# Patient Record
Sex: FEMALE | Race: WHITE | Employment: OTHER | URBAN - NONMETROPOLITAN AREA
[De-identification: names, ages, dates, MRNs, and addresses within clinical notes are randomized per-mention and may not be internally consistent; named-entity substitution may affect disease eponyms.]

---

## 2017-01-03 RX ORDER — TRAMADOL HYDROCHLORIDE 50 MG/1
TABLET ORAL
Qty: 90 TABLET | Refills: 0 | Status: SHIPPED | OUTPATIENT
Start: 2017-01-03 | End: 2017-02-09 | Stop reason: SDUPTHER

## 2017-01-10 ENCOUNTER — OFFICE VISIT (OUTPATIENT)
Dept: PRIMARY CARE CLINIC | Age: 67
End: 2017-01-10
Payer: MEDICARE

## 2017-01-10 VITALS
BODY MASS INDEX: 46.98 KG/M2 | DIASTOLIC BLOOD PRESSURE: 80 MMHG | OXYGEN SATURATION: 94 % | HEART RATE: 62 BPM | RESPIRATION RATE: 16 BRPM | SYSTOLIC BLOOD PRESSURE: 132 MMHG | TEMPERATURE: 97.2 F | WEIGHT: 261 LBS

## 2017-01-10 DIAGNOSIS — Z79.4 TYPE 2 DIABETES MELLITUS WITH COMPLICATION, WITH LONG-TERM CURRENT USE OF INSULIN (HCC): ICD-10-CM

## 2017-01-10 DIAGNOSIS — E11.8 TYPE 2 DIABETES MELLITUS WITH COMPLICATION, WITH LONG-TERM CURRENT USE OF INSULIN (HCC): ICD-10-CM

## 2017-01-10 DIAGNOSIS — K59.00 CONSTIPATION, UNSPECIFIED CONSTIPATION TYPE: Primary | ICD-10-CM

## 2017-01-10 DIAGNOSIS — G20 PARKINSON DISEASE (HCC): ICD-10-CM

## 2017-01-10 PROCEDURE — 99214 OFFICE O/P EST MOD 30 MIN: CPT | Performed by: NURSE PRACTITIONER

## 2017-01-10 RX ORDER — FLUCONAZOLE 100 MG/1
100 TABLET ORAL DAILY
Qty: 7 TABLET | Refills: 0 | Status: SHIPPED | OUTPATIENT
Start: 2017-01-10 | End: 2017-01-17

## 2017-01-10 ASSESSMENT — ENCOUNTER SYMPTOMS
ABDOMINAL PAIN: 1
CONSTIPATION: 1

## 2017-01-17 ENCOUNTER — TELEPHONE (OUTPATIENT)
Dept: PRIMARY CARE CLINIC | Age: 67
End: 2017-01-17

## 2017-01-17 DIAGNOSIS — R94.4 DECREASED GFR: Primary | ICD-10-CM

## 2017-01-17 DIAGNOSIS — R79.89 ELEVATED PTHRP LEVEL: ICD-10-CM

## 2017-01-17 DIAGNOSIS — E20.1 PSEUDOHYPOPARATHYROIDISM: ICD-10-CM

## 2017-01-17 DIAGNOSIS — R79.89 ABNORMAL TSH: ICD-10-CM

## 2017-01-18 RX ORDER — LOSARTAN POTASSIUM AND HYDROCHLOROTHIAZIDE 25; 100 MG/1; MG/1
TABLET ORAL
Qty: 30 TABLET | Refills: 3 | Status: SHIPPED | OUTPATIENT
Start: 2017-01-18 | End: 2017-04-17 | Stop reason: DRUGHIGH

## 2017-01-18 RX ORDER — OMEPRAZOLE 40 MG/1
CAPSULE, DELAYED RELEASE ORAL
Qty: 30 CAPSULE | Refills: 3 | Status: SHIPPED | OUTPATIENT
Start: 2017-01-18 | End: 2017-05-23 | Stop reason: SDUPTHER

## 2017-01-23 ENCOUNTER — NURSE ONLY (OUTPATIENT)
Dept: PRIMARY CARE CLINIC | Age: 67
End: 2017-01-23
Payer: MEDICARE

## 2017-01-23 DIAGNOSIS — E20.1 PSEUDOHYPOPARATHYROIDISM: ICD-10-CM

## 2017-01-23 DIAGNOSIS — R79.89 ABNORMAL TSH: ICD-10-CM

## 2017-01-23 DIAGNOSIS — R94.4 DECREASED GFR: ICD-10-CM

## 2017-01-23 LAB
ANION GAP SERPL CALCULATED.3IONS-SCNC: 19 MMOL/L (ref 7–19)
BUN BLDV-MCNC: 42 MG/DL (ref 8–23)
CALCIUM SERPL-MCNC: 10 MG/DL (ref 8.8–10.2)
CHLORIDE BLD-SCNC: 99 MMOL/L (ref 98–111)
CO2: 19 MMOL/L (ref 22–29)
CREAT SERPL-MCNC: 1.4 MG/DL (ref 0.5–0.9)
GFR NON-AFRICAN AMERICAN: 38
GLUCOSE BLD-MCNC: 317 MG/DL (ref 74–109)
POTASSIUM SERPL-SCNC: 5.5 MMOL/L (ref 3.5–5)
SODIUM BLD-SCNC: 137 MMOL/L (ref 136–145)
T4 FREE: 1.6 NG/ML (ref 0.9–1.7)
TSH SERPL DL<=0.05 MIU/L-ACNC: 9.49 UIU/ML (ref 0.27–4.2)

## 2017-01-23 PROCEDURE — 36415 COLL VENOUS BLD VENIPUNCTURE: CPT | Performed by: NURSE PRACTITIONER

## 2017-01-24 DIAGNOSIS — I48.0 PAF (PAROXYSMAL ATRIAL FIBRILLATION) (HCC): ICD-10-CM

## 2017-01-24 DIAGNOSIS — N18.32 CHRONIC KIDNEY DISEASE (CKD) STAGE G3B/A3, MODERATELY DECREASED GLOMERULAR FILTRATION RATE (GFR) BETWEEN 30-44 ML/MIN/1.73 SQUARE METER AND ALBUMINURIA CREATININE RATIO GREATER THAN 300 MG/G (HCC): Primary | ICD-10-CM

## 2017-01-24 DIAGNOSIS — Z79.4 TYPE 2 DIABETES MELLITUS WITH COMPLICATION, WITH LONG-TERM CURRENT USE OF INSULIN (HCC): ICD-10-CM

## 2017-01-24 DIAGNOSIS — E11.8 TYPE 2 DIABETES MELLITUS WITH COMPLICATION, WITH LONG-TERM CURRENT USE OF INSULIN (HCC): ICD-10-CM

## 2017-01-25 LAB
THYROGLOBULIN AB: <0.9 IU/ML (ref 0–4)
THYROID PEROXIDASE (TPO) ABS: 3.5 IU/ML (ref 0–9)

## 2017-01-26 ENCOUNTER — TELEPHONE (OUTPATIENT)
Dept: CARDIOLOGY | Age: 67
End: 2017-01-26

## 2017-01-30 ENCOUNTER — APPOINTMENT (OUTPATIENT)
Dept: CT IMAGING | Age: 67
End: 2017-01-30
Payer: MEDICARE

## 2017-01-30 ENCOUNTER — HOSPITAL ENCOUNTER (EMERGENCY)
Age: 67
Discharge: HOME | End: 2017-01-30
Payer: MEDICARE

## 2017-01-30 VITALS
DIASTOLIC BLOOD PRESSURE: 93 MMHG | HEART RATE: 81 BPM | SYSTOLIC BLOOD PRESSURE: 168 MMHG | BODY MASS INDEX: 44.65 KG/M2 | TEMPERATURE: 97.9 F | OXYGEN SATURATION: 96 % | WEIGHT: 252 LBS | HEIGHT: 63 IN | RESPIRATION RATE: 18 BRPM

## 2017-01-30 DIAGNOSIS — S32.591A PUBIC RAMUS FRACTURE, RIGHT, CLOSED, INITIAL ENCOUNTER (HCC): Primary | ICD-10-CM

## 2017-01-30 PROCEDURE — 99283 EMERGENCY DEPT VISIT LOW MDM: CPT | Performed by: NURSE PRACTITIONER

## 2017-01-30 PROCEDURE — 96372 THER/PROPH/DIAG INJ SC/IM: CPT

## 2017-01-30 PROCEDURE — 6360000002 HC RX W HCPCS: Performed by: NURSE PRACTITIONER

## 2017-01-30 PROCEDURE — 99283 EMERGENCY DEPT VISIT LOW MDM: CPT

## 2017-01-30 PROCEDURE — 72192 CT PELVIS W/O DYE: CPT

## 2017-01-30 RX ORDER — PROMETHAZINE HYDROCHLORIDE 25 MG/ML
25 INJECTION, SOLUTION INTRAMUSCULAR; INTRAVENOUS ONCE
Status: COMPLETED | OUTPATIENT
Start: 2017-01-30 | End: 2017-01-30

## 2017-01-30 RX ORDER — HYDROCODONE BITARTRATE AND ACETAMINOPHEN 7.5; 325 MG/1; MG/1
1 TABLET ORAL EVERY 6 HOURS PRN
Qty: 25 TABLET | Refills: 0 | Status: SHIPPED | OUTPATIENT
Start: 2017-01-30 | End: 2017-09-05 | Stop reason: SDUPTHER

## 2017-01-30 RX ADMIN — HYDROMORPHONE HYDROCHLORIDE 1 MG: 1 INJECTION, SOLUTION INTRAMUSCULAR; INTRAVENOUS; SUBCUTANEOUS at 15:48

## 2017-01-30 RX ADMIN — PROMETHAZINE HYDROCHLORIDE 25 MG: 25 INJECTION INTRAMUSCULAR; INTRAVENOUS at 15:48

## 2017-01-30 ASSESSMENT — ENCOUNTER SYMPTOMS
GASTROINTESTINAL NEGATIVE: 1
RESPIRATORY NEGATIVE: 1

## 2017-01-30 ASSESSMENT — PAIN SCALES - GENERAL: PAINLEVEL_OUTOF10: 7

## 2017-02-01 ENCOUNTER — TELEPHONE (OUTPATIENT)
Dept: CARDIOLOGY | Age: 67
End: 2017-02-01

## 2017-02-01 DIAGNOSIS — G20 PARKINSON DISEASE (HCC): ICD-10-CM

## 2017-02-01 DIAGNOSIS — E11.8 TYPE 2 DIABETES MELLITUS WITH COMPLICATION (HCC): ICD-10-CM

## 2017-02-01 DIAGNOSIS — R19.7 DIARRHEA: ICD-10-CM

## 2017-02-02 RX ORDER — FUROSEMIDE 20 MG/1
TABLET ORAL
Qty: 90 TABLET | Refills: 3 | Status: SHIPPED | OUTPATIENT
Start: 2017-02-02 | End: 2017-10-19 | Stop reason: DRUGHIGH

## 2017-02-03 ENCOUNTER — TELEPHONE (OUTPATIENT)
Dept: PRIMARY CARE CLINIC | Age: 67
End: 2017-02-03

## 2017-02-08 ENCOUNTER — TELEPHONE (OUTPATIENT)
Dept: PRIMARY CARE CLINIC | Age: 67
End: 2017-02-08

## 2017-02-08 DIAGNOSIS — S32.89XG: ICD-10-CM

## 2017-02-09 RX ORDER — TRAMADOL HYDROCHLORIDE 50 MG/1
TABLET ORAL
Qty: 90 TABLET | Refills: 0 | Status: SHIPPED | OUTPATIENT
Start: 2017-02-09 | End: 2017-03-07 | Stop reason: SDUPTHER

## 2017-02-14 DIAGNOSIS — E11.8 TYPE 2 DIABETES MELLITUS WITH COMPLICATION, WITH LONG-TERM CURRENT USE OF INSULIN (HCC): ICD-10-CM

## 2017-02-14 DIAGNOSIS — G20 PARKINSON DISEASE (HCC): Primary | ICD-10-CM

## 2017-02-14 DIAGNOSIS — Z79.4 TYPE 2 DIABETES MELLITUS WITH COMPLICATION, WITH LONG-TERM CURRENT USE OF INSULIN (HCC): ICD-10-CM

## 2017-02-21 ENCOUNTER — OFFICE VISIT (OUTPATIENT)
Dept: PRIMARY CARE CLINIC | Age: 67
End: 2017-02-21
Payer: MEDICARE

## 2017-02-21 VITALS
DIASTOLIC BLOOD PRESSURE: 80 MMHG | HEIGHT: 62 IN | HEART RATE: 79 BPM | RESPIRATION RATE: 16 BRPM | SYSTOLIC BLOOD PRESSURE: 120 MMHG | OXYGEN SATURATION: 97 % | BODY MASS INDEX: 46.38 KG/M2 | TEMPERATURE: 96.7 F | WEIGHT: 252 LBS

## 2017-02-21 DIAGNOSIS — Z13.6 ENCOUNTER FOR LIPID SCREENING FOR CARDIOVASCULAR DISEASE: ICD-10-CM

## 2017-02-21 DIAGNOSIS — Z13.220 ENCOUNTER FOR LIPID SCREENING FOR CARDIOVASCULAR DISEASE: ICD-10-CM

## 2017-02-21 DIAGNOSIS — E11.8 TYPE 2 DIABETES MELLITUS WITH COMPLICATION, WITH LONG-TERM CURRENT USE OF INSULIN (HCC): ICD-10-CM

## 2017-02-21 DIAGNOSIS — R79.89 ELEVATED TSH: Primary | ICD-10-CM

## 2017-02-21 DIAGNOSIS — Z79.4 TYPE 2 DIABETES MELLITUS WITH COMPLICATION, WITH LONG-TERM CURRENT USE OF INSULIN (HCC): ICD-10-CM

## 2017-02-21 LAB
CHOLESTEROL, TOTAL: 250 MG/DL (ref 160–199)
HDLC SERPL-MCNC: 36 MG/DL (ref 65–121)
LDL CHOLESTEROL CALCULATED: 145 MG/DL
TRIGL SERPL-MCNC: 345 MG/DL (ref 150–199)
TSH SERPL DL<=0.05 MIU/L-ACNC: 5.07 UIU/ML (ref 0.27–4.2)

## 2017-02-21 PROCEDURE — 36415 COLL VENOUS BLD VENIPUNCTURE: CPT | Performed by: NURSE PRACTITIONER

## 2017-02-21 PROCEDURE — G8399 PT W/DXA RESULTS DOCUMENT: HCPCS | Performed by: NURSE PRACTITIONER

## 2017-02-21 PROCEDURE — 1123F ACP DISCUSS/DSCN MKR DOCD: CPT | Performed by: NURSE PRACTITIONER

## 2017-02-21 PROCEDURE — G8484 FLU IMMUNIZE NO ADMIN: HCPCS | Performed by: NURSE PRACTITIONER

## 2017-02-21 PROCEDURE — 1036F TOBACCO NON-USER: CPT | Performed by: NURSE PRACTITIONER

## 2017-02-21 PROCEDURE — 1090F PRES/ABSN URINE INCON ASSESS: CPT | Performed by: NURSE PRACTITIONER

## 2017-02-21 PROCEDURE — G8417 CALC BMI ABV UP PARAM F/U: HCPCS | Performed by: NURSE PRACTITIONER

## 2017-02-21 PROCEDURE — 3046F HEMOGLOBIN A1C LEVEL >9.0%: CPT | Performed by: NURSE PRACTITIONER

## 2017-02-21 PROCEDURE — 3017F COLORECTAL CA SCREEN DOC REV: CPT | Performed by: NURSE PRACTITIONER

## 2017-02-21 PROCEDURE — 3014F SCREEN MAMMO DOC REV: CPT | Performed by: NURSE PRACTITIONER

## 2017-02-21 PROCEDURE — 99213 OFFICE O/P EST LOW 20 MIN: CPT | Performed by: NURSE PRACTITIONER

## 2017-02-21 PROCEDURE — 4040F PNEUMOC VAC/ADMIN/RCVD: CPT | Performed by: NURSE PRACTITIONER

## 2017-02-21 PROCEDURE — G8427 DOCREV CUR MEDS BY ELIG CLIN: HCPCS | Performed by: NURSE PRACTITIONER

## 2017-02-21 RX ORDER — BLOOD SUGAR DIAGNOSTIC
1 STRIP MISCELLANEOUS DAILY
Qty: 100 EACH | Refills: 3 | Status: SHIPPED | OUTPATIENT
Start: 2017-02-21 | End: 2017-12-20 | Stop reason: SDUPTHER

## 2017-02-21 RX ORDER — MONTELUKAST SODIUM 10 MG/1
10 TABLET ORAL NIGHTLY
Qty: 30 TABLET | Refills: 6 | Status: SHIPPED | OUTPATIENT
Start: 2017-02-21 | End: 2017-09-20 | Stop reason: SDUPTHER

## 2017-02-22 ENCOUNTER — TELEPHONE (OUTPATIENT)
Dept: PRIMARY CARE CLINIC | Age: 67
End: 2017-02-22

## 2017-02-23 ENCOUNTER — TELEPHONE (OUTPATIENT)
Dept: NEUROLOGY | Age: 67
End: 2017-02-23

## 2017-02-27 ENCOUNTER — TELEPHONE (OUTPATIENT)
Dept: PRIMARY CARE CLINIC | Age: 67
End: 2017-02-27

## 2017-02-27 RX ORDER — ATORVASTATIN CALCIUM 10 MG/1
10 TABLET, FILM COATED ORAL DAILY
Qty: 30 TABLET | Refills: 3 | Status: SHIPPED | OUTPATIENT
Start: 2017-02-27 | End: 2017-06-21 | Stop reason: SDUPTHER

## 2017-03-07 RX ORDER — TRAMADOL HYDROCHLORIDE 50 MG/1
TABLET ORAL
Qty: 90 TABLET | Refills: 0 | Status: SHIPPED | OUTPATIENT
Start: 2017-03-07 | End: 2017-04-19 | Stop reason: SDUPTHER

## 2017-03-08 ENCOUNTER — HOSPITAL ENCOUNTER (OUTPATIENT)
Dept: ULTRASOUND IMAGING | Age: 67
Discharge: HOME | End: 2017-03-08
Payer: MEDICARE

## 2017-03-08 DIAGNOSIS — E11.22 TYPE 2 DIABETES MELLITUS WITH ESRD (END-STAGE RENAL DISEASE) (HCC): ICD-10-CM

## 2017-03-08 DIAGNOSIS — N18.6 TYPE 2 DIABETES MELLITUS WITH ESRD (END-STAGE RENAL DISEASE) (HCC): ICD-10-CM

## 2017-03-08 DIAGNOSIS — I10 ESSENTIAL HYPERTENSION, BENIGN: ICD-10-CM

## 2017-03-08 PROCEDURE — 76770 US EXAM ABDO BACK WALL COMP: CPT

## 2017-03-10 ENCOUNTER — TELEPHONE (OUTPATIENT)
Dept: PRIMARY CARE CLINIC | Age: 67
End: 2017-03-10

## 2017-03-17 ENCOUNTER — TELEPHONE (OUTPATIENT)
Dept: PRIMARY CARE CLINIC | Age: 67
End: 2017-03-17

## 2017-03-17 RX ORDER — HYDROCODONE BITARTRATE AND ACETAMINOPHEN 5; 325 MG/1; MG/1
1 TABLET ORAL 2 TIMES DAILY PRN
Qty: 60 TABLET | Refills: 0 | Status: SHIPPED | OUTPATIENT
Start: 2017-03-17 | End: 2017-04-19 | Stop reason: SDUPTHER

## 2017-03-27 ENCOUNTER — TELEPHONE (OUTPATIENT)
Dept: PRIMARY CARE CLINIC | Age: 67
End: 2017-03-27

## 2017-03-30 ENCOUNTER — TELEPHONE (OUTPATIENT)
Dept: PRIMARY CARE CLINIC | Age: 67
End: 2017-03-30

## 2017-03-30 DIAGNOSIS — E11.8 TYPE 2 DIABETES MELLITUS WITH COMPLICATION, WITH LONG-TERM CURRENT USE OF INSULIN (HCC): Primary | ICD-10-CM

## 2017-03-30 DIAGNOSIS — Z79.4 TYPE 2 DIABETES MELLITUS WITH COMPLICATION, WITH LONG-TERM CURRENT USE OF INSULIN (HCC): Primary | ICD-10-CM

## 2017-03-31 RX ORDER — SPIRONOLACTONE 25 MG/1
25 TABLET ORAL DAILY
Qty: 30 TABLET | Refills: 5 | Status: SHIPPED | OUTPATIENT
Start: 2017-03-31 | End: 2017-04-17 | Stop reason: SDUPTHER

## 2017-04-06 ENCOUNTER — OFFICE VISIT (OUTPATIENT)
Dept: NEUROLOGY | Age: 67
End: 2017-04-06
Payer: MEDICARE

## 2017-04-06 VITALS
RESPIRATION RATE: 22 BRPM | SYSTOLIC BLOOD PRESSURE: 138 MMHG | WEIGHT: 257 LBS | DIASTOLIC BLOOD PRESSURE: 84 MMHG | HEART RATE: 82 BPM | HEIGHT: 62 IN | BODY MASS INDEX: 47.29 KG/M2

## 2017-04-06 DIAGNOSIS — R20.1 HYPOESTHESIA OF SKIN: ICD-10-CM

## 2017-04-06 DIAGNOSIS — G25.81 RESTLESS LEGS SYNDROME: ICD-10-CM

## 2017-04-06 DIAGNOSIS — G20 PARKINSON DISEASE (HCC): Primary | ICD-10-CM

## 2017-04-06 PROCEDURE — G8399 PT W/DXA RESULTS DOCUMENT: HCPCS | Performed by: PSYCHIATRY & NEUROLOGY

## 2017-04-06 PROCEDURE — 99203 OFFICE O/P NEW LOW 30 MIN: CPT | Performed by: PSYCHIATRY & NEUROLOGY

## 2017-04-06 PROCEDURE — 4040F PNEUMOC VAC/ADMIN/RCVD: CPT | Performed by: PSYCHIATRY & NEUROLOGY

## 2017-04-06 PROCEDURE — 3017F COLORECTAL CA SCREEN DOC REV: CPT | Performed by: PSYCHIATRY & NEUROLOGY

## 2017-04-06 PROCEDURE — 1036F TOBACCO NON-USER: CPT | Performed by: PSYCHIATRY & NEUROLOGY

## 2017-04-06 PROCEDURE — 3014F SCREEN MAMMO DOC REV: CPT | Performed by: PSYCHIATRY & NEUROLOGY

## 2017-04-06 PROCEDURE — 1123F ACP DISCUSS/DSCN MKR DOCD: CPT | Performed by: PSYCHIATRY & NEUROLOGY

## 2017-04-06 PROCEDURE — 1090F PRES/ABSN URINE INCON ASSESS: CPT | Performed by: PSYCHIATRY & NEUROLOGY

## 2017-04-06 PROCEDURE — G8417 CALC BMI ABV UP PARAM F/U: HCPCS | Performed by: PSYCHIATRY & NEUROLOGY

## 2017-04-06 PROCEDURE — G8427 DOCREV CUR MEDS BY ELIG CLIN: HCPCS | Performed by: PSYCHIATRY & NEUROLOGY

## 2017-04-06 RX ORDER — ROPINIROLE 8 MG/1
8 TABLET, FILM COATED, EXTENDED RELEASE ORAL NIGHTLY
Qty: 30 TABLET | Refills: 5 | Status: SHIPPED | OUTPATIENT
Start: 2017-04-06 | End: 2017-10-09 | Stop reason: SDUPTHER

## 2017-04-06 RX ORDER — ROPINIROLE 12 MG/1
12 TABLET, FILM COATED, EXTENDED RELEASE ORAL DAILY
Qty: 30 TABLET | Refills: 5 | Status: SHIPPED | OUTPATIENT
Start: 2017-04-06 | End: 2017-10-09 | Stop reason: SDUPTHER

## 2017-04-12 RX ORDER — GLYBURIDE 5 MG/1
TABLET ORAL
Qty: 60 TABLET | Refills: 3 | Status: SHIPPED | OUTPATIENT
Start: 2017-04-12 | End: 2017-06-09 | Stop reason: ALTCHOICE

## 2017-04-14 RX ORDER — INSULIN DETEMIR 100 [IU]/ML
INJECTION, SOLUTION SUBCUTANEOUS
Qty: 20 ML | Refills: 5 | Status: SHIPPED | OUTPATIENT
Start: 2017-04-14 | End: 2017-05-22 | Stop reason: SDUPTHER

## 2017-04-17 ENCOUNTER — OFFICE VISIT (OUTPATIENT)
Dept: CARDIOLOGY | Age: 67
End: 2017-04-17
Payer: MEDICARE

## 2017-04-17 VITALS
DIASTOLIC BLOOD PRESSURE: 82 MMHG | BODY MASS INDEX: 47.29 KG/M2 | SYSTOLIC BLOOD PRESSURE: 110 MMHG | WEIGHT: 257 LBS | HEART RATE: 72 BPM | HEIGHT: 62 IN

## 2017-04-17 DIAGNOSIS — I48.0 PAROXYSMAL ATRIAL FIBRILLATION (HCC): Primary | ICD-10-CM

## 2017-04-17 PROCEDURE — 1090F PRES/ABSN URINE INCON ASSESS: CPT | Performed by: INTERNAL MEDICINE

## 2017-04-17 PROCEDURE — 1036F TOBACCO NON-USER: CPT | Performed by: INTERNAL MEDICINE

## 2017-04-17 PROCEDURE — 4040F PNEUMOC VAC/ADMIN/RCVD: CPT | Performed by: INTERNAL MEDICINE

## 2017-04-17 PROCEDURE — 1123F ACP DISCUSS/DSCN MKR DOCD: CPT | Performed by: INTERNAL MEDICINE

## 2017-04-17 PROCEDURE — 99213 OFFICE O/P EST LOW 20 MIN: CPT | Performed by: INTERNAL MEDICINE

## 2017-04-17 PROCEDURE — G8399 PT W/DXA RESULTS DOCUMENT: HCPCS | Performed by: INTERNAL MEDICINE

## 2017-04-17 PROCEDURE — G8417 CALC BMI ABV UP PARAM F/U: HCPCS | Performed by: INTERNAL MEDICINE

## 2017-04-17 PROCEDURE — G8427 DOCREV CUR MEDS BY ELIG CLIN: HCPCS | Performed by: INTERNAL MEDICINE

## 2017-04-17 PROCEDURE — 3017F COLORECTAL CA SCREEN DOC REV: CPT | Performed by: INTERNAL MEDICINE

## 2017-04-17 PROCEDURE — 3014F SCREEN MAMMO DOC REV: CPT | Performed by: INTERNAL MEDICINE

## 2017-04-17 RX ORDER — SOTALOL HYDROCHLORIDE 120 MG/1
120 TABLET ORAL 2 TIMES DAILY
COMMUNITY
End: 2017-04-17 | Stop reason: SDUPTHER

## 2017-04-17 RX ORDER — SOTALOL HYDROCHLORIDE 120 MG/1
120 TABLET ORAL 2 TIMES DAILY
Qty: 60 TABLET | Refills: 5 | Status: SHIPPED | OUTPATIENT
Start: 2017-04-17 | End: 2017-10-11 | Stop reason: SDUPTHER

## 2017-04-17 RX ORDER — SYRINGE AND NEEDLE,INSULIN,1ML 30 GX5/16"
SYRINGE, EMPTY DISPOSABLE MISCELLANEOUS
COMMUNITY
Start: 2017-03-03 | End: 2019-08-13 | Stop reason: SDUPTHER

## 2017-04-17 RX ORDER — LOSARTAN POTASSIUM 100 MG/1
100 TABLET ORAL DAILY
Qty: 30 TABLET | Refills: 5 | Status: SHIPPED | OUTPATIENT
Start: 2017-04-17 | End: 2017-10-11 | Stop reason: SDUPTHER

## 2017-04-17 RX ORDER — SPIRONOLACTONE 25 MG/1
25 TABLET ORAL DAILY
Qty: 30 TABLET | Refills: 5 | Status: SHIPPED | OUTPATIENT
Start: 2017-04-17 | End: 2017-10-11 | Stop reason: SDUPTHER

## 2017-04-17 RX ORDER — LOSARTAN POTASSIUM 100 MG/1
100 TABLET ORAL DAILY
COMMUNITY
Start: 2017-03-28 | End: 2017-04-17 | Stop reason: SDUPTHER

## 2017-04-19 RX ORDER — HYDROCODONE BITARTRATE AND ACETAMINOPHEN 5; 325 MG/1; MG/1
TABLET ORAL
Qty: 60 TABLET | Refills: 0 | Status: SHIPPED | OUTPATIENT
Start: 2017-04-19 | End: 2017-05-22 | Stop reason: DRUGHIGH

## 2017-04-19 RX ORDER — TRAMADOL HYDROCHLORIDE 50 MG/1
TABLET ORAL
Qty: 90 TABLET | Refills: 0 | Status: SHIPPED | OUTPATIENT
Start: 2017-04-19 | End: 2017-05-22 | Stop reason: SDUPTHER

## 2017-04-23 ASSESSMENT — ENCOUNTER SYMPTOMS
BLOOD IN STOOL: 0
SPUTUM PRODUCTION: 0
VOMITING: 0
NAUSEA: 0
DIARRHEA: 0
COUGH: 0
BLURRED VISION: 0
ABDOMINAL PAIN: 0
SHORTNESS OF BREATH: 1
CONSTIPATION: 0
BACK PAIN: 0
DOUBLE VISION: 0
HEMOPTYSIS: 0
ORTHOPNEA: 0

## 2017-04-28 ENCOUNTER — TELEPHONE (OUTPATIENT)
Dept: PRIMARY CARE CLINIC | Age: 67
End: 2017-04-28

## 2017-05-22 ENCOUNTER — OFFICE VISIT (OUTPATIENT)
Dept: PRIMARY CARE CLINIC | Age: 67
End: 2017-05-22
Payer: MEDICARE

## 2017-05-22 VITALS
DIASTOLIC BLOOD PRESSURE: 92 MMHG | HEART RATE: 68 BPM | SYSTOLIC BLOOD PRESSURE: 140 MMHG | RESPIRATION RATE: 16 BRPM | TEMPERATURE: 97.9 F

## 2017-05-22 DIAGNOSIS — E11.8 TYPE 2 DIABETES MELLITUS WITH COMPLICATION, WITH LONG-TERM CURRENT USE OF INSULIN (HCC): Primary | ICD-10-CM

## 2017-05-22 DIAGNOSIS — R79.89 ELEVATED TSH: ICD-10-CM

## 2017-05-22 DIAGNOSIS — E78.5 HYPERLIPIDEMIA, UNSPECIFIED HYPERLIPIDEMIA TYPE: ICD-10-CM

## 2017-05-22 DIAGNOSIS — S32.810S: ICD-10-CM

## 2017-05-22 DIAGNOSIS — Z79.4 TYPE 2 DIABETES MELLITUS WITH COMPLICATION, WITH LONG-TERM CURRENT USE OF INSULIN (HCC): Primary | ICD-10-CM

## 2017-05-22 LAB
ALBUMIN SERPL-MCNC: 4.4 G/DL (ref 3.5–5.2)
ALP BLD-CCNC: 162 U/L (ref 35–104)
ALT SERPL-CCNC: 7 U/L (ref 5–33)
ANION GAP SERPL CALCULATED.3IONS-SCNC: 16 MMOL/L (ref 7–19)
AST SERPL-CCNC: 17 U/L (ref 5–32)
BILIRUB SERPL-MCNC: 0.4 MG/DL (ref 0.2–1.2)
BUN BLDV-MCNC: 21 MG/DL (ref 8–23)
CALCIUM SERPL-MCNC: 10 MG/DL (ref 8.8–10.2)
CHLORIDE BLD-SCNC: 99 MMOL/L (ref 98–111)
CHOLESTEROL, TOTAL: 172 MG/DL (ref 160–199)
CO2: 24 MMOL/L (ref 22–29)
CREAT SERPL-MCNC: 0.9 MG/DL (ref 0.5–0.9)
GFR NON-AFRICAN AMERICAN: >60
GLUCOSE BLD-MCNC: 137 MG/DL (ref 74–109)
HBA1C MFR BLD: 9.4 %
HDLC SERPL-MCNC: 40 MG/DL (ref 65–121)
LDL CHOLESTEROL CALCULATED: 69 MG/DL
POTASSIUM SERPL-SCNC: 4.3 MMOL/L (ref 3.5–5)
SODIUM BLD-SCNC: 139 MMOL/L (ref 136–145)
TOTAL PROTEIN: 7.1 G/DL (ref 6.6–8.7)
TRIGL SERPL-MCNC: 317 MG/DL (ref 150–199)
TSH SERPL DL<=0.05 MIU/L-ACNC: 3.4 UIU/ML (ref 0.27–4.2)

## 2017-05-22 PROCEDURE — 1090F PRES/ABSN URINE INCON ASSESS: CPT | Performed by: NURSE PRACTITIONER

## 2017-05-22 PROCEDURE — 4040F PNEUMOC VAC/ADMIN/RCVD: CPT | Performed by: NURSE PRACTITIONER

## 2017-05-22 PROCEDURE — 1036F TOBACCO NON-USER: CPT | Performed by: NURSE PRACTITIONER

## 2017-05-22 PROCEDURE — G8427 DOCREV CUR MEDS BY ELIG CLIN: HCPCS | Performed by: NURSE PRACTITIONER

## 2017-05-22 PROCEDURE — G8399 PT W/DXA RESULTS DOCUMENT: HCPCS | Performed by: NURSE PRACTITIONER

## 2017-05-22 PROCEDURE — 1123F ACP DISCUSS/DSCN MKR DOCD: CPT | Performed by: NURSE PRACTITIONER

## 2017-05-22 PROCEDURE — 3017F COLORECTAL CA SCREEN DOC REV: CPT | Performed by: NURSE PRACTITIONER

## 2017-05-22 PROCEDURE — G8417 CALC BMI ABV UP PARAM F/U: HCPCS | Performed by: NURSE PRACTITIONER

## 2017-05-22 PROCEDURE — 3046F HEMOGLOBIN A1C LEVEL >9.0%: CPT | Performed by: NURSE PRACTITIONER

## 2017-05-22 PROCEDURE — 99214 OFFICE O/P EST MOD 30 MIN: CPT | Performed by: NURSE PRACTITIONER

## 2017-05-22 PROCEDURE — 3014F SCREEN MAMMO DOC REV: CPT | Performed by: NURSE PRACTITIONER

## 2017-05-22 PROCEDURE — 36415 COLL VENOUS BLD VENIPUNCTURE: CPT | Performed by: NURSE PRACTITIONER

## 2017-05-22 RX ORDER — HYDROCODONE BITARTRATE AND ACETAMINOPHEN 5; 325 MG/1; MG/1
TABLET ORAL
Qty: 90 TABLET | Refills: 0 | Status: SHIPPED | OUTPATIENT
Start: 2017-05-22 | End: 2017-07-06 | Stop reason: ALTCHOICE

## 2017-05-22 RX ORDER — TRAMADOL HYDROCHLORIDE 50 MG/1
50 TABLET ORAL DAILY
Qty: 30 TABLET | Refills: 0 | Status: SHIPPED | OUTPATIENT
Start: 2017-05-22 | End: 2017-07-03 | Stop reason: SDUPTHER

## 2017-05-22 RX ORDER — LORAZEPAM 1 MG/1
1 TABLET ORAL 2 TIMES DAILY PRN
Qty: 60 TABLET | Refills: 0 | Status: SHIPPED | OUTPATIENT
Start: 2017-05-22 | End: 2017-06-21

## 2017-05-23 RX ORDER — OMEPRAZOLE 40 MG/1
CAPSULE, DELAYED RELEASE ORAL
Qty: 30 CAPSULE | Refills: 5 | Status: ON HOLD | OUTPATIENT
Start: 2017-05-23 | End: 2018-01-03 | Stop reason: SDUPTHER

## 2017-06-02 ASSESSMENT — ENCOUNTER SYMPTOMS: BACK PAIN: 1

## 2017-06-22 RX ORDER — GABAPENTIN 300 MG/1
CAPSULE ORAL
Qty: 180 CAPSULE | Refills: 1 | Status: SHIPPED | OUTPATIENT
Start: 2017-06-22 | End: 2017-08-16 | Stop reason: SDUPTHER

## 2017-06-22 RX ORDER — ATORVASTATIN CALCIUM 10 MG/1
TABLET, FILM COATED ORAL
Qty: 30 TABLET | Refills: 3 | Status: SHIPPED | OUTPATIENT
Start: 2017-06-22 | End: 2017-10-11 | Stop reason: SDUPTHER

## 2017-06-22 RX ORDER — ATORVASTATIN CALCIUM 10 MG/1
TABLET, FILM COATED ORAL
Qty: 30 TABLET | Refills: 5 | Status: SHIPPED | OUTPATIENT
Start: 2017-06-22 | End: 2017-07-06 | Stop reason: SDUPTHER

## 2017-06-29 ENCOUNTER — TELEPHONE (OUTPATIENT)
Dept: PRIMARY CARE CLINIC | Age: 67
End: 2017-06-29

## 2017-07-05 RX ORDER — TRAMADOL HYDROCHLORIDE 50 MG/1
TABLET ORAL
Qty: 30 TABLET | Refills: 0 | Status: SHIPPED | OUTPATIENT
Start: 2017-07-05 | End: 2017-08-02 | Stop reason: SDUPTHER

## 2017-07-06 ENCOUNTER — OFFICE VISIT (OUTPATIENT)
Dept: PRIMARY CARE CLINIC | Age: 67
End: 2017-07-06
Payer: MEDICARE

## 2017-07-06 VITALS
TEMPERATURE: 97.3 F | HEART RATE: 79 BPM | DIASTOLIC BLOOD PRESSURE: 70 MMHG | SYSTOLIC BLOOD PRESSURE: 132 MMHG | HEIGHT: 63 IN | RESPIRATION RATE: 16 BRPM | WEIGHT: 266 LBS | BODY MASS INDEX: 47.13 KG/M2 | OXYGEN SATURATION: 96 %

## 2017-07-06 DIAGNOSIS — E11.8 TYPE 2 DIABETES MELLITUS WITH COMPLICATION, WITH LONG-TERM CURRENT USE OF INSULIN (HCC): Primary | ICD-10-CM

## 2017-07-06 DIAGNOSIS — J45.20 MILD INTERMITTENT ASTHMA WITHOUT COMPLICATION: ICD-10-CM

## 2017-07-06 DIAGNOSIS — R06.2 WHEEZING: ICD-10-CM

## 2017-07-06 DIAGNOSIS — G20 PARKINSON DISEASE (HCC): ICD-10-CM

## 2017-07-06 DIAGNOSIS — R06.02 SHORTNESS OF BREATH: ICD-10-CM

## 2017-07-06 DIAGNOSIS — Z79.4 TYPE 2 DIABETES MELLITUS WITH COMPLICATION, WITH LONG-TERM CURRENT USE OF INSULIN (HCC): Primary | ICD-10-CM

## 2017-07-06 DIAGNOSIS — Z79.899 MEDICATION MANAGEMENT: ICD-10-CM

## 2017-07-06 PROCEDURE — 1090F PRES/ABSN URINE INCON ASSESS: CPT | Performed by: NURSE PRACTITIONER

## 2017-07-06 PROCEDURE — G8399 PT W/DXA RESULTS DOCUMENT: HCPCS | Performed by: NURSE PRACTITIONER

## 2017-07-06 PROCEDURE — G8417 CALC BMI ABV UP PARAM F/U: HCPCS | Performed by: NURSE PRACTITIONER

## 2017-07-06 PROCEDURE — 99213 OFFICE O/P EST LOW 20 MIN: CPT | Performed by: NURSE PRACTITIONER

## 2017-07-06 PROCEDURE — 1123F ACP DISCUSS/DSCN MKR DOCD: CPT | Performed by: NURSE PRACTITIONER

## 2017-07-06 PROCEDURE — 3017F COLORECTAL CA SCREEN DOC REV: CPT | Performed by: NURSE PRACTITIONER

## 2017-07-06 PROCEDURE — 4040F PNEUMOC VAC/ADMIN/RCVD: CPT | Performed by: NURSE PRACTITIONER

## 2017-07-06 PROCEDURE — 3014F SCREEN MAMMO DOC REV: CPT | Performed by: NURSE PRACTITIONER

## 2017-07-06 PROCEDURE — 3046F HEMOGLOBIN A1C LEVEL >9.0%: CPT | Performed by: NURSE PRACTITIONER

## 2017-07-06 PROCEDURE — 1036F TOBACCO NON-USER: CPT | Performed by: NURSE PRACTITIONER

## 2017-07-06 PROCEDURE — G8427 DOCREV CUR MEDS BY ELIG CLIN: HCPCS | Performed by: NURSE PRACTITIONER

## 2017-07-06 RX ORDER — ALBUTEROL SULFATE 1.25 MG/3ML
1 SOLUTION RESPIRATORY (INHALATION) EVERY 6 HOURS PRN
Qty: 360 ML | Refills: 3 | Status: SHIPPED | OUTPATIENT
Start: 2017-07-06 | End: 2020-08-24 | Stop reason: SDUPTHER

## 2017-07-06 RX ORDER — LIRAGLUTIDE 6 MG/ML
1.8 INJECTION SUBCUTANEOUS DAILY
COMMUNITY
Start: 2017-06-21 | End: 2017-07-22 | Stop reason: SDUPTHER

## 2017-07-06 RX ORDER — LORAZEPAM 1 MG/1
0.5 TABLET ORAL DAILY PRN
COMMUNITY
Start: 2017-06-12 | End: 2018-03-02 | Stop reason: SDUPTHER

## 2017-07-12 LAB — MISCELLANEOUS LAB TEST ORDER: ABNORMAL

## 2017-07-24 RX ORDER — LIRAGLUTIDE 6 MG/ML
INJECTION SUBCUTANEOUS
Qty: 9 ML | Refills: 5 | Status: ON HOLD | OUTPATIENT
Start: 2017-07-24 | End: 2018-01-05 | Stop reason: HOSPADM

## 2017-08-03 RX ORDER — TRAMADOL HYDROCHLORIDE 50 MG/1
TABLET ORAL
Qty: 30 TABLET | Refills: 0 | Status: SHIPPED | OUTPATIENT
Start: 2017-08-03 | End: 2017-08-30 | Stop reason: SDUPTHER

## 2017-08-17 RX ORDER — GABAPENTIN 300 MG/1
300 CAPSULE ORAL 3 TIMES DAILY
Qty: 180 CAPSULE | Refills: 1 | Status: SHIPPED | OUTPATIENT
Start: 2017-08-17 | End: 2017-10-09 | Stop reason: SDUPTHER

## 2017-08-30 RX ORDER — TRAMADOL HYDROCHLORIDE 50 MG/1
TABLET ORAL
Qty: 30 TABLET | Refills: 0 | Status: SHIPPED | OUTPATIENT
Start: 2017-08-30 | End: 2017-09-29 | Stop reason: SDUPTHER

## 2017-08-30 RX ORDER — HYDROCODONE BITARTRATE AND ACETAMINOPHEN 5; 325 MG/1; MG/1
TABLET ORAL
Qty: 90 TABLET | Refills: 0 | OUTPATIENT
Start: 2017-08-30

## 2017-09-01 ENCOUNTER — TELEPHONE (OUTPATIENT)
Dept: PRIMARY CARE CLINIC | Age: 67
End: 2017-09-01

## 2017-09-05 RX ORDER — HYDROCODONE BITARTRATE AND ACETAMINOPHEN 7.5; 325 MG/1; MG/1
1 TABLET ORAL EVERY 6 HOURS PRN
Qty: 12 TABLET | Refills: 0 | Status: SHIPPED | OUTPATIENT
Start: 2017-09-05 | End: 2017-09-08

## 2017-09-20 RX ORDER — MONTELUKAST SODIUM 10 MG/1
TABLET ORAL
Qty: 30 TABLET | Refills: 3 | Status: SHIPPED | OUTPATIENT
Start: 2017-09-20 | End: 2018-01-16 | Stop reason: SDUPTHER

## 2017-09-26 ENCOUNTER — TELEPHONE (OUTPATIENT)
Dept: PRIMARY CARE CLINIC | Age: 67
End: 2017-09-26

## 2017-09-29 RX ORDER — TRAMADOL HYDROCHLORIDE 50 MG/1
TABLET ORAL
Qty: 30 TABLET | Refills: 0 | OUTPATIENT
Start: 2017-09-29

## 2017-09-29 RX ORDER — TRAMADOL HYDROCHLORIDE 50 MG/1
TABLET ORAL
Qty: 30 TABLET | Refills: 0 | Status: SHIPPED | OUTPATIENT
Start: 2017-09-29 | End: 2017-11-01 | Stop reason: SDUPTHER

## 2017-10-09 ENCOUNTER — OFFICE VISIT (OUTPATIENT)
Dept: NEUROLOGY | Age: 67
End: 2017-10-09
Payer: MEDICARE

## 2017-10-09 ENCOUNTER — TELEPHONE (OUTPATIENT)
Dept: NEUROLOGY | Age: 67
End: 2017-10-09

## 2017-10-09 VITALS
SYSTOLIC BLOOD PRESSURE: 170 MMHG | DIASTOLIC BLOOD PRESSURE: 88 MMHG | HEART RATE: 80 BPM | WEIGHT: 270 LBS | RESPIRATION RATE: 18 BRPM | HEIGHT: 62 IN | BODY MASS INDEX: 49.69 KG/M2

## 2017-10-09 DIAGNOSIS — G20 PARKINSON DISEASE (HCC): Primary | ICD-10-CM

## 2017-10-09 DIAGNOSIS — R40.0 SOMNOLENCE, DAYTIME: ICD-10-CM

## 2017-10-09 DIAGNOSIS — G25.81 RESTLESS LEGS SYNDROME: ICD-10-CM

## 2017-10-09 DIAGNOSIS — R44.1 VISUAL HALLUCINATIONS: ICD-10-CM

## 2017-10-09 DIAGNOSIS — R20.1 HYPOESTHESIA OF SKIN: ICD-10-CM

## 2017-10-09 PROCEDURE — 1090F PRES/ABSN URINE INCON ASSESS: CPT | Performed by: PSYCHIATRY & NEUROLOGY

## 2017-10-09 PROCEDURE — G8427 DOCREV CUR MEDS BY ELIG CLIN: HCPCS | Performed by: PSYCHIATRY & NEUROLOGY

## 2017-10-09 PROCEDURE — 3017F COLORECTAL CA SCREEN DOC REV: CPT | Performed by: PSYCHIATRY & NEUROLOGY

## 2017-10-09 PROCEDURE — 1036F TOBACCO NON-USER: CPT | Performed by: PSYCHIATRY & NEUROLOGY

## 2017-10-09 PROCEDURE — G8484 FLU IMMUNIZE NO ADMIN: HCPCS | Performed by: PSYCHIATRY & NEUROLOGY

## 2017-10-09 PROCEDURE — G8417 CALC BMI ABV UP PARAM F/U: HCPCS | Performed by: PSYCHIATRY & NEUROLOGY

## 2017-10-09 PROCEDURE — 99214 OFFICE O/P EST MOD 30 MIN: CPT | Performed by: PSYCHIATRY & NEUROLOGY

## 2017-10-09 PROCEDURE — 3014F SCREEN MAMMO DOC REV: CPT | Performed by: PSYCHIATRY & NEUROLOGY

## 2017-10-09 PROCEDURE — 4040F PNEUMOC VAC/ADMIN/RCVD: CPT | Performed by: PSYCHIATRY & NEUROLOGY

## 2017-10-09 PROCEDURE — 1123F ACP DISCUSS/DSCN MKR DOCD: CPT | Performed by: PSYCHIATRY & NEUROLOGY

## 2017-10-09 PROCEDURE — G8399 PT W/DXA RESULTS DOCUMENT: HCPCS | Performed by: PSYCHIATRY & NEUROLOGY

## 2017-10-09 RX ORDER — ROPINIROLE 12 MG/1
12 TABLET, FILM COATED, EXTENDED RELEASE ORAL DAILY
Qty: 30 TABLET | Refills: 5 | Status: ON HOLD | OUTPATIENT
Start: 2017-10-09 | End: 2018-01-03 | Stop reason: HOSPADM

## 2017-10-09 RX ORDER — ROPINIROLE 8 MG/1
8 TABLET, FILM COATED, EXTENDED RELEASE ORAL NIGHTLY
Qty: 30 TABLET | Refills: 5 | Status: ON HOLD | OUTPATIENT
Start: 2017-10-09 | End: 2018-01-03 | Stop reason: HOSPADM

## 2017-10-09 RX ORDER — GABAPENTIN 300 MG/1
600 CAPSULE ORAL 2 TIMES DAILY
Qty: 120 CAPSULE | Refills: 5 | Status: SHIPPED | OUTPATIENT
Start: 2017-10-09 | End: 2018-01-22

## 2017-10-09 NOTE — PATIENT INSTRUCTIONS
INSTRUCTIONS:  1. Continue the same medications  2. Try using a wrist split for the right carpal tunnel syndrome  3. Will arrange a nerve conduction study and electromyogram of the right upper extremity  4.  I recommend a sleep study and likely use of BiPAP

## 2017-10-09 NOTE — PROGRESS NOTES
GASTROINTESTINAL ENDOSCOPY         Recent Hospitalizations  · None    Significant Injuries  · None    Habits  Vane Weeks reports that she has never smoked. She has never used smokeless tobacco. She reports that she does not drink alcohol or use drugs. Family History   Problem Relation Age of Onset    High Blood Pressure Father     Diabetes Father     Parkinsonism Father     High Blood Pressure Mother     Diabetes Sister     Other Paternal Grandmother      hiatal hernia    Heart Disease Paternal Grandfather        Social History  Vane Weeks is , lives in [address] [place] [address], and is retired. Medications:  Current Outpatient Prescriptions   Medication Sig Dispense Refill    traMADol (ULTRAM) 50 MG tablet TAKE ONE TABLET DAILY AS NEEDED FOR PAIN . . .MAY CAUSE DROWSINESS! ! 30 tablet 0    montelukast (SINGULAIR) 10 MG tablet TAKE 1 TABLET BY MOUTH NIGHTLY 30 tablet 3    gabapentin (NEURONTIN) 300 MG capsule Take 1 capsule by mouth 3 times daily (Patient taking differently: Take 300 mg by mouth 3 times daily ) 180 capsule 1    VICTOZA 18 MG/3ML SOPN SC injection INJECT 1.2MG SUB-Q EVERY DAY 9 mL 5    LORazepam (ATIVAN) 1 MG tablet Take 1 mg by mouth as needed       Docusate Calcium (STOOL SOFTENER PO) Take by mouth      Inulin (FIBER CHOICE PO) Take by mouth      Cholecalciferol (VITAMIN D3) 5000 units TABS Take by mouth      insulin detemir (LEVEMIR) 100 UNIT/ML injection vial Inject 65 Units into the skin nightly 1 vial 5    albuterol (ACCUNEB) 1.25 MG/3ML nebulizer solution Inhale 3 mLs into the lungs every 6 hours as needed for Wheezing 360 mL 3    Insulin Pen Needle 32G X 4 MM MISC 1 each by Does not apply route daily 100 each 3    atorvastatin (LIPITOR) 10 MG tablet TAKE ONE TABLET BY MOUTH EVERY DAY EACH EVENING 30 tablet 3    insulin aspart (NOVOLOG FLEXPEN) 100 UNIT/ML injection pen Use as directed (Patient taking differently: Inject into the skin 3 times daily (before meals) Use as directed) 5 Pen 0    omeprazole (PRILOSEC) 40 MG delayed release capsule TAKE ONE CAPSULE BY MOUTH DAILY 30 capsule 5    FORTEO 600 MCG/2.4ML SOLN injection Inject 20 mcg into the skin daily       linaclotide (LINZESS) 145 MCG capsule Take 1 capsule by mouth every morning (before breakfast) 30 capsule 11    SURE COMFORT INS SYR 1CC/30G 30G X 5/16\" 1 ML MISC       losartan (COZAAR) 100 MG tablet Take 1 tablet by mouth daily 30 tablet 5    spironolactone (ALDACTONE) 25 MG tablet Take 1 tablet by mouth daily 30 tablet 5    sotalol (BETAPACE) 120 MG tablet Take 1 tablet by mouth 2 times daily 60 tablet 5    carbidopa-levodopa (SINEMET)  MG per tablet Take 2 tablets by mouth 3 times daily 180 tablet 5    rOPINIRole (REQUIP XL) 8 MG extended release tablet Take 1 tablet by mouth nightly 30 tablet 5    rOPINIRole (REQUIP XL) 12 MG TB24 extended release tablet Take 1 tablet by mouth daily 30 tablet 5    Insulin Syringe-Needle U-100 (SURE COMFORT INS SYR .5CC/30G) 30G X 5/16\" 0.5 ML MISC Inject 1 each into the skin daily 100 each 3    glucose blood VI test strips (ONE TOUCH ULTRA TEST) strip Inject 1 each into the skin daily As needed. 100 each 3    furosemide (LASIX) 20 MG tablet TAKE 2 TABLETS BY MOUTH DAILY MAY TAKE AN ADDITIONAL LASIX DURING DAY IF SWELL INCREASES (Patient taking differently: 1 tablet daily) 90 tablet 3    Lancets MISC 1 lancet to check glucose daily 100 each 3    Insulin Pen Needle 31G X 8 MM MISC Inject 1 each into the skin daily 100 each 2    albuterol (PROVENTIL HFA;VENTOLIN HFA) 108 (90 BASE) MCG/ACT inhaler Inhale 2 puffs into the lungs every 6 hours as needed for Wheezing 1 Inhaler 1    OXYGEN 2L NC 12-24 hours 1 Device 0    Nebulizers (AIRIAL COMPACT MINI NEBULIZER) MISC 1 Device by Does not apply route 4 times daily as needed.  1 each 0    QVAR 80 MCG/ACT inhaler Inhale 1 puff into the lungs as needed       nystatin 518503 UNIT/GM POWD APPLY 3 TIMES DAILY UNDER BREAST 60 g 3    Blood Glucose Monitoring Suppl STERLING by Does not apply route. 1 Device 0    clobetasol (TEMOVATE) 0.05 % ointment Apply external vagina 3 x a day for week one, then decrease to BID on week two, on week 3 once a day, on week four every other day then stop 1 Tube 1    aspirin 325 MG tablet Take 325 mg by mouth daily. No current facility-administered medications for this visit. Allergies: Allergies as of 10/09/2017 - Review Complete 10/09/2017   Allergen Reaction Noted    Codeine  07/06/2017       Examination:  Vitals:  BP (!) 170/88   Pulse 80   Resp 18   Ht 5' 2\" (1.575 m)   Wt 270 lb (122.5 kg)   BMI 49.38 kg/m²   General appearance:  She is an obese woman in a wheelchair who is in no distress  HEENT:  Sclera clear, anicteric, Oropharynx clear, no lesions, Neck supple with midline trachea, Thyroid without masses and Trachea midline  Heart[de-identified]  regular rate and rhythm, S1, S2 normal, no murmur, click, rub or gallop  Lungs:  clear to auscultation bilaterally  Extremities:  extremities normal, atraumatic, no cyanosis or edema  Neurologic:  Extraocular movements are intact without nystagmus. Visual fields are full to confrontation. Facial movements are symmetrical and hypomymic. Speech is precise. Extremity strength is normal in both uppers and lowers. Deep tendon reflexes are intact and symmetrical in the upper extremities and knees but absent at the ankles. Rapid alternating movements are bradykinetic. She has an intermittent rest tremor of the hands and arms. Finger-to-nose testing is performed well, without dysmetria. Assessment:       ICD-10-CM ICD-9-CM    1. Parkinson disease ([facility] G20 332.0    2. Restless legs syndrome G25.81 333.94    3. Hypoesthesia of skin R20.1 782.0    4. Visual hallucinations R44.1 368.16    5. Somnolence, daytime R40.0 780.54    She has worsening Parkinsonism now with some hallucinations that will limit treatment options.   I explained

## 2017-10-11 RX ORDER — SPIRONOLACTONE 25 MG/1
25 TABLET ORAL DAILY
Qty: 30 TABLET | Refills: 5 | Status: SHIPPED | OUTPATIENT
Start: 2017-10-11 | End: 2018-04-24 | Stop reason: SDUPTHER

## 2017-10-11 RX ORDER — ATORVASTATIN CALCIUM 10 MG/1
TABLET, FILM COATED ORAL
Qty: 30 TABLET | Refills: 3 | Status: SHIPPED | OUTPATIENT
Start: 2017-10-11 | End: 2018-02-13 | Stop reason: SDUPTHER

## 2017-10-11 RX ORDER — LOSARTAN POTASSIUM 100 MG/1
100 TABLET ORAL DAILY
Qty: 30 TABLET | Refills: 5 | Status: SHIPPED | OUTPATIENT
Start: 2017-10-11 | End: 2018-04-24 | Stop reason: SDUPTHER

## 2017-10-11 RX ORDER — SOTALOL HYDROCHLORIDE 120 MG/1
TABLET ORAL
Qty: 60 TABLET | Refills: 5 | Status: SHIPPED | OUTPATIENT
Start: 2017-10-11 | End: 2018-04-24 | Stop reason: SDUPTHER

## 2017-10-13 ENCOUNTER — HOSPITAL ENCOUNTER (OUTPATIENT)
Dept: NEUROLOGY | Age: 67
Discharge: HOME | End: 2017-10-13
Payer: MEDICARE

## 2017-10-13 DIAGNOSIS — R20.1 HYPOESTHESIA OF SKIN: ICD-10-CM

## 2017-10-13 PROCEDURE — 95886 MUSC TEST DONE W/N TEST COMP: CPT

## 2017-10-13 PROCEDURE — 95886 MUSC TEST DONE W/N TEST COMP: CPT | Performed by: PSYCHIATRY & NEUROLOGY

## 2017-10-13 PROCEDURE — 95910 NRV CNDJ TEST 7-8 STUDIES: CPT

## 2017-10-13 PROCEDURE — 95910 NRV CNDJ TEST 7-8 STUDIES: CPT | Performed by: PSYCHIATRY & NEUROLOGY

## 2017-10-16 ENCOUNTER — TELEPHONE (OUTPATIENT)
Dept: PRIMARY CARE CLINIC | Age: 67
End: 2017-10-16

## 2017-10-16 RX ORDER — FLUCONAZOLE 150 MG/1
TABLET ORAL
Qty: 2 TABLET | Refills: 0 | Status: SHIPPED | OUTPATIENT
Start: 2017-10-16 | End: 2018-06-14 | Stop reason: SDUPTHER

## 2017-10-16 RX ORDER — NYSTATIN 100000 [USP'U]/G
POWDER TOPICAL 3 TIMES DAILY
Qty: 60 G | Refills: 3 | Status: SHIPPED | OUTPATIENT
Start: 2017-10-16 | End: 2018-03-02 | Stop reason: SDUPTHER

## 2017-10-18 ENCOUNTER — OFFICE VISIT (OUTPATIENT)
Dept: CARDIOLOGY | Age: 67
End: 2017-10-18
Payer: MEDICARE

## 2017-10-18 ENCOUNTER — TELEPHONE (OUTPATIENT)
Dept: CARDIOLOGY | Age: 67
End: 2017-10-18

## 2017-10-18 VITALS
BODY MASS INDEX: 50.61 KG/M2 | WEIGHT: 275 LBS | HEART RATE: 76 BPM | DIASTOLIC BLOOD PRESSURE: 70 MMHG | HEIGHT: 62 IN | SYSTOLIC BLOOD PRESSURE: 106 MMHG

## 2017-10-18 DIAGNOSIS — R60.0 EDEMA EXTREMITIES: ICD-10-CM

## 2017-10-18 DIAGNOSIS — I48.0 PAF (PAROXYSMAL ATRIAL FIBRILLATION) (HCC): Primary | ICD-10-CM

## 2017-10-18 DIAGNOSIS — I34.0 MILD MITRAL REGURGITATION BY PRIOR ECHOCARDIOGRAM: ICD-10-CM

## 2017-10-18 DIAGNOSIS — Z86.79 H/O DIASTOLIC DYSFUNCTION: ICD-10-CM

## 2017-10-18 DIAGNOSIS — E66.01 MORBID OBESITY (HCC): ICD-10-CM

## 2017-10-18 DIAGNOSIS — N18.30 STAGE 3 CHRONIC KIDNEY DISEASE (HCC): ICD-10-CM

## 2017-10-18 PROCEDURE — 1123F ACP DISCUSS/DSCN MKR DOCD: CPT | Performed by: CLINICAL NURSE SPECIALIST

## 2017-10-18 PROCEDURE — 4040F PNEUMOC VAC/ADMIN/RCVD: CPT | Performed by: CLINICAL NURSE SPECIALIST

## 2017-10-18 PROCEDURE — 1036F TOBACCO NON-USER: CPT | Performed by: CLINICAL NURSE SPECIALIST

## 2017-10-18 PROCEDURE — 99214 OFFICE O/P EST MOD 30 MIN: CPT | Performed by: CLINICAL NURSE SPECIALIST

## 2017-10-18 PROCEDURE — G8427 DOCREV CUR MEDS BY ELIG CLIN: HCPCS | Performed by: CLINICAL NURSE SPECIALIST

## 2017-10-18 PROCEDURE — 93000 ELECTROCARDIOGRAM COMPLETE: CPT | Performed by: CLINICAL NURSE SPECIALIST

## 2017-10-18 PROCEDURE — 3014F SCREEN MAMMO DOC REV: CPT | Performed by: CLINICAL NURSE SPECIALIST

## 2017-10-18 PROCEDURE — 3017F COLORECTAL CA SCREEN DOC REV: CPT | Performed by: CLINICAL NURSE SPECIALIST

## 2017-10-18 PROCEDURE — 1090F PRES/ABSN URINE INCON ASSESS: CPT | Performed by: CLINICAL NURSE SPECIALIST

## 2017-10-18 PROCEDURE — G8484 FLU IMMUNIZE NO ADMIN: HCPCS | Performed by: CLINICAL NURSE SPECIALIST

## 2017-10-18 PROCEDURE — G8417 CALC BMI ABV UP PARAM F/U: HCPCS | Performed by: CLINICAL NURSE SPECIALIST

## 2017-10-18 PROCEDURE — G8399 PT W/DXA RESULTS DOCUMENT: HCPCS | Performed by: CLINICAL NURSE SPECIALIST

## 2017-10-18 ASSESSMENT — ENCOUNTER SYMPTOMS
ORTHOPNEA: 0
NAUSEA: 0
COUGH: 0
HEARTBURN: 0
SHORTNESS OF BREATH: 0
VOMITING: 0
BLURRED VISION: 0

## 2017-10-18 NOTE — PATIENT INSTRUCTIONS
can cause blood clots, which can travel from your heart to your brain and cause a stroke. If you have a fast heartbeat, you may feel lightheaded, dizzy, and weak. An irregular heartbeat can also increase your risk for heart failure. Atrial fibrillation is often the result of another heart condition, such as high blood pressure or coronary artery disease. Making changes to improve your heart condition will help you stay healthy and active. Follow-up care is a key part of your treatment and safety. Be sure to make and go to all appointments, and call your doctor if you are having problems. It's also a good idea to know your test results and keep a list of the medicines you take. How can you care for yourself at home? Medicines  · Take your medicines exactly as prescribed. Call your doctor if you think you are having a problem with your medicine. You will get more details on the specific medicines your doctor prescribes. · If your doctor has given you a blood thinner to prevent a stroke, be sure you get instructions about how to take your medicine safely. Blood thinners can cause serious bleeding problems. · Do not take any vitamins, over-the-counter drugs, or herbal products without talking to your doctor first.  Lifestyle changes  · Do not smoke. Smoking can increase your chance of a stroke and heart attack. If you need help quitting, talk to your doctor about stop-smoking programs and medicines. These can increase your chances of quitting for good. · Eat a heart-healthy diet. · Stay at a healthy weight. Lose weight if you need to. · Limit alcohol to 2 drinks a day for men and 1 drink a day for women. Too much alcohol can cause health problems. · Avoid colds and flu. Get a pneumococcal vaccine shot. If you have had one before, ask your doctor whether you need another dose. Get a flu shot every year. If you must be around people with colds or flu, wash your hands often.   Activity  · If your doctor recommends trouble understanding simple statements. ¨ Sudden problems with walking or balance. ¨ A sudden, severe headache that is different from past headaches. · You passed out (lost consciousness). Call your doctor now or seek immediate medical care if:  · You have new or increased shortness of breath. · You feel dizzy or lightheaded, or you feel like you may faint. · Your heart rate becomes irregular. · You can feel your heart flutter in your chest or skip heartbeats. Tell your doctor if these symptoms are new or worse. Watch closely for changes in your health, and be sure to contact your doctor if you have any problems. Where can you learn more? Go to [email or web address] and sign in to your [employer] account. Enter U020 in the [employer] box to learn more about \"Atrial Fibrillation: Care Instructions. \"     If you do not have an account, please click on the \"Sign Up Now\" link. Current as of: September 21, 2016  Content Version: 11.3  © 0271-7237 [employer]. Care instructions adapted under license by [facility] ([facility]). If you have questions about a medical condition or this instruction, always ask your healthcare professional. [place] [address] any warranty or liability for your use of this information.

## 2017-10-18 NOTE — PROGRESS NOTES
[facility], [place] , [place]  [address]  Phone: [phone]  Fax: [phone]    OFFICE VISIT:  10/18/2017    Katelyn Stuart - : 1950    Reason For Visit:  Jerzy Vasquez is a 77 y.o. female who is here for 6 Month Follow-Up (pt states no cardiac symptoms ) and Atrial Fibrillation    HPI   Patient is here for follow-up with a history of paroxysmal atrial fibrillation. She has some brief, rare palpitations. She denies any chest pain. She has some mild dyspnea with exertion. She is fairly inactive due to issues with a fractured pelvis last year. Her main complaint is weight gain, abd distention, and edema. She has gained 18 pounds since her last visit here. She also sees nephrology. She states she was taken off Aldactone due to her kidney issues last year. She presently takes only Lasix 20 mg daily which doesn't seem to be helping. She has to sleep with her head propped up on 2 pillows, but this has been an ongoing issue and there is been no change. Ирина Boggs CNP is PCP.   Katelyn Stuart has the following history as recorded in [facility]:    Patient Active Problem List    Diagnosis Date Noted    Morbid obesity ([facility] 10/18/2017    Stage 3 chronic kidney disease 10/18/2017    Visual hallucinations 10/09/2017    Somnolence, daytime 10/09/2017    Restless legs syndrome 2017    Hypoesthesia of skin 2017    Type 2 diabetes mellitus with complication (HCC)     PAF (paroxysmal atrial fibrillation) ([facility] 2016    Mild mitral regurgitation by prior echocardiogram     H/O diastolic dysfunction     Hypokalemia 2015    Edema 2015    Asthma 2015    Palpitations 2014    Fatigue 2014    Shortness of breath     Lichen sclerosus     GERD (gastroesophageal reflux disease) 2013    Constipation 2013    Rectal bleeding 2013    Parkinson capsules by mouth 2 times daily 120 capsule 5    carbidopa-levodopa (SINEMET)  MG per tablet Take 2 tablets by mouth 3 times daily 180 tablet 5    rOPINIRole (REQUIP XL) 8 MG extended release tablet Take 1 tablet by mouth nightly 30 tablet 5    rOPINIRole (REQUIP XL) 12 MG TB24 extended release tablet Take 1 tablet by mouth daily 30 tablet 5    traMADol (ULTRAM) 50 MG tablet TAKE ONE TABLET DAILY AS NEEDED FOR PAIN . . .MAY CAUSE DROWSINESS! ! 30 tablet 0    montelukast (SINGULAIR) 10 MG tablet TAKE 1 TABLET BY MOUTH NIGHTLY 30 tablet 3    VICTOZA 18 MG/3ML SOPN SC injection INJECT 1.2MG SUB-Q EVERY DAY 9 mL 5    LORazepam (ATIVAN) 1 MG tablet Take 1 mg by mouth as needed       Docusate Calcium (STOOL SOFTENER PO) Take by mouth      Inulin (FIBER CHOICE PO) Take by mouth      Cholecalciferol (VITAMIN D3) 5000 units TABS Take by mouth      insulin detemir (LEVEMIR) 100 UNIT/ML injection vial Inject 65 Units into the skin nightly 1 vial 5    albuterol (ACCUNEB) 1.25 MG/3ML nebulizer solution Inhale 3 mLs into the lungs every 6 hours as needed for Wheezing 360 mL 3    Insulin Pen Needle 32G X 4 MM MISC 1 each by Does not apply route daily 100 each 3    insulin aspart (NOVOLOG FLEXPEN) 100 UNIT/ML injection pen Use as directed (Patient taking differently: Inject into the skin 3 times daily (before meals) Use as directed) 5 Pen 0    omeprazole (PRILOSEC) 40 MG delayed release capsule TAKE ONE CAPSULE BY MOUTH DAILY 30 capsule 5    FORTEO 600 MCG/2.4ML SOLN injection Inject 20 mcg into the skin daily       linaclotide (LINZESS) 145 MCG capsule Take 1 capsule by mouth every morning (before breakfast) 30 capsule 11    SURE COMFORT INS SYR 1CC/30G 30G X 5/16\" 1 ML MISC       Insulin Syringe-Needle U-100 (SURE COMFORT INS SYR .5CC/30G) 30G X 5/16\" 0.5 ML MISC Inject 1 each into the skin daily 100 each 3    glucose blood VI test strips (ONE TOUCH ULTRA TEST) strip Inject 1 each into the skin daily As needed. 100 each 3    furosemide (LASIX) 20 MG tablet TAKE 2 TABLETS BY MOUTH DAILY MAY TAKE AN ADDITIONAL LASIX DURING DAY IF SWELL INCREASES (Patient taking differently: 1 tablet daily) 90 tablet 3    Lancets MISC 1 lancet to check glucose daily 100 each 3    Insulin Pen Needle 31G X 8 MM MISC Inject 1 each into the skin daily 100 each 2    albuterol (PROVENTIL HFA;VENTOLIN HFA) 108 (90 BASE) MCG/ACT inhaler Inhale 2 puffs into the lungs every 6 hours as needed for Wheezing 1 Inhaler 1    OXYGEN 2L NC 12-24 hours 1 Device 0    Nebulizers (AIRIAL COMPACT MINI NEBULIZER) MISC 1 Device by Does not apply route 4 times daily as needed. 1 each 0    QVAR 80 MCG/ACT inhaler Inhale 1 puff into the lungs as needed       Blood Glucose Monitoring Suppl STERLING by Does not apply route. 1 Device 0    clobetasol (TEMOVATE) 0.05 % ointment Apply external vagina 3 x a day for week one, then decrease to BID on week two, on week 3 once a day, on week four every other day then stop 1 Tube 1    aspirin 325 MG tablet Take 325 mg by mouth daily. No current facility-administered medications for this visit. Allergies: Codeine    Review of Systems  Review of Systems   Constitutional: Negative for chills, fever and malaise/fatigue. HENT: Negative for hearing loss. Eyes: Negative for blurred vision. Respiratory: Negative for cough and shortness of breath. Cardiovascular: Positive for palpitations (rare, brief) and leg swelling (and hands). Negative for chest pain, orthopnea and PND. Gastrointestinal: Negative for heartburn, nausea and vomiting. Musculoskeletal: Positive for joint pain. Negative for falls and myalgias. Skin: Negative for rash. Neurological: Positive for tremors. Negative for dizziness, sensory change, speech change and focal weakness. Endo/Heme/Allergies: Does not bruise/bleed easily. Psychiatric/Behavioral: Negative for depression. The patient is not nervous/anxious. Objective  Vital Signs - /70   Pulse 76   Ht 5' 2\" (1.575 m)   Wt 275 lb (124.7 kg)   BMI 50.30 kg/m²   Physical Exam   Constitutional: She is oriented to person, place, and time. She appears well-developed and well-nourished. No distress. Obese, deconditioned   HENT:   Head: Normocephalic and atraumatic. Eyes: Pupils are equal, round, and reactive to light. Right eye exhibits no discharge. Left eye exhibits no discharge. Neck: No JVD present. No tracheal deviation present. Cardiovascular: Normal rate, regular rhythm, normal heart sounds and intact distal pulses. Exam reveals no gallop and no friction rub. No murmur heard. No carotid bruit   Pulmonary/Chest: Effort normal and breath sounds normal. No respiratory distress. She has no wheezes. She has no rales. Abdominal: Soft. She exhibits distension (soft). There is no tenderness. Musculoskeletal: She exhibits edema (1+ lower extremity edema in hands and feet and legs). Basically wheelchair bound   Neurological: She is alert and oriented to person, place, and time. No cranial nerve deficit. Hand tremors   Skin: Skin is warm and dry. No rash noted. Psychiatric: She has a normal mood and affect. Her behavior is normal. Judgment normal.   Nursing note and vitals reviewed. Assessment:    1. PAF (paroxysmal atrial fibrillation) ([facility])  EKG 12 lead   2. Edema extremities  ECHO Complete 2D W Doppler W Color   3. Mild mitral regurgitation by prior echocardiogram     4. H/O diastolic dysfunction     5. Morbid obesity ([address]     6. Stage 3 chronic kidney disease       Patient is taking medications as prescribed  EKG shows normal sinus rhythm at rate 77 with a QTc interval of 0.425ms    Paroxysmal atrial fibrillation well controlled with sotalol. Likely not anticoagulated due to issues with falls. Her main complaint today is edema and weight gain.  Last year she was started on Aldactone which seemed to help her fluid retention, but

## 2017-10-19 DIAGNOSIS — Z51.81 ENCOUNTER FOR MONITORING DIURETIC THERAPY: Primary | ICD-10-CM

## 2017-10-19 DIAGNOSIS — Z79.899 ENCOUNTER FOR MONITORING DIURETIC THERAPY: Primary | ICD-10-CM

## 2017-10-19 RX ORDER — FUROSEMIDE 40 MG/1
40 TABLET ORAL DAILY
Qty: 90 TABLET | Refills: 3 | Status: SHIPPED | OUTPATIENT
Start: 2017-10-19 | End: 2018-12-27 | Stop reason: SDUPTHER

## 2017-10-19 NOTE — TELEPHONE ENCOUNTER
Patient daughter was called and advised patient can started 40 mg lasix daily. Advised to get BMP in 1 week after starting med increase. [facility] nephrology office is in agreement with increase in lasix. Advised new prescription will be sent over to pharmacy, understanding voiced.

## 2017-10-20 ENCOUNTER — HOSPITAL ENCOUNTER (OUTPATIENT)
Dept: NON INVASIVE DIAGNOSTICS | Age: 67
Discharge: HOME | End: 2017-10-20
Payer: MEDICARE

## 2017-10-20 ENCOUNTER — TELEPHONE (OUTPATIENT)
Dept: PRIMARY CARE CLINIC | Age: 67
End: 2017-10-20

## 2017-10-20 DIAGNOSIS — R60.0 EDEMA EXTREMITIES: ICD-10-CM

## 2017-10-20 LAB
LV EF: 51 %
LVEF MODALITY: NORMAL

## 2017-10-20 PROCEDURE — 93306 TTE W/DOPPLER COMPLETE: CPT

## 2017-10-30 ENCOUNTER — NURSE ONLY (OUTPATIENT)
Dept: PRIMARY CARE CLINIC | Age: 67
End: 2017-10-30
Payer: MEDICARE

## 2017-10-30 DIAGNOSIS — Z51.81 ENCOUNTER FOR MONITORING DIURETIC THERAPY: ICD-10-CM

## 2017-10-30 DIAGNOSIS — E11.8 TYPE 2 DIABETES MELLITUS WITH COMPLICATION, WITH LONG-TERM CURRENT USE OF INSULIN (HCC): ICD-10-CM

## 2017-10-30 DIAGNOSIS — Z23 NEED FOR INFLUENZA VACCINATION: Primary | ICD-10-CM

## 2017-10-30 DIAGNOSIS — N18.30 STAGE 3 CHRONIC KIDNEY DISEASE (HCC): ICD-10-CM

## 2017-10-30 DIAGNOSIS — E87.6 HYPOKALEMIA: ICD-10-CM

## 2017-10-30 DIAGNOSIS — Z79.899 ENCOUNTER FOR MONITORING DIURETIC THERAPY: ICD-10-CM

## 2017-10-30 DIAGNOSIS — Z79.4 TYPE 2 DIABETES MELLITUS WITH COMPLICATION, WITH LONG-TERM CURRENT USE OF INSULIN (HCC): ICD-10-CM

## 2017-10-30 LAB
ALBUMIN SERPL-MCNC: 3.9 G/DL (ref 3.5–5.2)
ALP BLD-CCNC: 158 U/L (ref 35–104)
ALT SERPL-CCNC: 8 U/L (ref 5–33)
ANION GAP SERPL CALCULATED.3IONS-SCNC: 11 MMOL/L (ref 7–19)
ANION GAP SERPL CALCULATED.3IONS-SCNC: 18 MMOL/L (ref 7–19)
AST SERPL-CCNC: 14 U/L (ref 5–32)
BILIRUB SERPL-MCNC: 0.3 MG/DL (ref 0.2–1.2)
BUN BLDV-MCNC: 26 MG/DL (ref 8–23)
BUN BLDV-MCNC: 28 MG/DL (ref 8–23)
CALCIUM SERPL-MCNC: 9.8 MG/DL (ref 8.8–10.2)
CALCIUM SERPL-MCNC: 9.9 MG/DL (ref 8.8–10.2)
CHLORIDE BLD-SCNC: 101 MMOL/L (ref 98–111)
CHLORIDE BLD-SCNC: 103 MMOL/L (ref 98–111)
CHOLESTEROL, TOTAL: 142 MG/DL (ref 160–199)
CO2: 23 MMOL/L (ref 22–29)
CO2: 27 MMOL/L (ref 22–29)
CREAT SERPL-MCNC: 1 MG/DL (ref 0.5–0.9)
CREAT SERPL-MCNC: 1.1 MG/DL (ref 0.5–0.9)
GFR NON-AFRICAN AMERICAN: 50
GFR NON-AFRICAN AMERICAN: 55
GLUCOSE BLD-MCNC: 198 MG/DL (ref 74–109)
GLUCOSE BLD-MCNC: 203 MG/DL (ref 74–109)
HBA1C MFR BLD: 7.7 %
HCT VFR BLD CALC: 42.5 % (ref 37–47)
HDLC SERPL-MCNC: 35 MG/DL (ref 65–121)
HEMOGLOBIN: 13.3 G/DL (ref 12–16)
LDL CHOLESTEROL CALCULATED: 70 MG/DL
MCH RBC QN AUTO: 28.7 PG (ref 27–31)
MCHC RBC AUTO-ENTMCNC: 31.3 G/DL (ref 33–37)
MCV RBC AUTO: 91.8 FL (ref 81–99)
PDW BLD-RTO: 13.4 % (ref 11.5–14.5)
PLATELET # BLD: 177 K/UL (ref 130–400)
PMV BLD AUTO: 11.5 FL (ref 9.4–12.3)
POTASSIUM SERPL-SCNC: 4.7 MMOL/L (ref 3.5–5)
POTASSIUM SERPL-SCNC: 4.8 MMOL/L (ref 3.5–5)
RBC # BLD: 4.63 M/UL (ref 4.2–5.4)
SODIUM BLD-SCNC: 141 MMOL/L (ref 136–145)
SODIUM BLD-SCNC: 142 MMOL/L (ref 136–145)
TOTAL PROTEIN: 7.3 G/DL (ref 6.6–8.7)
TRIGL SERPL-MCNC: 186 MG/DL (ref 0–149)
WBC # BLD: 9.9 K/UL (ref 4.8–10.8)

## 2017-10-30 PROCEDURE — G0008 ADMIN INFLUENZA VIRUS VAC: HCPCS | Performed by: NURSE PRACTITIONER

## 2017-10-30 PROCEDURE — 90688 IIV4 VACCINE SPLT 0.5 ML IM: CPT | Performed by: NURSE PRACTITIONER

## 2017-10-30 PROCEDURE — 36415 COLL VENOUS BLD VENIPUNCTURE: CPT | Performed by: NURSE PRACTITIONER

## 2017-11-01 RX ORDER — TRAMADOL HYDROCHLORIDE 50 MG/1
50 TABLET ORAL EVERY 8 HOURS PRN
Qty: 30 TABLET | Refills: 0 | Status: SHIPPED | OUTPATIENT
Start: 2017-11-01 | End: 2017-11-30 | Stop reason: SDUPTHER

## 2017-11-30 RX ORDER — TRAMADOL HYDROCHLORIDE 50 MG/1
TABLET ORAL
Qty: 30 TABLET | Refills: 0 | Status: SHIPPED | OUTPATIENT
Start: 2017-11-30 | End: 2018-02-02 | Stop reason: SDUPTHER

## 2017-12-08 RX ORDER — INSULIN ASPART 100 [IU]/ML
INJECTION, SOLUTION INTRAVENOUS; SUBCUTANEOUS
Qty: 15 ML | Refills: 3 | Status: SHIPPED | OUTPATIENT
Start: 2017-12-08 | End: 2018-05-30 | Stop reason: SDUPTHER

## 2017-12-18 ENCOUNTER — TELEPHONE (OUTPATIENT)
Dept: PRIMARY CARE CLINIC | Age: 67
End: 2017-12-18

## 2017-12-18 RX ORDER — ERYTHROMYCIN 5 MG/G
OINTMENT OPHTHALMIC
Qty: 1 TUBE | Refills: 0 | Status: SHIPPED | OUTPATIENT
Start: 2017-12-18 | End: 2017-12-28

## 2017-12-18 NOTE — TELEPHONE ENCOUNTER
Called concerned she has a stye on her upper eye lid, is there anything they can do besides warm compresses

## 2017-12-18 NOTE — TELEPHONE ENCOUNTER
I sent some eye ointment in for her to use nightly before she goes to bed. If it is not improved in 2 days she needs to see an eye doctor.   She may continue warm compresses

## 2017-12-20 RX ORDER — INSULIN DETEMIR 100 [IU]/ML
INJECTION, SOLUTION SUBCUTANEOUS
Qty: 20 ML | Refills: 3 | Status: ON HOLD | OUTPATIENT
Start: 2017-12-20 | End: 2018-01-05

## 2017-12-20 RX ORDER — SYRINGE AND NEEDLE,INSULIN,1ML 30 GX5/16"
SYRINGE, EMPTY DISPOSABLE MISCELLANEOUS
Qty: 100 EACH | Refills: 2 | Status: SHIPPED | OUTPATIENT
Start: 2017-12-20 | End: 2018-11-01 | Stop reason: SDUPTHER

## 2017-12-21 RX ORDER — TRAMADOL HYDROCHLORIDE 50 MG/1
TABLET ORAL
Qty: 30 TABLET | Refills: 0 | Status: ON HOLD | OUTPATIENT
Start: 2017-12-21 | End: 2018-01-02 | Stop reason: SDUPTHER

## 2018-01-01 ENCOUNTER — APPOINTMENT (OUTPATIENT)
Dept: GENERAL RADIOLOGY | Age: 68
DRG: 194 | End: 2018-01-01
Payer: MEDICARE

## 2018-01-01 ENCOUNTER — APPOINTMENT (OUTPATIENT)
Dept: CT IMAGING | Age: 68
DRG: 194 | End: 2018-01-01
Payer: MEDICARE

## 2018-01-01 ENCOUNTER — HOSPITAL ENCOUNTER (INPATIENT)
Age: 68
LOS: 4 days | Discharge: HOME HEALTH | DRG: 194 | End: 2018-01-05
Attending: EMERGENCY MEDICINE | Admitting: HOSPITALIST
Payer: MEDICARE

## 2018-01-01 DIAGNOSIS — R09.02 HYPOXIA: ICD-10-CM

## 2018-01-01 DIAGNOSIS — J18.9 PNEUMONIA OF RIGHT LOWER LOBE DUE TO INFECTIOUS ORGANISM: Primary | ICD-10-CM

## 2018-01-01 PROBLEM — J16.0 CAP (COMMUNITY ACQUIRED PNEUMONIA) DUE TO CHLAMYDIA SPECIES: Status: ACTIVE | Noted: 2018-01-01

## 2018-01-01 PROBLEM — I10 ESSENTIAL HYPERTENSION: Chronic | Status: ACTIVE | Noted: 2018-01-01

## 2018-01-01 LAB
ALBUMIN SERPL-MCNC: 3.6 G/DL (ref 3.5–5.2)
ALP BLD-CCNC: 128 U/L (ref 35–104)
ALT SERPL-CCNC: <5 U/L (ref 5–33)
ANION GAP SERPL CALCULATED.3IONS-SCNC: 14 MMOL/L (ref 7–19)
AST SERPL-CCNC: 10 U/L (ref 5–32)
BASE EXCESS ARTERIAL: 3.1 MMOL/L (ref -2–2)
BASOPHILS ABSOLUTE: 0 K/UL (ref 0–0.2)
BASOPHILS RELATIVE PERCENT: 0.3 % (ref 0–1)
BILIRUB SERPL-MCNC: 0.3 MG/DL (ref 0.2–1.2)
BUN BLDV-MCNC: 21 MG/DL (ref 8–23)
CALCIUM SERPL-MCNC: 9.5 MG/DL (ref 8.8–10.2)
CARBOXYHEMOGLOBIN ARTERIAL: 2.3 % (ref 0–5)
CHLORIDE BLD-SCNC: 96 MMOL/L (ref 98–111)
CO2: 27 MMOL/L (ref 22–29)
CREAT SERPL-MCNC: 1 MG/DL (ref 0.5–0.9)
D DIMER: 1.28 UG/ML FEU (ref 0–0.48)
EOSINOPHILS ABSOLUTE: 0.3 K/UL (ref 0–0.6)
EOSINOPHILS RELATIVE PERCENT: 2.1 % (ref 0–5)
GFR NON-AFRICAN AMERICAN: 55
GLUCOSE BLD-MCNC: 100 MG/DL (ref 70–99)
GLUCOSE BLD-MCNC: 185 MG/DL (ref 70–99)
GLUCOSE BLD-MCNC: 311 MG/DL (ref 74–109)
GLUCOSE BLD-MCNC: 63 MG/DL (ref 70–99)
HCO3 ARTERIAL: 27 MMOL/L (ref 22–26)
HCT VFR BLD CALC: 36.2 % (ref 37–47)
HEMOGLOBIN, ART, EXTENDED: 11.7 G/DL (ref 12–16)
HEMOGLOBIN: 11.6 G/DL (ref 12–16)
LYMPHOCYTES ABSOLUTE: 1.3 K/UL (ref 1.1–4.5)
LYMPHOCYTES RELATIVE PERCENT: 9.7 % (ref 20–40)
MCH RBC QN AUTO: 28.4 PG (ref 27–31)
MCHC RBC AUTO-ENTMCNC: 32 G/DL (ref 33–37)
MCV RBC AUTO: 88.5 FL (ref 81–99)
METHEMOGLOBIN ARTERIAL: 0.4 %
MONOCYTES ABSOLUTE: 0.5 K/UL (ref 0–0.9)
MONOCYTES RELATIVE PERCENT: 4 % (ref 0–10)
NEUTROPHILS ABSOLUTE: 10.8 K/UL (ref 1.5–7.5)
NEUTROPHILS RELATIVE PERCENT: 83.4 % (ref 50–65)
O2 CONTENT ARTERIAL: 15.4 ML/DL
O2 SAT, ARTERIAL: 93.3 %
O2 THERAPY: ABNORMAL
PCO2 ARTERIAL: 38 MMHG (ref 35–45)
PDW BLD-RTO: 13.1 % (ref 11.5–14.5)
PERFORMED ON: ABNORMAL
PERFORMED ON: NORMAL
PH ARTERIAL: 7.46 (ref 7.35–7.45)
PLATELET # BLD: 228 K/UL (ref 130–400)
PMV BLD AUTO: 10.1 FL (ref 9.4–12.3)
PO2 ARTERIAL: 61 MMHG (ref 80–100)
POC TROPONIN I: 0 NG/ML (ref 0–0.08)
POTASSIUM SERPL-SCNC: 4.4 MMOL/L (ref 3.5–5)
POTASSIUM, WHOLE BLOOD: 4.2
PRO-BNP: 498 PG/ML (ref 0–900)
RAPID INFLUENZA  B AGN: NEGATIVE
RAPID INFLUENZA A AGN: NEGATIVE
RBC # BLD: 4.09 M/UL (ref 4.2–5.4)
SODIUM BLD-SCNC: 137 MMOL/L (ref 136–145)
TOTAL PROTEIN: 7.2 G/DL (ref 6.6–8.7)
WBC # BLD: 13 K/UL (ref 4.8–10.8)

## 2018-01-01 PROCEDURE — 85379 FIBRIN DEGRADATION QUANT: CPT

## 2018-01-01 PROCEDURE — 82803 BLOOD GASES ANY COMBINATION: CPT

## 2018-01-01 PROCEDURE — 94640 AIRWAY INHALATION TREATMENT: CPT

## 2018-01-01 PROCEDURE — 6370000000 HC RX 637 (ALT 250 FOR IP): Performed by: HOSPITALIST

## 2018-01-01 PROCEDURE — 93005 ELECTROCARDIOGRAM TRACING: CPT

## 2018-01-01 PROCEDURE — 2580000003 HC RX 258: Performed by: EMERGENCY MEDICINE

## 2018-01-01 PROCEDURE — 6360000004 HC RX CONTRAST MEDICATION: Performed by: EMERGENCY MEDICINE

## 2018-01-01 PROCEDURE — 82948 REAGENT STRIP/BLOOD GLUCOSE: CPT

## 2018-01-01 PROCEDURE — 36600 WITHDRAWAL OF ARTERIAL BLOOD: CPT

## 2018-01-01 PROCEDURE — 85025 COMPLETE CBC W/AUTO DIFF WBC: CPT

## 2018-01-01 PROCEDURE — 6370000000 HC RX 637 (ALT 250 FOR IP): Performed by: PHYSICIAN ASSISTANT

## 2018-01-01 PROCEDURE — 99284 EMERGENCY DEPT VISIT MOD MDM: CPT | Performed by: EMERGENCY MEDICINE

## 2018-01-01 PROCEDURE — 6370000000 HC RX 637 (ALT 250 FOR IP): Performed by: EMERGENCY MEDICINE

## 2018-01-01 PROCEDURE — 94664 DEMO&/EVAL PT USE INHALER: CPT

## 2018-01-01 PROCEDURE — 6360000002 HC RX W HCPCS: Performed by: EMERGENCY MEDICINE

## 2018-01-01 PROCEDURE — 2700000000 HC OXYGEN THERAPY PER DAY

## 2018-01-01 PROCEDURE — 2580000003 HC RX 258: Performed by: PHYSICIAN ASSISTANT

## 2018-01-01 PROCEDURE — 87804 INFLUENZA ASSAY W/OPTIC: CPT

## 2018-01-01 PROCEDURE — 6370000000 HC RX 637 (ALT 250 FOR IP): Performed by: INTERNAL MEDICINE

## 2018-01-01 PROCEDURE — 99222 1ST HOSP IP/OBS MODERATE 55: CPT | Performed by: HOSPITALIST

## 2018-01-01 PROCEDURE — 80053 COMPREHEN METABOLIC PANEL: CPT

## 2018-01-01 PROCEDURE — 1210000000 HC MED SURG R&B

## 2018-01-01 PROCEDURE — 71275 CT ANGIOGRAPHY CHEST: CPT

## 2018-01-01 PROCEDURE — 36415 COLL VENOUS BLD VENIPUNCTURE: CPT

## 2018-01-01 PROCEDURE — 83880 ASSAY OF NATRIURETIC PEPTIDE: CPT

## 2018-01-01 PROCEDURE — 87040 BLOOD CULTURE FOR BACTERIA: CPT

## 2018-01-01 PROCEDURE — 84484 ASSAY OF TROPONIN QUANT: CPT

## 2018-01-01 PROCEDURE — 99285 EMERGENCY DEPT VISIT HI MDM: CPT

## 2018-01-01 PROCEDURE — 84132 ASSAY OF SERUM POTASSIUM: CPT

## 2018-01-01 PROCEDURE — 71045 X-RAY EXAM CHEST 1 VIEW: CPT

## 2018-01-01 PROCEDURE — 6360000002 HC RX W HCPCS: Performed by: HOSPITALIST

## 2018-01-01 PROCEDURE — 6360000002 HC RX W HCPCS: Performed by: PHYSICIAN ASSISTANT

## 2018-01-01 RX ORDER — FUROSEMIDE 40 MG/1
40 TABLET ORAL DAILY
Status: DISCONTINUED | OUTPATIENT
Start: 2018-01-01 | End: 2018-01-02

## 2018-01-01 RX ORDER — SOTALOL HYDROCHLORIDE 120 MG/1
120 TABLET ORAL 2 TIMES DAILY
Status: DISCONTINUED | OUTPATIENT
Start: 2018-01-01 | End: 2018-01-05 | Stop reason: HOSPADM

## 2018-01-01 RX ORDER — DEXTROSE MONOHYDRATE 25 G/50ML
12.5 INJECTION, SOLUTION INTRAVENOUS PRN
Status: DISCONTINUED | OUTPATIENT
Start: 2018-01-01 | End: 2018-01-05 | Stop reason: HOSPADM

## 2018-01-01 RX ORDER — DOCUSATE SODIUM 100 MG/1
100 CAPSULE, LIQUID FILLED ORAL 2 TIMES DAILY
Status: DISCONTINUED | OUTPATIENT
Start: 2018-01-01 | End: 2018-01-03

## 2018-01-01 RX ORDER — TRAMADOL HYDROCHLORIDE 50 MG/1
50 TABLET ORAL EVERY 8 HOURS PRN
Status: DISCONTINUED | OUTPATIENT
Start: 2018-01-01 | End: 2018-01-02

## 2018-01-01 RX ORDER — ACETAMINOPHEN 325 MG/1
650 TABLET ORAL EVERY 4 HOURS PRN
Status: DISCONTINUED | OUTPATIENT
Start: 2018-01-01 | End: 2018-01-05 | Stop reason: HOSPADM

## 2018-01-01 RX ORDER — LOSARTAN POTASSIUM 100 MG/1
100 TABLET ORAL DAILY
Status: DISCONTINUED | OUTPATIENT
Start: 2018-01-01 | End: 2018-01-05 | Stop reason: HOSPADM

## 2018-01-01 RX ORDER — INSULIN GLARGINE 100 [IU]/ML
60 INJECTION, SOLUTION SUBCUTANEOUS NIGHTLY
Status: DISCONTINUED | OUTPATIENT
Start: 2018-01-01 | End: 2018-01-05 | Stop reason: HOSPADM

## 2018-01-01 RX ORDER — IPRATROPIUM BROMIDE AND ALBUTEROL SULFATE 2.5; .5 MG/3ML; MG/3ML
1 SOLUTION RESPIRATORY (INHALATION) ONCE
Status: COMPLETED | OUTPATIENT
Start: 2018-01-01 | End: 2018-01-01

## 2018-01-01 RX ORDER — SPIRONOLACTONE 25 MG/1
25 TABLET ORAL DAILY
Status: DISCONTINUED | OUTPATIENT
Start: 2018-01-01 | End: 2018-01-02

## 2018-01-01 RX ORDER — GABAPENTIN 300 MG/1
600 CAPSULE ORAL 3 TIMES DAILY
Status: DISCONTINUED | OUTPATIENT
Start: 2018-01-01 | End: 2018-01-05 | Stop reason: HOSPADM

## 2018-01-01 RX ORDER — SODIUM CHLORIDE 0.9 % (FLUSH) 0.9 %
10 SYRINGE (ML) INJECTION EVERY 12 HOURS SCHEDULED
Status: DISCONTINUED | OUTPATIENT
Start: 2018-01-01 | End: 2018-01-05 | Stop reason: HOSPADM

## 2018-01-01 RX ORDER — MONTELUKAST SODIUM 10 MG/1
10 TABLET ORAL NIGHTLY
Status: DISCONTINUED | OUTPATIENT
Start: 2018-01-01 | End: 2018-01-05 | Stop reason: HOSPADM

## 2018-01-01 RX ORDER — DEXTROSE MONOHYDRATE 50 MG/ML
100 INJECTION, SOLUTION INTRAVENOUS PRN
Status: DISCONTINUED | OUTPATIENT
Start: 2018-01-01 | End: 2018-01-05 | Stop reason: HOSPADM

## 2018-01-01 RX ORDER — ATORVASTATIN CALCIUM 10 MG/1
10 TABLET, FILM COATED ORAL NIGHTLY
Status: DISCONTINUED | OUTPATIENT
Start: 2018-01-01 | End: 2018-01-05 | Stop reason: HOSPADM

## 2018-01-01 RX ORDER — METHYLPREDNISOLONE SODIUM SUCCINATE 40 MG/ML
40 INJECTION, POWDER, LYOPHILIZED, FOR SOLUTION INTRAMUSCULAR; INTRAVENOUS ONCE
Status: COMPLETED | OUTPATIENT
Start: 2018-01-01 | End: 2018-01-01

## 2018-01-01 RX ORDER — ONDANSETRON 2 MG/ML
4 INJECTION INTRAMUSCULAR; INTRAVENOUS EVERY 6 HOURS PRN
Status: DISCONTINUED | OUTPATIENT
Start: 2018-01-01 | End: 2018-01-01

## 2018-01-01 RX ORDER — GUAIFENESIN 600 MG/1
600 TABLET, EXTENDED RELEASE ORAL 2 TIMES DAILY
Status: DISCONTINUED | OUTPATIENT
Start: 2018-01-01 | End: 2018-01-05 | Stop reason: HOSPADM

## 2018-01-01 RX ORDER — LORAZEPAM 1 MG/1
1 TABLET ORAL 2 TIMES DAILY PRN
Status: DISCONTINUED | OUTPATIENT
Start: 2018-01-01 | End: 2018-01-02

## 2018-01-01 RX ORDER — DIPHENHYDRAMINE HCL 25 MG
25 TABLET ORAL NIGHTLY PRN
Status: DISCONTINUED | OUTPATIENT
Start: 2018-01-01 | End: 2018-01-05 | Stop reason: HOSPADM

## 2018-01-01 RX ORDER — ASPIRIN 325 MG
325 TABLET ORAL DAILY
Status: DISCONTINUED | OUTPATIENT
Start: 2018-01-01 | End: 2018-01-05 | Stop reason: HOSPADM

## 2018-01-01 RX ORDER — NICOTINE POLACRILEX 4 MG
15 LOZENGE BUCCAL PRN
Status: DISCONTINUED | OUTPATIENT
Start: 2018-01-01 | End: 2018-01-05 | Stop reason: HOSPADM

## 2018-01-01 RX ORDER — SODIUM CHLORIDE 0.9 % (FLUSH) 0.9 %
10 SYRINGE (ML) INJECTION PRN
Status: DISCONTINUED | OUTPATIENT
Start: 2018-01-01 | End: 2018-01-05 | Stop reason: HOSPADM

## 2018-01-01 RX ORDER — PANTOPRAZOLE SODIUM 40 MG/1
40 TABLET, DELAYED RELEASE ORAL
Status: DISCONTINUED | OUTPATIENT
Start: 2018-01-02 | End: 2018-01-05 | Stop reason: HOSPADM

## 2018-01-01 RX ORDER — IPRATROPIUM BROMIDE AND ALBUTEROL SULFATE 2.5; .5 MG/3ML; MG/3ML
1 SOLUTION RESPIRATORY (INHALATION)
Status: DISCONTINUED | OUTPATIENT
Start: 2018-01-01 | End: 2018-01-03

## 2018-01-01 RX ADMIN — CARBIDOPA AND LEVODOPA 2 TABLET: 25; 100 TABLET ORAL at 21:24

## 2018-01-01 RX ADMIN — Medication 10 ML: at 21:29

## 2018-01-01 RX ADMIN — DIPHENHYDRAMINE HCL 25 MG: 25 TABLET ORAL at 21:25

## 2018-01-01 RX ADMIN — GABAPENTIN 600 MG: 300 CAPSULE ORAL at 16:39

## 2018-01-01 RX ADMIN — ACETAMINOPHEN 650 MG: 325 TABLET, FILM COATED ORAL at 21:25

## 2018-01-01 RX ADMIN — IPRATROPIUM BROMIDE AND ALBUTEROL SULFATE 1 AMPULE: .5; 3 SOLUTION RESPIRATORY (INHALATION) at 19:56

## 2018-01-01 RX ADMIN — SOTALOL HYDROCHLORIDE 120 MG: 120 TABLET ORAL at 21:24

## 2018-01-01 RX ADMIN — CARBIDOPA AND LEVODOPA 2 TABLET: 25; 100 TABLET ORAL at 16:34

## 2018-01-01 RX ADMIN — IPRATROPIUM BROMIDE AND ALBUTEROL SULFATE 1 AMPULE: .5; 3 SOLUTION RESPIRATORY (INHALATION) at 15:47

## 2018-01-01 RX ADMIN — MONTELUKAST SODIUM 10 MG: 10 TABLET, COATED ORAL at 21:24

## 2018-01-01 RX ADMIN — GUAIFENESIN 600 MG: 600 TABLET, EXTENDED RELEASE ORAL at 21:24

## 2018-01-01 RX ADMIN — TRAMADOL HYDROCHLORIDE 50 MG: 50 TABLET, COATED ORAL at 16:35

## 2018-01-01 RX ADMIN — GABAPENTIN 600 MG: 300 CAPSULE ORAL at 21:24

## 2018-01-01 RX ADMIN — ATORVASTATIN CALCIUM 10 MG: 10 TABLET, FILM COATED ORAL at 21:24

## 2018-01-01 RX ADMIN — DOCUSATE SODIUM 100 MG: 100 CAPSULE, LIQUID FILLED ORAL at 21:25

## 2018-01-01 RX ADMIN — METHYLPREDNISOLONE SODIUM SUCCINATE 40 MG: 40 INJECTION, POWDER, FOR SOLUTION INTRAMUSCULAR; INTRAVENOUS at 21:35

## 2018-01-01 RX ADMIN — CEFTRIAXONE 1 G: 1 INJECTION, POWDER, FOR SOLUTION INTRAMUSCULAR; INTRAVENOUS at 14:36

## 2018-01-01 RX ADMIN — ENOXAPARIN SODIUM 40 MG: 40 INJECTION SUBCUTANEOUS at 16:34

## 2018-01-01 RX ADMIN — IPRATROPIUM BROMIDE AND ALBUTEROL SULFATE 1 AMPULE: .5; 3 SOLUTION RESPIRATORY (INHALATION) at 12:17

## 2018-01-01 RX ADMIN — AZITHROMYCIN MONOHYDRATE 500 MG: 500 INJECTION, POWDER, LYOPHILIZED, FOR SOLUTION INTRAVENOUS at 15:13

## 2018-01-01 RX ADMIN — IOPAMIDOL 90 ML: 755 INJECTION, SOLUTION INTRAVENOUS at 13:13

## 2018-01-01 ASSESSMENT — PAIN SCALES - GENERAL
PAINLEVEL_OUTOF10: 3
PAINLEVEL_OUTOF10: 0
PAINLEVEL_OUTOF10: 7
PAINLEVEL_OUTOF10: 0

## 2018-01-01 ASSESSMENT — ENCOUNTER SYMPTOMS
VOMITING: 0
COUGH: 1
SHORTNESS OF BREATH: 1
SORE THROAT: 0
ABDOMINAL PAIN: 0

## 2018-01-01 NOTE — ED NOTES
Report called to 4th floor nurse. Notified nurse that family is unaware of patient's medications due to pharmacy giving blister packs. Family or nurse to call tomorrow to obtain a list of the medications.       Crow Tellez RN  01/01/18 2071

## 2018-01-01 NOTE — H&P
NIGHTLY 9/20/17   Brea Adler CNP   VICTOZA 18 MG/3ML SOPN SC injection INJECT 1.2MG SUB-Q EVERY DAY 7/24/17   Brea Adler CNP   LORazepam (ATIVAN) 1 MG tablet Take 1 mg by mouth as needed  6/12/17   Historical Provider, MD   Docusate Calcium (STOOL SOFTENER PO) Take by mouth    Historical Provider, MD   Inulin (FIBER CHOICE PO) Take by mouth    Historical Provider, MD   Cholecalciferol (VITAMIN D3) 5000 units TABS Take by mouth    Historical Provider, MD   insulin detemir (LEVEMIR) 100 UNIT/ML injection vial Inject 65 Units into the skin nightly 7/6/17   Brea Adler CNP   albuterol (ACCUNEB) 1.25 MG/3ML nebulizer solution Inhale 3 mLs into the lungs every 6 hours as needed for Wheezing 7/6/17   Brea Adler CNP   Insulin Pen Needle 32G X 4 MM MISC 1 each by Does not apply route daily 6/29/17   Reilly Arias MD   omeprazole (PRILOSEC) 40 MG delayed release capsule TAKE ONE CAPSULE BY MOUTH DAILY 5/23/17   Reilly Arias MD   FORTEO 600 MCG/2.4ML SOLN injection Inject 20 mcg into the skin daily  5/9/17   Historical Provider, MD   linaclotide Abby Deateofilo) 145 MCG capsule Take 1 capsule by mouth every morning (before breakfast) 5/22/17   Brea Adler CNP   SURE COMFORT INS SYR 1CC/30G 30G X 5/16\" 1 ML MISC  3/3/17   Historical Provider, MD   glucose blood VI test strips (ONE TOUCH ULTRA TEST) strip Inject 1 each into the skin daily As needed. 2/3/17   Brea Adler CNP   Lancets MISC 1 lancet to check glucose daily 1/21/16   Brea Adler CNP   Insulin Pen Needle 31G X 8 MM MISC Inject 1 each into the skin daily 1/11/16   Brea Adler CNP   albuterol (PROVENTIL HFA;VENTOLIN HFA) 108 (90 BASE) MCG/ACT inhaler Inhale 2 puffs into the lungs every 6 hours as needed for Wheezing 7/17/15   Brea Adler CNP   OXYGEN 2L NC 12-24 hours 6/29/15   Brea Adler CNP   Nebulizers (AIRIAL COMPACT MINI NEBULIZER) MISC 1 Device by Does not apply route 4 times daily as needed. / anxiety / insomnia / mood changes  Skin:  Denies new rashes / lesions / skin hair or nail changes    14 point review of systems is negative except as specifically addressed above. Physical Examination:  BP (!) 143/84   Pulse 80   Temp 97.2 °F (36.2 °C) (Temporal)   Resp 20   Ht 5' 2\" (1.575 m)   Wt 276 lb 14.4 oz (125.6 kg)   SpO2 98%   BMI 50.65 kg/m²   General appearance: alert, appears stated age, cooperative and no distress  Head: Normocephalic, without obvious abnormality, atraumatic  Eyes: conjunctivae/corneas clear. PERRL, EOM's intact. Ears: normal external ears and nose, throat without exudate  Neck: no adenopathy, no carotid bruit, no JVD, supple, symmetrical, trachea midline and thyroid not enlarged, symmetric, no tenderness/mass/nodules  Lungs: Mildly diminished throughout, faint rhonchi RLL, a few scattered expiratory wheezes   Heart: Distant heart tones otherwise regular rate and rhythm, S1, S2 normal, no murmur  Abdomen:soft, obese, non-tender; non-distended, normal bowel sounds no masses, no organomegaly  Extremities:No lower extremity edema,  No erythema, no tenderness to palpation  Skin: Skin color, texture, turgor normal. No rashes or lesions  Lymphatic: No palpable lymph node enlargment  Neurologic: Alert and oriented X 3, normal strength and tone. Normal symmetric reflexes.  Mental status: Alert, oriented, thought content appropriate  Cranial nerves:II-XII Grossly intact Sensory: normal Motor:grossly normal  Psychiatric: Alert and oriented, thought content appropriate, normal insight, mood appropriate    Diagnostic Data:  CBC:  Recent Labs      01/01/18   1210   WBC  13.0*   HGB  11.6*   HCT  36.2*   PLT  228     BMP:  Recent Labs      01/01/18   1210  01/01/18   1228   NA  137   --    K  4.4  4.2   CL  96*   --    CO2  27   --    BUN  21   --    CREATININE  1.0*   --    CALCIUM  9.5   --      Recent Labs      01/01/18   1210   AST  10   ALT  <5*   BILITOT  0.3   ALKPHOS  128* Cardiac Enzymes:   Recent Labs      01/01/18   1213   TROPONINI  0.00     ABGs:  Lab Results   Component Value Date    PHART 7.460 01/01/2018    PO2ART 61.0 01/01/2018    BBM8OHK 38.0 01/01/2018     Urinalysis:  Lab Results   Component Value Date    NITRU NEGATIVE 01/20/2014    WBCUA 49 01/20/2014    BACTERIA NEGATIVE 01/20/2014    RBCUA 1 01/20/2014    BLOODU neg 06/09/2015    SPECGRAV 1.020 06/09/2015    GLUCOSEU neg 06/09/2015    GLUCOSEU >=1000 01/20/2014     CTA pulmonary 01/01/2018  1. No pulmonary emboli. 2. Patchy superior segment right lower lobe opacities are favorable  for pneumonia. Probable reactive hilar lymphadenopathy. Follow-up  chest x-rays are recommended to document improvement of these  opacities in 10-14 days. 3. A 7 mm right middle lobe nodular density may represent an  inflammatory process associated with the pneumonia. A follow-up CT  chest in 6 months recommended to reassess this finding. 4. Cardiomegaly with mild underlying CHF suspected. 4. Very small right pleural effusion. CXR 01/01/2017  Impression:  1. Right perihilar opacities are suspicious for pneumonia. Follow-up  imaging recommended after completion of medical therapy to document  improvement. If the finding does not improve, CT chest might then be  considered with IV contrast to assess for neoplasm. .    Assessment:  Principal Problem:    Community acquired pneumonia-blood cultures pending, check sputum culture, add Duonebs, incentive spirometer, continue Rocephin and Azithromycin for now    Active Problems:    Parkinson disease ([facility])-noted, home medications resumed    GERD (gastroesophageal reflux disease)-PPI    PAF (paroxysmal atrial fibrillation) ([facility])-continue home medications, telemetry ordered as well    H/O diastolic dysfunction-I/O's, daily weights, continue Lasix/Spironolactone    Type 2 diabetes mellitus with complication ([facility])-SSI, accuchecks    Stage 3 chronic kidney disease-monitor     Essential

## 2018-01-01 NOTE — ED PROVIDER NOTES
[facility]  eMERGENCY dEPARTMENT eNCOUnter      Pt Name: Aparna Bhat  MRN: [id number]  [place] 1950  Date of evaluation: 1/1/2018  Provider: Sandra Shaw MD    [address] [place] [address]       Chief Complaint   Patient presents with    Shortness of Breath    Fatigue         HISTORY OF PRESENT ILLNESS   (Location/Symptom, Timing/Onset, Context/Setting, Quality, Duration, Modifying Factors, Severity)  Note limiting factors. Aparna Bhat is a 79 y.o. female who presents to the emergency department      The history is provided by the patient. Shortness of Breath   Severity:  Moderate  Onset quality:  Gradual  Duration:  3 days  Timing:  Constant  Progression:  Worsening  Chronicity:  Recurrent (\"asthma\")  Relieved by:  Nothing  Worsened by:  Exertion and activity  Ineffective treatments:  Inhaler  Associated symptoms: cough    Associated symptoms: no abdominal pain, no chest pain, no diaphoresis, no fever (although pt reports 99 is fever for her), no sore throat and no vomiting    Risk factors: obesity    Risk factors: no tobacco use        Nursing Notes were reviewed. REVIEW OF SYSTEMS    (2-9 systems for level 4, 10 or more for level 5)     Review of Systems   Constitutional: Negative for diaphoresis and fever (although pt reports 99 is fever for her). HENT: Negative for sore throat. Respiratory: Positive for cough and shortness of breath. Cardiovascular: Negative for chest pain. Gastrointestinal: Negative for abdominal pain and vomiting. All other systems reviewed and are negative. Except as noted above the remainder of the review of systems was reviewed and negative.        PAST MEDICAL HISTORY     Past Medical History:   Diagnosis Date    Asthma 4/21/2015    [facility] obstructive [facility]) [facility]     Diabetes mellitus ([facility] [facility]     HTN (hypertension)     Mild mitral regurgitation by prior echocardiogram 2/11/2016    Morbid obesity ([address] 10/18/2017    Normal

## 2018-01-02 LAB
ANION GAP SERPL CALCULATED.3IONS-SCNC: 16 MMOL/L (ref 7–19)
BASOPHILS ABSOLUTE: 0 K/UL (ref 0–0.2)
BASOPHILS RELATIVE PERCENT: 0.2 % (ref 0–1)
BUN BLDV-MCNC: 23 MG/DL (ref 8–23)
CALCIUM SERPL-MCNC: 9.3 MG/DL (ref 8.8–10.2)
CHLORIDE BLD-SCNC: 99 MMOL/L (ref 98–111)
CO2: 24 MMOL/L (ref 22–29)
CREAT SERPL-MCNC: 1.2 MG/DL (ref 0.5–0.9)
EOSINOPHILS ABSOLUTE: 0 K/UL (ref 0–0.6)
EOSINOPHILS RELATIVE PERCENT: 0.3 % (ref 0–5)
GFR NON-AFRICAN AMERICAN: 45
GLUCOSE BLD-MCNC: 183 MG/DL (ref 70–99)
GLUCOSE BLD-MCNC: 194 MG/DL (ref 74–109)
GLUCOSE BLD-MCNC: 237 MG/DL (ref 70–99)
GLUCOSE BLD-MCNC: 268 MG/DL (ref 70–99)
GLUCOSE BLD-MCNC: 277 MG/DL (ref 70–99)
HCT VFR BLD CALC: 36.5 % (ref 37–47)
HEMOGLOBIN: 11.6 G/DL (ref 12–16)
LACTIC ACID: 1.2 MMOL/L (ref 0.5–1.9)
LYMPHOCYTES ABSOLUTE: 1.3 K/UL (ref 1.1–4.5)
LYMPHOCYTES RELATIVE PERCENT: 8.6 % (ref 20–40)
MCH RBC QN AUTO: 28.4 PG (ref 27–31)
MCHC RBC AUTO-ENTMCNC: 31.8 G/DL (ref 33–37)
MCV RBC AUTO: 89.5 FL (ref 81–99)
MONOCYTES ABSOLUTE: 0.4 K/UL (ref 0–0.9)
MONOCYTES RELATIVE PERCENT: 2.6 % (ref 0–10)
NEUTROPHILS ABSOLUTE: 13.3 K/UL (ref 1.5–7.5)
NEUTROPHILS RELATIVE PERCENT: 87.6 % (ref 50–65)
PDW BLD-RTO: 13.2 % (ref 11.5–14.5)
PERFORMED ON: ABNORMAL
PLATELET # BLD: 255 K/UL (ref 130–400)
PMV BLD AUTO: 10.6 FL (ref 9.4–12.3)
POTASSIUM SERPL-SCNC: 4.9 MMOL/L (ref 3.5–5)
RBC # BLD: 4.08 M/UL (ref 4.2–5.4)
SODIUM BLD-SCNC: 139 MMOL/L (ref 136–145)
WBC # BLD: 15.2 K/UL (ref 4.8–10.8)

## 2018-01-02 PROCEDURE — 82948 REAGENT STRIP/BLOOD GLUCOSE: CPT

## 2018-01-02 PROCEDURE — 6360000002 HC RX W HCPCS: Performed by: PHYSICIAN ASSISTANT

## 2018-01-02 PROCEDURE — 6370000000 HC RX 637 (ALT 250 FOR IP): Performed by: PHYSICIAN ASSISTANT

## 2018-01-02 PROCEDURE — 80048 BASIC METABOLIC PNL TOTAL CA: CPT

## 2018-01-02 PROCEDURE — 6370000000 HC RX 637 (ALT 250 FOR IP): Performed by: HOSPITALIST

## 2018-01-02 PROCEDURE — 2580000003 HC RX 258: Performed by: HOSPITALIST

## 2018-01-02 PROCEDURE — 83605 ASSAY OF LACTIC ACID: CPT

## 2018-01-02 PROCEDURE — 36415 COLL VENOUS BLD VENIPUNCTURE: CPT

## 2018-01-02 PROCEDURE — 6370000000 HC RX 637 (ALT 250 FOR IP): Performed by: INTERNAL MEDICINE

## 2018-01-02 PROCEDURE — 99232 SBSQ HOSP IP/OBS MODERATE 35: CPT | Performed by: HOSPITALIST

## 2018-01-02 PROCEDURE — 6360000002 HC RX W HCPCS: Performed by: HOSPITALIST

## 2018-01-02 PROCEDURE — 2700000000 HC OXYGEN THERAPY PER DAY

## 2018-01-02 PROCEDURE — 1210000000 HC MED SURG R&B

## 2018-01-02 PROCEDURE — 2580000003 HC RX 258: Performed by: PHYSICIAN ASSISTANT

## 2018-01-02 PROCEDURE — 85025 COMPLETE CBC W/AUTO DIFF WBC: CPT

## 2018-01-02 PROCEDURE — 93005 ELECTROCARDIOGRAM TRACING: CPT

## 2018-01-02 PROCEDURE — 94640 AIRWAY INHALATION TREATMENT: CPT

## 2018-01-02 RX ORDER — SODIUM CHLORIDE 9 MG/ML
INJECTION, SOLUTION INTRAVENOUS CONTINUOUS
Status: DISCONTINUED | OUTPATIENT
Start: 2018-01-02 | End: 2018-01-03

## 2018-01-02 RX ORDER — TRAMADOL HYDROCHLORIDE 50 MG/1
25 TABLET ORAL EVERY 8 HOURS PRN
Status: DISCONTINUED | OUTPATIENT
Start: 2018-01-02 | End: 2018-01-05 | Stop reason: HOSPADM

## 2018-01-02 RX ORDER — LORAZEPAM 0.5 MG/1
0.5 TABLET ORAL 2 TIMES DAILY PRN
Status: DISCONTINUED | OUTPATIENT
Start: 2018-01-02 | End: 2018-01-05 | Stop reason: HOSPADM

## 2018-01-02 RX ORDER — TRAMADOL HYDROCHLORIDE 50 MG/1
TABLET ORAL
Qty: 30 TABLET | Refills: 0 | Status: SHIPPED | OUTPATIENT
Start: 2018-01-02 | End: 2018-01-03 | Stop reason: HOSPADM

## 2018-01-02 RX ORDER — FUROSEMIDE 20 MG/1
20 TABLET ORAL DAILY
Status: DISCONTINUED | OUTPATIENT
Start: 2018-01-03 | End: 2018-01-03

## 2018-01-02 RX ADMIN — LOSARTAN POTASSIUM 100 MG: 100 TABLET ORAL at 09:01

## 2018-01-02 RX ADMIN — CARBIDOPA AND LEVODOPA 2 TABLET: 25; 100 TABLET ORAL at 21:18

## 2018-01-02 RX ADMIN — CARBIDOPA AND LEVODOPA 2 TABLET: 25; 100 TABLET ORAL at 09:01

## 2018-01-02 RX ADMIN — GABAPENTIN 600 MG: 300 CAPSULE ORAL at 21:18

## 2018-01-02 RX ADMIN — CARBIDOPA AND LEVODOPA 2 TABLET: 25; 100 TABLET ORAL at 13:29

## 2018-01-02 RX ADMIN — Medication 10 ML: at 21:19

## 2018-01-02 RX ADMIN — AZITHROMYCIN MONOHYDRATE 250 MG: 500 INJECTION, POWDER, LYOPHILIZED, FOR SOLUTION INTRAVENOUS at 10:47

## 2018-01-02 RX ADMIN — LINACLOTIDE 145 MCG: 145 CAPSULE, GELATIN COATED ORAL at 05:53

## 2018-01-02 RX ADMIN — INSULIN LISPRO 6 UNITS: 100 INJECTION, SOLUTION INTRAVENOUS; SUBCUTANEOUS at 12:21

## 2018-01-02 RX ADMIN — INSULIN LISPRO 2 UNITS: 100 INJECTION, SOLUTION INTRAVENOUS; SUBCUTANEOUS at 09:03

## 2018-01-02 RX ADMIN — IPRATROPIUM BROMIDE AND ALBUTEROL SULFATE 1 AMPULE: .5; 3 SOLUTION RESPIRATORY (INHALATION) at 11:34

## 2018-01-02 RX ADMIN — GABAPENTIN 600 MG: 300 CAPSULE ORAL at 13:28

## 2018-01-02 RX ADMIN — SODIUM CHLORIDE: 9 INJECTION, SOLUTION INTRAVENOUS at 12:16

## 2018-01-02 RX ADMIN — ACETAMINOPHEN 650 MG: 325 TABLET, FILM COATED ORAL at 21:17

## 2018-01-02 RX ADMIN — INSULIN LISPRO 3 UNITS: 100 INJECTION, SOLUTION INTRAVENOUS; SUBCUTANEOUS at 21:25

## 2018-01-02 RX ADMIN — WATER 1 G: 1 INJECTION INTRAMUSCULAR; INTRAVENOUS; SUBCUTANEOUS at 14:32

## 2018-01-02 RX ADMIN — SOTALOL HYDROCHLORIDE 120 MG: 120 TABLET ORAL at 21:18

## 2018-01-02 RX ADMIN — ENOXAPARIN SODIUM 40 MG: 40 INJECTION SUBCUTANEOUS at 15:42

## 2018-01-02 RX ADMIN — SPIRONOLACTONE 25 MG: 25 TABLET, FILM COATED ORAL at 09:02

## 2018-01-02 RX ADMIN — MONTELUKAST SODIUM 10 MG: 10 TABLET, COATED ORAL at 21:18

## 2018-01-02 RX ADMIN — GABAPENTIN 600 MG: 300 CAPSULE ORAL at 09:02

## 2018-01-02 RX ADMIN — ASPIRIN 325 MG ORAL TABLET 325 MG: 325 PILL ORAL at 09:01

## 2018-01-02 RX ADMIN — INSULIN GLARGINE 60 UNITS: 100 INJECTION, SOLUTION SUBCUTANEOUS at 09:15

## 2018-01-02 RX ADMIN — PANTOPRAZOLE SODIUM 40 MG: 40 TABLET, DELAYED RELEASE ORAL at 05:53

## 2018-01-02 RX ADMIN — IPRATROPIUM BROMIDE AND ALBUTEROL SULFATE 1 AMPULE: .5; 3 SOLUTION RESPIRATORY (INHALATION) at 20:06

## 2018-01-02 RX ADMIN — FUROSEMIDE 40 MG: 40 TABLET ORAL at 09:02

## 2018-01-02 RX ADMIN — IPRATROPIUM BROMIDE AND ALBUTEROL SULFATE 1 AMPULE: .5; 3 SOLUTION RESPIRATORY (INHALATION) at 16:13

## 2018-01-02 RX ADMIN — DIPHENHYDRAMINE HCL 25 MG: 25 TABLET ORAL at 21:17

## 2018-01-02 RX ADMIN — TRAMADOL HYDROCHLORIDE 50 MG: 50 TABLET, COATED ORAL at 06:11

## 2018-01-02 RX ADMIN — Medication 10 ML: at 09:02

## 2018-01-02 RX ADMIN — IPRATROPIUM BROMIDE AND ALBUTEROL SULFATE 1 AMPULE: .5; 3 SOLUTION RESPIRATORY (INHALATION) at 07:57

## 2018-01-02 RX ADMIN — ATORVASTATIN CALCIUM 10 MG: 10 TABLET, FILM COATED ORAL at 21:18

## 2018-01-02 RX ADMIN — INSULIN LISPRO 2 UNITS: 100 INJECTION, SOLUTION INTRAVENOUS; SUBCUTANEOUS at 17:24

## 2018-01-02 RX ADMIN — GUAIFENESIN 600 MG: 600 TABLET, EXTENDED RELEASE ORAL at 21:18

## 2018-01-02 RX ADMIN — DOCUSATE SODIUM 100 MG: 100 CAPSULE, LIQUID FILLED ORAL at 09:01

## 2018-01-02 RX ADMIN — GUAIFENESIN 600 MG: 600 TABLET, EXTENDED RELEASE ORAL at 09:01

## 2018-01-02 RX ADMIN — SOTALOL HYDROCHLORIDE 120 MG: 120 TABLET ORAL at 09:01

## 2018-01-02 RX ADMIN — DOCUSATE SODIUM 100 MG: 100 CAPSULE, LIQUID FILLED ORAL at 21:18

## 2018-01-02 ASSESSMENT — PAIN SCALES - GENERAL
PAINLEVEL_OUTOF10: 0
PAINLEVEL_OUTOF10: 0
PAINLEVEL_OUTOF10: 4
PAINLEVEL_OUTOF10: 7

## 2018-01-02 NOTE — PROGRESS NOTES
Nutrition Assessment    Type and Reason for Visit: Positive Nutrition Screen, Initial    Nutrition Recommendations: continue POC    Malnutrition Assessment:  · Malnutrition Status: No malnutrition    Nutrition Diagnosis:   · Problem: Overweight/Obese  · Etiology: related to       Signs and symptoms:  as evidenced by BMI    Nutrition Assessment:  · Subjective Assessment: +NS for chewing/swallowing. Pt reports occasional swallowing difficulty, may be r/t to reflux,  prefers drinking liquids with straw, feels she tolerates liquids better with a straw. Tolerates regular diet, appetite is good. · Nutrition-Focused Physical Findings:    · Wound Type: None  · Current Nutrition Therapies:  · Oral Diet Orders:     · Oral Diet intake: %  · Anthropometric Measures:  · Ht: 5' 2\" (157.5 cm)   · Current Body Wt:    · Admission Body Wt: 276 lb (125.2 kg)  · Ideal Body Wt: 110 lb (49.9 kg), % Ideal Body    · BMI Classification: BMI > or equal to 40.0 Obese Class III    Estimated Intake vs Estimated Needs: Intake Meets Needs    Nutrition Risk Level: Low    Nutrition Interventions:      Continued Inpatient Monitoring    Nutrition Evaluation:   · Evaluation: Goals set   · Goals: po 75% or more, diet tolerance    · Monitoring: Meal Intake, Diet Tolerance, Weight, Pertinent Labs, Chewing/Swallowing    See Adult Nutrition Doc Flowsheet for more detail.      Electronically signed by Germán Cerna, MS, RD, LD on 1/2/18 at 1:45 PM    Contact Number: [phone]

## 2018-01-02 NOTE — PLAN OF CARE
Problem: Falls - Risk of  Intervention: Fall risk assessment  PT is to call for assistance. Intervention: Postfall assessment  None. Intervention: Fall precautions  PT is to call for assistance. Intervention: Toileting assistance  PT is to call for assistance. Goal: Absence of falls  Outcome: Ongoing      Problem: Risk for Impaired Skin Integrity  Goal: Tissue integrity - skin and mucous membranes  Structural intactness and normal physiological function of skin and  mucous membranes. Outcome: Ongoing    Intervention: SKIN ASSESSMENT  See assessment. Intervention: PRESSURE ULCER PREVENTION  PT is able to turn self. Intervention: TURN PATIENT  PT is able to turn self.

## 2018-01-03 ENCOUNTER — APPOINTMENT (OUTPATIENT)
Dept: GENERAL RADIOLOGY | Age: 68
DRG: 194 | End: 2018-01-03
Payer: MEDICARE

## 2018-01-03 ENCOUNTER — APPOINTMENT (OUTPATIENT)
Dept: CT IMAGING | Age: 68
DRG: 194 | End: 2018-01-03
Payer: MEDICARE

## 2018-01-03 LAB
ANION GAP SERPL CALCULATED.3IONS-SCNC: 12 MMOL/L (ref 7–19)
BASE EXCESS ARTERIAL: 1.7 MMOL/L (ref -2–2)
BILIRUBIN URINE: NEGATIVE
BLOOD, URINE: NEGATIVE
BUN BLDV-MCNC: 28 MG/DL (ref 8–23)
C DIFFICILE TOXIN, EIA: NORMAL
CALCIUM SERPL-MCNC: 9.1 MG/DL (ref 8.8–10.2)
CARBOXYHEMOGLOBIN ARTERIAL: 1.6 % (ref 0–5)
CHLORIDE BLD-SCNC: 102 MMOL/L (ref 98–111)
CLARITY: CLEAR
CO2: 26 MMOL/L (ref 22–29)
COLOR: YELLOW
CREAT SERPL-MCNC: 1.3 MG/DL (ref 0.5–0.9)
EKG P AXIS: 11 DEGREES
EKG P AXIS: 22 DEGREES
EKG P-R INTERVAL: 180 MS
EKG P-R INTERVAL: 182 MS
EKG Q-T INTERVAL: 384 MS
EKG Q-T INTERVAL: 410 MS
EKG QRS DURATION: 96 MS
EKG QRS DURATION: 98 MS
EKG QTC CALCULATION (BAZETT): 419 MS
EKG QTC CALCULATION (BAZETT): 431 MS
EKG T AXIS: 15 DEGREES
EKG T AXIS: 25 DEGREES
GFR NON-AFRICAN AMERICAN: 41
GLUCOSE BLD-MCNC: 111 MG/DL (ref 70–99)
GLUCOSE BLD-MCNC: 126 MG/DL (ref 70–99)
GLUCOSE BLD-MCNC: 142 MG/DL (ref 74–109)
GLUCOSE BLD-MCNC: 253 MG/DL (ref 70–99)
GLUCOSE BLD-MCNC: 261 MG/DL (ref 70–99)
GLUCOSE BLD-MCNC: 64 MG/DL (ref 70–99)
GLUCOSE URINE: NEGATIVE MG/DL
HCO3 ARTERIAL: 25.8 MMOL/L (ref 22–26)
HCT VFR BLD CALC: 33.6 % (ref 37–47)
HEMOGLOBIN, ART, EXTENDED: 12.7 G/DL (ref 12–16)
HEMOGLOBIN: 10.6 G/DL (ref 12–16)
KETONES, URINE: NEGATIVE MG/DL
LEUKOCYTE ESTERASE, URINE: NEGATIVE
MCH RBC QN AUTO: 29 PG (ref 27–31)
MCHC RBC AUTO-ENTMCNC: 31.5 G/DL (ref 33–37)
MCV RBC AUTO: 92.1 FL (ref 81–99)
METHEMOGLOBIN ARTERIAL: 0.9 %
NITRITE, URINE: NEGATIVE
O2 CONTENT ARTERIAL: 16.5 ML/DL
O2 SAT, ARTERIAL: 92.4 %
O2 THERAPY: ABNORMAL
PCO2 ARTERIAL: 38 MMHG (ref 35–45)
PDW BLD-RTO: 13.3 % (ref 11.5–14.5)
PERFORMED ON: ABNORMAL
PH ARTERIAL: 7.44 (ref 7.35–7.45)
PH UA: 5.5
PLATELET # BLD: 231 K/UL (ref 130–400)
PMV BLD AUTO: 10.5 FL (ref 9.4–12.3)
PO2 ARTERIAL: 61 MMHG (ref 80–100)
POTASSIUM SERPL-SCNC: 4.4 MMOL/L (ref 3.5–5)
POTASSIUM, WHOLE BLOOD: 4.5
PRO-BNP: 515 PG/ML (ref 0–900)
PROTEIN UA: NEGATIVE MG/DL
RAPID INFLUENZA  B AGN: NEGATIVE
RAPID INFLUENZA A AGN: NEGATIVE
RBC # BLD: 3.65 M/UL (ref 4.2–5.4)
SODIUM BLD-SCNC: 140 MMOL/L (ref 136–145)
SPECIFIC GRAVITY UA: 1.01
TROPONIN: <0.01 NG/ML (ref 0–0.03)
UROBILINOGEN, URINE: 0.2 E.U./DL
WBC # BLD: 11.7 K/UL (ref 4.8–10.8)

## 2018-01-03 PROCEDURE — 83880 ASSAY OF NATRIURETIC PEPTIDE: CPT

## 2018-01-03 PROCEDURE — 87804 INFLUENZA ASSAY W/OPTIC: CPT

## 2018-01-03 PROCEDURE — 36415 COLL VENOUS BLD VENIPUNCTURE: CPT

## 2018-01-03 PROCEDURE — 87040 BLOOD CULTURE FOR BACTERIA: CPT

## 2018-01-03 PROCEDURE — 36600 WITHDRAWAL OF ARTERIAL BLOOD: CPT

## 2018-01-03 PROCEDURE — 6360000002 HC RX W HCPCS: Performed by: PHYSICIAN ASSISTANT

## 2018-01-03 PROCEDURE — 82948 REAGENT STRIP/BLOOD GLUCOSE: CPT

## 2018-01-03 PROCEDURE — 6370000000 HC RX 637 (ALT 250 FOR IP): Performed by: PHYSICIAN ASSISTANT

## 2018-01-03 PROCEDURE — 81003 URINALYSIS AUTO W/O SCOPE: CPT

## 2018-01-03 PROCEDURE — 71046 X-RAY EXAM CHEST 2 VIEWS: CPT

## 2018-01-03 PROCEDURE — 85027 COMPLETE CBC AUTOMATED: CPT

## 2018-01-03 PROCEDURE — 84484 ASSAY OF TROPONIN QUANT: CPT

## 2018-01-03 PROCEDURE — 2700000000 HC OXYGEN THERAPY PER DAY

## 2018-01-03 PROCEDURE — 80048 BASIC METABOLIC PNL TOTAL CA: CPT

## 2018-01-03 PROCEDURE — 84132 ASSAY OF SERUM POTASSIUM: CPT

## 2018-01-03 PROCEDURE — 99232 SBSQ HOSP IP/OBS MODERATE 35: CPT | Performed by: HOSPITALIST

## 2018-01-03 PROCEDURE — 2580000003 HC RX 258: Performed by: PHYSICIAN ASSISTANT

## 2018-01-03 PROCEDURE — 94761 N-INVAS EAR/PLS OXIMETRY MLT: CPT

## 2018-01-03 PROCEDURE — 6370000000 HC RX 637 (ALT 250 FOR IP): Performed by: HOSPITALIST

## 2018-01-03 PROCEDURE — 94640 AIRWAY INHALATION TREATMENT: CPT

## 2018-01-03 PROCEDURE — 82803 BLOOD GASES ANY COMBINATION: CPT

## 2018-01-03 PROCEDURE — 87324 CLOSTRIDIUM AG IA: CPT

## 2018-01-03 PROCEDURE — 1210000000 HC MED SURG R&B

## 2018-01-03 PROCEDURE — 6370000000 HC RX 637 (ALT 250 FOR IP): Performed by: INTERNAL MEDICINE

## 2018-01-03 PROCEDURE — 70450 CT HEAD/BRAIN W/O DYE: CPT

## 2018-01-03 PROCEDURE — 85025 COMPLETE CBC W/AUTO DIFF WBC: CPT

## 2018-01-03 RX ORDER — CEFDINIR 300 MG/1
300 CAPSULE ORAL 2 TIMES DAILY
Qty: 10 CAPSULE | Refills: 0 | Status: SHIPPED | OUTPATIENT
Start: 2018-01-03 | End: 2018-01-08

## 2018-01-03 RX ORDER — FUROSEMIDE 10 MG/ML
40 INJECTION INTRAMUSCULAR; INTRAVENOUS ONCE
Status: DISCONTINUED | OUTPATIENT
Start: 2018-01-03 | End: 2018-01-03

## 2018-01-03 RX ORDER — BUMETANIDE 0.25 MG/ML
1 INJECTION, SOLUTION INTRAMUSCULAR; INTRAVENOUS ONCE
Status: DISCONTINUED | OUTPATIENT
Start: 2018-01-03 | End: 2018-01-03 | Stop reason: RX

## 2018-01-03 RX ORDER — DOXYCYCLINE HYCLATE 100 MG/1
100 CAPSULE ORAL EVERY 12 HOURS SCHEDULED
Status: DISCONTINUED | OUTPATIENT
Start: 2018-01-03 | End: 2018-01-05 | Stop reason: HOSPADM

## 2018-01-03 RX ORDER — AMLODIPINE BESYLATE 5 MG/1
5 TABLET ORAL DAILY
Status: DISCONTINUED | OUTPATIENT
Start: 2018-01-03 | End: 2018-01-05 | Stop reason: HOSPADM

## 2018-01-03 RX ORDER — GUAIFENESIN 600 MG/1
600 TABLET, EXTENDED RELEASE ORAL 2 TIMES DAILY
Qty: 10 TABLET | Refills: 0 | COMMUNITY
Start: 2018-01-03 | End: 2018-01-08

## 2018-01-03 RX ORDER — VANCOMYCIN HYDROCHLORIDE 1 G/200ML
1000 INJECTION, SOLUTION INTRAVENOUS EVERY 12 HOURS
Status: DISCONTINUED | OUTPATIENT
Start: 2018-01-03 | End: 2018-01-03 | Stop reason: DRUGHIGH

## 2018-01-03 RX ORDER — DOXYCYCLINE HYCLATE 100 MG/1
100 CAPSULE ORAL EVERY 12 HOURS SCHEDULED
Qty: 10 CAPSULE | Refills: 0 | Status: SHIPPED | OUTPATIENT
Start: 2018-01-03 | End: 2018-01-08

## 2018-01-03 RX ADMIN — TRAMADOL HYDROCHLORIDE 25 MG: 50 TABLET, COATED ORAL at 09:06

## 2018-01-03 RX ADMIN — DIPHENHYDRAMINE HCL 25 MG: 25 TABLET ORAL at 22:03

## 2018-01-03 RX ADMIN — LINACLOTIDE 145 MCG: 145 CAPSULE, GELATIN COATED ORAL at 06:30

## 2018-01-03 RX ADMIN — GABAPENTIN 600 MG: 300 CAPSULE ORAL at 08:45

## 2018-01-03 RX ADMIN — DOXYCYCLINE HYCLATE 100 MG: 100 CAPSULE, GELATIN COATED ORAL at 10:27

## 2018-01-03 RX ADMIN — DOXYCYCLINE HYCLATE 100 MG: 100 CAPSULE, GELATIN COATED ORAL at 21:50

## 2018-01-03 RX ADMIN — LOSARTAN POTASSIUM 100 MG: 100 TABLET ORAL at 11:21

## 2018-01-03 RX ADMIN — ATORVASTATIN CALCIUM 10 MG: 10 TABLET, FILM COATED ORAL at 21:50

## 2018-01-03 RX ADMIN — WATER 1 G: 1 INJECTION INTRAMUSCULAR; INTRAVENOUS; SUBCUTANEOUS at 13:32

## 2018-01-03 RX ADMIN — INSULIN LISPRO 6 UNITS: 100 INJECTION, SOLUTION INTRAVENOUS; SUBCUTANEOUS at 11:58

## 2018-01-03 RX ADMIN — ACETAMINOPHEN 650 MG: 325 TABLET, FILM COATED ORAL at 22:03

## 2018-01-03 RX ADMIN — INSULIN GLARGINE 60 UNITS: 100 INJECTION, SOLUTION SUBCUTANEOUS at 08:54

## 2018-01-03 RX ADMIN — ENOXAPARIN SODIUM 40 MG: 40 INJECTION SUBCUTANEOUS at 16:18

## 2018-01-03 RX ADMIN — GABAPENTIN 600 MG: 300 CAPSULE ORAL at 13:33

## 2018-01-03 RX ADMIN — GABAPENTIN 600 MG: 300 CAPSULE ORAL at 21:50

## 2018-01-03 RX ADMIN — SOTALOL HYDROCHLORIDE 120 MG: 120 TABLET ORAL at 21:50

## 2018-01-03 RX ADMIN — GUAIFENESIN 600 MG: 600 TABLET, EXTENDED RELEASE ORAL at 08:45

## 2018-01-03 RX ADMIN — ACETAMINOPHEN 650 MG: 325 TABLET, FILM COATED ORAL at 15:25

## 2018-01-03 RX ADMIN — TRAMADOL HYDROCHLORIDE 25 MG: 50 TABLET, COATED ORAL at 22:03

## 2018-01-03 RX ADMIN — PANTOPRAZOLE SODIUM 40 MG: 40 TABLET, DELAYED RELEASE ORAL at 06:30

## 2018-01-03 RX ADMIN — INSULIN LISPRO 4 UNITS: 100 INJECTION, SOLUTION INTRAVENOUS; SUBCUTANEOUS at 08:46

## 2018-01-03 RX ADMIN — SOTALOL HYDROCHLORIDE 120 MG: 120 TABLET ORAL at 08:46

## 2018-01-03 RX ADMIN — AMLODIPINE BESYLATE 5 MG: 5 TABLET ORAL at 21:51

## 2018-01-03 RX ADMIN — IPRATROPIUM BROMIDE AND ALBUTEROL SULFATE 1 AMPULE: .5; 3 SOLUTION RESPIRATORY (INHALATION) at 07:21

## 2018-01-03 RX ADMIN — DOCUSATE SODIUM 100 MG: 100 CAPSULE, LIQUID FILLED ORAL at 08:45

## 2018-01-03 RX ADMIN — LORAZEPAM 0.5 MG: 0.5 TABLET ORAL at 16:16

## 2018-01-03 RX ADMIN — MAGNESIUM HYDROXIDE 30 ML: 400 SUSPENSION ORAL at 16:17

## 2018-01-03 RX ADMIN — CARBIDOPA AND LEVODOPA 2 TABLET: 25; 100 TABLET ORAL at 08:46

## 2018-01-03 RX ADMIN — GUAIFENESIN 600 MG: 600 TABLET, EXTENDED RELEASE ORAL at 21:50

## 2018-01-03 RX ADMIN — ASPIRIN 325 MG ORAL TABLET 325 MG: 325 PILL ORAL at 08:46

## 2018-01-03 RX ADMIN — CARBIDOPA AND LEVODOPA 2 TABLET: 25; 100 TABLET ORAL at 13:33

## 2018-01-03 RX ADMIN — MONTELUKAST SODIUM 10 MG: 10 TABLET, COATED ORAL at 21:51

## 2018-01-03 RX ADMIN — CARBIDOPA AND LEVODOPA 2 TABLET: 25; 100 TABLET ORAL at 21:51

## 2018-01-03 ASSESSMENT — PAIN SCALES - GENERAL
PAINLEVEL_OUTOF10: 6
PAINLEVEL_OUTOF10: 6
PAINLEVEL_OUTOF10: 8

## 2018-01-03 NOTE — PROGRESS NOTES
HOSPITAL MEDICINE  - PROGRESS NOTE    Admit Date: 1/1/2018         CC; dyspnea     Subjective: dyspnea better, coughing up stuff    Objective: no distress    Diet: DIET CARB CONTROL; Low Sodium (2 GM)  Pain is:None  Nausea:None  Bowel Movement/Flatus yes    Data:   Scheduled Meds: Reviewed  Current Facility-Administered Medications   Medication Dose Route Frequency Provider Last Rate Last Dose    azithromycin (ZITHROMAX) 250 mg in sodium chloride 0.9 % 250 mL IVPB  250 mg Intravenous Q24H Albino Barrera MD   Stopped at 01/02/18 1150    0.9 % sodium chloride infusion   Intravenous Continuous Albino Barrera  mL/hr at 01/02/18 1216      [START ON 1/3/2018] furosemide (LASIX) tablet 20 mg  20 mg Oral Daily Albino Barrera MD        LORazepam (ATIVAN) tablet 0.5 mg  0.5 mg Oral BID PRN Albino Barrera MD        traMADol Addison Lieu) tablet 25 mg  25 mg Oral Q8H PRN Albino Barrera MD        sodium chloride flush 0.9 % injection 10 mL  10 mL Intravenous 2 times per day Sandi Craig PA-C   10 mL at 01/02/18 0902    sodium chloride flush 0.9 % injection 10 mL  10 mL Intravenous PRN Sandi Craig PA-C        acetaminophen (TYLENOL) tablet 650 mg  650 mg Oral Q4H PRN Sandi Craig PA-C   650 mg at 01/01/18 2125    magnesium hydroxide (MILK OF MAGNESIA) 400 MG/5ML suspension 30 mL  30 mL Oral Daily PRN Sandi Craig PA-C        docusate sodium (COLACE) capsule 100 mg  100 mg Oral BID Sandi Craig PA-C   100 mg at 01/02/18 0901    enoxaparin (LOVENOX) injection 40 mg  40 mg Subcutaneous Q24H Sandi Craig PA-C   40 mg at 01/02/18 1542    ipratropium-albuterol (DUONEB) nebulizer solution 1 ampule  1 ampule Inhalation Q4H WA Sandi Craig PA-C   1 ampule at 01/02/18 1613    cefTRIAXone (ROCEPHIN) 1 g in sterile water 10 mL IV syringe  1 g Intravenous Q24H Sandi Craig PA-C   1 g at 01/02/18 1432    insulin lispro (HUMALOG) injection vial 0-12 Units  0-12 Units Subcutaneous TID WC Vara Janette, PA-C   2 Units at 01/02/18 1724    insulin lispro (HUMALOG) injection vial 0-6 Units  0-6 Units Subcutaneous Nightly Vara Janette, PA-C        glucose (GLUTOSE) 40 % oral gel 15 g  15 g Oral PRN Vara Janette, PA-C        dextrose 50 % solution 12.5 g  12.5 g Intravenous PRN Vara Janette, PA-C        glucagon (rDNA) injection 1 mg  1 mg Intramuscular PRN Vara Janette, PA-C        dextrose 5 % solution  100 mL/hr Intravenous PRN Vara Janette, PA-C        aspirin tablet 325 mg  325 mg Oral Daily Vara Janette, PA-C   325 mg at 01/02/18 0901    atorvastatin (LIPITOR) tablet 10 mg  10 mg Oral Nightly Vara Janette, PA-C   10 mg at 01/01/18 2124    carbidopa-levodopa (SINEMET)  MG per tablet 2 tablet  2 tablet Oral TID Vara Janette, PA-C   2 tablet at 01/02/18 1329    teriparatide (recombinant) (FORTEO) injection 20 mcg  20 mcg Subcutaneous Daily Vara Janette, PA-C        gabapentin (NEURONTIN) capsule 600 mg  600 mg Oral TID Vara Janette, PA-C   600 mg at 01/02/18 1328    linaclotide (LINZESS) capsule 145 mcg  145 mcg Oral QAM AC Vara Janette, PA-C   145 mcg at 01/02/18 0553    losartan (COZAAR) tablet 100 mg  100 mg Oral Daily Vara Janette, PA-C   100 mg at 01/02/18 0901    montelukast (SINGULAIR) tablet 10 mg  10 mg Oral Nightly Vara Janette, PA-C   10 mg at 01/01/18 2124    pantoprazole (PROTONIX) tablet 40 mg  40 mg Oral QAM AC Vara Janette, PA-C   40 mg at 01/02/18 0553    sotalol (BETAPACE) tablet 120 mg  120 mg Oral BID Vara Janette, PA-C   120 mg at 01/02/18 0901    insulin glargine (LANTUS) injection vial 60 Units  60 Units Subcutaneous Nightly Stella Brown MD   60 Units at 01/02/18 0915    guaiFENesin [facility]) extended release tablet 600 mg  600 mg Oral BID Stella Brown MD   600 mg at 01/02/18 0901   

## 2018-01-03 NOTE — DISCHARGE SUMMARY
Physician Discharge Summary     Patient ID:  Aparna Bhat  [id number]  73 y.o.  1950    Admit date: 1/1/2018    Discharge date and time: 1/3/2017    Admitting Physician: Blanka Dean MD     Discharge Physician: Whitley Barber MD    Discharge Diagnoses:     Right lower pneumonia  7 mm right middle lobe nodular density- follow CTchest OP  Very small right pleural effusion  Parkinson disease  PAF  Type 2 diabetes mellitus   CKD  Essential hypertension  Morbid obesity     Discharged Condition: stable    Indication for Admission: dyspnea     Hospital Course:     79 y.o. female presented to [facility] emergency department complaining of worsening dyspnea and fatigue. She does have history significant for asthma . Her symptoms started 3-4 days prior to the admission. She has tried to use her inhaler but did not notice improvement. Associated symptoms were a non-productive cough, intermittent BLE edema, subjective fever, and fatigue. She denied chest pain or heart palpitations, has a history significant for paroxysmal atrial fibrillation. Her last echo was 10/2017 which revealed an EF of 50%. She was negative for influenza in ED. She was hypoxic in the ED which resolved with addition of O2 at 2 L via NC. She does have oxygen at home that she uses nightly for nocturnal hypoxia. The CXR showed right lower pneumonia, she was on azithromycin, rocephin and then switched to doxycyline. CT chest showed 7 mm right middle lobe nodular density- follow CT chest with PCP is recommended. She is discharged home in stable condition. She will need her oxygen 24/7 at this time. Consults: none    Significant Diagnostic Studies:     CTA PULMONARY W CONTRAST [[id number]] Resulted: 01/01/18 1439      Order Status: Completed Updated: 01/01/18 1341     Narrative:       CTA chest 1/1/2018 11:45 AM  HISTORY: Abnormal chest x-ray; elevated d-dimer  TECHNIQUE: Axial images of the chest were obtained following IV  contrast. . Coronal and sagittal reformatted images are reconstructed  and reviewed. Maximal intensity projectional images are reconstructed  and reviewed in  COMPARISON: 2/20/2014. DLP: 664 mGy cm Automated exposure control was utilized to minimize  patient radiation dose. FINDINGS:   There are right hilar lymph nodes measuring up to 1.2 cm. Reactive  mediastinal lymph nodes are suspected. There is no axillary  lymphadenopathy. There is a very small right pleural effusion. Heart  is mildly enlarged. There is no pericardial or left pleural effusion. There is scattered mild vascular calcification of the thoracic aorta  and coronary arteries. Limited images the upper abdomen demonstrate no adrenal nodules. There are patchy superior segment right lower lobe opacities. Interlobular septal thickening and scattered ground glass opacities  may represent underlying edema. There is a 7 mm right middle lobe  nodular density. There is a calcified left lower lobe granuloma. There  is no pneumothorax. There are degenerative changes of the thoracic spine     Impression:       1. No pulmonary emboli. 2. Patchy superior segment right lower lobe opacities are favorable  for pneumonia. Probable reactive hilar lymphadenopathy. Follow-up  chest x-rays are recommended to document improvement of these  opacities in 10-14 days. 3. A 7 mm right middle lobe nodular density may represent an  inflammatory process associated with the pneumonia. A follow-up CT  chest in 6 months recommended to reassess this finding. 4. Cardiomegaly with mild underlying CHF suspected. 4. Very small right pleural effusion. Signed by Dr Renae Perez on 1/1/2018 1:39 PM     XR CHEST PORTABLE [[id number]] Resulted: 01/01/18 1324     Order Status: Completed Updated: 01/01/18 1226     Narrative:       XR CHEST PORTABLE 1/1/2018 11:00 AM  HISTORY: Dyspnea  COMPARISON: 3/17/2014  CXR: A single frontal view of the chest is performed. Findings:    There are right

## 2018-01-04 LAB
ANION GAP SERPL CALCULATED.3IONS-SCNC: 13 MMOL/L (ref 7–19)
BUN BLDV-MCNC: 31 MG/DL (ref 8–23)
CALCIUM SERPL-MCNC: 8.9 MG/DL (ref 8.8–10.2)
CHLORIDE BLD-SCNC: 102 MMOL/L (ref 98–111)
CO2: 25 MMOL/L (ref 22–29)
CREAT SERPL-MCNC: 1.3 MG/DL (ref 0.5–0.9)
GFR NON-AFRICAN AMERICAN: 41
GLUCOSE BLD-MCNC: 190 MG/DL (ref 70–99)
GLUCOSE BLD-MCNC: 223 MG/DL (ref 70–99)
GLUCOSE BLD-MCNC: 291 MG/DL (ref 70–99)
GLUCOSE BLD-MCNC: 71 MG/DL (ref 70–99)
GLUCOSE BLD-MCNC: 74 MG/DL (ref 74–109)
GLUCOSE BLD-MCNC: 89 MG/DL (ref 70–99)
HCT VFR BLD CALC: 34.9 % (ref 37–47)
HEMOGLOBIN: 11.1 G/DL (ref 12–16)
MCH RBC QN AUTO: 28.7 PG (ref 27–31)
MCHC RBC AUTO-ENTMCNC: 31.8 G/DL (ref 33–37)
MCV RBC AUTO: 90.2 FL (ref 81–99)
PDW BLD-RTO: 13.7 % (ref 11.5–14.5)
PERFORMED ON: ABNORMAL
PERFORMED ON: NORMAL
PERFORMED ON: NORMAL
PLATELET # BLD: 223 K/UL (ref 130–400)
PMV BLD AUTO: 10.3 FL (ref 9.4–12.3)
POTASSIUM SERPL-SCNC: 4 MMOL/L (ref 3.5–5)
RBC # BLD: 3.87 M/UL (ref 4.2–5.4)
SODIUM BLD-SCNC: 140 MMOL/L (ref 136–145)
WBC # BLD: 8.3 K/UL (ref 4.8–10.8)

## 2018-01-04 PROCEDURE — 2700000000 HC OXYGEN THERAPY PER DAY

## 2018-01-04 PROCEDURE — 80048 BASIC METABOLIC PNL TOTAL CA: CPT

## 2018-01-04 PROCEDURE — 85027 COMPLETE CBC AUTOMATED: CPT

## 2018-01-04 PROCEDURE — 2580000003 HC RX 258: Performed by: INTERNAL MEDICINE

## 2018-01-04 PROCEDURE — 82948 REAGENT STRIP/BLOOD GLUCOSE: CPT

## 2018-01-04 PROCEDURE — 1210000000 HC MED SURG R&B

## 2018-01-04 PROCEDURE — 6370000000 HC RX 637 (ALT 250 FOR IP): Performed by: PHYSICIAN ASSISTANT

## 2018-01-04 PROCEDURE — 36415 COLL VENOUS BLD VENIPUNCTURE: CPT

## 2018-01-04 PROCEDURE — 6370000000 HC RX 637 (ALT 250 FOR IP): Performed by: INTERNAL MEDICINE

## 2018-01-04 PROCEDURE — 6360000002 HC RX W HCPCS: Performed by: PHYSICIAN ASSISTANT

## 2018-01-04 PROCEDURE — 99232 SBSQ HOSP IP/OBS MODERATE 35: CPT | Performed by: INTERNAL MEDICINE

## 2018-01-04 PROCEDURE — 2580000003 HC RX 258: Performed by: PHYSICIAN ASSISTANT

## 2018-01-04 PROCEDURE — 6370000000 HC RX 637 (ALT 250 FOR IP): Performed by: HOSPITALIST

## 2018-01-04 RX ORDER — SODIUM CHLORIDE 9 MG/ML
INJECTION, SOLUTION INTRAVENOUS CONTINUOUS
Status: DISCONTINUED | OUTPATIENT
Start: 2018-01-04 | End: 2018-01-05 | Stop reason: HOSPADM

## 2018-01-04 RX ADMIN — ACETAMINOPHEN 650 MG: 325 TABLET, FILM COATED ORAL at 22:31

## 2018-01-04 RX ADMIN — ENOXAPARIN SODIUM 40 MG: 40 INJECTION SUBCUTANEOUS at 16:54

## 2018-01-04 RX ADMIN — SOTALOL HYDROCHLORIDE 120 MG: 120 TABLET ORAL at 22:26

## 2018-01-04 RX ADMIN — INSULIN LISPRO 2 UNITS: 100 INJECTION, SOLUTION INTRAVENOUS; SUBCUTANEOUS at 16:54

## 2018-01-04 RX ADMIN — CARBIDOPA AND LEVODOPA 2 TABLET: 25; 100 TABLET ORAL at 08:24

## 2018-01-04 RX ADMIN — CARBIDOPA AND LEVODOPA 2 TABLET: 25; 100 TABLET ORAL at 14:05

## 2018-01-04 RX ADMIN — INSULIN LISPRO 4 UNITS: 100 INJECTION, SOLUTION INTRAVENOUS; SUBCUTANEOUS at 12:07

## 2018-01-04 RX ADMIN — Medication 10 ML: at 22:31

## 2018-01-04 RX ADMIN — Medication 10 ML: at 08:34

## 2018-01-04 RX ADMIN — WATER 1 G: 1 INJECTION INTRAMUSCULAR; INTRAVENOUS; SUBCUTANEOUS at 14:05

## 2018-01-04 RX ADMIN — PANTOPRAZOLE SODIUM 40 MG: 40 TABLET, DELAYED RELEASE ORAL at 05:58

## 2018-01-04 RX ADMIN — CARBIDOPA AND LEVODOPA 2 TABLET: 25; 100 TABLET ORAL at 22:25

## 2018-01-04 RX ADMIN — SOTALOL HYDROCHLORIDE 120 MG: 120 TABLET ORAL at 08:24

## 2018-01-04 RX ADMIN — AMLODIPINE BESYLATE 5 MG: 5 TABLET ORAL at 08:24

## 2018-01-04 RX ADMIN — LORAZEPAM 0.5 MG: 0.5 TABLET ORAL at 08:31

## 2018-01-04 RX ADMIN — GABAPENTIN 600 MG: 300 CAPSULE ORAL at 14:05

## 2018-01-04 RX ADMIN — GABAPENTIN 600 MG: 300 CAPSULE ORAL at 22:25

## 2018-01-04 RX ADMIN — GUAIFENESIN 600 MG: 600 TABLET, EXTENDED RELEASE ORAL at 08:24

## 2018-01-04 RX ADMIN — SODIUM CHLORIDE: 9 INJECTION, SOLUTION INTRAVENOUS at 17:49

## 2018-01-04 RX ADMIN — DOXYCYCLINE HYCLATE 100 MG: 100 CAPSULE, GELATIN COATED ORAL at 08:24

## 2018-01-04 RX ADMIN — GABAPENTIN 600 MG: 300 CAPSULE ORAL at 08:24

## 2018-01-04 RX ADMIN — LOSARTAN POTASSIUM 100 MG: 100 TABLET ORAL at 08:24

## 2018-01-04 RX ADMIN — INSULIN GLARGINE 60 UNITS: 100 INJECTION, SOLUTION SUBCUTANEOUS at 12:07

## 2018-01-04 RX ADMIN — DOXYCYCLINE HYCLATE 100 MG: 100 CAPSULE, GELATIN COATED ORAL at 22:26

## 2018-01-04 RX ADMIN — LINACLOTIDE 145 MCG: 145 CAPSULE, GELATIN COATED ORAL at 05:58

## 2018-01-04 RX ADMIN — INSULIN LISPRO 3 UNITS: 100 INJECTION, SOLUTION INTRAVENOUS; SUBCUTANEOUS at 21:58

## 2018-01-04 RX ADMIN — TRAMADOL HYDROCHLORIDE 25 MG: 50 TABLET, COATED ORAL at 08:31

## 2018-01-04 RX ADMIN — MONTELUKAST SODIUM 10 MG: 10 TABLET, COATED ORAL at 22:26

## 2018-01-04 RX ADMIN — TRAMADOL HYDROCHLORIDE 25 MG: 50 TABLET, COATED ORAL at 22:31

## 2018-01-04 RX ADMIN — DIPHENHYDRAMINE HCL 25 MG: 25 TABLET ORAL at 22:31

## 2018-01-04 RX ADMIN — GUAIFENESIN 600 MG: 600 TABLET, EXTENDED RELEASE ORAL at 22:26

## 2018-01-04 RX ADMIN — ATORVASTATIN CALCIUM 10 MG: 10 TABLET, FILM COATED ORAL at 22:26

## 2018-01-04 RX ADMIN — ASPIRIN 325 MG ORAL TABLET 325 MG: 325 PILL ORAL at 08:24

## 2018-01-04 ASSESSMENT — PAIN SCALES - GENERAL
PAINLEVEL_OUTOF10: 8
PAINLEVEL_OUTOF10: 5
PAINLEVEL_OUTOF10: 7

## 2018-01-04 NOTE — PROGRESS NOTES
right upper lobe and left lung are relatively  clear. Overall normal heart size with normal appearance of the  pulmonary vasculature. No acute bony abnormalities identified.     Impression:       1. Right lower lobe consolidation, likely acute pneumonia. Appearance  is similar to recent chest CT. 2. Recommend follow-up to complete resolution. CTA Chest:  Impression:        1. No pulmonary emboli. 2. Patchy superior segment right lower lobe opacities are favorable  for pneumonia. Probable reactive hilar lymphadenopathy. Follow-up  chest x-rays are recommended to document improvement of these  opacities in 10-14 days. 3. A 7 mm right middle lobe nodular density may represent an  inflammatory process associated with the pneumonia. A follow-up CT  chest in 6 months recommended to reassess this finding. 4. Cardiomegaly with mild underlying CHF suspected. 4. Very small right pleural effusion. CT head:   Impression:       1. No acute intracranial process. 2. Bilateral maxillary sinus disease. Objective:   Vitals: /70   Pulse 73   Temp 97.4 °F (36.3 °C) (Temporal)   Resp 20   Ht 5' 2\" (1.575 m)   Wt 276 lb 14.4 oz (125.6 kg)   SpO2 97%   BMI 50.65 kg/m²   General appearance: No apparent distress, appears stated age and cooperative. HEENT: Normal cephalic, atraumatic without obvious deformity. Pupils equal, round, and reactive to light. Extra ocular muscles intact. Conjunctivae/corneas clear. Neck: Supple, with full range of motion. No jugular venous distention. Trachea midline. No thyroid tenderness. No masses palpated. Respiratory:  Normal respiratory effort. Clear to auscultation, bilaterally without Rales/Wheezes/Rhonchi. Diminished bilateral bases, periods of apnea while sleeping. Cardiovascular: Regular rate and rhythm with normal S1/S2 without murmurs, rubs or gallops. Abdomen: Soft, non-tender, non-distended with normal bowel sounds. No hepatosplenomegaly.

## 2018-01-04 NOTE — PROGRESS NOTES
Pharmacy Note  Vancomycin Consult    Victorino Stauffer is a 79 y.o. female started on Vancomycin for Fever prior to discharge; consult received from Dr. Renetta Munson to manage therapy. Also receiving the following antibiotics: Ceftriaxone and Doxycycline oral.    Patient Active Problem List   Diagnosis    [facility] disease ([facility] [address]    Atrial fibrillation ([facility])    GERD (gastroesophageal reflux disease)    Constipation    Rectal bleeding    Lichen sclerosus    Palpitations    Fatigue    Shortness of breath    Asthma    Edema    Hypokalemia    PAF (paroxysmal atrial fibrillation) ([facility])    Mild mitral regurgitation by prior echocardiogram    H/O diastolic dysfunction    Type 2 diabetes mellitus with complication ([facility])    Restless legs syndrome    Hypoesthesia of skin    Visual hallucinations    Somnolence, daytime    Morbid obesity ([facility])    Stage 3 chronic kidney disease    Community acquired pneumonia of right lower lobe of lung ([facility] [address]    Essential hypertension       Allergies:  Codeine     Temp max: 100.2    Recent Labs      01/02/18   0337  01/03/18   0417   BUN  23  28*       Recent Labs      01/02/18   0337  01/03/18   0417   CREATININE  1.2*  1.3*       Recent Labs      01/02/18   0337  01/03/18   0417   WBC  15.2*  11.7*         Intake/Output Summary (Last 24 hours) at 01/03/18 1811  Last data filed at 01/03/18 1322   Gross per 24 hour   Intake 2663 ml   Output 300 ml   Net 2363 ml       Culture Date Source Results   1/01/18 Blood No growth   01/03/18 Blood sent            Ht Readings from Last 1 Encounters:   01/01/18 5' 2\" (1.575 m)        Wt Readings from Last 1 Encounters:   01/01/18 276 lb 14.4 oz (125.6 kg)         Body mass index is 50.65 kg/m². Estimated Creatinine Clearance: 53 mL/min (based on SCr of 1.3 mg/dL). Assessment/Plan:  Will initiate vancomycin 1750 mg IV every 24 hours.   Timing of trough level will be determined based on culture results, renal function, and clinical

## 2018-01-04 NOTE — PROGRESS NOTES
HOSPITAL MEDICINE  - PROGRESS NOTE    Admit Date: 1/1/2018         CC; pna    Subjective: confused    Objective: confused    Diet: DIET CARB CONTROL; Low Sodium (2 GM)  Pain is:None  Nausea:None  Bowel Movement/Flatus yes    Data:   Scheduled Meds: Reviewed  Current Facility-Administered Medications   Medication Dose Route Frequency Provider Last Rate Last Dose    doxycycline hyclate (VIBRAMYCIN) capsule 100 mg  100 mg Oral 2 times per day [employer], MD   100 mg at 01/03/18 1027    vancomycin (VANCOCIN) intermittent dosing (placeholder)   Other RX Placeholder [employer], MD        vancomycin (VANCOCIN) 1,750 mg in dextrose 5 % 500 mL IVPB  1,750 mg Intravenous Q24H [employer], MD        amLODIPine (NORVASC) tablet 5 mg  5 mg Oral Daily [employer], MD        LORazepam (ATIVAN) tablet 0.5 mg  0.5 mg Oral BID PRN [employer], MD   0.5 mg at 01/03/18 1616    traMADol (ULTRAM) tablet 25 mg  25 mg Oral Q8H PRN [employer], MD   25 mg at 01/03/18 0906    sodium chloride flush 0.9 % injection 10 mL  10 mL Intravenous 2 times per day Reino Cove, PA-C   10 mL at 01/02/18 2119    sodium chloride flush 0.9 % injection 10 mL  10 mL Intravenous PRN Reino Cove, PA-C        acetaminophen (TYLENOL) tablet 650 mg  650 mg Oral Q4H PRN Reino Cove, PA-C   650 mg at 01/03/18 1525    magnesium hydroxide (MILK OF MAGNESIA) 400 MG/5ML suspension 30 mL  30 mL Oral Daily PRN Reino Cove, PA-C   30 mL at 01/03/18 1617    enoxaparin (LOVENOX) injection 40 mg  40 mg Subcutaneous Q24H Reino Cove, PA-C   40 mg at 01/03/18 1618    cefTRIAXone (ROCEPHIN) 1 g in sterile water 10 mL IV syringe  1 g Intravenous Q24H Reino Cove, PA-C   1 g at 01/03/18 1332    insulin lispro (HUMALOG) injection vial 0-12 Units  0-12 Units Subcutaneous TID WC Reino Cove, PA-C   6 Units at 01/03/18 1158 2117     DVT Prophylaxis: Lovenox 30 mg sq daily    Continuous Infusions:   dextrose         Intake/Output Summary (Last 24 hours) at 01/03/18 2042  Last data filed at 01/03/18 1953   Gross per 24 hour   Intake             2453 ml   Output              300 ml   Net             2153 ml     CBC:   Recent Labs      01/01/18   1210  01/02/18   0337  01/03/18   0417   WBC  13.0*  15.2*  11.7*   HGB  11.6*  11.6*  10.6*   PLT  228  255  231     BMP:  Recent Labs      01/01/18   1210   01/02/18   0337  01/03/18   0417  01/03/18   1825   NA  137   --   139  140   --    K  4.4   < >  4.9  4.4  4.5   CL  96*   --   99  102   --    CO2  27   --   24  26   --    BUN  21   --   23  28*   --    CREATININE  1.0*   --   1.2*  1.3*   --    GLUCOSE  311*   --   194*  142*   --     < > = values in this interval not displayed. ABGs: Lab Results   Component Value Date    PHART 7.440 01/03/2018    PO2ART 61.0 01/03/2018    KVJ0GJQ 38.0 01/03/2018         Objective:   Vitals: BP (!) 172/77   Pulse 86   Temp 98 °F (36.7 °C) (Temporal)   Resp 16   Ht 5' 2\" (1.575 m)   Wt 276 lb 14.4 oz (125.6 kg)   SpO2 95%   BMI 50.65 kg/m²   General appearance: confused   Skin: Skin color, texture, turgor normal.   HEENT: Head: Normocephalic, no lesions, without obvious abnormality. Neck: no adenopathy, no carotid bruit, no JVD and supple, symmetrical, trachea midline  Lungs: clear to auscultation bilaterally  Heart: regular rate and rhythm, S1, S2 normal, no murmur, click, rub or gallop  Abdomen: soft, non-tender; bowel sounds normal; no masses,  no organomegaly  Extremities: extremities normal, atraumatic, no cyanosis or edema  Lymphatic: No significant lymph node enlargement papable  Neurologic: Mental status: confused         Assessment & Plan:    Right lower pneumonia. AB.  Cultures  Episode of confusion - ct head now, abg  7 mm right middle lobe nodular density-A follow-up CTchest OP  Very small right pleural effusion  Parkinson disease

## 2018-01-05 VITALS
SYSTOLIC BLOOD PRESSURE: 155 MMHG | OXYGEN SATURATION: 93 % | WEIGHT: 276.9 LBS | HEIGHT: 62 IN | BODY MASS INDEX: 50.96 KG/M2 | TEMPERATURE: 98 F | RESPIRATION RATE: 20 BRPM | DIASTOLIC BLOOD PRESSURE: 80 MMHG | HEART RATE: 71 BPM

## 2018-01-05 LAB
ANION GAP SERPL CALCULATED.3IONS-SCNC: 14 MMOL/L (ref 7–19)
BASOPHILS ABSOLUTE: 0 K/UL (ref 0–0.2)
BASOPHILS RELATIVE PERCENT: 0.4 % (ref 0–1)
BUN BLDV-MCNC: 26 MG/DL (ref 8–23)
CALCIUM SERPL-MCNC: 9 MG/DL (ref 8.8–10.2)
CHLORIDE BLD-SCNC: 104 MMOL/L (ref 98–111)
CO2: 22 MMOL/L (ref 22–29)
CREAT SERPL-MCNC: 1.2 MG/DL (ref 0.5–0.9)
EKG P AXIS: -2 DEGREES
EKG P-R INTERVAL: 160 MS
EKG Q-T INTERVAL: 388 MS
EKG QRS DURATION: 96 MS
EKG QTC CALCULATION (BAZETT): 430 MS
EKG T AXIS: 18 DEGREES
EOSINOPHILS ABSOLUTE: 0.3 K/UL (ref 0–0.6)
EOSINOPHILS RELATIVE PERCENT: 5.9 % (ref 0–5)
GFR NON-AFRICAN AMERICAN: 45
GLUCOSE BLD-MCNC: 125 MG/DL (ref 70–99)
GLUCOSE BLD-MCNC: 130 MG/DL (ref 74–109)
HCT VFR BLD CALC: 35.1 % (ref 37–47)
HEMOGLOBIN: 10.8 G/DL (ref 12–16)
LYMPHOCYTES ABSOLUTE: 1.2 K/UL (ref 1.1–4.5)
LYMPHOCYTES RELATIVE PERCENT: 20.8 % (ref 20–40)
MCH RBC QN AUTO: 28.5 PG (ref 27–31)
MCHC RBC AUTO-ENTMCNC: 30.8 G/DL (ref 33–37)
MCV RBC AUTO: 92.6 FL (ref 81–99)
MONOCYTES ABSOLUTE: 0.5 K/UL (ref 0–0.9)
MONOCYTES RELATIVE PERCENT: 9.6 % (ref 0–10)
NEUTROPHILS ABSOLUTE: 3.5 K/UL (ref 1.5–7.5)
NEUTROPHILS RELATIVE PERCENT: 61.9 % (ref 50–65)
PDW BLD-RTO: 13.6 % (ref 11.5–14.5)
PERFORMED ON: ABNORMAL
PLATELET # BLD: 199 K/UL (ref 130–400)
PMV BLD AUTO: 10.4 FL (ref 9.4–12.3)
POTASSIUM SERPL-SCNC: 4.2 MMOL/L (ref 3.5–5)
RBC # BLD: 3.79 M/UL (ref 4.2–5.4)
SODIUM BLD-SCNC: 140 MMOL/L (ref 136–145)
WBC # BLD: 5.6 K/UL (ref 4.8–10.8)

## 2018-01-05 PROCEDURE — 6370000000 HC RX 637 (ALT 250 FOR IP): Performed by: PHYSICIAN ASSISTANT

## 2018-01-05 PROCEDURE — 6370000000 HC RX 637 (ALT 250 FOR IP): Performed by: HOSPITALIST

## 2018-01-05 PROCEDURE — 94762 N-INVAS EAR/PLS OXIMTRY CONT: CPT

## 2018-01-05 PROCEDURE — 99238 HOSP IP/OBS DSCHRG MGMT 30/<: CPT | Performed by: INTERNAL MEDICINE

## 2018-01-05 RX ADMIN — AMLODIPINE BESYLATE 5 MG: 5 TABLET ORAL at 09:29

## 2018-01-05 RX ADMIN — LOSARTAN POTASSIUM 100 MG: 100 TABLET ORAL at 11:56

## 2018-01-05 RX ADMIN — DOXYCYCLINE HYCLATE 100 MG: 100 CAPSULE, GELATIN COATED ORAL at 09:30

## 2018-01-05 RX ADMIN — INSULIN GLARGINE 60 UNITS: 100 INJECTION, SOLUTION SUBCUTANEOUS at 09:36

## 2018-01-05 RX ADMIN — SOTALOL HYDROCHLORIDE 120 MG: 120 TABLET ORAL at 09:30

## 2018-01-05 RX ADMIN — CARBIDOPA AND LEVODOPA 2 TABLET: 25; 100 TABLET ORAL at 09:30

## 2018-01-05 RX ADMIN — LINACLOTIDE 145 MCG: 145 CAPSULE, GELATIN COATED ORAL at 06:32

## 2018-01-05 RX ADMIN — LORAZEPAM 0.5 MG: 0.5 TABLET ORAL at 10:41

## 2018-01-05 RX ADMIN — ASPIRIN 325 MG ORAL TABLET 325 MG: 325 PILL ORAL at 09:29

## 2018-01-05 RX ADMIN — TRAMADOL HYDROCHLORIDE 25 MG: 50 TABLET, COATED ORAL at 09:30

## 2018-01-05 RX ADMIN — GUAIFENESIN 600 MG: 600 TABLET, EXTENDED RELEASE ORAL at 09:30

## 2018-01-05 RX ADMIN — GABAPENTIN 600 MG: 300 CAPSULE ORAL at 09:30

## 2018-01-05 RX ADMIN — PANTOPRAZOLE SODIUM 40 MG: 40 TABLET, DELAYED RELEASE ORAL at 06:32

## 2018-01-05 ASSESSMENT — PAIN SCALES - GENERAL: PAINLEVEL_OUTOF10: 5

## 2018-01-05 NOTE — PROGRESS NOTES
12 hour chart check complete. Pt resting in bed not showing any S/S of distress at this time. Will continue to monitor pt.   Electronically signed by Herson Torres RN on 1/5/2018 at 1:37 AM

## 2018-01-05 NOTE — CARE COORDINATION
[facility] notified of patient discharge today. DC Summary and DC med list faxed.   Electronically signed by Maritza Moran RN on 1/5/18 at 12:27 PM

## 2018-01-05 NOTE — DISCHARGE SUMMARY
Cholecalciferol (VITAMIN D3) 5000 units TABS  Take 1 tablet by mouth              clobetasol (TEMOVATE) 0.05 % ointment  Apply external vagina 3 x a day for week one, then decrease to BID on week two, on week 3 once a day, on week four every other day then stop             Docusate Calcium (STOOL SOFTENER PO)  Take 1 tablet by mouth              doxycycline hyclate (VIBRAMYCIN) 100 MG capsule  Take 1 capsule by mouth every 12 hours for 5 days             fluconazole (DIFLUCAN) 150 MG tablet  Take one now and repeat in 1 wk if needed             FORTEO 600 MCG/2.4ML SOLN injection  Inject 20 mcg into the skin nightly              furosemide (LASIX) 40 MG tablet  Take 1 tablet by mouth daily             gabapentin (NEURONTIN) 300 MG capsule  Take 2 capsules by mouth 2 times daily             glucose blood VI test strips (ONE TOUCH ULTRA TEST) strip  Inject 1 each into the skin daily As needed. guaiFENesin (MUCINEX) 600 MG extended release tablet  Take 1 tablet by mouth 2 times daily for 5 days             insulin detemir (LEVEMIR) 100 UNIT/ML injection vial  Inject 65 Units into the skin nightly             insulin detemir (LEVEMIR) 100 UNIT/ML injection vial  65 units daily             Insulin Pen Needle 31G X 8 MM MISC  Inject 1 each into the skin daily             Insulin Pen Needle 32G X 4 MM MISC  1 each by Does not apply route daily             Inulin (FIBER CHOICE PO)  Take 1 tablet by mouth daily              Lancets MISC  1 lancet to check glucose daily             linaclotide (LINZESS) 145 MCG capsule  Take 1 capsule by mouth every morning (before breakfast)             LORazepam (ATIVAN) 1 MG tablet  Take 0.5 mg by mouth daily as needed .              losartan (COZAAR) 100 MG tablet  TAKE 1 TABLET BY MOUTH DAILY             montelukast (SINGULAIR) 10 MG tablet  TAKE 1 TABLET BY MOUTH NIGHTLY             Nebulizers (AIRIAL COMPACT MINI NEBULIZER) MISC  1 Device by Does not apply route 4 times

## 2018-01-06 LAB
BLOOD CULTURE, ROUTINE: NORMAL
CULTURE, BLOOD 2: NORMAL

## 2018-01-08 ENCOUNTER — TELEPHONE (OUTPATIENT)
Dept: PRIMARY CARE CLINIC | Age: 68
End: 2018-01-08

## 2018-01-09 LAB
ANION GAP SERPL CALCULATED.3IONS-SCNC: 8 MMOL/L (ref 7–19)
BLOOD CULTURE, ROUTINE: NORMAL
BUN BLDV-MCNC: 24 MG/DL (ref 8–23)
CALCIUM SERPL-MCNC: 8.9 MG/DL (ref 8.8–10.2)
CHLORIDE BLD-SCNC: 105 MMOL/L (ref 98–111)
CO2: 28 MMOL/L (ref 22–29)
CREAT SERPL-MCNC: 1.1 MG/DL (ref 0.5–0.9)
GFR NON-AFRICAN AMERICAN: 50
GLUCOSE BLD-MCNC: 108 MG/DL (ref 74–109)
POTASSIUM SERPL-SCNC: 4.4 MMOL/L (ref 3.5–5)
SODIUM BLD-SCNC: 141 MMOL/L (ref 136–145)

## 2018-01-16 LAB
ANION GAP SERPL CALCULATED.3IONS-SCNC: 15 MMOL/L (ref 7–19)
BASOPHILS ABSOLUTE: 0.1 K/UL (ref 0–0.2)
BASOPHILS RELATIVE PERCENT: 0.5 % (ref 0–1)
BUN BLDV-MCNC: 28 MG/DL (ref 8–23)
CALCIUM SERPL-MCNC: 9.1 MG/DL (ref 8.8–10.2)
CHLORIDE BLD-SCNC: 97 MMOL/L (ref 98–111)
CO2: 25 MMOL/L (ref 22–29)
CREAT SERPL-MCNC: 1.1 MG/DL (ref 0.5–0.9)
EOSINOPHILS ABSOLUTE: 0.4 K/UL (ref 0–0.6)
EOSINOPHILS RELATIVE PERCENT: 3.6 % (ref 0–5)
GFR NON-AFRICAN AMERICAN: 50
GLUCOSE BLD-MCNC: 333 MG/DL (ref 74–109)
HCT VFR BLD CALC: 36.7 % (ref 37–47)
HEMOGLOBIN: 11.6 G/DL (ref 12–16)
LYMPHOCYTES ABSOLUTE: 2 K/UL (ref 1.1–4.5)
LYMPHOCYTES RELATIVE PERCENT: 20.1 % (ref 20–40)
MCH RBC QN AUTO: 28.6 PG (ref 27–31)
MCHC RBC AUTO-ENTMCNC: 31.6 G/DL (ref 33–37)
MCV RBC AUTO: 90.4 FL (ref 81–99)
MONOCYTES ABSOLUTE: 0.6 K/UL (ref 0–0.9)
MONOCYTES RELATIVE PERCENT: 6.1 % (ref 0–10)
NEUTROPHILS ABSOLUTE: 6.9 K/UL (ref 1.5–7.5)
NEUTROPHILS RELATIVE PERCENT: 69.2 % (ref 50–65)
PDW BLD-RTO: 14.1 % (ref 11.5–14.5)
PLATELET # BLD: 227 K/UL (ref 130–400)
PMV BLD AUTO: 11.1 FL (ref 9.4–12.3)
POTASSIUM SERPL-SCNC: 5.3 MMOL/L (ref 3.5–5)
RBC # BLD: 4.06 M/UL (ref 4.2–5.4)
SODIUM BLD-SCNC: 137 MMOL/L (ref 136–145)
WBC # BLD: 10 K/UL (ref 4.8–10.8)

## 2018-01-17 ENCOUNTER — TELEPHONE (OUTPATIENT)
Dept: NEUROSURGERY | Age: 68
End: 2018-01-17

## 2018-01-17 NOTE — TELEPHONE ENCOUNTER
Patient's daughter called, she stated that Sommer Rodrigues has been in a lot of pain.  Some how her gabapentin prescription has changed she's only taking 2 capsules a day now she isn't used to taking it that way

## 2018-01-18 RX ORDER — LIRAGLUTIDE 6 MG/ML
INJECTION SUBCUTANEOUS
Qty: 9 ML | Refills: 1 | Status: SHIPPED | OUTPATIENT
Start: 2018-01-18 | End: 2018-04-12 | Stop reason: SDUPTHER

## 2018-01-18 RX ORDER — MONTELUKAST SODIUM 10 MG/1
TABLET ORAL
Qty: 30 TABLET | Refills: 1 | Status: SHIPPED | OUTPATIENT
Start: 2018-01-18 | End: 2018-04-02 | Stop reason: SDUPTHER

## 2018-01-19 ENCOUNTER — OFFICE VISIT (OUTPATIENT)
Dept: PRIMARY CARE CLINIC | Age: 68
End: 2018-01-19
Payer: MEDICARE

## 2018-01-19 VITALS
SYSTOLIC BLOOD PRESSURE: 133 MMHG | HEART RATE: 67 BPM | WEIGHT: 279 LBS | BODY MASS INDEX: 51.34 KG/M2 | TEMPERATURE: 97.5 F | HEIGHT: 62 IN | OXYGEN SATURATION: 94 % | DIASTOLIC BLOOD PRESSURE: 82 MMHG | RESPIRATION RATE: 18 BRPM

## 2018-01-19 DIAGNOSIS — E11.8 TYPE 2 DIABETES MELLITUS WITH COMPLICATION, WITH LONG-TERM CURRENT USE OF INSULIN (HCC): ICD-10-CM

## 2018-01-19 DIAGNOSIS — I10 ESSENTIAL HYPERTENSION: Chronic | ICD-10-CM

## 2018-01-19 DIAGNOSIS — N18.30 CHRONIC KIDNEY DISEASE, STAGE III (MODERATE) (HCC): ICD-10-CM

## 2018-01-19 DIAGNOSIS — Z79.4 TYPE 2 DIABETES MELLITUS WITH COMPLICATION, WITH LONG-TERM CURRENT USE OF INSULIN (HCC): ICD-10-CM

## 2018-01-19 DIAGNOSIS — J18.9 PNEUMONIA OF RIGHT LOWER LOBE DUE TO INFECTIOUS ORGANISM: Primary | ICD-10-CM

## 2018-01-19 DIAGNOSIS — G20 PARKINSON DISEASE (HCC): ICD-10-CM

## 2018-01-19 PROCEDURE — G8399 PT W/DXA RESULTS DOCUMENT: HCPCS | Performed by: NURSE PRACTITIONER

## 2018-01-19 PROCEDURE — 1123F ACP DISCUSS/DSCN MKR DOCD: CPT | Performed by: NURSE PRACTITIONER

## 2018-01-19 PROCEDURE — G8417 CALC BMI ABV UP PARAM F/U: HCPCS | Performed by: NURSE PRACTITIONER

## 2018-01-19 PROCEDURE — 3046F HEMOGLOBIN A1C LEVEL >9.0%: CPT | Performed by: NURSE PRACTITIONER

## 2018-01-19 PROCEDURE — 1090F PRES/ABSN URINE INCON ASSESS: CPT | Performed by: NURSE PRACTITIONER

## 2018-01-19 PROCEDURE — 3014F SCREEN MAMMO DOC REV: CPT | Performed by: NURSE PRACTITIONER

## 2018-01-19 PROCEDURE — 1036F TOBACCO NON-USER: CPT | Performed by: NURSE PRACTITIONER

## 2018-01-19 PROCEDURE — G8428 CUR MEDS NOT DOCUMENT: HCPCS | Performed by: NURSE PRACTITIONER

## 2018-01-19 PROCEDURE — 99214 OFFICE O/P EST MOD 30 MIN: CPT | Performed by: NURSE PRACTITIONER

## 2018-01-19 PROCEDURE — 1111F DSCHRG MED/CURRENT MED MERGE: CPT | Performed by: NURSE PRACTITIONER

## 2018-01-19 PROCEDURE — G8484 FLU IMMUNIZE NO ADMIN: HCPCS | Performed by: NURSE PRACTITIONER

## 2018-01-19 PROCEDURE — 4040F PNEUMOC VAC/ADMIN/RCVD: CPT | Performed by: NURSE PRACTITIONER

## 2018-01-19 PROCEDURE — 3017F COLORECTAL CA SCREEN DOC REV: CPT | Performed by: NURSE PRACTITIONER

## 2018-01-19 ASSESSMENT — ENCOUNTER SYMPTOMS
COUGH: 1
WHEEZING: 0
SHORTNESS OF BREATH: 1

## 2018-01-19 NOTE — PROGRESS NOTES
[facility] [place]IC[facility] [place]  [facility] [place]  [address] [address] [place]  [address]  Dept: [phone]  Dept Fax: [phone]  Loc: [phone]    Aminta Worley is a 79 y.o. female who presents today for her medical conditions/complaints as noted below. Aminta Worley is c/o of Follow-Up from Hospital        HPI:     HPI   Chief Complaint   Patient presents with    Follow-Up from Hospital     She was in hosp for pneumonia, jan 1st. She is feeling better. She says her sugars have been better. Since she came home from the hospital.  While in the hospital they continued her Levemir at a lower dose and stopped her Victoza. She is unsure of why they did this. They did cover her with meal time insulin but it was very small amounts. They also withdrew her Requip and Sinemet and the daughter has no reason why. The daughter has since restarted those medications and noticed the patient is doing much better. The patient has not had a fever since coming home and is feeling much better. They have not been able to get out because of the snow but did have blood work done by home health.   Lab Results   Component Value Date     01/16/2018    K 5.3 01/16/2018    CL 97 01/16/2018    CO2 25 01/16/2018    BUN 28 01/16/2018    CREATININE 1.1 01/16/2018    GLUCOSE 333 01/16/2018    CALCIUM 9.1 01/16/2018      Past Medical History:   Diagnosis Date    Asthma 4/21/2015    COPD (chronic obstructive pulmonary disease) ([address]     Diabetes mellitus ([address]     HTN (hypertension)     Mild mitral regurgitation by prior echocardiogram 2/11/2016    Morbid obesity ([address] 10/18/2017    Normal cardiac stress test 4-2-09    no ischemia at attained level of excercise    Parkinson disease (HCC)     Paroxysmal atrial fibrillation ([address]     Restrictive airway disease     Stage 3 chronic kidney disease 10/18/2017      Past Surgical History:   Procedure Laterality Date    APPENDECTOMY      CARDIAC CATHETERIZATION thought content normal.   Nursing note and vitals reviewed. /82 (Site: Right Arm, Position: Sitting, Cuff Size: Large Adult)   Pulse 67   Temp 97.5 °F (36.4 °C) (Temporal)   Resp 18   Ht 5' 2\" (1.575 m)   Wt 279 lb (126.6 kg)   SpO2 94%   Breastfeeding? No   BMI 51.03 kg/m²     Assessment:      1. Pneumonia of right lower lobe due to infectious organism ([address]  XR CHEST STANDARD (2 VW)    [place] DISCHARGE MEDS RECONCILED W/ CURRENT OUTPATIENT MED LIST   2. Essential hypertension     3. Type 2 diabetes mellitus with complication, with long-term current use of insulin (HCC)     4. Parkinson disease ([address]         Plan:     I reviewed the doctor's notes but I could not see while they had discontinued those medicines. Patient given educational materials - see patient instructions. Discussed use, benefit, and side effects of prescribed medications. All patient questions answered. Pt voiced understanding. Reviewed health maintenance. Instructed to continue current medications, diet and exercise. Patient agreed with treatment plan. Follow up as directed. MEDICATIONS:  No orders of the defined types were placed in this encounter. ORDERS:  Orders Placed This Encounter   Procedures    XR CHEST STANDARD (2 VW)    [place] DISCHARGE MEDS RECONCILED W/ CURRENT OUTPATIENT MED LIST       Follow-up:  Return in 1 month (on 2/19/2018). PATIENT INSTRUCTIONS:  Patient Instructions   Have the cxr. Continue home health and recheck cmp next week  Restart requip and call Dr Aarti Lyn for med refills. Restart victoza  levimir 65 u every night  Record your sugar and bring with you next visit. Recheck CT scan of lungs in 6 months.     Electronically signed by Bienvenido Leyva CNP on 1/19/2018 at 1:53 PM

## 2018-01-22 RX ORDER — GABAPENTIN 600 MG/1
600 TABLET ORAL 3 TIMES DAILY
Qty: 90 TABLET | Refills: 5 | Status: SHIPPED | OUTPATIENT
Start: 2018-01-22 | End: 2018-08-07 | Stop reason: SDUPTHER

## 2018-01-23 ENCOUNTER — HOSPITAL ENCOUNTER (OUTPATIENT)
Dept: GENERAL RADIOLOGY | Age: 68
Discharge: HOME | End: 2018-01-23
Payer: MEDICARE

## 2018-01-23 DIAGNOSIS — J18.9 PERSISTENT PNEUMONIA: Primary | ICD-10-CM

## 2018-01-23 DIAGNOSIS — J18.9 PNEUMONIA OF RIGHT LOWER LOBE DUE TO INFECTIOUS ORGANISM: ICD-10-CM

## 2018-01-23 PROCEDURE — 71046 X-RAY EXAM CHEST 2 VIEWS: CPT

## 2018-01-23 RX ORDER — CEFDINIR 300 MG/1
300 CAPSULE ORAL 2 TIMES DAILY
Qty: 20 CAPSULE | Refills: 0 | Status: SHIPPED | OUTPATIENT
Start: 2018-01-23 | End: 2018-02-02

## 2018-02-02 RX ORDER — TRAMADOL HYDROCHLORIDE 50 MG/1
TABLET ORAL
Qty: 30 TABLET | Refills: 0 | Status: SHIPPED | OUTPATIENT
Start: 2018-02-02 | End: 2018-03-02 | Stop reason: SDUPTHER

## 2018-02-07 ENCOUNTER — TELEPHONE (OUTPATIENT)
Dept: PRIMARY CARE CLINIC | Age: 68
End: 2018-02-07

## 2018-02-07 RX ORDER — FLUCONAZOLE 150 MG/1
150 TABLET ORAL ONCE
Qty: 1 TABLET | Refills: 0 | Status: SHIPPED | OUTPATIENT
Start: 2018-02-07 | End: 2018-02-07

## 2018-02-07 NOTE — TELEPHONE ENCOUNTER
Daughter called and states pt has developed a yeast infection after finishing antibiotics .   Requesting rx---Oli Norton

## 2018-02-08 ENCOUNTER — TELEPHONE (OUTPATIENT)
Dept: PRIMARY CARE CLINIC | Age: 68
End: 2018-02-08

## 2018-02-13 RX ORDER — ATORVASTATIN CALCIUM 10 MG/1
TABLET, FILM COATED ORAL
Qty: 30 TABLET | Refills: 3 | Status: SHIPPED | OUTPATIENT
Start: 2018-02-13 | End: 2018-06-14 | Stop reason: SDUPTHER

## 2018-02-14 ENCOUNTER — TELEPHONE (OUTPATIENT)
Dept: NEUROSURGERY | Age: 68
End: 2018-02-14

## 2018-02-14 ENCOUNTER — TELEPHONE (OUTPATIENT)
Dept: NEUROLOGY | Age: 68
End: 2018-02-14

## 2018-02-14 RX ORDER — ROPINIROLE 4 MG/1
4 TABLET, FILM COATED ORAL 3 TIMES DAILY
Qty: 90 TABLET | Refills: 5 | Status: SHIPPED | OUTPATIENT
Start: 2018-02-14 | End: 2018-02-16 | Stop reason: SDUPTHER

## 2018-02-15 RX ORDER — ROPINIROLE 4 MG/1
TABLET, FILM COATED ORAL
Qty: 150 TABLET | Refills: 3 | Status: CANCELLED | OUTPATIENT
Start: 2018-02-15

## 2018-02-16 ENCOUNTER — TELEPHONE (OUTPATIENT)
Dept: NEUROSURGERY | Age: 68
End: 2018-02-16

## 2018-02-16 RX ORDER — ROPINIROLE 4 MG/1
TABLET, FILM COATED ORAL
Qty: 150 TABLET | Refills: 5 | Status: SHIPPED | OUTPATIENT
Start: 2018-02-16 | End: 2018-10-18 | Stop reason: SDUPTHER

## 2018-02-16 NOTE — TELEPHONE ENCOUNTER
The patients daughter called back and I let her know what the message was. She said she will give a pharmacy call here after while. I told her if the pharmacy doesn't have after a hour to call the office back.

## 2018-02-16 NOTE — TELEPHONE ENCOUNTER
Left message for patients daughter, for her to take 2 PO QAM, 1 Q Noon and 2 PO QHS. Script will be sent to pharmcay with in the hour and to please check at pharmacy. Instructed to call if she has any questions or problems.

## 2018-03-02 ENCOUNTER — OFFICE VISIT (OUTPATIENT)
Dept: PRIMARY CARE CLINIC | Age: 68
End: 2018-03-02
Payer: MEDICARE

## 2018-03-02 VITALS
DIASTOLIC BLOOD PRESSURE: 86 MMHG | OXYGEN SATURATION: 94 % | HEIGHT: 62 IN | WEIGHT: 274.2 LBS | RESPIRATION RATE: 18 BRPM | BODY MASS INDEX: 50.46 KG/M2 | HEART RATE: 77 BPM | TEMPERATURE: 98 F | SYSTOLIC BLOOD PRESSURE: 131 MMHG

## 2018-03-02 DIAGNOSIS — G25.81 RESTLESS LEGS SYNDROME: ICD-10-CM

## 2018-03-02 DIAGNOSIS — E66.01 MORBID OBESITY WITH BMI OF 50.0-59.9, ADULT (HCC): ICD-10-CM

## 2018-03-02 DIAGNOSIS — B37.2 YEAST INFECTION OF THE SKIN: ICD-10-CM

## 2018-03-02 DIAGNOSIS — Z79.4 TYPE 2 DIABETES MELLITUS WITH COMPLICATION, WITH LONG-TERM CURRENT USE OF INSULIN (HCC): Primary | ICD-10-CM

## 2018-03-02 DIAGNOSIS — G20 PARKINSON DISEASE (HCC): ICD-10-CM

## 2018-03-02 DIAGNOSIS — I10 ESSENTIAL HYPERTENSION: Chronic | ICD-10-CM

## 2018-03-02 DIAGNOSIS — K21.9 GASTROESOPHAGEAL REFLUX DISEASE WITHOUT ESOPHAGITIS: ICD-10-CM

## 2018-03-02 DIAGNOSIS — E11.8 TYPE 2 DIABETES MELLITUS WITH COMPLICATION, WITH LONG-TERM CURRENT USE OF INSULIN (HCC): Primary | ICD-10-CM

## 2018-03-02 DIAGNOSIS — Z12.31 ENCOUNTER FOR SCREENING MAMMOGRAM FOR MALIGNANT NEOPLASM OF BREAST: ICD-10-CM

## 2018-03-02 PROCEDURE — 1090F PRES/ABSN URINE INCON ASSESS: CPT | Performed by: NURSE PRACTITIONER

## 2018-03-02 PROCEDURE — G8482 FLU IMMUNIZE ORDER/ADMIN: HCPCS | Performed by: NURSE PRACTITIONER

## 2018-03-02 PROCEDURE — G8417 CALC BMI ABV UP PARAM F/U: HCPCS | Performed by: NURSE PRACTITIONER

## 2018-03-02 PROCEDURE — 1036F TOBACCO NON-USER: CPT | Performed by: NURSE PRACTITIONER

## 2018-03-02 PROCEDURE — 3017F COLORECTAL CA SCREEN DOC REV: CPT | Performed by: NURSE PRACTITIONER

## 2018-03-02 PROCEDURE — 1123F ACP DISCUSS/DSCN MKR DOCD: CPT | Performed by: NURSE PRACTITIONER

## 2018-03-02 PROCEDURE — G8399 PT W/DXA RESULTS DOCUMENT: HCPCS | Performed by: NURSE PRACTITIONER

## 2018-03-02 PROCEDURE — G8427 DOCREV CUR MEDS BY ELIG CLIN: HCPCS | Performed by: NURSE PRACTITIONER

## 2018-03-02 PROCEDURE — 3046F HEMOGLOBIN A1C LEVEL >9.0%: CPT | Performed by: NURSE PRACTITIONER

## 2018-03-02 PROCEDURE — 3014F SCREEN MAMMO DOC REV: CPT | Performed by: NURSE PRACTITIONER

## 2018-03-02 PROCEDURE — 4040F PNEUMOC VAC/ADMIN/RCVD: CPT | Performed by: NURSE PRACTITIONER

## 2018-03-02 PROCEDURE — 99214 OFFICE O/P EST MOD 30 MIN: CPT | Performed by: NURSE PRACTITIONER

## 2018-03-02 RX ORDER — NYSTATIN 100000 [USP'U]/G
POWDER TOPICAL 3 TIMES DAILY
Qty: 60 G | Refills: 3 | Status: SHIPPED | OUTPATIENT
Start: 2018-03-02 | End: 2019-07-08 | Stop reason: SDUPTHER

## 2018-03-02 RX ORDER — CLOBETASOL PROPIONATE 0.5 MG/G
OINTMENT TOPICAL
Qty: 1 TUBE | Refills: 1 | Status: SHIPPED | OUTPATIENT
Start: 2018-03-02 | End: 2019-07-10 | Stop reason: SDUPTHER

## 2018-03-02 RX ORDER — LORAZEPAM 1 MG/1
1 TABLET ORAL DAILY PRN
Qty: 30 TABLET | Refills: 0 | Status: SHIPPED | OUTPATIENT
Start: 2018-03-02 | End: 2021-04-12 | Stop reason: SDUPTHER

## 2018-03-02 RX ORDER — TRAMADOL HYDROCHLORIDE 50 MG/1
TABLET ORAL
Qty: 30 TABLET | Refills: 0 | Status: SHIPPED | OUTPATIENT
Start: 2018-03-02 | End: 2018-04-05 | Stop reason: SDUPTHER

## 2018-03-02 ASSESSMENT — PATIENT HEALTH QUESTIONNAIRE - PHQ9
SUM OF ALL RESPONSES TO PHQ QUESTIONS 1-9: 0
SUM OF ALL RESPONSES TO PHQ9 QUESTIONS 1 & 2: 0
2. FEELING DOWN, DEPRESSED OR HOPELESS: 0
1. LITTLE INTEREST OR PLEASURE IN DOING THINGS: 0

## 2018-03-02 NOTE — PROGRESS NOTES
ONE TABLET BY MOUTH TWICE A DAY 60 tablet 5    carbidopa-levodopa (SINEMET)  MG per tablet Take 2 tablets by mouth 3 times daily (Patient taking differently: Take 2 tablets by mouth 4 times daily ) 180 tablet 5    Docusate Calcium (STOOL SOFTENER PO) Take 1 tablet by mouth       Inulin (FIBER CHOICE PO) Take 1 tablet by mouth daily       Cholecalciferol (VITAMIN D3) 5000 units TABS Take 1 tablet by mouth       insulin detemir (LEVEMIR) 100 UNIT/ML injection vial Inject 65 Units into the skin nightly 1 vial 5    albuterol (ACCUNEB) 1.25 MG/3ML nebulizer solution Inhale 3 mLs into the lungs every 6 hours as needed for Wheezing 360 mL 3    Insulin Pen Needle 32G X 4 MM MISC 1 each by Does not apply route daily 100 each 3    FORTEO 600 MCG/2.4ML SOLN injection Inject 20 mcg into the skin nightly       linaclotide (LINZESS) 145 MCG capsule Take 1 capsule by mouth every morning (before breakfast) 30 capsule 11    SURE COMFORT INS SYR 1CC/30G 30G X 5/16\" 1 ML MISC       glucose blood VI test strips (ONE TOUCH ULTRA TEST) strip Inject 1 each into the skin daily As needed. 100 each 3    Lancets MISC 1 lancet to check glucose daily 100 each 3    Insulin Pen Needle 31G X 8 MM MISC Inject 1 each into the skin daily 100 each 2    albuterol (PROVENTIL HFA;VENTOLIN HFA) 108 (90 BASE) MCG/ACT inhaler Inhale 2 puffs into the lungs every 6 hours as needed for Wheezing 1 Inhaler 1    OXYGEN 2L NC 12-24 hours 1 Device 0    Nebulizers (AIRIAL COMPACT MINI NEBULIZER) MISC 1 Device by Does not apply route 4 times daily as needed. 1 each 0    Blood Glucose Monitoring Suppl STERLING by Does not apply route. 1 Device 0    aspirin 325 MG tablet Take 325 mg by mouth daily.         clobetasol (TEMOVATE) 0.05 % ointment Apply external vagina 3 x a day for week one, then decrease to BID on week two, on week 3 once a day, on week four every other day then stop 1 Tube 1    gabapentin (NEURONTIN) 600 MG tablet Take 1 tablet by mouth 3 times daily for 30 days. 90 tablet 5    fluconazole (DIFLUCAN) 150 MG tablet Take one now and repeat in 1 wk if needed 2 tablet 0    QVAR 80 MCG/ACT inhaler Inhale 1 puff into the lungs as needed        No current facility-administered medications for this visit. Allergies   Allergen Reactions    Codeine      itching       Health Maintenance   Topic Date Due    DTaP/Tdap/Td vaccine (1 - Tdap) 12/27/1969    Shingles Vaccine (1 of 2 - 2 Dose Series) 12/27/2000    Diabetic foot exam  04/12/2017    Diabetic retinal exam  04/12/2017    Pneumococcal low/med risk (2 of 2 - PPSV23) 04/12/2017    A1C test (Diabetic or Prediabetic)  10/30/2018    Lipid screen  10/30/2018    Potassium monitoring  01/24/2019    Creatinine monitoring  01/24/2019    Breast cancer screen  03/22/2020    Colon cancer screen colonoscopy  08/21/2023    DEXA (modify frequency per FRAX score)  Completed    Flu vaccine  Completed    Hepatitis C screen  Addressed       Subjective:      Review of Systems   Constitutional: Negative. Gastrointestinal:        GERD   Endocrine:        Diabetes   Musculoskeletal: Positive for arthralgias and back pain. Skin: Positive for rash. Neurological:        Restless leg   Psychiatric/Behavioral: Positive for sleep disturbance. The patient is nervous/anxious. Objective:     Physical Exam   Constitutional: She is oriented to person, place, and time. She appears well-developed and well-nourished. HENT:   Head: Normocephalic. Right Ear: External ear normal.   Left Ear: External ear normal.   Eyes: Pupils are equal, round, and reactive to light. Neck: Normal range of motion. Cardiovascular: Normal rate, regular rhythm, normal heart sounds and intact distal pulses. Pulmonary/Chest: Effort normal and breath sounds normal.   Abdominal: Soft.  Bowel sounds are normal.   She does have a yeast like rash in the folds of her abdomen skin   Neurological: She is alert and oriented

## 2018-03-02 NOTE — PATIENT INSTRUCTIONS
Goal bs is under 140 in the am and under 170 after meals for sugar  May increase the levamir by 2 units every 2 days and or increase novalog  Watch diet closely  Use the dryer on folds of skin and powder once aday  May use the tramadol and the neuontin, contract in chart  Pap in May  mammo in April  Continue with lung doctor.

## 2018-03-22 ENCOUNTER — HOSPITAL ENCOUNTER (OUTPATIENT)
Dept: WOMENS IMAGING | Age: 68
Discharge: HOME | End: 2018-03-22
Payer: MEDICARE

## 2018-03-22 DIAGNOSIS — Z12.31 ENCOUNTER FOR SCREENING MAMMOGRAM FOR MALIGNANT NEOPLASM OF BREAST: ICD-10-CM

## 2018-03-22 PROCEDURE — 77063 BREAST TOMOSYNTHESIS BI: CPT

## 2018-03-25 ASSESSMENT — ENCOUNTER SYMPTOMS: BACK PAIN: 1

## 2018-04-02 RX ORDER — MONTELUKAST SODIUM 10 MG/1
TABLET ORAL
Qty: 30 TABLET | Refills: 2 | Status: SHIPPED | OUTPATIENT
Start: 2018-04-02 | End: 2018-06-14 | Stop reason: SDUPTHER

## 2018-04-04 RX ORDER — MONTELUKAST SODIUM 10 MG/1
TABLET ORAL
Qty: 30 TABLET | Refills: 3 | Status: SHIPPED | OUTPATIENT
Start: 2018-04-04 | End: 2018-04-09 | Stop reason: SDUPTHER

## 2018-04-05 ENCOUNTER — TELEPHONE (OUTPATIENT)
Dept: PRIMARY CARE CLINIC | Age: 68
End: 2018-04-05

## 2018-04-05 RX ORDER — TRAMADOL HYDROCHLORIDE 50 MG/1
TABLET ORAL
Qty: 30 TABLET | Refills: 0 | Status: SHIPPED | OUTPATIENT
Start: 2018-04-05 | End: 2018-05-05 | Stop reason: SDUPTHER

## 2018-04-09 ENCOUNTER — OFFICE VISIT (OUTPATIENT)
Dept: NEUROLOGY | Age: 68
End: 2018-04-09
Payer: MEDICARE

## 2018-04-09 VITALS
SYSTOLIC BLOOD PRESSURE: 124 MMHG | WEIGHT: 279 LBS | HEART RATE: 64 BPM | BODY MASS INDEX: 51.34 KG/M2 | DIASTOLIC BLOOD PRESSURE: 84 MMHG | HEIGHT: 62 IN

## 2018-04-09 DIAGNOSIS — R44.1 VISUAL HALLUCINATIONS: ICD-10-CM

## 2018-04-09 DIAGNOSIS — G56.03 BILATERAL CARPAL TUNNEL SYNDROME: ICD-10-CM

## 2018-04-09 DIAGNOSIS — G20 PARKINSON DISEASE (HCC): Primary | ICD-10-CM

## 2018-04-09 PROCEDURE — 3017F COLORECTAL CA SCREEN DOC REV: CPT | Performed by: PSYCHIATRY & NEUROLOGY

## 2018-04-09 PROCEDURE — 4040F PNEUMOC VAC/ADMIN/RCVD: CPT | Performed by: PSYCHIATRY & NEUROLOGY

## 2018-04-09 PROCEDURE — 3014F SCREEN MAMMO DOC REV: CPT | Performed by: PSYCHIATRY & NEUROLOGY

## 2018-04-09 PROCEDURE — 1090F PRES/ABSN URINE INCON ASSESS: CPT | Performed by: PSYCHIATRY & NEUROLOGY

## 2018-04-09 PROCEDURE — 99213 OFFICE O/P EST LOW 20 MIN: CPT | Performed by: PSYCHIATRY & NEUROLOGY

## 2018-04-09 PROCEDURE — 1123F ACP DISCUSS/DSCN MKR DOCD: CPT | Performed by: PSYCHIATRY & NEUROLOGY

## 2018-04-09 PROCEDURE — G8399 PT W/DXA RESULTS DOCUMENT: HCPCS | Performed by: PSYCHIATRY & NEUROLOGY

## 2018-04-09 PROCEDURE — G8427 DOCREV CUR MEDS BY ELIG CLIN: HCPCS | Performed by: PSYCHIATRY & NEUROLOGY

## 2018-04-09 PROCEDURE — 1036F TOBACCO NON-USER: CPT | Performed by: PSYCHIATRY & NEUROLOGY

## 2018-04-09 PROCEDURE — G8417 CALC BMI ABV UP PARAM F/U: HCPCS | Performed by: PSYCHIATRY & NEUROLOGY

## 2018-04-12 RX ORDER — LIRAGLUTIDE 6 MG/ML
INJECTION SUBCUTANEOUS
Qty: 9 ML | Refills: 3 | Status: SHIPPED | OUTPATIENT
Start: 2018-04-12 | End: 2018-06-14 | Stop reason: SDUPTHER

## 2018-04-24 RX ORDER — SPIRONOLACTONE 25 MG/1
25 TABLET ORAL DAILY
Qty: 30 TABLET | Refills: 5 | Status: SHIPPED | OUTPATIENT
Start: 2018-04-24 | End: 2018-10-23 | Stop reason: SDUPTHER

## 2018-04-24 RX ORDER — SOTALOL HYDROCHLORIDE 120 MG/1
TABLET ORAL
Qty: 60 TABLET | Refills: 5 | Status: SHIPPED | OUTPATIENT
Start: 2018-04-24 | End: 2018-10-23 | Stop reason: SDUPTHER

## 2018-04-24 RX ORDER — LOSARTAN POTASSIUM 100 MG/1
100 TABLET ORAL DAILY
Qty: 30 TABLET | Refills: 5 | Status: SHIPPED | OUTPATIENT
Start: 2018-04-24 | End: 2018-10-23 | Stop reason: SDUPTHER

## 2018-04-30 ENCOUNTER — OFFICE VISIT (OUTPATIENT)
Dept: CARDIOLOGY | Age: 68
End: 2018-04-30
Payer: MEDICARE

## 2018-04-30 VITALS
HEART RATE: 70 BPM | DIASTOLIC BLOOD PRESSURE: 68 MMHG | BODY MASS INDEX: 50.97 KG/M2 | SYSTOLIC BLOOD PRESSURE: 128 MMHG | WEIGHT: 277 LBS | HEIGHT: 62 IN

## 2018-04-30 DIAGNOSIS — I48.0 PAROXYSMAL ATRIAL FIBRILLATION (HCC): Primary | ICD-10-CM

## 2018-04-30 PROCEDURE — 1036F TOBACCO NON-USER: CPT | Performed by: INTERNAL MEDICINE

## 2018-04-30 PROCEDURE — G8399 PT W/DXA RESULTS DOCUMENT: HCPCS | Performed by: INTERNAL MEDICINE

## 2018-04-30 PROCEDURE — 1123F ACP DISCUSS/DSCN MKR DOCD: CPT | Performed by: INTERNAL MEDICINE

## 2018-04-30 PROCEDURE — 4040F PNEUMOC VAC/ADMIN/RCVD: CPT | Performed by: INTERNAL MEDICINE

## 2018-04-30 PROCEDURE — G8417 CALC BMI ABV UP PARAM F/U: HCPCS | Performed by: INTERNAL MEDICINE

## 2018-04-30 PROCEDURE — G8427 DOCREV CUR MEDS BY ELIG CLIN: HCPCS | Performed by: INTERNAL MEDICINE

## 2018-04-30 PROCEDURE — 1090F PRES/ABSN URINE INCON ASSESS: CPT | Performed by: INTERNAL MEDICINE

## 2018-04-30 PROCEDURE — 99213 OFFICE O/P EST LOW 20 MIN: CPT | Performed by: INTERNAL MEDICINE

## 2018-04-30 PROCEDURE — 3017F COLORECTAL CA SCREEN DOC REV: CPT | Performed by: INTERNAL MEDICINE

## 2018-05-01 ENCOUNTER — HOSPITAL ENCOUNTER (OUTPATIENT)
Dept: CT IMAGING | Age: 68
Discharge: HOME | End: 2018-05-01
Payer: MEDICARE

## 2018-05-01 ENCOUNTER — TELEPHONE (OUTPATIENT)
Dept: NEUROLOGY | Age: 68
End: 2018-05-01

## 2018-05-01 DIAGNOSIS — R59.0 LOCALIZED ENLARGED LYMPH NODES: ICD-10-CM

## 2018-05-01 PROCEDURE — 71250 CT THORAX DX C-: CPT

## 2018-05-07 ENCOUNTER — TELEPHONE (OUTPATIENT)
Dept: PRIMARY CARE CLINIC | Age: 68
End: 2018-05-07

## 2018-05-07 RX ORDER — TRAMADOL HYDROCHLORIDE 50 MG/1
TABLET ORAL
Qty: 30 TABLET | Refills: 0 | Status: SHIPPED | OUTPATIENT
Start: 2018-05-07 | End: 2018-06-04 | Stop reason: SDUPTHER

## 2018-05-21 RX ORDER — LINACLOTIDE 145 UG/1
CAPSULE, GELATIN COATED ORAL
Qty: 30 CAPSULE | Refills: 3 | Status: SHIPPED | OUTPATIENT
Start: 2018-05-21 | End: 2018-06-14 | Stop reason: SDUPTHER

## 2018-05-30 RX ORDER — INSULIN ASPART 100 [IU]/ML
INJECTION, SOLUTION INTRAVENOUS; SUBCUTANEOUS
Qty: 15 ML | Refills: 3 | Status: SHIPPED | OUTPATIENT
Start: 2018-05-30 | End: 2018-06-14 | Stop reason: SDUPTHER

## 2018-06-04 DIAGNOSIS — Z87.81 HISTORY OF PELVIC FRACTURE: Primary | ICD-10-CM

## 2018-06-04 RX ORDER — TRAMADOL HYDROCHLORIDE 50 MG/1
TABLET ORAL
Qty: 30 TABLET | Refills: 0 | Status: SHIPPED | OUTPATIENT
Start: 2018-06-04 | End: 2018-07-05

## 2018-06-14 ENCOUNTER — HOSPITAL ENCOUNTER (OUTPATIENT)
Age: 68
Setting detail: SPECIMEN
Discharge: HOME | End: 2018-06-14
Payer: MEDICARE

## 2018-06-14 ENCOUNTER — OFFICE VISIT (OUTPATIENT)
Dept: PRIMARY CARE CLINIC | Age: 68
End: 2018-06-14
Payer: MEDICARE

## 2018-06-14 VITALS
DIASTOLIC BLOOD PRESSURE: 82 MMHG | HEART RATE: 76 BPM | SYSTOLIC BLOOD PRESSURE: 132 MMHG | HEIGHT: 62 IN | BODY MASS INDEX: 51.71 KG/M2 | TEMPERATURE: 97.7 F | WEIGHT: 281 LBS | OXYGEN SATURATION: 97 % | RESPIRATION RATE: 16 BRPM

## 2018-06-14 DIAGNOSIS — Z79.899 MEDICATION MANAGEMENT: ICD-10-CM

## 2018-06-14 DIAGNOSIS — N18.30 STAGE 3 CHRONIC KIDNEY DISEASE (HCC): ICD-10-CM

## 2018-06-14 DIAGNOSIS — B37.2 YEAST DERMATITIS: ICD-10-CM

## 2018-06-14 DIAGNOSIS — Z79.4 TYPE 2 DIABETES MELLITUS WITH COMPLICATION, WITH LONG-TERM CURRENT USE OF INSULIN (HCC): Primary | ICD-10-CM

## 2018-06-14 DIAGNOSIS — E11.8 TYPE 2 DIABETES MELLITUS WITH COMPLICATION, WITH LONG-TERM CURRENT USE OF INSULIN (HCC): Primary | ICD-10-CM

## 2018-06-14 DIAGNOSIS — Z12.4 SCREENING FOR MALIGNANT NEOPLASM OF CERVIX: ICD-10-CM

## 2018-06-14 DIAGNOSIS — E78.2 MIXED HYPERLIPIDEMIA: ICD-10-CM

## 2018-06-14 LAB
ALBUMIN SERPL-MCNC: 4.1 G/DL (ref 3.5–5.2)
ALP BLD-CCNC: 115 U/L (ref 35–104)
ALT SERPL-CCNC: <5 U/L (ref 5–33)
ANION GAP SERPL CALCULATED.3IONS-SCNC: 19 MMOL/L (ref 7–19)
AST SERPL-CCNC: 19 U/L (ref 5–32)
BILIRUB SERPL-MCNC: <0.2 MG/DL (ref 0.2–1.2)
BUN BLDV-MCNC: 29 MG/DL (ref 8–23)
CALCIUM SERPL-MCNC: 9.7 MG/DL (ref 8.8–10.2)
CHLORIDE BLD-SCNC: 100 MMOL/L (ref 98–111)
CHOLESTEROL, TOTAL: 149 MG/DL (ref 160–199)
CO2: 22 MMOL/L (ref 22–29)
CREAT SERPL-MCNC: 1.1 MG/DL (ref 0.5–0.9)
CREATININE URINE: 33.8 MG/DL (ref 4.2–622)
GFR NON-AFRICAN AMERICAN: 49
GLUCOSE BLD-MCNC: 96 MG/DL (ref 74–109)
HBA1C MFR BLD: 7.7 %
HCT VFR BLD CALC: 41.1 % (ref 37–47)
HDLC SERPL-MCNC: 39 MG/DL (ref 65–121)
HEMOGLOBIN: 12.8 G/DL (ref 12–16)
LDL CHOLESTEROL CALCULATED: 69 MG/DL
MCH RBC QN AUTO: 28.6 PG (ref 27–31)
MCHC RBC AUTO-ENTMCNC: 31.1 G/DL (ref 33–37)
MCV RBC AUTO: 91.9 FL (ref 81–99)
MICROALBUMIN UR-MCNC: 2 MG/DL (ref 0–19)
MICROALBUMIN/CREAT UR-RTO: 59.2 MG/G
PDW BLD-RTO: 14.2 % (ref 11.5–14.5)
PLATELET # BLD: 195 K/UL (ref 130–400)
PMV BLD AUTO: 11 FL (ref 9.4–12.3)
POTASSIUM SERPL-SCNC: 4.5 MMOL/L (ref 3.5–5)
RBC # BLD: 4.47 M/UL (ref 4.2–5.4)
SODIUM BLD-SCNC: 141 MMOL/L (ref 136–145)
TOTAL PROTEIN: 7.1 G/DL (ref 6.6–8.7)
TRIGL SERPL-MCNC: 207 MG/DL (ref 0–149)
WBC # BLD: 9.7 K/UL (ref 4.8–10.8)

## 2018-06-14 PROCEDURE — 88142 CYTOPATH C/V THIN LAYER: CPT

## 2018-06-14 PROCEDURE — G8399 PT W/DXA RESULTS DOCUMENT: HCPCS | Performed by: NURSE PRACTITIONER

## 2018-06-14 PROCEDURE — 4040F PNEUMOC VAC/ADMIN/RCVD: CPT | Performed by: NURSE PRACTITIONER

## 2018-06-14 PROCEDURE — G8417 CALC BMI ABV UP PARAM F/U: HCPCS | Performed by: NURSE PRACTITIONER

## 2018-06-14 PROCEDURE — 36415 COLL VENOUS BLD VENIPUNCTURE: CPT | Performed by: NURSE PRACTITIONER

## 2018-06-14 PROCEDURE — G8427 DOCREV CUR MEDS BY ELIG CLIN: HCPCS | Performed by: NURSE PRACTITIONER

## 2018-06-14 PROCEDURE — 99215 OFFICE O/P EST HI 40 MIN: CPT | Performed by: NURSE PRACTITIONER

## 2018-06-14 PROCEDURE — 3045F PR MOST RECENT HEMOGLOBIN A1C LEVEL 7.0-9.0%: CPT | Performed by: NURSE PRACTITIONER

## 2018-06-14 PROCEDURE — 1036F TOBACCO NON-USER: CPT | Performed by: NURSE PRACTITIONER

## 2018-06-14 PROCEDURE — 1123F ACP DISCUSS/DSCN MKR DOCD: CPT | Performed by: NURSE PRACTITIONER

## 2018-06-14 PROCEDURE — 3017F COLORECTAL CA SCREEN DOC REV: CPT | Performed by: NURSE PRACTITIONER

## 2018-06-14 PROCEDURE — 2022F DILAT RTA XM EVC RTNOPTHY: CPT | Performed by: NURSE PRACTITIONER

## 2018-06-14 PROCEDURE — 1090F PRES/ABSN URINE INCON ASSESS: CPT | Performed by: NURSE PRACTITIONER

## 2018-06-14 RX ORDER — ATORVASTATIN CALCIUM 10 MG/1
TABLET, FILM COATED ORAL
Qty: 30 TABLET | Refills: 5 | Status: SHIPPED | OUTPATIENT
Start: 2018-06-14 | End: 2018-12-31 | Stop reason: SDUPTHER

## 2018-06-14 RX ORDER — OMEPRAZOLE 40 MG/1
CAPSULE, DELAYED RELEASE ORAL
Qty: 30 CAPSULE | Refills: 5 | Status: SHIPPED | OUTPATIENT
Start: 2018-06-14 | End: 2019-01-08 | Stop reason: SDUPTHER

## 2018-06-14 RX ORDER — NYSTATIN 100000 U/G
OINTMENT TOPICAL
Qty: 60 TUBE | Refills: 5 | Status: SHIPPED | OUTPATIENT
Start: 2018-06-14 | End: 2021-03-30

## 2018-06-14 RX ORDER — MONTELUKAST SODIUM 10 MG/1
TABLET ORAL
Qty: 30 TABLET | Refills: 5 | Status: SHIPPED | OUTPATIENT
Start: 2018-06-14 | End: 2018-12-31 | Stop reason: SDUPTHER

## 2018-06-14 RX ORDER — FLUCONAZOLE 150 MG/1
TABLET ORAL
Qty: 1 TABLET | Refills: 11 | Status: SHIPPED | OUTPATIENT
Start: 2018-06-14 | End: 2019-03-27 | Stop reason: SDUPTHER

## 2018-06-14 ASSESSMENT — ENCOUNTER SYMPTOMS: COUGH: 1

## 2018-07-10 DIAGNOSIS — R52 PAIN: Primary | ICD-10-CM

## 2018-07-10 RX ORDER — TRAMADOL HYDROCHLORIDE 50 MG/1
50 TABLET ORAL EVERY 8 HOURS PRN
Qty: 30 TABLET | Refills: 0 | Status: SHIPPED | OUTPATIENT
Start: 2018-07-10 | End: 2018-08-10 | Stop reason: SDUPTHER

## 2018-08-07 DIAGNOSIS — G25.81 RESTLESS LEGS SYNDROME: Primary | ICD-10-CM

## 2018-08-10 DIAGNOSIS — R52 PAIN: ICD-10-CM

## 2018-08-10 RX ORDER — TRAMADOL HYDROCHLORIDE 50 MG/1
50 TABLET ORAL EVERY 8 HOURS PRN
Qty: 30 TABLET | Refills: 0 | Status: CANCELLED | OUTPATIENT
Start: 2018-08-10 | End: 2018-09-10

## 2018-08-10 RX ORDER — TRAMADOL HYDROCHLORIDE 50 MG/1
TABLET ORAL
Qty: 30 TABLET | Refills: 0 | Status: SHIPPED | OUTPATIENT
Start: 2018-08-10 | End: 2018-09-11 | Stop reason: SDUPTHER

## 2018-08-10 NOTE — TELEPHONE ENCOUNTER
Requested Prescriptions     Pending Prescriptions Disp Refills    gabapentin (NEURONTIN) 600 MG tablet [Pharmacy Med Name: GABAPENTIN TAB 600MG TABLET] 90 tablet      Sig: TAKE ONE TABLET BY MOUTH 3 TIMES DAILY . . .MAY CAUSE DROWSINESS!!        Last OV 4/9/18  Next OV 10/11/18  Last Rx 1/22/18 with 5 refills  Gomez up to date

## 2018-08-11 RX ORDER — GABAPENTIN 600 MG/1
600 TABLET ORAL 3 TIMES DAILY
Qty: 90 TABLET | Refills: 5 | Status: SHIPPED | OUTPATIENT
Start: 2018-08-11 | End: 2019-02-06 | Stop reason: SDUPTHER

## 2018-08-14 RX ORDER — GABAPENTIN 600 MG/1
TABLET ORAL
Qty: 90 TABLET | OUTPATIENT
Start: 2018-08-14

## 2018-08-16 ENCOUNTER — HOSPITAL ENCOUNTER (OUTPATIENT)
Dept: PREADMISSION TESTING | Age: 68
Discharge: HOME | End: 2018-08-20
Payer: MEDICARE

## 2018-08-16 VITALS — BODY MASS INDEX: 49.43 KG/M2 | WEIGHT: 279 LBS | HEIGHT: 63 IN

## 2018-08-16 LAB
ANION GAP SERPL CALCULATED.3IONS-SCNC: 17 MMOL/L (ref 7–19)
APTT: 28.3 SEC (ref 26–36.2)
BASOPHILS ABSOLUTE: 0.1 K/UL (ref 0–0.2)
BASOPHILS RELATIVE PERCENT: 0.7 % (ref 0–1)
BUN BLDV-MCNC: 28 MG/DL (ref 8–23)
CALCIUM SERPL-MCNC: 9.5 MG/DL (ref 8.8–10.2)
CHLORIDE BLD-SCNC: 102 MMOL/L (ref 98–111)
CO2: 23 MMOL/L (ref 22–29)
CREAT SERPL-MCNC: 1 MG/DL (ref 0.5–0.9)
EOSINOPHILS ABSOLUTE: 0.4 K/UL (ref 0–0.6)
EOSINOPHILS RELATIVE PERCENT: 4.3 % (ref 0–5)
GFR NON-AFRICAN AMERICAN: 55
GLUCOSE BLD-MCNC: 130 MG/DL (ref 74–109)
HCT VFR BLD CALC: 42 % (ref 37–47)
HEMOGLOBIN: 12.8 G/DL (ref 12–16)
INR BLD: 0.97 (ref 0.88–1.18)
LYMPHOCYTES ABSOLUTE: 2 K/UL (ref 1.1–4.5)
LYMPHOCYTES RELATIVE PERCENT: 22.7 % (ref 20–40)
MCH RBC QN AUTO: 28.8 PG (ref 27–31)
MCHC RBC AUTO-ENTMCNC: 30.5 G/DL (ref 33–37)
MCV RBC AUTO: 94.6 FL (ref 81–99)
MONOCYTES ABSOLUTE: 0.6 K/UL (ref 0–0.9)
MONOCYTES RELATIVE PERCENT: 6.2 % (ref 0–10)
NEUTROPHILS ABSOLUTE: 5.8 K/UL (ref 1.5–7.5)
NEUTROPHILS RELATIVE PERCENT: 65.6 % (ref 50–65)
PDW BLD-RTO: 13.5 % (ref 11.5–14.5)
PLATELET # BLD: 193 K/UL (ref 130–400)
PMV BLD AUTO: 11.2 FL (ref 9.4–12.3)
POTASSIUM SERPL-SCNC: 4.7 MMOL/L (ref 3.5–5)
PROTHROMBIN TIME: 12.8 SEC (ref 12–14.6)
RBC # BLD: 4.44 M/UL (ref 4.2–5.4)
SODIUM BLD-SCNC: 142 MMOL/L (ref 136–145)
WBC # BLD: 8.8 K/UL (ref 4.8–10.8)

## 2018-08-16 PROCEDURE — 85025 COMPLETE CBC W/AUTO DIFF WBC: CPT

## 2018-08-16 PROCEDURE — 85610 PROTHROMBIN TIME: CPT

## 2018-08-16 PROCEDURE — 93005 ELECTROCARDIOGRAM TRACING: CPT

## 2018-08-16 PROCEDURE — 85730 THROMBOPLASTIN TIME PARTIAL: CPT

## 2018-08-16 PROCEDURE — 80048 BASIC METABOLIC PNL TOTAL CA: CPT

## 2018-08-19 LAB
EKG P AXIS: 2 DEGREES
EKG P-R INTERVAL: 212 MS
EKG Q-T INTERVAL: 398 MS
EKG QRS DURATION: 84 MS
EKG QTC CALCULATION (BAZETT): 412 MS
EKG T AXIS: 31 DEGREES

## 2018-08-29 ENCOUNTER — ANESTHESIA (OUTPATIENT)
Dept: OPERATING ROOM | Age: 68
End: 2018-08-29
Payer: MEDICARE

## 2018-08-29 ENCOUNTER — ANESTHESIA EVENT (OUTPATIENT)
Dept: OPERATING ROOM | Age: 68
End: 2018-08-29
Payer: MEDICARE

## 2018-08-29 ENCOUNTER — HOSPITAL ENCOUNTER (OUTPATIENT)
Age: 68
Setting detail: OUTPATIENT SURGERY
Discharge: HOME | End: 2018-08-29
Attending: ORTHOPAEDIC SURGERY | Admitting: ORTHOPAEDIC SURGERY
Payer: MEDICARE

## 2018-08-29 VITALS
SYSTOLIC BLOOD PRESSURE: 167 MMHG | RESPIRATION RATE: 23 BRPM | DIASTOLIC BLOOD PRESSURE: 74 MMHG | OXYGEN SATURATION: 100 % | TEMPERATURE: 96 F

## 2018-08-29 VITALS
SYSTOLIC BLOOD PRESSURE: 124 MMHG | RESPIRATION RATE: 16 BRPM | HEIGHT: 63 IN | OXYGEN SATURATION: 94 % | WEIGHT: 279 LBS | HEART RATE: 70 BPM | BODY MASS INDEX: 49.43 KG/M2 | DIASTOLIC BLOOD PRESSURE: 69 MMHG | TEMPERATURE: 97.1 F

## 2018-08-29 DIAGNOSIS — G25.81 RESTLESS LEGS SYNDROME: Primary | ICD-10-CM

## 2018-08-29 DIAGNOSIS — G56.03 BILATERAL CARPAL TUNNEL SYNDROME: ICD-10-CM

## 2018-08-29 LAB
GLUCOSE BLD-MCNC: 194 MG/DL (ref 70–99)
PERFORMED ON: ABNORMAL

## 2018-08-29 PROCEDURE — 7100000001 HC PACU RECOVERY - ADDTL 15 MIN: Performed by: ORTHOPAEDIC SURGERY

## 2018-08-29 PROCEDURE — 82948 REAGENT STRIP/BLOOD GLUCOSE: CPT

## 2018-08-29 PROCEDURE — 2580000003 HC RX 258: Performed by: ORTHOPAEDIC SURGERY

## 2018-08-29 PROCEDURE — 7100000000 HC PACU RECOVERY - FIRST 15 MIN: Performed by: ORTHOPAEDIC SURGERY

## 2018-08-29 PROCEDURE — 7100000010 HC PHASE II RECOVERY - FIRST 15 MIN: Performed by: ORTHOPAEDIC SURGERY

## 2018-08-29 PROCEDURE — 6360000002 HC RX W HCPCS: Performed by: NURSE ANESTHETIST, CERTIFIED REGISTERED

## 2018-08-29 PROCEDURE — 6370000000 HC RX 637 (ALT 250 FOR IP): Performed by: ORTHOPAEDIC SURGERY

## 2018-08-29 PROCEDURE — 3600000013 HC SURGERY LEVEL 3 ADDTL 15MIN: Performed by: ORTHOPAEDIC SURGERY

## 2018-08-29 PROCEDURE — 6360000002 HC RX W HCPCS: Performed by: ORTHOPAEDIC SURGERY

## 2018-08-29 PROCEDURE — 2580000003 HC RX 258: Performed by: NURSE ANESTHETIST, CERTIFIED REGISTERED

## 2018-08-29 PROCEDURE — 2709999900 HC NON-CHARGEABLE SUPPLY: Performed by: ORTHOPAEDIC SURGERY

## 2018-08-29 PROCEDURE — 3700000001 HC ADD 15 MINUTES (ANESTHESIA): Performed by: ORTHOPAEDIC SURGERY

## 2018-08-29 PROCEDURE — 2500000003 HC RX 250 WO HCPCS: Performed by: ORTHOPAEDIC SURGERY

## 2018-08-29 PROCEDURE — 7100000011 HC PHASE II RECOVERY - ADDTL 15 MIN: Performed by: ORTHOPAEDIC SURGERY

## 2018-08-29 PROCEDURE — 2500000003 HC RX 250 WO HCPCS: Performed by: NURSE ANESTHETIST, CERTIFIED REGISTERED

## 2018-08-29 PROCEDURE — 3700000000 HC ANESTHESIA ATTENDED CARE: Performed by: ORTHOPAEDIC SURGERY

## 2018-08-29 PROCEDURE — 3600000003 HC SURGERY LEVEL 3 BASE: Performed by: ORTHOPAEDIC SURGERY

## 2018-08-29 RX ORDER — ENALAPRILAT 2.5 MG/2ML
1.25 INJECTION INTRAVENOUS
Status: DISCONTINUED | OUTPATIENT
Start: 2018-08-29 | End: 2018-08-29 | Stop reason: HOSPADM

## 2018-08-29 RX ORDER — MEPERIDINE HYDROCHLORIDE 50 MG/ML
12.5 INJECTION INTRAMUSCULAR; INTRAVENOUS; SUBCUTANEOUS EVERY 5 MIN PRN
Status: DISCONTINUED | OUTPATIENT
Start: 2018-08-29 | End: 2018-08-29 | Stop reason: HOSPADM

## 2018-08-29 RX ORDER — MIDAZOLAM HYDROCHLORIDE 1 MG/ML
2 INJECTION INTRAMUSCULAR; INTRAVENOUS
Status: DISCONTINUED | OUTPATIENT
Start: 2018-08-29 | End: 2018-08-29 | Stop reason: HOSPADM

## 2018-08-29 RX ORDER — PROMETHAZINE HYDROCHLORIDE 25 MG/ML
6.25 INJECTION, SOLUTION INTRAMUSCULAR; INTRAVENOUS
Status: DISCONTINUED | OUTPATIENT
Start: 2018-08-29 | End: 2018-08-29 | Stop reason: HOSPADM

## 2018-08-29 RX ORDER — LABETALOL HYDROCHLORIDE 5 MG/ML
5 INJECTION, SOLUTION INTRAVENOUS EVERY 10 MIN PRN
Status: DISCONTINUED | OUTPATIENT
Start: 2018-08-29 | End: 2018-08-29 | Stop reason: HOSPADM

## 2018-08-29 RX ORDER — LIDOCAINE HYDROCHLORIDE 10 MG/ML
INJECTION, SOLUTION INFILTRATION; PERINEURAL PRN
Status: DISCONTINUED | OUTPATIENT
Start: 2018-08-29 | End: 2018-08-29 | Stop reason: SDUPTHER

## 2018-08-29 RX ORDER — FENTANYL CITRATE 50 UG/ML
25 INJECTION, SOLUTION INTRAMUSCULAR; INTRAVENOUS
Status: DISCONTINUED | OUTPATIENT
Start: 2018-08-29 | End: 2018-08-29 | Stop reason: HOSPADM

## 2018-08-29 RX ORDER — SODIUM CHLORIDE 0.9 % (FLUSH) 0.9 %
10 SYRINGE (ML) INJECTION EVERY 12 HOURS SCHEDULED
Status: DISCONTINUED | OUTPATIENT
Start: 2018-08-29 | End: 2018-08-29 | Stop reason: HOSPADM

## 2018-08-29 RX ORDER — MORPHINE SULFATE/0.9% NACL/PF 1 MG/ML
4 SYRINGE (ML) INJECTION EVERY 5 MIN PRN
Status: DISCONTINUED | OUTPATIENT
Start: 2018-08-29 | End: 2018-08-29 | Stop reason: HOSPADM

## 2018-08-29 RX ORDER — SODIUM CHLORIDE, SODIUM LACTATE, POTASSIUM CHLORIDE, CALCIUM CHLORIDE 600; 310; 30; 20 MG/100ML; MG/100ML; MG/100ML; MG/100ML
INJECTION, SOLUTION INTRAVENOUS CONTINUOUS PRN
Status: DISCONTINUED | OUTPATIENT
Start: 2018-08-29 | End: 2018-08-29 | Stop reason: SDUPTHER

## 2018-08-29 RX ORDER — LIDOCAINE HYDROCHLORIDE 10 MG/ML
1 INJECTION, SOLUTION EPIDURAL; INFILTRATION; INTRACAUDAL; PERINEURAL ONCE
Status: COMPLETED | OUTPATIENT
Start: 2018-08-29 | End: 2018-08-29

## 2018-08-29 RX ORDER — MORPHINE SULFATE/0.9% NACL/PF 1 MG/ML
2 SYRINGE (ML) INJECTION EVERY 5 MIN PRN
Status: DISCONTINUED | OUTPATIENT
Start: 2018-08-29 | End: 2018-08-29 | Stop reason: HOSPADM

## 2018-08-29 RX ORDER — HYDROMORPHONE HCL IN 0.9% NACL 0.5 MG/ML
0.5 SYRINGE (ML) INTRAVENOUS EVERY 5 MIN PRN
Status: DISCONTINUED | OUTPATIENT
Start: 2018-08-29 | End: 2018-08-29 | Stop reason: HOSPADM

## 2018-08-29 RX ORDER — DEXAMETHASONE SODIUM PHOSPHATE 10 MG/ML
INJECTION INTRAMUSCULAR; INTRAVENOUS PRN
Status: DISCONTINUED | OUTPATIENT
Start: 2018-08-29 | End: 2018-08-29 | Stop reason: SDUPTHER

## 2018-08-29 RX ORDER — FENTANYL CITRATE 50 UG/ML
INJECTION, SOLUTION INTRAMUSCULAR; INTRAVENOUS PRN
Status: DISCONTINUED | OUTPATIENT
Start: 2018-08-29 | End: 2018-08-29 | Stop reason: SDUPTHER

## 2018-08-29 RX ORDER — SODIUM CHLORIDE, SODIUM LACTATE, POTASSIUM CHLORIDE, CALCIUM CHLORIDE 600; 310; 30; 20 MG/100ML; MG/100ML; MG/100ML; MG/100ML
INJECTION, SOLUTION INTRAVENOUS CONTINUOUS
Status: DISCONTINUED | OUTPATIENT
Start: 2018-08-29 | End: 2018-08-29 | Stop reason: HOSPADM

## 2018-08-29 RX ORDER — ROCURONIUM BROMIDE 10 MG/ML
INJECTION, SOLUTION INTRAVENOUS PRN
Status: DISCONTINUED | OUTPATIENT
Start: 2018-08-29 | End: 2018-08-29 | Stop reason: SDUPTHER

## 2018-08-29 RX ORDER — ONDANSETRON 2 MG/ML
INJECTION INTRAMUSCULAR; INTRAVENOUS PRN
Status: DISCONTINUED | OUTPATIENT
Start: 2018-08-29 | End: 2018-08-29 | Stop reason: SDUPTHER

## 2018-08-29 RX ORDER — HYDROMORPHONE HCL IN 0.9% NACL 0.5 MG/ML
0.25 SYRINGE (ML) INTRAVENOUS EVERY 5 MIN PRN
Status: DISCONTINUED | OUTPATIENT
Start: 2018-08-29 | End: 2018-08-29 | Stop reason: HOSPADM

## 2018-08-29 RX ORDER — OXYCODONE HYDROCHLORIDE 5 MG/1
TABLET ORAL
Qty: 50 TABLET | Refills: 0 | Status: SHIPPED | OUTPATIENT
Start: 2018-08-29 | End: 2018-09-29

## 2018-08-29 RX ORDER — ROPIVACAINE HYDROCHLORIDE 2 MG/ML
INJECTION, SOLUTION EPIDURAL; INFILTRATION; PERINEURAL PRN
Status: DISCONTINUED | OUTPATIENT
Start: 2018-08-29 | End: 2018-08-29 | Stop reason: HOSPADM

## 2018-08-29 RX ORDER — PROPOFOL 10 MG/ML
INJECTION, EMULSION INTRAVENOUS PRN
Status: DISCONTINUED | OUTPATIENT
Start: 2018-08-29 | End: 2018-08-29 | Stop reason: SDUPTHER

## 2018-08-29 RX ORDER — DIPHENHYDRAMINE HYDROCHLORIDE 50 MG/ML
12.5 INJECTION INTRAMUSCULAR; INTRAVENOUS
Status: DISCONTINUED | OUTPATIENT
Start: 2018-08-29 | End: 2018-08-29 | Stop reason: HOSPADM

## 2018-08-29 RX ORDER — SODIUM CHLORIDE 0.9 % (FLUSH) 0.9 %
10 SYRINGE (ML) INJECTION PRN
Status: DISCONTINUED | OUTPATIENT
Start: 2018-08-29 | End: 2018-08-29 | Stop reason: HOSPADM

## 2018-08-29 RX ORDER — METOCLOPRAMIDE HYDROCHLORIDE 5 MG/ML
10 INJECTION INTRAMUSCULAR; INTRAVENOUS
Status: DISCONTINUED | OUTPATIENT
Start: 2018-08-29 | End: 2018-08-29 | Stop reason: HOSPADM

## 2018-08-29 RX ORDER — FENTANYL CITRATE 50 UG/ML
50 INJECTION, SOLUTION INTRAMUSCULAR; INTRAVENOUS
Status: DISCONTINUED | OUTPATIENT
Start: 2018-08-29 | End: 2018-08-29 | Stop reason: HOSPADM

## 2018-08-29 RX ORDER — LIDOCAINE HYDROCHLORIDE 10 MG/ML
1 INJECTION, SOLUTION EPIDURAL; INFILTRATION; INTRACAUDAL; PERINEURAL
Status: DISCONTINUED | OUTPATIENT
Start: 2018-08-29 | End: 2018-08-29 | Stop reason: HOSPADM

## 2018-08-29 RX ORDER — HYDRALAZINE HYDROCHLORIDE 20 MG/ML
5 INJECTION INTRAMUSCULAR; INTRAVENOUS EVERY 10 MIN PRN
Status: DISCONTINUED | OUTPATIENT
Start: 2018-08-29 | End: 2018-08-29 | Stop reason: HOSPADM

## 2018-08-29 RX ORDER — SODIUM CHLORIDE 9 MG/ML
INJECTION, SOLUTION INTRAVENOUS CONTINUOUS
Status: DISCONTINUED | OUTPATIENT
Start: 2018-08-29 | End: 2018-08-29 | Stop reason: HOSPADM

## 2018-08-29 RX ADMIN — PROPOFOL 180 MG: 10 INJECTION, EMULSION INTRAVENOUS at 07:12

## 2018-08-29 RX ADMIN — LIDOCAINE HYDROCHLORIDE 50 MG: 10 INJECTION, SOLUTION INFILTRATION; PERINEURAL at 07:12

## 2018-08-29 RX ADMIN — SODIUM CHLORIDE, POTASSIUM CHLORIDE, SODIUM LACTATE AND CALCIUM CHLORIDE: 600; 310; 30; 20 INJECTION, SOLUTION INTRAVENOUS at 06:07

## 2018-08-29 RX ADMIN — ONDANSETRON HYDROCHLORIDE 4 MG: 2 INJECTION, SOLUTION INTRAMUSCULAR; INTRAVENOUS at 07:49

## 2018-08-29 RX ADMIN — LIDOCAINE HYDROCHLORIDE 1 ML: 10 INJECTION, SOLUTION EPIDURAL; INFILTRATION; INTRACAUDAL; PERINEURAL at 06:08

## 2018-08-29 RX ADMIN — FENTANYL CITRATE 50 MCG: 50 INJECTION INTRAMUSCULAR; INTRAVENOUS at 07:12

## 2018-08-29 RX ADMIN — DEXAMETHASONE SODIUM PHOSPHATE 10 MG: 10 INJECTION INTRAMUSCULAR; INTRAVENOUS at 07:22

## 2018-08-29 RX ADMIN — Medication 2 G: at 07:23

## 2018-08-29 RX ADMIN — SUGAMMADEX 250 MG: 100 INJECTION, SOLUTION INTRAVENOUS at 07:52

## 2018-08-29 RX ADMIN — ROCURONIUM BROMIDE 40 MG: 10 INJECTION INTRAVENOUS at 07:12

## 2018-08-29 RX ADMIN — SODIUM CHLORIDE, SODIUM LACTATE, POTASSIUM CHLORIDE, AND CALCIUM CHLORIDE: 600; 310; 30; 20 INJECTION, SOLUTION INTRAVENOUS at 07:03

## 2018-08-29 ASSESSMENT — PAIN DESCRIPTION - PROGRESSION: CLINICAL_PROGRESSION: NOT CHANGED

## 2018-08-29 ASSESSMENT — ENCOUNTER SYMPTOMS: SHORTNESS OF BREATH: 1

## 2018-08-29 ASSESSMENT — PAIN DESCRIPTION - PAIN TYPE
TYPE: SURGICAL PAIN
TYPE: SURGICAL PAIN

## 2018-08-29 ASSESSMENT — PAIN DESCRIPTION - FREQUENCY
FREQUENCY: CONTINUOUS
FREQUENCY: CONTINUOUS

## 2018-08-29 ASSESSMENT — PAIN SCALES - GENERAL
PAINLEVEL_OUTOF10: 5
PAINLEVEL_OUTOF10: 5

## 2018-08-29 ASSESSMENT — PAIN DESCRIPTION - DESCRIPTORS
DESCRIPTORS: BURNING
DESCRIPTORS: BURNING

## 2018-08-29 ASSESSMENT — PAIN - FUNCTIONAL ASSESSMENT: PAIN_FUNCTIONAL_ASSESSMENT: 0-10

## 2018-08-29 ASSESSMENT — PAIN DESCRIPTION - ORIENTATION
ORIENTATION: RIGHT
ORIENTATION: RIGHT

## 2018-08-29 ASSESSMENT — PAIN DESCRIPTION - LOCATION: LOCATION: WRIST

## 2018-08-29 NOTE — H&P
H and P Reviewed. No changes.     Electronically signed by Fuentes Sierra MD on 8/29/2018 at 6:48 AM

## 2018-08-29 NOTE — OP NOTE
ropivacaine. Tourniquet was released. Hemostasis was achieved. We approximated the skin with 3-0 Vicryl suture  for the deep layer and 4-0 nylon for the superficial layer. Soft dressing  was applied. The patient was taken to the recovery room in stable  condition. POSTOPERATIVE PLANS:  The patient will be on our typical open carpal tunnel  release protocol.   We will see her back in about 10 days for suture  removal.        Kaitlyn Staley MD    D: 08/29/2018 8:56:31      T: 08/29/2018 9:40:05     [id number]  Job#: [id number]     Doc#: [id number]    CC:

## 2018-08-29 NOTE — H&P
[facility] [place] ([facility]) Pre-Operative History and Physical    Patient Name: Nohemi  : 1950    BP (!) 158/82   Pulse 74   Temp 97.4 °F (36.3 °C) (Tympanic)   Resp 14   Ht 5' 2.5\" (1.588 m)   Wt 279 lb (126.6 kg)   SpO2 95%   BMI 50.22 kg/m²     Pre-Operative Diagnosis:  R CTS    Proposed Surgical Procedure:   R CTR      Past Medical Hisotry:       Diagnosis Date    Asthma 2015    Bilateral carpal tunnel syndrome 2018    sees dr. Goodwin.  COPD (chronic obstructive pulmonary disease) (HCC)     Diabetes mellitus ([address]     HTN (hypertension)     Hyperlipidemia     Mild mitral regurgitation by prior echocardiogram 2016    Morbid obesity ([address] 10/18/2017    Normal cardiac stress test 09    no ischemia at attained level of excercise    Parkinson disease ([address]     Paroxysmal atrial fibrillation ([address]     sees dr. Jessica Marshall Post-menopausal     Prolonged emergence from general anesthesia     Restrictive airway disease     Stage 3 chronic kidney disease 10/18/2017     Past Surgical History:       Procedure Laterality Date    APPENDECTOMY      CARDIAC CATHETERIZATION      no interventions     SECTION      x3    COLONOSCOPY      GALLBLADDER SURGERY  2014    dr Zo Damon  2013    dr Snell in [address] [place] [address] ENDOSCOPY         Medications:   Prior to Admission medications    Medication Sig Start Date End Date Taking? Authorizing Provider   gabapentin (NEURONTIN) 600 MG tablet Take 1 tablet by mouth 3 times daily for 180 days. . 18  Abram Jacob MD   traMADol (ULTRAM) 50 MG tablet TAKE ONE TABLET BY MOUTH EVERY 8 HOURS AS NEEDED 8/10/18 10/9/18  INGRID Calix CNP   Liraglutide (VICTOZA) 18 MG/3ML SOPN SC injection INJECT 1.8MG SUBQ DAILY  Patient taking differently: nightly INJECT 1.8MG SUBQ DAILY 18   INGRID Calix CNP   insulin aspart (NOVOLOG FLEXPEN) 100 MCG/ACT AEPB Inhale 100 mcg into the lungs    Historical Provider, MD   nystatin (NYSTATIN) 937166 UNIT/GM powder Apply topically 3 times daily Apply topically 4 times daily.  3/2/18   INGRID Duff CNP   rOPINIRole (REQUIP) 4 MG tablet 2 PO QAM, 1  PO Q Noon, and 2 PO QHS  Patient taking differently: 2 PO QAM, 1  PO Q Noon, and 1 PO QHS 2/16/18   MARTHA Cage   SURE COMFORT INS SYR 1CC/30G 30G X 5/16\" 1 ML MISC INJECT AS DIRECTED DAILY 12/20/17   INGRID Corral CNP   furosemide (LASIX) 40 MG tablet Take 1 tablet by mouth daily 10/19/17   INGRID Marie   Docusate Calcium (STOOL SOFTENER PO) Take 1 tablet by mouth     Historical Provider, MD   Inulin (FIBER CHOICE PO) Take 1 tablet by mouth daily     Historical Provider, MD   Cholecalciferol (VITAMIN D3) 5000 units TABS Take 1 tablet by mouth daily     Historical Provider, MD   albuterol (ACCUNEB) 1.25 MG/3ML nebulizer solution Inhale 3 mLs into the lungs every 6 hours as needed for Wheezing 7/6/17   INGRID Corral CNP   Insulin Pen Needle 32G X 4 MM MISC 1 each by Does not apply route daily 6/29/17   Jorge Lopez MD   FORTEO 600 MCG/2.4ML SOLN injection Inject 20 mcg into the skin nightly  5/9/17   Historical Provider, MD   SURE COMFORT INS SYR 1CC/30G 30G X 5/16\" 1 ML MISC  3/3/17   Historical Provider, MD   Lancets MISC 1 lancet to check glucose daily 1/21/16   INGRID Corral CNP   Insulin Pen Needle 31G X 8 MM MISC Inject 1 each into the skin daily 1/11/16   INGRID Corral CNP   albuterol (PROVENTIL HFA;VENTOLIN HFA) 108 (90 BASE) MCG/ACT inhaler Inhale 2 puffs into the lungs every 6 hours as needed for Wheezing 7/17/15   INGRID Corral CNP   OXYGEN 2L NC 12-24 hours  Patient taking differently: nightly as needed 2L NC 12-24 hours 6/29/15   Christian Arevalo, APRN - CNP   QVAR 80 MCG/ACT inhaler Inhale 1 puff into the lungs as needed  10/13/14   Historical Provider, MD   Blood Glucose Monitoring Suppl STERLING by Does not apply route. 5/22/13   Norm Dao, APRN - CNP   aspirin 325 MG tablet Take 325 mg by mouth daily. Historical Provider, MD       Allergies:  Codeine    Social History:   Tobacco:  reports that she has never smoked. She has never used smokeless tobacco.   Alcohol:  reports that she does not drink alcohol. Review of Systems:  General: Denies any fever or chills  EYES: Denies any diplopia  ENT: Tinnitus or vertigo  Resp: Denies any shortness of breath, cough or wheezing  Cardiac: Denies any chest pain, palpitations, claudication or edema  GI: Denies any melena, hematochezia, hematemesis or pyrosis  : Denies any frequency, urgency, hesitancy or incontinence  Musculoskeletal: Denies back pain, joint pain, myalgias  Heme: Denies bruising or bleeding easily  Endocrine: Denies any history of diabetes or thyroid disease  Psych: Denies anxiety or depression  Neuro: Denies any focal motor or sensory deficits    NEUROLOGIC: CN II-XI grossly intact, no motor or sensory deficits   PSYCH: mood and affect are normal with a normal rate and tone of speech    Physical Exam:  Vitals: BP (!) 158/82   Pulse 74   Temp 97.4 °F (36.3 °C) (Tympanic)   Resp 14   Ht 5' 2.5\" (1.588 m)   Wt 279 lb (126.6 kg)   SpO2 95%   BMI 50.22 kg/m²   CONSTITUTIONAL: Alert, appropriate, no acute distress.   EYES: Non icteric, EOM intact, pupils equal round and reactive to light  ENT: Mucus membranes moist, no oral pharyngeal lesions, nares patent   NECK: Supple, no masses, no JVD, trachea mid line   CHEST/LUNGS: CTA bilaterally, normal respiratory effort   CARDIOVASCULAR: RRR, no murmurs,  2+ DP and radial pulses bilaterally  ABDOMEN: soft, nontender  EXTREMITIES: warm, well perfused, no edema   SKIN: warm, dry with no rashes or lesions  LYMPH: No cervical or inguinal lymphadenopathy    General Appearance: Patient is well nourished, well developed    HEENT: Normal    CARDIOVASCULAR: Normal S1 and S2. Regular rhythm. No murmurs, gallops, or rubs. PULMONARY: Normal.    GASTROINTESTINAL: Soft, non-tender, normal bowel sounds. No bruits, organomegaly or masses.     EXTREMITIES: positive exam findings: lateral joint line tenderness, tender over tibial tubercle, ecchymosis noted entire limb and ROM limited to approximately 80 degrees    MUSCULOSKELETAL: Tenderness over right hip(s)    NEUROLOGICAL: gait and coordination normal or speech normal    Diagnostic Studies:      Labs: CBC with Differential:    Lab Results   Component Value Date    WBC 8.8 08/16/2018    RBC 4.44 08/16/2018    HGB 12.8 08/16/2018    HCT 42.0 08/16/2018     08/16/2018    MCV 94.6 08/16/2018    MCH 28.8 08/16/2018    MCHC 30.5 08/16/2018    RDW 13.5 08/16/2018    LYMPHOPCT 22.7 08/16/2018    MONOPCT 6.2 08/16/2018    BASOPCT 0.7 08/16/2018    MONOSABS 0.60 08/16/2018    LYMPHSABS 2.0 08/16/2018    EOSABS 0.40 08/16/2018    BASOSABS 0.10 08/16/2018     BMP:    Lab Results   Component Value Date     08/16/2018    K 4.7 08/16/2018     08/16/2018    CO2 23 08/16/2018    BUN 28 08/16/2018    LABALBU 4.1 06/14/2018    CREATININE 1.0 08/16/2018    CALCIUM 9.5 08/16/2018    LABGLOM 55 08/16/2018    GLUCOSE 130 08/16/2018     Sodium:    Lab Results   Component Value Date     08/16/2018     Potassium:    Lab Results   Component Value Date    K 4.7 08/16/2018     BUN/Creatinine:    Lab Results   Component Value Date    BUN 28 08/16/2018    CREATININE 1.0 08/16/2018     PT/INR:    Lab Results   Component Value Date    PROTIME 12.8 08/16/2018    INR 0.97 08/16/2018     PTT:    Lab Results   Component Value Date    APTT 28.3 08/16/2018    PTT 38.4 03/17/2014   [APTT}  U/A:    Lab Results   Component Value Date    NITRITE neg 06/09/2015    COLORU YELLOW 01/03/2018    PHUR 5.5 01/03/2018    WBCUA 49 01/20/2014    RBCUA 1 01/20/2014    BACTERIA NEGATIVE 01/20/2014    CLARITYU Clear 01/03/2018    SPECGRAV 1.009 01/03/2018 LEUKOCYTESUR Negative 01/03/2018    UROBILINOGEN 0.2 01/03/2018    BILIRUBINUR Negative 01/03/2018    BILIRUBINUR neg 06/09/2015    BLOODU Negative 01/03/2018    GLUCOSEU Negative 01/03/2018     HgBA1c:  No components found for: HGBA1C        PLAN:  R CTR      Electronically signed by Nguyen Hines MD on 8/29/2018 at 6:52 AM

## 2018-08-29 NOTE — ANESTHESIA PRE PROCEDURE
Department of Anesthesiology  Preprocedure Note       Name:  Roswell Frankel   Age:  79 y.o.  :  1950                                          MRN:  [id number]         Date:  2018      Surgeon: Francois Cason):  Stephen Willett MD    Procedure: Procedure(s):  OPEN CARPAL TUNNEL RELEASE    Medications prior to admission:   Prior to Admission medications    Medication Sig Start Date End Date Taking? Authorizing Provider   gabapentin (NEURONTIN) 600 MG tablet Take 1 tablet by mouth 3 times daily for 180 days. . 18  Corrie Guthrie MD   traMADol (ULTRAM) 50 MG tablet TAKE ONE TABLET BY MOUTH EVERY 8 HOURS AS NEEDED 8/10/18 10/9/18  INGRID Werner CNP   Liraglutide (VICTOZA) 18 MG/3ML SOPN SC injection INJECT 1.8MG SUBQ DAILY  Patient taking differently: nightly INJECT 1.8MG SUBQ DAILY 18   INGRID Werner CNP   insulin aspart (NOVOLOG FLEXPEN) 100 UNIT/ML injection pen INJECT 15 UNITS INTO THE SKIN 3 TIMES DAILY (BEFORE MEALS) 18   INGRID Werner CNP   insulin detemir (LEVEMIR) 100 UNIT/ML injection vial Inject 70 Units into the skin nightly  Patient taking differently: Inject 70 Units into the skin daily  18   INGRID Werner CNP   linaclotide (LINZESS) 145 MCG capsule TAKE ONE CAPSULE BY MOUTH EVERY MORNING BEFORE BREAKFAST 18   INGRID Werner CNP   montelukast (SINGULAIR) 10 MG tablet TAKE ONE TABLET BY MOUTH EVERY NIGHT AT BEDTIME 18   INGRID Werner CNP   atorvastatin (LIPITOR) 10 MG tablet TAKE ONE TABLET BY MOUTH EVERY DAY EACH EVENING 18   INGRID Werner CNP   omeprazole (PRILOSEC) 40 MG delayed release capsule TAKE ONE CAPSULE BY MOUTH DAILY 18   INGRID Werner CNP   fluconazole (DIFLUCAN) 150 MG tablet Take one on the 15th of every month 18   INGRID Werner CNP   nystatin (MYCOSTATIN) 250303 UNIT/GM ointment Apply topically 2 times daily. for yeast 18   Marilu Joleen Fetch, APRN - CNP   carbidopa-levodopa (SINEMET)  MG per tablet TAKE TWO TABLETS BY MOUTH 3 TIMES DAILY  Patient taking differently: Take 1.5 tablets four times daily 6/13/18   Pao Bob MD   glucose blood VI test strips (ONE TOUCH ULTRA TEST) strip Inject 1 each into the skin daily As needed. 6/4/18   INGRID Sotomayor CNP   sotalol (BETAPACE) 120 MG tablet TAKE ONE TABLET BY MOUTH TWICE A DAY 4/24/18   Neha PuINGRID luna   spironolactone (ALDACTONE) 25 MG tablet TAKE 1 TABLET BY MOUTH DAILY 4/24/18   Patsie PuffINGRID   losartan (COZAAR) 100 MG tablet TAKE 1 TABLET BY MOUTH DAILY 4/24/18   INGRID Brown   clobetasol (TEMOVATE) 0.05 % ointment Apply external vagina 3 x a day for week one, then decrease to BID on week two, on week 3 once a day, on week four every other day then stop 3/2/18   INGRID Sotomayor CNP   fluticasone (ARNUITY ELLIPTA) 100 MCG/ACT AEPB Inhale 100 mcg into the lungs    Historical Provider, MD   nystatin (NYSTATIN) 895516 UNIT/GM powder Apply topically 3 times daily Apply topically 4 times daily.  3/2/18   INGRID Butler CNP   rOPINIRole (REQUIP) 4 MG tablet 2 PO QAM, 1  PO Q Noon, and 2 PO QHS  Patient taking differently: 2 PO QAM, 1  PO Q Noon, and 1 PO QHS 2/16/18   MARTHA Cage   SURE COMFORT INS SYR 1CC/30G 30G X 5/16\" 1 ML MISC INJECT AS DIRECTED DAILY 12/20/17   INGRID Sotomayor CNP   furosemide (LASIX) 40 MG tablet Take 1 tablet by mouth daily 10/19/17   INGRID Brown   Docusate Calcium (STOOL SOFTENER PO) Take 1 tablet by mouth     Historical Provider, MD   Inulin (FIBER CHOICE PO) Take 1 tablet by mouth daily     Historical Provider, MD   Cholecalciferol (VITAMIN D3) 5000 units TABS Take 1 tablet by mouth daily     Historical Provider, MD   albuterol (ACCUNEB) 1.25 MG/3ML nebulizer solution Inhale 3 mLs into the lungs every 6 hours as needed for Wheezing 7/6/17   INGRID Sotomayor - CNP   Insulin Pen Needle 32G X 4 MM MISC 1 each by Does not apply route daily 6/29/17   Murray Linda MD   FORTEO 600 MCG/2.4ML SOLN injection Inject 20 mcg into the skin nightly  5/9/17   Historical Provider, MD   SURE COMFORT INS SYR 1CC/30G 30G X 5/16\" 1 ML MISC  3/3/17   Historical Provider, MD   Lancets MISC 1 lancet to check glucose daily 1/21/16   INGRID Waldrop CNP   Insulin Pen Needle 31G X 8 MM MISC Inject 1 each into the skin daily 1/11/16   INGRID Waldrop CNP   albuterol (PROVENTIL HFA;VENTOLIN HFA) 108 (90 BASE) MCG/ACT inhaler Inhale 2 puffs into the lungs every 6 hours as needed for Wheezing 7/17/15   INGRID Waldrop CNP   OXYGEN 2L NC 12-24 hours  Patient taking differently: nightly as needed 2L NC 12-24 hours 6/29/15   INGRID Waldrop CNP   QVAR 80 MCG/ACT inhaler Inhale 1 puff into the lungs as needed  10/13/14   Historical Provider, MD   Blood Glucose Monitoring Suppl STERLING by Does not apply route. 5/22/13   INGRID Waldrop CNP   aspirin 325 MG tablet Take 325 mg by mouth daily. Historical Provider, MD       Current medications:    Current Facility-Administered Medications   Medication Dose Route Frequency Provider Last Rate Last Dose    ceFAZolin (ANCEF) 2 g in 0.9% sodium chloride 50 mL IVPB  2 g Intravenous Once Jorge Matias MD        lactated ringers infusion   Intravenous Continuous Jorge Matias  mL/hr at 08/29/18 0607         Allergies:     Allergies   Allergen Reactions    Codeine      itching       Problem List:    Patient Active Problem List   Diagnosis Code    Parkinson disease ([address] G20    Atrial fibrillation ([address] I48.91    GERD (gastroesophageal reflux disease) K21.9    Constipation K59.00    Rectal bleeding F98.8    Lichen sclerosus C01.0    Palpitations R00.2    Fatigue R53.83    Shortness of breath R06.02    Asthma J45.909    Edema R60.9    Hypokalemia E87.6    PAF (paroxysmal atrial fibrillation) ([facility]) I48.0    Mild BP Readings from Last 3 Encounters:   08/29/18 (!) 158/82   06/14/18 132/82   04/30/18 128/68       NPO Status: Time of last liquid consumption: 0000                        Time of last solid consumption: 0000                        Date of last liquid consumption: 08/28/18                        Date of last solid food consumption: 08/28/18    BMI:   Wt Readings from Last 3 Encounters:   08/29/18 279 lb (126.6 kg)   08/16/18 279 lb (126.6 kg)   06/14/18 281 lb (127.5 kg)     Body mass index is 50.22 kg/m². CBC:   Lab Results   Component Value Date    WBC 8.8 08/16/2018    RBC 4.44 08/16/2018    HGB 12.8 08/16/2018    HCT 42.0 08/16/2018    MCV 94.6 08/16/2018    RDW 13.5 08/16/2018     08/16/2018       CMP:   Lab Results   Component Value Date     08/16/2018    K 4.7 08/16/2018     08/16/2018    CO2 23 08/16/2018    BUN 28 08/16/2018    CREATININE 1.0 08/16/2018    LABGLOM 55 08/16/2018    GLUCOSE 130 08/16/2018    PROT 7.1 06/14/2018    CALCIUM 9.5 08/16/2018    BILITOT <0.2 06/14/2018    ALKPHOS 115 06/14/2018    AST 19 06/14/2018    ALT <5 06/14/2018       POC Tests:   Recent Labs      08/29/18   0552   POCGLU  194*       Coags:   Lab Results   Component Value Date    PROTIME 12.8 08/16/2018    INR 0.97 08/16/2018    APTT 28.3 08/16/2018       HCG (If Applicable): No results found for: PREGTESTUR, PREGSERUM, HCG, HCGQUANT     ABGs:   Lab Results   Component Value Date    PHART 7.440 01/03/2018    PO2ART 61.0 01/03/2018    HKS7CUZ 38.0 01/03/2018    YTW5VKC 25.8 01/03/2018    BEART 1.7 01/03/2018    A1RWNOIG 92.4 01/03/2018        Type & Screen (If Applicable):  No results found for: LABABO, [address]    Anesthesia Evaluation  Patient summary reviewed and Nursing notes reviewed history of anesthetic complications (prolonged emergence):    Airway: Mallampati: II  TM distance: >3 FB   Neck ROM: full  Mouth opening: > = 3 FB Dental: normal exam Pulmonary:   (+) COPD:  shortness of breath: chronic,  sleep apnea (no formal sleep study):  decreased breath sounds,  asthma:                           ROS comment: Home Oxygen at HS   Cardiovascular:  Exercise tolerance: poor (<4 METS),   (+) hypertension:, dysrhythmias: atrial fibrillation, SEXTON:,     (-) pacemaker, past MI, CABG/stent and  angina    ECG reviewed  Rhythm: regular  Rate: normal  Echocardiogram reviewed         Beta Blocker:  Dose within 24 Hrs      ROS comment: Echo 2017:  Left ventricular size is normal .   Moderate concentric left ventricular hypertrophy.   Normal ejection fraction.   Technically difficulty study due to poor acoustic windows and body habitus.   This study is of limited diagnostic utility.   Suggest transesophageal echocardiogram to heightened diagnostic sensitivity and specificity. EK BPM  Sinus rhythm with borderline 1st degree A-V block  Inferior infarct as previously  Anterior infarct - age undetermined  Low QRS voltages in precordial leads  Comparison Summary: Significant changes  Summary: Abnormal ECG     Neuro/Psych:   (+) neuromuscular disease: Parkinson's disease,    (-) seizures and CVA           GI/Hepatic/Renal:   (+) GERD: well controlled, renal disease: CRI,      (-) liver disease       Endo/Other:    (+) DiabetesType II DM, well controlled, using insulin, no malignancy/cancer. (-) hypothyroidism, blood dyscrasia, no malignancy/cancer               Abdominal:   (+) obese,         Vascular: negative vascular ROS. Anesthesia Plan      general     ASA 4       Induction: intravenous. BIS  MIPS: Postoperative opioids intended and Prophylactic antiemetics administered. Anesthetic plan and risks discussed with patient.                       Az Silveira DO   2018

## 2018-09-11 DIAGNOSIS — R52 PAIN: ICD-10-CM

## 2018-09-11 RX ORDER — TRAMADOL HYDROCHLORIDE 50 MG/1
TABLET ORAL
Qty: 30 TABLET | Refills: 0 | Status: SHIPPED | OUTPATIENT
Start: 2018-09-11 | End: 2018-10-16 | Stop reason: SDUPTHER

## 2018-09-18 RX ORDER — LINACLOTIDE 145 UG/1
CAPSULE, GELATIN COATED ORAL
Qty: 30 CAPSULE | Refills: 5 | Status: SHIPPED | OUTPATIENT
Start: 2018-09-18 | End: 2018-11-01 | Stop reason: SDUPTHER

## 2018-10-11 ENCOUNTER — OFFICE VISIT (OUTPATIENT)
Dept: NEUROLOGY | Age: 68
End: 2018-10-11
Payer: MEDICARE

## 2018-10-11 VITALS
DIASTOLIC BLOOD PRESSURE: 78 MMHG | SYSTOLIC BLOOD PRESSURE: 139 MMHG | BODY MASS INDEX: 51.34 KG/M2 | WEIGHT: 279 LBS | RESPIRATION RATE: 20 BRPM | HEART RATE: 98 BPM | HEIGHT: 62 IN

## 2018-10-11 DIAGNOSIS — G20 PARKINSON DISEASE (HCC): Primary | ICD-10-CM

## 2018-10-11 DIAGNOSIS — R44.1 VISUAL HALLUCINATIONS: ICD-10-CM

## 2018-10-11 PROCEDURE — G8427 DOCREV CUR MEDS BY ELIG CLIN: HCPCS | Performed by: PSYCHIATRY & NEUROLOGY

## 2018-10-11 PROCEDURE — 1123F ACP DISCUSS/DSCN MKR DOCD: CPT | Performed by: PSYCHIATRY & NEUROLOGY

## 2018-10-11 PROCEDURE — 1090F PRES/ABSN URINE INCON ASSESS: CPT | Performed by: PSYCHIATRY & NEUROLOGY

## 2018-10-11 PROCEDURE — 4040F PNEUMOC VAC/ADMIN/RCVD: CPT | Performed by: PSYCHIATRY & NEUROLOGY

## 2018-10-11 PROCEDURE — G8417 CALC BMI ABV UP PARAM F/U: HCPCS | Performed by: PSYCHIATRY & NEUROLOGY

## 2018-10-11 PROCEDURE — 1036F TOBACCO NON-USER: CPT | Performed by: PSYCHIATRY & NEUROLOGY

## 2018-10-11 PROCEDURE — G8399 PT W/DXA RESULTS DOCUMENT: HCPCS | Performed by: PSYCHIATRY & NEUROLOGY

## 2018-10-11 PROCEDURE — 99213 OFFICE O/P EST LOW 20 MIN: CPT | Performed by: PSYCHIATRY & NEUROLOGY

## 2018-10-11 PROCEDURE — 1101F PT FALLS ASSESS-DOCD LE1/YR: CPT | Performed by: PSYCHIATRY & NEUROLOGY

## 2018-10-11 PROCEDURE — G8484 FLU IMMUNIZE NO ADMIN: HCPCS | Performed by: PSYCHIATRY & NEUROLOGY

## 2018-10-11 PROCEDURE — 3017F COLORECTAL CA SCREEN DOC REV: CPT | Performed by: PSYCHIATRY & NEUROLOGY

## 2018-10-11 NOTE — PROGRESS NOTES
EVENING 30 tablet 5    omeprazole (PRILOSEC) 40 MG delayed release capsule TAKE ONE CAPSULE BY MOUTH DAILY 30 capsule 5    fluconazole (DIFLUCAN) 150 MG tablet Take one on the 15th of every month 1 tablet 11    nystatin (MYCOSTATIN) 581629 UNIT/GM ointment Apply topically 2 times daily. for yeast 60 Tube 5    carbidopa-levodopa (SINEMET)  MG per tablet TAKE TWO TABLETS BY MOUTH 3 TIMES DAILY (Patient taking differently: Take 1.5 tablets four times daily) 180 tablet 5    glucose blood VI test strips (ONE TOUCH ULTRA TEST) strip Inject 1 each into the skin daily As needed. 100 each 3    sotalol (BETAPACE) 120 MG tablet TAKE ONE TABLET BY MOUTH TWICE A DAY 60 tablet 5    spironolactone (ALDACTONE) 25 MG tablet TAKE 1 TABLET BY MOUTH DAILY 30 tablet 5    losartan (COZAAR) 100 MG tablet TAKE 1 TABLET BY MOUTH DAILY 30 tablet 5    clobetasol (TEMOVATE) 0.05 % ointment Apply external vagina 3 x a day for week one, then decrease to BID on week two, on week 3 once a day, on week four every other day then stop 1 Tube 1    nystatin (NYSTATIN) 671239 UNIT/GM powder Apply topically 3 times daily Apply topically 4 times daily.  60 g 3    rOPINIRole (REQUIP) 4 MG tablet 2 PO QAM, 1  PO Q Noon, and 2 PO QHS (Patient taking differently: 2 PO QAM, 1  PO Q Noon, and 1 PO QHS) 150 tablet 5    SURE COMFORT INS SYR 1CC/30G 30G X 5/16\" 1 ML MISC INJECT AS DIRECTED DAILY 100 each 2    furosemide (LASIX) 40 MG tablet Take 1 tablet by mouth daily 90 tablet 3    Docusate Calcium (STOOL SOFTENER PO) Take 1 tablet by mouth       Inulin (FIBER CHOICE PO) Take 1 tablet by mouth daily       Cholecalciferol (VITAMIN D3) 5000 units TABS Take 1 tablet by mouth daily       albuterol (ACCUNEB) 1.25 MG/3ML nebulizer solution Inhale 3 mLs into the lungs every 6 hours as needed for Wheezing 360 mL 3    Insulin Pen Needle 32G X 4 MM MISC 1 each by Does not apply route daily 100 each 3    FORTEO 600 MCG/2.4ML SOLN injection Inject 20

## 2018-10-16 ENCOUNTER — TELEPHONE (OUTPATIENT)
Dept: PRIMARY CARE CLINIC | Age: 68
End: 2018-10-16

## 2018-10-16 DIAGNOSIS — R52 PAIN: ICD-10-CM

## 2018-10-16 RX ORDER — TRAMADOL HYDROCHLORIDE 50 MG/1
50 TABLET ORAL EVERY 8 HOURS PRN
Qty: 60 TABLET | Refills: 0 | Status: SHIPPED | OUTPATIENT
Start: 2018-10-16 | End: 2018-11-29 | Stop reason: SDUPTHER

## 2018-10-16 NOTE — TELEPHONE ENCOUNTER
Pt Daughter Valentin Vazquez called asking if Pt Tramadol can be increased to 3 times a day. Daughter states Pt is having pain in her hips.   Valentin Vazquez [phone]

## 2018-10-19 RX ORDER — ROPINIROLE 4 MG/1
TABLET, FILM COATED ORAL
Qty: 150 TABLET | Refills: 5 | Status: SHIPPED | OUTPATIENT
Start: 2018-10-19 | End: 2019-05-15 | Stop reason: SDUPTHER

## 2018-10-23 RX ORDER — SOTALOL HYDROCHLORIDE 120 MG/1
TABLET ORAL
Qty: 60 TABLET | Refills: 5 | Status: SHIPPED | OUTPATIENT
Start: 2018-10-23 | End: 2019-04-16 | Stop reason: SDUPTHER

## 2018-10-23 RX ORDER — SPIRONOLACTONE 25 MG/1
TABLET ORAL
Qty: 30 TABLET | Refills: 5 | Status: SHIPPED | OUTPATIENT
Start: 2018-10-23

## 2018-10-23 RX ORDER — LOSARTAN POTASSIUM 100 MG/1
TABLET ORAL
Qty: 30 TABLET | Refills: 5 | Status: SHIPPED | OUTPATIENT
Start: 2018-10-23 | End: 2019-04-16 | Stop reason: SDUPTHER

## 2018-10-26 RX ORDER — INSULIN DETEMIR 100 [IU]/ML
INJECTION, SOLUTION SUBCUTANEOUS
Qty: 10 ML | Refills: 2 | Status: SHIPPED | OUTPATIENT
Start: 2018-10-26 | End: 2018-10-26 | Stop reason: SDUPTHER

## 2018-11-01 ENCOUNTER — OFFICE VISIT (OUTPATIENT)
Dept: CARDIOLOGY | Age: 68
End: 2018-11-01
Payer: MEDICARE

## 2018-11-01 VITALS
DIASTOLIC BLOOD PRESSURE: 70 MMHG | HEIGHT: 62 IN | SYSTOLIC BLOOD PRESSURE: 128 MMHG | BODY MASS INDEX: 52.08 KG/M2 | HEART RATE: 77 BPM | WEIGHT: 283 LBS

## 2018-11-01 DIAGNOSIS — E78.2 MIXED HYPERLIPIDEMIA: ICD-10-CM

## 2018-11-01 DIAGNOSIS — I10 ESSENTIAL HYPERTENSION: Chronic | ICD-10-CM

## 2018-11-01 DIAGNOSIS — Z86.79 H/O DIASTOLIC DYSFUNCTION: ICD-10-CM

## 2018-11-01 DIAGNOSIS — I48.0 PAF (PAROXYSMAL ATRIAL FIBRILLATION) (HCC): Primary | ICD-10-CM

## 2018-11-01 PROCEDURE — 1090F PRES/ABSN URINE INCON ASSESS: CPT | Performed by: NURSE PRACTITIONER

## 2018-11-01 PROCEDURE — G8427 DOCREV CUR MEDS BY ELIG CLIN: HCPCS | Performed by: NURSE PRACTITIONER

## 2018-11-01 PROCEDURE — G8484 FLU IMMUNIZE NO ADMIN: HCPCS | Performed by: NURSE PRACTITIONER

## 2018-11-01 PROCEDURE — 3017F COLORECTAL CA SCREEN DOC REV: CPT | Performed by: NURSE PRACTITIONER

## 2018-11-01 PROCEDURE — G8417 CALC BMI ABV UP PARAM F/U: HCPCS | Performed by: NURSE PRACTITIONER

## 2018-11-01 PROCEDURE — 1101F PT FALLS ASSESS-DOCD LE1/YR: CPT | Performed by: NURSE PRACTITIONER

## 2018-11-01 PROCEDURE — 99214 OFFICE O/P EST MOD 30 MIN: CPT | Performed by: NURSE PRACTITIONER

## 2018-11-01 NOTE — PATIENT INSTRUCTIONS
active life is one of the most rewarding gifts you can give yourself and those you love. Simply put, daily physical activity increases your length and quality of life. 5)  Eat better - A healthy diet is one of your best weapons for fighting cardiovascular disease. When you eat a heart healthy diet, you improve your chances for feeling good and staying healthy for life. 6)  Lose weight - when you shed extra fat an unnecessary pounds, you reduce the burden on your hear, lungs, blood vessels and skeleton. You give yourself the gift of active living, you lower your blood pressure and help yourself feel better. 7) Stop smoking - cigarette smokers have a higher risk of developing cardiovascular disease. If  You smoke, quitting is the best thing you can do for your health. Check American [employer] Association on line for more information on Life's Simple 7 and tips for healthy living.       Patient Education        Learning About Atrial Fibrillation  What is atrial fibrillation? Atrial fibrillation (say \"AY-tree-ines nur-lyxs-ZUP-shun\") is the most common type of irregular heartbeat (arrhythmia). Normally, the heart beats in a strong, steady rhythm. In atrial fibrillation, a problem with the heart's electrical system causes the two upper parts of the heart (the atria) to quiver, or fibrillate. Your heart rate also may be faster than normal.  Atrial fibrillation can be dangerous because if the heartbeat isn't strong and steady, blood can collect, or pool, in the atria. And pooled blood is more likely to form clots. Clots can travel to the brain, block blood flow, and cause a stroke. Atrial fibrillation can also lead to heart failure. Treatment for atrial fibrillation helps prevent stroke and heart failure. It also helps relieve symptoms. Atrial fibrillation is often caused by another heart problem. It may happen after heart surgery.  It may also be caused by other problems, such as an overactive thyroid gland or lung

## 2018-11-01 NOTE — PROGRESS NOTES
[facility], [place]. [address] [place] [address] 182, 128 [facility]  [phone] office  [phone] fax      OFFICE VISIT:  2018    Pauline Kaur - : 1950    Reason For Visit:  Titi Alvarado is a 79 y.o. female who is here for 6 Month Follow-Up (Patient presents for cardiology follow up.); Atrial Fibrillation; Hypertension; and Hyperlipidemia    The patient presents today for cardiology follow up. Overall, the patient is doing well from a cardiac standpoint without symptoms to suggest myocardial ischemia or recurrent AF. The patient is not on [address] [place] [address] Road, take aspirin. 3  BP is well controlled on current regimen. Continues on Sotalol. The patient's PCP monitors cholesterol. Maryana Kerns denies exertional chest pain, shortness of breath, orthopnea, paroxysmal nocturnal dyspnea, syncope, presyncope, sustained arrythmia, edema and fatigue. The patient denies numbness or weakness to suggest cerebrovascular accident or transient ischemic attack.       Pauline Kaur has the following history as recorded in [facility]:    Patient Active Problem List   Diagnosis Code    Parkinson disease ([facility] G20    Atrial fibrillation ([facility] I48.91    GERD (gastroesophageal reflux disease) K21.9    Constipation K59.00    Rectal bleeding F56.6    Lichen sclerosus V04.3    Palpitations R00.2    Fatigue R53.83    Shortness of breath R06.02    Asthma J45.909    Edema R60.9    Hypokalemia E87.6    PAF (paroxysmal atrial fibrillation) ([facility]) I48.0    Mild mitral regurgitation by prior echocardiogram L85.3    H/O diastolic dysfunction I30.85    Type 2 diabetes mellitus with complication ([facility]) X69.5    Restless legs syndrome G25.81    Hypoesthesia of skin R20.1    Visual hallucinations R44.1    Somnolence, daytime R40.0    Morbid obesity ([facility]) E66.01    Stage 3 chronic kidney disease ([facility]) N18.3    Community acquired pneumonia of right lower suggest myocardial ischemia. No orthopnea or PND. No sensation of sustained arrythmia. No occurrence of slow heart rate. No palpitations. No claudication. No leg edema. Gastrointestinal - no abdominal swelling or pain. No blood in stool. No severe constipation, diarrhea, nausea, or vomiting. Genitourinary - no dysuria, frequency, or urgency. No flank pain or hematuria. Musculoskeletal - no back pain or myalgia. Transported via wheelchair. Reports hip pain. Extremities - no clubbing, cyanosis or edema. Skin - no color change or rash. No pallor. No new surgical incision. Neurologic - no speech difficulty, facial asymmetry or lateralizing weakness. No seizures, presyncope or syncope. No significant dizziness. Hematologic - no easy bruising or excessive bleeding. Psychiatric - no severe anxiety or insomnia. No confusion. All other review of systems are negative. Objective  Vital Signs - /70   Pulse 77   Ht 5' 2\" (1.575 m)   Wt 283 lb (128.4 kg)   BMI 51.76 kg/m²   General - [place] is alert, cooperative, and pleasant. Well groomed. No acute distress. Body habitus - Body mass index is 51.76 kg/m². HEENT - Head is normocephalic. No circumoral cyanosis. Dentition is normal.  EYES -   Lids normal without ptosis. No discharge, edema or subconjunctival hemorrhage. Neck - Symmetrical without apparent mass or lymphadenopathy. Respiratory - Normal respiratory effort without use of accessory muscles. Ausculatation reveals vesicular breath sounds without crackles, wheezes, rub or rhonchi. Cardiovascular - No jugular venous distention. Auscultation reveals regular rate and rhythm. No audible clicks, gallop or rub. No murmur. No lower extremity varicosities. No carotid bruits. Abdominal -  No visible distention, mass or pulsations. Extremities - No clubbing or cyanosis. No statis dermatitis or ulcers. No edema. Musculoskeletal -   No Osler's nodes.   No kyphosis or scoliosis. Transported via wheelchair. Skin -  Warm and dry; no rash or pallor. No new surgical wound. Neurological - No focal neurological deficits. Thought processes coherent. No apparent tremor. Oriented to person, place and time. Psychiatric -  Appropriate affect and mood. Assessment:     Diagnosis Orders   1. PAF (paroxysmal atrial fibrillation) ([facility]     2. Essential hypertension     3. H/O diastolic dysfunction     4. Mixed hyperlipidemia       Data reviewed:  10/20/17 echo Conclusions    Summary   Left ventricular size is normal .   Moderate concentric left ventricular hypertrophy.   Normal ejection fraction.   Technically difficulty study due to poor acoustic windows and body   habitus.   This study is of limited diagnostic utility.   Suggest transesophageal echocardiogram to heightened diagnostic   sensitivity and specificity.    Signature    ----------------------------------------------------------------   Electronically signed by Pao Armstrong MD(Interpreting   physician) on 10/20/2017 07:43 PM   ----------------------------------------------------------------    Findings    Mitral Valve   Mitral valve leaflets are mildly thickened with preserved leaflet   mobility.   Mild mitral regurgitation is present.    Aortic Valve   Mildly thickened aortic valve leaflets with preserved leaflet mobility.   Aortic valve appears to be tricuspid.    Tricuspid Valve   Mild tricuspid regurgitation.    Pulmonic Valve   The pulmonic valve was not well visualized .    Left Atrium   Mildly dilated left atrium.    Left Ventricle   Left ventricular size is normal .   Moderate concentric left ventricular hypertrophy.   Normal ejection fraction.    Right Atrium   Markedly enlarged right atrium size.    Right Ventricle   Moderately enlarged right ventricle cavity.    RVSP = 38 mmHg    Pericardial Effusion   No evidence of significant pericardial effusion is noted.    Miscellaneous   Technically difficulty study pressure and help yourself feel better. 7) Stop smoking - cigarette smokers have a higher risk of developing cardiovascular disease. If  You smoke, quitting is the best thing you can do for your health. Check American Heart Association on line for more information on Life's Simple 7 and tips for healthy living.      Kathren Meigs, APRN

## 2018-11-29 DIAGNOSIS — R52 PAIN: ICD-10-CM

## 2018-11-29 RX ORDER — TRAMADOL HYDROCHLORIDE 50 MG/1
TABLET ORAL
Qty: 60 TABLET | Refills: 0 | Status: SHIPPED | OUTPATIENT
Start: 2018-11-29 | End: 2019-01-08 | Stop reason: SDUPTHER

## 2018-12-13 DIAGNOSIS — G20 PARKINSON DISEASE (HCC): Primary | ICD-10-CM

## 2018-12-14 ENCOUNTER — OFFICE VISIT (OUTPATIENT)
Dept: PRIMARY CARE CLINIC | Age: 68
End: 2018-12-14
Payer: MEDICARE

## 2018-12-14 VITALS
TEMPERATURE: 97.6 F | BODY MASS INDEX: 52.63 KG/M2 | RESPIRATION RATE: 18 BRPM | HEART RATE: 70 BPM | HEIGHT: 62 IN | SYSTOLIC BLOOD PRESSURE: 112 MMHG | OXYGEN SATURATION: 95 % | WEIGHT: 286 LBS | DIASTOLIC BLOOD PRESSURE: 80 MMHG

## 2018-12-14 DIAGNOSIS — E11.8 TYPE 2 DIABETES MELLITUS WITH COMPLICATION, WITH LONG-TERM CURRENT USE OF INSULIN (HCC): ICD-10-CM

## 2018-12-14 DIAGNOSIS — Z23 NEED FOR INFLUENZA VACCINATION: ICD-10-CM

## 2018-12-14 DIAGNOSIS — Z00.00 ROUTINE GENERAL MEDICAL EXAMINATION AT A HEALTH CARE FACILITY: Primary | ICD-10-CM

## 2018-12-14 DIAGNOSIS — E66.01 MORBID OBESITY (HCC): ICD-10-CM

## 2018-12-14 DIAGNOSIS — Z13.220 ENCOUNTER FOR LIPID SCREENING FOR CARDIOVASCULAR DISEASE: ICD-10-CM

## 2018-12-14 DIAGNOSIS — G20 PARKINSON DISEASE (HCC): ICD-10-CM

## 2018-12-14 DIAGNOSIS — R35.0 FREQUENT URINATION: ICD-10-CM

## 2018-12-14 DIAGNOSIS — Z79.4 TYPE 2 DIABETES MELLITUS WITH COMPLICATION, WITH LONG-TERM CURRENT USE OF INSULIN (HCC): ICD-10-CM

## 2018-12-14 DIAGNOSIS — Z79.899 MEDICATION MANAGEMENT: ICD-10-CM

## 2018-12-14 DIAGNOSIS — E78.2 MIXED HYPERLIPIDEMIA: ICD-10-CM

## 2018-12-14 DIAGNOSIS — N18.30 STAGE 3 CHRONIC KIDNEY DISEASE (HCC): ICD-10-CM

## 2018-12-14 DIAGNOSIS — I10 ESSENTIAL HYPERTENSION: Chronic | ICD-10-CM

## 2018-12-14 DIAGNOSIS — Z13.6 ENCOUNTER FOR LIPID SCREENING FOR CARDIOVASCULAR DISEASE: ICD-10-CM

## 2018-12-14 DIAGNOSIS — Z13.1 SCREENING FOR DIABETES MELLITUS: ICD-10-CM

## 2018-12-14 DIAGNOSIS — Z23 NEED FOR 23-POLYVALENT PNEUMOCOCCAL POLYSACCHARIDE VACCINE: ICD-10-CM

## 2018-12-14 LAB
ALBUMIN SERPL-MCNC: 3.7 G/DL (ref 3.5–5.2)
ALP BLD-CCNC: 113 U/L (ref 35–104)
ALT SERPL-CCNC: <5 U/L (ref 5–33)
ANION GAP SERPL CALCULATED.3IONS-SCNC: 13 MMOL/L (ref 7–19)
AST SERPL-CCNC: 13 U/L (ref 5–32)
BILIRUB SERPL-MCNC: 0.4 MG/DL (ref 0.2–1.2)
BILIRUBIN, POC: NORMAL
BLOOD URINE, POC: NORMAL
BUN BLDV-MCNC: 40 MG/DL (ref 8–23)
CALCIUM SERPL-MCNC: 9.4 MG/DL (ref 8.8–10.2)
CHLORIDE BLD-SCNC: 107 MMOL/L (ref 98–111)
CHOLESTEROL, TOTAL: 145 MG/DL (ref 160–199)
CLARITY, POC: CLEAR
CO2: 18 MMOL/L (ref 22–29)
COLOR, POC: YELLOW
CREAT SERPL-MCNC: 1.4 MG/DL (ref 0.5–0.9)
GFR NON-AFRICAN AMERICAN: 37
GLUCOSE BLD-MCNC: 234 MG/DL (ref 74–109)
GLUCOSE URINE, POC: 250
HBA1C MFR BLD: 8.2 % (ref 4–6)
HCT VFR BLD CALC: 42.4 % (ref 37–47)
HDLC SERPL-MCNC: 36 MG/DL (ref 65–121)
HEMOGLOBIN: 13.5 G/DL (ref 12–16)
KETONES, POC: NORMAL
LDL CHOLESTEROL CALCULATED: 78 MG/DL
LEUKOCYTE EST, POC: NORMAL
MCH RBC QN AUTO: 29.3 PG (ref 27–31)
MCHC RBC AUTO-ENTMCNC: 31.8 G/DL (ref 33–37)
MCV RBC AUTO: 92.2 FL (ref 81–99)
NITRITE, POC: NORMAL
PDW BLD-RTO: 13.6 % (ref 11.5–14.5)
PH, POC: 5
PLATELET # BLD: 175 K/UL (ref 130–400)
PMV BLD AUTO: 11.5 FL (ref 9.4–12.3)
POTASSIUM SERPL-SCNC: 4.7 MMOL/L (ref 3.5–5)
PROTEIN, POC: NORMAL
RBC # BLD: 4.6 M/UL (ref 4.2–5.4)
SODIUM BLD-SCNC: 138 MMOL/L (ref 136–145)
SPECIFIC GRAVITY, POC: 1010
TOTAL PROTEIN: 6.9 G/DL (ref 6.6–8.7)
TRIGL SERPL-MCNC: 156 MG/DL (ref 0–149)
UROBILINOGEN, POC: 0.02
WBC # BLD: 8.8 K/UL (ref 4.8–10.8)

## 2018-12-14 PROCEDURE — G0008 ADMIN INFLUENZA VIRUS VAC: HCPCS | Performed by: NURSE PRACTITIONER

## 2018-12-14 PROCEDURE — 36415 COLL VENOUS BLD VENIPUNCTURE: CPT | Performed by: NURSE PRACTITIONER

## 2018-12-14 PROCEDURE — G0009 ADMIN PNEUMOCOCCAL VACCINE: HCPCS | Performed by: NURSE PRACTITIONER

## 2018-12-14 PROCEDURE — 99213 OFFICE O/P EST LOW 20 MIN: CPT | Performed by: NURSE PRACTITIONER

## 2018-12-14 PROCEDURE — 1123F ACP DISCUSS/DSCN MKR DOCD: CPT | Performed by: NURSE PRACTITIONER

## 2018-12-14 PROCEDURE — G8482 FLU IMMUNIZE ORDER/ADMIN: HCPCS | Performed by: NURSE PRACTITIONER

## 2018-12-14 PROCEDURE — G8417 CALC BMI ABV UP PARAM F/U: HCPCS | Performed by: NURSE PRACTITIONER

## 2018-12-14 PROCEDURE — 1090F PRES/ABSN URINE INCON ASSESS: CPT | Performed by: NURSE PRACTITIONER

## 2018-12-14 PROCEDURE — 2022F DILAT RTA XM EVC RTNOPTHY: CPT | Performed by: NURSE PRACTITIONER

## 2018-12-14 PROCEDURE — 90662 IIV NO PRSV INCREASED AG IM: CPT | Performed by: NURSE PRACTITIONER

## 2018-12-14 PROCEDURE — 3017F COLORECTAL CA SCREEN DOC REV: CPT | Performed by: NURSE PRACTITIONER

## 2018-12-14 PROCEDURE — G8399 PT W/DXA RESULTS DOCUMENT: HCPCS | Performed by: NURSE PRACTITIONER

## 2018-12-14 PROCEDURE — G8427 DOCREV CUR MEDS BY ELIG CLIN: HCPCS | Performed by: NURSE PRACTITIONER

## 2018-12-14 PROCEDURE — 4040F PNEUMOC VAC/ADMIN/RCVD: CPT | Performed by: NURSE PRACTITIONER

## 2018-12-14 PROCEDURE — 1036F TOBACCO NON-USER: CPT | Performed by: NURSE PRACTITIONER

## 2018-12-14 PROCEDURE — G0438 PPPS, INITIAL VISIT: HCPCS | Performed by: NURSE PRACTITIONER

## 2018-12-14 PROCEDURE — 81002 URINALYSIS NONAUTO W/O SCOPE: CPT | Performed by: NURSE PRACTITIONER

## 2018-12-14 PROCEDURE — 3045F PR MOST RECENT HEMOGLOBIN A1C LEVEL 7.0-9.0%: CPT | Performed by: NURSE PRACTITIONER

## 2018-12-14 PROCEDURE — 1101F PT FALLS ASSESS-DOCD LE1/YR: CPT | Performed by: NURSE PRACTITIONER

## 2018-12-14 PROCEDURE — 90732 PPSV23 VACC 2 YRS+ SUBQ/IM: CPT | Performed by: NURSE PRACTITIONER

## 2018-12-14 ASSESSMENT — LIFESTYLE VARIABLES: HOW OFTEN DO YOU HAVE A DRINK CONTAINING ALCOHOL: 0

## 2018-12-14 ASSESSMENT — PATIENT HEALTH QUESTIONNAIRE - PHQ9
SUM OF ALL RESPONSES TO PHQ QUESTIONS 1-9: 0
SUM OF ALL RESPONSES TO PHQ QUESTIONS 1-9: 0

## 2018-12-14 ASSESSMENT — ANXIETY QUESTIONNAIRES: GAD7 TOTAL SCORE: 5

## 2018-12-14 NOTE — PROGRESS NOTES
STRENGTH PO) Take by mouth Yes Historical Provider, MD   gabapentin (NEURONTIN) 600 MG tablet Take 1 tablet by mouth 3 times daily for 180 days. Estelle Owens MD   Liraglutide (VICTOZA) 18 MG/3ML SOPN SC injection INJECT 1.8MG SUBQ DAILY  Patient taking differently: nightly INJECT 1.8MG SUBQ DAILY Yes INGRID Cantu CNP   insulin aspart (NOVOLOG FLEXPEN) 100 UNIT/ML injection pen INJECT 15 UNITS INTO THE SKIN 3 TIMES DAILY (BEFORE MEALS) Yes INGRID Cantu CNP   linaclotide (LINZESS) 145 MCG capsule TAKE ONE CAPSULE BY MOUTH EVERY MORNING BEFORE BREAKFAST Yes INGRID Cantu CNP   montelukast (SINGULAIR) 10 MG tablet TAKE ONE TABLET BY MOUTH EVERY NIGHT AT BEDTIME Yes INGRID Cantu CNP   atorvastatin (LIPITOR) 10 MG tablet TAKE ONE TABLET BY MOUTH EVERY DAY EACH EVENING Yes INGRID Cantu CNP   omeprazole (PRILOSEC) 40 MG delayed release capsule TAKE ONE CAPSULE BY MOUTH DAILY Yes INGRID Cantu CNP   fluconazole (DIFLUCAN) 150 MG tablet Take one on the 15th of every month Yes INGRID Cantu CNP   nystatin (MYCOSTATIN) 221587 UNIT/GM ointment Apply topically 2 times daily. for yeast Yes INGRID Cantu CNP   glucose blood VI test strips (ONE TOUCH ULTRA TEST) strip Inject 1 each into the skin daily As needed. Yes INGRID Cantu CNP   clobetasol (TEMOVATE) 0.05 % ointment Apply external vagina 3 x a day for week one, then decrease to BID on week two, on week 3 once a day, on week four every other day then stop Yes INGRID Cantu CNP   fluticasone (ARNUITY ELLIPTA) 100 MCG/ACT AEPB Inhale 100 mcg into the lungs Yes Historical Provider, MD   nystatin (NYSTATIN) 114438 UNIT/GM powder Apply topically 3 times daily Apply topically 4 times daily.  Yes INGRID Cantu CNP   furosemide (LASIX) 40 MG tablet Take 1 tablet by mouth daily Yes INGRID Chun   Docusate Calcium (STOOL SOFTENER PO) Take 1 tablet by mouth Laterality Date    APPENDECTOMY      CARDIAC CATHETERIZATION      no interventions     SECTION      x3    COLONOSCOPY      GALLBLADDER SURGERY  2014    dr Maurisio Sheehan N/CARPAL TUNNEL SURG Right 2018    OPEN CARPAL TUNNEL RELEASE performed by Crow Raygoza MD at [address] [place] [address] TYMPANOSTOMY TUBE PLACEMENT      dr Mady Ochoa in [facility] UPPER GASTROINTESTINAL ENDOSCOPY       Family History   Problem Relation Age of Onset    High Blood Pressure Father     Diabetes Father     Parkinsonism Father     High Blood Pressure Mother     Diabetes Sister     Other Paternal Grandmother         hiatal hernia    Heart Disease Paternal Grandfather        CareTeam (Including outside providers/suppliers regularly involved in providing care):   Patient Care Team:  INGRID Hawk - CNP as PCP - General (Family Nurse Practitioner)  Therese Courtney MD as Consulting Physician (Neurology)    Wt Readings from Last 3 Encounters:   18 286 lb (129.7 kg)   18 283 lb (128.4 kg)   10/11/18 279 lb (126.6 kg)     Vitals:    18 0938   BP: 112/80   Pulse: 70   Resp: 18   Temp: 97.6 °F (36.4 °C)   TempSrc: Temporal   SpO2: 95%   Weight: 286 lb (129.7 kg)   Height: 5' 2\" (1.575 m)     Body mass index is 52.31 kg/m². General Appearance: alert and oriented to person, place and time, well developed and well- nourished, in no acute distress. Appears older than stated age, sitting up in wheel chair.   Skin: warm and dry, no rash or erythema  Head: normocephalic and atraumatic  Eyes: pupils equal, round, and reactive to light, extraocular eye movements intact, conjunctivae normal  ENT: tympanic membrane, external ear and ear canal normal bilaterally, nose without deformity, nasal mucosa and turbinates normal without polyps  Neck: supple and non-tender without mass, no thyromegaly or thyroid nodules, no cervical lymphadenopathy  Pulmonary/Chest: clear to auscultation bilaterally- no wheezes, rales or rhonchi, normal air movement, no respiratory distress  Cardiovascular: normal rate, regular rhythm, normal S1 and S2, no murmurs, rubs, clicks, or gallops, distal pulses intact, no carotid bruits  Abdomen: soft, non-tender, non-distended, normal bowel sounds, no masses or organomegaly  Extremities: no cyanosis, clubbing or edema  Musculoskeletal: normal range of motion, no joint swelling, deformity or tenderness  Neurologic: reflexes normal and symmetric, no cranial nerve deficit, coordination and speech normal. She has constant resting tremor. Patient's complete Health Risk Assessment and screening values have been reviewed and are found in Flowsheets. The following problems were reviewed today and where indicated follow up appointments were made and/or referrals ordered.     Positive Risk Factor Screenings with Interventions:     Fall Risk:  Timed Up and Go Test > 12 seconds?: (!) yes  2 or more falls in past year?: (!) yes  Fall with injury in past year?: no  Fall Risk Assessment[de-identified] (!) Positive Screening for Falls  Fall Risk Interventions:    · Home exercises provided to promote strength and balance  · Patient declines any further evaluation/treatment for this issue  · recommend PT    Anxiety:  Anxiety Score: 5  Anxiety Screening: (!) Positive Screening for Anxiety  Anxiety Interventions:  · Relaxation techniques discussed    General Health:  General  In general, how would you say your health is?: (!) Poor  In the past 7 days, have you experienced any of the following?: (!) Stress  Do you get the social and emotional support that you need?: Yes  Do you have a Living Will?: (!) No  General Health Risk Interventions:  · Fatigue: labs ordered- cbc  · Stress: relaxation techniques discussed  · No Living Will: provided the state-specific advance directive document to the patient    Health Habits/Nutrition:  Health Habits/Nutrition  Do you exercise for at least 20 minutes 2-3 times per week?: Yes  Have you lost any weight without trying in the past 3 months?: No  Do you eat fewer than 2 meals per day?: No  Have you seen a dentist within the past year?: (!) No  Body mass index is 52.31 kg/m².   Health Habits/Nutrition Interventions:  · Inadequate physical activity:  patient is not ready to increase his/her physical activity level at this time    Hearing/Vision:  Hearing/Vision  Do you or your family notice any trouble with your hearing?: No  Do you have difficulty driving, watching TV, or doing any of your daily activities because of your eyesight?: (!) Yes  Have you had an eye exam within the past year?: Yes  Hearing/Vision Interventions:  · Vision concerns:  patient encouraged to make appointment with his/her eye specialist    ADL:  ADLs  In the past 7 days, did you need help from others to perform any of the following everyday activities?: (!) Walking/Balance, Dressing  In the past 7 days, did you need help from others to take care of any of the following?: [employer], Laundry, [employer], Food Preparation, Transportation, Taking Medications  ADL Interventions:  · Patient declines any further evaluation/treatment for this issue  · daughters help her    Personalized Preventive Plan   Current Health Maintenance Status  Immunization History   Administered Date(s) Administered    Influenza Virus Vaccine 11/14/2014, 10/29/2015    Influenza, Intradermal, Preservative free 11/11/2013    Influenza, Rachael Paniagua, 3 Years and older, IM (Fluzone 3 yrs and older or Afluria 5 yrs and older) 10/30/2017    Influenza, Rachael Paniagua, 3 yrs and older, IM, PF (Fluzone 3 yrs and older or Afluria 5 yrs and older) 11/21/2016    Pneumococcal 13-valent Conjugate (Foye Crumb) 04/12/2016        Health Maintenance   Topic Date Due    DTaP/Tdap/Td vaccine (1 - Tdap) 12/27/1969    Shingles Vaccine (1 of 2 - 2 Dose Series) 12/27/2000    Pneumococcal high/highest risk (2 of 2 - PPSV23) 06/07/2016    Diabetic foot exam  04/12/2017   

## 2018-12-17 DIAGNOSIS — N18.30 CKD (CHRONIC KIDNEY DISEASE) STAGE 3, GFR 30-59 ML/MIN (HCC): Primary | ICD-10-CM

## 2018-12-27 RX ORDER — FUROSEMIDE 40 MG/1
40 TABLET ORAL DAILY
Qty: 90 TABLET | Refills: 3 | Status: SHIPPED | OUTPATIENT
Start: 2018-12-27 | End: 2020-01-07

## 2018-12-27 ASSESSMENT — ENCOUNTER SYMPTOMS: RESPIRATORY NEGATIVE: 1

## 2018-12-27 NOTE — PROGRESS NOTES
days.. 90 tablet 5    Liraglutide (VICTOZA) 18 MG/3ML SOPN SC injection INJECT 1.8MG SUBQ DAILY (Patient taking differently: nightly INJECT 1.8MG SUBQ DAILY) 3 pen 3    insulin aspart (NOVOLOG FLEXPEN) 100 UNIT/ML injection pen INJECT 15 UNITS INTO THE SKIN 3 TIMES DAILY (BEFORE MEALS) 15 mL 3    linaclotide (LINZESS) 145 MCG capsule TAKE ONE CAPSULE BY MOUTH EVERY MORNING BEFORE BREAKFAST 30 capsule 5    montelukast (SINGULAIR) 10 MG tablet TAKE ONE TABLET BY MOUTH EVERY NIGHT AT BEDTIME 30 tablet 5    atorvastatin (LIPITOR) 10 MG tablet TAKE ONE TABLET BY MOUTH EVERY DAY EACH EVENING 30 tablet 5    omeprazole (PRILOSEC) 40 MG delayed release capsule TAKE ONE CAPSULE BY MOUTH DAILY 30 capsule 5    fluconazole (DIFLUCAN) 150 MG tablet Take one on the 15th of every month 1 tablet 11    nystatin (MYCOSTATIN) 733843 UNIT/GM ointment Apply topically 2 times daily. for yeast 60 Tube 5    glucose blood VI test strips (ONE TOUCH ULTRA TEST) strip Inject 1 each into the skin daily As needed. 100 each 3    clobetasol (TEMOVATE) 0.05 % ointment Apply external vagina 3 x a day for week one, then decrease to BID on week two, on week 3 once a day, on week four every other day then stop 1 Tube 1    fluticasone (ARNUITY ELLIPTA) 100 MCG/ACT AEPB Inhale 100 mcg into the lungs      nystatin (NYSTATIN) 883226 UNIT/GM powder Apply topically 3 times daily Apply topically 4 times daily.  60 g 3    furosemide (LASIX) 40 MG tablet Take 1 tablet by mouth daily 90 tablet 3    Docusate Calcium (STOOL SOFTENER PO) Take 1 tablet by mouth       Inulin (FIBER CHOICE PO) Take 1 tablet by mouth daily       Cholecalciferol (VITAMIN D3) 5000 units TABS Take 1 tablet by mouth daily       albuterol (ACCUNEB) 1.25 MG/3ML nebulizer solution Inhale 3 mLs into the lungs every 6 hours as needed for Wheezing 360 mL 3    FORTEO 600 MCG/2.4ML SOLN injection Inject 20 mcg into the skin nightly       SURE COMFORT INS SYR 1CC/30G 30G X 5/16\" 1

## 2018-12-31 RX ORDER — MONTELUKAST SODIUM 10 MG/1
TABLET ORAL
Qty: 30 TABLET | Refills: 5 | Status: SHIPPED | OUTPATIENT
Start: 2018-12-31 | End: 2020-05-07

## 2018-12-31 RX ORDER — ATORVASTATIN CALCIUM 10 MG/1
TABLET, FILM COATED ORAL
Qty: 30 TABLET | Refills: 5 | Status: SHIPPED | OUTPATIENT
Start: 2018-12-31 | End: 2019-10-25 | Stop reason: SDUPTHER

## 2018-12-31 RX ORDER — ATORVASTATIN CALCIUM 10 MG/1
TABLET, FILM COATED ORAL
Qty: 30 TABLET | Refills: 5 | Status: SHIPPED | OUTPATIENT
Start: 2018-12-31 | End: 2018-12-31 | Stop reason: SDUPTHER

## 2019-01-04 RX ORDER — SPIRONOLACTONE 25 MG/1
25 TABLET ORAL DAILY
COMMUNITY

## 2019-01-04 RX ORDER — OMEPRAZOLE 40 MG/1
40 CAPSULE, DELAYED RELEASE ORAL DAILY
COMMUNITY

## 2019-01-04 RX ORDER — DOCUSATE CALCIUM 240 MG
240 CAPSULE ORAL 2 TIMES DAILY
COMMUNITY
End: 2019-01-18

## 2019-01-04 RX ORDER — ATORVASTATIN CALCIUM 10 MG/1
10 TABLET, FILM COATED ORAL DAILY
COMMUNITY

## 2019-01-04 RX ORDER — LOSARTAN POTASSIUM 100 MG/1
100 TABLET ORAL DAILY
COMMUNITY

## 2019-01-04 RX ORDER — ALBUTEROL SULFATE 0.63 MG/3ML
1 SOLUTION RESPIRATORY (INHALATION) EVERY 6 HOURS PRN
COMMUNITY
End: 2020-02-21 | Stop reason: SDUPTHER

## 2019-01-04 RX ORDER — SOTALOL HYDROCHLORIDE 120 MG/1
120 TABLET ORAL 2 TIMES DAILY
COMMUNITY

## 2019-01-04 RX ORDER — GABAPENTIN 600 MG/1
600 TABLET ORAL 3 TIMES DAILY
COMMUNITY

## 2019-01-04 RX ORDER — MONTELUKAST SODIUM 10 MG/1
10 TABLET ORAL NIGHTLY
COMMUNITY

## 2019-01-04 RX ORDER — ALENDRONATE SODIUM 70 MG/1
70 TABLET ORAL
COMMUNITY
End: 2019-01-18

## 2019-01-08 DIAGNOSIS — R52 PAIN: ICD-10-CM

## 2019-01-08 RX ORDER — TRAMADOL HYDROCHLORIDE 50 MG/1
TABLET ORAL
Qty: 60 TABLET | Refills: 0 | Status: SHIPPED | OUTPATIENT
Start: 2019-01-08 | End: 2019-02-07

## 2019-01-08 RX ORDER — OMEPRAZOLE 40 MG/1
CAPSULE, DELAYED RELEASE ORAL
Qty: 30 CAPSULE | Refills: 3 | Status: SHIPPED | OUTPATIENT
Start: 2019-01-08 | End: 2019-10-14 | Stop reason: SDUPTHER

## 2019-01-08 RX ORDER — LIRAGLUTIDE 6 MG/ML
INJECTION SUBCUTANEOUS
Qty: 9 ML | Refills: 3 | Status: SHIPPED | OUTPATIENT
Start: 2019-01-08 | End: 2019-05-13 | Stop reason: SDUPTHER

## 2019-01-09 RX ORDER — OMEPRAZOLE 40 MG/1
CAPSULE, DELAYED RELEASE ORAL
Qty: 30 CAPSULE | Refills: 3 | Status: SHIPPED | OUTPATIENT
Start: 2019-01-09 | End: 2019-05-13 | Stop reason: SDUPTHER

## 2019-01-15 RX ORDER — LIRAGLUTIDE 6 MG/ML
INJECTION SUBCUTANEOUS
Qty: 9 ML | Refills: 3 | Status: SHIPPED | OUTPATIENT
Start: 2019-01-15 | End: 2019-01-21 | Stop reason: SDUPTHER

## 2019-01-18 ENCOUNTER — NURSE ONLY (OUTPATIENT)
Dept: PRIMARY CARE CLINIC | Age: 69
End: 2019-01-18
Payer: MEDICARE

## 2019-01-18 ENCOUNTER — OFFICE VISIT (OUTPATIENT)
Dept: PULMONOLOGY | Facility: CLINIC | Age: 69
End: 2019-01-18

## 2019-01-18 VITALS
DIASTOLIC BLOOD PRESSURE: 70 MMHG | SYSTOLIC BLOOD PRESSURE: 126 MMHG | HEART RATE: 74 BPM | BODY MASS INDEX: 50.14 KG/M2 | WEIGHT: 283 LBS | OXYGEN SATURATION: 96 % | HEIGHT: 63 IN

## 2019-01-18 DIAGNOSIS — N18.30 CKD (CHRONIC KIDNEY DISEASE) STAGE 3, GFR 30-59 ML/MIN (HCC): ICD-10-CM

## 2019-01-18 DIAGNOSIS — J98.4 RESTRICTIVE LUNG DISEASE: ICD-10-CM

## 2019-01-18 DIAGNOSIS — E66.01 MORBID OBESITY (HCC): ICD-10-CM

## 2019-01-18 DIAGNOSIS — J45.20 MILD INTERMITTENT ASTHMA, UNSPECIFIED WHETHER COMPLICATED: Primary | ICD-10-CM

## 2019-01-18 DIAGNOSIS — G47.34 NOCTURNAL HYPOXIA: ICD-10-CM

## 2019-01-18 DIAGNOSIS — J98.4 SCARRING OF LUNG: ICD-10-CM

## 2019-01-18 LAB
ALBUMIN SERPL-MCNC: 4 G/DL (ref 3.5–5.2)
ALP BLD-CCNC: 109 U/L (ref 35–104)
ALT SERPL-CCNC: <5 U/L (ref 5–33)
ANION GAP SERPL CALCULATED.3IONS-SCNC: 15 MMOL/L (ref 7–19)
AST SERPL-CCNC: 11 U/L (ref 5–32)
BILIRUB SERPL-MCNC: 0.3 MG/DL (ref 0.2–1.2)
BUN BLDV-MCNC: 33 MG/DL (ref 8–23)
CALCIUM SERPL-MCNC: 9.6 MG/DL (ref 8.8–10.2)
CHLORIDE BLD-SCNC: 101 MMOL/L (ref 98–111)
CO2: 22 MMOL/L (ref 22–29)
CREAT SERPL-MCNC: 1.4 MG/DL (ref 0.5–0.9)
GFR NON-AFRICAN AMERICAN: 37
GLUCOSE BLD-MCNC: 273 MG/DL (ref 74–109)
POTASSIUM SERPL-SCNC: 4.9 MMOL/L (ref 3.5–5)
SODIUM BLD-SCNC: 138 MMOL/L (ref 136–145)
TOTAL PROTEIN: 7.3 G/DL (ref 6.6–8.7)

## 2019-01-18 PROCEDURE — 36415 COLL VENOUS BLD VENIPUNCTURE: CPT | Performed by: NURSE PRACTITIONER

## 2019-01-18 PROCEDURE — 99213 OFFICE O/P EST LOW 20 MIN: CPT | Performed by: NURSE PRACTITIONER

## 2019-01-18 RX ORDER — ALBUTEROL SULFATE 90 UG/1
AEROSOL, METERED RESPIRATORY (INHALATION)
COMMUNITY
End: 2019-11-06 | Stop reason: SDUPTHER

## 2019-01-18 RX ORDER — SOTALOL HYDROCHLORIDE 120 MG/1
120 TABLET ORAL 2 TIMES DAILY
COMMUNITY
End: 2020-02-21 | Stop reason: SDUPTHER

## 2019-01-18 RX ORDER — CLOBETASOL PROPIONATE 0.5 MG/G
OINTMENT TOPICAL
COMMUNITY
Start: 2018-03-02

## 2019-01-18 RX ORDER — FUROSEMIDE 40 MG/1
40 TABLET ORAL
COMMUNITY
Start: 2018-12-27 | End: 2020-02-21 | Stop reason: ALTCHOICE

## 2019-01-18 RX ORDER — NYSTATIN 100000 U/G
OINTMENT TOPICAL
COMMUNITY
Start: 2018-06-14

## 2019-01-18 RX ORDER — TRAMADOL HYDROCHLORIDE 50 MG/1
TABLET ORAL
COMMUNITY
Start: 2019-01-08 | End: 2019-02-07

## 2019-01-18 RX ORDER — ROPINIROLE 4 MG/1
TABLET, FILM COATED ORAL
COMMUNITY
Start: 2018-10-19

## 2019-01-18 RX ORDER — ASPIRIN 325 MG
TABLET ORAL
COMMUNITY

## 2019-01-18 RX ORDER — FLUCONAZOLE 150 MG/1
TABLET ORAL
COMMUNITY
Start: 2018-06-14

## 2019-01-18 NOTE — PATIENT INSTRUCTIONS
Asthma, Adult  Asthma is a condition of the lungs in which the airways tighten and narrow. Asthma can make it hard to breathe. Asthma cannot be cured, but medicine and lifestyle changes can help control it. Asthma may be started (triggered) by:  · Animal skin flakes (dander).  · Dust.  · Cockroaches.  · Pollen.  · Mold.  · Smoke.  · Cleaning products.  · Hair sprays or aerosol sprays.  · Paint fumes or strong smells.  · Cold air, weather changes, and winds.  · Crying or laughing hard.  · Stress.  · Certain medicines or drugs.  · Foods, such as dried fruit, potato chips, and sparkling grape juice.  · Infections or conditions (colds, flu).  · Exercise.  · Certain medical conditions or diseases.  · Exercise or tiring activities.    Follow these instructions at home:  · Take medicine as told by your doctor.  · Use a peak flow meter as told by your doctor. A peak flow meter is a tool that measures how well the lungs are working.  · Record and keep track of the peak flow meter's readings.  · Understand and use the asthma action plan. An asthma action plan is a written plan for taking care of your asthma and treating your attacks.  · To help prevent asthma attacks:  ? Do not smoke. Stay away from secondhand smoke.  ? Change your heating and air conditioning filter often.  ? Limit your use of fireplaces and wood stoves.  ? Get rid of pests (such as roaches and mice) and their droppings.  ? Throw away plants if you see mold on them.  ? Clean your floors. Dust regularly. Use cleaning products that do not smell.  ? Have someone vacuum when you are not home. Use a vacuum  with a HEPA filter if possible.  ? Replace carpet with wood, tile, or vinyl angy. Carpet can trap animal skin flakes and dust.  ? Use allergy-proof pillows, mattress covers, and box spring covers.  ? Wash bed sheets and blankets every week in hot water and dry them in a dryer.  ? Use blankets that are made of polyester or cotton.  ? Clean bathrooms  and aleida with bleach. If possible, have someone repaint the walls in these rooms with mold-resistant paint. Keep out of the rooms that are being cleaned and painted.  ? Wash hands often.  Contact a doctor if:  · You have make a whistling sound when breaking (wheeze), have shortness of breath, or have a cough even if taking medicine to prevent attacks.  · The colored mucus you cough up (sputum) is thicker than usual.  · The colored mucus you cough up changes from clear or white to yellow, green, gray, or bloody.  · You have problems from the medicine you are taking such as:  ? A rash.  ? Itching.  ? Swelling.  ? Trouble breathing.  · You need reliever medicines more than 2-3 times a week.  · Your peak flow measurement is still at 50-79% of your personal best after following the action plan for 1 hour.  · You have a fever.  Get help right away if:  · You seem to be worse and are not responding to medicine during an asthma attack.  · You are short of breath even at rest.  · You get short of breath when doing very little activity.  · You have trouble eating, drinking, or talking.  · You have chest pain.  · You have a fast heartbeat.  · Your lips or fingernails start to turn blue.  · You are light-headed, dizzy, or faint.  · Your peak flow is less than 50% of your personal best.  This information is not intended to replace advice given to you by your health care provider. Make sure you discuss any questions you have with your health care provider.  Document Released: 06/05/2009 Document Revised: 05/25/2017 Document Reviewed: 07/17/2014  [employer] Patient Education © 2017 [employer]

## 2019-01-18 NOTE — PROGRESS NOTES
MAURICIO Moffett  [facility]   Respiratory Disease Clinic  [address] [place] [address]  Phone: [phone]  Fax: [phone]     Violeta Romo is a 68 y.o. female.   CC:   Chief Complaint   Patient presents with   • Asthma        HPI: She is here for follow-up visit with a history of asthma that is currently well controlled, nocturnal hypoxia, restrictive lung disease, obesity and lung scarring in a never smoker.  She also has Parkinson's disease which has greatly affected her mobility.  From a pulmonary standpoint she reports that she is doing well and has not needed any breathing treatments about 2 months.  She uses a rescue inhaler as needed and inhaled corticosteroid as needed.  She wears her oxygen with good compliance at night.  She is followed by MAURICIO Farrar for routine medical care and she is up-to-date on her flu and pneumonia vaccines.    The following portions of the patient's history were reviewed and updated as appropriate: allergies, current medications, past family history, past medical history, past social history, past surgical history and problem list.    Past Medical History:   Diagnosis Date   • Diabetes mellitus (CMS/[facility])    • GERD (gastroesophageal reflux disease)    • Hypertension        Family History   Problem Relation Age of Onset   • Hypertension Mother    • Hypertension Father    • Diabetes Father        Social History     Socioeconomic History   • Marital status:      Spouse name: Not on file   • Number of children: Not on file   • Years of education: Not on file   • Highest education level: Not on file   Social Needs   • Financial resource strain: Not on file   • Food insecurity - worry: Not on file   • Food insecurity - inability: Not on file   • Transportation needs - medical: Not on file   • Transportation needs - non-medical: Not on file   Occupational History   • Not on file   Tobacco Use   • Smoking status: Never Smoker  "  • Smokeless tobacco: Never Used   Substance and Sexual Activity   • Alcohol use: No     Frequency: Never   • Drug use: No   • Sexual activity: Defer   Other Topics Concern   • Not on file   Social History Narrative   • Not on file       Review of Systems   Constitutional: Negative for appetite change, chills, fatigue and fever.   HENT: Negative for trouble swallowing and voice change.    Eyes: Negative for visual disturbance.   Respiratory: Negative for cough, choking, chest tightness, shortness of breath and wheezing.    Cardiovascular: Positive for leg swelling. Negative for chest pain.   Gastrointestinal: Negative for abdominal distention, abdominal pain, nausea and vomiting.   Genitourinary: Negative.    Musculoskeletal: Negative for gait problem and myalgias.   Skin: Negative.    Neurological: Positive for tremors and weakness.   Hematological: Negative.    Psychiatric/Behavioral: The patient is not nervous/anxious.        /70   Pulse 74   Ht 158.8 cm (62.5\")   Wt 128 kg (283 lb)   SpO2 96% Comment: RA  Breastfeeding? No   BMI 50.94 kg/m²     Physical Exam   Constitutional: She is oriented to person, place, and time. She appears well-developed and well-nourished. No distress. She is morbidly obese.  HENT:   Head: Normocephalic and atraumatic.   Eyes: Pupils are equal, round, and reactive to light. No scleral icterus.   Neck: Normal range of motion. Neck supple.   Cardiovascular: Normal rate, regular rhythm, S1 normal and S2 normal.   Pulmonary/Chest: Effort normal and breath sounds normal. No tachypnea. She has no wheezes. She has no rhonchi. She has no rales.   Abdominal: Soft. Bowel sounds are normal. She exhibits no distension.   Musculoskeletal: Normal range of motion. She exhibits edema (1+ to ankles).   Lymphadenopathy:     She has no cervical adenopathy.   Neurological: She is alert and oriented to person, place, and time. She displays tremor (To hands).   In a wheelchair, generalized " weakness   Skin: Skin is warm and dry. No rash noted.   Psychiatric: She has a normal mood and affect. Her behavior is normal.   Vitals reviewed.        Violeta was seen today for asthma.    Diagnoses and all orders for this visit:    Mild intermittent asthma, unspecified whether complicated    Morbid obesity (CMS/HCC)    Scarring of lung    Nocturnal hypoxia    Restrictive lung disease      Patient's Body mass index is 50.94 kg/m². BMI is above normal parameters. Recommendations include: no follow-up required and Patient's mobility is greatly affected due to Parkinson's disease.      Follow-up/Plan: Continue daily Singulair.  Albuterol nebs and Arnuity as needed.  Continue to wear oxygen at night.  Return to clinic in 1 year with flow volume loop on return.    Marlo Howard, APRN  1/18/2019  5:22 PM

## 2019-01-29 RX ORDER — MONTELUKAST SODIUM 10 MG/1
TABLET ORAL
Qty: 30 TABLET | Refills: 3 | Status: SHIPPED | OUTPATIENT
Start: 2019-01-29 | End: 2019-06-13

## 2019-01-29 RX ORDER — ATORVASTATIN CALCIUM 10 MG/1
TABLET, FILM COATED ORAL
Qty: 30 TABLET | Refills: 3 | Status: SHIPPED | OUTPATIENT
Start: 2019-01-29 | End: 2019-05-20 | Stop reason: SDUPTHER

## 2019-02-04 RX ORDER — INSULIN DETEMIR 100 [IU]/ML
INJECTION, SOLUTION SUBCUTANEOUS
Qty: 10 ML | Refills: 2 | Status: SHIPPED | OUTPATIENT
Start: 2019-02-04 | End: 2019-03-26 | Stop reason: SDUPTHER

## 2019-02-06 DIAGNOSIS — G25.81 RESTLESS LEGS SYNDROME: ICD-10-CM

## 2019-02-07 RX ORDER — GABAPENTIN 600 MG/1
TABLET ORAL
Qty: 90 TABLET | Refills: 5 | Status: SHIPPED | OUTPATIENT
Start: 2019-02-07 | End: 2019-07-30 | Stop reason: SDUPTHER

## 2019-03-13 DIAGNOSIS — R52 PAIN: ICD-10-CM

## 2019-03-14 RX ORDER — TRAMADOL HYDROCHLORIDE 50 MG/1
50 TABLET ORAL EVERY 8 HOURS PRN
Qty: 60 TABLET | Refills: 0 | Status: SHIPPED | OUTPATIENT
Start: 2019-03-14 | End: 2019-05-23 | Stop reason: SDUPTHER

## 2019-03-26 RX ORDER — INSULIN DETEMIR 100 [IU]/ML
INJECTION, SOLUTION SUBCUTANEOUS
Qty: 10 ML | Refills: 2 | Status: SHIPPED | OUTPATIENT
Start: 2019-03-26 | End: 2019-05-18 | Stop reason: SDUPTHER

## 2019-03-27 RX ORDER — INSULIN ASPART 100 [IU]/ML
INJECTION, SOLUTION INTRAVENOUS; SUBCUTANEOUS
Qty: 15 ML | Refills: 3 | Status: SHIPPED | OUTPATIENT
Start: 2019-03-27 | End: 2019-06-13

## 2019-03-27 RX ORDER — FLUCONAZOLE 150 MG/1
TABLET ORAL
Qty: 1 TABLET | Refills: 11 | Status: SHIPPED | OUTPATIENT
Start: 2019-03-27 | End: 2019-10-13 | Stop reason: SDUPTHER

## 2019-04-04 ENCOUNTER — OFFICE VISIT (OUTPATIENT)
Dept: PRIMARY CARE CLINIC | Age: 69
End: 2019-04-04
Payer: MEDICARE

## 2019-04-04 VITALS
HEIGHT: 62 IN | OXYGEN SATURATION: 92 % | SYSTOLIC BLOOD PRESSURE: 126 MMHG | RESPIRATION RATE: 18 BRPM | HEART RATE: 67 BPM | DIASTOLIC BLOOD PRESSURE: 74 MMHG | BODY MASS INDEX: 52.31 KG/M2 | TEMPERATURE: 97.3 F

## 2019-04-04 DIAGNOSIS — M17.12 ARTHRITIS OF LEFT KNEE: Primary | ICD-10-CM

## 2019-04-04 DIAGNOSIS — Z12.11 SCREENING FOR COLON CANCER: ICD-10-CM

## 2019-04-04 PROCEDURE — 20610 DRAIN/INJ JOINT/BURSA W/O US: CPT | Performed by: NURSE PRACTITIONER

## 2019-04-04 ASSESSMENT — PATIENT HEALTH QUESTIONNAIRE - PHQ9
2. FEELING DOWN, DEPRESSED OR HOPELESS: 0
SUM OF ALL RESPONSES TO PHQ QUESTIONS 1-9: 0
SUM OF ALL RESPONSES TO PHQ9 QUESTIONS 1 & 2: 0
SUM OF ALL RESPONSES TO PHQ QUESTIONS 1-9: 0
1. LITTLE INTEREST OR PLEASURE IN DOING THINGS: 0

## 2019-04-04 NOTE — PROGRESS NOTES
[facility] PHYSICIAN SERVICES  [place] [address]  [id number]  Dept: [phone]  Dept Fax: [phone]  Loc: [phone]    Rebecca Hines is a 76 y.o. female who presents today for her medical conditions/complaints as noted below. Rebecca Hines is c/o of Knee Pain (sweeling on the left )        HPI:     HPI   Chief Complaint   Patient presents with    Knee Pain     sweeling on the left    she would like an injection in the knee. She has had one at orthopedic over 6  Months ago. Xray of knee confirmed arthritis. Past Medical History:   Diagnosis Date    Asthma 2015    Bilateral carpal tunnel syndrome 2018    sees dr. Emmanuel Rosas.     COPD (chronic obstructive pulmonary disease) ([address]     Diabetes mellitus ([address]     HTN (hypertension)     Hyperlipidemia     Mild mitral regurgitation by prior echocardiogram 2016    Morbid obesity ([address] 10/18/2017    Normal cardiac stress test 09    no ischemia at attained level of excercise    Parkinson disease ([address]     Paroxysmal atrial fibrillation ([address]     sees dr. Zeyad Johnson Post-menopausal     Prolonged emergence from general anesthesia     Restrictive airway disease     Stage 3 chronic kidney disease ([address] 10/18/2017      Past Surgical History:   Procedure Laterality Date    APPENDECTOMY      CARDIAC CATHETERIZATION      no interventions     SECTION      x3    COLONOSCOPY      GALLBLADDER SURGERY  2014    dr Breanna Daniel N/CARPAL TUNNEL SURG Right 2018    OPEN CARPAL TUNNEL RELEASE performed by Antoinette Chicas MD at [address] [place] [address]      dr Deacon Reina in [address] [place] [address] 2019 2018 2018 10/11/2018 2018    SYSTOLIC 560 [phone] - -   DIASTOLIC 74 80 70 78 - -   Site Left Upper Arm - - - - -   Position Sitting - - - - -   Cuff Size Medium Adult - - - - - Pulse 67 70 77 98 - -   Temp 97.3 97.6 - - - -   Resp 18 18 - 20 23 14   Weight - 286 lb 283 lb 279 lb - -   Height 5' 2\" 5' 2\" 5' 2\" 5' 2\" - -   BMI (wt*703/ht~2) - 52.3 kg/m2 51.76 kg/m2 51.02 kg/m2 - -   Some recent data might be hidden       Family History   Problem Relation Age of Onset    High Blood Pressure Father     Diabetes Father     Parkinsonism Father     High Blood Pressure Mother     Diabetes Sister     Other Paternal Grandmother         hiatal hernia    Heart Disease Paternal Grandfather        Social History     Tobacco Use    Smoking status: Never Smoker    Smokeless tobacco: Never Used   Substance Use Topics    Alcohol use: No      Current Outpatient Medications   Medication Sig Dispense Refill    NOVOLOG FLEXPEN 100 UNIT/ML injection pen INJECT 15 UNITS INTO THE SKIN 3 TIMES DAILY (BEFORE MEALS) 15 mL 3    fluconazole (DIFLUCAN) 150 MG tablet Take one on the 15th of every month 1 tablet 11    LEVEMIR 100 UNIT/ML injection vial INJECT 70 UNITS SUBQ EVERY NIGHT AT BEDTIME 10 mL 2    linaclotide (LINZESS) 145 MCG capsule TAKE ONE CAPSULE BY MOUTH EVERY MORNING BEFORE BREAKFAST 30 capsule 5    gabapentin (NEURONTIN) 600 MG tablet TAKE ONE TABLET BY MOUTH 3 TIMES DAILY AS NEEDED 90 tablet 5    montelukast (SINGULAIR) 10 MG tablet TAKE ONE TABLET BY MOUTH EVERY NIGHT AT BEDTIME 30 tablet 3    atorvastatin (LIPITOR) 10 MG tablet TAKE ONE TABLET BY MOUTH EVERY DAY EACH EVENING 30 tablet 3    omeprazole (PRILOSEC) 40 MG delayed release capsule TAKE ONE CAPSULE BY MOUTH DAILY 30 capsule 3    VICTOZA 18 MG/3ML SOPN SC injection INJECT 1.8MG SUBQ DAILY 9 mL 3    omeprazole (PRILOSEC) 40 MG delayed release capsule TAKE ONE CAPSULE BY MOUTH DAILY 30 capsule 3    montelukast (SINGULAIR) 10 MG tablet TAKE ONE TABLET BY MOUTH EVERY NIGHT AT BEDTIME 30 tablet 5    atorvastatin (LIPITOR) 10 MG tablet TAKE ONE TABLET BY MOUTH EVERY DAY EACH EVENING 30 tablet 5    furosemide (LASIX) 40 MG tablet Take 1 tablet by mouth daily 90 tablet 3    carbidopa-levodopa (SINEMET)  MG per tablet TAKE TWO TABLETS BY MOUTH 3 TIMES DAILY (Patient taking differently: 1 1/2 four times daily) 180 tablet 5    spironolactone (ALDACTONE) 25 MG tablet TAKE ONE TABLET DAILY 30 tablet 5    rOPINIRole (REQUIP) 4 MG tablet 2 PO QAM, 1  PO Q Noon, and 2 PO  tablet 5    Diphenhydramine-APAP, sleep, (TYLENOL PM EXTRA STRENGTH PO) Take by mouth      insulin aspart (NOVOLOG FLEXPEN) 100 UNIT/ML injection pen INJECT 15 UNITS INTO THE SKIN 3 TIMES DAILY (BEFORE MEALS) 15 mL 3    nystatin (MYCOSTATIN) 036389 UNIT/GM ointment Apply topically 2 times daily. for yeast 60 Tube 5    glucose blood VI test strips (ONE TOUCH ULTRA TEST) strip Inject 1 each into the skin daily As needed. 100 each 3    clobetasol (TEMOVATE) 0.05 % ointment Apply external vagina 3 x a day for week one, then decrease to BID on week two, on week 3 once a day, on week four every other day then stop 1 Tube 1    fluticasone (ARNUITY ELLIPTA) 100 MCG/ACT AEPB Inhale 100 mcg into the lungs      nystatin (NYSTATIN) 760662 UNIT/GM powder Apply topically 3 times daily Apply topically 4 times daily.  60 g 3    Docusate Calcium (STOOL SOFTENER PO) Take 1 tablet by mouth       Inulin (FIBER CHOICE PO) Take 1 tablet by mouth daily       Cholecalciferol (VITAMIN D3) 5000 units TABS Take 1 tablet by mouth daily       albuterol (ACCUNEB) 1.25 MG/3ML nebulizer solution Inhale 3 mLs into the lungs every 6 hours as needed for Wheezing 360 mL 3    FORTEO 600 MCG/2.4ML SOLN injection Inject 20 mcg into the skin nightly       SURE COMFORT INS SYR 1CC/30G 30G X 5/16\" 1 ML MISC       Lancets MISC 1 lancet to check glucose daily 100 each 3    Insulin Pen Needle 31G X 8 MM MISC Inject 1 each into the skin daily 100 each 2    albuterol (PROVENTIL HFA;VENTOLIN HFA) 108 (90 BASE) MCG/ACT inhaler Inhale 2 puffs into the lungs every 6 hours as needed for Wheezing 1 Inhaler 1    OXYGEN 2L NC 12-24 hours (Patient taking differently: nightly as needed 2L NC 12-24 hours) 1 Device 0    Blood Glucose Monitoring Suppl STERLING by Does not apply route. 1 Device 0    aspirin 325 MG tablet Take 325 mg by mouth daily.  sotalol (BETAPACE) 120 MG tablet TAKE ONE TABLET BY MOUTH TWICE A DAY 60 tablet 5    losartan (COZAAR) 100 MG tablet TAKE ONE TABLET DAILY 30 tablet 5     No current facility-administered medications for this visit. Allergies   Allergen Reactions    Codeine      itching    Hydrocodone-Acetaminophen Nausea Only       Health Maintenance   Topic Date Due    Shingles Vaccine (1 of 2) 12/27/2000    Diabetic foot exam  04/12/2017    Colon cancer screen colonoscopy  08/21/2018    DTaP/Tdap/Td vaccine (1 - Tdap) 04/04/2020 (Originally 12/27/1969)    Diabetic retinal exam  10/18/2019    A1C test (Diabetic or Prediabetic)  12/14/2019    Lipid screen  12/14/2019    Potassium monitoring  01/18/2020    Creatinine monitoring  01/18/2020    Breast cancer screen  03/22/2020    DEXA (modify frequency per FRAX score)  Completed    Flu vaccine  Completed    Pneumococcal 65+ years Vaccine  Completed    Hepatitis C screen  Addressed       Subjective:      Review of Systems   Constitutional: Positive for activity change. Cardiovascular: Positive for leg swelling. Musculoskeletal:        Knee pain   Psychiatric/Behavioral: Negative. Objective:     Physical Exam   Constitutional: She is oriented to person, place, and time. She appears well-developed and well-nourished. HENT:   Head: Normocephalic. Right Ear: External ear normal.   Left Ear: External ear normal.   Eyes: Pupils are equal, round, and reactive to light. Neck: Normal range of motion. Cardiovascular: Normal rate, regular rhythm, normal heart sounds and intact distal pulses.    Pulmonary/Chest: Effort normal and breath sounds normal.   Musculoskeletal:        Left knee: She exhibits orders of the defined types were placed in this encounter. Quality & Risk Score Accuracy    Last edited 04/17/19 10:58 CDT by INGRID Scott CNP         ORDERS:  Orders Placed This Encounter   Procedures   Estephania Duvall MD, Gastroenterology, [place]       Follow-up:  No follow-ups on file. PATIENT INSTRUCTIONS:  Patient Instructions   Check thyroid in June with labs   I discussed with the patient that she may have some temporary relief followed by 1-2 days of worsening pain. She should get some relief over the next few days from the arthritic pain. She may continue to take anti-inflammatories for the pain. May use compression on knee with wrap      Electronically signed by INGRID Scott CNP on 4/17/2019 at 10:59 AM    EMR Dragon/transcription disclaimer:  Much of thisencounter note is electronic transcription/translation of spoken language to printed texts. The electronic translation of spoken language may be erroneous, or at times, nonsensical words or phrases may be inadvertentlytranscribed.   Although I have reviewed the note for such errors, some may still exist.

## 2019-04-04 NOTE — PATIENT INSTRUCTIONS
Check thyroid in June with labs   I discussed with the patient that she may have some temporary relief followed by 1-2 days of worsening pain. She should get some relief over the next few days from the arthritic pain. She may continue to take anti-inflammatories for the pain.  May use compression on knee with wrap

## 2019-04-12 ENCOUNTER — TELEPHONE (OUTPATIENT)
Dept: PRIMARY CARE CLINIC | Age: 69
End: 2019-04-12

## 2019-04-12 DIAGNOSIS — M25.562 CHRONIC PAIN OF LEFT KNEE: Primary | ICD-10-CM

## 2019-04-12 DIAGNOSIS — G89.29 CHRONIC PAIN OF LEFT KNEE: Primary | ICD-10-CM

## 2019-04-12 NOTE — TELEPHONE ENCOUNTER
Pt daughter states the cortisone shot given by Fanny Kelsey in her knee has not helped. Pt has requested an xray. Pt is at another dr appt now and would like to get the xray while in [facility] [place].

## 2019-04-15 ENCOUNTER — HOSPITAL ENCOUNTER (OUTPATIENT)
Dept: GENERAL RADIOLOGY | Age: 69
Discharge: HOME | End: 2019-04-15
Payer: MEDICARE

## 2019-04-15 DIAGNOSIS — G89.29 CHRONIC PAIN OF LEFT KNEE: ICD-10-CM

## 2019-04-15 DIAGNOSIS — M25.562 CHRONIC PAIN OF LEFT KNEE: ICD-10-CM

## 2019-04-15 PROCEDURE — 73560 X-RAY EXAM OF KNEE 1 OR 2: CPT

## 2019-04-16 RX ORDER — LOSARTAN POTASSIUM 100 MG/1
TABLET ORAL
Qty: 30 TABLET | Refills: 5 | Status: SHIPPED | OUTPATIENT
Start: 2019-04-16 | End: 2020-08-17

## 2019-04-16 RX ORDER — SOTALOL HYDROCHLORIDE 120 MG/1
TABLET ORAL
Qty: 60 TABLET | Refills: 5 | Status: SHIPPED | OUTPATIENT
Start: 2019-04-16 | End: 2019-11-05 | Stop reason: SDUPTHER

## 2019-04-17 RX ORDER — CLINDAMYCIN HYDROCHLORIDE 300 MG/1
300 CAPSULE ORAL 3 TIMES DAILY
Qty: 30 CAPSULE | Refills: 0 | Status: SHIPPED | OUTPATIENT
Start: 2019-04-17 | End: 2019-04-27

## 2019-04-22 ENCOUNTER — OFFICE VISIT (OUTPATIENT)
Dept: PRIMARY CARE CLINIC | Age: 69
End: 2019-04-22
Payer: MEDICARE

## 2019-04-22 ENCOUNTER — HOSPITAL ENCOUNTER (OUTPATIENT)
Dept: NON INVASIVE DIAGNOSTICS | Age: 69
Discharge: HOME | End: 2019-04-22
Payer: MEDICARE

## 2019-04-22 VITALS
SYSTOLIC BLOOD PRESSURE: 100 MMHG | WEIGHT: 280 LBS | OXYGEN SATURATION: 96 % | TEMPERATURE: 97.2 F | HEART RATE: 67 BPM | BODY MASS INDEX: 51.53 KG/M2 | HEIGHT: 62 IN | DIASTOLIC BLOOD PRESSURE: 66 MMHG

## 2019-04-22 DIAGNOSIS — G89.29 CHRONIC PAIN OF LEFT KNEE: Primary | ICD-10-CM

## 2019-04-22 DIAGNOSIS — M79.89 LEFT LEG SWELLING: ICD-10-CM

## 2019-04-22 DIAGNOSIS — M79.605 LEFT LEG PAIN: ICD-10-CM

## 2019-04-22 DIAGNOSIS — M25.562 CHRONIC PAIN OF LEFT KNEE: ICD-10-CM

## 2019-04-22 DIAGNOSIS — M25.562 CHRONIC PAIN OF LEFT KNEE: Primary | ICD-10-CM

## 2019-04-22 DIAGNOSIS — Z91.81 AT HIGH RISK FOR FALLS: ICD-10-CM

## 2019-04-22 DIAGNOSIS — G89.29 CHRONIC PAIN OF LEFT KNEE: ICD-10-CM

## 2019-04-22 PROCEDURE — G8399 PT W/DXA RESULTS DOCUMENT: HCPCS | Performed by: NURSE PRACTITIONER

## 2019-04-22 PROCEDURE — 93971 EXTREMITY STUDY: CPT

## 2019-04-22 PROCEDURE — 1090F PRES/ABSN URINE INCON ASSESS: CPT | Performed by: NURSE PRACTITIONER

## 2019-04-22 PROCEDURE — 4040F PNEUMOC VAC/ADMIN/RCVD: CPT | Performed by: NURSE PRACTITIONER

## 2019-04-22 PROCEDURE — 1123F ACP DISCUSS/DSCN MKR DOCD: CPT | Performed by: NURSE PRACTITIONER

## 2019-04-22 PROCEDURE — 3017F COLORECTAL CA SCREEN DOC REV: CPT | Performed by: NURSE PRACTITIONER

## 2019-04-22 PROCEDURE — 99213 OFFICE O/P EST LOW 20 MIN: CPT | Performed by: NURSE PRACTITIONER

## 2019-04-22 PROCEDURE — G8427 DOCREV CUR MEDS BY ELIG CLIN: HCPCS | Performed by: NURSE PRACTITIONER

## 2019-04-22 PROCEDURE — G8417 CALC BMI ABV UP PARAM F/U: HCPCS | Performed by: NURSE PRACTITIONER

## 2019-04-22 PROCEDURE — 1036F TOBACCO NON-USER: CPT | Performed by: NURSE PRACTITIONER

## 2019-04-22 RX ORDER — SOTALOL HYDROCHLORIDE 120 MG/1
TABLET ORAL
Qty: 60 TABLET | Refills: 5 | Status: SHIPPED | OUTPATIENT
Start: 2019-04-22 | End: 2019-05-07 | Stop reason: ALTCHOICE

## 2019-04-22 RX ORDER — LOSARTAN POTASSIUM 100 MG/1
TABLET ORAL
Qty: 30 TABLET | Refills: 5 | Status: SHIPPED | OUTPATIENT
Start: 2019-04-22 | End: 2019-06-13

## 2019-04-22 NOTE — PROGRESS NOTES
[facility] PHYSICIAN SERVICES  [place] [address]  Dept: [phone]  Dept Fax: [phone]  Loc: [phone]    Earl Vargas is a 76 y.o. female who presents today for her medical conditions/complaints as noted below. Earl Vargas is c/o of Knee Pain (left knee)        HPI:     HPI   Chief Complaint   Patient presents with    Knee Pain     left knee   The patient was in the office on  for arthritic knee pain and had a injection in the left knee. The knee had been hurting her for about a week then. Afterwards she said the knee was still painful and did not get any relief. She called in and we started her on an antibiotic to cover her for a joint infection. Her knee xray was negative on 4-15 and the redness down the back of leg started yesterday. Pain in left leg with walking. Swelling in left more than right. She sits in a wheelchair most of the day. She is very limited on her mobility. The left leg has been swelling more than normal.  Past Medical History:   Diagnosis Date    Asthma 2015    Bilateral carpal tunnel syndrome 2018    sees dr. Carlyle Butler.     COPD (chronic obstructive pulmonary disease) (HCC)     Diabetes mellitus ([address]     HTN (hypertension)     Hyperlipidemia     Mild mitral regurgitation by prior echocardiogram 2016    Morbid obesity ([address] 10/18/2017    Normal cardiac stress test 09    no ischemia at attained level of excercise    Parkinson disease ([address]     Paroxysmal atrial fibrillation ([address]     sees dr. Inocencio Rodriguez Post-menopausal     Prolonged emergence from general anesthesia     Restrictive airway disease     Stage 3 chronic kidney disease ([address] 10/18/2017      Past Surgical History:   Procedure Laterality Date    APPENDECTOMY      CARDIAC CATHETERIZATION      no interventions     SECTION      x3    COLONOSCOPY      GALLBLADDER SURGERY  2014    dr Maite Alvarez [facility] N/CARPAL TUNNEL SURG Right 8/29/2018    OPEN CARPAL TUNNEL RELEASE performed by Loraine Rodriguez MD at [address] [place] [address]  2013    dr Todd Carreon in [address] [place] [address] 4/22/2019 4/4/2019 12/14/2018 11/1/2018 10/11/2018 7/41/0160   SYSTOLIC 826 210 [phone] -   DIASTOLIC 66 74 80 70 78 -   Site - Left Upper Arm - - - -   Position - Sitting - - - -   Cuff Size - Medium Adult - - - -   Pulse 67 67 70 77 98 -   Temp 97.2 97.3 97.6 - - -   Resp - 18 18 - 20 23   Weight 280 lb - 286 lb 283 lb 279 lb -   Height 5' 2\" 5' 2\" 5' 2\" 5' 2\" 5' 2\" -   BMI (wt*703/ht~2) 51.21 kg/m2 - 52.3 kg/m2 51.76 kg/m2 51.02 kg/m2 -   Some recent data might be hidden       Family History   Problem Relation Age of Onset    High Blood Pressure Father     Diabetes Father     Parkinsonism Father     High Blood Pressure Mother     Diabetes Sister     Other Paternal Grandmother         hiatal hernia    Heart Disease Paternal Grandfather        Social History     Tobacco Use    Smoking status: Never Smoker    Smokeless tobacco: Never Used   Substance Use Topics    Alcohol use: No      Current Outpatient Medications   Medication Sig Dispense Refill    clindamycin (CLEOCIN) 300 MG capsule Take 1 capsule by mouth 3 times daily for 10 days 30 capsule 0    sotalol (BETAPACE) 120 MG tablet TAKE ONE TABLET BY MOUTH TWICE A DAY 60 tablet 5    losartan (COZAAR) 100 MG tablet TAKE ONE TABLET DAILY 30 tablet 5    NOVOLOG FLEXPEN 100 UNIT/ML injection pen INJECT 15 UNITS INTO THE SKIN 3 TIMES DAILY (BEFORE MEALS) 15 mL 3    fluconazole (DIFLUCAN) 150 MG tablet Take one on the 15th of every month 1 tablet 11    LEVEMIR 100 UNIT/ML injection vial INJECT 70 UNITS SUBQ EVERY NIGHT AT BEDTIME 10 mL 2    linaclotide (LINZESS) 145 MCG capsule TAKE ONE CAPSULE BY MOUTH EVERY MORNING BEFORE BREAKFAST 30 capsule 5    gabapentin (NEURONTIN) 600 MG tablet TAKE ONE TABLET BY MOUTH 3 Subjective:      Review of Systems   Constitutional: Positive for activity change. Musculoskeletal:        Left leg pain. Left knee swelling. Objective:     Physical Exam   Constitutional: She is oriented to person, place, and time. She appears well-developed and well-nourished. HENT:   Head: Normocephalic. Right Ear: External ear normal.   Left Ear: External ear normal.   Eyes: Pupils are equal, round, and reactive to light. Neck: Normal range of motion. Cardiovascular: Normal rate, regular rhythm, normal heart sounds and intact distal pulses. Pulmonary/Chest: Effort normal and breath sounds normal.   Musculoskeletal:        Left knee: She exhibits decreased range of motion and swelling. She exhibits no effusion, no ecchymosis, no deformity, no laceration, no erythema, normal alignment, no LCL laxity, normal patellar mobility, no bony tenderness, normal meniscus and no MCL laxity. Legs:  Neurological: She is alert and oriented to person, place, and time. Skin: Skin is warm and dry. Psychiatric: She has a normal mood and affect. Her behavior is normal. Judgment and thought content normal.   Nursing note and vitals reviewed. /66   Pulse 67   Temp 97.2 °F (36.2 °C) (Temporal)   Ht 5' 2\" (1.575 m)   Wt 280 lb (127 kg)   SpO2 96%   BMI 51.21 kg/m²     Assessment:       Diagnosis Orders   1. Chronic pain of left knee  US DOPPLER VENOUS LEG LEFT   2. Left leg pain  US DOPPLER VENOUS LEG LEFT   3. Left leg swelling  US DOPPLER VENOUS LEG LEFT   4. At high risk for falls         Plan:   I do not believe she has a joint infection. There is no redness or warmth in the knee and the x-ray was negative on the 15th. I have encouraged her to elevate her legs as sitting in the wheelchair all day will cause dependent edema and could increase her chance for blood clots. We'll send her for vascular scan. Patient given educational materials -see patient instructions.   Discussed use, benefit, and side effects of prescribed medications. All patient questions answered. Pt voiced understanding. Reviewed health maintenance. Instructed to continue currentmedications, diet and exercise. Patient agreed with treatment plan. Follow up as directed. MEDICATIONS:  No orders of the defined types were placed in this encounter. Quality & Risk Score Accuracy    Last edited 04/22/19 12:40 CDT by INGRID Day CNP         ORDERS:  Orders Placed This Encounter   Procedures    US DOPPLER VENOUS LEG LEFT       Follow-up:  No follow-ups on file. PATIENT INSTRUCTIONS:  There are no Patient Instructions on file for this visit. Electronically signed by INGRID Day CNP on 4/22/2019 at 12:43 PM    EMR Dragon/transcription disclaimer:  Much of thisencounter note is electronic transcription/translation of spoken language to printed texts. The electronic translation of spoken language may be erroneous, or at times, nonsensical words or phrases may be inadvertentlytranscribed. Although I have reviewed the note for such errors, some may still exist.  On the basis of positive falls risk screening, assessment and plan is as follows: home safety tips provided.

## 2019-04-23 ENCOUNTER — HOSPITAL ENCOUNTER (OUTPATIENT)
Dept: MRI IMAGING | Age: 69
Discharge: HOME | End: 2019-04-23
Payer: MEDICARE

## 2019-04-23 ENCOUNTER — TELEPHONE (OUTPATIENT)
Dept: PRIMARY CARE CLINIC | Age: 69
End: 2019-04-23

## 2019-04-23 ENCOUNTER — ANCILLARY ORDERS (OUTPATIENT)
Dept: PRIMARY CARE CLINIC | Age: 69
End: 2019-04-23

## 2019-04-23 DIAGNOSIS — G89.29 CHRONIC PAIN OF LEFT KNEE: Primary | ICD-10-CM

## 2019-04-23 DIAGNOSIS — M25.562 CHRONIC PAIN OF LEFT KNEE: ICD-10-CM

## 2019-04-23 DIAGNOSIS — M25.462 PAIN AND SWELLING OF LEFT KNEE: ICD-10-CM

## 2019-04-23 DIAGNOSIS — G89.29 CHRONIC PAIN OF LEFT KNEE: ICD-10-CM

## 2019-04-23 DIAGNOSIS — M25.562 PAIN AND SWELLING OF LEFT KNEE: ICD-10-CM

## 2019-04-23 DIAGNOSIS — M25.562 CHRONIC PAIN OF LEFT KNEE: Primary | ICD-10-CM

## 2019-04-23 DIAGNOSIS — M17.12 ARTHRITIS OF LEFT KNEE: ICD-10-CM

## 2019-04-23 PROCEDURE — 73721 MRI JNT OF LWR EXTRE W/O DYE: CPT

## 2019-04-23 RX ORDER — OXYCODONE HYDROCHLORIDE AND ACETAMINOPHEN 5; 325 MG/1; MG/1
1 TABLET ORAL EVERY 6 HOURS PRN
Qty: 12 TABLET | Refills: 0 | Status: SHIPPED | OUTPATIENT
Start: 2019-04-23 | End: 2019-04-26

## 2019-04-23 NOTE — TELEPHONE ENCOUNTER
I have printed an MRI and for the left knee. She cannot have a contrast because of her kidney function. If you can get it scheduled wherever she can assume this.   I know [facility] is usually weeks away so maybe [facility] or [facility]

## 2019-04-23 NOTE — TELEPHONE ENCOUNTER
Daughter called stating Pt Doppler normal but Pt is still in a great deal of pain. No better, swollen, what now?

## 2019-04-23 NOTE — TELEPHONE ENCOUNTER
Pt Daughter calling for MRI results. I told her they were not back yet.  Daughter states Pt is in severe pain and asking for something stronger than Tramadol

## 2019-04-23 NOTE — TELEPHONE ENCOUNTER
Can you call her back and tell her the only thing I can do stronger is Lortab or Percocet and in her allergies she has both listed

## 2019-04-24 DIAGNOSIS — S83.242A ACUTE MEDIAL MENISCUS TEAR OF LEFT KNEE, INITIAL ENCOUNTER: Primary | ICD-10-CM

## 2019-05-07 ENCOUNTER — OFFICE VISIT (OUTPATIENT)
Dept: CARDIOLOGY | Age: 69
End: 2019-05-07
Payer: MEDICARE

## 2019-05-07 VITALS
SYSTOLIC BLOOD PRESSURE: 118 MMHG | WEIGHT: 287 LBS | BODY MASS INDEX: 52.81 KG/M2 | DIASTOLIC BLOOD PRESSURE: 86 MMHG | HEART RATE: 69 BPM | HEIGHT: 62 IN

## 2019-05-07 DIAGNOSIS — I48.0 PAF (PAROXYSMAL ATRIAL FIBRILLATION) (HCC): Primary | ICD-10-CM

## 2019-05-07 DIAGNOSIS — E78.2 MIXED HYPERLIPIDEMIA: ICD-10-CM

## 2019-05-07 DIAGNOSIS — Z86.79 H/O DIASTOLIC DYSFUNCTION: ICD-10-CM

## 2019-05-07 DIAGNOSIS — I10 ESSENTIAL HYPERTENSION: ICD-10-CM

## 2019-05-07 DIAGNOSIS — I34.0 MILD MITRAL REGURGITATION BY PRIOR ECHOCARDIOGRAM: ICD-10-CM

## 2019-05-07 PROCEDURE — 1123F ACP DISCUSS/DSCN MKR DOCD: CPT | Performed by: NURSE PRACTITIONER

## 2019-05-07 PROCEDURE — 4040F PNEUMOC VAC/ADMIN/RCVD: CPT | Performed by: NURSE PRACTITIONER

## 2019-05-07 PROCEDURE — 1036F TOBACCO NON-USER: CPT | Performed by: NURSE PRACTITIONER

## 2019-05-07 PROCEDURE — 93000 ELECTROCARDIOGRAM COMPLETE: CPT | Performed by: NURSE PRACTITIONER

## 2019-05-07 PROCEDURE — 99213 OFFICE O/P EST LOW 20 MIN: CPT | Performed by: NURSE PRACTITIONER

## 2019-05-07 PROCEDURE — G8399 PT W/DXA RESULTS DOCUMENT: HCPCS | Performed by: NURSE PRACTITIONER

## 2019-05-07 PROCEDURE — 3017F COLORECTAL CA SCREEN DOC REV: CPT | Performed by: NURSE PRACTITIONER

## 2019-05-07 PROCEDURE — 1090F PRES/ABSN URINE INCON ASSESS: CPT | Performed by: NURSE PRACTITIONER

## 2019-05-07 PROCEDURE — G8417 CALC BMI ABV UP PARAM F/U: HCPCS | Performed by: NURSE PRACTITIONER

## 2019-05-07 PROCEDURE — G8427 DOCREV CUR MEDS BY ELIG CLIN: HCPCS | Performed by: NURSE PRACTITIONER

## 2019-05-07 RX ORDER — TRAMADOL HYDROCHLORIDE 50 MG/1
50 TABLET ORAL EVERY MORNING
COMMUNITY
End: 2019-06-13

## 2019-05-07 RX ORDER — SOTALOL HYDROCHLORIDE 120 MG/1
120 TABLET ORAL 2 TIMES DAILY
Qty: 60 TABLET | Refills: 5 | Status: SHIPPED | OUTPATIENT
Start: 2019-05-07 | End: 2019-06-13

## 2019-05-07 RX ORDER — OXYCODONE HYDROCHLORIDE AND ACETAMINOPHEN 5; 325 MG/1; MG/1
1 TABLET ORAL EVERY 6 HOURS PRN
COMMUNITY
End: 2019-12-02 | Stop reason: ALTCHOICE

## 2019-05-07 NOTE — PATIENT INSTRUCTIONS
Continue current medications as prescribed. Continue to follow up with primary care provider for non cardiac medical problems. Call the office with any problems, questions or concerns at [phone]. Follow up as scheduled with your cardiologist. Dr. Gibson Bears 6 months. The following educational material has been included in this after visit summary for your review: Life simple 7. Heart health.     Additional instructions: If you feel that your heart is out of rhythm and the irregularity lasts for more than 4 to 6 hours, notify the office. If the office is closed, go to the ER. Coronary artery disease risk factors you can control: Smoking, high blood pressure, high cholesterol, diabetes, being overweight, lack of exercise and stress. Continue heart healthy diet. Take medications as directed. Exercise as tolerated. Strive for 15 minutes of exercise most days of the week. If asked to keep a blood pressure log, do so for 2 weeks. Call the office to report readings at [phone]. Blood pressure goal  is less than 120/70. Elevated blood pressure at 120-129/80 or less. High blood pressure at 130-139/80-89. If you are taking cholesterol lowering medications, it is recommended that lab work be checked annually. Always keep a current medication list. Bring your medications to every office visit. Life simple 7  1) Manage blood pressure - high blood pressure is a major risk factor for heart disease and stroke. Keeping blood pressure in health range reduces strain on your heart, arteries and kidneys. 2) Control cholesterol - contributes to plaque, which can clog arteries and lead to heart disease and stroke. When you control your cholesterol you are giving your arteries their best chance to remain clear. 3) Reduce blood sugar - most of the food we eat is turning into glucose or blood sugar that our body uses for energy.   Over time, high levels of blood sugar can damage your heart, kidneys, eyes and nerves. 4) Get active - living an active life is one of the most rewarding gifts you can give yourself and those you love. Simply put, daily physical activity increases your length and quality of life. 5)  Eat better - A healthy diet is one of your best weapons for fighting cardiovascular disease. When you eat a heart healthy diet, you improve your chances for feeling good and staying healthy for life. 6)  Lose weight - when you shed extra fat an unnecessary pounds, you reduce the burden on your hear, lungs, blood vessels and skeleton. You give yourself the gift of active living, you lower your blood pressure and help yourself feel better. 7) Stop smoking - cigarette smokers have a higher risk of developing cardiovascular disease. If  You smoke, quitting is the best thing you can do for your health. Check American Heart Association on line for more information on Life's Simple 7 and tips for healthy living.         Patient Education        A Healthy Heart: Care Instructions  Your Care Instructions    Heart disease occurs when a substance called plaque builds up in the vessels that supply oxygen-rich blood to your heart. This can narrow the blood vessels and reduce blood flow. A heart attack happens when blood flow is completely blocked. A high-fat diet, smoking, and other factors increase the risk of heart disease. Your doctor has found that you have a chance of having heart disease. You can do lots of things to keep your heart healthy. It may not be easy, but you can change your diet, exercise more, and quit smoking. These steps really work to lower your chance of heart disease. Follow-up care is a key part of your treatment and safety. Be sure to make and go to all appointments, and call your doctor if you are having problems. It's also a good idea to know your test results and keep a list of the medicines you take. How can you care for yourself at home? Diet    · Use less salt when you cook and eat. This helps lower your blood pressure. Taste food before salting. Add only a little salt when you think you need it. With time, your taste buds will adjust to less salt.     · Eat fewer snack items, fast foods, canned soups, and other high-salt, high-fat, processed foods.     · Read food labels and try to avoid saturated and trans fats. They increase your risk of heart disease by raising cholesterol levels.     · Limit the amount of solid fat-butter, margarine, and shortening-you eat. Use olive, peanut, or canola oil when you cook. Bake, broil, and steam foods instead of frying them.     · Eating fish can lower your risk for heart disease. Eat at least 2 servings of fish a week. [place], mackerel, herring, sardines, and chunk light tuna are very good choices. These fish contain omega-3 fatty acids.     · Eat a variety of fruit and vegetables every day. Dark green, deep orange, red, or yellow fruits and vegetables are especially good for you. Examples include spinach, carrots, peaches, and berries.     · Foods high in fiber can reduce your cholesterol and provide important vitamins and minerals. High-fiber foods include whole-grain cereals and breads, oatmeal, beans, brown rice, citrus fruits, and apples.     · Limit drinks and foods with added sugar. These include candy, desserts, and soda pop.    Lifestyle changes    · If your doctor recommends it, get more exercise. Walking is a good choice. Bit by bit, increase the amount you walk every day. Try for at least 30 minutes on most days of the week. You also may want to swim, bike, or do other activities.     · Do not smoke. If you need help quitting, talk to your doctor about stop-smoking programs and medicines. These can increase your chances of quitting for good. Quitting smoking may be the most important step you can take to protect your heart. It is never too late to quit.  You will get health benefits right away.     · Limit alcohol to 2 drinks a day for men and 1 drink a day for women. Too much alcohol can cause health problems. Medicines    · Take your medicines exactly as prescribed. Call your doctor if you think you are having a problem with your medicine.     · If your doctor recommends aspirin, take the amount directed each day. Make sure you take aspirin and not another kind of pain reliever, such as acetaminophen (Tylenol). If you take ibuprofen (such as Advil or Motrin) for other problems, take aspirin at least 2 hours before taking ibuprofen. When should you call for help? Call 911 if you have symptoms of a heart attack. These may include:    · Chest pain or pressure, or a strange feeling in the chest.     · Sweating.     · Shortness of breath.     · Pain, pressure, or a strange feeling in the back, neck, jaw, or upper belly or in one or both shoulders or arms.     · Lightheadedness or sudden weakness.     · A fast or irregular heartbeat.    After you call 911, the  may tell you to chew 1 adult-strength or 2 to 4 low-dose aspirin. Wait for an ambulance. Do not try to drive yourself.   Watch closely for changes in your health, and be sure to contact your doctor if you have any problems. Where can you learn more? Go to https://[employer]. org and sign in to your [employer] account. Enter T080 in the [facility] box to learn more about \"A Healthy Heart: Care Instructions. \"     If you do not have an account, please click on the \"Sign Up Now\" link. Current as of: July 22, 2018  Content Version: 11.9  © 5921-0192 [employer]. Care instructions adapted under license by [employer]. If you have questions about a medical condition or this instruction, always ask your healthcare professional. [place] [address] any warranty or liability for your use of this information.

## 2019-05-07 NOTE — PROGRESS NOTES
syndrome G56.03    Mixed hyperlipidemia E78.2     Past Medical History:   Diagnosis Date    Asthma 2015    Bilateral carpal tunnel syndrome 2018    sees dr. Valeria Costello.  COPD (chronic obstructive pulmonary disease) (HCC)     Diabetes mellitus [facility]     HTN (hypertension)     Hyperlipidemia     Mild mitral regurgitation by prior echocardiogram 2016    Morbid obesity ([address] 10/18/2017    Normal cardiac stress test 09    no ischemia at attained level of excercise    Parkinson disease ([facility]     Paroxysmal atrial fibrillation ([facility]     sees dr. Pee Hameed Post-menopausal     Prolonged emergence from general anesthesia     Restrictive airway disease     Stage 3 chronic kidney disease ([facility] 10/18/2017     Past Surgical History:   Procedure Laterality Date    APPENDECTOMY      CARDIAC CATHETERIZATION      no interventions     SECTION      x3    COLONOSCOPY      GALLBLADDER SURGERY  2014    dr Carlos HERNANDEZ/CARPAL TUNNEL SURG Right 2018    OPEN CARPAL TUNNEL RELEASE performed by Abena Rosales MD at [address] [place] [address] TYMPANOSTOMY TUBE PLACEMENT      dr Galvez Been in [facility] UPPER GASTROINTESTINAL ENDOSCOPY       Family History   Problem Relation Age of Onset    High Blood Pressure Father    [facility] Diabetes Father     Parkinsonism Father     High Blood Pressure Mother     Diabetes Sister     Other Paternal Grandmother         hiatal hernia    Heart Disease Paternal Grandfather      Social History     Tobacco Use    Smoking status: Never Smoker    Smokeless tobacco: Never Used   Substance Use Topics    Alcohol use: No      Current Outpatient Medications   Medication Sig Dispense Refill    denosumab (PROLIA) 60 MG/ML SOSY SC injection Inject 60 mg into the skin every 6 months      traMADol (ULTRAM) 50 MG tablet Take 50 mg by mouth every morning.       oxyCODONE-acetaminophen (PERCOCET) 5-325 MG per tablet Take 1 tablet by mouth every 6 tablet 2 PO QAM, 1  PO Q Noon, and 2 PO  tablet 5    Diphenhydramine-APAP, sleep, (TYLENOL PM EXTRA STRENGTH PO) Take by mouth      insulin aspart (NOVOLOG FLEXPEN) 100 UNIT/ML injection pen INJECT 15 UNITS INTO THE SKIN 3 TIMES DAILY (BEFORE MEALS) (Patient taking differently: Inject 15 Units into the skin 3 times daily (before meals) INJECT 15 UNITS INTO THE SKIN 3 TIMES DAILY (BEFORE MEALS)) 15 mL 3    nystatin (MYCOSTATIN) 562980 UNIT/GM ointment Apply topically 2 times daily. for yeast 60 Tube 5    glucose blood VI test strips (ONE TOUCH ULTRA TEST) strip Inject 1 each into the skin daily As needed. 100 each 3    clobetasol (TEMOVATE) 0.05 % ointment Apply external vagina 3 x a day for week one, then decrease to BID on week two, on week 3 once a day, on week four every other day then stop 1 Tube 1    fluticasone (ARNUITY ELLIPTA) 100 MCG/ACT AEPB Inhale 100 mcg into the lungs      nystatin (NYSTATIN) 707748 UNIT/GM powder Apply topically 3 times daily Apply topically 4 times daily. 60 g 3    Cholecalciferol (VITAMIN D3) 5000 units TABS Take 1 tablet by mouth daily       albuterol (ACCUNEB) 1.25 MG/3ML nebulizer solution Inhale 3 mLs into the lungs every 6 hours as needed for Wheezing 360 mL 3    SURE COMFORT INS SYR 1CC/30G 30G X 5/16\" 1 ML MISC       Lancets MISC 1 lancet to check glucose daily 100 each 3    Insulin Pen Needle 31G X 8 MM MISC Inject 1 each into the skin daily 100 each 2    albuterol (PROVENTIL HFA;VENTOLIN HFA) 108 (90 BASE) MCG/ACT inhaler Inhale 2 puffs into the lungs every 6 hours as needed for Wheezing 1 Inhaler 1    OXYGEN 2L NC 12-24 hours (Patient taking differently: nightly as needed 2L NC 12-24 hours) 1 Device 0    Blood Glucose Monitoring Suppl STERLIGN by Does not apply route. 1 Device 0    aspirin 325 MG tablet Take 325 mg by mouth daily. No current facility-administered medications for this visit.       Allergies: Codeine and Respiratory - Normal respiratory effort without use of accessory muscles. Ausculatation reveals vesicular breath sounds without crackles, wheezes, rub or rhonchi. Cardiovascular - No jugular venous distention. Auscultation reveals regular rate and rhythm. No audible clicks, gallop or rub. No murmur. No lower extremity varicosities. No carotid bruits. Abdominal -  No visible distention, mass or pulsations. Extremities - No clubbing or cyanosis. No statis dermatitis or ulcers. No edema. Musculoskeletal -   No Osler's nodes. No kyphosis or scoliosis. Transported via wheelchair. Skin -  Warm and dry; no rash or pallor. No new surgical wound. Neurological - No focal neurological deficits. Thought processes coherent. No apparent tremor. Oriented to person, place and time. Psychiatric -  Appropriate affect and mood. Assessment:     Diagnosis Orders   1. PAF (paroxysmal atrial fibrillation) (HCC)  EKG 12 lead   2. Essential hypertension  EKG 12 lead   3. H/O diastolic dysfunction     4. Mixed hyperlipidemia     5. Mild mitral regurgitation by prior echocardiogram       Data reviewed:  10/20/17 Conclusions    Summary   Left ventricular size is normal .   Moderate concentric left ventricular hypertrophy.   Normal ejection fraction.   Technically difficulty study due to poor acoustic windows and body   habitus.   This study is of limited diagnostic utility.   Suggest transesophageal echocardiogram to heightened diagnostic   sensitivity and specificity.    Signature    ----------------------------------------------------------------   Electronically signed by Criselda Epley MD(Interpreting   physician) on 10/20/2017 07:43 PM  NM Myocardial Spect Rest Multi   Status: Final result   Order Providers     Authorizing Encounter [facility], APRN [facility] NUC MED RM 1 C.  Nicole Pederson MD          Signed by     Signed Date/Time  Phone Pager   [facility] 10/13/2016 14:39 [phone]    Reading Radiologists     Read Date Phone Pager   Estephania Notice Oct 13, 2016 [phone]    All Reviewers List     INGRID Villavicencio on 10/14/2016 13:34   INGRID Villavicencio on 10/14/2016 13:34   Delio Huff RN on 10/14/2016 10:56   Delio Huff RN on 10/14/2016 10:56   Result Notes for NM Myocardial Spect Rest Multi     Notes Recorded by Delio Huff RN on 10/14/2016 at 10:56 AM  Letter mailed to patient regarding results of Sarah Core.  ------    Notes Recorded by INGRID Villavicencio on 10/14/2016 at 8:28 AM  NO ischemia noted on Lexiscan          Radiation Dose Estimates     No radiation information found for this patient   Narrative   Examination: Jocelyn scan.       Comment: The patient was injected with 10.8 mCi of technetium 99   sestamibi. Following the Jocelyn scan infusion she was reinjected with   30.8 mCi of technetium 99 sestamibi. Repeat images were obtained   following standard protocol revealing the followin. Analysis of stress and rest images reveals an apparent fixed defect   along the inferior wall and apex. There is no reversible ischemia. 2. Analysis of gated images reveals that the left ventricular ejection   fraction is 71 percent at rest.           Impression   Impression:   1. Infero-apical infarction versus artifact with no reversible   ischemia. 2. The left ventricular ejection fraction is 71 percent at rest.       Dictated on 10/13/2016 3:35 PM EST. Signed by Dr Mark Bourgeois on   10/13/2016 3:39 PM EST   Signed by Dr Silvia Larry 10/13/2016 14:39         EKG reviewed:  NSR 69 BPM; no acute ischemic changes or ectopy. No acute ischemic changes. 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 Stable CV status without symptoms of overt heart failure, arrhythmia or angina. No recurrent AF on Sotalol 120 mg BID. Patient not taking [address] at this time, on aspirin. BP well controlled. PCP follows lipids.   Patient is compliant with medication regimen. BP Readings from Last 3 Encounters:   05/07/19 118/86   04/22/19 100/66   04/04/19 126/74    Pulse Readings from Last 3 Encounters:   05/07/19 69   04/22/19 67   04/04/19 67        Wt Readings from Last 3 Encounters:   05/07/19 287 lb (130.2 kg)   04/22/19 280 lb (127 kg)   12/14/18 286 lb (129.7 kg)     Plan  Previous cardiac history and records reviewed. Continue current medications as prescribed. Continue to follow up with primary care provider for non cardiac medical problems. Call the office with any problems, questions or concerns at [phone]. Follow up as scheduled with your cardiologist. Dr. Nadege Salas 6 months. The following educational material has been included in this after visit summary for your review: Life simple 7. Heart health.     Additional instructions: If you feel that your heart is out of rhythm and the irregularity lasts for more than 4 to 6 hours, notify the office. If the office is closed, go to the ER. Coronary artery disease risk factors you can control: Smoking, high blood pressure, high cholesterol, diabetes, being overweight, lack of exercise and stress. Continue heart healthy diet. Take medications as directed. Exercise as tolerated. Strive for 15 minutes of exercise most days of the week. If asked to keep a blood pressure log, do so for 2 weeks. Call the office to report readings at [phone]. Blood pressure goal  is less than 120/70. Elevated blood pressure at 120-129/80 or less. High blood pressure at 130-139/80-89. If you are taking cholesterol lowering medications, it is recommended that lab work be checked annually. Always keep a current medication list. Bring your medications to every office visit. Life simple 7  1) Manage blood pressure - high blood pressure is a major risk factor for heart disease and stroke. Keeping blood pressure in health range reduces strain on your heart, arteries and kidneys.   2) Control cholesterol - contributes to plaque, which can clog arteries and lead to heart disease and stroke. When you control your cholesterol you are giving your arteries their best chance to remain clear. 3) Reduce blood sugar - most of the food we eat is turning into glucose or blood sugar that our body uses for energy. Over time, high levels of blood sugar can damage your heart, kidneys, eyes and nerves. 4) Get active - living an active life is one of the most rewarding gifts you can give yourself and those you love. Simply put, daily physical activity increases your length and quality of life. 5)  Eat better - A healthy diet is one of your best weapons for fighting cardiovascular disease. When you eat a heart healthy diet, you improve your chances for feeling good and staying healthy for life. 6)  Lose weight - when you shed extra fat an unnecessary pounds, you reduce the burden on your hear, lungs, blood vessels and skeleton. You give yourself the gift of active living, you lower your blood pressure and help yourself feel better. 7) Stop smoking - cigarette smokers have a higher risk of developing cardiovascular disease. If  You smoke, quitting is the best thing you can do for your health. Check American Heart Association on line for more information on Life's Simple 7 and tips for healthy living.      INGRID Garces

## 2019-05-13 RX ORDER — OMEPRAZOLE 40 MG/1
CAPSULE, DELAYED RELEASE ORAL
Qty: 30 CAPSULE | Refills: 3 | Status: SHIPPED | OUTPATIENT
Start: 2019-05-13 | End: 2019-06-13

## 2019-05-13 RX ORDER — LIRAGLUTIDE 6 MG/ML
INJECTION SUBCUTANEOUS
Qty: 9 ML | Refills: 3 | Status: SHIPPED | OUTPATIENT
Start: 2019-05-13 | End: 2019-07-10 | Stop reason: SDUPTHER

## 2019-05-15 RX ORDER — ROPINIROLE 4 MG/1
TABLET, FILM COATED ORAL
Qty: 150 TABLET | Refills: 5 | Status: SHIPPED | OUTPATIENT
Start: 2019-05-15 | End: 2019-05-24 | Stop reason: SDUPTHER

## 2019-05-15 NOTE — TELEPHONE ENCOUNTER
Requested Prescriptions     Pending Prescriptions Disp Refills    rOPINIRole (REQUIP) 4 MG tablet [Pharmacy Med Name: ROPINIROLE TAB 4MG TABLET] 150 tablet 5     Sig: TAKE TWO TABLETS EVERY MORNING TAKE ONE TABLET AT NOON AND TAKE TWO TABLETS EVERY NIGHT AT BEDTIME       Last OV  10/11/18  Next OV  5/24/19  Last Rx   10/19/18 with 5 refills  Cornel Fernández   4/24/19

## 2019-05-20 RX ORDER — ATORVASTATIN CALCIUM 10 MG/1
TABLET, FILM COATED ORAL
Qty: 30 TABLET | Refills: 3 | Status: SHIPPED | OUTPATIENT
Start: 2019-05-20 | End: 2019-06-13

## 2019-05-20 RX ORDER — INSULIN DETEMIR 100 [IU]/ML
INJECTION, SOLUTION SUBCUTANEOUS
Qty: 10 ML | Refills: 2 | Status: SHIPPED | OUTPATIENT
Start: 2019-05-20 | End: 2019-07-08 | Stop reason: SDUPTHER

## 2019-05-23 DIAGNOSIS — R52 PAIN: ICD-10-CM

## 2019-05-23 RX ORDER — TRAMADOL HYDROCHLORIDE 50 MG/1
TABLET ORAL
Qty: 60 TABLET | Refills: 0 | Status: SHIPPED | OUTPATIENT
Start: 2019-05-23 | End: 2019-07-08 | Stop reason: SDUPTHER

## 2019-05-24 ENCOUNTER — OFFICE VISIT (OUTPATIENT)
Dept: NEUROLOGY | Age: 69
End: 2019-05-24
Payer: MEDICARE

## 2019-05-24 VITALS
DIASTOLIC BLOOD PRESSURE: 73 MMHG | SYSTOLIC BLOOD PRESSURE: 130 MMHG | BODY MASS INDEX: 52.63 KG/M2 | RESPIRATION RATE: 18 BRPM | HEIGHT: 62 IN | HEART RATE: 82 BPM | WEIGHT: 286 LBS

## 2019-05-24 DIAGNOSIS — G20 PARKINSON DISEASE (HCC): Primary | ICD-10-CM

## 2019-05-24 PROCEDURE — 3017F COLORECTAL CA SCREEN DOC REV: CPT | Performed by: PHYSICIAN ASSISTANT

## 2019-05-24 PROCEDURE — 99213 OFFICE O/P EST LOW 20 MIN: CPT | Performed by: PHYSICIAN ASSISTANT

## 2019-05-24 PROCEDURE — G8417 CALC BMI ABV UP PARAM F/U: HCPCS | Performed by: PHYSICIAN ASSISTANT

## 2019-05-24 PROCEDURE — 1090F PRES/ABSN URINE INCON ASSESS: CPT | Performed by: PHYSICIAN ASSISTANT

## 2019-05-24 PROCEDURE — 1036F TOBACCO NON-USER: CPT | Performed by: PHYSICIAN ASSISTANT

## 2019-05-24 PROCEDURE — G8427 DOCREV CUR MEDS BY ELIG CLIN: HCPCS | Performed by: PHYSICIAN ASSISTANT

## 2019-05-24 PROCEDURE — 4040F PNEUMOC VAC/ADMIN/RCVD: CPT | Performed by: PHYSICIAN ASSISTANT

## 2019-05-24 PROCEDURE — G8399 PT W/DXA RESULTS DOCUMENT: HCPCS | Performed by: PHYSICIAN ASSISTANT

## 2019-05-24 PROCEDURE — 1123F ACP DISCUSS/DSCN MKR DOCD: CPT | Performed by: PHYSICIAN ASSISTANT

## 2019-05-24 RX ORDER — ROPINIROLE 4 MG/1
TABLET, FILM COATED ORAL
Qty: 150 TABLET | Refills: 5 | Status: SHIPPED | OUTPATIENT
Start: 2019-05-24 | End: 2020-05-18

## 2019-05-24 RX ORDER — HYDROCODONE BITARTRATE AND ACETAMINOPHEN 10; 325 MG/1; MG/1
TABLET ORAL
COMMUNITY
Start: 2019-05-10 | End: 2019-06-13

## 2019-05-24 NOTE — PROGRESS NOTES
[facility] Neurology Follow Up Encounter      Information:   Patient Name: Lisa Brown  :   1950  Age:   76 y.o. MRN:   [id number]  Account #:  [de-identified]  Date:              19    Provider:  [facility], [facility]    Chief Complaint   Patient presents with    6 Month Follow-Up     Parkinson disease       Subjective:   Lisa Brown is a 76 y.o. female  with a history of Parkinson's disease, macular degeneration, and visual halllucinations who comes in for a follow up. She is taking Sinemet 25/100, 1 1/2 tablet four times a day but varies it based on her tremor. She takes Requip 4 mg, 2 in the am then 1 BID after that. She uses a scooter at home due to left knee pain. She had an arthroscopy. She needs help getting in the shower, otherwise she is independent with ADL's. The visual hallucinations have decreased. She describes them as humorous. The tremor is stable. Objective:     Past Medical History:   Diagnosis Date    Asthma 2015    Bilateral carpal tunnel syndrome 2018    sees dr. Tori Siddiqui.     COPD (chronic obstructive pulmonary disease) (HCC)     Diabetes mellitus ([address]     HTN (hypertension)     Hyperlipidemia     Mild mitral regurgitation by prior echocardiogram 2016    Morbid obesity ([address] 10/18/2017    Normal cardiac stress test 09    no ischemia at attained level of excercise    Parkinson disease ([address]     Paroxysmal atrial fibrillation ([address]     sees dr. Pallavi Coburn Post-menopausal     Prolonged emergence from general anesthesia     Restrictive airway disease     Stage 3 chronic kidney disease ([address] 10/18/2017       Past Surgical History:   Procedure Laterality Date    APPENDECTOMY      CARDIAC CATHETERIZATION      no interventions     SECTION      x3    COLONOSCOPY      GALLBLADDER SURGERY  2014    dr Karo Becker ARTHROSCOPY      [place] REVISE MEDIAN N/CARPAL TUNNEL SURG Right 2018    OPEN CARPAL TUNNEL RELEASE (NORCO)  MG per tablet       carbidopa-levodopa (SINEMET)  MG per tablet TAKE TWO TABLETS 3 TIMES DAILY 180 tablet 5    rOPINIRole (REQUIP) 4 MG tablet TAKE TWO TABLETS EVERY MORNING TAKE ONE TABLET AT NOON AND TAKE TWO TABLETS EVERY NIGHT AT BEDTIME 150 tablet 5    traMADol (ULTRAM) 50 MG tablet TAKE ONE TABLET BY MOUTH EVERY 8 HOURS AS NEEDED FOR PAIN . . .MAY CAUSE DROWSINESS! ! 60 tablet 0    LEVEMIR 100 UNIT/ML injection vial INJECT 70 UNITS SUBQ EVERY NIGHT AT BEDTIME 10 mL 2    VICTOZA 18 MG/3ML SOPN SC injection INJECT 1.8MG SUBQ DAILY 9 mL 3    denosumab (PROLIA) 60 MG/ML SOSY SC injection Inject 60 mg into the skin every 6 months      oxyCODONE-acetaminophen (PERCOCET) 5-325 MG per tablet Take 1 tablet by mouth every 6 hours as needed for Pain.       sotalol (BETAPACE) 120 MG tablet TAKE ONE TABLET BY MOUTH TWICE A DAY 60 tablet 5    losartan (COZAAR) 100 MG tablet TAKE ONE TABLET DAILY 30 tablet 5    NOVOLOG FLEXPEN 100 UNIT/ML injection pen INJECT 15 UNITS INTO THE SKIN 3 TIMES DAILY (BEFORE MEALS) 15 mL 3    fluconazole (DIFLUCAN) 150 MG tablet Take one on the 15th of every month 1 tablet 11    linaclotide (LINZESS) 145 MCG capsule TAKE ONE CAPSULE BY MOUTH EVERY MORNING BEFORE BREAKFAST 30 capsule 5    gabapentin (NEURONTIN) 600 MG tablet TAKE ONE TABLET BY MOUTH 3 TIMES DAILY AS NEEDED 90 tablet 5    omeprazole (PRILOSEC) 40 MG delayed release capsule TAKE ONE CAPSULE BY MOUTH DAILY 30 capsule 3    montelukast (SINGULAIR) 10 MG tablet TAKE ONE TABLET BY MOUTH EVERY NIGHT AT BEDTIME 30 tablet 5    atorvastatin (LIPITOR) 10 MG tablet TAKE ONE TABLET BY MOUTH EVERY DAY EACH EVENING 30 tablet 5    furosemide (LASIX) 40 MG tablet Take 1 tablet by mouth daily 90 tablet 3    spironolactone (ALDACTONE) 25 MG tablet TAKE ONE TABLET DAILY (Patient taking differently: One tablet in the am and one tablet in the evening) 30 tablet 5    insulin aspart (NOVOLOG FLEXPEN) 100 UNIT/ML injection pen INJECT 15 UNITS INTO THE SKIN 3 TIMES DAILY (BEFORE MEALS) (Patient taking differently: Inject 15 Units into the skin 3 times daily (before meals) INJECT 15 UNITS INTO THE SKIN 3 TIMES DAILY (BEFORE MEALS)) 15 mL 3    nystatin (MYCOSTATIN) 034058 UNIT/GM ointment Apply topically 2 times daily. for yeast 60 Tube 5    glucose blood VI test strips (ONE TOUCH ULTRA TEST) strip Inject 1 each into the skin daily As needed. 100 each 3    clobetasol (TEMOVATE) 0.05 % ointment Apply external vagina 3 x a day for week one, then decrease to BID on week two, on week 3 once a day, on week four every other day then stop 1 Tube 1    fluticasone (ARNUITY ELLIPTA) 100 MCG/ACT AEPB Inhale 100 mcg into the lungs      nystatin (NYSTATIN) 429499 UNIT/GM powder Apply topically 3 times daily Apply topically 4 times daily. 60 g 3    Cholecalciferol (VITAMIN D3) 5000 units TABS Take 1 tablet by mouth daily       albuterol (ACCUNEB) 1.25 MG/3ML nebulizer solution Inhale 3 mLs into the lungs every 6 hours as needed for Wheezing 360 mL 3    SURE COMFORT INS SYR 1CC/30G 30G X 5/16\" 1 ML MISC       Lancets MISC 1 lancet to check glucose daily 100 each 3    Insulin Pen Needle 31G X 8 MM MISC Inject 1 each into the skin daily 100 each 2    albuterol (PROVENTIL HFA;VENTOLIN HFA) 108 (90 BASE) MCG/ACT inhaler Inhale 2 puffs into the lungs every 6 hours as needed for Wheezing 1 Inhaler 1    OXYGEN 2L NC 12-24 hours (Patient taking differently: nightly as needed 2L NC 12-24 hours) 1 Device 0    Blood Glucose Monitoring Suppl STERLING by Does not apply route. 1 Device 0    aspirin 325 MG tablet Take 325 mg by mouth daily.  atorvastatin (LIPITOR) 10 MG tablet TAKE ONE TABLET BY MOUTH EVERY DAY EACH EVENING 30 tablet 3    omeprazole (PRILOSEC) 40 MG delayed release capsule TAKE ONE CAPSULE BY MOUTH DAILY 30 capsule 3    traMADol (ULTRAM) 50 MG tablet Take 50 mg by mouth every morning.       sotalol (BETAPACE) 120 MG tablet Take 1 tablet by mouth 2 times daily 60 tablet 5    losartan (COZAAR) 100 MG tablet TAKE ONE TABLET DAILY 30 tablet 5    montelukast (SINGULAIR) 10 MG tablet TAKE ONE TABLET BY MOUTH EVERY NIGHT AT BEDTIME 30 tablet 3    Diphenhydramine-APAP, sleep, (TYLENOL PM EXTRA STRENGTH PO) Take by mouth       No current facility-administered medications for this visit. Allergies:   Allergies   Allergen Reactions    Codeine      itching    Hydrocodone-Acetaminophen Nausea Only       REVIEW OF SYSTEMS     Constitutional: []Fever []Sweats []Chills [] Recent Injury   [x] Denies all unless marked  HENT:[]Headache  [] Head Injury  [] Sore Throat  [] Ear Pain  [] Dizziness [] Hearing Loss   [x] Denies all unless marked  Spine:  [] Neck pain  [] Back pain  [] Sciaticia  [x] Denies all unless marked  Cardiovascular:[]Chest Pain []Palpitations [] Heart Disease  [x] Denies all unless marked  Pulmonary: []Shortness of Breath []Cough   [x] Denies all unless marked  Gastrointestinal:  []Abdominal Pain  []Blood in Stool  []Diarrhea []Constipation []Nausea  []Vomiting  [x] Denies all unless marked  Genitourinary:  [] Dysuria [] Frequency  [] Incontinence [] Urgency   [x] Denies all unless marked  Musculoskeletal: [] Arthralgia  [] Myalgias [] Muscle cramps  [] Muscle twitches   [x] Denies all unless marked   Extremities:   [x] Pain   [x] Swelling   [] Denies all unless marked  Skin:[] Rash  [] Color Change  [x] Denies all unless marked  Neurological:[] Visual Disturbance [] Double Vision [] Slurred Speech [] Trouble swallowing  [] Vertigo [] Tingling [] Numbness [] Weakness [] Loss of Balance   [] Loss of Consciousness [] Memory Loss [] Seizures  [x] Denies all unless marked  Psychiatric/Behavioral:[] Depression [] Anxiety  [x] Denies all unless marked  Sleep: []  Insomnia [] Sleep Disturbance [] Snoring [] Restless Legs [] Daytime Sleepiness [] Sleep Apnea  [x] Denies all unless marked      The MA has completed the ROS with the 01/18/2019    GLUCOSE 273 (H) 01/18/2019    CALCIUM 9.6 01/18/2019    PROT 7.3 01/18/2019    LABALBU 4.0 01/18/2019    BILITOT 0.3 01/18/2019    ALKPHOS 109 (H) 01/18/2019    AST 11 01/18/2019    ALT <5 (A) 01/18/2019    LABGLOM 37 (A) 01/18/2019    GLOB 2.7 11/21/2016             Assessment:       ICD-10-CM    1. Parkinson disease ([facility] G20              [X]  :  Stable        [  ]  :  Improved                          [  ]  :  Well controlled                 [  ]  :  Resolving        [  ]  :  Resolved        [  ]  :  Inadequately controlled        [  ]  :  Worsening        [  ]  :  Additional workup planned      Plan:   No orders of the defined types were placed in this encounter. Orders Placed This Encounter   Medications    carbidopa-levodopa (SINEMET)  MG per tablet     Sig: TAKE TWO TABLETS 3 TIMES DAILY     Dispense:  180 tablet     Refill:  5    rOPINIRole (REQUIP) 4 MG tablet     Sig: TAKE TWO TABLETS EVERY MORNING TAKE ONE TABLET AT NOON AND TAKE TWO TABLETS EVERY NIGHT AT BEDTIME     Dispense:  150 tablet     Refill:  5         1. The following educational material has been included in this visit after visit summary for your review: PD-  Discussed with the patient and all questions fully answered. 2.  We had a discussion about the clinical issues and went over the various important aspects to consider. Treatment plan discussed. Discussed use, benefit, and SE of prescribed medication. All questions were answered. Pt voiced understanding and agrees with treatment plan. 3. The current medical regimen is effective;  continue present plan and medications. 4.  Refill Sinemet and Requip  5. Follow up with Dr. Lorenzo Gong in 6 months    Note:  A total of >50% (>8 minutes) of 15 minutes was spent discussing the pathophysiology and treatment and/or coordination of care of the above diagnoses.

## 2019-05-24 NOTE — PATIENT INSTRUCTIONS
Patient Education        Parkinson's Disease: Care Instructions  Your Care Instructions  Parkinson's disease can cause tremors, stiffness, and problems with movement. Severe or advanced cases can also cause problems with thinking. In Parkinson's disease, part of the brain cannot make enough dopamine, a chemical that helps control movement. Taking your medicines correctly and getting regular exercise may help you maintain your quality of life. There are many things that can cause Parkinson's disease symptoms, including some medicine, some toxins, and trauma to the head. The cause in most cases is not known. Follow-up care is a key part of your treatment and safety. Be sure to make and go to all appointments, and call your doctor if you are having problems. It's also a good idea to know your test results and keep a list of the medicines you take. How can you care for yourself at home? General care  · Take your medicines exactly as prescribed. Call your doctor if you think you are having a problem with your medicine. · Make sure your home is safe:  ? Place furniture so that you have something to hold on to as you walk around the house. ? Use chairs that make it easier to sit down and stand up. ? Group the things you use most, such as reading glasses, keys, and the telephone, in one easy-to-reach place. ? Tack down rugs so that you do not trip. ? Put no-slip tape and handrails in the tub to prevent falls. · Use a cane, walker, or scooter if your doctor suggests it. · Keep up your normal activities as much as you can. · Find ways to manage stress, which can make symptoms worse. · Spend time with family and friends. Join a support group for people with Parkinson's disease if you want extra help. · Depression is common with this condition. Tell your doctor if you have trouble sleeping, are eating too much or are not hungry, or feel sad or tearful all the time.  Depression can be treated with medicine and counseling. Diet and exercise  · Eat a balanced diet. · If you are taking levodopa, do not eat protein at the same time you take your medicine. Levodopa may not work as well if you take it at the same time you eat protein. You can eat normal amounts of protein. Talk to your doctor if you have questions. · If you have problems swallowing, change how and what you eat:  ? Try thick drinks, such as milk shakes. They are easier to swallow than other fluids. ? Do not eat foods that crumble easily. These can cause choking. ? Use a  to prepare food. Soft foods need less chewing. ? Eat small meals often so that you do not get tired from eating heavy meals. · Drink plenty of water and eat a high-fiber diet to prevent constipation. Parkinson's--and the medicines that treat it--may slow your intestines. · Get exercise on most days. Work with your doctor to set up a program of walking, swimming, or other exercise you are able to do. When should you call for help? Call your doctor now or seek immediate medical care if:    · You have a change in your symptoms.     · You develop other problems from your condition, such as:  ? Injury from a fall. ? Thinking or memory problems. ? A urinary tract infection (burning pain when urinating).    Watch closely for changes in your health, and be sure to contact your doctor if:    · You lose weight because of problems with eating.     · You want more information about your condition or your medicines. Where can you learn more? Go to [email or web address] and sign in to your [employer] account. Enter Z615 in the [facility] box to learn more about \"Parkinson's Disease: Care Instructions. \"     If you do not have an account, please click on the \"Sign Up Now\" link. Current as of: Zee 3, 2018  Content Version: 12.0  © 8753-0655 [employer]wise, Incorporated. Care instructions adapted under license by [facility] ([facility]).  If you have questions about a medical

## 2019-05-24 NOTE — PROGRESS NOTES

## 2019-06-12 NOTE — PROGRESS NOTES
Subjective:      Patient ID: Jorge Urrutia is a 76 y.o. female  Erendira WhitakerINGRID johnson - [facility]  Prashant INGRID Barcenas *    HPI  Chief Complaint   Patient presents with    Colon Cancer Screening     ref by [facility]    Other     history of colon polyps     Referred for screening colonosocpy. History of colon polyps. Last c-scope 6/6/2013 per Dr. Kevin Santiago, normal.     Patient c/o gas and bloating. Varies day to day. Sometimes has large amount of flatulence and also belching. Abdomen gets distended and firm. No abdominal pain. She has chronic constipation managed with Linzess. She reports this as working well. She denies rectal bleeding. No change in bowel habit. Patient followed by cardiology for a-fib. She was seen 5/7/2019 with condition noted as stable. Family History   Problem Relation Age of Onset    High Blood Pressure Father     Diabetes Father     Parkinsonism Father     High Blood Pressure Mother     Diabetes Sister     Other Paternal Grandmother         hiatal hernia    Heart Disease Paternal Grandfather     Colon Cancer Neg Hx     Colon Polyps Neg Hx     Esophageal Cancer Neg Hx     Liver Cancer Neg Hx     Liver Disease Neg Hx     Rectal Cancer Neg Hx     Stomach Cancer Neg Hx        Past Medical History:   Diagnosis Date    Asthma 4/21/2015    Bilateral carpal tunnel syndrome 04/09/2018    sees dr. Rod Mac.     Colon polyp     COPD (chronic obstructive pulmonary disease) (HCC)     Diabetes mellitus (HCC)     GERD (gastroesophageal reflux disease)     HTN (hypertension)     Hyperlipidemia     Mild mitral regurgitation by prior echocardiogram 2/11/2016    Morbid obesity ([address] 10/18/2017    Normal cardiac stress test 4-2-09    no ischemia at attained level of excercise    Parkinson disease ([address]     Paroxysmal atrial fibrillation ([address]     sees dr. Junior Esteban Post-menopausal     Prolonged emergence from general anesthesia     Restrictive airway disease     Stage 3 chronic kidney disease ([facility] 10/18/2017       Past Surgical History:   Procedure Laterality Date    APPENDECTOMY      CARDIAC CATHETERIZATION      no interventions     SECTION      x3    COLONOSCOPY      Dr Dara Huynh polyp-5 yr recall    GALLBLADDER SURGERY  2014    dr Sonya Muhammad ARTHROSCOPY      [place] REVISE MEDIAN N/CARPAL TUNNEL SURG Right 2018    OPEN CARPAL TUNNEL RELEASE performed by Zane Dia MD at [facility] TYMPANOSTOMY TUBE PLACEMENT      dr Omid Garcia in [address] [place] [address] ENDOSCOPY         Current Outpatient Medications   Medication Sig Dispense Refill    Melatonin 10 MG CAPS Take 10 mg by mouth every evening      Multiple Vitamins-Minerals (ICAPS AREDS 2 PO) Take by mouth daily      carbidopa-levodopa (SINEMET)  MG per tablet TAKE TWO TABLETS 3 TIMES DAILY 180 tablet 5    rOPINIRole (REQUIP) 4 MG tablet TAKE TWO TABLETS EVERY MORNING TAKE ONE TABLET AT NOON AND TAKE TWO TABLETS EVERY NIGHT AT BEDTIME (Patient taking differently: TAKE TWO TABLETS EVERY MORNING TAKE ONE TABLET AT NOON AND TAKE ONE TABLET EVERY NIGHT AT BEDTIME) 150 tablet 5    traMADol (ULTRAM) 50 MG tablet TAKE ONE TABLET BY MOUTH EVERY 8 HOURS AS NEEDED FOR PAIN . . .MAY CAUSE DROWSINESS! ! 60 tablet 0    LEVEMIR 100 UNIT/ML injection vial INJECT 70 UNITS SUBQ EVERY NIGHT AT BEDTIME (Patient taking differently: INJECT 70 UNITS SUBQ EVERY MORNING) 10 mL 2    VICTOZA 18 MG/3ML SOPN SC injection INJECT 1.8MG SUBQ DAILY 9 mL 3    denosumab (PROLIA) 60 MG/ML SOSY SC injection Inject 60 mg into the skin every 6 months      oxyCODONE-acetaminophen (PERCOCET) 5-325 MG per tablet Take 1 tablet by mouth every 6 hours as needed for Pain.       sotalol (BETAPACE) 120 MG tablet TAKE ONE TABLET BY MOUTH TWICE A DAY 60 tablet 5    losartan (COZAAR) 100 MG tablet TAKE ONE TABLET DAILY 30 tablet 5    fluconazole (DIFLUCAN) 150 MG tablet Take one on the 15th of every month 1 tablet 11    linaclotide (LINZESS) 145 MCG capsule TAKE ONE CAPSULE BY MOUTH EVERY MORNING BEFORE BREAKFAST 30 capsule 5    gabapentin (NEURONTIN) 600 MG tablet TAKE ONE TABLET BY MOUTH 3 TIMES DAILY AS NEEDED 90 tablet 5    omeprazole (PRILOSEC) 40 MG delayed release capsule TAKE ONE CAPSULE BY MOUTH DAILY 30 capsule 3    montelukast (SINGULAIR) 10 MG tablet TAKE ONE TABLET BY MOUTH EVERY NIGHT AT BEDTIME 30 tablet 5    atorvastatin (LIPITOR) 10 MG tablet TAKE ONE TABLET BY MOUTH EVERY DAY EACH EVENING 30 tablet 5    furosemide (LASIX) 40 MG tablet Take 1 tablet by mouth daily 90 tablet 3    spironolactone (ALDACTONE) 25 MG tablet TAKE ONE TABLET DAILY (Patient taking differently: One tablet in the am and one tablet in the evening) 30 tablet 5    Diphenhydramine-APAP, sleep, (TYLENOL PM EXTRA STRENGTH PO) Take by mouth      insulin aspart (NOVOLOG FLEXPEN) 100 UNIT/ML injection pen INJECT 15 UNITS INTO THE SKIN 3 TIMES DAILY (BEFORE MEALS) (Patient taking differently: Inject 20 Units into the skin 3 times daily (before meals) INJECT 15 UNITS INTO THE SKIN 3 TIMES DAILY (BEFORE MEALS)) 15 mL 3    nystatin (MYCOSTATIN) 526266 UNIT/GM ointment Apply topically 2 times daily. for yeast 60 Tube 5    glucose blood VI test strips (ONE TOUCH ULTRA TEST) strip Inject 1 each into the skin daily As needed. 100 each 3    clobetasol (TEMOVATE) 0.05 % ointment Apply external vagina 3 x a day for week one, then decrease to BID on week two, on week 3 once a day, on week four every other day then stop 1 Tube 1    fluticasone (ARNUITY ELLIPTA) 100 MCG/ACT AEPB Inhale 100 mcg into the lungs      nystatin (NYSTATIN) 798772 UNIT/GM powder Apply topically 3 times daily Apply topically 4 times daily.  60 g 3    Cholecalciferol (VITAMIN D3) 5000 units TABS Take 1 tablet by mouth daily       albuterol (ACCUNEB) 1.25 MG/3ML nebulizer solution Inhale 3 mLs into the lungs every 6 hours as needed for Wheezing 360 mL 3    SURE COMFORT INS SYR 1CC/30G 30G X 5/16\" 1 ML MISC       Lancets MISC 1 lancet to check glucose daily 100 each 3    Insulin Pen Needle 31G X 8 MM MISC Inject 1 each into the skin daily 100 each 2    albuterol (PROVENTIL HFA;VENTOLIN HFA) 108 (90 BASE) MCG/ACT inhaler Inhale 2 puffs into the lungs every 6 hours as needed for Wheezing 1 Inhaler 1    OXYGEN 2L NC 12-24 hours (Patient taking differently: nightly as needed 2L NC 12-24 hours) 1 Device 0    Blood Glucose Monitoring Suppl STERLING by Does not apply route. 1 Device 0    aspirin 325 MG tablet Take 325 mg by mouth daily. No current facility-administered medications for this visit. Allergies   Allergen Reactions    Codeine      itching    Hydrocodone-Acetaminophen Nausea Only        reports that she has never smoked. She has never used smokeless tobacco. She reports that she does not drink alcohol or use drugs. Review of Systems   Constitutional: Negative for appetite change, fever and unexpected weight change. HENT: Negative for sore throat and voice change. Respiratory: Positive for shortness of breath. Negative for cough and chest tightness. Cardiovascular: Negative for chest pain, palpitations and leg swelling. Gastrointestinal: Positive for constipation. Negative for abdominal distention, abdominal pain, blood in stool, diarrhea, nausea, rectal pain and vomiting. Gas, bloating   Musculoskeletal: Positive for arthralgias and gait problem. Negative for back pain. Skin: Negative for pallor, rash and wound. Neurological: Negative for dizziness, weakness and light-headedness. Hematological: Negative for adenopathy. Does not bruise/bleed easily. All other systems reviewed and are negative. Objective:   Physical Exam   Constitutional: She is oriented to person, place, and time. She appears well-developed and well-nourished. No distress.    /82   Pulse 70   Ht 5' 2.5\" (1.588 m) for anesthesia: MAC  Comorbid disease as noted above        Pt instructed on FODMAP diet for gas and bloating. Advised to try food elimination diet for 2 weeks with gradual reintroduction of foods. Will need to eat smaller meals, more frequently. Avoid chewing gum, drinking with straw. No carbonated beverages.

## 2019-06-13 ENCOUNTER — OFFICE VISIT (OUTPATIENT)
Dept: GASTROENTEROLOGY | Age: 69
End: 2019-06-13

## 2019-06-13 VITALS
BODY MASS INDEX: 50.21 KG/M2 | SYSTOLIC BLOOD PRESSURE: 124 MMHG | HEIGHT: 63 IN | OXYGEN SATURATION: 96 % | DIASTOLIC BLOOD PRESSURE: 82 MMHG | WEIGHT: 283.4 LBS | HEART RATE: 70 BPM

## 2019-06-13 DIAGNOSIS — Z12.11 SCREENING FOR COLON CANCER: ICD-10-CM

## 2019-06-13 DIAGNOSIS — R14.0 GASEOUS ABDOMINAL DISTENTION: Primary | ICD-10-CM

## 2019-06-13 DIAGNOSIS — K59.09 CHRONIC CONSTIPATION: ICD-10-CM

## 2019-06-13 DIAGNOSIS — Z86.010 HISTORY OF COLON POLYPS: ICD-10-CM

## 2019-06-13 PROCEDURE — 99999 PR OFFICE/OUTPT VISIT,PROCEDURE ONLY: CPT | Performed by: NURSE PRACTITIONER

## 2019-06-13 RX ORDER — MELATONIN 10 MG
10 CAPSULE ORAL EVERY EVENING
COMMUNITY

## 2019-06-13 ASSESSMENT — ENCOUNTER SYMPTOMS
ABDOMINAL DISTENTION: 0
SHORTNESS OF BREATH: 1
SORE THROAT: 0
DIARRHEA: 0
CONSTIPATION: 1
CHEST TIGHTNESS: 0
VOICE CHANGE: 0
VOMITING: 0
COUGH: 0
BLOOD IN STOOL: 0
RECTAL PAIN: 0
ABDOMINAL PAIN: 0
BACK PAIN: 0
NAUSEA: 0

## 2019-06-14 ENCOUNTER — OFFICE VISIT (OUTPATIENT)
Dept: PRIMARY CARE CLINIC | Age: 69
End: 2019-06-14
Payer: MEDICARE

## 2019-06-14 VITALS
HEIGHT: 63 IN | HEART RATE: 67 BPM | BODY MASS INDEX: 50.14 KG/M2 | SYSTOLIC BLOOD PRESSURE: 125 MMHG | WEIGHT: 283 LBS | DIASTOLIC BLOOD PRESSURE: 66 MMHG | RESPIRATION RATE: 16 BRPM | TEMPERATURE: 97.8 F

## 2019-06-14 DIAGNOSIS — N18.30 CHRONIC KIDNEY DISEASE, STAGE III (MODERATE) (HCC): ICD-10-CM

## 2019-06-14 DIAGNOSIS — E66.01 MORBID OBESITY WITH BMI OF 50.0-59.9, ADULT (HCC): ICD-10-CM

## 2019-06-14 DIAGNOSIS — E11.8 TYPE 2 DIABETES MELLITUS WITH COMPLICATION, WITH LONG-TERM CURRENT USE OF INSULIN (HCC): Primary | ICD-10-CM

## 2019-06-14 DIAGNOSIS — R53.81 PHYSICAL DECONDITIONING: ICD-10-CM

## 2019-06-14 DIAGNOSIS — N18.30 STAGE 3 CHRONIC KIDNEY DISEASE (HCC): ICD-10-CM

## 2019-06-14 DIAGNOSIS — Z79.4 TYPE 2 DIABETES MELLITUS WITH COMPLICATION, WITH LONG-TERM CURRENT USE OF INSULIN (HCC): Primary | ICD-10-CM

## 2019-06-14 DIAGNOSIS — I48.0 PAF (PAROXYSMAL ATRIAL FIBRILLATION) (HCC): ICD-10-CM

## 2019-06-14 DIAGNOSIS — E78.2 MIXED HYPERLIPIDEMIA: ICD-10-CM

## 2019-06-14 DIAGNOSIS — J44.9 CHRONIC OBSTRUCTIVE PULMONARY DISEASE, UNSPECIFIED COPD TYPE (HCC): ICD-10-CM

## 2019-06-14 DIAGNOSIS — G20 PARKINSON DISEASE (HCC): ICD-10-CM

## 2019-06-14 DIAGNOSIS — Z79.899 MEDICATION MANAGEMENT: ICD-10-CM

## 2019-06-14 LAB
ALBUMIN SERPL-MCNC: 3.6 G/DL (ref 3.5–5.2)
ALP BLD-CCNC: 91 U/L (ref 35–104)
ALT SERPL-CCNC: 6 U/L (ref 5–33)
ANION GAP SERPL CALCULATED.3IONS-SCNC: 12 MMOL/L (ref 7–19)
AST SERPL-CCNC: 15 U/L (ref 5–32)
BILIRUB SERPL-MCNC: 0.3 MG/DL (ref 0.2–1.2)
BUN BLDV-MCNC: 29 MG/DL (ref 8–23)
CALCIUM SERPL-MCNC: 8.4 MG/DL (ref 8.8–10.2)
CHLORIDE BLD-SCNC: 105 MMOL/L (ref 98–111)
CHOLESTEROL, TOTAL: 122 MG/DL (ref 160–199)
CO2: 22 MMOL/L (ref 22–29)
CREAT SERPL-MCNC: 1.3 MG/DL (ref 0.5–0.9)
GFR NON-AFRICAN AMERICAN: 41
GLUCOSE BLD-MCNC: 149 MG/DL (ref 74–109)
HBA1C MFR BLD: 8.6 % (ref 4–6)
HDLC SERPL-MCNC: 36 MG/DL (ref 65–121)
LDL CHOLESTEROL CALCULATED: 63 MG/DL
POTASSIUM SERPL-SCNC: 5.3 MMOL/L (ref 3.5–5)
SODIUM BLD-SCNC: 139 MMOL/L (ref 136–145)
TOTAL PROTEIN: 6.8 G/DL (ref 6.6–8.7)
TRIGL SERPL-MCNC: 115 MG/DL (ref 0–149)

## 2019-06-14 PROCEDURE — 1090F PRES/ABSN URINE INCON ASSESS: CPT | Performed by: NURSE PRACTITIONER

## 2019-06-14 PROCEDURE — G8926 SPIRO NO PERF OR DOC: HCPCS | Performed by: NURSE PRACTITIONER

## 2019-06-14 PROCEDURE — 4040F PNEUMOC VAC/ADMIN/RCVD: CPT | Performed by: NURSE PRACTITIONER

## 2019-06-14 PROCEDURE — G8427 DOCREV CUR MEDS BY ELIG CLIN: HCPCS | Performed by: NURSE PRACTITIONER

## 2019-06-14 PROCEDURE — 1123F ACP DISCUSS/DSCN MKR DOCD: CPT | Performed by: NURSE PRACTITIONER

## 2019-06-14 PROCEDURE — G8417 CALC BMI ABV UP PARAM F/U: HCPCS | Performed by: NURSE PRACTITIONER

## 2019-06-14 PROCEDURE — 1036F TOBACCO NON-USER: CPT | Performed by: NURSE PRACTITIONER

## 2019-06-14 PROCEDURE — 36415 COLL VENOUS BLD VENIPUNCTURE: CPT | Performed by: NURSE PRACTITIONER

## 2019-06-14 PROCEDURE — 99214 OFFICE O/P EST MOD 30 MIN: CPT | Performed by: NURSE PRACTITIONER

## 2019-06-14 PROCEDURE — 3017F COLORECTAL CA SCREEN DOC REV: CPT | Performed by: NURSE PRACTITIONER

## 2019-06-14 PROCEDURE — 2022F DILAT RTA XM EVC RTNOPTHY: CPT | Performed by: NURSE PRACTITIONER

## 2019-06-14 PROCEDURE — G8399 PT W/DXA RESULTS DOCUMENT: HCPCS | Performed by: NURSE PRACTITIONER

## 2019-06-14 PROCEDURE — 3045F PR MOST RECENT HEMOGLOBIN A1C LEVEL 7.0-9.0%: CPT | Performed by: NURSE PRACTITIONER

## 2019-06-14 PROCEDURE — 3023F SPIROM DOC REV: CPT | Performed by: NURSE PRACTITIONER

## 2019-06-14 NOTE — PROGRESS NOTES
[facility] PHYSICIAN SERVICES  [address]  [address]  Dept: [phone]  Dept Fax: [phone]  Loc: [phone]    Alayna Schafer is a 76 y.o. female who presents today for her medical conditions/complaints as noted below. Alayna Schafer is c/o of 6 Month Follow-Up        HPI:     HPI   Chief Complaint   Patient presents with    6 Month Follow-Up   The patient is actually here for her diabetes follow-up her hemoglobin A1c was last done in December at 8.2. Her cholesterol is good at 145. Kidney function was borderline at 37. She does have a kidney doctor, Dr Alexandr Orozco,  she sees. She says sugar may be better. She sees neurology for her Parkinson's disease. I last seen her in April and did do an MRI of her knee because she was having increased pain. We also done a vascular scan of that leg which was negative for blood clots. She does see Dr. Mau Forbes and orthopedic [place]. She did see Dr Sourav Chew in [address] [place] [address] and had meniscus surgery left knee. She is taking tramadol mostly once a day for sometimes twice a day. It does relieve pain. She is having trouble getting up at night. She cant get herself out of the bed. She is wondering if she would qualify for a hospital bed. She is in the wheelchair today. She has limited mobility due to parkinsons  Past Medical History:   Diagnosis Date    Asthma 4/21/2015    Bilateral carpal tunnel syndrome 04/09/2018    sees dr. Gordon Tavares.     Colon polyp     COPD (chronic obstructive pulmonary disease) (HCC)     Diabetes mellitus (HCC)     GERD (gastroesophageal reflux disease)     HTN (hypertension)     Hyperlipidemia     Mild mitral regurgitation by prior echocardiogram 2/11/2016    Morbid obesity ([address] 10/18/2017    Normal cardiac stress test 4-2-09    no ischemia at attained level of excercise    Parkinson disease ([address]     Paroxysmal atrial fibrillation ([address]     sees dr. Jayleen Abel Post-menopausal     Prolonged emergence from general anesthesia     Restrictive airway disease     Stage 3 chronic kidney disease ([address] 10/18/2017      Past Surgical History:   Procedure Laterality Date    APPENDECTOMY       SECTION      x3    COLONOSCOPY      Dr Elizabeth Calle polyp-5 yr recall    COLONOSCOPY N/A 2019    Dr Kimberly Gonzalez 2 internal hemorrhoids without stigmata, diverticulosis, suboptimal prep--5 yr recall    GALLBLADDER SURGERY  2014    dr Coppola Fees ARTHROSCOPY      [place] REVISE MEDIAN N/CARPAL TUNNEL SURG Right 2018    OPEN CARPAL TUNNEL RELEASE performed by Antoinette Chicas MD at [address] [facility] TYMPANOSTOMY TUBE PLACEMENT      dr Deacon Reina in [facility] [address]   SYSTOLIC 774 511 126 - 233 -   DIASTOLIC 86 69 60 - 71 -   Pulse 76 75 - - - -   Temp - 98 - - - -   Resp 18 18 20 18 16 16   SpO2 97 96 91 94 92 92   Weight - - - - - -   Height - - - - - -   BMI (wt*703/ht~2) - - - - - -   Pain Level - - - - - -   Some recent data might be hidden       Family History   Problem Relation Age of Onset    High Blood Pressure Father     Diabetes Father     Parkinsonism Father     High Blood Pressure Mother     Diabetes Sister     Other Paternal Grandmother         hiatal hernia    Heart Disease Paternal Grandfather     Colon Cancer Neg Hx     Colon Polyps Neg Hx     Esophageal Cancer Neg Hx     Liver Cancer Neg Hx     Liver Disease Neg Hx     Rectal Cancer Neg Hx     Stomach Cancer Neg Hx        Social History     Tobacco Use    Smoking status: Never Smoker    Smokeless tobacco: Never Used   Substance Use Topics    Alcohol use: No      Current Outpatient Medications   Medication Sig Dispense Refill    Melatonin 10 MG CAPS Take 10 mg by mouth every evening      Multiple Vitamins-Minerals (ICAPS AREDS 2 PO) Take by mouth daily      insulin aspart (NOVOLOG FLEXPEN) 100 UNIT/ML injection pen INJECT 15 UNITS INTO THE SKIN 3 TIMES DAILY (BEFORE MEALS) (Patient taking differently: Inject 20 Units into the skin 3 times daily (before meals) INJECT 15 UNITS INTO THE SKIN 3 TIMES DAILY (BEFORE MEALS)) 15 mL 3    nystatin (MYCOSTATIN) 585336 UNIT/GM ointment Apply topically 2 times daily. for yeast 60 Tube 5    glucose blood VI test strips (ONE TOUCH ULTRA TEST) strip Inject 1 each into the skin daily As needed. 100 each 3    clobetasol (TEMOVATE) 0.05 % ointment Apply external vagina 3 x a day for week one, then decrease to BID on week two, on week 3 once a day, on week four every other day then stop 1 Tube 1    fluticasone (ARNUITY ELLIPTA) 100 MCG/ACT AEPB Inhale 100 mcg into the lungs      nystatin (NYSTATIN) 486959 UNIT/GM powder Apply topically 3 times daily Apply topically 4 times daily. 60 g 3    Cholecalciferol (VITAMIN D3) 5000 units TABS Take 1 tablet by mouth daily       albuterol (ACCUNEB) 1.25 MG/3ML nebulizer solution Inhale 3 mLs into the lungs every 6 hours as needed for Wheezing 360 mL 3    SURE COMFORT INS SYR 1CC/30G 30G X 5/16\" 1 ML MISC       Lancets MISC 1 lancet to check glucose daily 100 each 3    Insulin Pen Needle 31G X 8 MM MISC Inject 1 each into the skin daily 100 each 2    albuterol (PROVENTIL HFA;VENTOLIN HFA) 108 (90 BASE) MCG/ACT inhaler Inhale 2 puffs into the lungs every 6 hours as needed for Wheezing 1 Inhaler 1    OXYGEN 2L NC 12-24 hours (Patient taking differently: nightly as needed 2L NC 12-24 hours) 1 Device 0    Blood Glucose Monitoring Suppl STERLING by Does not apply route. 1 Device 0    aspirin 325 MG tablet Take 325 mg by mouth daily. No current facility-administered medications for this visit.       Allergies   Allergen Reactions    Codeine      itching    Hydrocodone-Acetaminophen Nausea Only       Health Maintenance   Topic Date Due    Shingles Vaccine (1 of 2) 12/27/2000    Diabetic Judgment and thought content normal.   Nursing note and vitals reviewed. /66   Pulse 67   Temp 97.8 °F (36.6 °C) (Temporal)   Resp 16   Ht 5' 2.5\" (1.588 m)   Wt 283 lb (128.4 kg)   BMI 50.94 kg/m²     Assessment:       Diagnosis Orders   1. Type 2 diabetes mellitus with complication, with long-term current use of insulin (HCC)  Comprehensive Metabolic Panel    Hemoglobin A1C   2. Stage 3 chronic kidney disease ([facility]     3. PAF (paroxysmal atrial fibrillation) ([facility]     4. Chronic obstructive pulmonary disease, unspecified COPD type ([facility]     5. Medication management  Comprehensive Metabolic Panel    Hemoglobin A1C    Lipid Panel   6. Mixed hyperlipidemia  Lipid Panel   7. Physical deconditioning     8. Parkinson disease [facility])         Plan:     I had Dr Leeanne Churchill come in and evaluate the patient as well and she agrees patient would qualify for hospital bed. Will call [facility] and try t o get it ordered. Patient given educational materials -see patient instructions. Discussed use, benefit, and side effects of prescribed medications. All patient questions answered. Pt voiced understanding. Reviewed health maintenance. Instructed to continue currentmedications, diet and exercise. Patient agreed with treatment plan. Follow up as directed. MEDICATIONS:  No orders of the defined types were placed in this encounter. Quality & Risk Score Accuracy    Last edited 06/20/19 23:05 CDT by INGRID Munguia CNP         ORDERS:  Orders Placed This Encounter   Procedures    Comprehensive Metabolic Panel    Hemoglobin A1C    Lipid Panel       Follow-up:  No follow-ups on file. PATIENT INSTRUCTIONS:  Patient Instructions   Continue with Dr Shane Dai and orthopedic  We can continue the tramadol and ativan to use as needed. See neck stretches. Patient Education        Neck Spasm: Exercises  Your Care Instructions  Here are some examples of typical rehabilitation exercises for your condition.  Start each exercise slowly. Ease off the exercise if you start to have pain. Your doctor or physical therapist will tell you when you can start these exercises and which ones will work best for you. How to do the exercises  Levator scapula stretch    1. Sit in a firm chair, or stand up straight. 2. Gently tilt your head toward your left shoulder. 3. Turn your head to look down into your armpit, bending your head slightly forward. Let the weight of your head stretch your neck muscles. 4. Hold for 15 to 30 seconds. 5. Return to your starting position. 6. Follow the same instructions above, but tilt your head toward your right shoulder. 7. Repeat 2 to 4 times toward each shoulder. Upper trapezius stretch    1. Sit in a firm chair, or stand up straight. 2. This stretch works best if you keep your shoulder down as you lean away from it. To help you remember to do this, start by relaxing your shoulders and lightly holding on to your thighs or your chair. 3. Tilt your head toward your shoulder and hold for 15 to 30 seconds. Let the weight of your head stretch your muscles. 4. If you would like a little added stretch, place your arm behind your back. Use the arm opposite of the direction you are tilting your head. For example, if you are tilting your head to the left, place your right arm behind your back. 5. Repeat 2 to 4 times toward each shoulder. Neck rotation    1. Sit in a firm chair, or stand up straight. 2. Keeping your chin level, turn your head to the right, and hold for 15 to 30 seconds. 3. Turn your head to the left, and hold for 15 to 30 seconds. 4. Repeat 2 to 4 times to each side. Chin tuck    1. Lie on the floor with a rolled-up towel under your neck. Your head should be touching the floor. 2. Slowly bring your chin toward the front of your neck. 3. Hold for a count of 6, and then relax for up to 10 seconds. 4. Repeat 8 to 12 times. Forward neck flexion    1.  Sit in a firm chair, or stand up straight. 2. Bend your head forward. 3. Hold for 15 to 30 seconds, then return to your starting position. 4. Repeat 2 to 4 times. Follow-up care is a key part of your treatment and safety. Be sure to make and go to all appointments, and call your doctor if you are having problems. It's also a good idea to know your test results and keep a list of the medicines you take. Where can you learn more? Go to https://Bubble & Balm.eTipping. org and sign in to your [employer] account. Enter P962 in the [employer] box to learn more about \"Neck Spasm: Exercises. \"     If you do not have an account, please click on the \"Sign Up Now\" link. Current as of: September 20, 2018  Content Version: 12.0  © 4702-8154 Healthwise, Incorporated. Care instructions adapted under license by [facility] ([facility]). If you have questions about a medical condition or this instruction, always ask your healthcare professional. [address] any warranty or liability for your use of this information. Electronically signed by INGRID Parker CNP on 6/20/2019 at 11:07 PM    EMR Dragon/transcription disclaimer:  Much of thisencounter note is electronic transcription/translation of spoken language to printed texts. The electronic translation of spoken language may be erroneous, or at times, nonsensical words or phrases may be inadvertentlytranscribed.   Although I have reviewed the note for such errors, some may still exist.

## 2019-06-14 NOTE — PATIENT INSTRUCTIONS
Continue with Dr Lindsay Cotter and orthopedic  We can continue the tramadol and ativan to use as needed. See neck stretches. Patient Education        Neck Spasm: Exercises  Your Care Instructions  Here are some examples of typical rehabilitation exercises for your condition. Start each exercise slowly. Ease off the exercise if you start to have pain. Your doctor or physical therapist will tell you when you can start these exercises and which ones will work best for you. How to do the exercises  Levator scapula stretch    1. Sit in a firm chair, or stand up straight. 2. Gently tilt your head toward your left shoulder. 3. Turn your head to look down into your armpit, bending your head slightly forward. Let the weight of your head stretch your neck muscles. 4. Hold for 15 to 30 seconds. 5. Return to your starting position. 6. Follow the same instructions above, but tilt your head toward your right shoulder. 7. Repeat 2 to 4 times toward each shoulder. Upper trapezius stretch    1. Sit in a firm chair, or stand up straight. 2. This stretch works best if you keep your shoulder down as you lean away from it. To help you remember to do this, start by relaxing your shoulders and lightly holding on to your thighs or your chair. 3. Tilt your head toward your shoulder and hold for 15 to 30 seconds. Let the weight of your head stretch your muscles. 4. If you would like a little added stretch, place your arm behind your back. Use the arm opposite of the direction you are tilting your head. For example, if you are tilting your head to the left, place your right arm behind your back. 5. Repeat 2 to 4 times toward each shoulder. Neck rotation    1. Sit in a firm chair, or stand up straight. 2. Keeping your chin level, turn your head to the right, and hold for 15 to 30 seconds. 3. Turn your head to the left, and hold for 15 to 30 seconds. 4. Repeat 2 to 4 times to each side. Chin tuck    1.  Lie on the floor with a rolled-up towel under your neck. Your head should be touching the floor. 2. Slowly bring your chin toward the front of your neck. 3. Hold for a count of 6, and then relax for up to 10 seconds. 4. Repeat 8 to 12 times. Forward neck flexion    1. Sit in a firm chair, or stand up straight. 2. Bend your head forward. 3. Hold for 15 to 30 seconds, then return to your starting position. 4. Repeat 2 to 4 times. Follow-up care is a key part of your treatment and safety. Be sure to make and go to all appointments, and call your doctor if you are having problems. It's also a good idea to know your test results and keep a list of the medicines you take. Where can you learn more? Go to https://[employer].Thoora. org and sign in to your [employer] account. Enter P962 in the [employer] box to learn more about \"Neck Spasm: Exercises. \"     If you do not have an account, please click on the \"Sign Up Now\" link. Current as of: September 20, 2018  Content Version: 12.0  © 2685-0587 Healthwise, Incorporated. Care instructions adapted under license by [facility] ([facility]). If you have questions about a medical condition or this instruction, always ask your healthcare professional. [address] any warranty or liability for your use of this information.

## 2019-06-18 ENCOUNTER — ANESTHESIA (OUTPATIENT)
Dept: ENDOSCOPY | Age: 69
End: 2019-06-18
Payer: MEDICARE

## 2019-06-18 ENCOUNTER — HOSPITAL ENCOUNTER (OUTPATIENT)
Age: 69
Setting detail: OUTPATIENT SURGERY
Discharge: HOME | End: 2019-06-18
Attending: INTERNAL MEDICINE | Admitting: INTERNAL MEDICINE
Payer: MEDICARE

## 2019-06-18 ENCOUNTER — ANESTHESIA EVENT (OUTPATIENT)
Dept: ENDOSCOPY | Age: 69
End: 2019-06-18
Payer: MEDICARE

## 2019-06-18 VITALS
OXYGEN SATURATION: 91 % | DIASTOLIC BLOOD PRESSURE: 60 MMHG | SYSTOLIC BLOOD PRESSURE: 115 MMHG | RESPIRATION RATE: 20 BRPM

## 2019-06-18 VITALS
SYSTOLIC BLOOD PRESSURE: 141 MMHG | OXYGEN SATURATION: 97 % | HEIGHT: 63 IN | WEIGHT: 283 LBS | TEMPERATURE: 98 F | HEART RATE: 76 BPM | RESPIRATION RATE: 18 BRPM | DIASTOLIC BLOOD PRESSURE: 86 MMHG | BODY MASS INDEX: 50.14 KG/M2

## 2019-06-18 LAB
GLUCOSE BLD-MCNC: 97 MG/DL (ref 70–99)
PERFORMED ON: NORMAL

## 2019-06-18 PROCEDURE — 82948 REAGENT STRIP/BLOOD GLUCOSE: CPT

## 2019-06-18 PROCEDURE — 3700000001 HC ADD 15 MINUTES (ANESTHESIA): Performed by: INTERNAL MEDICINE

## 2019-06-18 PROCEDURE — 2500000003 HC RX 250 WO HCPCS: Performed by: INTERNAL MEDICINE

## 2019-06-18 PROCEDURE — 2500000003 HC RX 250 WO HCPCS: Performed by: NURSE ANESTHETIST, CERTIFIED REGISTERED

## 2019-06-18 PROCEDURE — 3609009500 HC COLONOSCOPY DIAGNOSTIC OR SCREENING: Performed by: INTERNAL MEDICINE

## 2019-06-18 PROCEDURE — 2580000003 HC RX 258: Performed by: INTERNAL MEDICINE

## 2019-06-18 PROCEDURE — 7100000011 HC PHASE II RECOVERY - ADDTL 15 MIN: Performed by: INTERNAL MEDICINE

## 2019-06-18 PROCEDURE — 7100000010 HC PHASE II RECOVERY - FIRST 15 MIN: Performed by: INTERNAL MEDICINE

## 2019-06-18 PROCEDURE — 6360000002 HC RX W HCPCS: Performed by: NURSE ANESTHETIST, CERTIFIED REGISTERED

## 2019-06-18 PROCEDURE — 45378 DIAGNOSTIC COLONOSCOPY: CPT | Performed by: INTERNAL MEDICINE

## 2019-06-18 PROCEDURE — 3700000000 HC ANESTHESIA ATTENDED CARE: Performed by: INTERNAL MEDICINE

## 2019-06-18 RX ORDER — LIDOCAINE HYDROCHLORIDE 10 MG/ML
INJECTION, SOLUTION EPIDURAL; INFILTRATION; INTRACAUDAL; PERINEURAL PRN
Status: DISCONTINUED | OUTPATIENT
Start: 2019-06-18 | End: 2019-06-18 | Stop reason: SDUPTHER

## 2019-06-18 RX ORDER — LIDOCAINE HYDROCHLORIDE 10 MG/ML
1 INJECTION, SOLUTION EPIDURAL; INFILTRATION; INTRACAUDAL; PERINEURAL ONCE
Status: COMPLETED | OUTPATIENT
Start: 2019-06-18 | End: 2019-06-18

## 2019-06-18 RX ORDER — SODIUM CHLORIDE, SODIUM LACTATE, POTASSIUM CHLORIDE, CALCIUM CHLORIDE 600; 310; 30; 20 MG/100ML; MG/100ML; MG/100ML; MG/100ML
INJECTION, SOLUTION INTRAVENOUS CONTINUOUS
Status: DISCONTINUED | OUTPATIENT
Start: 2019-06-18 | End: 2019-06-18 | Stop reason: HOSPADM

## 2019-06-18 RX ORDER — DIPHENHYDRAMINE HYDROCHLORIDE 50 MG/ML
12.5 INJECTION INTRAMUSCULAR; INTRAVENOUS
Status: DISCONTINUED | OUTPATIENT
Start: 2019-06-18 | End: 2019-06-18 | Stop reason: HOSPADM

## 2019-06-18 RX ORDER — PROPOFOL 10 MG/ML
INJECTION, EMULSION INTRAVENOUS PRN
Status: DISCONTINUED | OUTPATIENT
Start: 2019-06-18 | End: 2019-06-18 | Stop reason: SDUPTHER

## 2019-06-18 RX ORDER — PROMETHAZINE HYDROCHLORIDE 25 MG/ML
6.25 INJECTION, SOLUTION INTRAMUSCULAR; INTRAVENOUS
Status: DISCONTINUED | OUTPATIENT
Start: 2019-06-18 | End: 2019-06-18 | Stop reason: HOSPADM

## 2019-06-18 RX ORDER — ONDANSETRON 2 MG/ML
4 INJECTION INTRAMUSCULAR; INTRAVENOUS
Status: DISCONTINUED | OUTPATIENT
Start: 2019-06-18 | End: 2019-06-18 | Stop reason: HOSPADM

## 2019-06-18 RX ADMIN — SODIUM CHLORIDE, POTASSIUM CHLORIDE, SODIUM LACTATE AND CALCIUM CHLORIDE: 600; 310; 30; 20 INJECTION, SOLUTION INTRAVENOUS at 08:42

## 2019-06-18 RX ADMIN — LIDOCAINE HYDROCHLORIDE 1 ML: 10 INJECTION, SOLUTION EPIDURAL; INFILTRATION; INTRACAUDAL; PERINEURAL at 08:42

## 2019-06-18 RX ADMIN — PROPOFOL 250 MG: 10 INJECTION, EMULSION INTRAVENOUS at 09:22

## 2019-06-18 RX ADMIN — LIDOCAINE HYDROCHLORIDE 5 ML: 10 INJECTION, SOLUTION EPIDURAL; INFILTRATION; INTRACAUDAL; PERINEURAL at 09:22

## 2019-06-18 ASSESSMENT — PAIN - FUNCTIONAL ASSESSMENT: PAIN_FUNCTIONAL_ASSESSMENT: 0-10

## 2019-06-18 ASSESSMENT — COPD QUESTIONNAIRES: CAT_SEVERITY: MODERATE

## 2019-06-18 ASSESSMENT — ENCOUNTER SYMPTOMS: SHORTNESS OF BREATH: 1

## 2019-06-18 NOTE — OP NOTE
Patient: Winston Hirsch: 1950  Med Rec#: [id number] Acc#: [id number]   Primary Care Provider INGRID Gonzalez CNP    Date of Procedure:  6/18/2019    Endoscopist: Brad Kelsey MD    Referring Provider: INGRID Gonzalez CNP,     Operation Performed: Colonoscopy up to the terminal ileum    Indications: Constipation, hx of polyps, excessive gas    Anesthesia:  Sedation was administered by anesthesia who monitored the patient during the procedure. I met with Mahlon Sacks prior to procedure. We discussed the procedure itself, and I have discussed the risks of endoscopy (including-- but not limited to-- pain, discomfort, bleeding potentially requiring second endoscopic procedure and/or blood transfusion, organ perforation requiring operative repair, damage to organs near the colon, infection, aspiration, cardiopulmonary/allergic reaction), benefits, indications to endoscopy. Additionally, we discussed options other than colonoscopy. The patient expressed understanding. All questions answered. The patient decided to proceed with the procedure. Signed informed consent was placed on the chart. Blood Loss: minimal    Withdrawal time: >8 mins  Bowel Prep: Fair  with moderate amounts of thick solid and semisolid stool scattered in segments throughout the colon obscuring the underlying mucosa (patient apparently did not finish her prep last night). Small lesions including polyps may have been missed. Complications: no immediate complications    DESCRIPTION OF PROCEDURE:     A time out was performed. After written informed consent was obtained, the patient was placed in the left lateral position. The perianal area was inspected, and a digital rectal exam was performed. A rectal exam was performed: normal tone, no palpable lesions. At this point, a forward viewing Olympus colonoscope was inserted into the anus and carefully advanced to the Terminal Ileum.   The cecum was identified by the ileocecal valve and the appendiceal orifice. The colonoscope was then slowly withdrawn with careful inspection of the mucosa in a linear and circumferential fashion. The scope was retroflexed in the rectum. Suction was utilized during the procedure to remove as much air as possible from the bowel. The colonoscope was removed from the patient, and the procedure was terminated. Findings are listed below. Findings:   Grade 2 internal hemorrhoids without stigmata. Moderate diverticulosis in the left colon. Suboptimal prep. Otherwise, where it was clearly visible, the mucosa appeared normal throughout the entire examined colon   Retroflexion in the rectum otherwise was normal and revealed no further abnormalities     Recommendations:  1. Repeat colonoscopy: in 5 years due to her hx of polyps and suboptimal prep today. 2. NO ASA/NSAIDs for 2 weeks. 3. May d/c patient home when recovery complete. 4. OP GI f/u PRN;  5.  Resume previous meds and high fiber diet and keep f/u with her PCP and other MDs as before. Findings and recommendations were discussed w/ the patient. A copy of the images was provided.     Phu Beth MD  6/18/2019  9:42 AM

## 2019-06-18 NOTE — ANESTHESIA PRE PROCEDURE
Department of Anesthesiology  Preprocedure Note       Name:  Alex Keller   Age:  76 y.o.  :  1950                                          MRN:  [id number]         Date:  2019      Surgeon: Carlos Kamara):  Kelley Burciaga MD    Procedure: COLONOSCOPY DIAGNOSTIC (N/A )    Medications prior to admission:   Prior to Admission medications    Medication Sig Start Date End Date Taking? Authorizing Provider   Melatonin 10 MG CAPS Take 10 mg by mouth every evening    Historical Provider, MD   Multiple Vitamins-Minerals (ICAPS AREDS 2 PO) Take by mouth daily    Historical Provider, MD   carbidopa-levodopa (SINEMET)  MG per tablet TAKE TWO TABLETS 3 TIMES DAILY 19   MARTHA Ayala   rOPINIRole (REQUIP) 4 MG tablet TAKE TWO TABLETS EVERY MORNING TAKE ONE TABLET AT NOON AND TAKE TWO TABLETS EVERY NIGHT AT BEDTIME  Patient taking differently: TAKE TWO TABLETS EVERY MORNING TAKE ONE TABLET AT NOON AND TAKE ONE TABLET EVERY NIGHT AT BEDTIME 19   MARTHA Mcmanus   traMADol (ULTRAM) 50 MG tablet TAKE ONE TABLET BY MOUTH EVERY 8 HOURS AS NEEDED FOR PAIN . . .MAY CAUSE DROWSINESS!! 19  INGRID Nix CNP   LEVEMIR 100 UNIT/ML injection vial INJECT 70 UNITS SUBQ EVERY NIGHT AT BEDTIME  Patient taking differently: INJECT 70 UNITS SUBQ EVERY MORNING 19   INGRID Nix CNP   VICTOZA 18 MG/3ML SOPN SC injection INJECT 1.8MG SUBQ DAILY 19   INGRID Nix CNP   denosumab (PROLIA) 60 MG/ML SOSY SC injection Inject 60 mg into the skin every 6 months    Historical Provider, MD   oxyCODONE-acetaminophen (PERCOCET) 5-325 MG per tablet Take 1 tablet by mouth every 6 hours as needed for Pain.     Historical Provider, MD   sotalol (BETAPACE) 120 MG tablet TAKE ONE TABLET BY MOUTH TWICE A DAY 19   INGRID Becker   losartan (COZAAR) 100 MG tablet TAKE ONE TABLET DAILY 19   INGRID Becker   fluconazole (DIFLUCAN) 150 MG tablet Take one on the 15th of every month 3/27/19   INGRID Neumann CNP   linaclotide [facility]) 145 MCG capsule TAKE ONE CAPSULE BY MOUTH EVERY MORNING BEFORE BREAKFAST 3/20/19   INGRID Neumann CNP   gabapentin (NEURONTIN) 600 MG tablet TAKE ONE TABLET BY MOUTH 3 TIMES DAILY AS NEEDED 2/7/19 8/6/19  Mady Ponce MD   omeprazole (PRILOSEC) 40 MG delayed release capsule TAKE ONE CAPSULE BY MOUTH DAILY 1/8/19   INGRID Neumann CNP   montelukast (SINGULAIR) 10 MG tablet TAKE ONE TABLET BY MOUTH EVERY NIGHT AT BEDTIME 12/31/18   INGRID Neumann CNP   atorvastatin (LIPITOR) 10 MG tablet TAKE ONE TABLET BY MOUTH EVERY DAY EACH EVENING 12/31/18   INGRID Neumann CNP   furosemide (LASIX) 40 MG tablet Take 1 tablet by mouth daily 12/27/18   INGRID Cancino   spironolactone (ALDACTONE) 25 MG tablet TAKE ONE TABLET DAILY  Patient taking differently: One tablet in the am and one tablet in the evening 10/23/18   Harlen Gottron, APRN   insulin aspart (NOVOLOG FLEXPEN) 100 UNIT/ML injection pen INJECT 15 UNITS INTO THE SKIN 3 TIMES DAILY (BEFORE MEALS)  Patient taking differently: Inject 20 Units into the skin 3 times daily (before meals) INJECT 15 UNITS INTO THE SKIN 3 TIMES DAILY (BEFORE MEALS) 6/14/18   INGRID Neumann CNP   nystatin (MYCOSTATIN) 373945 UNIT/GM ointment Apply topically 2 times daily. for yeast 6/14/18   INGRID Neumann CNP   glucose blood VI test strips (ONE TOUCH ULTRA TEST) strip Inject 1 each into the skin daily As needed.  6/4/18   INGRID Neumann CNP   clobetasol (TEMOVATE) 0.05 % ointment Apply external vagina 3 x a day for week one, then decrease to BID on week two, on week 3 once a day, on week four every other day then stop 3/2/18   INGRID Neumann CNP   fluticasone (ARNUITY ELLIPTA) 100 MCG/ACT AEPB Inhale 100 mcg into the lungs    Historical MD Raf   nystatin (NYSTATIN) 044420 UNIT/GM powder Apply topically 3 times daily Apply topically 4 times daily. 3/2/18   INGRID Byers CNP   Cholecalciferol (VITAMIN D3) 5000 units TABS Take 1 tablet by mouth daily     Historical Provider, MD   albuterol (ACCUNEB) 1.25 MG/3ML nebulizer solution Inhale 3 mLs into the lungs every 6 hours as needed for Wheezing 7/6/17   INGRID Byers CNP   SURE COMFORT INS SYR 1CC/30G 30G X 5/16\" 1 ML MISC  3/3/17   Historical Provider, MD   Lancets MISC 1 lancet to check glucose daily 1/21/16   INGRID Byers CNP   Insulin Pen Needle 31G X 8 MM MISC Inject 1 each into the skin daily 1/11/16   INGRID Byers CNP   albuterol (PROVENTIL HFA;VENTOLIN HFA) 108 (90 BASE) MCG/ACT inhaler Inhale 2 puffs into the lungs every 6 hours as needed for Wheezing 7/17/15   INGRID Byers CNP   OXYGEN 2L NC 12-24 hours  Patient taking differently: nightly as needed 2L NC 12-24 hours 6/29/15   INGRID Byers CNP   Blood Glucose Monitoring Suppl STERLING by Does not apply route. 5/22/13   INGRID Byers CNP   aspirin 325 MG tablet Take 325 mg by mouth daily. Historical Provider, MD       Current medications:    Current Facility-Administered Medications   Medication Dose Route Frequency Provider Last Rate Last Dose    lactated ringers infusion   Intravenous Continuous Ciara Miller MD        lidocaine PF 1 % injection 1 mL  1 mL Intradermal Once Ciara Miller MD           Allergies:     Allergies   Allergen Reactions    Codeine      itching    Hydrocodone-Acetaminophen Nausea Only       Problem List:    Patient Active Problem List   Diagnosis Code    Parkinson disease ([facility] [address] G20    GERD (gastroesophageal reflux disease) K21.9    Constipation K59.00    Rectal bleeding G40.8    Lichen sclerosus E73.0    Palpitations R00.2    Fatigue R53.83    Shortness of breath R06.02    Asthma J45.909    Edema R60.9    Hypokalemia E87.6    PAF (paroxysmal atrial fibrillation) ([facility] I48.0    Mild mitral Use Topics    Alcohol use: No                                Counseling given: Not Answered      Vital Signs (Current): There were no vitals filed for this visit. BP Readings from Last 3 Encounters:   06/14/19 125/66   06/13/19 124/82   05/24/19 130/73       NPO Status:                                                                                 BMI:   Wt Readings from Last 3 Encounters:   06/14/19 283 lb (128.4 kg)   06/13/19 283 lb 6.4 oz (128.5 kg)   05/24/19 286 lb (129.7 kg)     There is no height or weight on file to calculate BMI.    CBC:   Lab Results   Component Value Date    WBC 8.8 12/14/2018    RBC 4.60 12/14/2018    HGB 13.5 12/14/2018    HCT 42.4 12/14/2018    MCV 92.2 12/14/2018    RDW 13.6 12/14/2018     12/14/2018       CMP:   Lab Results   Component Value Date     06/14/2019    K 5.3 06/14/2019     06/14/2019    CO2 22 06/14/2019    BUN 29 06/14/2019    CREATININE 1.3 06/14/2019    LABGLOM 41 06/14/2019    GLUCOSE 149 06/14/2019    PROT 6.8 06/14/2019    CALCIUM 8.4 06/14/2019    BILITOT 0.3 06/14/2019    ALKPHOS 91 06/14/2019    AST 15 06/14/2019    ALT 6 06/14/2019       POC Tests: No results for input(s): POCGLU, POCNA, POCK, POCCL, POCBUN, POCHEMO, POCHCT in the last 72 hours.     Coags:   Lab Results   Component Value Date    PROTIME 12.8 08/16/2018    INR 0.97 08/16/2018    APTT 28.3 08/16/2018       HCG (If Applicable): No results found for: PREGTESTUR, PREGSERUM, HCG, HCGQUANT     ABGs:   Lab Results   Component Value Date    PHART 7.440 01/03/2018    PO2ART 61.0 01/03/2018    GTI1ZZI 38.0 01/03/2018    PZY4TCH 25.8 01/03/2018    BEART 1.7 01/03/2018    C7MAPBXU 92.4 01/03/2018        Type & Screen (If Applicable):  No results found for: LABABO, [address]    Anesthesia Evaluation  Patient summary reviewed  Airway: Mallampati: III  TM distance: >3 FB   Neck ROM: full  Mouth opening: > = 3 FB Dental: normal exam

## 2019-06-18 NOTE — ANESTHESIA PRE PROCEDURE
Department of Anesthesiology  Preprocedure Note       Name:  Chitra Almeida   Age:  76 y.o.  :  1950                                          MRN:  [id number]         Date:  2019      Surgeon: Champ Millan):  Rosmery Bains MD    Procedure: COLONOSCOPY DIAGNOSTIC (N/A )    Medications prior to admission:   Prior to Admission medications    Medication Sig Start Date End Date Taking? Authorizing Provider   Melatonin 10 MG CAPS Take 10 mg by mouth every evening    Historical Provider, MD   Multiple Vitamins-Minerals (ICAPS AREDS 2 PO) Take by mouth daily    Historical Provider, MD   carbidopa-levodopa (SINEMET)  MG per tablet TAKE TWO TABLETS 3 TIMES DAILY 19   MARTHA Snell   rOPINIRole (REQUIP) 4 MG tablet TAKE TWO TABLETS EVERY MORNING TAKE ONE TABLET AT NOON AND TAKE TWO TABLETS EVERY NIGHT AT BEDTIME  Patient taking differently: TAKE TWO TABLETS EVERY MORNING TAKE ONE TABLET AT NOON AND TAKE ONE TABLET EVERY NIGHT AT BEDTIME 19   MARTHA Mcmanus   traMADol (ULTRAM) 50 MG tablet TAKE ONE TABLET BY MOUTH EVERY 8 HOURS AS NEEDED FOR PAIN . . .MAY CAUSE DROWSINESS!! 19  INGRID Palacios CNP   LEVEMIR 100 UNIT/ML injection vial INJECT 70 UNITS SUBQ EVERY NIGHT AT BEDTIME  Patient taking differently: INJECT 70 UNITS SUBQ EVERY MORNING 19   INGRID Palacios CNP   VICTOZA 18 MG/3ML SOPN SC injection INJECT 1.8MG SUBQ DAILY 19   INGRID Palacios CNP   denosumab (PROLIA) 60 MG/ML SOSY SC injection Inject 60 mg into the skin every 6 months    Historical Provider, MD   oxyCODONE-acetaminophen (PERCOCET) 5-325 MG per tablet Take 1 tablet by mouth every 6 hours as needed for Pain.     Historical Provider, MD   sotalol (BETAPACE) 120 MG tablet TAKE ONE TABLET BY MOUTH TWICE A DAY 19   INGRID Shelton   losartan (COZAAR) 100 MG tablet TAKE ONE TABLET DAILY 19   INGRID Shelton   fluconazole (DIFLUCAN) 150 MG tablet Take one on the Use Topics    Alcohol use: No                                Counseling given: Not Answered      Vital Signs (Current): There were no vitals filed for this visit. BP Readings from Last 3 Encounters:   06/14/19 125/66   06/13/19 124/82   05/24/19 130/73       NPO Status:                                                                                 BMI:   Wt Readings from Last 3 Encounters:   06/14/19 283 lb (128.4 kg)   06/13/19 283 lb 6.4 oz (128.5 kg)   05/24/19 286 lb (129.7 kg)     There is no height or weight on file to calculate BMI.    CBC:   Lab Results   Component Value Date    WBC 8.8 12/14/2018    RBC 4.60 12/14/2018    HGB 13.5 12/14/2018    HCT 42.4 12/14/2018    MCV 92.2 12/14/2018    RDW 13.6 12/14/2018     12/14/2018       CMP:   Lab Results   Component Value Date     06/14/2019    K 5.3 06/14/2019     06/14/2019    CO2 22 06/14/2019    BUN 29 06/14/2019    CREATININE 1.3 06/14/2019    LABGLOM 41 06/14/2019    GLUCOSE 149 06/14/2019    PROT 6.8 06/14/2019    CALCIUM 8.4 06/14/2019    BILITOT 0.3 06/14/2019    ALKPHOS 91 06/14/2019    AST 15 06/14/2019    ALT 6 06/14/2019       POC Tests: No results for input(s): POCGLU, POCNA, POCK, POCCL, POCBUN, POCHEMO, POCHCT in the last 72 hours.     Coags:   Lab Results   Component Value Date    PROTIME 12.8 08/16/2018    INR 0.97 08/16/2018    APTT 28.3 08/16/2018       HCG (If Applicable): No results found for: PREGTESTUR, PREGSERUM, HCG, HCGQUANT     ABGs:   Lab Results   Component Value Date    PHART 7.440 01/03/2018    PO2ART 61.0 01/03/2018    QNF6GGA 38.0 01/03/2018    JZF0DOF 25.8 01/03/2018    BEART 1.7 01/03/2018    F5PYJMGZ 92.4 01/03/2018        Type & Screen (If Applicable):  No results found for: LABABO, LABRH    Anesthesia Evaluation   no history of anesthetic complications:   Airway: Mallampati: II  TM distance: >3 FB   Neck ROM: full  Mouth opening: > = 3 FB Dental:    (+) upper dentures and lower dentures      Pulmonary:normal exam    (+) pneumonia: resolved,  COPD: moderate,  shortness of breath: chronic,  asthma: allergic asthma,                           ROS comment: Community acquired pneumonia of right lower lobe of lung ([facility] [address] -Resolved   Cardiovascular:  Exercise tolerance: poor (<4 METS),   (+) hypertension: moderate, dysrhythmias:, hyperlipidemia               ROS comment: PAF (paroxysmal atrial fibrillation) ([facility])  Mild mitral regurgitation by prior echocardiogram  H/O diastolic dysfunction     Normal cardiac stress test no ischemia at attained level of excercise      Neuro/Psych:   (+) neuromuscular disease:,              ROS comment: Parkinson disease (HCC)  Restless legs syndrome GI/Hepatic/Renal:   (+) GERD: well controlled, renal disease:, bowel prep, morbid obesity         ROS comment: Stage 3 chronic kidney disease ([facility] [address] Endo/Other:    (+) DiabetesType II DM, , : arthritis: OA., electrolyte abnormalities, . Abdominal:   (+) obese,         Vascular: negative vascular ROS. Anesthesia Plan      MAC     ASA 3       Induction: intravenous. Anesthetic plan and risks discussed with patient.                       INGRID Whaley - CRNA   6/18/2019

## 2019-06-18 NOTE — H&P
daily.for yeast 6/14/18  Yes INGRID Drake CNP   glucose blood VI test strips (ONE TOUCH ULTRA TEST) strip Inject 1 each into the skin daily As needed. 6/4/18  Yes INGRID Drake CNP   clobetasol (TEMOVATE) 0.05 % ointment Apply external vagina 3 x a day for week one, then decrease to BID on week two, on week 3 once a day, on week four every other day then stop 3/2/18  Yes INGRID Ross CNP   nystatin (NYSTATIN) 558529 UNIT/GM powder Apply topically 3 times daily Apply topically 4 times daily. 3/2/18  Yes INGRID Drake CNP   Cholecalciferol (VITAMIN D3) 5000 units TABS Take 1 tablet by mouth daily    Yes Historical Provider, MD   [address] 1CC/30G 30G X 5/16\" 1 ML [facility]  3/3/17  Yes Historical Provider, MD   Lancets MISC 1 lancet to check glucose daily 1/21/16  Yes INGRID Ross CNP   Insulin Pen Needle 31G X 8 MM MISC Inject 1 each into the skin daily 1/11/16  Yes INGRID Drake CNP   OXYGEN 2L NC 12-24 hours  Patient taking differently: nightly as needed 2L NC 12-24 hours 6/29/15  Yes INGRID Ross CNP   Blood Glucose Monitoring Suppl STERLING by Does not apply route.  5/22/13  Yes INGRID Drake CNP   VICTOZA 18 MG/3ML SOPN SC injection INJECT 1.8MG SUBQ DAILY 5/13/19   INGRID Ross CNP   denosumab (PROLIA) 60 MG/ML SOSY SC injection Inject 60 mg into the skin every 6 months    Historical Provider, MD   fluconazole (DIFLUCAN) 150 MG tablet Take one on the 15th of every month 3/27/19   INGRID Ross CNP   insulin aspart (NOVOLOG FLEXPEN) 100 UNIT/ML injection pen INJECT 15 UNITS INTO THE SKIN 3 TIMES DAILY (BEFORE MEALS)  Patient taking differently: Inject 20 Units into the skin 3 times daily (before meals) INJECT 15 UNITS INTO THE SKIN 3 TIMES DAILY (BEFORE MEALS) 6/14/18   Marilu Watts, APRN - CNP   fluticasone (ARNUITY ELLIPTA) 100 MCG/ACT AEPB Inhale 100 mcg into the lungs    Historical Provider, MD Resp: 16   Temp: 97.6 °F (36.4 °C)   SpO2: 98%        Physical Exam:  Cardiac:  [x]WNL  []Comments:  Pulmonary:  [x]WNL   []Comments:  Neuro/Mental Status:  [x]WNL  []Comments:  Abdominal:  [x]WNL    []Comments:  Other:   []WNL  []Comments:    Informed Consent:  The risks and benefits of the procedure have been discussed with either the patient or if they cannot consent, their representative. Assessment:  Patient examined and appropriate for planned sedation and procedure. Plan:  Proceed with planned sedation and procedure as above.          Anca Kaur MD

## 2019-06-20 ASSESSMENT — ENCOUNTER SYMPTOMS
SHORTNESS OF BREATH: 1
EYES NEGATIVE: 1

## 2019-07-08 ENCOUNTER — PATIENT MESSAGE (OUTPATIENT)
Dept: PRIMARY CARE CLINIC | Age: 69
End: 2019-07-08

## 2019-07-08 DIAGNOSIS — R52 PAIN: ICD-10-CM

## 2019-07-08 RX ORDER — TRAMADOL HYDROCHLORIDE 50 MG/1
TABLET ORAL
Qty: 60 TABLET | Refills: 0 | Status: SHIPPED | OUTPATIENT
Start: 2019-07-08 | End: 2019-09-03 | Stop reason: SDUPTHER

## 2019-07-09 RX ORDER — NYSTATIN 100000 [USP'U]/G
POWDER TOPICAL 3 TIMES DAILY
Qty: 60 G | Refills: 3 | Status: SHIPPED | OUTPATIENT
Start: 2019-07-09 | End: 2020-03-04 | Stop reason: SDUPTHER

## 2019-07-10 RX ORDER — CLOBETASOL PROPIONATE 0.5 MG/G
OINTMENT TOPICAL
Qty: 1 TUBE | Refills: 1 | Status: SHIPPED | OUTPATIENT
Start: 2019-07-10 | End: 2021-03-30 | Stop reason: SDUPTHER

## 2019-07-30 DIAGNOSIS — G25.81 RESTLESS LEGS SYNDROME: ICD-10-CM

## 2019-07-31 RX ORDER — GABAPENTIN 600 MG/1
TABLET ORAL
Qty: 90 TABLET | Refills: 5 | Status: SHIPPED | OUTPATIENT
Start: 2019-07-31 | End: 2020-01-23

## 2019-08-13 RX ORDER — SYRINGE AND NEEDLE,INSULIN,1ML 30 GX5/16"
SYRINGE, EMPTY DISPOSABLE MISCELLANEOUS
Qty: 100 EACH | Refills: 3 | Status: SHIPPED | OUTPATIENT
Start: 2019-08-13

## 2019-09-03 DIAGNOSIS — R52 PAIN: ICD-10-CM

## 2019-09-03 RX ORDER — TRAMADOL HYDROCHLORIDE 50 MG/1
50 TABLET ORAL EVERY 8 HOURS PRN
Qty: 60 TABLET | Refills: 0 | Status: SHIPPED | OUTPATIENT
Start: 2019-09-03 | End: 2019-10-31 | Stop reason: SDUPTHER

## 2019-10-14 RX ORDER — FLUCONAZOLE 150 MG/1
TABLET ORAL
Qty: 1 TABLET | Refills: 11 | Status: SHIPPED | OUTPATIENT
Start: 2019-10-14 | End: 2020-08-13 | Stop reason: SDUPTHER

## 2019-10-14 RX ORDER — OMEPRAZOLE 40 MG/1
40 CAPSULE, DELAYED RELEASE ORAL DAILY
Qty: 30 CAPSULE | Refills: 3 | Status: SHIPPED | OUTPATIENT
Start: 2019-10-14 | End: 2020-02-17

## 2019-10-15 ENCOUNTER — PATIENT MESSAGE (OUTPATIENT)
Dept: PRIMARY CARE CLINIC | Age: 69
End: 2019-10-15

## 2019-10-25 RX ORDER — LOSARTAN POTASSIUM 100 MG/1
TABLET ORAL
Qty: 30 TABLET | Refills: 5 | Status: SHIPPED | OUTPATIENT
Start: 2019-10-25 | End: 2019-12-02

## 2019-10-25 RX ORDER — ATORVASTATIN CALCIUM 10 MG/1
TABLET, FILM COATED ORAL
Qty: 30 TABLET | Refills: 3 | Status: SHIPPED | OUTPATIENT
Start: 2019-10-25 | End: 2020-03-02

## 2019-10-31 DIAGNOSIS — R52 PAIN: ICD-10-CM

## 2019-10-31 RX ORDER — TRAMADOL HYDROCHLORIDE 50 MG/1
50 TABLET ORAL EVERY 8 HOURS PRN
Qty: 60 TABLET | Refills: 0 | Status: SHIPPED | OUTPATIENT
Start: 2019-10-31 | End: 2019-12-13 | Stop reason: SDUPTHER

## 2019-11-05 ENCOUNTER — HOSPITAL ENCOUNTER (OUTPATIENT)
Dept: VASCULAR LAB | Age: 69
Discharge: HOME | End: 2019-11-05
Payer: MEDICARE

## 2019-11-05 ENCOUNTER — OFFICE VISIT (OUTPATIENT)
Dept: CARDIOLOGY | Age: 69
End: 2019-11-05
Payer: MEDICARE

## 2019-11-05 VITALS
SYSTOLIC BLOOD PRESSURE: 122 MMHG | HEART RATE: 60 BPM | WEIGHT: 289 LBS | HEIGHT: 62 IN | DIASTOLIC BLOOD PRESSURE: 60 MMHG | BODY MASS INDEX: 53.18 KG/M2

## 2019-11-05 DIAGNOSIS — R60.0 BILATERAL LEG EDEMA: ICD-10-CM

## 2019-11-05 DIAGNOSIS — I48.0 PAF (PAROXYSMAL ATRIAL FIBRILLATION) (HCC): Primary | ICD-10-CM

## 2019-11-05 DIAGNOSIS — I10 ESSENTIAL HYPERTENSION: Chronic | ICD-10-CM

## 2019-11-05 DIAGNOSIS — Z86.79 H/O DIASTOLIC DYSFUNCTION: ICD-10-CM

## 2019-11-05 DIAGNOSIS — L03.116 CELLULITIS OF LEFT LOWER EXTREMITY: ICD-10-CM

## 2019-11-05 DIAGNOSIS — E78.2 MIXED HYPERLIPIDEMIA: ICD-10-CM

## 2019-11-05 PROCEDURE — 93970 EXTREMITY STUDY: CPT

## 2019-11-05 PROCEDURE — G8399 PT W/DXA RESULTS DOCUMENT: HCPCS | Performed by: NURSE PRACTITIONER

## 2019-11-05 PROCEDURE — G8484 FLU IMMUNIZE NO ADMIN: HCPCS | Performed by: NURSE PRACTITIONER

## 2019-11-05 PROCEDURE — 1036F TOBACCO NON-USER: CPT | Performed by: NURSE PRACTITIONER

## 2019-11-05 PROCEDURE — G8427 DOCREV CUR MEDS BY ELIG CLIN: HCPCS | Performed by: NURSE PRACTITIONER

## 2019-11-05 PROCEDURE — 4040F PNEUMOC VAC/ADMIN/RCVD: CPT | Performed by: NURSE PRACTITIONER

## 2019-11-05 PROCEDURE — 1090F PRES/ABSN URINE INCON ASSESS: CPT | Performed by: NURSE PRACTITIONER

## 2019-11-05 PROCEDURE — 99214 OFFICE O/P EST MOD 30 MIN: CPT | Performed by: NURSE PRACTITIONER

## 2019-11-05 PROCEDURE — 3017F COLORECTAL CA SCREEN DOC REV: CPT | Performed by: NURSE PRACTITIONER

## 2019-11-05 PROCEDURE — G8417 CALC BMI ABV UP PARAM F/U: HCPCS | Performed by: NURSE PRACTITIONER

## 2019-11-05 PROCEDURE — 1123F ACP DISCUSS/DSCN MKR DOCD: CPT | Performed by: NURSE PRACTITIONER

## 2019-11-05 RX ORDER — MUPIROCIN CALCIUM 20 MG/G
CREAM TOPICAL
Qty: 1 TUBE | Refills: 1 | Status: SHIPPED | OUTPATIENT
Start: 2019-11-05 | End: 2019-12-05

## 2019-11-05 RX ORDER — SOTALOL HYDROCHLORIDE 120 MG/1
120 TABLET ORAL 2 TIMES DAILY
Qty: 180 TABLET | Refills: 3 | Status: SHIPPED | OUTPATIENT
Start: 2019-11-05 | End: 2021-01-25

## 2019-11-05 RX ORDER — CEPHALEXIN 500 MG/1
500 CAPSULE ORAL 2 TIMES DAILY
Qty: 20 CAPSULE | Refills: 0 | Status: SHIPPED | OUTPATIENT
Start: 2019-11-05 | End: 2019-11-15

## 2019-11-06 DIAGNOSIS — J45.20 MILD INTERMITTENT ASTHMA, UNSPECIFIED WHETHER COMPLICATED: Primary | ICD-10-CM

## 2019-11-06 RX ORDER — ALBUTEROL SULFATE 90 UG/1
2 AEROSOL, METERED RESPIRATORY (INHALATION) EVERY 4 HOURS PRN
Qty: 1 INHALER | Refills: 11 | Status: SHIPPED | OUTPATIENT
Start: 2019-11-06 | End: 2020-02-21 | Stop reason: SDUPTHER

## 2019-11-25 ENCOUNTER — OFFICE VISIT (OUTPATIENT)
Dept: NEUROLOGY | Age: 69
End: 2019-11-25
Payer: MEDICARE

## 2019-11-25 VITALS
BODY MASS INDEX: 52.63 KG/M2 | WEIGHT: 286 LBS | HEIGHT: 62 IN | RESPIRATION RATE: 16 BRPM | DIASTOLIC BLOOD PRESSURE: 60 MMHG | HEART RATE: 68 BPM | SYSTOLIC BLOOD PRESSURE: 127 MMHG

## 2019-11-25 DIAGNOSIS — G25.81 RESTLESS LEGS SYNDROME: ICD-10-CM

## 2019-11-25 DIAGNOSIS — G20 PARKINSON DISEASE (HCC): Primary | ICD-10-CM

## 2019-11-25 PROCEDURE — G8484 FLU IMMUNIZE NO ADMIN: HCPCS | Performed by: PSYCHIATRY & NEUROLOGY

## 2019-11-25 PROCEDURE — 1036F TOBACCO NON-USER: CPT | Performed by: PSYCHIATRY & NEUROLOGY

## 2019-11-25 PROCEDURE — 1090F PRES/ABSN URINE INCON ASSESS: CPT | Performed by: PSYCHIATRY & NEUROLOGY

## 2019-11-25 PROCEDURE — 1123F ACP DISCUSS/DSCN MKR DOCD: CPT | Performed by: PSYCHIATRY & NEUROLOGY

## 2019-11-25 PROCEDURE — G8399 PT W/DXA RESULTS DOCUMENT: HCPCS | Performed by: PSYCHIATRY & NEUROLOGY

## 2019-11-25 PROCEDURE — G8427 DOCREV CUR MEDS BY ELIG CLIN: HCPCS | Performed by: PSYCHIATRY & NEUROLOGY

## 2019-11-25 PROCEDURE — G8417 CALC BMI ABV UP PARAM F/U: HCPCS | Performed by: PSYCHIATRY & NEUROLOGY

## 2019-11-25 PROCEDURE — 3017F COLORECTAL CA SCREEN DOC REV: CPT | Performed by: PSYCHIATRY & NEUROLOGY

## 2019-11-25 PROCEDURE — 99213 OFFICE O/P EST LOW 20 MIN: CPT | Performed by: PSYCHIATRY & NEUROLOGY

## 2019-11-25 PROCEDURE — 4040F PNEUMOC VAC/ADMIN/RCVD: CPT | Performed by: PSYCHIATRY & NEUROLOGY

## 2019-11-25 RX ORDER — ALPRAZOLAM 2 MG/1
2 TABLET ORAL NIGHTLY PRN
COMMUNITY
End: 2019-12-02 | Stop reason: ALTCHOICE

## 2019-12-02 ENCOUNTER — OFFICE VISIT (OUTPATIENT)
Dept: PRIMARY CARE CLINIC | Age: 69
End: 2019-12-02
Payer: MEDICARE

## 2019-12-02 VITALS
WEIGHT: 291 LBS | SYSTOLIC BLOOD PRESSURE: 130 MMHG | RESPIRATION RATE: 16 BRPM | OXYGEN SATURATION: 95 % | HEART RATE: 63 BPM | DIASTOLIC BLOOD PRESSURE: 82 MMHG | TEMPERATURE: 97.4 F | BODY MASS INDEX: 53.55 KG/M2 | HEIGHT: 62 IN

## 2019-12-02 DIAGNOSIS — E11.8 TYPE 2 DIABETES MELLITUS WITH COMPLICATION, WITH LONG-TERM CURRENT USE OF INSULIN (HCC): Primary | ICD-10-CM

## 2019-12-02 DIAGNOSIS — Z79.4 TYPE 2 DIABETES MELLITUS WITH COMPLICATION, WITH LONG-TERM CURRENT USE OF INSULIN (HCC): Primary | ICD-10-CM

## 2019-12-02 DIAGNOSIS — R60.9 PERIPHERAL EDEMA: ICD-10-CM

## 2019-12-02 DIAGNOSIS — N18.30 STAGE 3 CHRONIC KIDNEY DISEASE (HCC): ICD-10-CM

## 2019-12-02 DIAGNOSIS — R53.83 FATIGUE, UNSPECIFIED TYPE: ICD-10-CM

## 2019-12-02 DIAGNOSIS — R82.998 LEUKOCYTES IN URINE: ICD-10-CM

## 2019-12-02 LAB
ALBUMIN SERPL-MCNC: 4 G/DL (ref 3.5–5.2)
ALP BLD-CCNC: 86 U/L (ref 35–104)
ALT SERPL-CCNC: 5 U/L (ref 5–33)
ANION GAP SERPL CALCULATED.3IONS-SCNC: 14 MMOL/L (ref 7–19)
AST SERPL-CCNC: 12 U/L (ref 5–32)
BASOPHILS ABSOLUTE: 0 K/UL (ref 0–0.2)
BASOPHILS RELATIVE PERCENT: 0.4 % (ref 0–1)
BILIRUB SERPL-MCNC: 0.3 MG/DL (ref 0.2–1.2)
BILIRUBIN, POC: ABNORMAL
BLOOD URINE, POC: ABNORMAL
BUN BLDV-MCNC: 29 MG/DL (ref 8–23)
CALCIUM SERPL-MCNC: 9.4 MG/DL (ref 8.8–10.2)
CHLORIDE BLD-SCNC: 105 MMOL/L (ref 98–111)
CLARITY, POC: CLEAR
CO2: 21 MMOL/L (ref 22–29)
COLOR, POC: YELLOW
CREAT SERPL-MCNC: 1.3 MG/DL (ref 0.5–0.9)
EOSINOPHILS ABSOLUTE: 0.3 K/UL (ref 0–0.6)
EOSINOPHILS RELATIVE PERCENT: 2.9 % (ref 0–5)
GFR NON-AFRICAN AMERICAN: 41
GLUCOSE BLD-MCNC: 96 MG/DL (ref 74–109)
GLUCOSE URINE, POC: ABNORMAL
HBA1C MFR BLD: 9.3 % (ref 4–6)
HCT VFR BLD CALC: 41.4 % (ref 37–47)
HEMOGLOBIN: 13.1 G/DL (ref 12–16)
IMMATURE GRANULOCYTES #: 0.1 K/UL
KETONES, POC: ABNORMAL
LEUKOCYTE EST, POC: ABNORMAL
LYMPHOCYTES ABSOLUTE: 2.2 K/UL (ref 1.1–4.5)
LYMPHOCYTES RELATIVE PERCENT: 22.2 % (ref 20–40)
MCH RBC QN AUTO: 30.3 PG (ref 27–31)
MCHC RBC AUTO-ENTMCNC: 31.6 G/DL (ref 33–37)
MCV RBC AUTO: 95.6 FL (ref 81–99)
MONOCYTES ABSOLUTE: 0.7 K/UL (ref 0–0.9)
MONOCYTES RELATIVE PERCENT: 7.1 % (ref 0–10)
NEUTROPHILS ABSOLUTE: 6.7 K/UL (ref 1.5–7.5)
NEUTROPHILS RELATIVE PERCENT: 66.9 % (ref 50–65)
NITRITE, POC: ABNORMAL
PDW BLD-RTO: 13.7 % (ref 11.5–14.5)
PH, POC: 5
PLATELET # BLD: 187 K/UL (ref 130–400)
PMV BLD AUTO: 11.9 FL (ref 9.4–12.3)
POTASSIUM SERPL-SCNC: 4.9 MMOL/L (ref 3.5–5)
PRO-BNP: 268 PG/ML (ref 0–900)
PROTEIN, POC: ABNORMAL
RBC # BLD: 4.33 M/UL (ref 4.2–5.4)
SODIUM BLD-SCNC: 140 MMOL/L (ref 136–145)
SPECIFIC GRAVITY, POC: 1010
TOTAL PROTEIN: 7 G/DL (ref 6.6–8.7)
TSH SERPL DL<=0.05 MIU/L-ACNC: 4.19 UIU/ML (ref 0.27–4.2)
UROBILINOGEN, POC: 0.2
WBC # BLD: 10 K/UL (ref 4.8–10.8)

## 2019-12-02 PROCEDURE — 4040F PNEUMOC VAC/ADMIN/RCVD: CPT | Performed by: NURSE PRACTITIONER

## 2019-12-02 PROCEDURE — 99214 OFFICE O/P EST MOD 30 MIN: CPT | Performed by: NURSE PRACTITIONER

## 2019-12-02 PROCEDURE — 36415 COLL VENOUS BLD VENIPUNCTURE: CPT | Performed by: NURSE PRACTITIONER

## 2019-12-02 PROCEDURE — 1123F ACP DISCUSS/DSCN MKR DOCD: CPT | Performed by: NURSE PRACTITIONER

## 2019-12-02 PROCEDURE — G8417 CALC BMI ABV UP PARAM F/U: HCPCS | Performed by: NURSE PRACTITIONER

## 2019-12-02 PROCEDURE — G8399 PT W/DXA RESULTS DOCUMENT: HCPCS | Performed by: NURSE PRACTITIONER

## 2019-12-02 PROCEDURE — G8484 FLU IMMUNIZE NO ADMIN: HCPCS | Performed by: NURSE PRACTITIONER

## 2019-12-02 PROCEDURE — 1036F TOBACCO NON-USER: CPT | Performed by: NURSE PRACTITIONER

## 2019-12-02 PROCEDURE — 3017F COLORECTAL CA SCREEN DOC REV: CPT | Performed by: NURSE PRACTITIONER

## 2019-12-02 PROCEDURE — 2022F DILAT RTA XM EVC RTNOPTHY: CPT | Performed by: NURSE PRACTITIONER

## 2019-12-02 PROCEDURE — 81002 URINALYSIS NONAUTO W/O SCOPE: CPT | Performed by: NURSE PRACTITIONER

## 2019-12-02 PROCEDURE — 3046F HEMOGLOBIN A1C LEVEL >9.0%: CPT | Performed by: NURSE PRACTITIONER

## 2019-12-02 PROCEDURE — G8427 DOCREV CUR MEDS BY ELIG CLIN: HCPCS | Performed by: NURSE PRACTITIONER

## 2019-12-02 PROCEDURE — 1090F PRES/ABSN URINE INCON ASSESS: CPT | Performed by: NURSE PRACTITIONER

## 2019-12-02 ASSESSMENT — ENCOUNTER SYMPTOMS
NAUSEA: 1
BACK PAIN: 1

## 2019-12-04 ENCOUNTER — OFFICE VISIT (OUTPATIENT)
Dept: CARDIOLOGY | Age: 69
End: 2019-12-04
Payer: MEDICARE

## 2019-12-04 VITALS
WEIGHT: 293 LBS | SYSTOLIC BLOOD PRESSURE: 138 MMHG | BODY MASS INDEX: 53.92 KG/M2 | HEIGHT: 62 IN | DIASTOLIC BLOOD PRESSURE: 70 MMHG | HEART RATE: 68 BPM

## 2019-12-04 DIAGNOSIS — I48.0 PAF (PAROXYSMAL ATRIAL FIBRILLATION) (HCC): ICD-10-CM

## 2019-12-04 DIAGNOSIS — R60.9 EDEMA, UNSPECIFIED TYPE: ICD-10-CM

## 2019-12-04 DIAGNOSIS — I10 ESSENTIAL HYPERTENSION: Chronic | ICD-10-CM

## 2019-12-04 DIAGNOSIS — E78.2 MIXED HYPERLIPIDEMIA: ICD-10-CM

## 2019-12-04 DIAGNOSIS — R06.02 SOB (SHORTNESS OF BREATH): Primary | ICD-10-CM

## 2019-12-04 DIAGNOSIS — I34.0 MILD MITRAL REGURGITATION BY PRIOR ECHOCARDIOGRAM: ICD-10-CM

## 2019-12-04 DIAGNOSIS — N18.30 STAGE 3 CHRONIC KIDNEY DISEASE (HCC): ICD-10-CM

## 2019-12-04 LAB — URINE CULTURE, ROUTINE: NORMAL

## 2019-12-04 PROCEDURE — 1090F PRES/ABSN URINE INCON ASSESS: CPT | Performed by: NURSE PRACTITIONER

## 2019-12-04 PROCEDURE — 1036F TOBACCO NON-USER: CPT | Performed by: NURSE PRACTITIONER

## 2019-12-04 PROCEDURE — 99214 OFFICE O/P EST MOD 30 MIN: CPT | Performed by: NURSE PRACTITIONER

## 2019-12-04 PROCEDURE — G8417 CALC BMI ABV UP PARAM F/U: HCPCS | Performed by: NURSE PRACTITIONER

## 2019-12-04 PROCEDURE — 3017F COLORECTAL CA SCREEN DOC REV: CPT | Performed by: NURSE PRACTITIONER

## 2019-12-04 PROCEDURE — 1123F ACP DISCUSS/DSCN MKR DOCD: CPT | Performed by: NURSE PRACTITIONER

## 2019-12-04 PROCEDURE — G8484 FLU IMMUNIZE NO ADMIN: HCPCS | Performed by: NURSE PRACTITIONER

## 2019-12-04 PROCEDURE — G8427 DOCREV CUR MEDS BY ELIG CLIN: HCPCS | Performed by: NURSE PRACTITIONER

## 2019-12-04 PROCEDURE — G8399 PT W/DXA RESULTS DOCUMENT: HCPCS | Performed by: NURSE PRACTITIONER

## 2019-12-04 PROCEDURE — 4040F PNEUMOC VAC/ADMIN/RCVD: CPT | Performed by: NURSE PRACTITIONER

## 2019-12-04 RX ORDER — SOTALOL HYDROCHLORIDE 120 MG/1
TABLET ORAL
Qty: 60 TABLET | Refills: 5 | Status: SHIPPED | OUTPATIENT
Start: 2019-12-04 | End: 2019-12-17

## 2019-12-13 ENCOUNTER — PATIENT MESSAGE (OUTPATIENT)
Dept: PRIMARY CARE CLINIC | Age: 69
End: 2019-12-13

## 2019-12-13 DIAGNOSIS — R52 PAIN: ICD-10-CM

## 2019-12-13 RX ORDER — TRAMADOL HYDROCHLORIDE 50 MG/1
50 TABLET ORAL EVERY 8 HOURS PRN
Qty: 60 TABLET | Refills: 0 | Status: SHIPPED | OUTPATIENT
Start: 2019-12-13 | End: 2020-03-02 | Stop reason: SDUPTHER

## 2019-12-17 ENCOUNTER — OFFICE VISIT (OUTPATIENT)
Dept: PRIMARY CARE CLINIC | Age: 69
End: 2019-12-17
Payer: MEDICARE

## 2019-12-17 VITALS
WEIGHT: 292.8 LBS | BODY MASS INDEX: 53.88 KG/M2 | TEMPERATURE: 97.8 F | HEIGHT: 62 IN | SYSTOLIC BLOOD PRESSURE: 110 MMHG | DIASTOLIC BLOOD PRESSURE: 73 MMHG | OXYGEN SATURATION: 97 % | HEART RATE: 72 BPM | RESPIRATION RATE: 16 BRPM

## 2019-12-17 DIAGNOSIS — E78.2 MIXED HYPERLIPIDEMIA: ICD-10-CM

## 2019-12-17 DIAGNOSIS — Z79.4 TYPE 2 DIABETES MELLITUS WITH COMPLICATION, WITH LONG-TERM CURRENT USE OF INSULIN (HCC): ICD-10-CM

## 2019-12-17 DIAGNOSIS — E11.8 TYPE 2 DIABETES MELLITUS WITH COMPLICATION, WITH LONG-TERM CURRENT USE OF INSULIN (HCC): ICD-10-CM

## 2019-12-17 DIAGNOSIS — Z00.00 ROUTINE GENERAL MEDICAL EXAMINATION AT A HEALTH CARE FACILITY: Primary | ICD-10-CM

## 2019-12-17 LAB
BILIRUBIN, POC: ABNORMAL
BLOOD URINE, POC: ABNORMAL
CHOLESTEROL, TOTAL: 135 MG/DL (ref 160–199)
CLARITY, POC: CLEAR
COLOR, POC: YELLOW
CREATININE URINE: 51.6 MG/DL (ref 4.2–622)
GLUCOSE URINE, POC: 250
HDLC SERPL-MCNC: 37 MG/DL (ref 65–121)
KETONES, POC: ABNORMAL
LDL CHOLESTEROL CALCULATED: 61 MG/DL
LEUKOCYTE EST, POC: ABNORMAL
MICROALBUMIN UR-MCNC: 3 MG/DL (ref 0–19)
MICROALBUMIN/CREAT UR-RTO: 58.1 MG/G
NITRITE, POC: ABNORMAL
PH, POC: 5
PROTEIN, POC: ABNORMAL
SPECIFIC GRAVITY, POC: 1010
TRIGL SERPL-MCNC: 185 MG/DL (ref 0–149)
UROBILINOGEN, POC: 0.2

## 2019-12-17 PROCEDURE — 1123F ACP DISCUSS/DSCN MKR DOCD: CPT | Performed by: NURSE PRACTITIONER

## 2019-12-17 PROCEDURE — G0439 PPPS, SUBSEQ VISIT: HCPCS | Performed by: NURSE PRACTITIONER

## 2019-12-17 PROCEDURE — 36415 COLL VENOUS BLD VENIPUNCTURE: CPT | Performed by: NURSE PRACTITIONER

## 2019-12-17 PROCEDURE — 81002 URINALYSIS NONAUTO W/O SCOPE: CPT | Performed by: NURSE PRACTITIONER

## 2019-12-17 PROCEDURE — 3046F HEMOGLOBIN A1C LEVEL >9.0%: CPT | Performed by: NURSE PRACTITIONER

## 2019-12-17 PROCEDURE — 3017F COLORECTAL CA SCREEN DOC REV: CPT | Performed by: NURSE PRACTITIONER

## 2019-12-17 PROCEDURE — 4040F PNEUMOC VAC/ADMIN/RCVD: CPT | Performed by: NURSE PRACTITIONER

## 2019-12-17 ASSESSMENT — LIFESTYLE VARIABLES
HOW OFTEN DO YOU HAVE A DRINK CONTAINING ALCOHOL: 0
HOW OFTEN DO YOU HAVE A DRINK CONTAINING ALCOHOL: 0

## 2019-12-17 ASSESSMENT — PATIENT HEALTH QUESTIONNAIRE - PHQ9
SUM OF ALL RESPONSES TO PHQ QUESTIONS 1-9: 0
SUM OF ALL RESPONSES TO PHQ QUESTIONS 1-9: 0

## 2019-12-18 ENCOUNTER — OFFICE VISIT (OUTPATIENT)
Dept: CARDIOLOGY | Age: 69
End: 2019-12-18
Payer: MEDICARE

## 2019-12-18 VITALS
BODY MASS INDEX: 53.37 KG/M2 | OXYGEN SATURATION: 96 % | SYSTOLIC BLOOD PRESSURE: 126 MMHG | DIASTOLIC BLOOD PRESSURE: 72 MMHG | HEART RATE: 74 BPM | HEIGHT: 62 IN | WEIGHT: 290 LBS

## 2019-12-18 DIAGNOSIS — R60.9 EDEMA, UNSPECIFIED TYPE: ICD-10-CM

## 2019-12-18 DIAGNOSIS — I10 ESSENTIAL HYPERTENSION: Primary | ICD-10-CM

## 2019-12-18 DIAGNOSIS — I10 ESSENTIAL HYPERTENSION: ICD-10-CM

## 2019-12-18 DIAGNOSIS — I48.0 PAF (PAROXYSMAL ATRIAL FIBRILLATION) (HCC): ICD-10-CM

## 2019-12-18 LAB
ANION GAP SERPL CALCULATED.3IONS-SCNC: 14 MMOL/L (ref 7–19)
BUN BLDV-MCNC: 37 MG/DL (ref 8–23)
CALCIUM SERPL-MCNC: 9.7 MG/DL (ref 8.8–10.2)
CHLORIDE BLD-SCNC: 101 MMOL/L (ref 98–111)
CO2: 22 MMOL/L (ref 22–29)
CREAT SERPL-MCNC: 1.5 MG/DL (ref 0.5–0.9)
GFR NON-AFRICAN AMERICAN: 34
GLUCOSE BLD-MCNC: 240 MG/DL (ref 74–109)
POTASSIUM SERPL-SCNC: 5 MMOL/L (ref 3.5–5)
SODIUM BLD-SCNC: 137 MMOL/L (ref 136–145)

## 2019-12-18 PROCEDURE — 1090F PRES/ABSN URINE INCON ASSESS: CPT | Performed by: NURSE PRACTITIONER

## 2019-12-18 PROCEDURE — 3017F COLORECTAL CA SCREEN DOC REV: CPT | Performed by: NURSE PRACTITIONER

## 2019-12-18 PROCEDURE — G8484 FLU IMMUNIZE NO ADMIN: HCPCS | Performed by: NURSE PRACTITIONER

## 2019-12-18 PROCEDURE — 1036F TOBACCO NON-USER: CPT | Performed by: NURSE PRACTITIONER

## 2019-12-18 PROCEDURE — 93000 ELECTROCARDIOGRAM COMPLETE: CPT | Performed by: NURSE PRACTITIONER

## 2019-12-18 PROCEDURE — G8427 DOCREV CUR MEDS BY ELIG CLIN: HCPCS | Performed by: NURSE PRACTITIONER

## 2019-12-18 PROCEDURE — 1123F ACP DISCUSS/DSCN MKR DOCD: CPT | Performed by: NURSE PRACTITIONER

## 2019-12-18 PROCEDURE — G8417 CALC BMI ABV UP PARAM F/U: HCPCS | Performed by: NURSE PRACTITIONER

## 2019-12-18 PROCEDURE — G8399 PT W/DXA RESULTS DOCUMENT: HCPCS | Performed by: NURSE PRACTITIONER

## 2019-12-18 PROCEDURE — 4040F PNEUMOC VAC/ADMIN/RCVD: CPT | Performed by: NURSE PRACTITIONER

## 2019-12-18 PROCEDURE — 99214 OFFICE O/P EST MOD 30 MIN: CPT | Performed by: NURSE PRACTITIONER

## 2019-12-27 ENCOUNTER — HOSPITAL ENCOUNTER (OUTPATIENT)
Dept: NON INVASIVE DIAGNOSTICS | Age: 69
Discharge: HOME | End: 2019-12-27
Payer: MEDICARE

## 2019-12-27 ENCOUNTER — APPOINTMENT (OUTPATIENT)
Dept: NON INVASIVE DIAGNOSTICS | Age: 69
End: 2019-12-27
Payer: MEDICARE

## 2019-12-27 DIAGNOSIS — R06.02 SOB (SHORTNESS OF BREATH): ICD-10-CM

## 2019-12-27 DIAGNOSIS — R60.9 EDEMA, UNSPECIFIED TYPE: ICD-10-CM

## 2019-12-27 LAB
LV EF: 58 %
LVEF MODALITY: NORMAL

## 2019-12-27 PROCEDURE — 6360000004 HC RX CONTRAST MEDICATION: Performed by: INTERNAL MEDICINE

## 2019-12-27 PROCEDURE — C8929 TTE W OR WO FOL WCON,DOPPLER: HCPCS

## 2019-12-27 RX ADMIN — PERFLUTREN 2.2 MG: 6.52 INJECTION, SUSPENSION INTRAVENOUS at 10:48

## 2020-01-07 RX ORDER — FUROSEMIDE 40 MG/1
TABLET ORAL
Qty: 90 TABLET | Refills: 3 | Status: SHIPPED | OUTPATIENT
Start: 2020-01-07 | End: 2020-01-21 | Stop reason: ALTCHOICE

## 2020-01-21 ENCOUNTER — OFFICE VISIT (OUTPATIENT)
Dept: CARDIOLOGY | Age: 70
End: 2020-01-21
Payer: MEDICARE

## 2020-01-21 VITALS
SYSTOLIC BLOOD PRESSURE: 120 MMHG | BODY MASS INDEX: 53.92 KG/M2 | HEART RATE: 67 BPM | DIASTOLIC BLOOD PRESSURE: 58 MMHG | WEIGHT: 293 LBS | HEIGHT: 62 IN

## 2020-01-21 PROCEDURE — G8484 FLU IMMUNIZE NO ADMIN: HCPCS | Performed by: CLINICAL NURSE SPECIALIST

## 2020-01-21 PROCEDURE — 99214 OFFICE O/P EST MOD 30 MIN: CPT | Performed by: CLINICAL NURSE SPECIALIST

## 2020-01-21 PROCEDURE — G8399 PT W/DXA RESULTS DOCUMENT: HCPCS | Performed by: CLINICAL NURSE SPECIALIST

## 2020-01-21 PROCEDURE — G8417 CALC BMI ABV UP PARAM F/U: HCPCS | Performed by: CLINICAL NURSE SPECIALIST

## 2020-01-21 PROCEDURE — 1090F PRES/ABSN URINE INCON ASSESS: CPT | Performed by: CLINICAL NURSE SPECIALIST

## 2020-01-21 PROCEDURE — 4040F PNEUMOC VAC/ADMIN/RCVD: CPT | Performed by: CLINICAL NURSE SPECIALIST

## 2020-01-21 PROCEDURE — 3017F COLORECTAL CA SCREEN DOC REV: CPT | Performed by: CLINICAL NURSE SPECIALIST

## 2020-01-21 PROCEDURE — 1123F ACP DISCUSS/DSCN MKR DOCD: CPT | Performed by: CLINICAL NURSE SPECIALIST

## 2020-01-21 PROCEDURE — G8427 DOCREV CUR MEDS BY ELIG CLIN: HCPCS | Performed by: CLINICAL NURSE SPECIALIST

## 2020-01-21 PROCEDURE — 1036F TOBACCO NON-USER: CPT | Performed by: CLINICAL NURSE SPECIALIST

## 2020-01-21 RX ORDER — TORSEMIDE 20 MG/1
20 TABLET ORAL DAILY
COMMUNITY

## 2020-01-21 NOTE — PROGRESS NOTES
mitral regurgitation by prior echocardiogram 2016    Hypokalemia 2015    Edema 2015    Asthma 2015    Palpitations 2014    Fatigue 2014    Shortness of breath     Lichen sclerosus     GERD (gastroesophageal reflux disease) 2013    Constipation 2013    Rectal bleeding 2013    Parkinson disease ([facility] [address] 2013     Past Medical History:   Diagnosis Date    Asthma 2015    Bilateral carpal tunnel syndrome 2018    sees dr. Breann Quiroz.     Colon polyp     COPD (chronic obstructive pulmonary disease) (HCC)     Diabetes mellitus (HCC)     GERD (gastroesophageal reflux disease)     HTN (hypertension)     Hyperlipidemia     Mild mitral regurgitation by prior echocardiogram 2016    Morbid obesity ([facility] [address] 10/18/2017    Normal cardiac stress test 09    no ischemia at attained level of excercise    Parkinson disease (HCC)     Paroxysmal atrial fibrillation ([facility] [address]     sees dr. Dereje Cast Post-menopausal     Prolonged emergence from general anesthesia     Restrictive airway disease     Stage 3 chronic kidney disease ([facility] [address] 10/18/2017     Past Surgical History:   Procedure Laterality Date    APPENDECTOMY       SECTION      x3    COLONOSCOPY      Dr Joesph Goltz polyp-5 yr recall    COLONOSCOPY N/A 2019    Dr Bebeto Mccormack 2 internal hemorrhoids without stigmata, diverticulosis, suboptimal prep--5 yr recall    GALLBLADDER SURGERY  2014    dr Grant Cantu ARTHROSCOPY      LA REVISE MEDIAN N/CARPAL TUNNEL SURG Right 2018    OPEN CARPAL TUNNEL RELEASE performed by Jemma Davila MD at [address] [place] [address] TYMPANOSTOMY TUBE PLACEMENT      dr Jerome Kumar in [facility] UPPER GASTROINTESTINAL ENDOSCOPY       Family History   Problem Relation Age of Onset    High Blood Pressure Father     Diabetes Father     Parkinsonism Father     High Blood Pressure Mother     Diabetes Sister     Other Paternal Grandmother         hiatal hernia    Heart Disease Paternal Grandfather     Colon Cancer Neg Hx     Colon Polyps Neg Hx     Esophageal Cancer Neg Hx     Liver Cancer Neg Hx     Liver Disease Neg Hx     Rectal Cancer Neg Hx     Stomach Cancer Neg Hx      Social History     Tobacco Use    Smoking status: Never Smoker    Smokeless tobacco: Never Used   Substance Use Topics    Alcohol use: No      Current Outpatient Medications   Medication Sig Dispense Refill    torsemide (DEMADEX) 20 MG tablet Take 20 mg by mouth 3 times daily 3 tablets for three days then two tablets daily      insulin detemir (LEVEMIR) 100 UNIT/ML injection vial INJECT 70 UNITS SUBQ EVERY NIGHT AT BEDTIME 10 mL 2    blood glucose monitor kit and supplies Use as directed for diabetes TID checks. 1 kit 0    blood glucose monitor strips Test 3times a day & as needed for symptoms of irregular blood glucose.  100 strip 3    zoster recombinant adjuvanted vaccine (SHINGRIX) 50 MCG/0.5ML SUSR injection Inject 0.5 mLs into the muscle See Admin Instructions 1 dose now and repeat in 2-6 months 0.5 mL 1    insulin aspart (NOVOLOG FLEXPEN) 100 UNIT/ML injection pen INJECT 20 UNITS INTO THE SKIN 3 TIMES DAILY (BEFORE MEALS) 15 mL 3    sotalol (BETAPACE) 120 MG tablet Take 1 tablet by mouth 2 times daily 180 tablet 3    atorvastatin (LIPITOR) 10 MG tablet TAKE ONE TABLET BY MOUTH EACH EVENING 30 tablet 3    linaclotide (LINZESS) 290 MCG CAPS capsule Take 1 capsule by mouth every morning (before breakfast) 30 capsule 5    fluconazole (DIFLUCAN) 150 MG tablet Take one on the 15th of every month 1 tablet 11    Liraglutide (VICTOZA) 18 MG/3ML SOPN SC injection Inject 1.8 mg into the skin daily 9 mL 3    omeprazole (PRILOSEC) 40 MG delayed release capsule Take 1 capsule by mouth daily 30 capsule 3    TRUEPLUS INSULIN SYRINGE 30G X 5/16\" 1 ML MISC INJECT AS DIRECTED DAILY 100 each 3    gabapentin (NEURONTIN) 600 MG tablet TAKE ONE TABLET BY MOUTH 3 TIMES DAILY AS NEEDED 90 tablet 5    clobetasol (TEMOVATE) 0.05 % ointment Apply external vagina 3 x a day for week one, then decrease to BID on week two, on week 3 once a day, on week four every other day then stop 1 Tube 1    nystatin (NYSTATIN) 875075 UNIT/GM powder Apply topically 3 times daily Apply topically 4 times daily. 60 g 3    Melatonin 10 MG CAPS Take 10 mg by mouth every evening Takes 20 mg a night      carbidopa-levodopa (SINEMET)  MG per tablet TAKE TWO TABLETS 3 TIMES DAILY 180 tablet 5    rOPINIRole (REQUIP) 4 MG tablet TAKE TWO TABLETS EVERY MORNING TAKE ONE TABLET AT NOON AND TAKE TWO TABLETS EVERY NIGHT AT BEDTIME (Patient taking differently: TAKE TWO TABLETS EVERY MORNING TAKE ONE TABLET AT NOON AND TAKE ONE TABLET EVERY NIGHT AT BEDTIME) 150 tablet 5    denosumab (PROLIA) 60 MG/ML SOSY SC injection Inject 60 mg into the skin every 6 months      losartan (COZAAR) 100 MG tablet TAKE ONE TABLET DAILY 30 tablet 5    montelukast (SINGULAIR) 10 MG tablet TAKE ONE TABLET BY MOUTH EVERY NIGHT AT BEDTIME 30 tablet 5    spironolactone (ALDACTONE) 25 MG tablet TAKE ONE TABLET DAILY (Patient taking differently: One tablet in the am and one tablet in the evening) 30 tablet 5    nystatin (MYCOSTATIN) 143727 UNIT/GM ointment Apply topically 2 times daily. for yeast 60 Tube 5    glucose blood VI test strips (ONE TOUCH ULTRA TEST) strip Inject 1 each into the skin daily As needed.  100 each 3    fluticasone (ARNUITY ELLIPTA) 100 MCG/ACT AEPB Inhale 100 mcg into the lungs      Cholecalciferol (VITAMIN D3) 5000 units TABS Take 1 tablet by mouth daily       albuterol (ACCUNEB) 1.25 MG/3ML nebulizer solution Inhale 3 mLs into the lungs every 6 hours as needed for Wheezing 360 mL 3    Lancets MISC 1 lancet to check glucose daily 100 each 3    Insulin Pen Needle 31G X 8 MM MISC Inject 1 each into the skin daily 100 each 2    albuterol (PROVENTIL HFA;VENTOLIN HFA) 108 (90 BASE) MCG/ACT inhaler Inhale 2 puffs into the lungs every 6 hours as needed for Wheezing 1 Inhaler 1    OXYGEN 2L NC 12-24 hours (Patient taking differently: nightly as needed 2L NC 12-24 hours) 1 Device 0    aspirin 325 MG tablet Take 325 mg by mouth daily.  Multiple Vitamins-Minerals (ICAPS AREDS 2 PO) Take by mouth daily       No current facility-administered medications for this visit. Allergies: Codeine and Hydrocodone-acetaminophen    Review of Systems  Constitutional - no significant activity change, appetite change, or unexpected weight change. No fever, chills or diaphoresis. + fatigue. HEENT - no significant rhinorrhea or epistaxis. No tinnitus or significant hearing loss. Eyes - no sudden vision change or amaurosis. Respiratory - no significant wheezing, stridor, apnea or cough. + dyspnea on exertion + shortness of breath. Cardiovascular - no exertional chest pain, orthopnea or PND. No sensation of arrhythmia or slow heart rate. +leg edema. Gastrointestinal - no abdominal swelling or pain. No blood in stool. No severe constipation, diarrhea, nausea, or vomiting. Genitourinary - no difficulty urinating, dysuria, frequency, or urgency. No flank pain or hematuria. Musculoskeletal - no back pain, gait disturbance, or myalgia. Skin - no color change or rash. No pallor. No new surgical incision. Neurologic - no speech difficulty, facial asymmetry or lateralizing weakness. No seizures, presyncope, syncope, or significant dizziness. Hematologic - no easy bruising or excessive bleeding. Psychiatric - no severe anxiety or insomnia. No confusion. All other review of systems are negative. Objective  Vital Signs - BP (!) 120/58   Pulse 67   Ht 5' 2\" (1.575 m)   Wt 296 lb (134.3 kg)   BMI 54.14 kg/m²   General - [facility] Salome is alert, cooperative, and pleasant. Well groomed. No acute distress. Body habitus is morbidly obese.   HEENT - The head is

## 2020-01-24 RX ORDER — GABAPENTIN 600 MG/1
TABLET ORAL
Qty: 90 TABLET | Refills: 5 | Status: SHIPPED | OUTPATIENT
Start: 2020-01-27 | End: 2020-07-23

## 2020-02-17 RX ORDER — OMEPRAZOLE 40 MG/1
CAPSULE, DELAYED RELEASE ORAL
Qty: 30 CAPSULE | Refills: 3 | Status: SHIPPED | OUTPATIENT
Start: 2020-02-17 | End: 2020-07-21

## 2020-02-20 NOTE — PROGRESS NOTES
MAURICIO Moffett  [facility]   Respiratory Disease Clinic  [address] [place] [address]  Phone: [phone]  Fax: [phone]     Violeta Romo is a 69 y.o. female.   CC:   Chief Complaint   Patient presents with   • mild intermittent asthma        HPI: She is here for her annual visit for asthma, nocturnal hypoxia, restrictive lung disease secondary to obesity, some lung scarring and history of Parkinson's that has caused a decrease in her mobility.  She continues to wear nighttime oxygen and feels that she benefits from it.  She reports that she tore her meniscus and is awaiting a possible knee replacement surgery that she would have done in [place].  She says that her breathing is okay other than exertional shortness of breath.  When I saw her last year she was pretty much wheelchair-bound but she comes in today using a rolling walker and says that since getting better control of the edema to her lower extremities she is been able to be a little bit more mobile.  It seems currently that her Parkinson's is under better control also.  She uses a rescue inhaler on a daily basis and does breathing treatments and Arnuity on an as-needed basis.  She sees MAURICIO Chavarria for routine medical care and she stays up-to-date on flu and pneumonia vaccines.    The following portions of the patient's history were reviewed and updated as appropriate: allergies, current medications, past family history, past medical history, past social history, past surgical history and problem list.    Past Medical History:   Diagnosis Date   • Diabetes mellitus (CMS/[facility])    • GERD (gastroesophageal reflux disease)    • Hypertension        Family History   Problem Relation Age of Onset   • Hypertension Mother    • Hypertension Father    • Diabetes Father        Social History     Socioeconomic History   • Marital status:      Spouse name: Not on file   • Number of children: Not on file   •  "Years of education: Not on file   • Highest education level: Not on file   Tobacco Use   • Smoking status: Never Smoker   • Smokeless tobacco: Never Used   Substance and Sexual Activity   • Alcohol use: No     Frequency: Never   • Drug use: No   • Sexual activity: Defer       Review of Systems   Constitutional: Negative for appetite change, chills, fatigue and fever.   HENT: Negative for swollen glands, trouble swallowing and voice change.    Eyes: Negative for visual disturbance.   Respiratory: Positive for shortness of breath. Negative for cough and wheezing.    Cardiovascular: Negative for chest pain and leg swelling.   Gastrointestinal: Negative for abdominal distention, abdominal pain, nausea and vomiting.   Endocrine:        Poor control of blood sugars   Genitourinary: Negative.    Musculoskeletal: Positive for gait problem. Negative for myalgias.   Skin: Negative.    Neurological: Positive for weakness.   Hematological: Negative.    Psychiatric/Behavioral: The patient is not nervous/anxious.        /80   Pulse 75   Ht 157.5 cm (62\")   Wt 131 kg (289 lb)   SpO2 96% Comment: RA  BMI 52.86 kg/m²     Physical Exam  Constitutional: She is oriented to person, place, and time. She appears well-developed and well-nourished. No distress. She is morbidly obese.  HENT:   Head: Normocephalic and atraumatic.   Eyes: Pupils are equal, round, and reactive to light. No scleral icterus.   Neck: Normal range of motion. Neck supple.   Cardiovascular: Normal rate, regular rhythm, S1 normal and S2 normal.   Pulmonary/Chest: Effort normal and breath sounds normal. No tachypnea. She has no wheezes. She has no rhonchi. She has no rales.   Abdominal: Soft. Bowel sounds are normal. She exhibits no distension.   Musculoskeletal: Normal range of motion. She exhibits edema (trace to ankles).   Lymphadenopathy:     She has no cervical adenopathy.   Neurological: She is alert and oriented to person, place, and time. She " displays very minimal tremor (To hands).   Using a rolling walker for ambulation  Skin: Skin is warm and dry. No rash noted.   Psychiatric: She has a normal mood and affect. Her behavior is normal.   Vitals reviewed.      PFT Values        Some values may be hidden. Unless noted otherwise, only the newest values recorded on each date are displayed.         Old Values PFT Results 2/21/20   No data to display.      Pre Drug PFT Results 2/21/20   FVC 74   FEV1 73   FEF 25-75% 65   FEV1/FVC 77.73      Post Drug PFT Results 2/21/20   No data to display.      Other Tests PFT Results 2/21/20   No data to display.           My interpretation of  Pulmonary Functions Testing: Spirometry shows a moderate restrictive defect with some coexisting obstruction.  FVC is 74% predicted and FEV1 is 73% predicted.  Actual FEV1/FVC is 77.73.  When compared to pulmonary function testing from 2016, her current lung function remains stable and is actually slightly improved.    Violeta was seen today for mild intermittent asthma.    Diagnoses and all orders for this visit:    Mild intermittent asthma, unspecified whether complicated  -     Pulmonary Function Test    Morbid obesity (CMS/[facility])    Restrictive lung disease    Nocturnal hypoxia    Scarring of lung      Patient's Body mass index is 52.86 kg/m². BMI is above normal parameters. Recommendations include: referral to primary care.    Follow-up/Plan: Continue rescue inhaler, albuterol nebs and Arnuity as needed.  Continue wearing oxygen at night.  Patient has been encouraged to continue to try and increase her mobility as any weight loss would be beneficial to her breathing.  Return to clinic in 1 year with flow volume loop.    Marlo Howard, MAURICIO  2/21/2020  11:30 AM

## 2020-02-21 ENCOUNTER — OFFICE VISIT (OUTPATIENT)
Dept: PULMONOLOGY | Facility: CLINIC | Age: 70
End: 2020-02-21

## 2020-02-21 VITALS
HEART RATE: 75 BPM | OXYGEN SATURATION: 96 % | BODY MASS INDEX: 53.18 KG/M2 | HEIGHT: 62 IN | DIASTOLIC BLOOD PRESSURE: 80 MMHG | WEIGHT: 289 LBS | SYSTOLIC BLOOD PRESSURE: 110 MMHG

## 2020-02-21 DIAGNOSIS — J45.20 MILD INTERMITTENT ASTHMA, UNSPECIFIED WHETHER COMPLICATED: Primary | ICD-10-CM

## 2020-02-21 DIAGNOSIS — E66.01 MORBID OBESITY (HCC): ICD-10-CM

## 2020-02-21 DIAGNOSIS — J98.4 SCARRING OF LUNG: ICD-10-CM

## 2020-02-21 DIAGNOSIS — G47.34 NOCTURNAL HYPOXIA: ICD-10-CM

## 2020-02-21 DIAGNOSIS — J98.4 RESTRICTIVE LUNG DISEASE: ICD-10-CM

## 2020-02-21 PROCEDURE — 94010 BREATHING CAPACITY TEST: CPT | Performed by: NURSE PRACTITIONER

## 2020-02-21 PROCEDURE — 99214 OFFICE O/P EST MOD 30 MIN: CPT | Performed by: NURSE PRACTITIONER

## 2020-02-21 RX ORDER — ALBUTEROL SULFATE 0.63 MG/3ML
1 SOLUTION RESPIRATORY (INHALATION) EVERY 6 HOURS PRN
Qty: 360 ML | Refills: 3 | Status: SHIPPED | OUTPATIENT
Start: 2020-02-21

## 2020-02-21 RX ORDER — TORSEMIDE 20 MG/1
20 TABLET ORAL
COMMUNITY

## 2020-02-21 RX ORDER — ALBUTEROL SULFATE 90 UG/1
2 AEROSOL, METERED RESPIRATORY (INHALATION) EVERY 4 HOURS PRN
Qty: 1 INHALER | Refills: 11 | Status: SHIPPED | OUTPATIENT
Start: 2020-02-21

## 2020-02-21 NOTE — PROCEDURES
Pulmonary Function Test  Performed by: Marlo Howard APRN  Authorized by: Marlo Howard APRN      Pre Drug    FVC: 74%   FEV1: 73%   FEF 25-75%: 65%   FEV1/FVC: 77.73%

## 2020-02-21 NOTE — PATIENT INSTRUCTIONS
How to Increase Your Level of Physical Activity    Getting regular physical activity is important for your overall health and well-being. Most people do not get enough exercise. There are easy ways to increase your level of physical activity, even if you have not been very active in the past or you are just starting out.  Why is physical activity important?  Physical activity has many short-term and long-term health benefits. Regular exercise can:  · Help you lose weight or maintain a healthy weight.  · Strengthen your muscles and bones.  · Boost your mood and improve self-esteem.  · Reduce your risk of certain long-term (chronic) diseases, like heart disease, cancer, and diabetes.  · Help you stay capable of walking and moving around (mobile) as you age.  · Prevent accidents, such as falls, as you age.  · Increase life expectancy.  What are the benefits of being physically active on a regular basis?  In addition to improving your physical health, being physically active on most days of the week can help you in ways that you may not expect. Benefits of regular physical activity may include:  · Feeling good about your body.  · Being able to move around more easily and for longer periods of time without getting tired (increased stamina).  · Finding new sources of fun and enjoyment.  · Meeting new people who share a common interest.  · Being able to fight off illness better (enhanced immunity).  · Being able to sleep better.  What can happen if I am not physically active on a regular basis?  Not getting enough physical activity can lead to an unhealthy lifestyle and future health problems. This can increase your chances of:  · Becoming overweight or obese.  · Becoming sick.  · Developing chronic illnesses, like heart disease or diabetes.  · Having mental health problems, like depression or anxiety.  · Having sleep problems.  · Having trouble walking or getting yourself around (reduced mobility).  · Injuring yourself in  a fall as you get older.  What steps can I take to be more physically active?  · Check with your health care provider about how to get started. Ask your health care provider what activities are safe for you.  · Start out slowly. Walking or doing some simple chair exercises is a good place to start, especially if you have not been active before or for a long time.  · Try to find activities that you enjoy. You are more likely to commit to an exercise routine if it does not feel like a chore.  · If you have bone or joint problems, choose low-impact exercises, like walking or swimming.  · Include physical activity in your everyday routine.  · Invite friends or family members to exercise with you. This also will help you commit to your workout plan.  · Set goals that you can work toward.  · Aim for at least 150 minutes of moderate-intensity exercise each week. Examples of moderate-intensity exercise include walking or riding a bike.  Where to find more information  · Centers for Disease Control and Prevention: www.cdc.gov/physicalactivity/index.html  · [employer] [place] [employer] Fitness, Sports & Nutrition www.fitness.gov/resource-center  · ChooseMyPlate: www.choosemyplate.gov/physical-activity  Contact a health care provider if:  · You have headaches, muscle aches, or joint pain.  · You feel dizzy or light-headed while exercising.  · You faint.  · You have chest pain while exercising.  Summary  · Exercise benefits your mind and body at any age, even if you are just starting out.  · If you have a chronic illness or have not been active for a while, check with your health care provider before increasing your physical activity.  · Choose activities that are safe and enjoyable for you. Ask your health care provider what activities are safe for you.  · Start slowly. Tell your health care provider if you have problems as you start to increase your activity level.  This information is not intended to replace advice given to you by  your health care provider. Make sure you discuss any questions you have with your health care provider.  Document Released: 12/07/2017 Document Revised: 12/07/2017 Document Reviewed: 12/07/2017  Elsevier Interactive Patient Education © 2020 [employer] Inc.

## 2020-03-01 ENCOUNTER — PATIENT MESSAGE (OUTPATIENT)
Dept: PRIMARY CARE CLINIC | Age: 70
End: 2020-03-01

## 2020-03-02 RX ORDER — TRAMADOL HYDROCHLORIDE 50 MG/1
50 TABLET ORAL EVERY 8 HOURS PRN
Qty: 60 TABLET | Refills: 0 | Status: SHIPPED | OUTPATIENT
Start: 2020-03-02 | End: 2020-05-04 | Stop reason: SDUPTHER

## 2020-03-02 RX ORDER — ATORVASTATIN CALCIUM 10 MG/1
TABLET, FILM COATED ORAL
Qty: 30 TABLET | Refills: 3 | Status: SHIPPED | OUTPATIENT
Start: 2020-03-02 | End: 2020-06-19

## 2020-03-02 NOTE — TELEPHONE ENCOUNTER
From: Christel Manzo  To: INGRID Mann - CNP  Sent: 3/1/2020 5:25 PM CST  Subject: Prescription Question    Deon Jacobs needs a refill on her tramadol please

## 2020-03-04 RX ORDER — NYSTATIN 100000 [USP'U]/G
POWDER TOPICAL 3 TIMES DAILY
Qty: 60 G | Refills: 3 | Status: SHIPPED | OUTPATIENT
Start: 2020-03-04 | End: 2021-03-30 | Stop reason: SDUPTHER

## 2020-03-10 RX ORDER — LIRAGLUTIDE 6 MG/ML
1.8 INJECTION SUBCUTANEOUS DAILY
Qty: 9 ML | Refills: 3 | Status: SHIPPED | OUTPATIENT
Start: 2020-03-10 | End: 2020-11-09

## 2020-03-10 RX ORDER — INSULIN DETEMIR 100 [IU]/ML
INJECTION, SOLUTION SUBCUTANEOUS
Qty: 10 ML | Refills: 2 | Status: SHIPPED | OUTPATIENT
Start: 2020-03-10 | End: 2020-04-09 | Stop reason: SDUPTHER

## 2020-03-16 ENCOUNTER — TELEPHONE (OUTPATIENT)
Dept: PRIMARY CARE CLINIC | Age: 70
End: 2020-03-16

## 2020-03-16 RX ORDER — TIZANIDINE 2 MG/1
2 TABLET ORAL NIGHTLY PRN
Qty: 30 TABLET | Refills: 0 | Status: SHIPPED | OUTPATIENT
Start: 2020-03-16 | End: 2020-07-27

## 2020-03-16 NOTE — TELEPHONE ENCOUNTER
Pt was scheduled for tomorrow for a well check, I did reschedule but she I s having some back pain and would like a muscle relaxer sent in

## 2020-03-25 RX ORDER — INSULIN ASPART 100 [IU]/ML
INJECTION, SOLUTION INTRAVENOUS; SUBCUTANEOUS
Qty: 15 ML | Refills: 3 | Status: SHIPPED | OUTPATIENT
Start: 2020-03-25 | End: 2020-04-03

## 2020-04-03 RX ORDER — INSULIN ASPART 100 [IU]/ML
INJECTION, SOLUTION INTRAVENOUS; SUBCUTANEOUS
Qty: 15 ML | Refills: 3 | Status: SHIPPED | OUTPATIENT
Start: 2020-04-03 | End: 2020-06-12 | Stop reason: SDUPTHER

## 2020-04-08 ENCOUNTER — VIRTUAL VISIT (OUTPATIENT)
Dept: PRIMARY CARE CLINIC | Age: 70
End: 2020-04-08
Payer: MEDICARE

## 2020-04-08 PROCEDURE — G8399 PT W/DXA RESULTS DOCUMENT: HCPCS | Performed by: FAMILY MEDICINE

## 2020-04-08 PROCEDURE — 1123F ACP DISCUSS/DSCN MKR DOCD: CPT | Performed by: FAMILY MEDICINE

## 2020-04-08 PROCEDURE — 4040F PNEUMOC VAC/ADMIN/RCVD: CPT | Performed by: FAMILY MEDICINE

## 2020-04-08 PROCEDURE — 3017F COLORECTAL CA SCREEN DOC REV: CPT | Performed by: FAMILY MEDICINE

## 2020-04-08 PROCEDURE — G8428 CUR MEDS NOT DOCUMENT: HCPCS | Performed by: FAMILY MEDICINE

## 2020-04-08 PROCEDURE — 1090F PRES/ABSN URINE INCON ASSESS: CPT | Performed by: FAMILY MEDICINE

## 2020-04-08 PROCEDURE — 99213 OFFICE O/P EST LOW 20 MIN: CPT | Performed by: FAMILY MEDICINE

## 2020-04-09 ENCOUNTER — PATIENT MESSAGE (OUTPATIENT)
Dept: PRIMARY CARE CLINIC | Age: 70
End: 2020-04-09

## 2020-04-09 RX ORDER — INSULIN DETEMIR 100 [IU]/ML
INJECTION, SOLUTION SUBCUTANEOUS
Qty: 20 ML | Refills: 5 | Status: SHIPPED | OUTPATIENT
Start: 2020-04-09 | End: 2020-07-24

## 2020-04-09 RX ORDER — CLINDAMYCIN HYDROCHLORIDE 300 MG/1
300 CAPSULE ORAL 2 TIMES DAILY
Qty: 14 CAPSULE | Refills: 0 | Status: SHIPPED | OUTPATIENT
Start: 2020-04-09 | End: 2020-04-16

## 2020-04-09 RX ORDER — INSULIN DETEMIR 100 [IU]/ML
INJECTION, SOLUTION SUBCUTANEOUS
Qty: 20 ML | Refills: 2 | Status: SHIPPED | OUTPATIENT
Start: 2020-04-09 | End: 2020-04-09 | Stop reason: SDUPTHER

## 2020-04-09 ASSESSMENT — ENCOUNTER SYMPTOMS
ABDOMINAL PAIN: 0
WHEEZING: 0
VOMITING: 0
EYE DISCHARGE: 0
BACK PAIN: 0
COUGH: 0
COLOR CHANGE: 0
NAUSEA: 0
DIARRHEA: 0

## 2020-04-09 NOTE — PROGRESS NOTES
for sleep disturbance and suicidal ideas. Prior to Visit Medications    Medication Sig Taking? Authorizing Provider   clindamycin (CLEOCIN) 300 MG capsule Take 1 capsule by mouth 2 times daily for 7 days  Christelle Jauregui MD   insulin detemir (LEVEMIR) 100 UNIT/ML injection vial INJECT 70 UNITS SUBQ EVERY NIGHT AT BEDTIME  INGRID Olivera CNP   insulin aspart (NOVOLOG FLEXPEN) 100 UNIT/ML injection pen INJECT 15 UNITS INTO THE SKIN 3 TIMES DAILY (BEFORE MEALS)  INGRID Olivera CNP   tiZANidine (ZANAFLEX) 2 MG tablet Take 1 tablet by mouth nightly as needed (muscle spasms)  INGRID Olivera CNP   Liraglutide (VICTOZA) 18 MG/3ML SOPN SC injection Inject 1.8 mg into the skin daily  INGRID Olivera CNP   nystatin (NYSTATIN) 225642 UNIT/GM powder Apply topically 3 times daily Apply topically 4 times daily. INGRID Olivera CNP   atorvastatin (LIPITOR) 10 MG tablet TAKE ONE TABLET BY MOUTH EACH EVENING  INGRID Olivera CNP   omeprazole (PRILOSEC) 40 MG delayed release capsule TAKE ONE CAPSULE BY MOUTH EVERY DAY  INGRID Olivera CNP   gabapentin (NEURONTIN) 600 MG tablet TAKE ONE TABLET BY MOUTH 3 TIMES DAILY AS NEEDED  Shyann Galaviz MD   torsemide (DEMADEX) 20 MG tablet Take 20 mg by mouth 3 times daily 3 tablets for three days then two tablets daily  Historical Provider, MD   blood glucose monitor kit and supplies Use as directed for diabetes TID checks. INGRID Olivera CNP   blood glucose monitor strips Test 3times a day & as needed for symptoms of irregular blood glucose.   INGRID Olivera CNP   zoster recombinant adjuvanted vaccine [facility]) 50 MCG/0.5ML SUSR injection Inject 0.5 mLs into the muscle See Admin Instructions 1 dose now and repeat in 2-6 months  INGRID Cruz CNP   sotalol (BETAPACE) 120 MG tablet Take 1 tablet by mouth 2 times daily  INGRID Gerber   linaclotide (LINZESS) 290 MCG CAPS capsule Take Speech: Speech normal.         Behavior: Behavior normal. Behavior is cooperative. Thought Content: Thought content normal.         Cognition and Memory: Cognition and memory normal.         Judgment: Judgment normal.         Due to this being a TeleHealth encounter, evaluation of the following organ systems is limited: Vitals/Constitutional/EENT/Resp/CV/GI//MS/Neuro/Skin/Heme-Lymph-Imm. ASSESSMENT/PLAN:  1. Blister  Encouraged to wear compression socks. She is to elevate her legs. I am going to go ahead and start an antibiotic because it does look like she is got some cellulitis around the blister. I encouraged him to keep this elevated and let us know if the redness is spreading. The daughter is going to keep an eye on it. 2. Bilateral leg edema  See note above. Return if symptoms worsen or fail to improve. An  electronic signature was used to authenticate this note. --Ruthie Christine MD on 4/9/2020 at 12:17 PM      Pursuant to the emergency declaration under the [facility], [facility] waiver authority and the [employer] and [employer] Act, this Virtual  Visit was conducted, with patient's consent, to reduce the patient's risk of exposure to COVID-19 and provide continuity of care for an established patient. Services were provided through a video synchronous discussion virtually to substitute for in-person clinic visit.

## 2020-04-13 RX ORDER — LINACLOTIDE 290 UG/1
CAPSULE, GELATIN COATED ORAL
Qty: 30 CAPSULE | Refills: 5 | Status: SHIPPED | OUTPATIENT
Start: 2020-04-13 | End: 2020-10-12

## 2020-04-16 ENCOUNTER — TELEMEDICINE (OUTPATIENT)
Dept: PRIMARY CARE CLINIC | Age: 70
End: 2020-04-16
Payer: MEDICARE

## 2020-04-16 PROCEDURE — G8428 CUR MEDS NOT DOCUMENT: HCPCS | Performed by: NURSE PRACTITIONER

## 2020-04-16 PROCEDURE — G8399 PT W/DXA RESULTS DOCUMENT: HCPCS | Performed by: NURSE PRACTITIONER

## 2020-04-16 PROCEDURE — 2022F DILAT RTA XM EVC RTNOPTHY: CPT | Performed by: NURSE PRACTITIONER

## 2020-04-16 PROCEDURE — 3017F COLORECTAL CA SCREEN DOC REV: CPT | Performed by: NURSE PRACTITIONER

## 2020-04-16 PROCEDURE — 3046F HEMOGLOBIN A1C LEVEL >9.0%: CPT | Performed by: NURSE PRACTITIONER

## 2020-04-16 PROCEDURE — 4040F PNEUMOC VAC/ADMIN/RCVD: CPT | Performed by: NURSE PRACTITIONER

## 2020-04-16 PROCEDURE — 1090F PRES/ABSN URINE INCON ASSESS: CPT | Performed by: NURSE PRACTITIONER

## 2020-04-16 PROCEDURE — 1123F ACP DISCUSS/DSCN MKR DOCD: CPT | Performed by: NURSE PRACTITIONER

## 2020-04-16 PROCEDURE — 99214 OFFICE O/P EST MOD 30 MIN: CPT | Performed by: NURSE PRACTITIONER

## 2020-04-16 ASSESSMENT — ENCOUNTER SYMPTOMS
ROS SKIN COMMENTS: LEFT LOWER LEG WOUND
RESPIRATORY NEGATIVE: 1
BACK PAIN: 1
SHORTNESS OF BREATH: 0
GASTROINTESTINAL NEGATIVE: 1

## 2020-04-16 NOTE — PROGRESS NOTES
screen  12/17/2020    Annual Wellness Visit (AWV)  12/17/2020    Potassium monitoring  12/18/2020    Creatinine monitoring  12/18/2020    Colon cancer screen colonoscopy  06/18/2024    DEXA (modify frequency per FRAX score)  Completed    Flu vaccine  Completed    Pneumococcal 65+ years Vaccine  Completed    Hepatitis C screen  Addressed    Hepatitis A vaccine  Aged Out    Hib vaccine  Aged Out    Meningococcal (ACWY) vaccine  Aged Out       PHYSICAL EXAMINATION:  Physical Exam  Vitals signs and nursing note reviewed. Constitutional:       Appearance: Normal appearance. She is well-developed. She is obese. HENT:      Head: Normocephalic. Eyes:      Conjunctiva/sclera: Conjunctivae normal.      Pupils: Pupils are equal, round, and reactive to light. Neck:      Musculoskeletal: Normal range of motion. Pulmonary:      Effort: Pulmonary effort is normal.   Skin:     General: Skin is warm and dry. Neurological:      Mental Status: She is alert and oriented to person, place, and time. Psychiatric:         Mood and Affect: Mood normal.         Behavior: Behavior normal.         Thought Content: Thought content normal.         Judgment: Judgment normal.     I was unable to get a good picture on the video today it was very blurry and distorted. Due to this being a TeleHealth encounter, evaluation of the following organ systems is limited: Vitals/Constitutional/EENT/Resp/CV/GI//MS/Neuro/Skin/Heme-Lymph-Imm. ASSESSMENT/PLAN:  1. Open wound of left lower leg, sequela  Change the dressing to a Silvadene dressing twice daily until the nurse is able to go out and get a culture for me. - Culture, Wound; Future  - Internal Referral to Home Health  - Comprehensive Metabolic Panel; Future  - Hemoglobin A1C; Future    2. Uncontrolled insulin dependent diabetes mellitus ([facility] [address]  We will draw hemoglobin A1c and assess her current diabetic status and maybe have to adjust her medications.   - Internal note.    --Luis Flowers, APRN - CNP on 4/16/2020 at 11:53 AM    I did call myself and make the referral for home health to see if they could go out today or tomorrow and do a wound culture as well as draw a CMP and a hemoglobin A1c. I spoke with Shakila Kiko she is going to send someone out by tomorrow. I called the patient back    Pursuant to the emergency declaration under the [address] [place] [address], [facility] waiver authority and the [employer] and [employer] Act, this Virtual  Visit was conducted, with patient's consent, to reduce the patient's risk of exposure to COVID-19 and provide continuity of care for an established patient. Services were provided through a video synchronous discussion virtually to substitute for in-person clinic visit.

## 2020-04-21 ENCOUNTER — TELEPHONE (OUTPATIENT)
Dept: PRIMARY CARE CLINIC | Age: 70
End: 2020-04-21

## 2020-04-21 RX ORDER — SULFAMETHOXAZOLE AND TRIMETHOPRIM 400; 80 MG/1; MG/1
1 TABLET ORAL 2 TIMES DAILY
Qty: 14 TABLET | Refills: 0 | Status: SHIPPED | OUTPATIENT
Start: 2020-04-21 | End: 2020-04-28

## 2020-04-21 NOTE — TELEPHONE ENCOUNTER
Will repeat lab on Thursday. As we discussed home health states that she has never had one before then he would have to talk to Dr. Davida Simmons and see if he would approve.

## 2020-04-23 ENCOUNTER — TELEMEDICINE (OUTPATIENT)
Dept: CARDIOLOGY | Age: 70
End: 2020-04-23
Payer: MEDICARE

## 2020-04-23 VITALS — BODY MASS INDEX: 54.14 KG/M2 | SYSTOLIC BLOOD PRESSURE: 122 MMHG | DIASTOLIC BLOOD PRESSURE: 60 MMHG | HEIGHT: 62 IN

## 2020-04-23 PROCEDURE — 1090F PRES/ABSN URINE INCON ASSESS: CPT | Performed by: CLINICAL NURSE SPECIALIST

## 2020-04-23 PROCEDURE — G8399 PT W/DXA RESULTS DOCUMENT: HCPCS | Performed by: CLINICAL NURSE SPECIALIST

## 2020-04-23 PROCEDURE — 99214 OFFICE O/P EST MOD 30 MIN: CPT | Performed by: CLINICAL NURSE SPECIALIST

## 2020-04-23 PROCEDURE — 3017F COLORECTAL CA SCREEN DOC REV: CPT | Performed by: CLINICAL NURSE SPECIALIST

## 2020-04-23 PROCEDURE — 4040F PNEUMOC VAC/ADMIN/RCVD: CPT | Performed by: CLINICAL NURSE SPECIALIST

## 2020-04-23 PROCEDURE — 1123F ACP DISCUSS/DSCN MKR DOCD: CPT | Performed by: CLINICAL NURSE SPECIALIST

## 2020-04-23 PROCEDURE — G8427 DOCREV CUR MEDS BY ELIG CLIN: HCPCS | Performed by: CLINICAL NURSE SPECIALIST

## 2020-04-23 NOTE — PROGRESS NOTES
MEALS) Yes Angel DryINGRID CNP   tiZANidine (ZANAFLEX) 2 MG tablet Take 1 tablet by mouth nightly as needed (muscle spasms) Yes INGRID Wong CNP   Liraglutide (VICTOZA) 18 MG/3ML SOPN SC injection Inject 1.8 mg into the skin daily Yes Angel DryINGRID - CESAR   nystatin (NYSTATIN) 525285 UNIT/GM powder Apply topically 3 times daily Apply topically 4 times daily. Yes Angel DryINGRID - CESAR   atorvastatin (LIPITOR) 10 MG tablet TAKE ONE TABLET BY MOUTH EACH EVENING Yes Angel DryINGRID - CESAR   omeprazole (PRILOSEC) 40 MG delayed release capsule TAKE ONE CAPSULE BY MOUTH EVERY DAY Yes INGRID Wong - CESAR   gabapentin (NEURONTIN) 600 MG tablet TAKE ONE TABLET BY MOUTH 3 TIMES DAILY AS NEEDED Yes Simin Trevizo MD   torsemide (DEMADEX) 20 MG tablet Take 20 mg by mouth daily 3 tablets for three days then two tablets daily  Yes Historical Provider, MD   blood glucose monitor kit and supplies Use as directed for diabetes TID checks. Yes INGRID Wong CNP   blood glucose monitor strips Test 3times a day & as needed for symptoms of irregular blood glucose.  Yes INGRID Wong CNP   sotalol (BETAPACE) 120 MG tablet Take 1 tablet by mouth 2 times daily Yes INGRID Adler   fluconazole (DIFLUCAN) 150 MG tablet Take one on the 15th of every month Yes INGRID Wong CNP   TRUEPLUS INSULIN SYRINGE 30G X 5/16\" 1 ML MISC INJECT AS DIRECTED DAILY Yes INGRID Wong CNP   clobetasol (TEMOVATE) 0.05 % ointment Apply external vagina 3 x a day for week one, then decrease to BID on week two, on week 3 once a day, on week four every other day then stop Yes Hussain Alaniz MD   Melatonin 10 MG CAPS Take 10 mg by mouth every evening Takes 20 mg a night Yes Historical Provider, MD   Multiple Vitamins-Minerals (ICAPS AREDS 2 PO) Take by mouth daily Yes Historical Provider, MD   carbidopa-levodopa (SINEMET)  MG per tablet TAKE TWO TABLETS 3 TIMES DAILY Yes MARTHA Mcmanus   rOPINIRole (REQUIP) 4 MG tablet TAKE TWO TABLETS EVERY MORNING TAKE ONE TABLET AT NOON AND TAKE TWO TABLETS EVERY NIGHT AT BEDTIME  Patient taking differently: TAKE TWO TABLETS EVERY MORNING TAKE ONE TABLET AT NOON AND TAKE ONE TABLET EVERY NIGHT AT BEDTIME Yes MARTHA Mcmanus   denosumab (PROLIA) 60 MG/ML SOSY SC injection Inject 60 mg into the skin every 6 months Yes Historical Provider, MD   losartan (COZAAR) 100 MG tablet TAKE ONE TABLET DAILY Yes INGRID Pitt   montelukast (SINGULAIR) 10 MG tablet TAKE ONE TABLET BY MOUTH EVERY NIGHT AT BEDTIME Yes INGRID Kim CNP   spironolactone (ALDACTONE) 25 MG tablet TAKE ONE TABLET DAILY  Patient taking differently: One tablet in the am and one tablet in the evening Yes INGRID Umana   nystatin (MYCOSTATIN) 034547 UNIT/GM ointment Apply topically 2 times daily. for yeast Yes INGRID Kim CNP   glucose blood VI test strips (ONE TOUCH ULTRA TEST) strip Inject 1 each into the skin daily As needed. Yes INGRID Kim CNP   fluticasone (ARNUITY ELLIPTA) 100 MCG/ACT AEPB Inhale 100 mcg into the lungs Yes Historical Provider, MD   Cholecalciferol (VITAMIN D3) 5000 units TABS Take 1 tablet by mouth daily  Yes Historical Provider, MD   albuterol (ACCUNEB) 1.25 MG/3ML nebulizer solution Inhale 3 mLs into the lungs every 6 hours as needed for Wheezing Yes INGRID Kim CNP   Lancets MISC 1 lancet to check glucose daily Yes INGRID Kim CNP   Insulin Pen Needle 31G X 8 MM MISC Inject 1 each into the skin daily Yes INGRID Kim CNP   albuterol (PROVENTIL HFA;VENTOLIN HFA) 108 (90 BASE) MCG/ACT inhaler Inhale 2 puffs into the lungs every 6 hours as needed for Wheezing Yes INGRID Kim CNP   OXYGEN 2L NC 12-24 hours  Patient taking differently: nightly as needed 2L NC 12-24 hours Yes INGRID Kim CNP   aspirin 325 MG tablet Take 325 mg by mouth daily. Yes Historical Provider, MD   zoster recombinant adjuvanted vaccine [facility]) 50 MCG/0.5ML SUSR injection Inject 0.5 mLs into the muscle See Admin Instructions 1 dose now and repeat in 2-6 months  Patient not taking: Reported on 2020  INGRID Adame CNP       Social History     Tobacco Use    Smoking status: Never Smoker    Smokeless tobacco: Never Used   Substance Use Topics    Alcohol use: No    Drug use: No        Allergies   Allergen Reactions    Codeine      itching    Hydrocodone-Acetaminophen Nausea Only   ,   Past Medical History:   Diagnosis Date    Asthma 2015    Bilateral carpal tunnel syndrome 2018    sees dr. Brennan Sees.     Colon polyp     COPD (chronic obstructive pulmonary disease) (HCC)     Diabetes mellitus (HCC)     GERD (gastroesophageal reflux disease)     HTN (hypertension)     Hyperlipidemia     Mild mitral regurgitation by prior echocardiogram 2016    Morbid obesity ([address] 10/18/2017    Normal cardiac stress test 09    no ischemia at attained level of excercise    Parkinson disease (HCC)     Paroxysmal atrial fibrillation ([address]     sees dr. Bubba Berg Post-menopausal     Prolonged emergence from general anesthesia     Restrictive airway disease     Stage 3 chronic kidney disease ([address] 10/18/2017   ,   Past Surgical History:   Procedure Laterality Date    APPENDECTOMY       SECTION      x3    COLONOSCOPY      Dr Paulette Linda polyp-5 yr recall    COLONOSCOPY N/A 2019    Dr Nivia Castellon 2 internal hemorrhoids without stigmata, diverticulosis, suboptimal prep--5 yr recall    GALLBLADDER SURGERY  2014    dr Floyd Rosales N/CARPAL TUNNEL SURG Right 2018    OPEN CARPAL TUNNEL RELEASE performed by Alysha Smith MD at [address] [place] [address] TYMPANOSTOMY TUBE PLACEMENT      dr Grant Ortiz in [facility] ENDOSCOPY     ,   Family History   Problem discoloration noted on facial skin         [x] Abnormal-  Wound on lower legs           Psychiatric:       [x] Normal Affect [] No Hallucinations        [] Abnormal-     Other pertinent observable physical exam findings-     Due to this being a TeleHealth encounter, evaluation of the following organ systems is limited: Vitals/Constitutional/EENT/Resp/CV/GI//MS/Neuro/Skin/Heme-Lymph-Imm. ASSESSMENT/PLAN:  1. PAF (paroxysmal atrial fibrillation) (HCC)  Maintained on sotalol. No recurrent sustained atrial fibrillation. Last EKG showed sinus rhythm. Patient is not anticoagulated  Instructed to notify us if she notices any increase in palpitations or arrhythmia. 2. Essential hypertension  Blood pressures well controlled on ARB    3. Bilateral leg edema  On torsemide and Aldactone-provement in. Does have some blistering being treated by PCP and home health    4. H/O diastolic dysfunction  Mild diastolic dysfunction. Reviewed last 2D echo. Stable    Also has CKD, diabetes Parkinson's disease. Follows with other specialists      Return in about 6 months (around 10/23/2020). An  electronic signature was used to authenticate this note. --INGRID Jung on 4/23/2020 at 11:23 AM        Pursuant to the emergency declaration under the [facility] waiver authority and the [employer] and [employer] Act, this Virtual  Visit was conducted, with patient's consent, to reduce the patient's risk of exposure to COVID-19 and provide continuity of care for an established patient. Medical assistantRiver phone patient prior to visit to verify medications and go over complaints and update chart. Services were provided through a video synchronous discussion virtually to substitute for in-person clinic visit.

## 2020-04-24 LAB
ALBUMIN SERPL-MCNC: 3.9 G/DL (ref 3.5–5.2)
ALP BLD-CCNC: 82 U/L (ref 35–104)
ALT SERPL-CCNC: <5 U/L (ref 5–33)
ANION GAP SERPL CALCULATED.3IONS-SCNC: 15 MMOL/L (ref 7–19)
AST SERPL-CCNC: 11 U/L (ref 5–32)
BILIRUB SERPL-MCNC: 0.3 MG/DL (ref 0.2–1.2)
BUN BLDV-MCNC: 46 MG/DL (ref 8–23)
CALCIUM SERPL-MCNC: 9.7 MG/DL (ref 8.8–10.2)
CHLORIDE BLD-SCNC: 98 MMOL/L (ref 98–111)
CO2: 24 MMOL/L (ref 22–29)
CREAT SERPL-MCNC: 2.5 MG/DL (ref 0.5–0.9)
GFR NON-AFRICAN AMERICAN: 19
GLUCOSE BLD-MCNC: 127 MG/DL (ref 74–109)
POTASSIUM SERPL-SCNC: 5.1 MMOL/L (ref 3.5–5)
SODIUM BLD-SCNC: 137 MMOL/L (ref 136–145)
TOTAL PROTEIN: 6.8 G/DL (ref 6.6–8.7)

## 2020-05-04 RX ORDER — TRAMADOL HYDROCHLORIDE 50 MG/1
50 TABLET ORAL EVERY 8 HOURS PRN
Qty: 60 TABLET | Refills: 0 | Status: SHIPPED | OUTPATIENT
Start: 2020-05-04 | End: 2020-06-25

## 2020-05-07 RX ORDER — MONTELUKAST SODIUM 10 MG/1
TABLET ORAL
Qty: 30 TABLET | Refills: 5 | Status: SHIPPED | OUTPATIENT
Start: 2020-05-07 | End: 2020-12-09

## 2020-05-08 ENCOUNTER — TELEPHONE (OUTPATIENT)
Dept: PRIMARY CARE CLINIC | Age: 70
End: 2020-05-08

## 2020-05-08 ENCOUNTER — TELEMEDICINE (OUTPATIENT)
Dept: PRIMARY CARE CLINIC | Age: 70
End: 2020-05-08
Payer: MEDICARE

## 2020-05-08 PROBLEM — R44.1 VISUAL HALLUCINATIONS: Status: RESOLVED | Noted: 2017-10-09 | Resolved: 2020-05-08

## 2020-05-08 PROBLEM — J44.9 CHRONIC OBSTRUCTIVE PULMONARY DISEASE (HCC): Status: ACTIVE | Noted: 2020-05-08

## 2020-05-08 PROBLEM — J18.9 COMMUNITY ACQUIRED PNEUMONIA OF RIGHT LOWER LOBE OF LUNG: Status: RESOLVED | Noted: 2018-01-01 | Resolved: 2020-05-08

## 2020-05-08 PROBLEM — G56.03 BILATERAL CARPAL TUNNEL SYNDROME: Status: RESOLVED | Noted: 2018-04-09 | Resolved: 2020-05-08

## 2020-05-08 PROBLEM — N18.4 STAGE 4 CHRONIC KIDNEY DISEASE (HCC): Status: ACTIVE | Noted: 2020-05-08

## 2020-05-08 PROCEDURE — 99214 OFFICE O/P EST MOD 30 MIN: CPT | Performed by: NURSE PRACTITIONER

## 2020-05-08 PROCEDURE — 3017F COLORECTAL CA SCREEN DOC REV: CPT | Performed by: NURSE PRACTITIONER

## 2020-05-08 PROCEDURE — 4040F PNEUMOC VAC/ADMIN/RCVD: CPT | Performed by: NURSE PRACTITIONER

## 2020-05-08 PROCEDURE — G8399 PT W/DXA RESULTS DOCUMENT: HCPCS | Performed by: NURSE PRACTITIONER

## 2020-05-08 PROCEDURE — 1123F ACP DISCUSS/DSCN MKR DOCD: CPT | Performed by: NURSE PRACTITIONER

## 2020-05-08 PROCEDURE — 1090F PRES/ABSN URINE INCON ASSESS: CPT | Performed by: NURSE PRACTITIONER

## 2020-05-08 PROCEDURE — G8428 CUR MEDS NOT DOCUMENT: HCPCS | Performed by: NURSE PRACTITIONER

## 2020-05-08 PROCEDURE — 3046F HEMOGLOBIN A1C LEVEL >9.0%: CPT | Performed by: NURSE PRACTITIONER

## 2020-05-08 PROCEDURE — 2022F DILAT RTA XM EVC RTNOPTHY: CPT | Performed by: NURSE PRACTITIONER

## 2020-05-08 ASSESSMENT — ENCOUNTER SYMPTOMS
SHORTNESS OF BREATH: 1
BACK PAIN: 1

## 2020-05-08 NOTE — PROGRESS NOTES
[address], [address]  Phone:  [phone]      2020     TELEHEALTH EVALUATION -- Audio/Visual (During HRLIS-53 public health emergency)    HPI: Back in April she had a blister on her left lower leg and was put on clindamycin and Bactroban ointment. The blister got larger and infected and we had to switch the antibiotics according to the culture. Home health could not go out and do Rocephin injections so we did a low dose of Bactrim which in turn lowered her kidney function we had to remove her off the medicine. Since then home health is continued to do dressing changes which is now healing well with a scab and no signs of infection. On the virtual visit today the patient notes that she did not check her blood sugar this morning before she ate. She is on gabapentin for chronic diabetic neuropathy. She also is on tramadol for back pain. She does take the gabapentin daily she only uses the tramadol as needed. I have had physical therapy going to her house and working with her because she was not getting up out of her chair. The daughter is on the phone call as well and says she is doing much better with that and doing some exercises. They admit that she does have some leg swelling by the end of the day but that usually goes down with rest.    Jn Marte (:  1950) has requested an audio/video evaluation for the following concern(s):    Following up on her leg wound that she had on her lower leg and also her kidney function. Review of Systems   Constitutional: Positive for fatigue. Respiratory: Positive for shortness of breath (on exertion). Cardiovascular: Positive for leg swelling. Negative for chest pain and palpitations. Endocrine:        Diabetes   Musculoskeletal: Positive for arthralgias and back pain. Skin: Positive for wound. Neurological: Positive for numbness (diabetic neuropathy).         Parkinson   Psychiatric/Behavioral: Positive for sleep disturbance. The patient is nervous/anxious. Prior to Visit Medications    Medication Sig Taking? Authorizing Provider   montelukast (SINGULAIR) 10 MG tablet TAKE ONE TABLET BY MOUTH EVERY NIGHT AT BEDTIME  INGRID Denise CNP   traMADol (ULTRAM) 50 MG tablet Take 1 tablet by mouth every 8 hours as needed for Pain for up to 31 days. INGRID Denise CNP   silver sulfADIAZINE (SILVADENE) 1 % cream Apply topically bid  INGRID Denise CNP   LINZESS 290 MCG CAPS capsule TAKE ONE CAPSULE IN THE MORNING BEFORE BREAKFAST  INGRID Russ CNP   insulin detemir (LEVEMIR) 100 UNIT/ML injection vial INJECT 70 UNITS SUBQ EVERY NIGHT AT BEDTIME  INGRID Denise CNP   insulin aspart (NOVOLOG FLEXPEN) 100 UNIT/ML injection pen INJECT 15 UNITS INTO THE SKIN 3 TIMES DAILY (BEFORE MEALS)  INGRID Denise CNP   tiZANidine (ZANAFLEX) 2 MG tablet Take 1 tablet by mouth nightly as needed (muscle spasms)  INGRID Denise CNP   Liraglutide (VICTOZA) 18 MG/3ML SOPN SC injection Inject 1.8 mg into the skin daily  INGRID Denise CNP   nystatin (NYSTATIN) 164162 UNIT/GM powder Apply topically 3 times daily Apply topically 4 times daily. INGRID Denise CNP   atorvastatin (LIPITOR) 10 MG tablet TAKE ONE TABLET BY MOUTH EACH EVENING  INGRID Russ CNP   omeprazole (PRILOSEC) 40 MG delayed release capsule TAKE ONE CAPSULE BY MOUTH EVERY DAY  INGRID Denise CNP   gabapentin (NEURONTIN) 600 MG tablet TAKE ONE TABLET BY MOUTH 3 TIMES DAILY AS NEEDED  Dillon Kim MD   torsemide (DEMADEX) 20 MG tablet Take 20 mg by mouth daily 3 tablets for three days then two tablets daily   Historical Provider, MD   blood glucose monitor kit and supplies Use as directed for diabetes TID checks. INGRID Denies CNP   blood glucose monitor strips Test 3times a day & as needed for symptoms of irregular blood glucose.   INGRID Denise CNP zoster recombinant adjuvanted vaccine [facility]) 50 MCG/0.5ML SUSR injection Inject 0.5 mLs into the muscle See Admin Instructions 1 dose now and repeat in 2-6 months  Patient not taking: Reported on 4/23/2020  INGRID Adame CNP   sotalol (BETAPACE) 120 MG tablet Take 1 tablet by mouth 2 times daily  INGRID Bates   fluconazole (DIFLUCAN) 150 MG tablet Take one on the 15th of every month  INGRID Cruz CNP   TRUEPLUS INSULIN SYRINGE 30G X 5/16\" 1 ML MISC INJECT AS DIRECTED DAILY  INGRID Adame CNP   clobetasol (TEMOVATE) 0.05 % ointment Apply external vagina 3 x a day for week one, then decrease to BID on week two, on week 3 once a day, on week four every other day then stop  Celestino Obrien MD   Melatonin 10 MG CAPS Take 10 mg by mouth every evening Takes 20 mg a night  Historical Provider, MD   Multiple Vitamins-Minerals (ICAPS AREDS 2 PO) Take by mouth daily  Historical Provider, MD   carbidopa-levodopa (SINEMET)  MG per tablet TAKE TWO TABLETS 3 TIMES DAILY  MARTHA Mcmanus   rOPINIRole (REQUIP) 4 MG tablet TAKE TWO TABLETS EVERY MORNING TAKE ONE TABLET AT NOON AND TAKE TWO TABLETS EVERY NIGHT AT BEDTIME  Patient taking differently: TAKE TWO TABLETS EVERY MORNING TAKE ONE TABLET AT NOON AND TAKE ONE TABLET EVERY NIGHT AT BEDTIME  MARTHA Mcmanus   denosumab (PROLIA) 60 MG/ML SOSY SC injection Inject 60 mg into the skin every 6 months  Historical Provider, MD   losartan (COZAAR) 100 MG tablet TAKE ONE TABLET DAILY  INGRID Rdz   spironolactone (ALDACTONE) 25 MG tablet TAKE ONE TABLET DAILY  Patient taking differently: One tablet in the am and one tablet in the evening  INGRID Bates   nystatin (MYCOSTATIN) 170628 UNIT/GM ointment Apply topically 2 times daily. for yeast  INGRID Adame CNP   glucose blood VI test strips (ONE TOUCH ULTRA TEST) strip Inject 1 each into the skin daily As needed.   INGRID Adame CNP   fluticasone 04/12/2017    A1C test (Diabetic or Prediabetic)  03/02/2020    Breast cancer screen  03/22/2020    Diabetic retinal exam  07/05/2020    Lipid screen  12/17/2020    Annual Wellness Visit (AWV)  12/17/2020    Potassium monitoring  05/06/2021    Creatinine monitoring  05/06/2021    Colon cancer screen colonoscopy  06/18/2024    DEXA (modify frequency per FRAX score)  Completed    Flu vaccine  Completed    Pneumococcal 65+ years Vaccine  Completed    Hepatitis C screen  Addressed    Hepatitis A vaccine  Aged Out    Hib vaccine  Aged Out    Meningococcal (ACWY) vaccine  Aged Out       PHYSICAL EXAMINATION:  Physical Exam  Constitutional:       Appearance: Normal appearance. She is well-developed. She is obese. HENT:      Head: Normocephalic. Eyes:      Conjunctiva/sclera: Conjunctivae normal.      Pupils: Pupils are equal, round, and reactive to light. Neck:      Musculoskeletal: Normal range of motion. Pulmonary:      Effort: Pulmonary effort is normal.   Skin:     General: Skin is warm and dry. Neurological:      General: No focal deficit present. Mental Status: She is alert and oriented to person, place, and time. Psychiatric:         Mood and Affect: Mood normal.         Behavior: Behavior normal.         Thought Content: Thought content normal.         Judgment: Judgment normal.         Due to this being a TeleHealth encounter, evaluation of the following organ systems is limited: Vitals/Constitutional/EENT/Resp/CV/GI//MS/Neuro/Skin/Heme-Lymph-Imm. ASSESSMENT/PLAN:  1. Open wound of left lower leg, sequela  They are leaving the wound open at this time. It has a good scab on it. They still have Silvadene if they need that to apply. 2. Uncontrolled insulin dependent diabetes mellitus ([facility]  Her hemoglobin A1c is down to 8.4 which is much better than December at 9.4. I have asked her to continue to try to monitor her diet and increase her exercise.     3. Stage 4 chronic

## 2020-05-18 RX ORDER — ROPINIROLE 4 MG/1
TABLET, FILM COATED ORAL
Qty: 150 TABLET | Refills: 5 | Status: SHIPPED | OUTPATIENT
Start: 2020-05-18 | End: 2020-12-21

## 2020-05-18 NOTE — TELEPHONE ENCOUNTER
Requested Prescriptions     Pending Prescriptions Disp Refills    rOPINIRole (REQUIP) 4 MG tablet [Pharmacy Med Name: ROPINIROLE TAB 4MG TABLET] 150 tablet 5     Sig: TAKE TWO TABLETS EVERY MORNING TAKE ONE TABLET AT NOON AND TAKE TWO TABLETS EVERY NIGHT AT BEDTIME       Last Office Visit: 11/25/2019  Next Office Visit: 5/28/2020  Last Medication Refill: 5/24/19 with 5 refills

## 2020-05-28 ENCOUNTER — TELEMEDICINE (OUTPATIENT)
Dept: NEUROLOGY | Age: 70
End: 2020-05-28
Payer: MEDICARE

## 2020-05-28 VITALS — HEIGHT: 62 IN | BODY MASS INDEX: 53.37 KG/M2 | WEIGHT: 290 LBS

## 2020-05-28 PROCEDURE — G8399 PT W/DXA RESULTS DOCUMENT: HCPCS | Performed by: PSYCHIATRY & NEUROLOGY

## 2020-05-28 PROCEDURE — 99214 OFFICE O/P EST MOD 30 MIN: CPT | Performed by: PSYCHIATRY & NEUROLOGY

## 2020-05-28 PROCEDURE — 1123F ACP DISCUSS/DSCN MKR DOCD: CPT | Performed by: PSYCHIATRY & NEUROLOGY

## 2020-05-28 PROCEDURE — 4040F PNEUMOC VAC/ADMIN/RCVD: CPT | Performed by: PSYCHIATRY & NEUROLOGY

## 2020-05-28 PROCEDURE — 3017F COLORECTAL CA SCREEN DOC REV: CPT | Performed by: PSYCHIATRY & NEUROLOGY

## 2020-05-28 PROCEDURE — 1090F PRES/ABSN URINE INCON ASSESS: CPT | Performed by: PSYCHIATRY & NEUROLOGY

## 2020-05-28 PROCEDURE — G8427 DOCREV CUR MEDS BY ELIG CLIN: HCPCS | Performed by: PSYCHIATRY & NEUROLOGY

## 2020-05-28 PROCEDURE — 2022F DILAT RTA XM EVC RTNOPTHY: CPT | Performed by: PSYCHIATRY & NEUROLOGY

## 2020-05-28 PROCEDURE — 3046F HEMOGLOBIN A1C LEVEL >9.0%: CPT | Performed by: PSYCHIATRY & NEUROLOGY

## 2020-05-28 NOTE — PROGRESS NOTES
negative for - chills and fever  Eyes:  negative for - visual disturbance and photophobia  HENMT: negative for - headaches and sinus pain  Respiratory: negative for - cough, hemoptysis, and shortness of breath  Cardiovascular: negative for - chest pain and palpitations  Gastrointestinal: negative for - blood in stools, constipation, diarrhea, nausea, and vomiting  Genito-Urinary: negative for - hematuria, urinary frequency, urinary urgency, and urinary retention  Musculoskeletal: negative for - joint pain, joint stiffness, and joint swelling  Hematological and Lymphatic: negative for - bleeding problems, abnormal bruising, and swollen lymph nodes  Endocrine:  negative for - polydipsia and polyphagia  Allergy/Immunology:  negative for - rhinorrhea, sinus congestion, hives  Integument:  negative for - negative for - rash, change in moles, new or changing lesions  Psychological: negative for - anxiety and depression  Neurological: negative for - memory loss, numbness/tingling, and weakness     Physical Examination:  Vitals:  (As obtained by patient/caregiver or practitioner observation). Not taken/available if data not entered. BP -  P -  R -  T -  PO -    General appearance:  She is an obese woman in a wheelchair who is in no distress  HEENT:  normocephalic, atraumatic, sclera appear normal, no observable or audible rhinorrhea, Ears appear normal, oral mucous membranes are moist without erythema, trachea appears midline, no observable anterior neck masses  Cardiovascular:  She has moderate peripheral edema, No observable cyanosis or clubbing. Pulmonary:  Breathing appears normal, good expansion, normal effort without use of accessory muscles  Musculoskeletal:  Joints are arthritic. Integument:  No rash, erythema, or pallor on visible skin  Psychiatric:  Mood, affect, and behavior appear normal    Neurologic:  Mental Status:  alert, oriented to person, place, and time.   Speech:  Clear without dysarthria or

## 2020-06-03 ENCOUNTER — TELEPHONE (OUTPATIENT)
Dept: PRIMARY CARE CLINIC | Age: 70
End: 2020-06-03

## 2020-06-11 ENCOUNTER — TELEPHONE (OUTPATIENT)
Dept: PRIMARY CARE CLINIC | Age: 70
End: 2020-06-11

## 2020-06-11 RX ORDER — COLCHICINE 0.6 MG/1
TABLET ORAL
Qty: 30 TABLET | Refills: 0 | Status: SHIPPED | OUTPATIENT
Start: 2020-06-11 | End: 2020-06-11 | Stop reason: SDUPTHER

## 2020-06-11 RX ORDER — COLCHICINE 0.6 MG/1
TABLET, FILM COATED ORAL
Qty: 30 TABLET | Refills: 0 | Status: SHIPPED | OUTPATIENT
Start: 2020-06-11 | End: 2020-07-27

## 2020-06-12 RX ORDER — INSULIN ASPART 100 [IU]/ML
INJECTION, SOLUTION INTRAVENOUS; SUBCUTANEOUS
Qty: 15 ML | Refills: 3 | Status: SHIPPED | OUTPATIENT
Start: 2020-06-12 | End: 2020-07-31

## 2020-06-25 RX ORDER — TRAMADOL HYDROCHLORIDE 50 MG/1
TABLET ORAL
Qty: 60 TABLET | Refills: 0 | Status: SHIPPED | OUTPATIENT
Start: 2020-06-25 | End: 2020-08-17 | Stop reason: SDUPTHER

## 2020-07-21 RX ORDER — BLOOD-GLUCOSE METER
EACH MISCELLANEOUS
Qty: 100 EACH | Refills: 3 | Status: SHIPPED | OUTPATIENT
Start: 2020-07-21

## 2020-07-21 RX ORDER — OMEPRAZOLE 40 MG/1
CAPSULE, DELAYED RELEASE ORAL
Qty: 30 CAPSULE | Refills: 3 | Status: SHIPPED | OUTPATIENT
Start: 2020-07-21 | End: 2020-12-18

## 2020-07-23 RX ORDER — GABAPENTIN 600 MG/1
TABLET ORAL
Qty: 90 TABLET | Refills: 5 | Status: SHIPPED | OUTPATIENT
Start: 2020-07-23 | End: 2021-01-18

## 2020-07-23 NOTE — TELEPHONE ENCOUNTER
Requested Prescriptions     Pending Prescriptions Disp Refills    gabapentin (NEURONTIN) 600 MG tablet [Pharmacy Med Name: GABAPENTIN TAB 600MG TABLET] 90 tablet      Sig: TAKE ONE TABLET BY MOUTH 3 TIMES DAILY AS NEEDED       Last Office Visit:  11/25/2019  Next Office Visit:  12/3/2020  Last Medication Refill: 1/27/20 with 5 refills     Hoover Hamman up to date: 5/4/20      *RX updated to reflect   7/23/20 fill date*

## 2020-07-24 ENCOUNTER — TELEPHONE (OUTPATIENT)
Dept: PRIMARY CARE CLINIC | Age: 70
End: 2020-07-24

## 2020-07-24 RX ORDER — INSULIN DETEMIR 100 [IU]/ML
INJECTION, SOLUTION SUBCUTANEOUS
Qty: 5 PEN | Refills: 3 | Status: SHIPPED | OUTPATIENT
Start: 2020-07-24 | End: 2020-12-14

## 2020-07-27 ENCOUNTER — OFFICE VISIT (OUTPATIENT)
Dept: PRIMARY CARE CLINIC | Age: 70
End: 2020-07-27
Payer: MEDICARE

## 2020-07-27 VITALS
HEART RATE: 66 BPM | OXYGEN SATURATION: 96 % | TEMPERATURE: 97.5 F | HEIGHT: 62 IN | SYSTOLIC BLOOD PRESSURE: 128 MMHG | RESPIRATION RATE: 16 BRPM | DIASTOLIC BLOOD PRESSURE: 80 MMHG | WEIGHT: 293 LBS | BODY MASS INDEX: 53.92 KG/M2

## 2020-07-27 PROCEDURE — G8417 CALC BMI ABV UP PARAM F/U: HCPCS | Performed by: NURSE PRACTITIONER

## 2020-07-27 PROCEDURE — 2022F DILAT RTA XM EVC RTNOPTHY: CPT | Performed by: NURSE PRACTITIONER

## 2020-07-27 PROCEDURE — 3046F HEMOGLOBIN A1C LEVEL >9.0%: CPT | Performed by: NURSE PRACTITIONER

## 2020-07-27 PROCEDURE — 1123F ACP DISCUSS/DSCN MKR DOCD: CPT | Performed by: NURSE PRACTITIONER

## 2020-07-27 PROCEDURE — 1090F PRES/ABSN URINE INCON ASSESS: CPT | Performed by: NURSE PRACTITIONER

## 2020-07-27 PROCEDURE — 3017F COLORECTAL CA SCREEN DOC REV: CPT | Performed by: NURSE PRACTITIONER

## 2020-07-27 PROCEDURE — 99214 OFFICE O/P EST MOD 30 MIN: CPT | Performed by: NURSE PRACTITIONER

## 2020-07-27 PROCEDURE — G8399 PT W/DXA RESULTS DOCUMENT: HCPCS | Performed by: NURSE PRACTITIONER

## 2020-07-27 PROCEDURE — 4040F PNEUMOC VAC/ADMIN/RCVD: CPT | Performed by: NURSE PRACTITIONER

## 2020-07-27 PROCEDURE — G8427 DOCREV CUR MEDS BY ELIG CLIN: HCPCS | Performed by: NURSE PRACTITIONER

## 2020-07-27 PROCEDURE — 1036F TOBACCO NON-USER: CPT | Performed by: NURSE PRACTITIONER

## 2020-07-27 RX ORDER — ALLOPURINOL 100 MG/1
1 TABLET ORAL DAILY
COMMUNITY
Start: 2020-07-16

## 2020-07-27 RX ORDER — CIPROFLOXACIN 250 MG/1
250 TABLET, FILM COATED ORAL 2 TIMES DAILY
Qty: 10 TABLET | Refills: 0 | Status: SHIPPED | OUTPATIENT
Start: 2020-07-27 | End: 2020-07-31

## 2020-07-27 RX ORDER — ERYTHROMYCIN 5 MG/G
OINTMENT OPHTHALMIC NIGHTLY PRN
COMMUNITY
Start: 2020-07-16 | End: 2021-03-30

## 2020-07-27 ASSESSMENT — PATIENT HEALTH QUESTIONNAIRE - PHQ9
SUM OF ALL RESPONSES TO PHQ QUESTIONS 1-9: 0
SUM OF ALL RESPONSES TO PHQ9 QUESTIONS 1 & 2: 0
SUM OF ALL RESPONSES TO PHQ QUESTIONS 1-9: 0
1. LITTLE INTEREST OR PLEASURE IN DOING THINGS: 0
2. FEELING DOWN, DEPRESSED OR HOPELESS: 0

## 2020-07-27 NOTE — PROGRESS NOTES
[address] [address] [place] [address]  [address] [place] [address]  Dept: [phone]  Dept Fax: [phone]  Loc: [phone]    Rebecca Deutsch is a 71 y.o. female who presents today for her medical conditions/complaints as noted below. Rebecca Deutsch is c/o of Leg Problem (redness lower left leg, painful) and Leg Swelling (both)        HPI:     HPI   Chief Complaint   Patient presents with    Leg Problem     redness lower left leg, painful    Leg Swelling     both   blood sugar self reported at 130-150 fasting at home. Daughter with her today says home health quit coming because leg healed up. Now she is swollen in lower legs agin with weeping fluid. Past Medical History:   Diagnosis Date    Asthma 2015    Bilateral carpal tunnel syndrome 2018    sees dr. Nirmal Zeng.     Colon polyp     Diabetes mellitus (HCC)     GERD (gastroesophageal reflux disease)     HTN (hypertension)     Hyperlipidemia     Mild mitral regurgitation by prior echocardiogram 2016    Morbid obesity ([address] 10/18/2017    Normal cardiac stress test 09    no ischemia at attained level of excercise    Parkinson disease (HCC)     Paroxysmal atrial fibrillation ([address]     sees dr. Jose Martin Pozo Post-menopausal     Prolonged emergence from general anesthesia     Restrictive airway disease     Stage 3 chronic kidney disease ([address] 10/18/2017      Past Surgical History:   Procedure Laterality Date    APPENDECTOMY       SECTION      x3    COLONOSCOPY      Dr Amaya George polyp-5 yr recall    COLONOSCOPY N/A 2019    Dr Ludwig Landry 2 internal hemorrhoids without stigmata, diverticulosis, suboptimal prep--5 yr recall    GALLBLADDER SURGERY  2014    dr Sonia Lopez N/CARPAL TUNNEL SURG Right 2018    OPEN CARPAL TUNNEL RELEASE performed by Khalif Trevino MD at [facility] Inject 1 each into the skin daily As needed. 100 each 3    albuterol (ACCUNEB) 1.25 MG/3ML nebulizer solution Inhale 3 mLs into the lungs every 6 hours as needed for Wheezing 360 mL 3    Lancets MISC 1 lancet to check glucose daily 100 each 3    Insulin Pen Needle 31G X 8 MM MISC Inject 1 each into the skin daily 100 each 2    albuterol (PROVENTIL HFA;VENTOLIN HFA) 108 (90 BASE) MCG/ACT inhaler Inhale 2 puffs into the lungs every 6 hours as needed for Wheezing 1 Inhaler 1    OXYGEN 2L NC 12-24 hours (Patient taking differently: nightly as needed 2L NC 12-24 hours) 1 Device 0    aspirin 325 MG tablet Take 325 mg by mouth daily.  insulin aspart (NOVOLOG) 100 UNIT/ML injection vial Inject 22 Units into the skin 3 times daily (before meals)       No current facility-administered medications for this visit. Allergies   Allergen Reactions    Codeine      itching    Hydrocodone-Acetaminophen Nausea Only       Health Maintenance   Topic Date Due    DTaP/Tdap/Td vaccine (1 - Tdap) 12/27/1969    Shingles Vaccine (1 of 2) 12/27/2000    Diabetic foot exam  04/12/2017    A1C test (Diabetic or Prediabetic)  03/02/2020    Breast cancer screen  03/22/2020    Flu vaccine (1) 09/01/2020    Lipid screen  12/17/2020    Annual Wellness Visit (AWV)  12/17/2020    Potassium monitoring  05/12/2021    Creatinine monitoring  05/12/2021    Diabetic retinal exam  05/14/2021    Colon cancer screen colonoscopy  06/18/2024    DEXA (modify frequency per FRAX score)  Completed    Pneumococcal 65+ yrs at Risk Vaccine  Completed    Hepatitis C screen  Addressed    Hepatitis A vaccine  Aged Out    Hib vaccine  Aged Out    Meningococcal (ACWY) vaccine  Aged Out       Subjective:      Review of Systems   Constitutional: Negative. Respiratory: Positive for shortness of breath. Cardiovascular: Positive for leg swelling. Endocrine:        Uncontrolled diabetes   Skin: Positive for wound. Psychiatric/Behavioral: The patient is nervous/anxious. Objective:     Physical Exam  Vitals signs and nursing note reviewed. Constitutional:       Appearance: She is well-developed. She is obese. Comments: Sitting up in wheelchair   HENT:      Head: Normocephalic. Right Ear: External ear normal.      Left Ear: External ear normal.   Eyes:      Pupils: Pupils are equal, round, and reactive to light. Neck:      Musculoskeletal: Normal range of motion. Cardiovascular:      Rate and Rhythm: Normal rate and regular rhythm. Heart sounds: Normal heart sounds. Pulmonary:      Effort: Pulmonary effort is normal.      Breath sounds: Normal breath sounds. Skin:     General: Skin is warm and dry. Neurological:      Mental Status: She is alert and oriented to person, place, and time. Psychiatric:         Behavior: Behavior normal.         Thought Content: Thought content normal.         Judgment: Judgment normal.       /80   Pulse 66   Temp 97.5 °F (36.4 °C)   Resp 16   Ht 5' 2\" (1.575 m)   Wt 298 lb 12.8 oz (135.5 kg)   SpO2 96%   BMI 54.65 kg/m²     Assessment:       Diagnosis Orders   1. Cellulitis of left lower extremity  Culture, Wound   2. Diabetic ulcer of left lower leg associated with type 2 diabetes mellitus, with fat layer exposed ([address]     3. Type 2 diabetes mellitus with complication ([address]           Plan:   More than 50% of the time was spent counseling and coordinating care for a total time of 25min face to face. The patient is known to be non compliant with sugar and has had high sugar for some time and unable to loss weight. I reviewed the notes with her from kidney doctor,  Home health and past labs. We again discussed wound care but she doesn't want to go. Patient given educational materials -see patient instructions. Discussed use, benefit, and side effects of prescribed medications. All patient questions answered. Pt voiced understanding. Reviewed health maintenance. Instructed to continue currentmedications, diet and exercise. Patient agreed with treatment plan. Follow up as directed. MEDICATIONS:  Orders Placed This Encounter   Medications    DISCONTD: ciprofloxacin (CIPRO) 250 MG tablet     Sig: Take 1 tablet by mouth 2 times daily for 5 days     Dispense:  10 tablet     Refill:  0         ORDERS:  Orders Placed This Encounter   Procedures    Culture, Wound       Follow-up:  Return in about 3 months (around 10/27/2020). PATIENT INSTRUCTIONS:  Patient Instructions   Wrap legs with ace wrap starting at foot moving fluid towards heart, keep the draining wounds covered  Start the cipro and wait on culture   May need wound care evidentually. Take extra fluid pill like kidney doctor says  Elevate feet often. May need to do a compression boot     Electronically signed by INGRID Kahn CNP on 8/2/2020 at 9:22 PM    EMR Dragon/transcription disclaimer:  Much of thisencounter note is electronic transcription/translation of spoken language to printed texts. The electronic translation of spoken language may be erroneous, or at times, nonsensical words or phrases may be inadvertentlytranscribed.   Although I have reviewed the note for such errors, some may still exist.

## 2020-07-27 NOTE — PATIENT INSTRUCTIONS
Wrap legs with ace wrap starting at foot moving fluid towards heart, keep the draining wounds covered  Start the cipro and wait on culture   May need wound care evidentually. Take extra fluid pill like kidney doctor says  Elevate feet often.   May need to do a compression boot

## 2020-07-29 LAB
GRAM STAIN RESULT: ABNORMAL
ORGANISM: ABNORMAL
WOUND/ABSCESS: ABNORMAL

## 2020-07-31 ENCOUNTER — HOSPITAL ENCOUNTER (OUTPATIENT)
Dept: WOUND CARE | Age: 70
Discharge: HOME | End: 2020-07-31
Payer: MEDICARE

## 2020-07-31 VITALS
TEMPERATURE: 98.1 F | DIASTOLIC BLOOD PRESSURE: 69 MMHG | HEART RATE: 72 BPM | WEIGHT: 293 LBS | BODY MASS INDEX: 53.92 KG/M2 | SYSTOLIC BLOOD PRESSURE: 132 MMHG | HEIGHT: 62 IN | RESPIRATION RATE: 20 BRPM

## 2020-07-31 PROBLEM — I83.022: Status: ACTIVE | Noted: 2020-07-31

## 2020-07-31 PROBLEM — E11.622 DIABETIC ULCER OF LEFT LOWER LEG ASSOCIATED WITH TYPE 2 DIABETES MELLITUS, WITH FAT LAYER EXPOSED (HCC): Status: ACTIVE | Noted: 2020-07-31

## 2020-07-31 PROBLEM — L97.922 DIABETIC ULCER OF LEFT LOWER LEG ASSOCIATED WITH TYPE 2 DIABETES MELLITUS, WITH FAT LAYER EXPOSED (HCC): Status: ACTIVE | Noted: 2020-07-31

## 2020-07-31 PROCEDURE — 99213 OFFICE O/P EST LOW 20 MIN: CPT

## 2020-07-31 PROCEDURE — 99215 OFFICE O/P EST HI 40 MIN: CPT | Performed by: NURSE PRACTITIONER

## 2020-07-31 ASSESSMENT — PAIN DESCRIPTION - ORIENTATION: ORIENTATION: LEFT

## 2020-07-31 ASSESSMENT — PAIN SCALES - GENERAL: PAINLEVEL_OUTOF10: 5

## 2020-07-31 ASSESSMENT — PAIN DESCRIPTION - LOCATION: LOCATION: LEG

## 2020-07-31 ASSESSMENT — PAIN DESCRIPTION - PAIN TYPE: TYPE: ACUTE PAIN

## 2020-08-01 ASSESSMENT — ENCOUNTER SYMPTOMS: COLOR CHANGE: 1

## 2020-08-01 ASSESSMENT — VISUAL ACUITY: OU: 1

## 2020-08-01 NOTE — HOME CARE
Dressing/Treatment Other (comment) 07/31/20 0842   Wound Cleansed Soap and water 07/31/20 0842   Wound Length (cm) 3 cm 07/31/20 0842   Wound Width (cm) 3 cm 07/31/20 0842   Wound Depth (cm) 0.1 cm 07/31/20 0842   Wound Surface Area (cm^2) 9 cm^2 07/31/20 0842   Wound Volume (cm^3) 0.9 cm^3 07/31/20 0842   Wound Assessment Slough 07/31/20 0842   Drainage Amount Moderate 07/31/20 0842   Drainage Description Serous 07/31/20 0842   Odor None 07/31/20 0842   Margins Unattached edges 07/31/20 0842   [place]-wound Assessment Swelling;Red 07/31/20 0842   Non-staged Wound Description Full thickness 07/31/20 0842   Yellow%Wound Bed 100 07/31/20 0842   Number of days: 4          Supplies Requested :      WOUND #: 1   PRIMARY DRESSING:  Silicone border   Cover and Secure with: None     FREQUENCY OF DRESSING CHANGES:  Daily                ADDITIONAL ITEMS:  [] Gloves Small  [x] Gloves Medium [] Gloves Large [] Gloves XLarge  [] Tape 1\" [] Tape 2\" [] Tape 3\"  [] Medipore Tape  [x] Saline  [] Skin Prep   [] Adhesive Remover   [] Cotton Tip Applicators   [] Other:    Patient Wound(s) Debrided: [x] Yes if yes please add date 7/31/20   [] No    Debribement Type: Mechanical     Patient currently being seen by Home Health: [] Yes   [x] No    Duration for needed supplies:  [x]15  []30  []60  []90 Days    Provider Information:      PROVIDER'S NAME: Melva QUINTERO    NPI: [id number]

## 2020-08-01 NOTE — PROGRESS NOTES
Patient Care Team:  INGRID Mitchell CNP as PCP - General (Family Nurse Practitioner)  INGRID Mitchell CNP as PCP - [facility]  Judge Antwan MD as Consulting Physician (Neurology)  [facility] (Physician Assistant Medical)  INGRID Aceves NP as Nurse Practitioner (Nurse Practitioner Adult Health)    TODAY'S DATE:  7/31/2020     HISTORY of PRESENTILLNESS HPI   Kelly Pascal is a 71 y.o. female who presents today for wound evaluation. Wound Type:venous  Wound Location:left ankle  Modifying factors:edema, venous stasis, diabetes, poor glucose control and obesity    Patient Active Problem List   Diagnosis Code    Parkinson disease ([facility] G20    GERD (gastroesophageal reflux disease) Q31.4    Lichen sclerosus E67.3    Palpitations R00.2    Fatigue R53.83    Shortness of breath R06.02    Asthma J45.909    Edema R60.9    Hypokalemia E87.6    PAF (paroxysmal atrial fibrillation) ([facility]) I48.0    Mild mitral regurgitation by prior echocardiogram I34.0    Type 2 diabetes mellitus with complication ([facility]) G98.4    Restless legs syndrome G25.81    Hypoesthesia of skin R20.1    Somnolence, daytime R40.0    Morbid obesity ([facility]) E66.01    Stage 4 chronic kidney disease ([facility]) N18.4    Essential hypertension I10    Mixed hyperlipidemia E78.2    Cellulitis of left lower extremity L03. 116    Chronic obstructive pulmonary disease ([address] J44.9    Diabetic ulcer of left lower leg associated with type 2 diabetes mellitus, with fat layer exposed ([address] E11.622, N33.792    Varicose veins of left lower extremity with ulcer of calf (CODE) ([facility] N10.077       She reports she developed a wound on left leg. This started 2 month(s) ago. She believes this is not healing. She has been applying neosporin. She has not had  fever orchills. She has a history of diabetes mellitus and peripheral vascular disease.     Kelly Pascal is a 71 y.o. female with the following history reviewed and recorded in [facility]:    Current Outpatient Medications   Medication Sig Dispense Refill    insulin aspart (NOVOLOG) 100 UNIT/ML injection vial Inject 22 Units into the skin 3 times daily (before meals)      allopurinol (ZYLOPRIM) 100 MG tablet Take 1 tablet by mouth daily      erythromycin (ROMYCIN) 5 MG/GM ophthalmic ointment Place into both eyes nightly as needed      insulin detemir (LEVEMIR FLEXTOUCH) 100 UNIT/ML injection pen Inject 70 units subcu nightly for type 2 diabetes 5 pen 3    gabapentin (NEURONTIN) 600 MG tablet TAKE ONE TABLET BY MOUTH 3 TIMES DAILY AS NEEDED 90 tablet 5    omeprazole (PRILOSEC) 40 MG delayed release capsule TAKE ONE CAPSULE BY MOUTH EVERY DAY 30 capsule 3    atorvastatin (LIPITOR) 10 MG tablet TAKE ONE TABLET BY MOUTH EACH EVENING 30 tablet 3    carbidopa-levodopa (SINEMET)  MG per tablet TAKE TWO TABLETS BY MOUTH 3 TIMES DAILY 180 tablet 5    rOPINIRole (REQUIP) 4 MG tablet TAKE TWO TABLETS EVERY MORNING TAKE ONE TABLET AT NOON AND TAKE TWO TABLETS EVERY NIGHT AT BEDTIME 150 tablet 5    montelukast (SINGULAIR) 10 MG tablet TAKE ONE TABLET BY MOUTH EVERY NIGHT AT BEDTIME 30 tablet 5    LINZESS 290 MCG CAPS capsule TAKE ONE CAPSULE IN THE MORNING BEFORE BREAKFAST 30 capsule 5    Liraglutide (VICTOZA) 18 MG/3ML SOPN SC injection Inject 1.8 mg into the skin daily 9 mL 3    nystatin (NYSTATIN) 482563 UNIT/GM powder Apply topically 3 times daily Apply topically 4 times daily.  60 g 3    torsemide (DEMADEX) 20 MG tablet Take 20 mg by mouth daily 3 tablets for three days then two tablets daily       sotalol (BETAPACE) 120 MG tablet Take 1 tablet by mouth 2 times daily 180 tablet 3    fluconazole (DIFLUCAN) 150 MG tablet Take one on the 15th of every month 1 tablet 11    clobetasol (TEMOVATE) 0.05 % ointment Apply external vagina 3 x a day for week one, then decrease to BID on week two, on week 3 once a day, on week four every other day then stop 1 Tube 1  Melatonin 10 MG CAPS Take 10 mg by mouth every evening Takes 20 mg a night      denosumab (PROLIA) 60 MG/ML SOSY SC injection Inject 60 mg into the skin every 6 months      losartan (COZAAR) 100 MG tablet TAKE ONE TABLET DAILY 30 tablet 5    spironolactone (ALDACTONE) 25 MG tablet TAKE ONE TABLET DAILY (Patient taking differently: One tablet in the am and one tablet in the evening) 30 tablet 5    nystatin (MYCOSTATIN) 480549 UNIT/GM ointment Apply topically 2 times daily. for yeast 60 Tube 5    albuterol (ACCUNEB) 1.25 MG/3ML nebulizer solution Inhale 3 mLs into the lungs every 6 hours as needed for Wheezing 360 mL 3    albuterol (PROVENTIL HFA;VENTOLIN HFA) 108 (90 BASE) MCG/ACT inhaler Inhale 2 puffs into the lungs every 6 hours as needed for Wheezing 1 Inhaler 1    OXYGEN 2L NC 12-24 hours (Patient taking differently: nightly as needed 2L NC 12-24 hours) 1 Device 0    aspirin 325 MG tablet Take 325 mg by mouth daily.  EASY COMFORT INSULIN SYRINGE 31G X 5/16\" 1 ML MISC INJECT AS DIRECTED DAILY 100 each 3    blood glucose monitor kit and supplies Use as directed for diabetes TID checks. 1 kit 0    blood glucose monitor strips Test 3times a day & as needed for symptoms of irregular blood glucose. 100 strip 3    TRUEPLUS INSULIN SYRINGE 30G X 5/16\" 1 ML MISC INJECT AS DIRECTED DAILY 100 each 3    glucose blood VI test strips (ONE TOUCH ULTRA TEST) strip Inject 1 each into the skin daily As needed. 100 each 3    Lancets MISC 1 lancet to check glucose daily 100 each 3    Insulin Pen Needle 31G X 8 MM MISC Inject 1 each into the skin daily 100 each 2     No current facility-administered medications for this encounter. Allergies: Codeine and Hydrocodone-acetaminophen  Past Medical History:   Diagnosis Date    Asthma 4/21/2015    Bilateral carpal tunnel syndrome 04/09/2018    sees dr. Luis Santos.     Colon polyp     Diabetes mellitus (HCC)     GERD (gastroesophageal reflux disease)  HTN (hypertension)     Hyperlipidemia     Mild mitral regurgitation by prior echocardiogram 2016    Morbid obesity ([facility] 10/18/2017    Normal cardiac stress test 09    no ischemia at attained level of excercise    Parkinson disease (HCC)     Paroxysmal atrial fibrillation ([facility]     seetigre Orellana Post-menopausal     Prolonged emergence from general anesthesia     Restrictive airway disease     Stage 3 chronic kidney disease ([facility] 10/18/2017       Past Surgical History:   Procedure Laterality Date    APPENDECTOMY       SECTION      x3    COLONOSCOPY      Dr Kaitlynn Nichols polyp-5 yr recall    COLONOSCOPY N/A 2019    Dr Janine Perez 2 internal hemorrhoids without stigmata, diverticulosis, suboptimal prep--5 yr recall    GALLBLADDER SURGERY  2014    dr Cabello Quiet ARTHROSCOPY      [facility] N/CARPAL TUNNEL SURG Right 2018    OPEN CARPAL TUNNEL RELEASE performed by Jose Rose MD at [facility] TYMPANOSTOMY TUBE PLACEMENT      dr Maria L Grant in [facility] UPPER GASTROINTESTINAL ENDOSCOPY       Family History   Problem Relation Age of Onset    High Blood Pressure Father     Diabetes Father     Parkinsonism Father     High Blood Pressure Mother     Diabetes Sister     Other Paternal Grandmother         hiatal hernia    Heart Disease Paternal Grandfather     Colon Cancer Neg Hx     Colon Polyps Neg Hx     Esophageal Cancer Neg Hx     Liver Cancer Neg Hx     Liver Disease Neg Hx     Rectal Cancer Neg Hx     Stomach Cancer Neg Hx      Social History     Tobacco Use    Smoking status: Never Smoker    Smokeless tobacco: Never Used   Substance Use Topics    Alcohol use: No         Review of Systems    Review of Systems   Skin: Positive for color change and wound. All other systems reviewed and are negative. All other review of systems are negative.     Physical Exam    /69   Pulse 72   Temp 98.1 °F (36.7 °C) (Temporal)   Resp 20   Ht 5' 2\" (1.575 m)   Wt 293 lb (132.9 kg)   BMI 53.59 kg/m²     Physical Exam  Vitals signs reviewed. Exam conducted with a chaperone present. Constitutional:       Appearance: Normal appearance. She is obese. HENT:      Head: Normocephalic and atraumatic. Eyes:      General: Lids are normal. Lids are everted, no foreign bodies appreciated. Vision grossly intact. Gaze aligned appropriately. Neck:      Musculoskeletal: Normal range of motion. Cardiovascular:      Rate and Rhythm: Normal rate and regular rhythm. Pulses: Normal pulses. Heart sounds: Normal heart sounds. Pulmonary:      Effort: Pulmonary effort is normal.      Breath sounds: Normal breath sounds. Abdominal:      General: Bowel sounds are normal.   Musculoskeletal: Normal range of motion. General: Swelling present. Left lower leg: Edema present. Skin:     General: Skin is warm and dry. Capillary Refill: Capillary refill takes less than 2 seconds. Findings: Wound present. Neurological:      Mental Status: She is alert and oriented to person, place, and time. Psychiatric:         Mood and Affect: Mood normal.         Behavior: Behavior normal.         Thought Content: Thought content normal.         Judgment: Judgment normal.             Post Debridement Measurements and Assessment:    The patientspain isPain Level: 5 Pain Type: Acute pain. Please refer to nursing measurements and assessment regarding wound pre and postdebridement.     Incision 08/29/18 Arm Right (Active)   Number of days: 706       Wound 07/31/20 Leg Lateral;Left wound 1- left pretibial venous (Active)   Wound Image    07/31/20 0842   Wound Venous 07/31/20 0842   Dressing Status Old drainage 07/31/20 0842   Dressing Changed Changed/New 07/31/20 0842   Dressing/Treatment Other (comment) 07/31/20 0842   Wound Cleansed Soap and water 07/31/20 0842   Wound Length (cm) 3 cm 07/31/20 0842   Wound Width (cm) 3 cm 07/31/20 0842   Wound Depth (cm) 0.1 cm 07/31/20 0842   Wound Surface Area (cm^2) 9 cm^2 07/31/20 0842   Wound Volume (cm^3) 0.9 cm^3 07/31/20 0842   Wound Assessment [facility] 07/31/20 0842   Drainage Amount Moderate 07/31/20 0842   Drainage Description Serous 07/31/20 0842   Odor None 07/31/20 0842   Margins Unattached edges 07/31/20 0842   [place]-wound Assessment Swelling;Red 07/31/20 0842   Non-staged Wound Description Full thickness 07/31/20 0842   Yellow%Wound Bed 100 07/31/20 0842   Number of days: 4          Assessment    1. Essential hypertension    2. Diabetic ulcer of left lower leg associated with type 2 diabetes mellitus, with fat layer exposed ([address]    3. Varicose veins of left lower extremity with ulcer of calf (CODE) ([address]    4. Morbid obesity ([address]    5. Stage 4 chronic kidney disease ([address]    6. Chronic obstructive pulmonary disease, unspecified COPD type ([address]          Plan    1. BIJU Prior to next visit    Plan for wound - Dress per physician order  Treatment:     Compression : Yes   Offloading : No   Dressing : see AVS    Discussed importance of leg elevation, leg compression, arterial disease, glucose control, nutrition, and weight loss. Patient understanding and questions answered. I spent a total of  90 minutes face to face with the patient. Over 75% of that time was spent on counseling and care coordination. Patient was told that if symptoms worsen or new symptoms develop they are to go to the emergency department immediately. Patient was educated on diagnosis and treatment plan. All of patient's questions were answered, and the patient understands the discharge plan. Discussed appropriate home care of this wound. Wound redressed. Patient instructions were given.   Recommend no smoking  Offloading instructions given

## 2020-08-02 ASSESSMENT — ENCOUNTER SYMPTOMS: SHORTNESS OF BREATH: 1

## 2020-08-04 ENCOUNTER — HOSPITAL ENCOUNTER (INPATIENT)
Age: 70
LOS: 5 days | Discharge: HOME | DRG: 177 | End: 2020-08-10
Attending: EMERGENCY MEDICINE | Admitting: HOSPITALIST
Payer: MEDICARE

## 2020-08-04 PROCEDURE — 93005 ELECTROCARDIOGRAM TRACING: CPT | Performed by: EMERGENCY MEDICINE

## 2020-08-04 PROCEDURE — 99285 EMERGENCY DEPT VISIT HI MDM: CPT

## 2020-08-04 NOTE — PLAN OF CARE
Problem: Wound:  Goal: Will show signs of wound healing; wound closure and no evidence of infection  Description: Will show signs of wound healing; wound closure and no evidence of infection  Outcome: Ongoing     Problem: Weight control:  Goal: Ability to maintain an optimal weight for height and age will be supported  Description: Ability to maintain an optimal weight for height and age will be supported  Outcome: Ongoing     Problem: Blood Glucose:  Goal: Ability to maintain appropriate glucose levels will improve  Description: Ability to maintain appropriate glucose levels will improve  Outcome: Ongoing

## 2020-08-05 ENCOUNTER — APPOINTMENT (OUTPATIENT)
Dept: GENERAL RADIOLOGY | Age: 70
DRG: 177 | End: 2020-08-05
Payer: MEDICARE

## 2020-08-05 PROBLEM — U07.1 COVID-19 VIRUS INFECTION: Status: ACTIVE | Noted: 2020-08-05

## 2020-08-05 LAB
ABO/RH: NORMAL
ALBUMIN SERPL-MCNC: 3.9 G/DL (ref 3.5–5.2)
ALP BLD-CCNC: 65 U/L (ref 35–104)
ALT SERPL-CCNC: <5 U/L (ref 5–33)
ANION GAP SERPL CALCULATED.3IONS-SCNC: 14 MMOL/L (ref 7–19)
ANTIBODY SCREEN: NORMAL
AST SERPL-CCNC: 21 U/L (ref 5–32)
BASOPHILS ABSOLUTE: 0 K/UL (ref 0–0.2)
BASOPHILS RELATIVE PERCENT: 0.8 % (ref 0–1)
BILIRUB SERPL-MCNC: <0.2 MG/DL (ref 0.2–1.2)
BILIRUBIN URINE: NEGATIVE
BLOOD, URINE: NEGATIVE
BUN BLDV-MCNC: 43 MG/DL (ref 8–23)
C-REACTIVE PROTEIN: 1.76 MG/DL (ref 0–0.5)
CALCIUM SERPL-MCNC: 9 MG/DL (ref 8.8–10.2)
CHLORIDE BLD-SCNC: 99 MMOL/L (ref 98–111)
CLARITY: CLEAR
CO2: 24 MMOL/L (ref 22–29)
COLOR: YELLOW
CREAT SERPL-MCNC: 1.9 MG/DL (ref 0.5–0.9)
D DIMER: 0.92 UG/ML FEU (ref 0–0.48)
EKG P AXIS: NORMAL DEGREES
EKG P-R INTERVAL: NORMAL MS
EKG Q-T INTERVAL: 418 MS
EKG QRS DURATION: 90 MS
EKG QTC CALCULATION (BAZETT): 437 MS
EKG T AXIS: 47 DEGREES
EOSINOPHILS ABSOLUTE: 0.1 K/UL (ref 0–0.6)
EOSINOPHILS RELATIVE PERCENT: 1.9 % (ref 0–5)
EPITHELIAL CELLS, UA: NORMAL /HPF
FERRITIN: 124.2 NG/ML (ref 13–150)
GFR AFRICAN AMERICAN: 32
GFR NON-AFRICAN AMERICAN: 26
GLUCOSE BLD-MCNC: 138 MG/DL (ref 70–99)
GLUCOSE BLD-MCNC: 343 MG/DL (ref 70–99)
GLUCOSE BLD-MCNC: 350 MG/DL (ref 70–99)
GLUCOSE BLD-MCNC: 385 MG/DL (ref 70–99)
GLUCOSE BLD-MCNC: 81 MG/DL (ref 74–109)
GLUCOSE URINE: 100 MG/DL
HCT VFR BLD CALC: 36.6 % (ref 37–47)
HEMOGLOBIN: 11.6 G/DL (ref 12–16)
HYALINE CASTS: NORMAL /LPF (ref 0–5)
IMMATURE GRANULOCYTES #: 0 K/UL
INR BLD: 0.98 (ref 0.88–1.18)
KETONES, URINE: NEGATIVE MG/DL
LACTATE DEHYDROGENASE: 209 U/L (ref 91–215)
LEUKOCYTE ESTERASE, URINE: NEGATIVE
LYMPHOCYTES ABSOLUTE: 1 K/UL (ref 1.1–4.5)
LYMPHOCYTES RELATIVE PERCENT: 20 % (ref 20–40)
MCH RBC QN AUTO: 30 PG (ref 27–31)
MCHC RBC AUTO-ENTMCNC: 31.7 G/DL (ref 33–37)
MCV RBC AUTO: 94.6 FL (ref 81–99)
MONOCYTES ABSOLUTE: 0.7 K/UL (ref 0–0.9)
MONOCYTES RELATIVE PERCENT: 13.5 % (ref 0–10)
NEUTROPHILS ABSOLUTE: 3 K/UL (ref 1.5–7.5)
NEUTROPHILS RELATIVE PERCENT: 63 % (ref 50–65)
NITRITE, URINE: NEGATIVE
PDW BLD-RTO: 14 % (ref 11.5–14.5)
PERFORMED ON: ABNORMAL
PH UA: 5.5 (ref 5–8)
PLATELET # BLD: 135 K/UL (ref 130–400)
PMV BLD AUTO: 12 FL (ref 9.4–12.3)
POTASSIUM SERPL-SCNC: 4.9 MMOL/L (ref 3.5–5)
PRO-BNP: 452 PG/ML (ref 0–900)
PROTEIN UA: 30 MG/DL
PROTHROMBIN TIME: 12.9 SEC (ref 12–14.6)
RAPID INFLUENZA  B AGN: NEGATIVE
RAPID INFLUENZA A AGN: NEGATIVE
RBC # BLD: 3.87 M/UL (ref 4.2–5.4)
REASON FOR REJECTION: NORMAL
REJECTED TEST: NORMAL
SARS-COV-2, NAAT: DETECTED
SODIUM BLD-SCNC: 137 MMOL/L (ref 136–145)
SPECIFIC GRAVITY UA: 1.02 (ref 1–1.03)
TOTAL PROTEIN: 6.3 G/DL (ref 6.6–8.7)
TROPONIN: <0.01 NG/ML (ref 0–0.03)
TROPONIN: <0.01 NG/ML (ref 0–0.03)
UROBILINOGEN, URINE: 0.2 E.U./DL
WBC # BLD: 4.8 K/UL (ref 4.8–10.8)

## 2020-08-05 PROCEDURE — 81001 URINALYSIS AUTO W/SCOPE: CPT

## 2020-08-05 PROCEDURE — 2580000003 HC RX 258: Performed by: INTERNAL MEDICINE

## 2020-08-05 PROCEDURE — 36415 COLL VENOUS BLD VENIPUNCTURE: CPT

## 2020-08-05 PROCEDURE — 6360000002 HC RX W HCPCS: Performed by: EMERGENCY MEDICINE

## 2020-08-05 PROCEDURE — 2580000003 HC RX 258: Performed by: EMERGENCY MEDICINE

## 2020-08-05 PROCEDURE — 86140 C-REACTIVE PROTEIN: CPT

## 2020-08-05 PROCEDURE — 85379 FIBRIN DEGRADATION QUANT: CPT

## 2020-08-05 PROCEDURE — 86901 BLOOD TYPING SEROLOGIC RH(D): CPT

## 2020-08-05 PROCEDURE — 83880 ASSAY OF NATRIURETIC PEPTIDE: CPT

## 2020-08-05 PROCEDURE — 87040 BLOOD CULTURE FOR BACTERIA: CPT

## 2020-08-05 PROCEDURE — 86900 BLOOD TYPING SEROLOGIC ABO: CPT

## 2020-08-05 PROCEDURE — 85610 PROTHROMBIN TIME: CPT

## 2020-08-05 PROCEDURE — 2580000003 HC RX 258: Performed by: HOSPITALIST

## 2020-08-05 PROCEDURE — 83615 LACTATE (LD) (LDH) ENZYME: CPT

## 2020-08-05 PROCEDURE — 87804 INFLUENZA ASSAY W/OPTIC: CPT

## 2020-08-05 PROCEDURE — 85025 COMPLETE CBC W/AUTO DIFF WBC: CPT

## 2020-08-05 PROCEDURE — 6370000000 HC RX 637 (ALT 250 FOR IP): Performed by: EMERGENCY MEDICINE

## 2020-08-05 PROCEDURE — 1210000000 HC MED SURG R&B

## 2020-08-05 PROCEDURE — 84484 ASSAY OF TROPONIN QUANT: CPT

## 2020-08-05 PROCEDURE — U0002 COVID-19 LAB TEST NON-CDC: HCPCS

## 2020-08-05 PROCEDURE — 71045 X-RAY EXAM CHEST 1 VIEW: CPT

## 2020-08-05 PROCEDURE — 6360000002 HC RX W HCPCS: Performed by: INTERNAL MEDICINE

## 2020-08-05 PROCEDURE — 6370000000 HC RX 637 (ALT 250 FOR IP): Performed by: HOSPITALIST

## 2020-08-05 PROCEDURE — 83520 IMMUNOASSAY QUANT NOS NONAB: CPT

## 2020-08-05 PROCEDURE — 82728 ASSAY OF FERRITIN: CPT

## 2020-08-05 PROCEDURE — 82947 ASSAY GLUCOSE BLOOD QUANT: CPT

## 2020-08-05 PROCEDURE — 93010 ELECTROCARDIOGRAM REPORT: CPT | Performed by: INTERNAL MEDICINE

## 2020-08-05 PROCEDURE — 86850 RBC ANTIBODY SCREEN: CPT

## 2020-08-05 PROCEDURE — 6360000002 HC RX W HCPCS: Performed by: HOSPITALIST

## 2020-08-05 PROCEDURE — 2700000000 HC OXYGEN THERAPY PER DAY

## 2020-08-05 PROCEDURE — 80053 COMPREHEN METABOLIC PANEL: CPT

## 2020-08-05 RX ORDER — SOTALOL HYDROCHLORIDE 80 MG/1
120 TABLET ORAL 2 TIMES DAILY
Status: DISCONTINUED | OUTPATIENT
Start: 2020-08-05 | End: 2020-08-10 | Stop reason: HOSPADM

## 2020-08-05 RX ORDER — DEXAMETHASONE SODIUM PHOSPHATE 10 MG/ML
6 INJECTION, SOLUTION INTRAMUSCULAR; INTRAVENOUS DAILY
Status: DISCONTINUED | OUTPATIENT
Start: 2020-08-06 | End: 2020-08-09

## 2020-08-05 RX ORDER — DEXAMETHASONE SODIUM PHOSPHATE 10 MG/ML
10 INJECTION, SOLUTION INTRAMUSCULAR; INTRAVENOUS DAILY
Status: DISCONTINUED | OUTPATIENT
Start: 2020-08-05 | End: 2020-08-05

## 2020-08-05 RX ORDER — ASPIRIN 325 MG
325 TABLET ORAL ONCE
Status: COMPLETED | OUTPATIENT
Start: 2020-08-05 | End: 2020-08-05

## 2020-08-05 RX ORDER — SODIUM CHLORIDE 0.9 % (FLUSH) 0.9 %
10 SYRINGE (ML) INJECTION PRN
Status: DISCONTINUED | OUTPATIENT
Start: 2020-08-05 | End: 2020-08-10 | Stop reason: HOSPADM

## 2020-08-05 RX ORDER — MAGNESIUM SULFATE IN WATER 40 MG/ML
2 INJECTION, SOLUTION INTRAVENOUS PRN
Status: DISCONTINUED | OUTPATIENT
Start: 2020-08-05 | End: 2020-08-10 | Stop reason: HOSPADM

## 2020-08-05 RX ORDER — TORSEMIDE 20 MG/1
20 TABLET ORAL DAILY
Status: DISCONTINUED | OUTPATIENT
Start: 2020-08-05 | End: 2020-08-06

## 2020-08-05 RX ORDER — ALBUTEROL SULFATE 90 UG/1
2 AEROSOL, METERED RESPIRATORY (INHALATION) EVERY 6 HOURS PRN
Status: DISCONTINUED | OUTPATIENT
Start: 2020-08-05 | End: 2020-08-10 | Stop reason: HOSPADM

## 2020-08-05 RX ORDER — ACETAMINOPHEN 325 MG/1
650 TABLET ORAL EVERY 6 HOURS PRN
Status: DISCONTINUED | OUTPATIENT
Start: 2020-08-05 | End: 2020-08-10 | Stop reason: HOSPADM

## 2020-08-05 RX ORDER — POTASSIUM CHLORIDE 7.45 MG/ML
10 INJECTION INTRAVENOUS PRN
Status: DISCONTINUED | OUTPATIENT
Start: 2020-08-05 | End: 2020-08-10 | Stop reason: HOSPADM

## 2020-08-05 RX ORDER — OMEPRAZOLE 20 MG/1
40 CAPSULE, DELAYED RELEASE ORAL DAILY
Status: DISCONTINUED | OUTPATIENT
Start: 2020-08-05 | End: 2020-08-07

## 2020-08-05 RX ORDER — ACETAMINOPHEN 650 MG/1
650 SUPPOSITORY RECTAL EVERY 6 HOURS PRN
Status: DISCONTINUED | OUTPATIENT
Start: 2020-08-05 | End: 2020-08-10 | Stop reason: HOSPADM

## 2020-08-05 RX ORDER — POTASSIUM CHLORIDE 20 MEQ/1
40 TABLET, EXTENDED RELEASE ORAL PRN
Status: DISCONTINUED | OUTPATIENT
Start: 2020-08-05 | End: 2020-08-10 | Stop reason: HOSPADM

## 2020-08-05 RX ORDER — SODIUM CHLORIDE 9 MG/ML
INJECTION, SOLUTION INTRAVENOUS CONTINUOUS
Status: DISCONTINUED | OUTPATIENT
Start: 2020-08-05 | End: 2020-08-06

## 2020-08-05 RX ORDER — PROMETHAZINE HYDROCHLORIDE 12.5 MG/1
12.5 TABLET ORAL EVERY 6 HOURS PRN
Status: DISCONTINUED | OUTPATIENT
Start: 2020-08-05 | End: 2020-08-10 | Stop reason: HOSPADM

## 2020-08-05 RX ORDER — SPIRONOLACTONE 25 MG/1
25 TABLET ORAL DAILY
Status: DISCONTINUED | OUTPATIENT
Start: 2020-08-05 | End: 2020-08-05

## 2020-08-05 RX ORDER — ROPINIROLE 0.25 MG/1
0.25 TABLET, FILM COATED ORAL 3 TIMES DAILY
Status: DISCONTINUED | OUTPATIENT
Start: 2020-08-05 | End: 2020-08-06

## 2020-08-05 RX ORDER — ASPIRIN 325 MG
325 TABLET ORAL DAILY
Status: DISCONTINUED | OUTPATIENT
Start: 2020-08-06 | End: 2020-08-10 | Stop reason: HOSPADM

## 2020-08-05 RX ORDER — MONTELUKAST SODIUM 10 MG/1
10 TABLET ORAL NIGHTLY
Status: DISCONTINUED | OUTPATIENT
Start: 2020-08-05 | End: 2020-08-10 | Stop reason: HOSPADM

## 2020-08-05 RX ORDER — 0.9 % SODIUM CHLORIDE 0.9 %
20 INTRAVENOUS SOLUTION INTRAVENOUS ONCE
Status: COMPLETED | OUTPATIENT
Start: 2020-08-05 | End: 2020-08-06

## 2020-08-05 RX ORDER — ONDANSETRON 2 MG/ML
4 INJECTION INTRAMUSCULAR; INTRAVENOUS EVERY 6 HOURS PRN
Status: DISCONTINUED | OUTPATIENT
Start: 2020-08-05 | End: 2020-08-10 | Stop reason: HOSPADM

## 2020-08-05 RX ORDER — SODIUM CHLORIDE 0.9 % (FLUSH) 0.9 %
10 SYRINGE (ML) INJECTION EVERY 12 HOURS SCHEDULED
Status: DISCONTINUED | OUTPATIENT
Start: 2020-08-05 | End: 2020-08-10 | Stop reason: HOSPADM

## 2020-08-05 RX ADMIN — SODIUM CHLORIDE, PRESERVATIVE FREE 10 ML: 5 INJECTION INTRAVENOUS at 08:50

## 2020-08-05 RX ADMIN — OMEPRAZOLE 40 MG: 20 CAPSULE, DELAYED RELEASE ORAL at 08:49

## 2020-08-05 RX ADMIN — Medication 500 MG: at 04:01

## 2020-08-05 RX ADMIN — INSULIN LISPRO 10 UNITS: 100 INJECTION, SOLUTION INTRAVENOUS; SUBCUTANEOUS at 20:58

## 2020-08-05 RX ADMIN — ROPINIROLE HYDROCHLORIDE 0.25 MG: 0.25 TABLET, FILM COATED ORAL at 14:44

## 2020-08-05 RX ADMIN — INSULIN LISPRO 10 UNITS: 100 INJECTION, SOLUTION INTRAVENOUS; SUBCUTANEOUS at 17:47

## 2020-08-05 RX ADMIN — CARBIDOPA AND LEVODOPA 1 TABLET: 25; 100 TABLET ORAL at 14:44

## 2020-08-05 RX ADMIN — ASPIRIN 325 MG: 325 TABLET, FILM COATED ORAL at 04:01

## 2020-08-05 RX ADMIN — INSULIN LISPRO 8 UNITS: 100 INJECTION, SOLUTION INTRAVENOUS; SUBCUTANEOUS at 12:28

## 2020-08-05 RX ADMIN — DEXAMETHASONE SODIUM PHOSPHATE 10 MG: 10 INJECTION, SOLUTION INTRAMUSCULAR; INTRAVENOUS at 08:50

## 2020-08-05 RX ADMIN — SODIUM CHLORIDE 20 ML: 9 INJECTION, SOLUTION INTRAVENOUS at 21:24

## 2020-08-05 RX ADMIN — CEFTRIAXONE 1 G: 1 INJECTION, POWDER, FOR SOLUTION INTRAMUSCULAR; INTRAVENOUS at 04:00

## 2020-08-05 RX ADMIN — CARBIDOPA AND LEVODOPA 1 TABLET: 25; 100 TABLET ORAL at 08:49

## 2020-08-05 RX ADMIN — ROPINIROLE HYDROCHLORIDE 0.25 MG: 0.25 TABLET, FILM COATED ORAL at 08:49

## 2020-08-05 RX ADMIN — ENOXAPARIN SODIUM 40 MG: 40 INJECTION SUBCUTANEOUS at 08:49

## 2020-08-05 RX ADMIN — MONTELUKAST SODIUM 10 MG: 10 TABLET, FILM COATED ORAL at 20:53

## 2020-08-05 RX ADMIN — ENOXAPARIN SODIUM 40 MG: 40 INJECTION SUBCUTANEOUS at 20:52

## 2020-08-05 RX ADMIN — SOTALOL HYDROCHLORIDE 120 MG: 80 TABLET ORAL at 20:53

## 2020-08-05 RX ADMIN — TORSEMIDE 20 MG: 20 TABLET ORAL at 08:49

## 2020-08-05 RX ADMIN — SPIRONOLACTONE 25 MG: 25 TABLET ORAL at 08:49

## 2020-08-05 RX ADMIN — SODIUM CHLORIDE: 9 INJECTION, SOLUTION INTRAVENOUS at 06:00

## 2020-08-05 RX ADMIN — ACETAMINOPHEN 650 MG: 325 TABLET, FILM COATED ORAL at 22:24

## 2020-08-05 RX ADMIN — PROMETHAZINE HYDROCHLORIDE 12.5 MG: 12.5 TABLET ORAL at 22:24

## 2020-08-05 RX ADMIN — CARBIDOPA AND LEVODOPA 1 TABLET: 25; 100 TABLET ORAL at 20:57

## 2020-08-05 RX ADMIN — ROPINIROLE HYDROCHLORIDE 0.25 MG: 0.25 TABLET, FILM COATED ORAL at 20:53

## 2020-08-05 RX ADMIN — SOTALOL HYDROCHLORIDE 120 MG: 80 TABLET ORAL at 08:49

## 2020-08-05 ASSESSMENT — ENCOUNTER SYMPTOMS
BACK PAIN: 0
COUGH: 1
NAUSEA: 1
VOMITING: 0
EYE PAIN: 0
DIARRHEA: 0
SHORTNESS OF BREATH: 1
ABDOMINAL PAIN: 0

## 2020-08-05 ASSESSMENT — PAIN SCALES - GENERAL
PAINLEVEL_OUTOF10: 0
PAINLEVEL_OUTOF10: 3
PAINLEVEL_OUTOF10: 0
PAINLEVEL_OUTOF10: 0

## 2020-08-05 ASSESSMENT — PAIN DESCRIPTION - LOCATION: LOCATION: HEAD

## 2020-08-05 NOTE — CONSULTS
Palliative Care:        Patient was seen for initial palliative eval.  She was seen thru the glass in the Covid unit and then I was able to speak with her by calling her cell phone. She was admitted yesterday after a three day history of headache, body aches, nonproductive cough, and some dyspnea. She presented to the ED where she tested positive for SARS-CoV-2 and then admitted. She did not sleep well last night. She was hungry this morning, but didn't eat much breakfast due to it being cold. She continues to have a dull headache. She doesn't like being confined to the room with door shut and reports feeling claustrophobic. She reports living at home with her . She has a history of Parkinson's which makes mobility difficult. She reports her tremors are worse when she is stressed or anxious. I also called and spoke with her daughter Titi Leblanc. Past Medical History:        Past Medical History:   Diagnosis Date    Asthma 4/21/2015    Bilateral carpal tunnel syndrome 04/09/2018    sees dr. Danny Estrada.  Colon polyp     Diabetes mellitus ([address]     GERD (gastroesophageal reflux disease)     HTN (hypertension)     Hyperlipidemia     Mild mitral regurgitation by prior echocardiogram 2/11/2016    Morbid obesity ([address] 10/18/2017    Normal cardiac stress test 4-2-09    no ischemia at attained level of excercise    Palliative care patient 08/05/2020    Parkinson disease [facility])     Paroxysmal atrial fibrillation [facility])     sees dr. Jovany Saavedra Post-menopausal     Prolonged emergence from general anesthesia     Restrictive airway disease     Stage 3 chronic kidney disease ([address] 10/18/2017       Advance Directives:    None- she does want to complete a living will    Code Status:  Discussed rescucitative measures. She reports not wanting to be intubated in the event of acute respiratory arrest.  She would like code status changed to DNI.   For now she would want to have CPR, shock, meds, but will continue to think about it. Discussed with Dr. Bell Klein and I have changed her status to DNI. Pain/Other Symptoms:    Dull headache, mild shortness of breath    Activity:   Currently bedbound    Psychological/Spiritual:   Attends  [facility] in [facility], [address]: To be transferred to room 436 this afternoon. Continuing on O2, dexamethasone, Lovenox. Considering convalescent plasma. Patient/family discussion r/t goals:  Spoke with both patient and daughter Zohreh Cullen). They both plan for her to be discharged home to live with her . She has three children who all live nearby. Review of any needed services:    She was recently seen by wound care clinic due to wound (diabetic ulcer) left lower leg and home health was ordered for home wound care. She reports having a two different styles of rolling walkers, hitesh chair, shower chair, lift chair (recliner). They deny having any other DME needs.            Electronically signed by INGRID Cheatham CNP on 8/5/2020 at 12:46 PM

## 2020-08-05 NOTE — PROGRESS NOTES
Patient has signed the consent form for authorization to receive the convalescent plasma. Form is in patient's chart.  Electronically signed by Tavo Gerber RN on 8/5/2020 at 4:27 PM

## 2020-08-05 NOTE — ACP (ADVANCE CARE PLANNING)
Advance Care Planning     Advance Care Planning (ACP) Physician/NP/PA (Provider) Conversation      Date of ACP Conversation: 8/4/2020    Conversation Conducted with:   Patient with Decision Making [place]:    \"Who would you like to name as your primary health care decision-maker? \"             Name: Rashid Bo      Relationship: Daughter         Phone number: [phone]  Name: Dennis Haynes       Relationship: Daughter         Phone number: [phone]  Name: Nubia Dudley        Relationship: Daughter      Phone number: [phone] Preferences:    Ventilation: \"If you were in your present state of health and suddenly became very ill and were unable to breathe on your own, what would your preference be about the use of a ventilator (breathing machine) if it was available to you? \"    YES    \"If your health worsens and it becomes clear that your chance of recovery is unlikely, what would your preference be about the use of a ventilator (breathing machine) if it was available to you? \"  NO        Resuscitation:  If she was to have a sudden event and her heart stopped in her present state of health she said YES she would want CPR at this time. She indicated wanting to think about this more and and is aware she should tell her medical providers if she changes her mind. Conversation Outcomes / Follow-Up Plan:   Recommended completion of Advance Directive   Order placed for spiritual care consult to complete LWBrijesh Ovalle

## 2020-08-05 NOTE — PROGRESS NOTES
Holden Edwards transferred to  from [phone] via wheelchair. Reason for transfer: stable for transfer to 4th floor COVID unit   Explained reason for transfer to Patient and Family. Belongings: clothing and phone with patient at bedside . Soft chart transferred with patient: Yes. Telemetry box number 436 transferred with patient: yes. Report given to: 4th floor RN, via telephone.       Electronically signed by Andriy Manzo RN on 8/5/2020 at 2:12 PM

## 2020-08-05 NOTE — ED PROVIDER NOTES
[address] [place] [address] UNIT  eMERGENCY dEPARTMENT eNCOUnter      Pt Name: Lyndesy Cid  MRN: [id number]  [place] 1950  Date of evaluation: 8/4/2020  Provider: Moustapha Oliveira MD    CHIEF COMPLAINT       Chief Complaint   Patient presents with    Concern For COVID-19     PT c/o shortness of breath, palpitations, low grade fever and headache for a couple of days. pt states 99.9 temp at home, pt had tylenol at 2100. HISTORY OF PRESENT ILLNESS   (Location/Symptom, Timing/Onset,Context/Setting, Quality, Duration, Modifying Factors, Severity)  Note limiting factors. Lyndsey Cid is a 71 y.o. female who presents to the emergency department with multiple complaints. For the past few days has had mild headache and some achy discomfort in shoulders and neck. No injuries. Has also had nonproductive cough and some dyspnea. Low-grade fever. Has had chest discomfort she says is worse with exertion. No unilateral leg swelling or pain but has some bilateral swelling of her legs which is chronic and baseline. Denies history of CHF. Some nausea but no abdominal pain vomiting or diarrhea. Son-in-law recently diagnosed with COVID but she denies any direct exposure to son-in-law. Patient is nonambulatory due to history of Parkinson. No focal numbness or weakness. HPI    NursingNotes were reviewed. REVIEW OF SYSTEMS    (2-9 systems for level 4, 10 or more for level 5)     Review of Systems   Constitutional: Positive for chills and fever (low grade). Eyes: Negative for pain and visual disturbance. Respiratory: Positive for cough and shortness of breath. Cardiovascular: Positive for chest pain. Negative for palpitations. Gastrointestinal: Positive for nausea. Negative for abdominal pain, diarrhea and vomiting. Genitourinary: Negative for difficulty urinating and dysuria. Musculoskeletal: Positive for neck pain. Negative for back pain. Skin: Negative for rash.    Neurological: Positive for Shortness of breath; Mild intermittent asthma without complication      albuterol (PROVENTIL HFA;VENTOLIN HFA) 108 (90 BASE) MCG/ACT inhaler Inhale 2 puffs into the lungs every 6 hours as needed for Wheezing  Qty: 1 Inhaler, Refills: 1    Comments: Please show patient how to use the inhaler. She may need a spacer. aspirin 325 MG tablet Take 325 mg by mouth daily.         insulin aspart (NOVOLOG) 100 UNIT/ML injection vial Inject 22 Units into the skin 3 times daily (before meals)      erythromycin (ROMYCIN) 5 MG/GM ophthalmic ointment Place into both eyes nightly as needed      insulin detemir (LEVEMIR FLEXTOUCH) 100 UNIT/ML injection pen Inject 70 units subcu nightly for type 2 diabetes  Qty: 5 pen, Refills: 3    Comments: Patient would like the pens      gabapentin (NEURONTIN) 600 MG tablet TAKE ONE TABLET BY MOUTH 3 TIMES DAILY AS NEEDED  Qty: 90 tablet, Refills: 5    Associated Diagnoses: Restless legs syndrome      EASY COMFORT INSULIN SYRINGE 31G X 5/16\" 1 ML MISC INJECT AS DIRECTED DAILY  Qty: 100 each, Refills: 3    Comments: NO REFILLS      omeprazole (PRILOSEC) 40 MG delayed release capsule TAKE ONE CAPSULE BY MOUTH EVERY DAY  Qty: 30 capsule, Refills: 3      atorvastatin (LIPITOR) 10 MG tablet TAKE ONE TABLET BY MOUTH EACH EVENING  Qty: 30 tablet, Refills: 3      carbidopa-levodopa (SINEMET)  MG per tablet TAKE TWO TABLETS BY MOUTH 3 TIMES DAILY  Qty: 180 tablet, Refills: 5    Comments:       rOPINIRole (REQUIP) 4 MG tablet TAKE TWO TABLETS EVERY MORNING TAKE ONE TABLET AT NOON AND TAKE TWO TABLETS EVERY NIGHT AT BEDTIME  Qty: 150 tablet, Refills: 5      montelukast (SINGULAIR) 10 MG tablet TAKE ONE TABLET BY MOUTH EVERY NIGHT AT BEDTIME  Qty: 30 tablet, Refills: 5      LINZESS 290 MCG CAPS capsule TAKE ONE CAPSULE IN THE MORNING BEFORE BREAKFAST  Qty: 30 capsule, Refills: 5      Liraglutide (VICTOZA) 18 MG/3ML SOPN SC injection Inject 1.8 mg into the skin daily  Qty: 9 mL, Refills: 3 the skin daily  Qty: 100 each, Refills: 2      OXYGEN 2L NC 12-24 hours  Qty: 1 Device, Refills: 0       !! - Potential duplicate medications found. Please discuss with provider.           ALLERGIES     Codeine and Hydrocodone-acetaminophen    FAMILY HISTORY       Family History   Problem Relation Age of Onset    High Blood Pressure Father     Diabetes Father     Parkinsonism Father     High Blood Pressure Mother     Diabetes Sister     Other Paternal Grandmother         hiatal hernia    Heart Disease Paternal Grandfather     Colon Cancer Neg Hx     Colon Polyps Neg Hx     Esophageal Cancer Neg Hx     Liver Cancer Neg Hx     Liver Disease Neg Hx     Rectal Cancer Neg Hx     Stomach Cancer Neg Hx           SOCIAL HISTORY       Social History     Socioeconomic History    Marital status:      Spouse name: None    Number of children: None    Years of education: None    Highest education level: None   Occupational History    None   Social Needs    Financial resource strain: None    Food insecurity     Worry: None     Inability: None    Transportation needs     Medical: None     Non-medical: None   Tobacco Use    Smoking status: Never Smoker    Smokeless tobacco: Never Used   Substance and Sexual Activity    Alcohol use: No    Drug use: No    Sexual activity: Yes     Partners: Male   Lifestyle    Physical activity     Days per week: None     Minutes per session: None    Stress: None   Relationships    Social connections     Talks on phone: None     Gets together: None     Attends Confucianist service: None     Active member of club or organization: None     Attends meetings of clubs or organizations: None     Relationship status: None    Intimate partner violence     Fear of current or ex partner: None     Emotionally abused: None     Physically abused: None     Forced sexual activity: None   Other Topics Concern    None   Social History Narrative    None       SCREENINGS    [place] Coma Scale  Eye Opening: Spontaneous  Best Verbal Response: Oriented  Best Motor Response: Obeys commands  Tonya Coma Scale Score: 15        PHYSICAL EXAM    (up to 7 for level 4, 8 or more for level 5)     ED Triage Vitals [08/04/20 2314]   BP Temp Temp Source Pulse Resp SpO2 Height Weight   (!) 147/50 98.6 °F (37 °C) Oral 87 18 95 % 5' 2\" (1.575 m) 293 lb (132.9 kg)       Physical Exam  Vitals signs reviewed. Constitutional:       General: She is not in acute distress. Appearance: She is well-developed. HENT:      Head: Normocephalic and atraumatic. Right Ear: Tympanic membrane, ear canal and external ear normal.      Left Ear: Tympanic membrane, ear canal and external ear normal.      Mouth/Throat:      Mouth: Mucous membranes are moist.      Pharynx: Oropharynx is clear. Eyes:      General: No scleral icterus. Extraocular Movements: Extraocular movements intact. Pupils: Pupils are equal, round, and reactive to light. Neck:      Musculoskeletal: Normal range of motion and neck supple. No neck rigidity or muscular tenderness. Vascular: No JVD. Cardiovascular:      Rate and Rhythm: Normal rate and regular rhythm. Pulses: Normal pulses. Heart sounds: Normal heart sounds. Pulmonary:      Effort: Pulmonary effort is normal. No respiratory distress. Breath sounds: Normal breath sounds. Abdominal:      General: There is no distension. Palpations: Abdomen is soft. Tenderness: There is no abdominal tenderness. There is no guarding or rebound. Musculoskeletal: Normal range of motion. General: No swelling or tenderness. Lymphadenopathy:      Cervical: No cervical adenopathy. Skin:     General: Skin is warm and dry. Capillary Refill: Capillary refill takes less than 2 seconds. Neurological:      General: No focal deficit present. Mental Status: She is alert and oriented to person, place, and time. GCS: GCS eye subscore is 4.  GCS verbal subscore is 5. GCS motor subscore is 6. Cranial Nerves: Cranial nerves are intact. Sensory: Sensation is intact. Motor: Motor function is intact. Psychiatric:         Mood and Affect: Mood normal.         Behavior: Behavior normal.         DIAGNOSTIC RESULTS     EKG: All EKG's are interpreted by the Emergency Department Physician who either signs or Co-signs this chart in the absence of a cardiologist.    Normal sinus rhythm. Normal QT. Borderline ST changes inferiorly which looks similar to previous EKG. I do not see signs of acute ischemia when compared to prior EKG. RADIOLOGY:   Non-plain film images such as CT, Ultrasound and MRI are read by the radiologist. Connie Lilly images are visualized and preliminarily interpreted by the emergency physician with the below findings:    Interpretation per the Radiologist below, if available at the time of this note:    XR CHEST PORTABLE    (Results Pending)   NM LUNG VENT/PERFUSION (VQ)    (Results Pending)     FILM CXR 1 VIEW:    Left basilar opacity, correlate of infection, aspiration, or atelectasis. Unchanged heart size. No pleural effusion.      Radiologist: Evelina Hunter MD         LABS:  Labs Reviewed   CBC WITH AUTO DIFFERENTIAL - Abnormal; Notable for the following components:       Result Value    RBC 3.87 (*)     Hemoglobin 11.6 (*)     Hematocrit 36.6 (*)     MCHC 31.7 (*)     Monocytes % 13.5 (*)     Lymphocytes Absolute 1.0 (*)     All other components within normal limits   COMPREHENSIVE METABOLIC PANEL - Abnormal; Notable for the following components:    BUN 43 (*)     CREATININE 1.9 (*)     GFR Non- 26 (*)     GFR  32 (*)     Total Protein 6.3 (*)     ALT <5 (*)     All other components within normal limits   D-DIMER, QUANTITATIVE - Abnormal; Notable for the following components:    D-Dimer, Quant 0.92 (*)     All other components within normal limits   COVID-19 - Abnormal; Notable for the following components:    SARS-CoV-2, NAAT DETECTED (*)     All other components within normal limits   URINE RT REFLEX TO CULTURE - Abnormal; Notable for the following components:    Glucose, Ur 100 (*)     Protein, UA 30 (*)     All other components within normal limits   RAPID INFLUENZA A/B ANTIGENS   CULTURE, BLOOD 1   CULTURE, BLOOD 2   SPECIMEN REJECTION   TROPONIN   PROTIME-INR   BRAIN NATRIURETIC PEPTIDE   MICROSCOPIC URINALYSIS   D-DIMER, QUANTITATIVE   TROPONIN   POCT GLUCOSE   POCT GLUCOSE   POCT GLUCOSE       All other labs were within normal range or not returned as of this dictation. EMERGENCY DEPARTMENT COURSE and DIFFERENTIALDIAGNOSIS/MDM:   Vitals:    Vitals:    08/05/20 0600 08/05/20 0615 08/05/20 0630 08/05/20 0645   BP: (!) 132/59 (!) 143/61 128/62 (!) 111/53   Pulse: 66 73 65 64   Resp: 17 15 17 16   Temp:       TempSrc:       SpO2: (!) 87% 95% 95% 93%   Weight:       Height:           MDM  Patient's case discussed with hospitalist, Dr. Cosmo Arreola, who is agreeable plan of care and admission. Patient's d-dimer is elevated which is likely related to the COVID. With her creatinine elevation cannot do CTA of the chest and with COVID cannot do complete VQ scan. Dr. Cosmo Arreola said he will probably order perfusion only study portion of VQ scan in the morning. In the meantime, he will be treating empirically with Lovenox per COVID protocols. Patient is resting comfortably updated about plan. CONSULTS:  IP CONSULT TO INFECTIOUS DISEASES    PROCEDURES:  Unless otherwise notedbelow, none     Procedures    FINAL IMPRESSION     1. Pneumonia due to organism    2. Chest pain, unspecified type    3.  COVID-19          DISPOSITION/PLAN   DISPOSITION Admitted 08/05/2020 03:44:56 AM      PATIENT REFERRED TO:  @FUP@    DISCHARGE MEDICATIONS:  Current Discharge Medication List             (Please note that portions of this note were completed with a voice recognition program.  Efforts were made to edit the dictations butoccasionally words are mis-transcribed.)    Don Spring MD (electronically signed)  AttendingEmergency Physician        Don Spring MD  08/05/20 4946

## 2020-08-05 NOTE — PROGRESS NOTES
4 Eyes Skin Assessment    Pato James is being assessed upon: Admission    I agree that I, Meliton Grissom, along with Mary Eller RN (either 2 RN's or 1 LPN and 1 RN) have performed a thorough Head to Toe Skin Assessment on the patient. ALL assessment sites listed below have been assessed. Areas assessed by both nurses:     [x]   Head, Face, and Ears   [x]   Shoulders, Back, and Chest  [x]   Arms, Elbows, and Hands   [x]   Coccyx, Sacrum, and Ischium  [x]   Legs, Feet, and Heels    Does the Patient have Skin Breakdown? Yes, wound(s) noted upon assessment. It is the responsibility of the Primary Nurse to assure that the following documentation, preventions, orders, and consults are complete on the above noted wound(s): Wound LDA initiated. LDA Flowsheet Documentation includes the [place]-wound, Wound Assessment, Measurements, Dressing Treatment, Drainage, and Color.     Fernie Prevention initiated: Yes  Wound Care Orders initiated: Yes    [address]  nurse consulted for Pressure Injury (Stage 3,4, Unstageable, DTI, NWPT, and Complex wounds) and New or Established Ostomies: Yes        Primary Nurse eSignature: Meliton Grissom RN on 8/5/2020 at 6:55 AM      Co-Signer eSignature: {Esignature:[id number]}

## 2020-08-05 NOTE — PROGRESS NOTES
Called patient back. Spoke with her on the phone. I had spoke with her daughter Azra Ferrer earlier. I gave the details of the [address] [place] [address] 6 convalescent study to her daughter. Answered all her questions. She felt her mother should enroll in the study. When I called the patient back I again reviewed all the key aspects of the consent with her. Explained again:  Participation is 100% voluntary  She can withdraw from the study at any time before or after receiving plasma  There is no cost to her for the plasma or the administration of the plasma  Her protected health information will be shared with the study investigators for purposes of data analysis  Through enrollment she will receive approximately 500 mL of plasma which is protein rich fluid harvested from somebody who is recovered from COVID-19  Risks include but are not limited to infection, volume overload, transfusion reaction, and other  Explained we do not know if plasma is helpful, harmful, or if no benefit-that is why it is being done as part of a study  The product is not FDA approved, but the [address] [place] [address],[facility] has been approved by the FDA to conduct the COVID-19 convalescent plasma study  This will not limit her from getting other therapies that we would offer  All of her questions were answered  She indicated she had read through and already signed the consent for convalescent plasma  She felt comfortable with that decision.     Juan Griffin MD

## 2020-08-05 NOTE — PROGRESS NOTES
Called back to speak with patient. Wanted to see if she had any questions regarding the consent form for convalescent plasma. She is not yet read through the consent. She does have her glasses. I encouraged her to go ahead and read through the consent so that I could address any questions. Just want to make sure she is offered any treatments that are available. Nurse is going to assist her with reading the consent and reviewing the consent as well. They are going to let me know if they have any questions. Patient said she would read through tonight and let me know tomorrow. I will probably check back with her a little bit later as well. Her current estimated creatinine clearance is 26. Remdesivir not recommended for creatinine clearance less than 30. We will hold on remdesivir treatment.     Chapo Lucas MD

## 2020-08-05 NOTE — SIGNIFICANT EVENT
Update Note:  Resting comfortably in bed on NC  Covid positive  -ID following  -CXR no gross consolidation/effusion  -Continue steroids,   Considering remdesivir,  Convalescent plasma  Mild hypoxia - 2L NC satting mid 90s; elevated D dimer secondary to Covid  -Unable to get CTA due to SETH; Not possible to due VQ scan on Covid positive patient per radiology department. Monitor at this time, feel low risk for PE and likely sequale of Covid.     Mild SETH - gentle hydration    Caridad Cárdenas MD  8/5/20

## 2020-08-05 NOTE — CONSULTS
INFECTIOUS DISEASES CONSULT NOTE    Patient:  Robert Brush 71 y.o. female  ROOM # [unfilled]  YOB: 1950  MRN: [id number]  [facility]:  [id number]  Admit date: 8/4/2020   Admitting Physician: Carissa Vasquez MD  Primary Care Physician: INGRID James - CNP  REFERRING PROVIDER: No ref. provider found    Reason for Consultation: COVID-19 pneumonia    History of Present Illness/Chief Complaint: Pleasant 71-year-old woman. She dates the onset of symptoms to 3 days ago. She indicates she started to feel poorly. She felt weak. She felt tired. She developed some headache. She has had low-grade fever to the high 99's. She feels she has had some mild cough. She has felt tired but does not describe definite dyspnea. She has felt nauseated but has not had vomiting. She does not report diarrhea. Her symptoms of weakness fatigue and headache became severe enough that she presented to the emergency room. SARS-CoV-2 testing was positive. She was admitted for further management. Oxygen saturations have been as low as 87%. Currently she has oxygen saturation of 93% on 2 L of oxygen via nasal cannula. Infectious disease is asked to evaluate and offer recommendations. I was able to speak with her clearly on her cell phone which is [phone]. She also indicated we could speak with her daughter Fam Velazquez at [phone].     Current Scheduled Medications:    torsemide  20 mg Oral Daily    spironolactone  25 mg Oral Daily    sotalol  120 mg Oral BID    rOPINIRole  0.25 mg Oral TID    omeprazole  40 mg Oral Daily    montelukast  10 mg Oral Nightly    carbidopa-levodopa  1 tablet Oral TID    [START ON 8/6/2020] aspirin  325 mg Oral Daily    sodium chloride flush  10 mL Intravenous 2 times per day    [START ON 8/6/2020] cefTRIAXone (ROCEPHIN) IV  1 g Intravenous Q24H    [START ON 8/6/2020] azithromycin  500 mg Intravenous Q24H    dexamethasone  10 mg Intravenous Daily    insulin lispro  0-12 Units Subcutaneous 4x Daily AC & HS    insulin lispro  0-6 Units Subcutaneous Nightly    enoxaparin  40 mg Subcutaneous BID     Current PRN Medications:  albuterol sulfate HFA, sodium chloride flush, acetaminophen **OR** acetaminophen, magnesium hydroxide, promethazine **OR** ondansetron, potassium chloride **OR** potassium alternative oral replacement **OR** potassium chloride, potassium chloride, magnesium sulfate    Allergies: Allergies   Allergen Reactions    Codeine      itching    Hydrocodone-Acetaminophen Nausea Only       Past Medical History: Parkinson's disease. Diabetes mellitus. Osteoporosis. Hypertension. Atrial fibrillation. Obesity. Gastroesophageal reflux disease. Stage III chronic kidney disease. Past Surgical History: Appendectomy.  section x3. Cholecystectomy. Knee arthroscopy. Tympanostomy tube placement. Social History: . Lives just outside of [address] [place] [address]. No tobacco, alcohol, or illicit drug use. 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 Family History:     Exposure History: Her son-in-law has tested positive for COVID. From what she describes his testing was around the same time hers was. She indicates she had been primarily isolating at home. She indicates she knows of no other exposure.     Review of Systems:   No visual changes  No paresthesia or anesthesia  She indicates Parkinson's tremor stable  No rash or skin itching  No dysuria, hematuria, urinary frequency  No depression or anxiety  No rash or skin itching  No recent weight gain or weight loss  She felt she was at her baseline state of health until symptoms outlined in HPI  See HPI for additional pertinent positive negatives from her complete review of systems    Vital Signs:  BP (!) 111/53   Pulse 64   Temp 98.8 °F (37.1 °C) (Oral)   Resp 16   Ht 5' 2\" (1.575 m)   Wt 296 lb 12.8 oz (134.6 kg)   SpO2 93%   BMI 54.29 kg/m²  Temp (24hrs), Av.5 °F (36.9 °C), Min:98.2 °F (36.8 °C), Max:98.8 °F (37.1 °C)    Physical Exam:   Vital signs reviewed. I observed her through the sliding glass doors in CCU. Was able to talk with her. Was able to see her. Was able to watch her breathe. She has tremor related to her Parkinson's disease. She had some dry cough during both phone discussion and observing her with physical exam.  She did not appear to be dyspneic at rest.  She did appear tired. She is obese. Trace lower extremity edema  Does not appear to have any focal neurological deficit. Moving all extremities equally. Per nursing no wheezing or abdominal tenderness. No crackles on her exam per discussion with nursing. Visualized areas of skin without rash. Lab Results:  CBC:   Recent Labs     08/04/20  2340   WBC 4.8   HGB 11.6*   HCT 36.6*      LYMPHOPCT 20.0   MONOPCT 13.5*     CMP:   Recent Labs     08/05/20  0133      K 4.9   CL 99   CO2 24   BUN 43*   CREATININE 1.9*   CALCIUM 9.0   BILITOT <0.2   ALKPHOS 65   ALT <5*   AST 21   GLUCOSE 81     D-dimer 0.9  Influenza a and B direct antigen test negative  SARS-CoV-2 PCR testing detected  Urinalysis glucose and trace protein    Culture: Blood cultures x2 drawn today pending    Radiology:   Chest x-ray today personally reviewed:  Impression    1. No radiographic evidence of acute cardiopulmonary process. Signed by Dr Mickey Rushing on 8/5/2020 7:23 AM             Additional Studies Reviewed (reviewed in epic):   2D echocardiogram dated December 27, 2019:  Summary   Normal left ventricular size and function. Moderate concentric left ventricular hypertrophy. Left ventricular ejection fraction is estimated at 55-60%. E/A flow reversal noted. Suggestive of diastolic dysfunction.       Signature      ----------------------------------------------------------------   Electronically signed by Edy Napoles MD(Interpreting   physician) on 12/27/2019 06:51 PM ----------------------------------------------------------------    Impression:   1. COVID-19 infection  2. Diabetes mellitus  3. Hypertension  4. Chronic kidney disease  5. Parkinson's disease  6. Obesity with BMI 54    Recommendations:    Currently she is hemodynamically stable. She is without fever at present. She is requiring oxygen supplementation at 2 L. Explained no currently FDA approved treatment for COVID-19 infection. Explained would recommend treatment with dexamethasone and Lovenox. She is agreeable. Explained remdesivir is available under emergency use authorization. Explained it is not yet approved by the FDA officially, but is available under the emergency use authorization approach. Explained there are some renal function cutoffs for administration of remdesivir. I asked if she would like to receive remdesivir treatment if she does not have exclusionary criteria understanding that the medicine is not yet FDA approved. She is considering. She will likely speak with her daughter Regla Jarrett about it as well. Talked with her about the administration of convalescent plasma. Explained COVID-19 convalescent plasma is not yet FDA approved. Explained the FDA is working with the [facility] to offer convalescent plasma under expanded use authorization study protocol. Explained receipt of convalescent plasma would require signing informed consent.   Reviewed the key components of the consent form for administration of convalescent plasma:  Participation in the study is 100% voluntary  She can withdraw at any time  There would be no charge to her for the plasma or the administration  She would receive approximately 500 mL of plasma harvested from somebody who had recovered from COVID-19  Risks include but are not limited to infection, volume overload, transfusion reaction, and other  We do not know at this point whether administration of plasma is helpful, harmful, or if no benefit  Her protected health information be shared under privacy laws with investigators at the [facility] for purposes of data analysis  She is going to consider receipt of convalescent plasma. She has risk factors for progression of COVID-19 infection. We are going to give her a copy of the written consent dated June 26, 2020 for her review. Do not feel she needs antibiotic treatment.   Will discontinue ceftriaxone and azithromycin  Will continue to follow    Victor Manuel Jack MD  08/05/20  9:28 AM

## 2020-08-06 ENCOUNTER — APPOINTMENT (OUTPATIENT)
Dept: GENERAL RADIOLOGY | Age: 70
DRG: 177 | End: 2020-08-06
Payer: MEDICARE

## 2020-08-06 LAB
ALBUMIN SERPL-MCNC: 3.6 G/DL (ref 3.5–5.2)
ALP BLD-CCNC: 63 U/L (ref 35–104)
ALT SERPL-CCNC: <5 U/L (ref 5–33)
ANION GAP SERPL CALCULATED.3IONS-SCNC: 15 MMOL/L (ref 7–19)
AST SERPL-CCNC: 17 U/L (ref 5–32)
BASOPHILS ABSOLUTE: 0 K/UL (ref 0–0.2)
BASOPHILS RELATIVE PERCENT: 0.2 % (ref 0–1)
BILIRUB SERPL-MCNC: <0.2 MG/DL (ref 0.2–1.2)
BUN BLDV-MCNC: 43 MG/DL (ref 8–23)
CALCIUM SERPL-MCNC: 8.8 MG/DL (ref 8.8–10.2)
CHLORIDE BLD-SCNC: 98 MMOL/L (ref 98–111)
CO2: 24 MMOL/L (ref 22–29)
CREAT SERPL-MCNC: 1.7 MG/DL (ref 0.5–0.9)
D DIMER: 0.84 UG/ML FEU (ref 0–0.48)
EOSINOPHILS ABSOLUTE: 0 K/UL (ref 0–0.6)
EOSINOPHILS RELATIVE PERCENT: 0 % (ref 0–5)
GFR AFRICAN AMERICAN: 36
GFR NON-AFRICAN AMERICAN: 30
GLUCOSE BLD-MCNC: 263 MG/DL (ref 74–109)
GLUCOSE BLD-MCNC: 340 MG/DL (ref 70–99)
GLUCOSE BLD-MCNC: 367 MG/DL (ref 70–99)
GLUCOSE BLD-MCNC: 382 MG/DL (ref 70–99)
HCT VFR BLD CALC: 39 % (ref 37–47)
HEMOGLOBIN: 12.5 G/DL (ref 12–16)
IMMATURE GRANULOCYTES #: 0.1 K/UL
LYMPHOCYTES ABSOLUTE: 1.1 K/UL (ref 1.1–4.5)
LYMPHOCYTES RELATIVE PERCENT: 17.3 % (ref 20–40)
MAGNESIUM: 1.8 MG/DL (ref 1.6–2.4)
MCH RBC QN AUTO: 29.7 PG (ref 27–31)
MCHC RBC AUTO-ENTMCNC: 32.1 G/DL (ref 33–37)
MCV RBC AUTO: 92.6 FL (ref 81–99)
MONOCYTES ABSOLUTE: 0.7 K/UL (ref 0–0.9)
MONOCYTES RELATIVE PERCENT: 10.7 % (ref 0–10)
NEUTROPHILS ABSOLUTE: 4.5 K/UL (ref 1.5–7.5)
NEUTROPHILS RELATIVE PERCENT: 70.9 % (ref 50–65)
PDW BLD-RTO: 13.3 % (ref 11.5–14.5)
PERFORMED ON: ABNORMAL
PLATELET # BLD: 150 K/UL (ref 130–400)
PMV BLD AUTO: 11.2 FL (ref 9.4–12.3)
POTASSIUM REFLEX MAGNESIUM: 4.6 MMOL/L (ref 3.5–5)
POTASSIUM SERPL-SCNC: 4.6 MMOL/L (ref 3.5–5)
RBC # BLD: 4.21 M/UL (ref 4.2–5.4)
SODIUM BLD-SCNC: 137 MMOL/L (ref 136–145)
TOTAL PROTEIN: 6.5 G/DL (ref 6.6–8.7)
WBC # BLD: 6.4 K/UL (ref 4.8–10.8)

## 2020-08-06 PROCEDURE — 2700000000 HC OXYGEN THERAPY PER DAY

## 2020-08-06 PROCEDURE — 85379 FIBRIN DEGRADATION QUANT: CPT

## 2020-08-06 PROCEDURE — 71045 X-RAY EXAM CHEST 1 VIEW: CPT

## 2020-08-06 PROCEDURE — 6370000000 HC RX 637 (ALT 250 FOR IP): Performed by: HOSPITALIST

## 2020-08-06 PROCEDURE — 83735 ASSAY OF MAGNESIUM: CPT

## 2020-08-06 PROCEDURE — 6360000002 HC RX W HCPCS: Performed by: INTERNAL MEDICINE

## 2020-08-06 PROCEDURE — 2580000003 HC RX 258: Performed by: INTERNAL MEDICINE

## 2020-08-06 PROCEDURE — 85025 COMPLETE CBC W/AUTO DIFF WBC: CPT

## 2020-08-06 PROCEDURE — 6370000000 HC RX 637 (ALT 250 FOR IP): Performed by: INTERNAL MEDICINE

## 2020-08-06 PROCEDURE — 1210000000 HC MED SURG R&B

## 2020-08-06 PROCEDURE — 82947 ASSAY GLUCOSE BLOOD QUANT: CPT

## 2020-08-06 PROCEDURE — 80053 COMPREHEN METABOLIC PANEL: CPT

## 2020-08-06 RX ORDER — GABAPENTIN 600 MG/1
600 TABLET ORAL 3 TIMES DAILY PRN
Status: DISCONTINUED | OUTPATIENT
Start: 2020-08-06 | End: 2020-08-06

## 2020-08-06 RX ORDER — CHOLECALCIFEROL (VITAMIN D3) 125 MCG
10 CAPSULE ORAL ONCE
Status: COMPLETED | OUTPATIENT
Start: 2020-08-06 | End: 2020-08-06

## 2020-08-06 RX ORDER — 0.9 % SODIUM CHLORIDE 0.9 %
250 INTRAVENOUS SOLUTION INTRAVENOUS ONCE
Status: COMPLETED | OUTPATIENT
Start: 2020-08-06 | End: 2020-08-06

## 2020-08-06 RX ORDER — INSULIN GLARGINE 100 [IU]/ML
5 INJECTION, SOLUTION SUBCUTANEOUS ONCE
Status: COMPLETED | OUTPATIENT
Start: 2020-08-06 | End: 2020-08-06

## 2020-08-06 RX ORDER — CHOLECALCIFEROL (VITAMIN D3) 125 MCG
10 CAPSULE ORAL EVERY EVENING
Status: DISCONTINUED | OUTPATIENT
Start: 2020-08-06 | End: 2020-08-06

## 2020-08-06 RX ORDER — LORAZEPAM 0.5 MG/1
0.5 TABLET ORAL EVERY 12 HOURS PRN
Status: DISCONTINUED | OUTPATIENT
Start: 2020-08-06 | End: 2020-08-06

## 2020-08-06 RX ORDER — LORAZEPAM 1 MG/1
1 TABLET ORAL ONCE
Status: COMPLETED | OUTPATIENT
Start: 2020-08-06 | End: 2020-08-06

## 2020-08-06 RX ORDER — DEXTROSE MONOHYDRATE 25 G/50ML
12.5 INJECTION, SOLUTION INTRAVENOUS PRN
Status: DISCONTINUED | OUTPATIENT
Start: 2020-08-06 | End: 2020-08-10 | Stop reason: HOSPADM

## 2020-08-06 RX ORDER — NICOTINE POLACRILEX 4 MG
15 LOZENGE BUCCAL PRN
Status: DISCONTINUED | OUTPATIENT
Start: 2020-08-06 | End: 2020-08-10 | Stop reason: HOSPADM

## 2020-08-06 RX ORDER — DEXTROSE MONOHYDRATE 50 MG/ML
100 INJECTION, SOLUTION INTRAVENOUS PRN
Status: DISCONTINUED | OUTPATIENT
Start: 2020-08-06 | End: 2020-08-10 | Stop reason: HOSPADM

## 2020-08-06 RX ORDER — GABAPENTIN 300 MG/1
600 CAPSULE ORAL ONCE
Status: COMPLETED | OUTPATIENT
Start: 2020-08-06 | End: 2020-08-06

## 2020-08-06 RX ADMIN — INSULIN LISPRO 10 UNITS: 100 INJECTION, SOLUTION INTRAVENOUS; SUBCUTANEOUS at 19:42

## 2020-08-06 RX ADMIN — LORAZEPAM 1 MG: 1 TABLET ORAL at 21:51

## 2020-08-06 RX ADMIN — TORSEMIDE 20 MG: 20 TABLET ORAL at 08:32

## 2020-08-06 RX ADMIN — OMEPRAZOLE 40 MG: 20 CAPSULE, DELAYED RELEASE ORAL at 08:33

## 2020-08-06 RX ADMIN — SODIUM CHLORIDE 250 ML: 9 INJECTION, SOLUTION INTRAVENOUS at 10:55

## 2020-08-06 RX ADMIN — ASPIRIN 325 MG ORAL TABLET 325 MG: 325 PILL ORAL at 08:33

## 2020-08-06 RX ADMIN — INSULIN LISPRO 6 UNITS: 100 INJECTION, SOLUTION INTRAVENOUS; SUBCUTANEOUS at 08:43

## 2020-08-06 RX ADMIN — GABAPENTIN 600 MG: 600 TABLET, FILM COATED ORAL at 10:40

## 2020-08-06 RX ADMIN — DEXAMETHASONE SODIUM PHOSPHATE 6 MG: 10 INJECTION INTRAMUSCULAR; INTRAVENOUS at 08:33

## 2020-08-06 RX ADMIN — INSULIN LISPRO 8 UNITS: 100 INJECTION, SOLUTION INTRAVENOUS; SUBCUTANEOUS at 12:00

## 2020-08-06 RX ADMIN — CARBIDOPA AND LEVODOPA 1 TABLET: 25; 100 TABLET ORAL at 15:34

## 2020-08-06 RX ADMIN — SOTALOL HYDROCHLORIDE 120 MG: 80 TABLET ORAL at 08:34

## 2020-08-06 RX ADMIN — LORAZEPAM 0.5 MG: 0.5 TABLET ORAL at 10:40

## 2020-08-06 RX ADMIN — ACETAMINOPHEN 650 MG: 325 TABLET, FILM COATED ORAL at 19:33

## 2020-08-06 RX ADMIN — ENOXAPARIN SODIUM 40 MG: 40 INJECTION SUBCUTANEOUS at 19:36

## 2020-08-06 RX ADMIN — CARBIDOPA AND LEVODOPA 1 TABLET: 25; 100 TABLET ORAL at 08:33

## 2020-08-06 RX ADMIN — SOTALOL HYDROCHLORIDE 120 MG: 80 TABLET ORAL at 19:33

## 2020-08-06 RX ADMIN — ENOXAPARIN SODIUM 40 MG: 40 INJECTION SUBCUTANEOUS at 08:32

## 2020-08-06 RX ADMIN — INSULIN LISPRO 10 UNITS: 100 INJECTION, SOLUTION INTRAVENOUS; SUBCUTANEOUS at 17:34

## 2020-08-06 RX ADMIN — GABAPENTIN 600 MG: 300 CAPSULE ORAL at 21:51

## 2020-08-06 RX ADMIN — MONTELUKAST SODIUM 10 MG: 10 TABLET, FILM COATED ORAL at 19:34

## 2020-08-06 RX ADMIN — ROPINIROLE HYDROCHLORIDE 0.25 MG: 0.25 TABLET, FILM COATED ORAL at 08:33

## 2020-08-06 RX ADMIN — ROPINIROLE HYDROCHLORIDE 0.25 MG: 0.25 TABLET, FILM COATED ORAL at 15:34

## 2020-08-06 RX ADMIN — CARBIDOPA AND LEVODOPA 1 TABLET: 25; 100 TABLET ORAL at 19:34

## 2020-08-06 RX ADMIN — Medication 10 MG: at 21:50

## 2020-08-06 RX ADMIN — INSULIN GLARGINE 5 UNITS: 100 INJECTION, SOLUTION SUBCUTANEOUS at 11:50

## 2020-08-06 ASSESSMENT — PAIN DESCRIPTION - LOCATION: LOCATION: BACK;BUTTOCKS

## 2020-08-06 ASSESSMENT — PAIN DESCRIPTION - PAIN TYPE: TYPE: ACUTE PAIN

## 2020-08-06 ASSESSMENT — PAIN SCALES - GENERAL: PAINLEVEL_OUTOF10: 4

## 2020-08-06 NOTE — PROGRESS NOTES
Length of Stay:   LOS: 1 day      Chief complaint:  Chief Complaint   Patient presents with    Concern For COVID-19     PT c/o shortness of breath, palpitations, low grade fever and headache for a couple of days. pt states 99.9 temp at home, pt had tylenol at 2100. Subjective:  Resting in chair, feeling good    Physical Examination:  BP (!) 148/75   Pulse 64   Temp 97.2 °F (36.2 °C) (Oral)   Resp 20   Ht 5' 2\" (1.575 m)   Wt 296 lb 12.8 oz (134.6 kg)   SpO2 93%   BMI 54.29 kg/m²   Constitutional - obese, Appropriately groomed, good nutritional status  Psychiatric - AOX3, baseline mood  Eyes - EOMI, conjunctivae and lids intact  ENMT - lips, teeth, gums intact; hearing intact  Respiratory - CTAB, no accessory muscle use  Cardiovascular - Clear S1, S2 no gross m/r/g; pedal pulses intact  Abd - Soft, non-tender; No gross hernia  MSK - UE and LE joints intact, no gross clubbing  Skin - No rashes or wounds; no gross capillary refill defects  Neurologic - CN 2-12 grossly intact, sensation intact    Medications:  torsemide, 20 mg, Oral, Daily    sotalol, 120 mg, Oral, BID    rOPINIRole, 0.25 mg, Oral, TID    omeprazole, 40 mg, Oral, Daily    montelukast, 10 mg, Oral, Nightly    carbidopa-levodopa, 1 tablet, Oral, TID    aspirin, 325 mg, Oral, Daily    sodium chloride flush, 10 mL, Intravenous, 2 times per day    insulin lispro, 0-12 Units, Subcutaneous, 4x Daily AC & HS    insulin lispro, 0-6 Units, Subcutaneous, Nightly    dexamethasone, 6 mg, Intravenous, Daily    enoxaparin, 40 mg, Subcutaneous, BID      Problem list:  Principal Problem:    COVID-19 virus infection  Active Problems:    Shortness of breath    Type 2 diabetes mellitus with complication (HCC)    Essential hypertension  Resolved Problems:    * No resolved hospital problems.  *        A/P:  []Covid-19 positive  Very comfortable in chair on NC  -ID following  -Considering remdesivir,  Convalescent plasma  Continue steroids  -Unable to get CTA due to SETH; Not possible to due VQ scan on Covid positive patient per radiology department. Monitor at this time, feel low risk for PE and likely sequale of Covid.       []Mild SETH  Continue IVF      []Hospital Issues  DVT prop - SCDs  Code - FULL  Disposition - Continue floor       Janis Hennessy M.D.,  8/6/2020

## 2020-08-06 NOTE — PROGRESS NOTES
lispro (HUMALOG) injection vial 0-12 Units, 4x Daily AC & HS  insulin lispro (HUMALOG) injection vial 0-6 Units, Nightly  dexamethasone (PF) (DECADRON) injection 6 mg, Daily  enoxaparin (LOVENOX) injection 40 mg, BID      Review of Systems    VitalSigns:  BP (!) 148/75   Pulse 64   Temp 97.2 °F (36.2 °C) (Oral)   Resp 20   Ht 5' 2\" (1.575 m)   Wt 296 lb 12.8 oz (134.6 kg)   SpO2 93%   BMI 54.29 kg/m²      Physical Exam  Line/IV site: No erythema, warmth, induration, or tenderness. Saw her through the door on fourth floor and also through the floor video monitoring system. She looks comfortable. She is breathing easily at rest.  Discussed bedside exam with nurse. She is not wheezing. She has no abdominal tenderness. She has mild lower extremity edema. Lab Results:  CBC:   Recent Labs     08/04/20  2340 08/06/20  0300   WBC 4.8 6.4   HGB 11.6* 12.5    150     BMP:  Recent Labs     08/05/20  0133 08/06/20  0300    137   K 4.9 4.6  4.6   CL 99 98   CO2 24 24   BUN 43* 43*   CREATININE 1.9* 1.7*   GLUCOSE 81 263*     CultureResults: Blood cultures x2 yesterday no growth    Radiology:   Chest x-ray today:  Impression    No acute cardiopulmonary process. Signed by Dr Alcides Cabrera on 8/6/2020 11:43 AM          Additional Studies Reviewed:  None    Impression:  1. COVID-19 infection  2. Diabetes mellitus  3. Hypertension  4. Chronic kidney disease  5. Parkinson's disease  6. Obesity with BMI 54    Recommendations:  Continue treatment with Lovenox and dexamethasone  Awaiting delivery of convalescent plasma for administration.   Continue supportive care  Continue to follow    Kareen Knott MD

## 2020-08-06 NOTE — PROGRESS NOTES
Pt somewhat restless this morning- states that she did not hardly sleep at all last night as she was not able to get comfortable and legs hurting. Has been up to chair and back to bed several times since the start of shift. Pt expressed that she is not pleased with the use of bed alarm/calling out for help to the bedside commode, stating that she \"gets around with my walker at home just fine. \"   Pt requests gabapentin to help with her legs/feet. Okay to resume home medications per MD.  Pt remains on 2L NC with sats 96-97%.  Will continue to monitor     Electronically signed by Juan Steen RN on 8/6/2020 at 11:30 AM

## 2020-08-06 NOTE — PLAN OF CARE
Problem: Airway Clearance - Ineffective  Goal: Achieve or maintain patent airway  8/6/2020 0036 by Latricia Tirado RN  Outcome: Ongoing     Problem: Gas Exchange - Impaired  Goal: Absence of hypoxia  8/6/2020 0036 by Latricia Tirado RN  Outcome: Ongoing  Goal: Promote optimal lung function  8/6/2020 0036 by Latricia Tirado RN  Outcome: Ongoing     Problem: Breathing Pattern - Ineffective  Goal: Ability to achieve and maintain a regular respiratory rate  8/6/2020 0036 by Latricia Tirado RN  Outcome: Ongoing     Problem:  Body Temperature -  Risk of, Imbalanced  Goal: Ability to maintain a body temperature within defined limits  8/6/2020 0036 by Latricia Tirado RN  Outcome: Ongoing  Goal: Will regain or maintain usual level of consciousness  8/6/2020 0036 by Latricia Tirado RN  Outcome: Ongoing  Goal: Complications related to the disease process, condition or treatment will be avoided or minimized  8/6/2020 0036 by Latricia Tirado RN  Outcome: Ongoing     Problem: Isolation Precautions - Risk of Spread of Infection  Goal: Prevent transmission of infection  8/6/2020 0036 by Latricia Tirado RN  Outcome: Ongoing     Problem: Nutrition Deficits  Goal: Optimize nutrtional status  8/6/2020 0036 by Latricia Tirado RN  Outcome: Ongoing     Problem: Risk for Fluid Volume Deficit  Goal: Maintain normal heart rhythm  8/6/2020 0036 by Latricia Tirado RN  Outcome: Ongoing  Goal: Maintain absence of muscle cramping  8/6/2020 0036 by Latricia Tirado RN  Outcome: Ongoing  Goal: Maintain normal serum potassium, sodium, calcium, phosphorus, and pH  8/6/2020 0036 by Latricia Tirado RN  Outcome: Ongoing     Problem: Loneliness or Risk for Loneliness  Goal: Demonstrate positive use of time alone when socialization is not possible  8/6/2020 0036 by Latricia Tirado RN  Outcome: Ongoing     Problem: Fatigue  Goal: Verbalize increase energy and improved vitality  8/6/2020 0036 by Latricia Tirado RN  Outcome: Ongoing     Problem: Patient Education: Go to Patient Education Activity  Goal: Patient/Family Education  8/6/2020 0036 by Selwyn Angelucci, RN  Outcome: Ongoing

## 2020-08-06 NOTE — PLAN OF CARE
Problem: Airway Clearance - Ineffective  Goal: Achieve or maintain patent airway  Outcome: Ongoing     Problem: Gas Exchange - Impaired  Goal: Absence of hypoxia  Outcome: Ongoing  Goal: Promote optimal lung function  Outcome: Ongoing     Problem: Breathing Pattern - Ineffective  Goal: Ability to achieve and maintain a regular respiratory rate  Outcome: Ongoing     Problem:  Body Temperature -  Risk of, Imbalanced  Goal: Ability to maintain a body temperature within defined limits  Outcome: Ongoing  Goal: Will regain or maintain usual level of consciousness  Outcome: Ongoing  Goal: Complications related to the disease process, condition or treatment will be avoided or minimized  Outcome: Ongoing     Problem: Isolation Precautions - Risk of Spread of Infection  Goal: Prevent transmission of infection  Outcome: Ongoing     Problem: Nutrition Deficits  Goal: Optimize nutrtional status  Outcome: Ongoing     Problem: Risk for Fluid Volume Deficit  Goal: Maintain normal heart rhythm  Outcome: Ongoing  Goal: Maintain absence of muscle cramping  Outcome: Ongoing  Goal: Maintain normal serum potassium, sodium, calcium, phosphorus, and pH  Outcome: Ongoing     Problem: Loneliness or Risk for Loneliness  Goal: Demonstrate positive use of time alone when socialization is not possible  Outcome: Ongoing     Problem: Fatigue  Goal: Verbalize increase energy and improved vitality  Outcome: Ongoing     Problem: Patient Education: Go to Patient Education Activity  Goal: Patient/Family Education  Outcome: Ongoing

## 2020-08-07 ENCOUNTER — APPOINTMENT (OUTPATIENT)
Dept: CT IMAGING | Age: 70
DRG: 177 | End: 2020-08-07
Payer: MEDICARE

## 2020-08-07 ENCOUNTER — APPOINTMENT (OUTPATIENT)
Dept: GENERAL RADIOLOGY | Age: 70
DRG: 177 | End: 2020-08-07
Payer: MEDICARE

## 2020-08-07 LAB
ALBUMIN SERPL-MCNC: 3.8 G/DL (ref 3.5–5.2)
ALP BLD-CCNC: 61 U/L (ref 35–104)
ALT SERPL-CCNC: 5 U/L (ref 5–33)
ANION GAP SERPL CALCULATED.3IONS-SCNC: 16 MMOL/L (ref 7–19)
AST SERPL-CCNC: 16 U/L (ref 5–32)
BASOPHILS ABSOLUTE: 0 K/UL (ref 0–0.2)
BASOPHILS RELATIVE PERCENT: 0.1 % (ref 0–1)
BILIRUB SERPL-MCNC: <0.2 MG/DL (ref 0.2–1.2)
BUN BLDV-MCNC: 47 MG/DL (ref 8–23)
CALCIUM SERPL-MCNC: 9 MG/DL (ref 8.8–10.2)
CHLORIDE BLD-SCNC: 101 MMOL/L (ref 98–111)
CO2: 25 MMOL/L (ref 22–29)
CREAT SERPL-MCNC: 1.6 MG/DL (ref 0.5–0.9)
D DIMER: 0.53 UG/ML FEU (ref 0–0.48)
EOSINOPHILS ABSOLUTE: 0 K/UL (ref 0–0.6)
EOSINOPHILS RELATIVE PERCENT: 0 % (ref 0–5)
GFR AFRICAN AMERICAN: 39
GFR NON-AFRICAN AMERICAN: 32
GLUCOSE BLD-MCNC: 182 MG/DL (ref 74–109)
GLUCOSE BLD-MCNC: 269 MG/DL (ref 70–99)
GLUCOSE BLD-MCNC: 418 MG/DL (ref 70–99)
HBA1C MFR BLD: 8.7 % (ref 4–6)
HCT VFR BLD CALC: 38.8 % (ref 37–47)
HEMOGLOBIN: 12.5 G/DL (ref 12–16)
IMMATURE GRANULOCYTES #: 0.1 K/UL
INTERLEUKIN-6: 2.3 PG/ML
LYMPHOCYTES ABSOLUTE: 1.1 K/UL (ref 1.1–4.5)
LYMPHOCYTES RELATIVE PERCENT: 13.3 % (ref 20–40)
MAGNESIUM: 1.8 MG/DL (ref 1.6–2.4)
MCH RBC QN AUTO: 29.7 PG (ref 27–31)
MCHC RBC AUTO-ENTMCNC: 32.2 G/DL (ref 33–37)
MCV RBC AUTO: 92.2 FL (ref 81–99)
MONOCYTES ABSOLUTE: 0.6 K/UL (ref 0–0.9)
MONOCYTES RELATIVE PERCENT: 7.1 % (ref 0–10)
NEUTROPHILS ABSOLUTE: 6.5 K/UL (ref 1.5–7.5)
NEUTROPHILS RELATIVE PERCENT: 78.9 % (ref 50–65)
PDW BLD-RTO: 13.5 % (ref 11.5–14.5)
PERFORMED ON: ABNORMAL
PERFORMED ON: ABNORMAL
PLATELET # BLD: 129 K/UL (ref 130–400)
PMV BLD AUTO: 11.5 FL (ref 9.4–12.3)
POTASSIUM SERPL-SCNC: 4.3 MMOL/L (ref 3.5–5)
RBC # BLD: 4.21 M/UL (ref 4.2–5.4)
SODIUM BLD-SCNC: 142 MMOL/L (ref 136–145)
TOTAL PROTEIN: 6.6 G/DL (ref 6.6–8.7)
WBC # BLD: 8.2 K/UL (ref 4.8–10.8)

## 2020-08-07 PROCEDURE — 82947 ASSAY GLUCOSE BLOOD QUANT: CPT

## 2020-08-07 PROCEDURE — 6360000002 HC RX W HCPCS: Performed by: INTERNAL MEDICINE

## 2020-08-07 PROCEDURE — 6370000000 HC RX 637 (ALT 250 FOR IP): Performed by: HOSPITALIST

## 2020-08-07 PROCEDURE — 83735 ASSAY OF MAGNESIUM: CPT

## 2020-08-07 PROCEDURE — 2580000003 HC RX 258: Performed by: HOSPITALIST

## 2020-08-07 PROCEDURE — 83036 HEMOGLOBIN GLYCOSYLATED A1C: CPT

## 2020-08-07 PROCEDURE — 85379 FIBRIN DEGRADATION QUANT: CPT

## 2020-08-07 PROCEDURE — 2100000000 HC CCU R&B

## 2020-08-07 PROCEDURE — 71275 CT ANGIOGRAPHY CHEST: CPT

## 2020-08-07 PROCEDURE — 87040 BLOOD CULTURE FOR BACTERIA: CPT

## 2020-08-07 PROCEDURE — 2700000000 HC OXYGEN THERAPY PER DAY

## 2020-08-07 PROCEDURE — 71045 X-RAY EXAM CHEST 1 VIEW: CPT

## 2020-08-07 PROCEDURE — 6360000004 HC RX CONTRAST MEDICATION: Performed by: INTERNAL MEDICINE

## 2020-08-07 PROCEDURE — 85025 COMPLETE CBC W/AUTO DIFF WBC: CPT

## 2020-08-07 PROCEDURE — 80053 COMPREHEN METABOLIC PANEL: CPT

## 2020-08-07 PROCEDURE — 70450 CT HEAD/BRAIN W/O DYE: CPT

## 2020-08-07 RX ORDER — HEPARIN SODIUM 1000 [USP'U]/ML
80 INJECTION, SOLUTION INTRAVENOUS; SUBCUTANEOUS ONCE
Status: DISCONTINUED | OUTPATIENT
Start: 2020-08-07 | End: 2020-08-07

## 2020-08-07 RX ORDER — HEPARIN SODIUM 1000 [USP'U]/ML
5000 INJECTION, SOLUTION INTRAVENOUS; SUBCUTANEOUS PRN
Status: DISCONTINUED | OUTPATIENT
Start: 2020-08-07 | End: 2020-08-08 | Stop reason: ALTCHOICE

## 2020-08-07 RX ORDER — TRAMADOL HYDROCHLORIDE 50 MG/1
50 TABLET ORAL EVERY 6 HOURS PRN
Status: DISCONTINUED | OUTPATIENT
Start: 2020-08-07 | End: 2020-08-10 | Stop reason: HOSPADM

## 2020-08-07 RX ORDER — HEPARIN SODIUM 1000 [USP'U]/ML
10000 INJECTION, SOLUTION INTRAVENOUS; SUBCUTANEOUS PRN
Status: DISCONTINUED | OUTPATIENT
Start: 2020-08-07 | End: 2020-08-08 | Stop reason: ALTCHOICE

## 2020-08-07 RX ORDER — HEPARIN SODIUM 10000 [USP'U]/100ML
15.6 INJECTION, SOLUTION INTRAVENOUS CONTINUOUS
Status: DISCONTINUED | OUTPATIENT
Start: 2020-08-07 | End: 2020-08-08

## 2020-08-07 RX ORDER — PANTOPRAZOLE SODIUM 40 MG/10ML
40 INJECTION, POWDER, LYOPHILIZED, FOR SOLUTION INTRAVENOUS DAILY
Status: DISCONTINUED | OUTPATIENT
Start: 2020-08-07 | End: 2020-08-10 | Stop reason: HOSPADM

## 2020-08-07 RX ADMIN — CARBIDOPA AND LEVODOPA 1 TABLET: 25; 100 TABLET ORAL at 21:22

## 2020-08-07 RX ADMIN — ENOXAPARIN SODIUM 40 MG: 40 INJECTION SUBCUTANEOUS at 09:33

## 2020-08-07 RX ADMIN — SOTALOL HYDROCHLORIDE 120 MG: 80 TABLET ORAL at 21:22

## 2020-08-07 RX ADMIN — OMEPRAZOLE 40 MG: 20 CAPSULE, DELAYED RELEASE ORAL at 09:33

## 2020-08-07 RX ADMIN — IOPAMIDOL 90 ML: 755 INJECTION, SOLUTION INTRAVENOUS at 11:32

## 2020-08-07 RX ADMIN — ASPIRIN 325 MG ORAL TABLET 325 MG: 325 PILL ORAL at 09:33

## 2020-08-07 RX ADMIN — CARBIDOPA AND LEVODOPA 1 TABLET: 25; 100 TABLET ORAL at 09:33

## 2020-08-07 RX ADMIN — ACETAMINOPHEN 650 MG: 325 TABLET, FILM COATED ORAL at 09:33

## 2020-08-07 RX ADMIN — SOTALOL HYDROCHLORIDE 120 MG: 80 TABLET ORAL at 09:33

## 2020-08-07 RX ADMIN — INSULIN LISPRO 12 UNITS: 100 INJECTION, SOLUTION INTRAVENOUS; SUBCUTANEOUS at 21:34

## 2020-08-07 RX ADMIN — DEXAMETHASONE SODIUM PHOSPHATE 6 MG: 10 INJECTION INTRAMUSCULAR; INTRAVENOUS at 09:34

## 2020-08-07 RX ADMIN — MONTELUKAST SODIUM 10 MG: 10 TABLET, FILM COATED ORAL at 21:22

## 2020-08-07 RX ADMIN — SODIUM CHLORIDE, PRESERVATIVE FREE 10 ML: 5 INJECTION INTRAVENOUS at 09:34

## 2020-08-07 RX ADMIN — TRAMADOL HYDROCHLORIDE 50 MG: 50 TABLET, FILM COATED ORAL at 22:07

## 2020-08-07 ASSESSMENT — PAIN SCALES - GENERAL
PAINLEVEL_OUTOF10: 6
PAINLEVEL_OUTOF10: 7
PAINLEVEL_OUTOF10: 3

## 2020-08-07 NOTE — PROGRESS NOTES
Pt was observed pulling out IV and throwing it on the floor. Pt was then pulling off bedside monitoring leads. Pt was informed of the need for IV access and the needs for bedside monitoring due to her being in a critical care location. Pt looked at this nurse and said \"yeah, so what\". Hospitalist notified of behavior as well as daughter Corie Bianchi.

## 2020-08-07 NOTE — PLAN OF CARE
Problem: Airway Clearance - Ineffective  Goal: Achieve or maintain patent airway  Outcome: Ongoing     Problem: Gas Exchange - Impaired  Goal: Absence of hypoxia  Outcome: Ongoing  Goal: Promote optimal lung function  Outcome: Ongoing     Problem: Breathing Pattern - Ineffective  Goal: Ability to achieve and maintain a regular respiratory rate  Outcome: Ongoing     Problem: Body Temperature -  Risk of, Imbalanced  Goal: Ability to maintain a body temperature within defined limits  Outcome: Ongoing  Goal: Will regain or maintain usual level of consciousness  Outcome: Ongoing  Goal: Complications related to the disease process, condition or treatment will be avoided or minimized  Outcome: Ongoing     Problem: Isolation Precautions - Risk of Spread of Infection  Goal: Prevent transmission of infection  Outcome: Ongoing     Problem: Nutrition Deficits  Goal: Optimize nutrtional status  Outcome: Ongoing     Problem: Risk for Fluid Volume Deficit  Goal: Maintain normal heart rhythm  Outcome: Ongoing  Goal: Maintain absence of muscle cramping  Outcome: Ongoing  Goal: Maintain normal serum potassium, sodium, calcium, phosphorus, and pH  Outcome: Ongoing     Problem: Loneliness or Risk for Loneliness  Goal: Demonstrate positive use of time alone when socialization is not possible  Outcome: Ongoing     Problem: Fatigue  Goal: Verbalize increase energy and improved vitality  Outcome: Ongoing     Problem: Patient Education: Go to Patient Education Activity  Goal: Patient/Family Education  Outcome: Ongoing     Problem: Falls - Risk of:  Goal: Will remain free from falls  Description: Will remain free from falls  Outcome: Ongoing  Goal: Absence of physical injury  Description: Absence of physical injury  Outcome: Ongoing     Problem: Anxiety:  Goal: Level of anxiety will decrease  Description: Level of anxiety will decrease  Outcome: Ongoing     Problem:  Activity:  Goal: Risk for activity intolerance will decrease  Description: Risk for activity intolerance will decrease  Outcome: Ongoing     Problem: Health Behavior:  Goal: Ability to manage health-related needs will improve  Description: Ability to manage health-related needs will improve  Outcome: Ongoing     Problem: Metabolic:  Goal: Ability to maintain appropriate glucose levels will improve  Description: Ability to maintain appropriate glucose levels will improve  Outcome: Ongoing     Problem: Physical Regulation:  Goal: Complications related to the disease process, condition or treatment will be avoided or minimized  Description: Complications related to the disease process, condition or treatment will be avoided or minimized  Outcome: Ongoing     Problem: Tissue Perfusion:  Goal: Adequacy of tissue perfusion will improve  Description: Adequacy of tissue perfusion will improve  Outcome: Ongoing

## 2020-08-07 NOTE — PROGRESS NOTES
Pts daughter Shahrzad Hamilton called back, she advised that she spoke with her father (the pts ) and her siblings.  At this time they wish for the patient to be a full code

## 2020-08-07 NOTE — PROGRESS NOTES
Length of Stay:   LOS: 2 days      Chief complaint:  Chief Complaint   Patient presents with    Concern For COVID-19     PT c/o shortness of breath, palpitations, low grade fever and headache for a couple of days. pt states 99.9 temp at home, pt had tylenol at 2100. Subjective: Worsening confusion this am.  Extensive discussion with daughter, decision maker. Explained critical condition of patient and possibility of decompensating. Family now wishing to change from DNI to FULL code. Transfer patient back to CCU. Physical Examination:  BP (!) 179/69   Pulse 84   Temp 103.4 °F (39.7 °C) (Oral)   Resp 20   Ht 5' 2\" (1.575 m)   Wt 296 lb 12.8 oz (134.6 kg)   SpO2 96%   BMI 54.29 kg/m²   Constitutional - obese, Appropriately groomed, good nutritional status  Psychiatric - Alert, grossly confused  Eyes - EOMI, conjunctivae and lids intact  ENMT - lips, teeth, gums intact; hearing intact  Respiratory - Coarse BS bilaterally  Cardiovascular - Clear S1, S2 no gross m/r/g; pedal pulses intact  Abd - Soft, non-tender; No gross hernia  MSK - UE and LE joints intact, no gross clubbing  Skin - No rashes or wounds; no gross capillary refill defects  Neurologic - Not following all commands    Medications:  sotalol, 120 mg, Oral, BID    omeprazole, 40 mg, Oral, Daily    montelukast, 10 mg, Oral, Nightly    carbidopa-levodopa, 1 tablet, Oral, TID    aspirin, 325 mg, Oral, Daily    sodium chloride flush, 10 mL, Intravenous, 2 times per day    insulin lispro, 0-12 Units, Subcutaneous, 4x Daily AC & HS    insulin lispro, 0-6 Units, Subcutaneous, Nightly    dexamethasone, 6 mg, Intravenous, Daily    enoxaparin, 40 mg, Subcutaneous, BID      Problem list:  Principal Problem:    COVID-19 virus infection  Active Problems:    Shortness of breath    Type 2 diabetes mellitus with complication (HCC)    Essential hypertension  Resolved Problems:    * No resolved hospital problems.  *        A/P:  []Covid-19 positive  Confused in bed, appears restless  -ID following  ABG pending  Transfer to CCU  High risk of decompensation  Extensive discussion with family, wish for patient to be FULL code  -Not able to get CT head in setting of Covid positive at this time, continue to monitor, suspect secondary to Covid not other etiologies  -Considering remdesivir,  Convalescent plasma - defer to ID  Continue steroids - could be exacerbating confusion, if continues to worsen consider stopping steroids      []Elevated D dimer  -Continue empiric hep drip at this time, unable to get VQ scan in setting of Covid positive  Family understand risk benefits of possible bleeding vs possible death from blood clot - wish to proceed with current treatment plan      []Mild SETH  Monitor    []Hospital Issues  DVT prop - SCDs  Code - FULL  Disposition - Transfer to Jossy Leach M.D.,  8/7/2020

## 2020-08-07 NOTE — PROGRESS NOTES
Accompanied Dr. Maria L Allen on speaker phone to discuss patients current status with family. Explained that the patient is having some confusion and still has fever. Code status was addressed. Family unsure at this time and will call back.

## 2020-08-07 NOTE — PROGRESS NOTES
Pt continued to pull off bedside monitoring equipment. Hospitalist notified of behavior. Orders received for bilateral wrist restraints.

## 2020-08-07 NOTE — PROGRESS NOTES
Daughter has called multiple times this shift and insisted the doctor be called re: her home meds, spoke to hospitalist. Daughter is requesting hospitalist to call her re: her mothers medication at 21

## 2020-08-07 NOTE — PROGRESS NOTES
Alexys Rocha received from [phone] to room # [id number] . Mental Status: Patient is disoriented. Vitals:    08/07/20 0900   BP: (!) 179/69   Pulse: 84   Resp: 20   Temp: 103.4 °F (39.7 °C)   SpO2: 96%     Placed on cardiac monitor: Yes. Bedside monitor. Belongings: None with patient at bedside . Family at bedside No.  Oriented Patient to room. Call light within reach. Yes. Transfer was: Well tolerated by patient. .    Electronically signed by Reyna Celeste RN on 8/7/2020 at 12:23 PM

## 2020-08-07 NOTE — PLAN OF CARE
Problem: Airway Clearance - Ineffective  Goal: Achieve or maintain patent airway  Outcome: Ongoing     Problem: Gas Exchange - Impaired  Goal: Absence of hypoxia  Outcome: Ongoing  Goal: Promote optimal lung function  Outcome: Ongoing     Problem: Breathing Pattern - Ineffective  Goal: Ability to achieve and maintain a regular respiratory rate  Outcome: Ongoing     Problem: Body Temperature -  Risk of, Imbalanced  Goal: Ability to maintain a body temperature within defined limits  Outcome: Ongoing  Goal: Will regain or maintain usual level of consciousness  Outcome: Ongoing  Goal: Complications related to the disease process, condition or treatment will be avoided or minimized  Outcome: Ongoing     Problem: Isolation Precautions - Risk of Spread of Infection  Goal: Prevent transmission of infection  Outcome: Ongoing     Problem: Nutrition Deficits  Goal: Optimize nutrtional status  Outcome: Ongoing     Problem: Risk for Fluid Volume Deficit  Goal: Maintain normal heart rhythm  Outcome: Ongoing  Goal: Maintain absence of muscle cramping  Outcome: Ongoing  Goal: Maintain normal serum potassium, sodium, calcium, phosphorus, and pH  Outcome: Ongoing     Problem: Loneliness or Risk for Loneliness  Goal: Demonstrate positive use of time alone when socialization is not possible  Outcome: Ongoing     Problem: Fatigue  Goal: Verbalize increase energy and improved vitality  Outcome: Ongoing     Problem: Patient Education: Go to Patient Education Activity  Goal: Patient/Family Education  Outcome: Ongoing     Problem: Falls - Risk of:  Goal: Will remain free from falls  Outcome: Ongoing  Goal: Absence of physical injury  Outcome: Ongoing     Problem: Anxiety:  Goal: Level of anxiety will decrease  Outcome: Ongoing     Problem:  Activity:  Goal: Risk for activity intolerance will decrease  Outcome: Ongoing     Problem: Health Behavior:  Goal: Ability to manage health-related needs will improve  Outcome: Ongoing     Problem: Metabolic:  Goal: Ability to maintain appropriate glucose levels will improve  Outcome: Ongoing     Problem: Physical Regulation:  Goal: Complications related to the disease process, condition or treatment will be avoided or minimized  Outcome: Ongoing     Problem: Tissue Perfusion:  Goal: Adequacy of tissue perfusion will improve  Outcome: Ongoing     Problem: Restraint Use - Nonviolent/Non-Self-Destructive Behavior:  Goal: Absence of restraint indications  Outcome: Ongoing  Goal: Absence of restraint-related injury  Outcome: Ongoing

## 2020-08-07 NOTE — PROGRESS NOTES
Infectious Diseases Progress Note    Patient:  Raúl Chester  YOB: 1950  MRN: [id number]   Admit date: 8/4/2020   Admitting Physician: Pool Resendez MD  Primary Care Physician: INGRID Leger - CESAR    Chief Complaint/Interval History: She looks more ill this morning. She has had temperature elevation of 103. She has some mild delirium. Heart rate and blood pressure stable. She feels tired. She looks anxious. She seems more tachypneic. She has some cough. No significant production to her cough. There is not seem to be complaints of abdominal pain or urinary symptoms. I observed her using the video assisted us on the floor. Also spoke with her through open door to her room. In addition did telehealth visit with [facility] with nursing assistance. Contacted blood bank. Remains a shortage on COVID-19 convalescent plasma. Her plasma has not yet arrived. In/Out    Intake/Output Summary (Last 24 hours) at 8/7/2020 0846  Last data filed at 8/6/2020 1600  Gross per 24 hour   Intake 654 ml   Output 350 ml   Net 304 ml     Allergies:    Allergies   Allergen Reactions    Codeine      itching    Hydrocodone-Acetaminophen Nausea Only     Current Meds: glucose (GLUTOSE) 40 % oral gel 15 g, PRN  dextrose 50 % IV solution, PRN  glucagon (rDNA) injection 1 mg, PRN  dextrose 5 % solution, PRN  albuterol sulfate  (90 Base) MCG/ACT inhaler 2 puff, Q6H PRN  sotalol (BETAPACE) tablet 120 mg, BID  omeprazole (PRILOSEC) delayed release capsule 40 mg, Daily  montelukast (SINGULAIR) tablet 10 mg, Nightly  carbidopa-levodopa (SINEMET)  MG per tablet 1 tablet, TID  aspirin tablet 325 mg, Daily  sodium chloride flush 0.9 % injection 10 mL, 2 times per day  sodium chloride flush 0.9 % injection 10 mL, PRN  acetaminophen (TYLENOL) tablet 650 mg, Q6H PRN    Or  acetaminophen (TYLENOL) suppository 650 mg, Q6H PRN  magnesium hydroxide (MILK OF MAGNESIA) 400 MG/5ML suspension 30 mL, Daily PRN  promethazine (PHENERGAN) tablet 12.5 mg, Q6H PRN    Or  ondansetron (ZOFRAN) injection 4 mg, Q6H PRN  potassium chloride (KLOR-CON M) extended release tablet 40 mEq, PRN    Or  potassium bicarb-citric acid (EFFER-K) effervescent tablet 40 mEq, PRN    Or  potassium chloride 10 mEq/100 mL IVPB (Peripheral Line), PRN  potassium chloride 10 mEq/100 mL IVPB (Peripheral Line), PRN  magnesium sulfate 2 g in 50 mL IVPB premix, PRN  insulin lispro (HUMALOG) injection vial 0-12 Units, 4x Daily AC & HS  insulin lispro (HUMALOG) injection vial 0-6 Units, Nightly  dexamethasone (PF) (DECADRON) injection 6 mg, Daily  enoxaparin (LOVENOX) injection 40 mg, BID      Review of Systems see HPI. Difficult to get additional review of systems from the patient. Complete review of systems attempted. What I was able to hui as outlined in the HPI. VitalSigns:  BP (!) 140/68   Pulse 71   Temp 98.3 °F (36.8 °C)   Resp 18   Ht 5' 2\" (1.575 m)   Wt 296 lb 12.8 oz (134.6 kg)   SpO2 95%   BMI 54.29 kg/m²      Physical Exam  Line/IV (peripheral) site: No erythema, warmth, induration, or tenderness. General she looks more anxious. She seems more tachypneic. She is on nasal cannula oxygen. She is obese. Visualized areas of skin without rash. Extremities with trace edema  Per discussion with nursing no significant wheezing on exam  Moving all extremities.   Neurological examination appear to be nonfocal.    Lab Results:  CBC:   Recent Labs     08/04/20  2340 08/06/20  0300 08/07/20  0540   WBC 4.8 6.4 8.2   HGB 11.6* 12.5 12.5    150 129*     BMP:  Recent Labs     08/05/20  0133 08/06/20  0300 08/07/20  0540    137 142   K 4.9 4.6  4.6 4.3   CL 99 98 101   CO2 24 24 25   BUN 43* 43* 47*   CREATININE 1.9* 1.7* 1.6*   GLUCOSE 81 263* 182*     Her estimated creatinine clearance is between 32 and 39    CultureResults: Blood cultures August 5, 2020-no growth to date    Radiology:  Chest x-ray today-pending  Chest CT today-pending    Additional Studies Reviewed:    2 Dechocardiogram 12/27/2020:   Summary   Normal left ventricular size and function. Moderate concentric left ventricular hypertrophy. Left ventricular ejection fraction is estimated at 55-60%. E/A flow reversal noted. Suggestive of diastolic dysfunction. Signature      ----------------------------------------------------------------   Electronically signed by Eri Friend MD(Interpreting   physician) on 12/27/2019 06:51 PM   ----------------------------------------------------------------    Impression:  1. COVID-19 infection/pneumonia-worsening  2. Diabetes mellitus  3. Hypertension  4. Chronic kidney disease-slight improvement in creatinine-her clearance is now greater than 30  5. Parkinson's disease  6.   Obesity with BMI 54    Recommendations:  Continue dexamethasone  Continue Lovenox  Await arrival of COVID-19 convalescent plasma  Given improvement in creatinine clearance, fever, and clinical deterioration will initiate orders for remdesivir  Given fever she is getting blood cultures today  Going to add empiric antibiotic therapy pending culture results  She has risk factors for progression of COVID she appears to be quite ill at present given her fever to 103  Continue to follow closely    Varsha Castano MD

## 2020-08-08 LAB
ALBUMIN SERPL-MCNC: 3.5 G/DL (ref 3.5–5.2)
ALP BLD-CCNC: 55 U/L (ref 35–104)
ALT SERPL-CCNC: <5 U/L (ref 5–33)
ANION GAP SERPL CALCULATED.3IONS-SCNC: 13 MMOL/L (ref 7–19)
APTT: 30.7 SEC (ref 26–36.2)
AST SERPL-CCNC: 23 U/L (ref 5–32)
BILIRUB SERPL-MCNC: 0.3 MG/DL (ref 0.2–1.2)
BUN BLDV-MCNC: 49 MG/DL (ref 8–23)
CALCIUM SERPL-MCNC: 8.5 MG/DL (ref 8.8–10.2)
CHLORIDE BLD-SCNC: 99 MMOL/L (ref 98–111)
CO2: 25 MMOL/L (ref 22–29)
CREAT SERPL-MCNC: 1.6 MG/DL (ref 0.5–0.9)
D DIMER: 0.62 UG/ML FEU (ref 0–0.48)
GFR AFRICAN AMERICAN: 39
GFR NON-AFRICAN AMERICAN: 32
GLUCOSE BLD-MCNC: 218 MG/DL (ref 70–99)
GLUCOSE BLD-MCNC: 277 MG/DL (ref 74–109)
GLUCOSE BLD-MCNC: 458 MG/DL (ref 70–99)
GLUCOSE BLD-MCNC: 466 MG/DL (ref 70–99)
GLUCOSE BLD-MCNC: 481 MG/DL (ref 70–99)
GLUCOSE BLD-MCNC: 504 MG/DL (ref 70–99)
HCT VFR BLD CALC: 37.9 % (ref 37–47)
HEMOGLOBIN: 12 G/DL (ref 12–16)
MAGNESIUM: 2.1 MG/DL (ref 1.6–2.4)
MCH RBC QN AUTO: 29.5 PG (ref 27–31)
MCHC RBC AUTO-ENTMCNC: 31.7 G/DL (ref 33–37)
MCV RBC AUTO: 93.1 FL (ref 81–99)
PDW BLD-RTO: 13.4 % (ref 11.5–14.5)
PERFORMED ON: ABNORMAL
PLATELET # BLD: 114 K/UL (ref 130–400)
PMV BLD AUTO: 11.8 FL (ref 9.4–12.3)
POTASSIUM SERPL-SCNC: 4.7 MMOL/L (ref 3.5–5)
RBC # BLD: 4.07 M/UL (ref 4.2–5.4)
SODIUM BLD-SCNC: 137 MMOL/L (ref 136–145)
TOTAL PROTEIN: 6.4 G/DL (ref 6.6–8.7)
WBC # BLD: 6.4 K/UL (ref 4.8–10.8)

## 2020-08-08 PROCEDURE — 6360000002 HC RX W HCPCS: Performed by: INTERNAL MEDICINE

## 2020-08-08 PROCEDURE — 85379 FIBRIN DEGRADATION QUANT: CPT

## 2020-08-08 PROCEDURE — 80053 COMPREHEN METABOLIC PANEL: CPT

## 2020-08-08 PROCEDURE — 2700000000 HC OXYGEN THERAPY PER DAY

## 2020-08-08 PROCEDURE — 6370000000 HC RX 637 (ALT 250 FOR IP): Performed by: INTERNAL MEDICINE

## 2020-08-08 PROCEDURE — 82947 ASSAY GLUCOSE BLOOD QUANT: CPT

## 2020-08-08 PROCEDURE — 6370000000 HC RX 637 (ALT 250 FOR IP): Performed by: HOSPITALIST

## 2020-08-08 PROCEDURE — 85730 THROMBOPLASTIN TIME PARTIAL: CPT

## 2020-08-08 PROCEDURE — 2580000003 HC RX 258: Performed by: HOSPITALIST

## 2020-08-08 PROCEDURE — 2100000000 HC CCU R&B

## 2020-08-08 PROCEDURE — 6360000002 HC RX W HCPCS: Performed by: HOSPITALIST

## 2020-08-08 PROCEDURE — 83735 ASSAY OF MAGNESIUM: CPT

## 2020-08-08 PROCEDURE — 2580000003 HC RX 258: Performed by: INTERNAL MEDICINE

## 2020-08-08 PROCEDURE — C9113 INJ PANTOPRAZOLE SODIUM, VIA: HCPCS | Performed by: INTERNAL MEDICINE

## 2020-08-08 PROCEDURE — 85027 COMPLETE CBC AUTOMATED: CPT

## 2020-08-08 RX ORDER — CHOLECALCIFEROL (VITAMIN D3) 125 MCG
2.5 CAPSULE ORAL NIGHTLY PRN
Status: DISCONTINUED | OUTPATIENT
Start: 2020-08-08 | End: 2020-08-10 | Stop reason: HOSPADM

## 2020-08-08 RX ORDER — ROPINIROLE 4 MG/1
4 TABLET, FILM COATED ORAL 3 TIMES DAILY
Status: DISCONTINUED | OUTPATIENT
Start: 2020-08-08 | End: 2020-08-09

## 2020-08-08 RX ORDER — ROPINIROLE 2 MG/1
2 TABLET, FILM COATED ORAL ONCE
Status: COMPLETED | OUTPATIENT
Start: 2020-08-08 | End: 2020-08-08

## 2020-08-08 RX ORDER — GABAPENTIN 300 MG/1
300 CAPSULE ORAL 3 TIMES DAILY
Status: DISCONTINUED | OUTPATIENT
Start: 2020-08-08 | End: 2020-08-09

## 2020-08-08 RX ORDER — LIDOCAINE 4 G/G
1 PATCH TOPICAL DAILY
Status: DISCONTINUED | OUTPATIENT
Start: 2020-08-08 | End: 2020-08-10 | Stop reason: HOSPADM

## 2020-08-08 RX ADMIN — ENOXAPARIN SODIUM 40 MG: 40 INJECTION SUBCUTANEOUS at 08:14

## 2020-08-08 RX ADMIN — INSULIN LISPRO 12 UNITS: 100 INJECTION, SOLUTION INTRAVENOUS; SUBCUTANEOUS at 17:11

## 2020-08-08 RX ADMIN — INSULIN LISPRO 12 UNITS: 100 INJECTION, SOLUTION INTRAVENOUS; SUBCUTANEOUS at 12:44

## 2020-08-08 RX ADMIN — PANTOPRAZOLE SODIUM 40 MG: 40 INJECTION, POWDER, FOR SOLUTION INTRAVENOUS at 08:13

## 2020-08-08 RX ADMIN — Medication 2.5 MG: at 21:42

## 2020-08-08 RX ADMIN — GABAPENTIN 300 MG: 300 CAPSULE ORAL at 08:13

## 2020-08-08 RX ADMIN — GABAPENTIN 300 MG: 300 CAPSULE ORAL at 13:49

## 2020-08-08 RX ADMIN — TRAMADOL HYDROCHLORIDE 50 MG: 50 TABLET, FILM COATED ORAL at 16:43

## 2020-08-08 RX ADMIN — SOTALOL HYDROCHLORIDE 120 MG: 80 TABLET ORAL at 20:00

## 2020-08-08 RX ADMIN — ONDANSETRON 4 MG: 2 INJECTION INTRAMUSCULAR; INTRAVENOUS at 08:13

## 2020-08-08 RX ADMIN — CARBIDOPA AND LEVODOPA 1 TABLET: 25; 100 TABLET ORAL at 20:00

## 2020-08-08 RX ADMIN — INSULIN LISPRO 4 UNITS: 100 INJECTION, SOLUTION INTRAVENOUS; SUBCUTANEOUS at 08:15

## 2020-08-08 RX ADMIN — ACETAMINOPHEN 650 MG: 325 TABLET, FILM COATED ORAL at 16:42

## 2020-08-08 RX ADMIN — SODIUM CHLORIDE, PRESERVATIVE FREE 10 ML: 5 INJECTION INTRAVENOUS at 02:00

## 2020-08-08 RX ADMIN — GABAPENTIN 300 MG: 300 CAPSULE ORAL at 20:00

## 2020-08-08 RX ADMIN — INSULIN LISPRO 12 UNITS: 100 INJECTION, SOLUTION INTRAVENOUS; SUBCUTANEOUS at 20:09

## 2020-08-08 RX ADMIN — SODIUM CHLORIDE 200 MG: 9 INJECTION, SOLUTION INTRAVENOUS at 13:01

## 2020-08-08 RX ADMIN — HEPARIN SODIUM 10000 UNITS: 1000 INJECTION INTRAVENOUS; SUBCUTANEOUS at 02:23

## 2020-08-08 RX ADMIN — DEXAMETHASONE SODIUM PHOSPHATE 6 MG: 10 INJECTION INTRAMUSCULAR; INTRAVENOUS at 08:12

## 2020-08-08 RX ADMIN — MONTELUKAST SODIUM 10 MG: 10 TABLET, FILM COATED ORAL at 19:58

## 2020-08-08 RX ADMIN — MEROPENEM 1 G: 1 INJECTION, POWDER, FOR SOLUTION INTRAVENOUS at 02:00

## 2020-08-08 RX ADMIN — ROPINIROLE HYDROCHLORIDE 4 MG: 2 TABLET, FILM COATED ORAL at 20:01

## 2020-08-08 RX ADMIN — SODIUM CHLORIDE, PRESERVATIVE FREE 10 ML: 5 INJECTION INTRAVENOUS at 20:00

## 2020-08-08 RX ADMIN — ENOXAPARIN SODIUM 40 MG: 40 INJECTION SUBCUTANEOUS at 20:00

## 2020-08-08 RX ADMIN — ASPIRIN 325 MG ORAL TABLET 325 MG: 325 PILL ORAL at 08:13

## 2020-08-08 RX ADMIN — HEPARIN SODIUM 15.6 UNITS/KG/HR: 10000 INJECTION, SOLUTION INTRAVENOUS at 02:19

## 2020-08-08 RX ADMIN — SOTALOL HYDROCHLORIDE 120 MG: 80 TABLET ORAL at 08:13

## 2020-08-08 RX ADMIN — ACETAMINOPHEN 650 MG: 325 TABLET, FILM COATED ORAL at 10:23

## 2020-08-08 RX ADMIN — CARBIDOPA AND LEVODOPA 1 TABLET: 25; 100 TABLET ORAL at 13:49

## 2020-08-08 RX ADMIN — TRAMADOL HYDROCHLORIDE 50 MG: 50 TABLET, FILM COATED ORAL at 10:23

## 2020-08-08 RX ADMIN — ROPINIROLE HYDROCHLORIDE 2 MG: 2 TABLET, FILM COATED ORAL at 08:13

## 2020-08-08 RX ADMIN — CARBIDOPA AND LEVODOPA 1 TABLET: 25; 100 TABLET ORAL at 08:13

## 2020-08-08 RX ADMIN — Medication 2.5 MG: at 01:12

## 2020-08-08 RX ADMIN — MEROPENEM 1 G: 1 INJECTION, POWDER, FOR SOLUTION INTRAVENOUS at 12:44

## 2020-08-08 ASSESSMENT — PAIN SCALES - GENERAL
PAINLEVEL_OUTOF10: 3
PAINLEVEL_OUTOF10: 6
PAINLEVEL_OUTOF10: 9

## 2020-08-08 ASSESSMENT — PAIN DESCRIPTION - PAIN TYPE: TYPE: CHRONIC PAIN

## 2020-08-08 ASSESSMENT — PAIN DESCRIPTION - LOCATION: LOCATION: LEG

## 2020-08-08 NOTE — PROGRESS NOTES
Message sent to Dr. Satya Cast after phone call from pt's daughter advising that she \"went crazy\" last time pt here and home meds Requip and Neurontin were abruptly stopped. Pt alert and oriented and out of restraints since 2100. Pt having tremors and constant leg restlessness and has not slept more than 2 hrs this shift. Will continue to monitor.

## 2020-08-08 NOTE — PROGRESS NOTES
IV access obtained at this time, labs drawn and sent, and Heparin drip started. Pt has been refusing IV until this time.

## 2020-08-08 NOTE — PLAN OF CARE
Problem: Airway Clearance - Ineffective  Goal: Achieve or maintain patent airway  Outcome: Ongoing     Problem: Gas Exchange - Impaired  Goal: Absence of hypoxia  Outcome: Ongoing  Goal: Promote optimal lung function  Outcome: Ongoing     Problem: Breathing Pattern - Ineffective  Goal: Ability to achieve and maintain a regular respiratory rate  Outcome: Ongoing     Problem: Body Temperature -  Risk of, Imbalanced  Goal: Ability to maintain a body temperature within defined limits  Outcome: Ongoing  Goal: Will regain or maintain usual level of consciousness  Outcome: Ongoing  Goal: Complications related to the disease process, condition or treatment will be avoided or minimized  Outcome: Ongoing     Problem: Isolation Precautions - Risk of Spread of Infection  Goal: Prevent transmission of infection  Outcome: Ongoing     Problem: Nutrition Deficits  Goal: Optimize nutrtional status  Outcome: Ongoing     Problem: Risk for Fluid Volume Deficit  Goal: Maintain normal heart rhythm  Outcome: Ongoing  Goal: Maintain absence of muscle cramping  Outcome: Ongoing  Goal: Maintain normal serum potassium, sodium, calcium, phosphorus, and pH  Outcome: Ongoing     Problem: Loneliness or Risk for Loneliness  Goal: Demonstrate positive use of time alone when socialization is not possible  Outcome: Ongoing     Problem: Fatigue  Goal: Verbalize increase energy and improved vitality  Outcome: Ongoing     Problem: Patient Education: Go to Patient Education Activity  Goal: Patient/Family Education  Outcome: Ongoing     Problem: Falls - Risk of:  Goal: Will remain free from falls  Outcome: Ongoing  Goal: Absence of physical injury  Outcome: Ongoing     Problem: Anxiety:  Goal: Level of anxiety will decrease  Outcome: Ongoing     Problem:  Activity:  Goal: Risk for activity intolerance will decrease  Outcome: Ongoing     Problem: Health Behavior:  Goal: Ability to manage health-related needs will improve  Outcome: Ongoing     Problem: Metabolic:  Goal: Ability to maintain appropriate glucose levels will improve  Outcome: Ongoing     Problem: Physical Regulation:  Goal: Complications related to the disease process, condition or treatment will be avoided or minimized  Outcome: Ongoing     Problem: Tissue Perfusion:  Goal: Adequacy of tissue perfusion will improve  Outcome: Ongoing     Problem: Restraint Use - Nonviolent/Non-Self-Destructive Behavior:  Goal: Absence of restraint indications  Outcome: Ongoing  Goal: Absence of restraint-related injury  Outcome: Ongoing     Problem: Pain:  Goal: Pain level will decrease  Outcome: Ongoing  Goal: Control of acute pain  Outcome: Ongoing  Goal: Control of chronic pain  Outcome: Ongoing

## 2020-08-08 NOTE — PROGRESS NOTES
hospital problems.  *        A/P:  []Covid-19 positive with metabolic encephalopathy  Doing well on NC low dose resting in bed  Confusion improving secondary to Covid, steroids  -ID following  Continue in CCU  Extensive discussion with family, wish for patient to be FULL code  -CT head WNL  CTA no obvious PE  Continue steroids for now if worsening confusion will stop  Defer possible remdesivir, convalescent plasma to ID    []Mild SETH  Monitor, improving    []Hospital Issues  DVT prop - SCDs  Code - FULL  Disposition - Continue CCU      Melly Dinh M.D.,  8/8/2020

## 2020-08-08 NOTE — PROGRESS NOTES
Infectious Diseases Progress Note    Patient:  Raquel Sanchez  YOB: 1950  MRN: [id number]   Admit date: 8/4/2020   Admitting Physician: Maria Dolores Erickson MD  Primary Care Physician: INGRID Mcginnis CNP    CC  : unobtainable from patient in CCU    Interval History: patient moved to CCU yesterday due it high fever and worsening status. Per Rogelio Lazo RN, patient has not had any more fever. He feels she is not likely able to answer her room phone  DId not have iv access so just got remdesivir started today         Allergies:    Allergies   Allergen Reactions    Codeine      itching    Hydrocodone-Acetaminophen Nausea Only     Current Meds: lidocaine 4 % external patch 1 patch, Daily  melatonin tablet 2.5 mg, Nightly PRN  gabapentin (NEURONTIN) capsule 300 mg, TID  enoxaparin (LOVENOX) injection 40 mg, BID  pantoprazole (PROTONIX) injection 40 mg, Daily  meropenem (MERREM) 1 g in sterile water 20 mL IV syringe, Q12H  [START ON 8/9/2020] remdesivir 100 mg in sodium chloride 0.9 % 250 mL IVPB, Q24H  traMADol (ULTRAM) tablet 50 mg, Q6H PRN  glucose (GLUTOSE) 40 % oral gel 15 g, PRN  dextrose 50 % IV solution, PRN  glucagon (rDNA) injection 1 mg, PRN  dextrose 5 % solution, PRN  albuterol sulfate  (90 Base) MCG/ACT inhaler 2 puff, Q6H PRN  sotalol (BETAPACE) tablet 120 mg, BID  montelukast (SINGULAIR) tablet 10 mg, Nightly  carbidopa-levodopa (SINEMET)  MG per tablet 1 tablet, TID  aspirin tablet 325 mg, Daily  sodium chloride flush 0.9 % injection 10 mL, 2 times per day  sodium chloride flush 0.9 % injection 10 mL, PRN  acetaminophen (TYLENOL) tablet 650 mg, Q6H PRN    Or  acetaminophen (TYLENOL) suppository 650 mg, Q6H PRN  magnesium hydroxide (MILK OF MAGNESIA) 400 MG/5ML suspension 30 mL, Daily PRN  promethazine (PHENERGAN) tablet 12.5 mg, Q6H PRN    Or  ondansetron (ZOFRAN) injection 4 mg, Q6H PRN  potassium chloride (KLOR-CON M) extended release tablet 40 mEq, PRN    Or  potassium bicarb-citric acid (EFFER-K) effervescent tablet 40 mEq, PRN    Or  potassium chloride 10 mEq/100 mL IVPB (Peripheral Line), PRN  potassium chloride 10 mEq/100 mL IVPB (Peripheral Line), PRN  magnesium sulfate 2 g in 50 mL IVPB premix, PRN  insulin lispro (HUMALOG) injection vial 0-12 Units, 4x Daily AC & HS  insulin lispro (HUMALOG) injection vial 0-6 Units, Nightly  dexamethasone (PF) (DECADRON) injection 6 mg, Daily      Review of Systems     Not directly obtained from patient    VitalSigns:  BP (!) 135/52   Pulse 58   Temp 98.8 °F (37.1 °C)   Resp 19   Ht 5' 2\" (1.575 m)   Wt 296 lb 12.8 oz (134.6 kg)   SpO2 94%   BMI 54.29 kg/m²      Physical Exam  General: Patient is a morbidly obese female sitting up in bed in no acute distress  HEENT: O2 per nasal cannula in place  Respiratory: Effort even and unlabored  Cardiac: Sinus bradycardia on the monitor  Abdomen: Obese  Psych: Does not appear agitated. Lab Results:  CBC:   Recent Labs     08/06/20  0300 08/07/20  0540 08/08/20  0150   WBC 6.4 8.2 6.4   HGB 12.5 12.5 12.0    129* 114*     BMP:  Recent Labs     08/06/20  0300 08/07/20  0540 08/08/20  0150    142 137   K 4.6  4.6 4.3 4.7   CL 98 101 99   CO2 24 25 25   BUN 43* 47* 49*   CREATININE 1.7* 1.6* 1.6*   GLUCOSE 263* 182* 277*         CultureResults: Blood cultures August 5, 2020-no growth to date    Radiology:   XR CHEST PORTABLE [[id number]]  Resulted: 08/07/20 1422       Order Status: Completed  Updated: 08/07/20 1424       Narrative:         EXAM: XR CHEST PORTABLE -- 8/7/2020 12:35 PM   HISTORY: 71 years, Female, evaluate shortness of breath   COMPARISON: 8/6/2020   TECHNIQUE:  1 images.  Frontal view of the chest.   FINDINGS:     No pneumothorax or large pleural effusion. Decreased lung volumes   compared to 8/6/2020. Perihilar vascular prominence without overt   edema. Prominent cardiac silhouette. Upper mediastinum likely within   normal limits considering lower lung volumes.

## 2020-08-09 LAB
ALBUMIN SERPL-MCNC: 3.7 G/DL (ref 3.5–5.2)
ALP BLD-CCNC: 60 U/L (ref 35–104)
ALT SERPL-CCNC: <5 U/L (ref 5–33)
ANION GAP SERPL CALCULATED.3IONS-SCNC: 13 MMOL/L (ref 7–19)
AST SERPL-CCNC: 31 U/L (ref 5–32)
BASOPHILS ABSOLUTE: 0 K/UL (ref 0–0.2)
BASOPHILS RELATIVE PERCENT: 0.1 % (ref 0–1)
BILIRUB SERPL-MCNC: 0.3 MG/DL (ref 0.2–1.2)
BUN BLDV-MCNC: 40 MG/DL (ref 8–23)
CALCIUM SERPL-MCNC: 8.4 MG/DL (ref 8.8–10.2)
CHLORIDE BLD-SCNC: 98 MMOL/L (ref 98–111)
CO2: 26 MMOL/L (ref 22–29)
CREAT SERPL-MCNC: 1.4 MG/DL (ref 0.5–0.9)
D DIMER: 0.75 UG/ML FEU (ref 0–0.48)
EOSINOPHILS ABSOLUTE: 0 K/UL (ref 0–0.6)
EOSINOPHILS RELATIVE PERCENT: 0 % (ref 0–5)
GFR AFRICAN AMERICAN: 45
GFR NON-AFRICAN AMERICAN: 37
GLUCOSE BLD-MCNC: 233 MG/DL (ref 74–109)
GLUCOSE BLD-MCNC: 248 MG/DL (ref 70–99)
GLUCOSE BLD-MCNC: 335 MG/DL (ref 70–99)
GLUCOSE BLD-MCNC: 337 MG/DL (ref 70–99)
HCT VFR BLD CALC: 42.6 % (ref 37–47)
HEMOGLOBIN: 13.2 G/DL (ref 12–16)
IMMATURE GRANULOCYTES #: 0.1 K/UL
LYMPHOCYTES ABSOLUTE: 0.7 K/UL (ref 1.1–4.5)
LYMPHOCYTES RELATIVE PERCENT: 9 % (ref 20–40)
MAGNESIUM: 2.3 MG/DL (ref 1.6–2.4)
MCH RBC QN AUTO: 29.7 PG (ref 27–31)
MCHC RBC AUTO-ENTMCNC: 31 G/DL (ref 33–37)
MCV RBC AUTO: 95.7 FL (ref 81–99)
MONOCYTES ABSOLUTE: 0.6 K/UL (ref 0–0.9)
MONOCYTES RELATIVE PERCENT: 6.7 % (ref 0–10)
NEUTROPHILS ABSOLUTE: 6.8 K/UL (ref 1.5–7.5)
NEUTROPHILS RELATIVE PERCENT: 83.3 % (ref 50–65)
PDW BLD-RTO: 13.4 % (ref 11.5–14.5)
PERFORMED ON: ABNORMAL
PLATELET # BLD: 146 K/UL (ref 130–400)
PMV BLD AUTO: 12.2 FL (ref 9.4–12.3)
POTASSIUM SERPL-SCNC: 5 MMOL/L (ref 3.5–5)
RBC # BLD: 4.45 M/UL (ref 4.2–5.4)
SODIUM BLD-SCNC: 137 MMOL/L (ref 136–145)
TOTAL PROTEIN: 7.1 G/DL (ref 6.6–8.7)
WBC # BLD: 8.2 K/UL (ref 4.8–10.8)

## 2020-08-09 PROCEDURE — 2580000003 HC RX 258: Performed by: INTERNAL MEDICINE

## 2020-08-09 PROCEDURE — C9113 INJ PANTOPRAZOLE SODIUM, VIA: HCPCS | Performed by: INTERNAL MEDICINE

## 2020-08-09 PROCEDURE — 2700000000 HC OXYGEN THERAPY PER DAY

## 2020-08-09 PROCEDURE — 6360000002 HC RX W HCPCS: Performed by: INTERNAL MEDICINE

## 2020-08-09 PROCEDURE — 83735 ASSAY OF MAGNESIUM: CPT

## 2020-08-09 PROCEDURE — 6370000000 HC RX 637 (ALT 250 FOR IP): Performed by: HOSPITALIST

## 2020-08-09 PROCEDURE — 85379 FIBRIN DEGRADATION QUANT: CPT

## 2020-08-09 PROCEDURE — 82947 ASSAY GLUCOSE BLOOD QUANT: CPT

## 2020-08-09 PROCEDURE — 80053 COMPREHEN METABOLIC PANEL: CPT

## 2020-08-09 PROCEDURE — 6370000000 HC RX 637 (ALT 250 FOR IP): Performed by: INTERNAL MEDICINE

## 2020-08-09 PROCEDURE — 1210000000 HC MED SURG R&B

## 2020-08-09 PROCEDURE — 85025 COMPLETE CBC W/AUTO DIFF WBC: CPT

## 2020-08-09 RX ORDER — ROPINIROLE 0.25 MG/1
1 TABLET, FILM COATED ORAL 3 TIMES DAILY
Status: DISCONTINUED | OUTPATIENT
Start: 2020-08-09 | End: 2020-08-10 | Stop reason: HOSPADM

## 2020-08-09 RX ADMIN — SOTALOL HYDROCHLORIDE 120 MG: 80 TABLET ORAL at 09:45

## 2020-08-09 RX ADMIN — PROMETHAZINE HYDROCHLORIDE 12.5 MG: 12.5 TABLET ORAL at 10:49

## 2020-08-09 RX ADMIN — MEROPENEM 1 G: 1 INJECTION, POWDER, FOR SOLUTION INTRAVENOUS at 00:33

## 2020-08-09 RX ADMIN — TRAMADOL HYDROCHLORIDE 50 MG: 50 TABLET, FILM COATED ORAL at 09:45

## 2020-08-09 RX ADMIN — INSULIN LISPRO 8 UNITS: 100 INJECTION, SOLUTION INTRAVENOUS; SUBCUTANEOUS at 17:42

## 2020-08-09 RX ADMIN — INSULIN LISPRO 8 UNITS: 100 INJECTION, SOLUTION INTRAVENOUS; SUBCUTANEOUS at 11:22

## 2020-08-09 RX ADMIN — ROPINIROLE HYDROCHLORIDE 1 MG: 0.25 TABLET, FILM COATED ORAL at 23:06

## 2020-08-09 RX ADMIN — MONTELUKAST SODIUM 10 MG: 10 TABLET, FILM COATED ORAL at 23:05

## 2020-08-09 RX ADMIN — CARBIDOPA AND LEVODOPA 1 TABLET: 25; 100 TABLET ORAL at 09:45

## 2020-08-09 RX ADMIN — GABAPENTIN 300 MG: 300 CAPSULE ORAL at 09:45

## 2020-08-09 RX ADMIN — ENOXAPARIN SODIUM 40 MG: 40 INJECTION SUBCUTANEOUS at 23:06

## 2020-08-09 RX ADMIN — SODIUM CHLORIDE 100 MG: 9 INJECTION, SOLUTION INTRAVENOUS at 13:33

## 2020-08-09 RX ADMIN — MEROPENEM 1 G: 1 INJECTION, POWDER, FOR SOLUTION INTRAVENOUS at 13:33

## 2020-08-09 RX ADMIN — SOTALOL HYDROCHLORIDE 160 MG: 80 TABLET ORAL at 23:06

## 2020-08-09 RX ADMIN — PANTOPRAZOLE SODIUM 40 MG: 40 INJECTION, POWDER, FOR SOLUTION INTRAVENOUS at 13:32

## 2020-08-09 RX ADMIN — INSULIN LISPRO 4 UNITS: 100 INJECTION, SOLUTION INTRAVENOUS; SUBCUTANEOUS at 09:50

## 2020-08-09 RX ADMIN — ROPINIROLE HYDROCHLORIDE 1 MG: 0.25 TABLET, FILM COATED ORAL at 13:28

## 2020-08-09 RX ADMIN — CARBIDOPA AND LEVODOPA 1 TABLET: 25; 100 TABLET ORAL at 13:28

## 2020-08-09 RX ADMIN — CARBIDOPA AND LEVODOPA 1 TABLET: 25; 100 TABLET ORAL at 23:05

## 2020-08-09 RX ADMIN — INSULIN LISPRO 2 UNITS: 100 INJECTION, SOLUTION INTRAVENOUS; SUBCUTANEOUS at 23:07

## 2020-08-09 RX ADMIN — ASPIRIN 325 MG ORAL TABLET 325 MG: 325 PILL ORAL at 09:45

## 2020-08-09 RX ADMIN — ENOXAPARIN SODIUM 40 MG: 40 INJECTION SUBCUTANEOUS at 09:44

## 2020-08-09 ASSESSMENT — PAIN DESCRIPTION - PAIN TYPE
TYPE: CHRONIC PAIN

## 2020-08-09 ASSESSMENT — PAIN DESCRIPTION - ORIENTATION
ORIENTATION: RIGHT

## 2020-08-09 ASSESSMENT — PAIN SCALES - GENERAL
PAINLEVEL_OUTOF10: 3
PAINLEVEL_OUTOF10: 6
PAINLEVEL_OUTOF10: 3
PAINLEVEL_OUTOF10: 5
PAINLEVEL_OUTOF10: 0

## 2020-08-09 ASSESSMENT — PAIN DESCRIPTION - ONSET
ONSET: ON-GOING
ONSET: ON-GOING

## 2020-08-09 ASSESSMENT — PAIN DESCRIPTION - FREQUENCY
FREQUENCY: CONTINUOUS
FREQUENCY: INTERMITTENT

## 2020-08-09 ASSESSMENT — PAIN DESCRIPTION - LOCATION
LOCATION: HIP;LEG
LOCATION: HIP

## 2020-08-09 ASSESSMENT — PAIN DESCRIPTION - DESCRIPTORS
DESCRIPTORS: CONSTANT;DULL;SORE
DESCRIPTORS: DULL;CONSTANT

## 2020-08-09 ASSESSMENT — PAIN DESCRIPTION - PROGRESSION: CLINICAL_PROGRESSION: NOT CHANGED

## 2020-08-09 NOTE — PROGRESS NOTES
Patient has intermittent times of confusion and agitation. Pulse ox removed, BP cuff removed and pillows throw into floor by Patient. RN replaced monitoring equipment, but Patient is still refusing the BP cuff at this time. Patient requested to get out of bed or she was [place] do it herself\".

## 2020-08-09 NOTE — PROGRESS NOTES
Patient refusing her accu check stating that \"she got insulin and she's not wet\". Patient is beginning to have intermittent confusion. Patient has not slept well tonight.

## 2020-08-09 NOTE — PROGRESS NOTES
Pt still experiencing some confusion this morning. Pt was able to state name, today's date, and that we were at the hospital in [facility]. Pt describes that other people are in the room with us, and is also asking nurse questions such as \"Is someone out there getting the truck fixed? \"    Pt having trouble with remembering to keep oxygen on- she continues to take it off several times since start of shift. Pt is requiring 3L of oxygen intermittently with exertion- sats drop to 83-85%, but then sats rise to 90-92% and remain in low 90 range when decreased back down to 2L. Pt removed IV and when asked what happened, she only stated \"Well it was bleeding. They said I'm not supposed to have any IV's. \" explained to pt that IV access is required in order for medicines to be given so that she is able to get well enough to go home. Will continue to monitor.     Electronically signed by Leydi Downey RN on 8/9/2020 at 11:05 AM

## 2020-08-09 NOTE — PROGRESS NOTES
Infectious Diseases Progress Note    Patient:  Paulette Hilton  YOB: 1950  MRN: [id number]   Admit date: 8/4/2020   Admitting Physician: Karena Chappell MD  Primary Care Physician: Allie Wilkins, INGRID - CNP    CC  \" I am a little bit better\"    History    Patient moved to 4 th floor from CCU this morning. Per Ada Lala RN attending was surprised at move as I was. Patient remains agitated at times - and has pulled off pulse ox twice and required increase of 02 to 4 liters upon arrival to 4 th unit    DId not have iv access until 8/8 so just got remdesivir started 8/8     Called patient in room- She finally answered her phone after Ada Lala RN opened door and told her to answer     Patient reports she is feeling a little bit better. She said she is on home oxygen and said it is usually \"20 units\"    She did tell me she is to see Dr. Kimo Barry before he left but not cannot recall who she sees now. I did tell her I also called her cell phone and read the number to her. She has no idea where that number came from. She told me her telephone number which is listed as her \"home number\"    She also stated she is retired and there should not be a work number listed    No dysuria   No diarrhea  Cough-not bad. Allergies:    Allergies   Allergen Reactions    Codeine      itching    Hydrocodone-Acetaminophen Nausea Only     Current Meds: lidocaine 4 % external patch 1 patch, Daily  melatonin tablet 2.5 mg, Nightly PRN  gabapentin (NEURONTIN) capsule 300 mg, TID  enoxaparin (LOVENOX) injection 40 mg, BID  rOPINIRole (REQUIP) tablet 4 mg, TID  pantoprazole (PROTONIX) injection 40 mg, Daily  meropenem (MERREM) 1 g in sterile water 20 mL IV syringe, Q12H  remdesivir 100 mg in sodium chloride 0.9 % 250 mL IVPB, Q24H  traMADol (ULTRAM) tablet 50 mg, Q6H PRN  glucose (GLUTOSE) 40 % oral gel 15 g, PRN  dextrose 50 % IV solution, PRN  glucagon (rDNA) injection 1 mg, PRN  dextrose 5 % solution, PRN  albuterol and she did say \"wait a minute I know this, now it is 20 units\"  Psych: Does not appear agitated. Lab Results:  CBC:   Recent Labs     08/07/20  0540 08/08/20  0150 08/09/20  0321   WBC 8.2 6.4 8.2   HGB 12.5 12.0 13.2   * 114* 146       Results for Alfreda Landau (MRN [id number]) as of 8/9/2020 08:25   Ref. Range 8/8/2020 01:50 8/9/2020 03:21   Sodium Latest Ref Range: 136 - 145 mmol/L 137 137   Potassium Latest Ref Range: 3.5 - 5.0 mmol/L 4.7 5.0   Chloride Latest Ref Range: 98 - 111 mmol/L 99 98   CO2 Latest Ref Range: 22 - 29 mmol/L 25 26   BUN Latest Ref Range: 8 - 23 mg/dL 49 (H) 40 (H)   Creatinine Latest Ref Range: 0.5 - 0.9 mg/dL 1.6 (H) 1.4 (H)   Anion Gap Latest Ref Range: 7 - 19 mmol/L 13 13   GFR Non- Latest Ref Range: >60  32 (A) 37 (A)   GFR  Latest Ref Range: >59  39 (L) 45 (L)   Magnesium Latest Ref Range: 1.6 - 2.4 mg/dL 2.1 2.3   Glucose Latest Ref Range: 74 - 109 mg/dL 277 (H) 233 (H)   POC Glucose Latest Ref Range: 70 - 99 mg/dl     Calcium Latest Ref Range: 8.8 - 10.2 mg/dL 8.5 (L) 8.4 (L)   Total Protein Latest Ref Range: 6.6 - 8.7 g/dL 6.4 (L) 7.1   Albumin Latest Ref Range: 3.5 - 5.2 g/dL 3.5 3.7   Alk Phos Latest Ref Range: 35 - 104 U/L 55 60   ALT Latest Ref Range: 5 - 33 U/L <5 (A) <5 (A)   AST Latest Ref Range: 5 - 32 U/L 23 31   Bilirubin Latest Ref Range: 0.2 - 1.2 mg/dL 0.3 0.3         CultureResults: Blood cultures August 5  and August 7-no growth to date      Radiology:   XR CHEST PORTABLE [[id number]]  Resulted: 08/07/20 1422       Order Status: Completed  Updated: 08/07/20 1424       Narrative:         EXAM: XR CHEST PORTABLE -- 8/7/2020 12:35 PM   HISTORY: 69 years, Female, evaluate shortness of breath   COMPARISON: 8/6/2020   TECHNIQUE:  1 images.  Frontal view of the chest.   FINDINGS:     No pneumothorax or large pleural effusion. Decreased lung volumes   compared to 8/6/2020. Perihilar vascular prominence without overt   edema. Prominent cardiac silhouette. Upper mediastinum likely within   normal limits considering lower lung volumes.       Impression:         1. Perihilar vascular prominence without overt edema. Signed by Dr Edel Sultana on 8/7/2020 2:22 PM                Impression:  1. COVID-19 infection/pneumonia-CT angiogram with interstitial prominence and groundglass opacities in the lungs greater on right  2. Diabetes mellitus-difficult to control especially with dexamethasone  3. Hypertension  4. Chronic kidney disease-  5. Morbid obesity  6. Thrombocytopenia -resolved    Recommendations:  Continue dexamethasone  Continue Remdesivir-will need close monitoring of creatinine clearance and liver function  Continue Lovenox  Await arrival of COVID-19 convalescent plasma>>ALREADY ORDERED-CHELSY Knowles states still has not arrived  Continue empiric meropenem started August 7 due to fever.   Monitor blood cultures     >>>>>>>>>patients mobile number is [phone] ( listed as home)    SHE is retired and work number is not correct   MOBILE number is NOT correct and she has no idea where  that number came from       Td Fiore MD

## 2020-08-09 NOTE — PROGRESS NOTES
Length of Stay:   LOS: 4 days      Chief complaint:  Chief Complaint   Patient presents with    Concern For COVID-19     PT c/o shortness of breath, palpitations, low grade fever and headache for a couple of days. pt states 99.9 temp at home, pt had tylenol at 2100. Subjective:  Remains confused this am, continues to take off oxygen. Not clear why patient was moved to 4th floor overnight from CCU. Physical Examination:  BP (!) 153/77   Pulse 71   Temp 99.7 °F (37.6 °C) (Oral)   Resp 22   Ht 5' 2\" (1.575 m)   Wt 296 lb 12.8 oz (134.6 kg)   SpO2 92%   BMI 54.29 kg/m²   Constitutional - obese, Appropriately groomed, good nutritional status  Psychiatric - Alert, Ox1  Eyes - EOMI, conjunctivae and lids intact  ENMT - lips, teeth, gums intact; hearing intact  Respiratory - Coarse BS bilaterally  Cardiovascular - Clear S1, S2 no gross m/r/g; pedal pulses intact  Abd - Soft, non-tender;  No gross hernia  MSK - UE and LE joints intact, no gross clubbing  Skin - No rashes or wounds; no gross capillary refill defects  Neurologic - Following some commands    Medications:    lidocaine, 1 patch, Transdermal, Daily    gabapentin, 300 mg, Oral, TID    enoxaparin, 40 mg, Subcutaneous, BID    rOPINIRole, 4 mg, Oral, TID    pantoprazole, 40 mg, Intravenous, Daily    meropenem, 1 g, Intravenous, Q12H    [COMPLETED] remdesivir IVPB, 200 mg, Intravenous, Once **FOLLOWED BY** remdesivir IVPB, 100 mg, Intravenous, Q24H    sotalol, 120 mg, Oral, BID    montelukast, 10 mg, Oral, Nightly    carbidopa-levodopa, 1 tablet, Oral, TID    aspirin, 325 mg, Oral, Daily    sodium chloride flush, 10 mL, Intravenous, 2 times per day    insulin lispro, 0-12 Units, Subcutaneous, 4x Daily AC & HS    insulin lispro, 0-6 Units, Subcutaneous, Nightly    dexamethasone, 6 mg, Intravenous, Daily      Problem list:  Principal Problem:    COVID-19 virus infection  Active Problems:    Shortness of breath    Type 2 diabetes

## 2020-08-09 NOTE — PLAN OF CARE
Problem: Airway Clearance - Ineffective  Goal: Achieve or maintain patent airway  Outcome: Ongoing     Problem: Gas Exchange - Impaired  Goal: Absence of hypoxia  Outcome: Ongoing  Goal: Promote optimal lung function  Outcome: Ongoing     Problem: Breathing Pattern - Ineffective  Goal: Ability to achieve and maintain a regular respiratory rate  Outcome: Ongoing     Problem: Body Temperature -  Risk of, Imbalanced  Goal: Ability to maintain a body temperature within defined limits  Outcome: Ongoing  Goal: Will regain or maintain usual level of consciousness  Outcome: Ongoing  Goal: Complications related to the disease process, condition or treatment will be avoided or minimized  Outcome: Ongoing     Problem: Isolation Precautions - Risk of Spread of Infection  Goal: Prevent transmission of infection  Outcome: Ongoing     Problem: Nutrition Deficits  Goal: Optimize nutrtional status  Outcome: Ongoing     Problem: Risk for Fluid Volume Deficit  Goal: Maintain normal heart rhythm  Outcome: Ongoing  Goal: Maintain absence of muscle cramping  Outcome: Ongoing  Goal: Maintain normal serum potassium, sodium, calcium, phosphorus, and pH  Outcome: Ongoing     Problem: Loneliness or Risk for Loneliness  Goal: Demonstrate positive use of time alone when socialization is not possible  Outcome: Ongoing     Problem: Fatigue  Goal: Verbalize increase energy and improved vitality  Outcome: Ongoing     Problem: Patient Education: Go to Patient Education Activity  Goal: Patient/Family Education  Outcome: Ongoing     Problem: Falls - Risk of:  Goal: Will remain free from falls  Description: Will remain free from falls  Outcome: Ongoing  Goal: Absence of physical injury  Description: Absence of physical injury  Outcome: Ongoing     Problem: Anxiety:  Goal: Level of anxiety will decrease  Description: Level of anxiety will decrease  Outcome: Ongoing     Problem:  Activity:  Goal: Risk for activity intolerance will decrease  Description: Risk for activity intolerance will decrease  Outcome: Ongoing     Problem: Health Behavior:  Goal: Ability to manage health-related needs will improve  Description: Ability to manage health-related needs will improve  Outcome: Ongoing     Problem: Metabolic:  Goal: Ability to maintain appropriate glucose levels will improve  Description: Ability to maintain appropriate glucose levels will improve  Outcome: Ongoing     Problem: Physical Regulation:  Goal: Complications related to the disease process, condition or treatment will be avoided or minimized  Description: Complications related to the disease process, condition or treatment will be avoided or minimized  Outcome: Ongoing     Problem: Tissue Perfusion:  Goal: Adequacy of tissue perfusion will improve  Description: Adequacy of tissue perfusion will improve  Outcome: Ongoing     Problem: Restraint Use - Nonviolent/Non-Self-Destructive Behavior:  Goal: Absence of restraint indications  Description: Absence of restraint indications  Outcome: Ongoing  Goal: Absence of restraint-related injury  Description: Absence of restraint-related injury  Outcome: Ongoing     Problem: Pain:  Goal: Pain level will decrease  Description: Pain level will decrease  Outcome: Ongoing  Goal: Control of acute pain  Description: Control of acute pain  Outcome: Ongoing  Goal: Control of chronic pain  Description: Control of chronic pain  Outcome: Ongoing     Problem: Skin Integrity:  Goal: Will show no infection signs and symptoms  Description: Will show no infection signs and symptoms  Outcome: Ongoing  Goal: Absence of new skin breakdown  Description: Absence of new skin breakdown  Outcome: Ongoing

## 2020-08-10 VITALS
TEMPERATURE: 97.8 F | RESPIRATION RATE: 18 BRPM | WEIGHT: 282.7 LBS | SYSTOLIC BLOOD PRESSURE: 155 MMHG | OXYGEN SATURATION: 93 % | DIASTOLIC BLOOD PRESSURE: 71 MMHG | HEART RATE: 78 BPM | BODY MASS INDEX: 52.02 KG/M2 | HEIGHT: 62 IN

## 2020-08-10 PROBLEM — U07.1 COVID-19 VIRUS INFECTION: Status: RESOLVED | Noted: 2020-08-05 | Resolved: 2020-08-10

## 2020-08-10 PROBLEM — I10 ESSENTIAL HYPERTENSION: Chronic | Status: RESOLVED | Noted: 2018-01-01 | Resolved: 2020-08-10

## 2020-08-10 LAB
BLOOD CULTURE, ROUTINE: NORMAL
CULTURE, BLOOD 2: NORMAL
GLUCOSE BLD-MCNC: 243 MG/DL (ref 70–99)
GLUCOSE BLD-MCNC: 270 MG/DL (ref 70–99)
PERFORMED ON: ABNORMAL
PERFORMED ON: ABNORMAL

## 2020-08-10 PROCEDURE — 6360000002 HC RX W HCPCS: Performed by: INTERNAL MEDICINE

## 2020-08-10 PROCEDURE — 82947 ASSAY GLUCOSE BLOOD QUANT: CPT

## 2020-08-10 PROCEDURE — C9113 INJ PANTOPRAZOLE SODIUM, VIA: HCPCS | Performed by: INTERNAL MEDICINE

## 2020-08-10 PROCEDURE — 6370000000 HC RX 637 (ALT 250 FOR IP): Performed by: INTERNAL MEDICINE

## 2020-08-10 PROCEDURE — 2580000003 HC RX 258: Performed by: INTERNAL MEDICINE

## 2020-08-10 PROCEDURE — 6370000000 HC RX 637 (ALT 250 FOR IP): Performed by: HOSPITALIST

## 2020-08-10 PROCEDURE — 2700000000 HC OXYGEN THERAPY PER DAY

## 2020-08-10 PROCEDURE — 2580000003 HC RX 258: Performed by: HOSPITALIST

## 2020-08-10 RX ADMIN — CARBIDOPA AND LEVODOPA 1 TABLET: 25; 100 TABLET ORAL at 09:36

## 2020-08-10 RX ADMIN — ROPINIROLE HYDROCHLORIDE 1 MG: 0.25 TABLET, FILM COATED ORAL at 09:36

## 2020-08-10 RX ADMIN — INSULIN LISPRO 4 UNITS: 100 INJECTION, SOLUTION INTRAVENOUS; SUBCUTANEOUS at 12:10

## 2020-08-10 RX ADMIN — ENOXAPARIN SODIUM 40 MG: 40 INJECTION SUBCUTANEOUS at 09:35

## 2020-08-10 RX ADMIN — MEROPENEM 1 G: 1 INJECTION, POWDER, FOR SOLUTION INTRAVENOUS at 01:21

## 2020-08-10 RX ADMIN — Medication 2.5 MG: at 01:07

## 2020-08-10 RX ADMIN — PANTOPRAZOLE SODIUM 40 MG: 40 INJECTION, POWDER, FOR SOLUTION INTRAVENOUS at 09:34

## 2020-08-10 RX ADMIN — ASPIRIN 325 MG ORAL TABLET 325 MG: 325 PILL ORAL at 09:34

## 2020-08-10 RX ADMIN — SODIUM CHLORIDE, PRESERVATIVE FREE 10 ML: 5 INJECTION INTRAVENOUS at 09:35

## 2020-08-10 RX ADMIN — SOTALOL HYDROCHLORIDE 120 MG: 80 TABLET ORAL at 09:36

## 2020-08-10 RX ADMIN — PROMETHAZINE HYDROCHLORIDE 12.5 MG: 12.5 TABLET ORAL at 01:08

## 2020-08-10 RX ADMIN — TRAMADOL HYDROCHLORIDE 50 MG: 50 TABLET, FILM COATED ORAL at 01:08

## 2020-08-10 RX ADMIN — PROMETHAZINE HYDROCHLORIDE 12.5 MG: 12.5 TABLET ORAL at 09:37

## 2020-08-10 ASSESSMENT — PAIN DESCRIPTION - DESCRIPTORS: DESCRIPTORS: CONSTANT;DULL

## 2020-08-10 ASSESSMENT — PAIN DESCRIPTION - ORIENTATION: ORIENTATION: RIGHT

## 2020-08-10 ASSESSMENT — PAIN DESCRIPTION - FREQUENCY: FREQUENCY: CONTINUOUS

## 2020-08-10 ASSESSMENT — PAIN SCALES - GENERAL: PAINLEVEL_OUTOF10: 5

## 2020-08-10 ASSESSMENT — PAIN DESCRIPTION - PAIN TYPE: TYPE: CHRONIC PAIN

## 2020-08-10 ASSESSMENT — PAIN DESCRIPTION - LOCATION: LOCATION: HIP

## 2020-08-10 NOTE — PROGRESS NOTES
Pt states her name, identifies the current month, and that she is at [facility]. Pt called out that she needs to see the nurse. When nurse entered room, pt was agitated and loudly saying, 'Aren't you going to do something? I can't catch my breath! \" Pt's nasal cannula was resting on top of bridge of nose, and was not in place to supply oxygen. Attempted to reposition NC into pt's nares, but pt pulls away and states \"NO, I just took that out and got it where I want it. \" Explained to pt that she feels short of breath due to not having the oxygen in the correct spot. Also attempted to educate pt about the need to leave her oxygen in place to prevent feeling short of air. Pt was put back on NC prior to leaving room-   O2 sat was 92% on 3L. Will continue to assess.

## 2020-08-10 NOTE — PLAN OF CARE
Problem: Airway Clearance - Ineffective  Goal: Achieve or maintain patent airway  8/10/2020 1341 by Gemma Alvarado RN  Outcome: Completed  8/10/2020 0438 by Hieu Hickey RN  Outcome: Ongoing     Problem: Gas Exchange - Impaired  Goal: Absence of hypoxia  8/10/2020 1341 by Gemma Alvarado RN  Outcome: Completed  8/10/2020 0438 by Hieu Hickey RN  Outcome: Ongoing  Goal: Promote optimal lung function  8/10/2020 1341 by Gemma Alvarado RN  Outcome: Completed  8/10/2020 0438 by Hieu Hickey RN  Outcome: Ongoing     Problem: Breathing Pattern - Ineffective  Goal: Ability to achieve and maintain a regular respiratory rate  8/10/2020 1341 by Gemma Alvarado RN  Outcome: Completed  8/10/2020 0438 by Hieu Hickey RN  Outcome: Ongoing     Problem:  Body Temperature -  Risk of, Imbalanced  Goal: Ability to maintain a body temperature within defined limits  8/10/2020 1341 by Gemma Alvarado RN  Outcome: Completed  8/10/2020 0438 by Hieu Hickey RN  Outcome: Ongoing  Goal: Will regain or maintain usual level of consciousness  8/10/2020 1341 by Gemma lAvarado RN  Outcome: Completed  8/10/2020 0438 by Hieu Hickey RN  Outcome: Ongoing  Goal: Complications related to the disease process, condition or treatment will be avoided or minimized  8/10/2020 1341 by Gemma Alvarado RN  Outcome: Completed  8/10/2020 0438 by Hieu Hickey RN  Outcome: Ongoing     Problem: Isolation Precautions - Risk of Spread of Infection  Goal: Prevent transmission of infection  8/10/2020 1341 by Gemma Alvarado RN  Outcome: Completed  8/10/2020 0438 by Hieu Hickey RN  Outcome: Ongoing     Problem: Nutrition Deficits  Goal: Optimize nutrtional status  8/10/2020 1341 by Gemma Alvarado RN  Outcome: Completed  8/10/2020 0438 by Hieu Hickey RN  Outcome: Ongoing     Problem: Risk for Fluid Volume Deficit  Goal: Maintain normal heart rhythm  8/10/2020 1341 by Gemma Alvarado RN  Outcome: Completed  8/10/2020 [address] by Hieu Hickey RN  Outcome: Ongoing  Goal: Maintain absence of muscle cramping  8/10/2020 1341 by Bethany Davis RN  Outcome: Completed  8/10/2020 0438 by Nguyen Ramirez RN  Outcome: Ongoing  Goal: Maintain normal serum potassium, sodium, calcium, phosphorus, and pH  8/10/2020 1341 by Bethany Davis RN  Outcome: Completed  8/10/2020 0438 by Nguyen Ramirez RN  Outcome: Ongoing     Problem: Loneliness or Risk for Loneliness  Goal: Demonstrate positive use of time alone when socialization is not possible  8/10/2020 1341 by Bethany Davis RN  Outcome: Completed  8/10/2020 0438 by Nguyen Ramirez RN  Outcome: Ongoing     Problem: Fatigue  Goal: Verbalize increase energy and improved vitality  8/10/2020 1341 by Bethany Davis RN  Outcome: Completed  8/10/2020 0438 by Nguyen Ramirez RN  Outcome: Ongoing     Problem: Patient Education: Go to Patient Education Activity  Goal: Patient/Family Education  8/10/2020 1341 by Bethany Davis RN  Outcome: Completed  8/10/2020 0438 by Nguyen Ramirez RN  Outcome: Ongoing     Problem: Falls - Risk of:  Goal: Will remain free from falls  Description: Will remain free from falls  8/10/2020 1341 by Bethany Davis RN  Outcome: Completed  8/10/2020 0438 by Nguyen Ramirez RN  Outcome: Ongoing  Goal: Absence of physical injury  Description: Absence of physical injury  8/10/2020 1341 by Bethany Davis RN  Outcome: Completed  8/10/2020 0438 by Nguyen Ramirez RN  Outcome: Ongoing     Problem: Anxiety:  Goal: Level of anxiety will decrease  Description: Level of anxiety will decrease  8/10/2020 1341 by Bethany Davis RN  Outcome: Completed  8/10/2020 0438 by Nguyen Ramirez RN  Outcome: Ongoing     Problem:  Activity:  Goal: Risk for activity intolerance will decrease  Description: Risk for activity intolerance will decrease  8/10/2020 1341 by Bethany Davis RN  Outcome: Completed  8/10/2020 0438 by Nguyen Ramirez RN  Outcome: Ongoing     Problem: Health Behavior:  Goal: Ability to manage health-related needs will improve  Description: Ability to manage health-related needs will improve  8/10/2020 1341 by Jesus Worthington RN  Outcome: Completed  8/10/2020 0438 by Marianna Holt RN  Outcome: Ongoing     Problem: Metabolic:  Goal: Ability to maintain appropriate glucose levels will improve  Description: Ability to maintain appropriate glucose levels will improve  8/10/2020 1341 by Jesus Worthington RN  Outcome: Completed  8/10/2020 0438 by Marianna Holt RN  Outcome: Ongoing     Problem: Physical Regulation:  Goal: Complications related to the disease process, condition or treatment will be avoided or minimized  Description: Complications related to the disease process, condition or treatment will be avoided or minimized  8/10/2020 1341 by Jesus Worthington RN  Outcome: Completed  8/10/2020 0438 by Marianna Holt RN  Outcome: Ongoing     Problem: Tissue Perfusion:  Goal: Adequacy of tissue perfusion will improve  Description: Adequacy of tissue perfusion will improve  8/10/2020 1341 by Jesus Worthington RN  Outcome: Completed  8/10/2020 [facility] by Marianna Holt RN  Outcome: Ongoing     Problem: Restraint Use - Nonviolent/Non-Self-Destructive Behavior:  Goal: Absence of restraint indications  Description: Absence of restraint indications  8/10/2020 1341 by Jesus Worthington RN  Outcome: Completed  8/10/2020 0438 by Marianna Holt RN  Outcome: Ongoing  Goal: Absence of restraint-related injury  Description: Absence of restraint-related injury  8/10/2020 1341 by Jesus Worthington RN  Outcome: Completed  8/10/2020 0438 by Marianna Holt RN  Outcome: Ongoing     Problem: Pain:  Goal: Pain level will decrease  Description: Pain level will decrease  8/10/2020 1341 by Jesus Worthington RN  Outcome: Completed  8/10/2020 [address] by Marianna Holt RN  Outcome: Ongoing  Goal: Control of acute pain  Description: Control of acute pain  8/10/2020 1341 by Jesus Worthington RN  Outcome: Completed  8/10/2020 [address] by Marianna Holt RN  Outcome: Ongoing  Goal: Control of chronic pain  Description: Control of chronic pain  8/10/2020 1341 by Soham Dodson RN  Outcome: Completed  8/10/2020 0438 by Norma Bass RN  Outcome: Ongoing     Problem: Skin Integrity:  Goal: Will show no infection signs and symptoms  Description: Will show no infection signs and symptoms  8/10/2020 1341 by Soham Dodson RN  Outcome: Completed  8/10/2020 0438 by Norma Bass RN  Outcome: Ongoing  Goal: Absence of new skin breakdown  Description: Absence of new skin breakdown  8/10/2020 1341 by Soham Dodson RN  Outcome: Completed  8/10/2020 [address] [facility] by Norma Bass RN  Outcome: Ongoing

## 2020-08-10 NOTE — PLAN OF CARE
Ongoing  Goal: Maintain absence of muscle cramping  8/10/2020 0438 by Kait Anaya RN  Outcome: Ongoing  8/9/2020 1727 by Terry Anderson RN  Outcome: Ongoing  Goal: Maintain normal serum potassium, sodium, calcium, phosphorus, and pH  8/10/2020 0438 by Kait Anaya RN  Outcome: Ongoing  8/9/2020 1727 by Terry Anderson RN  Outcome: Ongoing     Problem: Loneliness or Risk for Loneliness  Goal: Demonstrate positive use of time alone when socialization is not possible  8/10/2020 0438 by Kait Anaya RN  Outcome: Ongoing  8/9/2020 1727 by Terry Anderson RN  Outcome: Ongoing     Problem: Fatigue  Goal: Verbalize increase energy and improved vitality  8/10/2020 0438 by Kait Anaya RN  Outcome: Ongoing  8/9/2020 1727 by Terry Anderson RN  Outcome: Ongoing     Problem: Patient Education: Go to Patient Education Activity  Goal: Patient/Family Education  8/10/2020 6046 by Kait Anaya RN  Outcome: Ongoing  8/9/2020 1727 by Terry Anderson RN  Outcome: Ongoing     Problem: Falls - Risk of:  Goal: Will remain free from falls  Description: Will remain free from falls  8/10/2020 0438 by Kait Anaya RN  Outcome: Ongoing  8/9/2020 1727 by Terry Anderson RN  Outcome: Ongoing  Goal: Absence of physical injury  Description: Absence of physical injury  8/10/2020 0438 by Kait Anaya RN  Outcome: Ongoing  8/9/2020 1727 by Terry Anderson RN  Outcome: Ongoing     Problem: Anxiety:  Goal: Level of anxiety will decrease  Description: Level of anxiety will decrease  8/10/2020 0438 by Kait Anaya RN  Outcome: Ongoing  8/9/2020 1727 by Terry Anderson RN  Outcome: Ongoing     Problem:  Activity:  Goal: Risk for activity intolerance will decrease  Description: Risk for activity intolerance will decrease  8/10/2020 0438 by Kait Anaya RN  Outcome: Ongoing  8/9/2020 1727 by Terry Anderson RN  Outcome: Ongoing     Problem: Health Behavior:  Goal: Ability to manage health-related needs will improve  Description: Ability to manage health-related needs will improve  8/10/2020 0438 by Vega Manley RN  Outcome: Ongoing  8/9/2020 1727 by Chilango Rojo RN  Outcome: Ongoing     Problem: Metabolic:  Goal: Ability to maintain appropriate glucose levels will improve  Description: Ability to maintain appropriate glucose levels will improve  8/10/2020 0438 by Vega Manley RN  Outcome: Ongoing  8/9/2020 1727 by Chilango Rojo RN  Outcome: Ongoing     Problem: Physical Regulation:  Goal: Complications related to the disease process, condition or treatment will be avoided or minimized  Description: Complications related to the disease process, condition or treatment will be avoided or minimized  8/10/2020 0438 by Vega Manley RN  Outcome: Ongoing  8/9/2020 1727 by Chilango Rojo RN  Outcome: Ongoing     Problem: Tissue Perfusion:  Goal: Adequacy of tissue perfusion will improve  Description: Adequacy of tissue perfusion will improve  8/10/2020 0438 by Vega Manley RN  Outcome: Ongoing  8/9/2020 1727 by Chilango Rojo RN  Outcome: Ongoing     Problem: Restraint Use - Nonviolent/Non-Self-Destructive Behavior:  Goal: Absence of restraint indications  Description: Absence of restraint indications  8/10/2020 0438 by Vega Manley RN  Outcome: Ongoing  8/9/2020 1727 by Chilango Rojo RN  Outcome: Ongoing  Goal: Absence of restraint-related injury  Description: Absence of restraint-related injury  8/10/2020 0438 by Vega Manley RN  Outcome: Ongoing  8/9/2020 1727 by Chilango Rojo RN  Outcome: Ongoing     Problem: Pain:  Description: Pain management should include both nonpharmacologic and pharmacologic interventions.   Goal: Pain level will decrease  Description: Pain level will decrease  8/10/2020 0438 by Vega Manley RN  Outcome: Ongoing  8/9/2020 1727 by Chilango Rojo RN  Outcome: Ongoing  Goal: Control of acute pain  Description: Control of acute pain  8/10/2020 0438 by Vega Manley RN  Outcome: Ongoing  8/9/2020 1727 by Segrio Girard RN  Outcome: Ongoing  Goal: Control of chronic pain  Description: Control of chronic pain  8/10/2020 0438 by Triny Cortez RN  Outcome: Ongoing  8/9/2020 1727 by Sergio Girard RN  Outcome: Ongoing     Problem: Skin Integrity:  Goal: Will show no infection signs and symptoms  Description: Will show no infection signs and symptoms  8/10/2020 0438 by Triny Cortez RN  Outcome: Ongoing  8/9/2020 1727 by Sergio Girard RN  Outcome: Ongoing  Goal: Absence of new skin breakdown  Description: Absence of new skin breakdown  8/10/2020 0438 by Triny Cortez RN  Outcome: Ongoing  8/9/2020 1727 by Sergio Girard RN  Outcome: Ongoing

## 2020-08-10 NOTE — DISCHARGE SUMMARY
Discharge Summary    Meme Rueda  :  1950  MRN:  [id number]    Admit date:  2020  Discharge date: 8/10/2020      Admitting Physician:  Tom Licea DO    Advance Directive: Full Code    Consults: ID    Primary Care Physician:  Dillon Gandara, APRN - CNP    Discharge Diagnoses:  Principal Problem (Resolved): COVID-19 virus infection  Active Problems:    * No active hospital problems. *  Resolved Problems:    Shortness of breath    Type 2 diabetes mellitus with complication [facility])    Essential hypertension      Significant Diagnostic Studies:   Xr Chest Portable    Result Date: 2020  EXAMINATION: XR CHEST PORTABLE 2020 7:22 AM HISTORY: XR CHEST PORTABLE 2020 11:37 PM HISTORY: Palpitations COMPARISON: 2018. FINDINGS: The lungs are clear. The cardiomediastinal silhouette and pulmonary vascularity are within normal limits. The osseous structures and surrounding soft tissues demonstrate no acute abnormality. 1. No radiographic evidence of acute cardiopulmonary process. Signed by Dr Paul Richard on 2020 7:23 AM      CBC:   Recent Labs     20  0150 20  0321   WBC 6.4 8.2   HGB 12.0 13.2   * 146     BMP:    Recent Labs     20  0150 20  0321    137   K 4.7 5.0   CL 99 98   CO2 25 26   BUN 49* 40*   CREATININE 1.6* 1.4*   GLUCOSE 277* 233*     INR: No results for input(s): INR in the last 72 hours. Lipids: No results for input(s): CHOL, HDL in the last 72 hours. Invalid input(s): LDLCALCU  ABGs:No results for input(s): PHART, ZHU6BBL, PO2ART, TMP8FQG, BEART, HGBAE, P8XEKBXT, CARBOXHGBART, 02THERAPY in the last 72 hours. HgBA1c:  No results for input(s): LABA1C in the last 72 hours. Procedures: None  Hospital Course: [facility] was admitted  with COVID 19. She was transferred to the CCU after some respiratory distress. She was initiated on dexamethasone and remdesivir.   She had a fairly rapid improvement in her clinical status. She declined the convalescent plasma. On the afternoon of 8/10 I discussed the case with Dr. Whitley Sutton and he felt that she was stable for discharge home. Both the patient and her daughter wish for discharge today as well. Physical Exam:  Vital Signs: BP (!) 155/71   Pulse 78   Temp 97.8 °F (36.6 °C) (Oral)   Resp 20   Ht 5' 2\" (1.575 m)   Wt 282 lb 11.2 oz (128.2 kg)   SpO2 93%   BMI 51.71 kg/m²   General appearance:. Alert and Cooperative   HEENT: Normocephalic. Chest: clear to auscultation bilaterally without wheezes or rhonchi. Cardiac: Normal heart tones with regular rate and rhythm, S1, S2 normal. No murmurs, gallops, or rubs auscultated. Abdomen:soft, non-tender; normal bowel sounds, no masses, no organomegaly. Extremities: No clubbing or cyanosis. No peripheral edema. Peripheral pulses palpable. Neurologic: Grossly intact. Discharge Medications:       Cuca Case   Home Medication Instructions PAF:[id number]    Printed on:08/10/20 1307   Medication Information                      albuterol (ACCUNEB) 1.25 MG/3ML nebulizer solution  Inhale 3 mLs into the lungs every 6 hours as needed for Wheezing             albuterol (PROVENTIL HFA;VENTOLIN HFA) 108 (90 BASE) MCG/ACT inhaler  Inhale 2 puffs into the lungs every 6 hours as needed for Wheezing             allopurinol (ZYLOPRIM) 100 MG tablet  Take 1 tablet by mouth daily             aspirin 325 MG tablet  Take 325 mg by mouth daily. atorvastatin (LIPITOR) 10 MG tablet  TAKE ONE TABLET BY MOUTH EACH EVENING             blood glucose monitor kit and supplies  Use as directed for diabetes TID checks. blood glucose monitor strips  Test 3times a day & as needed for symptoms of irregular blood glucose.              carbidopa-levodopa (SINEMET)  MG per tablet  TAKE TWO TABLETS BY MOUTH 3 TIMES DAILY             clobetasol (TEMOVATE) 0.05 % ointment  Apply external vagina 3 x a day for week one, then decrease to BID on week two, on week 3 once a day, on week four every other day then stop             denosumab (PROLIA) 60 MG/ML SOSY SC injection  Inject 60 mg into the skin every 6 months             EASY COMFORT INSULIN SYRINGE 31G X 5/16\" 1 ML MISC  INJECT AS DIRECTED DAILY             erythromycin (ROMYCIN) 5 MG/GM ophthalmic ointment  Place into both eyes nightly as needed             fluconazole (DIFLUCAN) 150 MG tablet  Take one on the 15th of every month             gabapentin (NEURONTIN) 600 MG tablet  TAKE ONE TABLET BY MOUTH 3 TIMES DAILY AS NEEDED             glucose blood VI test strips (ONE TOUCH ULTRA TEST) strip  Inject 1 each into the skin daily As needed. insulin aspart (NOVOLOG) 100 UNIT/ML injection vial  Inject 22 Units into the skin 3 times daily (before meals)             insulin detemir (LEVEMIR FLEXTOUCH) 100 UNIT/ML injection pen  Inject 70 units subcu nightly for type 2 diabetes             Insulin Pen Needle 31G X 8 MM MISC  Inject 1 each into the skin daily             Lancets MISC  1 lancet to check glucose daily             LINZESS 290 MCG CAPS capsule  TAKE ONE CAPSULE IN THE MORNING BEFORE BREAKFAST             Liraglutide (VICTOZA) 18 MG/3ML SOPN SC injection  Inject 1.8 mg into the skin daily             losartan (COZAAR) 100 MG tablet  TAKE ONE TABLET DAILY             Melatonin 10 MG CAPS  Take 10 mg by mouth every evening Takes 20 mg a night             montelukast (SINGULAIR) 10 MG tablet  TAKE ONE TABLET BY MOUTH EVERY NIGHT AT BEDTIME             nystatin (MYCOSTATIN) 384164 UNIT/GM ointment  Apply topically 2 times daily. for yeast             nystatin (NYSTATIN) 246566 UNIT/GM powder  Apply topically 3 times daily Apply topically 4 times daily.              omeprazole (PRILOSEC) 40 MG delayed release capsule  TAKE ONE CAPSULE BY MOUTH EVERY DAY             OXYGEN  2L NC 12-24 hours             rOPINIRole (REQUIP) 4 MG tablet  TAKE TWO TABLETS EVERY MORNING TAKE ONE TABLET AT NOON AND TAKE TWO TABLETS EVERY NIGHT AT BEDTIME             sotalol (BETAPACE) 120 MG tablet  Take 1 tablet by mouth 2 times daily             spironolactone (ALDACTONE) 25 MG tablet  TAKE ONE TABLET DAILY             torsemide (DEMADEX) 20 MG tablet  Take 20 mg by mouth daily 3 tablets for three days then two tablets daily              TRUEPLUS INSULIN SYRINGE 30G X 5/16\" 1 ML MISC  INJECT AS DIRECTED DAILY                 Discharge Instructions: Follow up with INGRID Concepcion CNP in 3-5 days. Take medications as directed. Resume activity as tolerated. Diet: DIET CLEAR LIQUID; Low Sodium (2 GM); Safety Tray; Safety Tray (Disposables)     Disposition: Patient is medically stable and will be discharged to home. Time spent on discharge less than 30 minutes.     Signed:  Brenda Palencia DO

## 2020-08-10 NOTE — PROGRESS NOTES
Infectious Diseases Progress Note    Patient:  Radha Gerber  YOB: 1950  MRN: [id number]   Admit date: 8/4/2020   Admitting Physician: Kiara Del Cid DO  Primary Care Physician: INGRID Santoro - CNP    Chief Complaint/Interval History:  She overall feels better. She is tired. She has minimal cough. She does not describe shortness of breath. She is on oxygen at 3 L. Current saturations at 94%. She has IV access currently. Nurse told me she declined remdisivir. I spoke with patient about this. I asked whether she would like to receive the treatment. She indicated, \"I would rather not. \"  I asked the reasons why she was declining treatment. She indicates she just felt she was better and did not feel like she needed the medication therapy. She is on oxygen at home. Typically she uses 2 L at night and will use 2 L PRN during the day. From what she describes she does have an O2 sat monitor at home. Spoke with pharmacy. I ask when she had received dexamethasone. She received dexamethasone 6 mg from August 6 through August 8. Dexamethasone discontinued by the hospitalist the morning of the ninth. Daughter had expressed to my partner over the weekend and again the nursing this morning that she would like her to be able to go home today. Spoke with her daughter at length on the phone. She indicates the whole family is in agreement that they feel she would do better at home. If the plasma will be available this afternoon she would like her to go ahead and receive it. If not she would like her to be discharged home. Explained her mother had declined remdesivir and additional treatments because she felt she was getting better than that she did not feel she needed the treatment.   Daughter is been concerned that she has typically been on higher doses of her Parkinson's medicine (ropinirole) at home and that they went through this a few years ago when her mother was in the hospital had some mental status changes that they felt was related to changes in dosing of her Parkinson's treatment. They just feel overall she is doing better than she was before and they feel capable of managing her at home. She indicates she has already been on oxygen at home and they feel they can manage her without difficulty. She indicates they know to have her return if anything gets worse. In/Out    Intake/Output Summary (Last 24 hours) at 8/10/2020 0756  Last data filed at 8/9/2020 1734  Gross per 24 hour   Intake 686.67 ml   Output --   Net 686.67 ml     Allergies:    Allergies   Allergen Reactions    Codeine      itching    Hydrocodone-Acetaminophen Nausea Only     Current Meds: rOPINIRole (REQUIP) tablet 1 mg, TID  lidocaine 4 % external patch 1 patch, Daily  melatonin tablet 2.5 mg, Nightly PRN  enoxaparin (LOVENOX) injection 40 mg, BID  pantoprazole (PROTONIX) injection 40 mg, Daily  meropenem (MERREM) 1 g in sterile water 20 mL IV syringe, Q12H  remdesivir 100 mg in sodium chloride 0.9 % 250 mL IVPB, Q24H  traMADol (ULTRAM) tablet 50 mg, Q6H PRN  glucose (GLUTOSE) 40 % oral gel 15 g, PRN  dextrose 50 % IV solution, PRN  glucagon (rDNA) injection 1 mg, PRN  dextrose 5 % solution, PRN  albuterol sulfate  (90 Base) MCG/ACT inhaler 2 puff, Q6H PRN  sotalol (BETAPACE) tablet 120 mg, BID  montelukast (SINGULAIR) tablet 10 mg, Nightly  carbidopa-levodopa (SINEMET)  MG per tablet 1 tablet, TID  aspirin tablet 325 mg, Daily  sodium chloride flush 0.9 % injection 10 mL, 2 times per day  sodium chloride flush 0.9 % injection 10 mL, PRN  acetaminophen (TYLENOL) tablet 650 mg, Q6H PRN    Or  acetaminophen (TYLENOL) suppository 650 mg, Q6H PRN  magnesium hydroxide (MILK OF MAGNESIA) 400 MG/5ML suspension 30 mL, Daily PRN  promethazine (PHENERGAN) tablet 12.5 mg, Q6H PRN    Or  ondansetron (ZOFRAN) injection 4 mg, Q6H PRN  potassium chloride (KLOR-CON M) extended release tablet 40 mEq, PRN Or  potassium bicarb-citric acid (EFFER-K) effervescent tablet 40 mEq, PRN    Or  potassium chloride 10 mEq/100 mL IVPB (Peripheral Line), PRN  potassium chloride 10 mEq/100 mL IVPB (Peripheral Line), PRN  magnesium sulfate 2 g in 50 mL IVPB premix, PRN  insulin lispro (HUMALOG) injection vial 0-12 Units, 4x Daily AC & HS  insulin lispro (HUMALOG) injection vial 0-6 Units, Nightly      Review of Systems She reports no chest pain or chest pressure. She has not had fevers, chills, or night sweats. No nausea or diarrhea. No rash or skin itching. VitalSigns:  BP (!) 153/88   Pulse 76   Temp 98.4 °F (36.9 °C) (Oral)   Resp 18   Ht 5' 2\" (1.575 m)   Wt 282 lb 11.2 oz (128.2 kg)   SpO2 94%   BMI 51.71 kg/m²      Physical Exam  Line/IV site: No erythema, warmth, induration, or tenderness. I saw her through her door on the floor. I was able to speak with her. She was up to chair. She looked comfortable. I could see her legs. There was minimal edema. She was not tachypneic. She was not coughing. She was alert and interactive. She was comfortable appearing. Was also able to observe her on the video monitoring device on the floor. Lab Results:  CBC:   Recent Labs     08/08/20  0150 08/09/20  0321   WBC 6.4 8.2   HGB 12.0 13.2   * 146     BMP:  Recent Labs     08/08/20  0150 08/09/20  0321    137   K 4.7 5.0   CL 99 98   CO2 25 26   BUN 49* 40*   CREATININE 1.6* 1.4*   GLUCOSE 277* 233*     CultureResults: Blood cultures 8/5 and 8/7    Radiology: None    Additional Studies Reviewed:  None     Impression:  1. COVID-19 infection/pneumonia-clinically seems to be improving. Patient has declined additional treatment with antiviral medication (REM death severe). COVID-19 plasma has not arrived. They would like her to go home without the plasma if it is not going to be available this afternoon. Patient feels comfortable with that plan. 2.  Diabetes mellitus  3. Hypertension  4.   Chronic kidney disease  5. Parkinson's disease    Recommendations:  Okay with me for discharge home  Have discontinued orders for REM death severe and convalescent plasma  She received 3 or 4 days of steroids-given improvement may be best to continue off steroids at this point  She already has oxygen and a sat monitor at home  Explained to the daughter they would need to adjust her liter flow between 2 and 5 L to keep her sats above 91% (explained there may be some drops with exertion)  They indicate they will bring her back to the hospital she has any deterioration after discharge    Lucio Flores MD     Addendum-I spoke Pendleton Cleaves with the blood bank. Her convalescent plasma is scheduled to be shipped out of [facility] today via [employer]. The earliest it would arrive would be tomorrow. Will confirm with patient and family that they wish for discharge home today and do do not want to wait untill tomorrow for plasma administration.     Lucio Flores MD

## 2020-08-10 NOTE — PROGRESS NOTES
Spoke with patient on the phone again after speaking with the blood bank. Explained her convalescent plasma would be available tomorrow. It is being shipped out of [facility] via [employer]. I asked whether she would like to wait until tomorrow to be discharged so that she can receive the convalescent plasma. Her preference is to go home today. I called her daughter back. Spoke with her daughter DWIGHT Fisher. Explained the convalescent plasma would be available tomorrow. DWIGHT Fisher indicated she had just spoken with her mother on the phone. She felt her mother sounded more herself than she had in a week. She feels that she should be discharged home today. Both the patient and her daughter are in agreement that they would be preferred to be discharged home without receiving convalescent plasma. I explained to them both that her participation in the study was voluntary and it was totally up to her if she would like to withdraw from the study prior to receiving convalescent plasma. She confirmed that she would like to not receive the plasma and go home.     Loyda Lindo MD

## 2020-08-11 ENCOUNTER — TELEPHONE (OUTPATIENT)
Dept: WOUND CARE | Age: 70
End: 2020-08-11

## 2020-08-11 ENCOUNTER — CARE COORDINATION (OUTPATIENT)
Dept: CASE MANAGEMENT | Age: 70
End: 2020-08-11

## 2020-08-11 NOTE — TELEPHONE ENCOUNTER
Daughter states due to Covid she is unable to come in for a visit but that her wound looks great and is no longer requiring treatment.

## 2020-08-11 NOTE — CARE COORDINATION
[facility] 45 Transitions Initial Follow Up Call    Call within 2 business days of discharge: Yes    Patient: Rell Polanco Patient : 1950   MRN: [id number]  Reason for Admission: PNA  Discharge Date: 8/10/20 RARS: Readmission Risk Score: 29      Last Discharge [address] [address]       Complaint Diagnosis Description Type Department Provider    20 Concern For COVID-19 Pneumonia due to organism . .. ED to Hosp-Admission (Discharged) (ADMITTED) [facility], ; Emmanuel Flores. .. Spoke with: N/A    Facility:[address]      Non-face-to-face services provided:  Reviewed encounter information for continuity of care prior to follow up phone call - chart notes, consults, progress notes, test results, med list, appointments, AVS, other information. Care Transitions 24 Hour Call    Care Transitions Interventions         Follow Up : Attempted to make contact with Edward Grewal for BPCI-COVID initial follow up call post discharge from the hospital without success. Unable to leave a message regarding intent of call and call back information. Will try again my next business day.      Future Appointments   Date Time Provider Marquis Pang   2020 10:45 AM INGRID Concepcion CNP [facility]   10/28/2020 10:30 AM INGRID Concepcion CNP [facility]   12/3/2020 10:45 AM Jerrod Martinez MD [facility] NEURO [facility]       Leonarda Rojo RN

## 2020-08-12 ENCOUNTER — CARE COORDINATION (OUTPATIENT)
Dept: CASE MANAGEMENT | Age: 70
End: 2020-08-12

## 2020-08-12 LAB
BLOOD CULTURE, ROUTINE: NORMAL
CULTURE, BLOOD 2: NORMAL

## 2020-08-12 NOTE — CARE COORDINATION
[facility] 45 Transitions Initial Follow Up Call    Call within 2 business days of discharge: Yes    Patient: Magalis Grant Patient : 1950   MRN: [id number]  Reason for Admission:   Discharge Date: 8/10/20 RARS: Readmission Risk Score: 29      Last Discharge [facility]       Complaint Diagnosis Description Type Department Provider    20 Concern For COVID-19 Pneumonia due to organism . .. ED to Hosp-Admission (Discharged) (ADMITTED) [address] [facility]; Beverly Weiss. .. Spoke with: N/A    Facility: [address] [place] [address]    Non-face-to-face services provided:  Reviewed encounter information for continuity of care prior to follow up phone call - chart notes, consults, progress notes, test results, med list, appointments, AVS, other information. Care Transitions 24 Hour Call    Care Transitions Interventions     Follow Up : Attempt #2 to make contact with Jean Marie Rodríguez for BPCI/COVID initial follow up call post discharge from the hospital without success. Unable to leave a message regarding intent of call and call back information. Will try again at a later time.      Future Appointments   Date Time Provider Marquis Pang   2020 10:45 AM INGRID Barcenas CNP [facility]   10/28/2020 10:30 AM INGRID Barcenas CNP [facility]   12/3/2020 10:45 AM Rochelle Sidhu MD [facility] NEURO [facility]       Karine Godfrey RN

## 2020-08-13 RX ORDER — FLUCONAZOLE 150 MG/1
TABLET ORAL
Qty: 1 TABLET | Refills: 11 | Status: SHIPPED | OUTPATIENT
Start: 2020-08-13 | End: 2021-09-11 | Stop reason: SDUPTHER

## 2020-08-17 RX ORDER — LOSARTAN POTASSIUM 100 MG/1
TABLET ORAL
Qty: 30 TABLET | Refills: 5 | Status: SHIPPED | OUTPATIENT
Start: 2020-08-17 | End: 2021-10-21 | Stop reason: SDUPTHER

## 2020-08-19 ENCOUNTER — CARE COORDINATION (OUTPATIENT)
Dept: CASE MANAGEMENT | Age: 70
End: 2020-08-19

## 2020-08-19 NOTE — CARE COORDINATION
[facility] 45 Transitions Initial Follow Up Call    Call within 2 business days of discharge: Yes    Patient: Mirta Keller Patient : 1950   MRN: [id number]  Reason for Admission: PNA, COVID-19 positive  Discharge Date: 8/10/20 RARS: Readmission Risk Score: 29      Last Discharge [facility]       Complaint Diagnosis Description Type Department Provider    20 Concern For COVID-19 Pneumonia due to organism . .. ED to Hosp-Admission (Discharged) (ADMITTED) [address] [facility]; Azeb Matson. .. Spoke with: Nikki Carson, patient    Facility: [facility]    Follow Up:  Contacted patient for BPCI-A and COVID-19 follow up. Introduced self and explained BPCI bundle and 90 day follow up. Patient in agreement with follow up calls. Ivory Bustillos stated she is feeling better. Reports she is still coughing at times. Reports she still becomes short of breath with minimal exertion but stated it has gotten better. She continues to use 2 L of oxygen. She denies having any chest pain/discomfort, fever, chills, headaches, nausea/vomiting. Appetite has been poor but she stated she is making herself eat. She reports everything taste salty. She is drinking fluids to stay hydrated. Per Ivory Bustillos, she had a follow up with ID and was released. She continues to follow CDC COVID-19 precautions and guidelines. She is aware of when to contact provider with any new or worsening symptoms. Attempted to review medication list.  Ivory Bustillos stated she does not know her medications and will have to wait to speak with her other daughter. She does not have any questions or concerns at this time. Will continue to follow. Patient contacted regarding COVID-19 exposure. Discussed COVID-19 related testing which was available at this time. Test results were positive. Patient informed of results, if available?  Yes    Care Transition Nurse/ Ambulatory Care Manager contacted the patient by telephone to perform post discharge assessment. Verified name and  with patient as identifiers. Provided introduction to self, and explanation of the CTN/ACM role, and reason for call due to risk factors for infection and/or exposure to COVID-19. Symptoms reviewed with patient who verbalized the following symptoms: cough, shortness of breath with minimal exertion. Due to no new or worsening symptoms encounter was not routed to provider for escalation. Discussed follow-up appointments. If no appointment was previously scheduled, appointment scheduling offered: Yes  [facility] follow up appointment(s):   Future Appointments   Date Time Provider Marquis Pang   10/28/2020 10:30 AM INGRID Baird - CNP [facility]   12/3/2020 10:45 AM Latosha Lainez MD [facility]     Non-[facility] follow up appointment(s):      Advance Care Planning:   Does patient have an Advance Directive:  not on file. Patient has following risk factors of: COPD, diabetes, pneumonia. CTN/ACM reviewed discharge instructions, medical action plan and red flags such as increased shortness of breath, increasing fever and signs of decompensation with patient who verbalized understanding. Discussed exposure protocols and quarantine with CDC Guidelines What to do if you are sick with coronavirus disease .  Patient was given an opportunity for questions and concerns. The patient agrees to contact the [facility] exposure line [phone] and PCP office for questions related to their healthcare. CTN/ACM provided contact information for future needs. Reviewed and educated patient on any new and changed medications related to discharge diagnosis     Patient/family/caregiver given information for GetWell Loop and agrees to enroll no  Patient's preferred e-mail:    Patient's preferred phone number:   Based on Loop alert triggers, patient will be contacted by nurse care manager for worsening symptoms.     Plan for follow-up call in 5-7 days based on severity of symptoms and risk factors.     Future Appointments   Date Time Provider Marquis Pang   10/28/2020 10:30 AM INGRID Weems - CNP [facility]   12/3/2020 10:45 AM Anahi Rodas MD [facility]O [facility]       Sandrine Gonzalez RN

## 2020-08-24 RX ORDER — ALBUTEROL SULFATE 1.25 MG/3ML
1 SOLUTION RESPIRATORY (INHALATION) EVERY 6 HOURS PRN
Qty: 360 ML | Refills: 3 | Status: SHIPPED | OUTPATIENT
Start: 2020-08-24

## 2020-08-25 RX ORDER — GLUCOSAMINE HCL/CHONDROITIN SU 500-400 MG
CAPSULE ORAL
Qty: 100 STRIP | Refills: 3 | Status: SHIPPED | OUTPATIENT
Start: 2020-08-25 | End: 2021-09-09 | Stop reason: SDUPTHER

## 2020-08-26 ENCOUNTER — CARE COORDINATION (OUTPATIENT)
Dept: CASE MANAGEMENT | Age: 70
End: 2020-08-26

## 2020-08-26 NOTE — CARE COORDINATION
[facility] 45 Transitions Follow Up Call    2020    Patient: Raquel Sanchez  Patient : 1950   MRN: [id number]  Reason for Admission: PNA  Discharge Date: 8/10/20 RARS: Readmission Risk Score: 29         Attempted to contact patient for BPCI-A f/u call. Contact information left to mobile  requesting call back at the earliest convenience. Home number VMB full.     Follow Up  Future Appointments   Date Time Provider Marquis Pang   10/28/2020 10:30 AM [facility]innis [facility]   12/3/2020 10:45 AM Ton Addison MD [facility]       Lobo Xie RN

## 2020-09-02 ENCOUNTER — CARE COORDINATION (OUTPATIENT)
Dept: CASE MANAGEMENT | Age: 70
End: 2020-09-02

## 2020-09-02 NOTE — CARE COORDINATION
[facility]  Transitions Follow Up Call    2020    Patient: Rell Polanco  Patient : 1950   MRN: [id number]  Reason for Admission:   Discharge Date: 8/10/20 RARS: Readmission Risk Score: 29    Follow Up: Attempted to contact patient for BPCI-A follow up. Unable to reach patient. Unable to leave a message. Mailbox is full. Will try again at a later time.       Future Appointments   Date Time Provider Marquis Pang   10/28/2020 10:30 AM [facility]ion - [facility]   12/3/2020 10:45 AM Jerrod Martinez MD [facility] [place]       Violet Vidal RN

## 2020-09-14 ENCOUNTER — CARE COORDINATION (OUTPATIENT)
Dept: CASE MANAGEMENT | Age: 70
End: 2020-09-14

## 2020-09-14 NOTE — CARE COORDINATION
[address] for Care Improvement ([facility]) Follow Up Call  Qualifying Diagnosis of Pneumonia. 2020  Patient Name:  Dell Joseph   YOB: 1950  Discharge Date:  8/10/20  RARS:  Readmission Risk Score: 29    PCP:  INGRID Nguyen CNP    Assessment:      Short of Breath: using oxygen more since hospitalization, dyspnea with exercise.  Cough Frequency, Productive/Color: occasional, non-productive.  Appetite: too good   Sleep pattern: OK   Thinking clearly: yes   Tired/weakness: OK   Fever, Chills Sweating: denies   Headaches: denies  [address],  Active: denies need   Medication Needs/Questions: denies   Transportation Need: denies    Live Alone: no   Ability to Prepare Meals, Bathe: ok   Do you feel like you have everything you need to stay well at home? yes   Follow Up Appointment: scheduled   Teaching: see below      Patient contacted regarding COVID-19 risk and screening. Discussed COVID-19 related testing which was available at this time. Test results were positive. Patient informed of results, if available? Yes. Care Transition Nurse/ Ambulatory Care Manager contacted the patient by telephone to perform follow-up assessment. Verified name and  with patient as identifiers. Patient has following risk factors of: pneumonia. Symptoms reviewed with patient who verbalized the following symptoms: cough. Due to no new or worsening symptoms encounter was not routed to provider for escalation. Education provided regarding infection prevention, and signs and symptoms of COVID-19 and when to seek medical attention with patient who verbalized understanding. Discussed exposure protocols and quarantine from [address] you at higher risk for severe illness  and given an opportunity for questions and concerns.  The patient agrees to contact the [address] [place] [address] hotline [phone] or PCP office for questions related to their healthcare. CTN/ACM provided contact information for future reference. From CDC: Are you at higher risk for severe illness?  Wash your hands often.  Avoid close contact (6 feet, which is about two arm lengths) with people who are sick.  Put distance between yourself and other people if COVID-19 is spreading in your community.  Clean and disinfect frequently touched surfaces.  Avoid all cruise travel and non-essential air travel.  Call your healthcare professional if you have concerns about COVID-19 and your underlying condition or if you are sick. For more information on steps you can take to protect yourself, see CDC's How to [address] [place] [address] for follow-up call in 7-14 days based on severity of symptoms and risk factors.     Care Transitions will continue to follow per BPCI Program.  Wil Nance RN, CTN  Follow Up Concerns:    Future Appointments   Date Time Provider Marquis Pang   10/28/2020 10:30 AM Evelio Miller APRN - CNP [facility]   12/3/2020 10:45 AM Carmel Mina MD [facility] NEURO [facility]

## 2020-09-21 ENCOUNTER — CARE COORDINATION (OUTPATIENT)
Dept: CASE MANAGEMENT | Age: 70
End: 2020-09-21

## 2020-09-21 NOTE — CARE COORDINATION
[facility] Transitions Follow Up Call    2020    Patient: Leti Parkinson  Patient : 1950   MRN: <[id number]>  Reason for Admission:   Discharge Date: 8/10/20 RARS: Readmission Risk Score: 34         Spoke with: Attempted to contact patient for follow up BPCI-A transition call. Mail box full - unable to leave message. Will Follow up at later time. Juliana Page LPN     [facility] Transitions Subsequent and Final Call    Subsequent and Final Calls  Care Transitions Interventions  Other Interventions:             Follow Up  Future Appointments   Date Time Provider Marquis Pang   10/28/2020 10:30 AM INGRID Torres - CNP [facility]   12/3/2020 10:45 AM Pedro Gonzalez MD [facility] NEURO [facility]       Juliana Page LPN

## 2020-09-28 ENCOUNTER — CARE COORDINATION (OUTPATIENT)
Dept: CASE MANAGEMENT | Age: 70
End: 2020-09-28

## 2020-10-07 ENCOUNTER — CARE COORDINATION (OUTPATIENT)
Dept: CASE MANAGEMENT | Age: 70
End: 2020-10-07

## 2020-10-07 NOTE — CARE COORDINATION
[facility] Transitions Follow Up Call    10/7/2020    Patient: Imbler Colder  Patient : 1950   MRN: [id number]  Reason for Admission:   Discharge Date: 8/10/20 RARS: Readmission Risk Score: 29    Care Transition Nurse (CTN) contacted the patient by telephone to follow up after admission on 20-8/10/20. Verified name and  with patient as identifiers. Addressed changes since last contact: symptom management-No new or worsening symptoms  Discharged needs reviewed: none  Follow up appointment completed? Yes    CTN reviewed discharge instructions, medical action plan and red flags with patient and discussed any barriers to care and/or understanding of plan of care after discharge. Discussed appropriate site of care based on symptoms and resources available to patient including: PCP. The patient agrees to contact the PCP office for questions related to their healthcare. Patients top risk factors for readmission: medical condition and polypharmacy  Interventions to address risk factors: Reviewed signs/symptoms and when to contact MD    Discussed follow-up appointments. If no appointment was previously scheduled, appointment scheduling offered: Yes     Plan for follow-up call in 10-14 days based on severity of symptoms and risk factors. Plan for next call: symptom management-Follow up on any new or worsening symptoms  CTN provided contact information for future needs. Care Transitions Subsequent and Final Call    Subsequent and Final Calls  Have your medications changed?:  No  Do you have any questions related to your medications?:  No  Do you currently have any active services?:  No  Do you have any needs or concerns that I can assist you with?:  No  Identified Barriers:  None  Care Transitions Interventions  Other Interventions: Follow Up:  Contacted patient for BPCI-A follow up. Bryan Molina stated she is doing great.   Reports breathing has improved and only using oxygen at

## 2020-10-08 RX ORDER — TRAMADOL HYDROCHLORIDE 50 MG/1
50 TABLET ORAL 2 TIMES DAILY PRN
Qty: 60 TABLET | Refills: 0 | Status: SHIPPED | OUTPATIENT
Start: 2020-10-08 | End: 2021-01-08 | Stop reason: SDUPTHER

## 2020-10-12 RX ORDER — LINACLOTIDE 290 UG/1
CAPSULE, GELATIN COATED ORAL
Qty: 30 CAPSULE | Refills: 5 | Status: SHIPPED | OUTPATIENT
Start: 2020-10-12 | End: 2021-04-01

## 2020-10-21 ENCOUNTER — CARE COORDINATION (OUTPATIENT)
Dept: CASE MANAGEMENT | Age: 70
End: 2020-10-21

## 2020-10-21 ENCOUNTER — HOSPITAL ENCOUNTER (OUTPATIENT)
Dept: WOUND CARE | Age: 70
Discharge: HOME | End: 2020-10-21
Payer: MEDICARE

## 2020-10-21 VITALS
SYSTOLIC BLOOD PRESSURE: 126 MMHG | RESPIRATION RATE: 20 BRPM | BODY MASS INDEX: 53.18 KG/M2 | TEMPERATURE: 96.8 F | HEART RATE: 71 BPM | DIASTOLIC BLOOD PRESSURE: 69 MMHG | WEIGHT: 289 LBS | HEIGHT: 62 IN

## 2020-10-21 PROBLEM — I87.2 EDEMA OF BOTH LOWER EXTREMITIES DUE TO PERIPHERAL VENOUS INSUFFICIENCY: Status: ACTIVE | Noted: 2020-10-21

## 2020-10-21 PROBLEM — L97.912 DIABETIC ULCER OF RIGHT LOWER LEG ASSOCIATED WITH TYPE 2 DIABETES MELLITUS, WITH FAT LAYER EXPOSED (HCC): Status: ACTIVE | Noted: 2020-10-21

## 2020-10-21 PROBLEM — E11.622 DIABETIC ULCER OF RIGHT LOWER LEG ASSOCIATED WITH TYPE 2 DIABETES MELLITUS, WITH FAT LAYER EXPOSED (HCC): Status: ACTIVE | Noted: 2020-10-21

## 2020-10-21 PROCEDURE — 99214 OFFICE O/P EST MOD 30 MIN: CPT

## 2020-10-21 PROCEDURE — 99215 OFFICE O/P EST HI 40 MIN: CPT | Performed by: NURSE PRACTITIONER

## 2020-10-21 RX ORDER — LANCETS 33 GAUGE
EACH MISCELLANEOUS
Qty: 100 EACH | Refills: 3 | Status: SHIPPED | OUTPATIENT
Start: 2020-10-21 | End: 2021-05-03

## 2020-10-21 RX ORDER — ACETAMINOPHEN 160 MG
1 TABLET,DISINTEGRATING ORAL
COMMUNITY

## 2020-10-21 RX ORDER — ATORVASTATIN CALCIUM 10 MG/1
TABLET, FILM COATED ORAL
Qty: 30 TABLET | Refills: 3 | Status: SHIPPED | OUTPATIENT
Start: 2020-10-21 | End: 2021-05-20

## 2020-10-21 ASSESSMENT — ENCOUNTER SYMPTOMS: COLOR CHANGE: 1

## 2020-10-21 ASSESSMENT — VISUAL ACUITY: OU: 1

## 2020-10-21 NOTE — HOME CARE
[address] f: [phone] f: [phone] p: [phone] [email or web address]      Ordering Center:     [facility] [address]  [address] [facility], [address] [id number] [phone]  WOUND CARE Dept: [address] [phone]    Patient Information:      Hossein Марина [id number] [address]   [phone]   : 1950  AGE: 71 y.o. GENDER: female   EPISODE DATE: 10/21/2020    Insurance:      PRIMARY INSURANCE:  Plan: MEDICARE PART A AND B  Coverage: MEDICARE  Effective Date: 2019  Group Number: [unfilled]  Subscriber Number: [id number] - (Medicare)    Payor/Plan Subscr  Sex Relation Sub. Ins. ID Effective Group Num   1. MEDICARE - PATITOramonliliana García 1950 Female  [id number]                                    [address]    2.  MEDICAID Mariposa Fine 1950 Female Self [id number]                                    [address]        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 (CODE) (HCC)    Relevant Orders    Supply: Wound Cleanser    Supply: Wound Dressings    Supply: Pack Wound    Supply: Cover and Secure    Supply: Edema Control    VL LOWER EXTREMITY ARTERIAL SEGMENTAL PRESSURES W PPG    Diabetic ulcer of right lower leg associated with type 2 diabetes mellitus, with fat layer exposed ([address]    Relevant Orders    Supply: Wound Cleanser    Supply: Wound Dressings    Supply: Pack Wound    Supply: Cover and Secure    Supply: Edema Control    VL LOWER EXTREMITY ARTERIAL SEGMENTAL PRESSURES W PPG    Edema of both lower extremities due to peripheral venous insufficiency    Relevant Orders    Supply: Wound Cleanser    Supply: Wound Dressings    Supply: Pack Wound    Supply: Cover and Secure    Supply: Edema Control          WOUNDS REQUIRING DRESSING SUPPLIES:     Incision 08/29/18 Arm Right (Active)   Number of days: 784       Wound 10/21/20 Pretibial Distal;Left wound 1- left venous anterior (Active)   Wound Image    10/21/20 1416   Wound Etiology Venous 10/21/20 1416   Dressing Status Old drainage noted 10/21/20 1416   Wound Cleansed Soap and water 10/21/20 1416   Dressing/Treatment Other (comment) 10/21/20 1416   Wound Length (cm) 6.5 cm 10/21/20 1416   Wound Width (cm) 3.5 cm 10/21/20 1416   Wound Depth (cm) 0.1 cm 10/21/20 1416   Wound Surface Area (cm^2) 22.75 cm^2 10/21/20 1416   Wound Volume (cm^3) 2.28 cm^3 10/21/20 1416   Wound Assessment Slough; Fluid filled blister 10/21/20 1416   Drainage Amount Copious 10/21/20 1416   Drainage Description Serous 10/21/20 1416   Odor None 10/21/20 1416   [place]-wound Assessment Intact;Edematous; Blanchable erythema 10/21/20 1416   Margins Attached edges 10/21/20 1416   Wound Thickness Description not for Pressure Injury Full thickness 10/21/20 1416   Number of days: 0       Wound 10/21/20 Pretibial Left;Medial wound 2- left medial venous (Active)   Wound Image   10/21/20 1416   Wound Etiology Venous 10/21/20 1416   Dressing Status Old drainage noted 10/21/20 1416   Wound Cleansed Soap and water 10/21/20 1416   Dressing/Treatment Other (comment) 10/21/20 1416   Wound Length (cm) 2.5 cm 10/21/20 1416   Wound Width (cm) 3.5 cm 10/21/20 1416   Wound Depth (cm) 0.1 cm 10/21/20 1416   Wound Surface Area (cm^2) 8.75 cm^2 10/21/20 1416   Wound Volume (cm^3) 0.88 cm^3 10/21/20 1416   Wound Assessment [facility] 10/21/20 1416   Drainage Amount Copious 10/21/20 1416   Drainage Description Serous 10/21/20 1416   Odor None 10/21/20 1416   [place]-wound Assessment Intact;Edematous 10/21/20 1416   Margins Attached edges 10/21/20 1416   Wound Thickness Description not for Pressure Injury Full thickness 10/21/20 1416   Number of days: 0       Wound 10/21/20 Pretibial Right;Posterior wound 3- right posterior venous (Active)   Wound Image   10/21/20 1416   Wound Etiology Venous 10/21/20 1416   Dressing Status Old drainage noted 10/21/20 1416   Wound Cleansed Soap and water 10/21/20 1416   Dressing/Treatment Other (comment) 10/21/20 1416   Wound Length (cm) 1 cm 10/21/20 1416   Wound Width (cm) 1 cm 10/21/20 1416   Wound Depth (cm) 0.1 cm 10/21/20 1416   Wound Surface Area (cm^2) 1 cm^2 10/21/20 1416   Wound Volume (cm^3) 0.1 cm^3 10/21/20 1416   Wound Assessment Slough 10/21/20 1416   Drainage Amount Moderate 10/21/20 1416   Drainage Description Serous 10/21/20 1416   Odor None 10/21/20 1416   [place]-wound Assessment Edematous 10/21/20 1416   Margins Attached edges 10/21/20 1416   Wound Thickness Description not for Pressure Injury Full thickness 10/21/20 1416   Number of days: 0          Supplies Requested :      WOUND #: 1, 2 and 3   PRIMARY DRESSING:  Other: Restore AG silicone contact layer   Cover and Secure with: Bulky roll gauze  Ace wrap  Other kerramax non-bordered 10x22 and 10x10     FREQUENCY OF DRESSING CHANGES:  Daily       ADDITIONAL ITEMS:  [] Gloves Small  [] Gloves Medium [x] Gloves Large [] Gloves XLarge  [] Tape 1\" [] Tape 2\" [] Tape 3\"  [x] Medipore Tape  [] Saline  [] Skin Prep    [] Adhesive Remover   [] Cotton Tip Applicators   [] Other:    Patient Wound(s) Debrided: [x] Yes if yes please add date: 10/21/20  [] No    Debribement Type: Mechanical     Patient currently being seen by Home Health: [] Yes   [x] No    Duration for needed supplies:  []15  [x]30  []60  []90 Days    Electronically signed by Felipe Carnes, INGRID - CESAR on 10/21/2020 at 4:25 PM     Provider Information:      PROVIDER'S NAME: Dior QUINTERO    NPI: [id number]

## 2020-10-21 NOTE — HOME CARE
[address] f: [phone] f: [phone] p: [phone] [email or web address]      Ordering Center:     [facility] [address]  [address] [id number] [phone]  WOUND CARE Dept: [address] [facility] [phone]    Patient Information:      Jose Lind [address] [address]   [phone]   : 1950  AGE: 71 y.o. GENDER: female   EPISODE DATE: 10/21/2020    Insurance:      PRIMARY INSURANCE:  Plan: MEDICARE PART A AND B  Coverage: MEDICARE  Effective Date: 2019  Group Number: [unfilled]  Subscriber Number: [id number] - (Medicare)    Payor/Plan Subscr  Sex Relation Sub. Ins. ID Effective Group Num   1. MEDICARE - MEBlossom Stager 1950 Female  [id number] 19                                    [address]    2.  MEDICAID Jackalyn Coyer 1950 Female Self [id number] 19                                    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 (CODE) (HCC)    Relevant Orders    Supply: Wound Cleanser    Supply: Wound Dressings    Supply: Pack Wound    Supply: Cover and Secure    Supply: Edema Control    VL LOWER EXTREMITY ARTERIAL SEGMENTAL PRESSURES W PPG    Diabetic ulcer of right lower leg associated with type 2 diabetes mellitus, with fat layer exposed ([address]    Relevant Orders    Supply: Wound Cleanser    Supply: Wound Dressings    Supply: Pack Wound    Supply: Cover and Secure    Supply: Edema Control    VL LOWER EXTREMITY ARTERIAL SEGMENTAL PRESSURES W PPG    Edema of both lower extremities due to peripheral venous insufficiency    Relevant Orders    Supply: Wound Cleanser    Supply: Wound Dressings    Supply: Pack Wound    Supply: Cover and Secure    Supply: Edema Control          WOUNDS REQUIRING DRESSING SUPPLIES:     Incision 08/29/18 Arm Right (Active)   Number of days: 784       Wound 10/21/20 Pretibial Distal;Left wound 1- left venous anterior (Active)   Wound Image    10/21/20 1416   Wound Etiology Venous 10/21/20 1416   Dressing Status Old drainage noted 10/21/20 1416   Wound Cleansed Soap and water 10/21/20 1416   Dressing/Treatment Other (comment) 10/21/20 1416   Wound Length (cm) 6.5 cm 10/21/20 1416   Wound Width (cm) 3.5 cm 10/21/20 1416   Wound Depth (cm) 0.1 cm 10/21/20 1416   Wound Surface Area (cm^2) 22.75 cm^2 10/21/20 1416   Wound Volume (cm^3) 2.28 cm^3 10/21/20 1416   Wound Assessment Slough; Fluid filled blister 10/21/20 1416   Drainage Amount Copious 10/21/20 1416   Drainage Description Serous 10/21/20 1416   Odor None 10/21/20 1416   [place]-wound Assessment Intact;Edematous; Blanchable erythema 10/21/20 1416   Margins Attached edges 10/21/20 1416   Wound Thickness Description not for Pressure Injury Full thickness 10/21/20 1416   Number of days: 0       Wound 10/21/20 Pretibial Left;Medial wound 2- left medial venous (Active)   Wound Image   10/21/20 1416   Wound Etiology Venous 10/21/20 1416   Dressing Status Old drainage noted 10/21/20 1416   Wound Cleansed Soap and water 10/21/20 1416   Dressing/Treatment Other (comment) 10/21/20 1416   Wound Length (cm) 2.5 cm 10/21/20 1416   Wound Width (cm) 3.5 cm 10/21/20 1416   Wound Depth (cm) 0.1 cm 10/21/20 1416   Wound Surface Area (cm^2) 8.75 cm^2 10/21/20 1416   Wound Volume (cm^3) 0.88 cm^3 10/21/20 1416   Wound Assessment [facility] 10/21/20 1416   Drainage Amount Copious 10/21/20 1416   Drainage Description Serous 10/21/20 1416   Odor None 10/21/20 1416   [place]-wound Assessment Intact;Edematous 10/21/20 1416   Margins Attached edges 10/21/20 1416   Wound Thickness Description not for Pressure Injury Full thickness 10/21/20 1416   Number of days: 0       Wound 10/21/20 Pretibial Right;Posterior wound 3- right posterior venous (Active)   Wound Image   10/21/20 1416   Wound Etiology Venous 10/21/20 1416   Dressing Status Old drainage noted 10/21/20 1416   Wound Cleansed Soap and water 10/21/20 1416   Dressing/Treatment Other (comment) 10/21/20 1416   Wound Length (cm) 1 cm 10/21/20 1416   Wound Width (cm) 1 cm 10/21/20 1416   Wound Depth (cm) 0.1 cm 10/21/20 1416   Wound Surface Area (cm^2) 1 cm^2 10/21/20 1416   Wound Volume (cm^3) 0.1 cm^3 10/21/20 1416   Wound Assessment Slough 10/21/20 1416   Drainage Amount Moderate 10/21/20 1416   Drainage Description Serous 10/21/20 1416   Odor None 10/21/20 1416   [place]-wound Assessment Edematous 10/21/20 1416   Margins Attached edges 10/21/20 1416   Wound Thickness Description not for Pressure Injury Full thickness 10/21/20 1416   Number of days: 0          Supplies Requested :      WOUND #: 1, 2 and 3   PRIMARY DRESSING:  Other: juxtulite            Patient currently being seen by Home Health: [] Yes   [x] No    Electronically signed by INGRID Iniguez CNP on 10/21/2020 at 4:22 PM     Provider Information:      PROVIDER'S NAME: Levi QUINTERO    NPI: [id number]

## 2020-10-21 NOTE — CARE COORDINATION
[facility] Transitions Follow Up Call    10/21/2020    Patient: Alyssa Dixon  Patient : 1950   MRN: [id number]  Reason for Admission:   Discharge Date: 8/10/20 RARS: Readmission Risk Score: 29    Follow Up: Attempted to contact patient for BPCI-A follow up. Unable to reach patient. Unable to leave a message. Mailbox is full. Will try again at a later time.       Future Appointments   Date Time Provider Marquis Pang   10/21/2020  1:00 PM INGRID Irwin CNP [facility] [address] [facility] [address]   10/28/2020 10:30 AM INGRID Lofton CNP [facility] [place]   12/3/2020 10:45 AM Harvinder Kendall MD [facility] NEURO [place]       Byrd Sandifer, RN

## 2020-10-21 NOTE — PROGRESS NOTES
Edema of both lower extremities due to peripheral venous insufficiency I87.2       She reports she developed a wound on right/left leg. This started 2 month(s) ago. She believes this is not healing. She has been applying nothing. She has not had  fever orchills. She has a history of diabetes mellitus and peripheral vascular disease. Johanna Garcia is a 71 y.o. female with the following history reviewed and recorded in [facility]:    Current Outpatient Medications   Medication Sig Dispense Refill    atorvastatin (LIPITOR) 10 MG tablet TAKE ONE TABLET BY MOUTH EACH EVENING 30 tablet 3    Cholecalciferol (VITAMIN D3) 50 MCG (2000 UT) CAPS Take 1 capsule by mouth      LINZESS 290 MCG CAPS capsule TAKE ONE CAPSULE IN THE MORNING BEFORE BREAKFAST 30 capsule 5    traMADol (ULTRAM) 50 MG tablet Take 1 tablet by mouth 2 times daily as needed for Pain for up to 30 days.  60 tablet 0    albuterol (ACCUNEB) 1.25 MG/3ML nebulizer solution Inhale 3 mLs into the lungs every 6 hours as needed for Wheezing 360 mL 3    losartan (COZAAR) 100 MG tablet TAKE ONE TABLET BY MOUTH EVERY DAY 30 tablet 5    insulin aspart (NOVOLOG) 100 UNIT/ML injection vial Inject 22 Units into the skin 3 times daily (before meals) 3 vial 3    tiZANidine (ZANAFLEX) 2 MG tablet Take 1 tablet by mouth nightly as needed (muscle spasms) 30 tablet 0    fluconazole (DIFLUCAN) 150 MG tablet Take one on the 15th of every month 1 tablet 11    allopurinol (ZYLOPRIM) 100 MG tablet Take 1 tablet by mouth daily      erythromycin (ROMYCIN) 5 MG/GM ophthalmic ointment Place into both eyes nightly as needed      insulin detemir (LEVEMIR FLEXTOUCH) 100 UNIT/ML injection pen Inject 70 units subcu nightly for type 2 diabetes 5 pen 3    gabapentin (NEURONTIN) 600 MG tablet TAKE ONE TABLET BY MOUTH 3 TIMES DAILY AS NEEDED 90 tablet 5    omeprazole (PRILOSEC) 40 MG delayed release capsule TAKE ONE CAPSULE BY MOUTH EVERY DAY 30 capsule 3    carbidopa-levodopa (SINEMET)  MG per tablet TAKE TWO TABLETS BY MOUTH 3 TIMES DAILY 180 tablet 5    rOPINIRole (REQUIP) 4 MG tablet TAKE TWO TABLETS EVERY MORNING TAKE ONE TABLET AT NOON AND TAKE TWO TABLETS EVERY NIGHT AT BEDTIME 150 tablet 5    montelukast (SINGULAIR) 10 MG tablet TAKE ONE TABLET BY MOUTH EVERY NIGHT AT BEDTIME 30 tablet 5    Liraglutide (VICTOZA) 18 MG/3ML SOPN SC injection Inject 1.8 mg into the skin daily 9 mL 3    nystatin (NYSTATIN) 413437 UNIT/GM powder Apply topically 3 times daily Apply topically 4 times daily. 60 g 3    torsemide (DEMADEX) 20 MG tablet Take 20 mg by mouth daily 3 tablets for three days then two tablets daily       sotalol (BETAPACE) 120 MG tablet Take 1 tablet by mouth 2 times daily 180 tablet 3    clobetasol (TEMOVATE) 0.05 % ointment Apply external vagina 3 x a day for week one, then decrease to BID on week two, on week 3 once a day, on week four every other day then stop 1 Tube 1    Melatonin 10 MG CAPS Take 10 mg by mouth every evening Takes 20 mg a night      denosumab (PROLIA) 60 MG/ML SOSY SC injection Inject 60 mg into the skin every 6 months      spironolactone (ALDACTONE) 25 MG tablet TAKE ONE TABLET DAILY (Patient taking differently: One tablet in the am and one tablet in the evening) 30 tablet 5    nystatin (MYCOSTATIN) 248547 UNIT/GM ointment Apply topically 2 times daily. for yeast 60 Tube 5    albuterol (PROVENTIL HFA;VENTOLIN HFA) 108 (90 BASE) MCG/ACT inhaler Inhale 2 puffs into the lungs every 6 hours as needed for Wheezing 1 Inhaler 1    OXYGEN 2L NC 12-24 hours (Patient taking differently: nightly as needed 2L NC 12-24 hours) 1 Device 0    aspirin 325 MG tablet Take 325 mg by mouth daily.  COMFORT EZ PEN NEEDLES 32G X 4 MM MISC APPLY USE WITH INSULIN  THREE TIMES PER DAY. DX: E11.8, Z79.4 100 each 3    blood glucose monitor strips Test 3times a day & as needed for symptoms of irregular blood glucose.  100 strip 3    EASY COMFORT INSULIN SYRINGE 31G X 516\" 1 ML MISC INJECT AS DIRECTED DAILY 100 each 3    blood glucose monitor kit and supplies Use as directed for diabetes TID checks. 1 kit 0    TRUEPLUS INSULIN SYRINGE 30G X 5/16\" 1 ML MISC INJECT AS DIRECTED DAILY 100 each 3    glucose blood VI test strips (ONE TOUCH ULTRA TEST) strip Inject 1 each into the skin daily As needed. 100 each 3    Lancets MISC 1 lancet to check glucose daily 100 each 3    Insulin Pen Needle 31G X 8 MM MISC Inject 1 each into the skin daily 100 each 2     No current facility-administered medications for this encounter. Allergies: Codeine and Hydrocodone-acetaminophen  Past Medical History:   Diagnosis Date    Asthma 2015    Bilateral carpal tunnel syndrome 2018    sees dr. Mayo Hutchison.     Colon polyp     Diabetes mellitus (HCC)     GERD (gastroesophageal reflux disease)     HTN (hypertension)     Hyperlipidemia     Mild mitral regurgitation by prior echocardiogram 2016    Morbid obesity ([address] 10/18/2017    Normal cardiac stress test 4-2-09    no ischemia at attained level of excercise    Palliative care patient 2020    Parkinson disease [facility])     Paroxysmal atrial fibrillation [facility])     sees dr. Vish Angel Post-menopausal     Prolonged emergence from general anesthesia     Restrictive airway disease     Stage 3 chronic kidney disease 10/18/2017       Past Surgical History:   Procedure Laterality Date    APPENDECTOMY       SECTION      x3    COLONOSCOPY      Dr Arlin Landa polyp-5 yr recall    COLONOSCOPY N/A 2019    Dr Schwarz Peoples 2 internal hemorrhoids without stigmata, diverticulosis, suboptimal prep--5 yr recall    GALLBLADDER SURGERY  2014    dr Yoni Willis N/CARPAL TUNNEL SURG Right 2018    OPEN CARPAL TUNNEL RELEASE performed by Harry Marino MD at [address] [place] [address]  2013    dr Jeffery Horne in [facility] GASTROINTESTINAL ENDOSCOPY       Family History   Problem Relation Age of Onset    High Blood Pressure Father     Diabetes Father     Parkinsonism Father     High Blood Pressure Mother     Diabetes Sister     Other Paternal Grandmother         hiatal hernia    Heart Disease Paternal Grandfather     Colon Cancer Neg Hx     Colon Polyps Neg Hx     Esophageal Cancer Neg Hx     Liver Cancer Neg Hx     Liver Disease Neg Hx     Rectal Cancer Neg Hx     Stomach Cancer Neg Hx      Social History     Tobacco Use    Smoking status: Never Smoker    Smokeless tobacco: Never Used   Substance Use Topics    Alcohol use: No         Review of Systems    Review of Systems   Skin: Positive for color change and wound. All other systems reviewed and are negative. All other review of systems are negative. Physical Exam    /69   Pulse 71   Temp 96.8 °F (36 °C) (Temporal)   Resp 20   Ht 5' 2\" (1.575 m)   Wt 289 lb (131.1 kg)   BMI 52.86 kg/m²     Physical Exam  Vitals signs reviewed. Exam conducted with a chaperone present. Constitutional:       Appearance: Normal appearance. She is obese. HENT:      Head: Normocephalic and atraumatic. Right Ear: External ear normal.      Left Ear: External ear normal.   Eyes:      General: Lids are normal. Lids are everted, no foreign bodies appreciated. Vision grossly intact. Gaze aligned appropriately. Neck:      Musculoskeletal: Normal range of motion. Cardiovascular:      Rate and Rhythm: Normal rate and regular rhythm. Pulses: Normal pulses. Heart sounds: Normal heart sounds. Pulmonary:      Effort: Pulmonary effort is normal.      Breath sounds: Normal breath sounds. Abdominal:      General: Bowel sounds are normal.   Musculoskeletal: Normal range of motion. General: Swelling present. Skin:     General: Skin is warm and dry. Capillary Refill: Capillary refill takes 2 to 3 seconds.       Findings: Erythema and wound present. Neurological:      Mental Status: She is alert and oriented to person, place, and time. Psychiatric:         Mood and Affect: Mood normal.         Behavior: Behavior normal.         Thought Content: Thought content normal.         Judgment: Judgment normal.             Post Debridement Measurements and Assessment:    The patientspain is   . Please refer to nursing measurements and assessment regarding wound pre and postdebridement. Incision 08/29/18 Arm Right (Active)   Number of days: 784       Wound 10/21/20 Pretibial Distal;Left wound 1- left venous anterior (Active)   Wound Image    10/21/20 1416   Wound Etiology Venous 10/21/20 1416   Dressing Status Old drainage noted 10/21/20 1416   Wound Cleansed Soap and water 10/21/20 1416   Dressing/Treatment Other (comment) 10/21/20 1416   Wound Length (cm) 6.5 cm 10/21/20 1416   Wound Width (cm) 3.5 cm 10/21/20 1416   Wound Depth (cm) 0.1 cm 10/21/20 1416   Wound Surface Area (cm^2) 22.75 cm^2 10/21/20 1416   Wound Volume (cm^3) 2.28 cm^3 10/21/20 1416   Wound Assessment Slough; Fluid filled blister 10/21/20 1416   Drainage Amount Copious 10/21/20 1416   Drainage Description Serous 10/21/20 1416   Odor None 10/21/20 1416   [place]-wound Assessment Intact;Edematous; Blanchable erythema 10/21/20 1416   Margins Attached edges 10/21/20 1416   Wound Thickness Description not for Pressure Injury Full thickness 10/21/20 1416   Number of days: 0       Wound 10/21/20 Pretibial Left;Medial wound 2- left medial venous (Active)   Wound Image   10/21/20 1416   Wound Etiology Venous 10/21/20 1416   Dressing Status Old drainage noted 10/21/20 1416   Wound Cleansed Soap and water 10/21/20 1416   Dressing/Treatment Other (comment) 10/21/20 1416   Wound Length (cm) 2.5 cm 10/21/20 1416   Wound Width (cm) 3.5 cm 10/21/20 1416   Wound Depth (cm) 0.1 cm 10/21/20 1416   Wound Surface Area (cm^2) 8.75 cm^2 10/21/20 1416   Wound Volume (cm^3) 0.88 cm^3 10/21/20 1416 Wound Assessment [facility] 10/21/20 1416   Drainage Amount Copious 10/21/20 1416   Drainage Description Serous 10/21/20 1416   Odor None 10/21/20 1416   [place]-wound Assessment Intact;Edematous 10/21/20 1416   Margins Attached edges 10/21/20 1416   Wound Thickness Description not for Pressure Injury Full thickness 10/21/20 1416   Number of days: 0       Wound 10/21/20 Pretibial Right;Posterior wound 3- right posterior venous (Active)   Wound Image   10/21/20 1416   Wound Etiology Venous 10/21/20 1416   Dressing Status Old drainage noted 10/21/20 1416   Wound Cleansed Soap and water 10/21/20 1416   Dressing/Treatment Other (comment) 10/21/20 1416   Wound Length (cm) 1 cm 10/21/20 1416   Wound Width (cm) 1 cm 10/21/20 1416   Wound Depth (cm) 0.1 cm 10/21/20 1416   Wound Surface Area (cm^2) 1 cm^2 10/21/20 1416   Wound Volume (cm^3) 0.1 cm^3 10/21/20 1416   Wound Assessment Slough 10/21/20 1416   Drainage Amount Moderate 10/21/20 1416   Drainage Description Serous 10/21/20 1416   Odor None 10/21/20 1416   [place]-wound Assessment Edematous 10/21/20 1416   Margins Attached edges 10/21/20 1416   Wound Thickness Description not for Pressure Injury Full thickness 10/21/20 1416   Number of days: 0          Assessment    1. Diabetic ulcer of left lower leg associated with type 2 diabetes mellitus, with fat layer exposed ([address]    2. Diabetic ulcer of right lower leg associated with type 2 diabetes mellitus, with fat layer exposed ([address]    3. Morbid obesity ([address]    4. Edema of both lower extremities due to peripheral venous insufficiency    5. Parkinson disease ([address]    6. Varicose veins of left lower extremity with ulcer of calf (CODE) ([address]          Plan    1. BIJU prior to next visit    Planfor wound - Dress per physician order  Treatment:     Compression : Yes   Offloading : No   Dressing Orders:  Right and left leg wounds: Soap and water wash, restore to the wound bed, secure with kerramax, rolled gauze, tape, and an ace wrap. Change the daily. Change as needed if it becomes saturated. Treatment Orders:  Protein rich diet (unless restricted by your physician); Multivitamin daily; Elevate legs above the level of your heart when sitting 3-4 times daily for at least one hour each time, avoid standing for long periods of time. Discussed importance of nutrition, glucose control, leg elevation, compression, wound care, and plan of care. Patient understanding and questions answered. I spent a total of 60 minutes face to face with the patient. Over 75% of that time was spent on counseling and care coordination. Patient was told that if symptoms worsen or new symptoms develop they are to go to the emergency department immediately. Patient was educated on diagnosis and treatment plan. All of patient's questions were answered, and the patient understands the discharge plan. Discussed appropriate home care of this wound. Wound redressed. Patient instructions were given.   Recommend no smoking  Offloading instructions given

## 2020-10-28 ENCOUNTER — OFFICE VISIT (OUTPATIENT)
Dept: PRIMARY CARE CLINIC | Age: 70
End: 2020-10-28
Payer: MEDICARE

## 2020-10-28 VITALS
SYSTOLIC BLOOD PRESSURE: 133 MMHG | OXYGEN SATURATION: 95 % | RESPIRATION RATE: 16 BRPM | HEART RATE: 78 BPM | TEMPERATURE: 98.5 F | HEIGHT: 63 IN | DIASTOLIC BLOOD PRESSURE: 82 MMHG | WEIGHT: 293 LBS | BODY MASS INDEX: 51.91 KG/M2

## 2020-10-28 LAB
ALBUMIN SERPL-MCNC: 3.9 G/DL (ref 3.5–5.2)
ALP BLD-CCNC: 96 U/L (ref 35–104)
ALT SERPL-CCNC: 6 U/L (ref 5–33)
ANION GAP SERPL CALCULATED.3IONS-SCNC: 15 MMOL/L (ref 7–19)
AST SERPL-CCNC: 10 U/L (ref 5–32)
BILIRUB SERPL-MCNC: <0.2 MG/DL (ref 0.2–1.2)
BUN BLDV-MCNC: 32 MG/DL (ref 8–23)
CALCIUM SERPL-MCNC: 9.9 MG/DL (ref 8.8–10.2)
CHLORIDE BLD-SCNC: 100 MMOL/L (ref 98–111)
CO2: 23 MMOL/L (ref 22–29)
CREAT SERPL-MCNC: 1.6 MG/DL (ref 0.5–0.9)
GFR AFRICAN AMERICAN: 39
GFR NON-AFRICAN AMERICAN: 32
GLUCOSE BLD-MCNC: 263 MG/DL (ref 74–109)
POTASSIUM SERPL-SCNC: 4.9 MMOL/L (ref 3.5–5)
SODIUM BLD-SCNC: 138 MMOL/L (ref 136–145)
TOTAL PROTEIN: 7.4 G/DL (ref 6.6–8.7)

## 2020-10-28 PROCEDURE — 99214 OFFICE O/P EST MOD 30 MIN: CPT | Performed by: NURSE PRACTITIONER

## 2020-10-28 PROCEDURE — G8417 CALC BMI ABV UP PARAM F/U: HCPCS | Performed by: NURSE PRACTITIONER

## 2020-10-28 PROCEDURE — G8427 DOCREV CUR MEDS BY ELIG CLIN: HCPCS | Performed by: NURSE PRACTITIONER

## 2020-10-28 PROCEDURE — 4040F PNEUMOC VAC/ADMIN/RCVD: CPT | Performed by: NURSE PRACTITIONER

## 2020-10-28 PROCEDURE — 36415 COLL VENOUS BLD VENIPUNCTURE: CPT | Performed by: NURSE PRACTITIONER

## 2020-10-28 PROCEDURE — G8484 FLU IMMUNIZE NO ADMIN: HCPCS | Performed by: NURSE PRACTITIONER

## 2020-10-28 PROCEDURE — 1090F PRES/ABSN URINE INCON ASSESS: CPT | Performed by: NURSE PRACTITIONER

## 2020-10-28 PROCEDURE — 2022F DILAT RTA XM EVC RTNOPTHY: CPT | Performed by: NURSE PRACTITIONER

## 2020-10-28 PROCEDURE — 1123F ACP DISCUSS/DSCN MKR DOCD: CPT | Performed by: NURSE PRACTITIONER

## 2020-10-28 PROCEDURE — 3017F COLORECTAL CA SCREEN DOC REV: CPT | Performed by: NURSE PRACTITIONER

## 2020-10-28 PROCEDURE — G8399 PT W/DXA RESULTS DOCUMENT: HCPCS | Performed by: NURSE PRACTITIONER

## 2020-10-28 PROCEDURE — 1036F TOBACCO NON-USER: CPT | Performed by: NURSE PRACTITIONER

## 2020-10-28 PROCEDURE — 3052F HG A1C>EQUAL 8.0%<EQUAL 9.0%: CPT | Performed by: NURSE PRACTITIONER

## 2020-10-28 ASSESSMENT — ENCOUNTER SYMPTOMS
RESPIRATORY NEGATIVE: 1
EYES NEGATIVE: 1
GASTROINTESTINAL NEGATIVE: 1

## 2020-10-28 NOTE — PROGRESS NOTES
[facility]IC[facility]  [facility]  [address] [place] [address]  [address] [place] [address]  Dept: [phone]  Dept Fax: [phone]  Loc: [phone]    Khadra Coleman is a 71 y.o. female who presents today for her medical conditions/complaints as noted below. Khadra Coleman is c/o of Follow-up (patient presents today for follow up )        HPI:     HPI   Chief Complaint   Patient presents with    Follow-up     patient presents today for follow up    Is her first follow-up since she was admitted to the hospital in August for COVID-19. She says she has recovered well and back to her normal self. She admits she is not following a diabetic diet in fact today she had [employer] biscuits and gravy and a biscuit and sausage . sugar 127-186. She is seeing wound care for both foot  . Her daughter is with her today and has been helping change her dressings. she is on levemir 70 U daily. novalog about 22 U. victoza is 1.8  Past Medical History:   Diagnosis Date    Asthma 2015    Bilateral carpal tunnel syndrome 2018    sees dr. Myers Pert.     Colon polyp     Diabetes mellitus (HCC)     GERD (gastroesophageal reflux disease)     HTN (hypertension)     Hyperlipidemia     Mild mitral regurgitation by prior echocardiogram 2016    Morbid obesity ([address] 10/18/2017    Normal cardiac stress test 4-2-09    no ischemia at attained level of excercise    Palliative care patient 2020    Parkinson disease [facility])     Paroxysmal atrial fibrillation [facility])     sees dr. Cayetano Farris Post-menopausal     Prolonged emergence from general anesthesia     Restrictive airway disease     Stage 3 chronic kidney disease 10/18/2017      Past Surgical History:   Procedure Laterality Date    APPENDECTOMY       SECTION      x3    COLONOSCOPY      Dr Debora Nageotte polyp-5 yr recall    COLONOSCOPY N/A 2019    Dr Ainsley Jacinto 2 internal hemorrhoids without stigmata, diverticulosis, suboptimal prep--5 yr recall    GALLBLADDER SURGERY  12/11/2014    dr Brenda Toth ARTHROSCOPY      HI [facility] N/CARPAL TUNNEL SURG Right 8/29/2018    OPEN CARPAL TUNNEL RELEASE performed by Carli García MD at [address] TYMPANOSTOMY TUBE PLACEMENT  2013    dr Javan Galeazzi in [facility] 10/28/2020 10/21/2020 8/10/2020 8/10/2020 8/10/2020 0/17/5728   SYSTOLIC 300 032 - 599 - -   DIASTOLIC 82 69 - 71 - -   Pulse 78 71 - 78 - -   Temp 98.5 96.8 - 97.8 - -   Resp 16 20 18 20 18 -   SpO2 95 - 93 93 94 -   Weight 296 lb 289 lb - - - -   Height 5' 2.5\" 5' 2\" - - - -   Body mass index 53.27 kg/m2 52.85 kg/m2 - - - -   Pain Level - - - - - 5   Some recent data might be hidden       Family History   Problem Relation Age of Onset    High Blood Pressure Father     Diabetes Father     Parkinsonism Father     High Blood Pressure Mother     Diabetes Sister     Other Paternal Grandmother         hiatal hernia    Heart Disease Paternal Grandfather     Colon Cancer Neg Hx     Colon Polyps Neg Hx     Esophageal Cancer Neg Hx     Liver Cancer Neg Hx     Liver Disease Neg Hx     Rectal Cancer Neg Hx     Stomach Cancer Neg Hx        Social History     Tobacco Use    Smoking status: Never Smoker    Smokeless tobacco: Never Used   Substance Use Topics    Alcohol use: No      Current Outpatient Medications   Medication Sig Dispense Refill    atorvastatin (LIPITOR) 10 MG tablet TAKE ONE TABLET BY MOUTH EACH EVENING 30 tablet 3    COMFORT EZ PEN NEEDLES 32G X 4 MM MISC APPLY USE WITH INSULIN  THREE TIMES PER DAY. DX: E11.8, Z79.4 100 each 3    Cholecalciferol (VITAMIN D3) 50 MCG (2000 UT) CAPS Take 1 capsule by mouth      LINZESS 290 MCG CAPS capsule TAKE ONE CAPSULE IN THE MORNING BEFORE BREAKFAST 30 capsule 5    traMADol (ULTRAM) 50 MG tablet Take 1 tablet by mouth 2 times daily as needed for Pain for up to 30 days.  60 tablet 0    blood glucose directed for diabetes TID checks. 1 kit 0    sotalol (BETAPACE) 120 MG tablet Take 1 tablet by mouth 2 times daily 180 tablet 3    clobetasol (TEMOVATE) 0.05 % ointment Apply external vagina 3 x a day for week one, then decrease to BID on week two, on week 3 once a day, on week four every other day then stop 1 Tube 1    Melatonin 10 MG CAPS Take 10 mg by mouth every evening Takes 20 mg a night      denosumab (PROLIA) 60 MG/ML SOSY SC injection Inject 60 mg into the skin every 6 months      spironolactone (ALDACTONE) 25 MG tablet TAKE ONE TABLET DAILY (Patient taking differently: One tablet in the am and one tablet in the evening) 30 tablet 5    nystatin (MYCOSTATIN) 391085 UNIT/GM ointment Apply topically 2 times daily. for yeast 60 Tube 5    glucose blood VI test strips (ONE TOUCH ULTRA TEST) strip Inject 1 each into the skin daily As needed. 100 each 3    Lancets MISC 1 lancet to check glucose daily 100 each 3    Insulin Pen Needle 31G X 8 MM MISC Inject 1 each into the skin daily 100 each 2    albuterol (PROVENTIL HFA;VENTOLIN HFA) 108 (90 BASE) MCG/ACT inhaler Inhale 2 puffs into the lungs every 6 hours as needed for Wheezing 1 Inhaler 1    OXYGEN 2L NC 12-24 hours (Patient taking differently: nightly as needed 2L NC 12-24 hours) 1 Device 0    aspirin 325 MG tablet Take 325 mg by mouth daily.  TRUEPLUS INSULIN SYRINGE 30G X 5/16\" 1 ML MISC INJECT AS DIRECTED DAILY 100 each 3     No current facility-administered medications for this visit.       Allergies   Allergen Reactions    Codeine      itching    Hydrocodone-Acetaminophen Nausea Only       Health Maintenance   Topic Date Due    DTaP/Tdap/Td vaccine (1 - Tdap) 12/27/1969    Shingles Vaccine (1 of 2) 12/27/2000    Diabetic foot exam  04/12/2017    Breast cancer screen  03/22/2020    Flu vaccine (1) 09/01/2020    Lipid screen  12/17/2020    Annual Wellness Visit (AWV)  12/17/2020    Diabetic retinal exam  05/14/2021    A1C test (Diabetic or Prediabetic)  08/07/2021    Potassium monitoring  08/09/2021    Creatinine monitoring  08/09/2021    Colon cancer screen colonoscopy  06/18/2024    DEXA (modify frequency per FRAX score)  Completed    Pneumococcal 65+ yrs at Risk Vaccine  Completed    Hepatitis C screen  Addressed    Hepatitis A vaccine  Aged Out    Hib vaccine  Aged Out    Meningococcal (ACWY) vaccine  Aged Out       Subjective:      Review of Systems   Constitutional: Negative. HENT: Negative. Eyes: Negative. Respiratory: Negative. Cardiovascular: Negative. Gastrointestinal: Negative. Genitourinary: Negative. Musculoskeletal: Negative. Skin: Negative. Neurological: Negative. Psychiatric/Behavioral: Negative. Objective:     Physical Exam  Vitals signs and nursing note reviewed. Constitutional:       Appearance: She is well-developed. HENT:      Head: Normocephalic. Eyes:      Pupils: Pupils are equal, round, and reactive to light. Neck:      Musculoskeletal: Normal range of motion. Cardiovascular:      Rate and Rhythm: Normal rate and regular rhythm. Heart sounds: Normal heart sounds. Pulmonary:      Effort: Pulmonary effort is normal.      Breath sounds: Normal breath sounds. Skin:     General: Skin is warm and dry. Capillary Refill: Capillary refill takes less than 2 seconds. Neurological:      General: No focal deficit present. Mental Status: She is alert and oriented to person, place, and time. Psychiatric:         Behavior: Behavior normal.         Thought Content: Thought content normal.         Judgment: Judgment normal.       /82   Pulse 78   Temp 98.5 °F (36.9 °C) (Temporal)   Resp 16   Ht 5' 2.5\" (1.588 m)   Wt 296 lb (134.3 kg)   SpO2 95%   Breastfeeding No   BMI 53.28 kg/m²     Assessment:       Diagnosis Orders   1. Type 2 diabetes mellitus with complication (HCC)  Comprehensive Metabolic Panel   2.  Cellulitis of left lower extremity  Comprehensive Metabolic Panel   3. Stage 4 chronic kidney disease ([facility]  Comprehensive Metabolic Panel         Plan:   More than 50% of the time was spent counseling and coordinating care for a total time of 30 min face to face. Tried to repeat her hemoglobin A1c but insurance would not let me repeat it because it did not been 30 days. I went ahead and did a CMP today she will continue with wound care. I have stressed to her the importance of watching her diet but she has been noncompliant for quite some time now her last hemoglobin A1c was 8.6. Patient given educational materials -see patient instructions. Discussed use, benefit, and side effects of prescribed medications. All patient questions answered. Pt voiced understanding. Reviewed health maintenance. Instructed to continue currentmedications, diet and exercise. Patient agreed with treatment plan. Follow up as directed. MEDICATIONS:  No orders of the defined types were placed in this encounter. ORDERS:  Orders Placed This Encounter   Procedures    Comprehensive Metabolic Panel       Follow-up:  No follow-ups on file. PATIENT INSTRUCTIONS:  There are no Patient Instructions on file for this visit. Electronically signed by INGRID Montgomery CNP on 10/28/2020 at 1:58 PM    EMR Dragon/transcription disclaimer:  Much of thisencounter note is electronic transcription/translation of spoken language to printed texts. The electronic translation of spoken language may be erroneous, or at times, nonsensical words or phrases may be inadvertentlytranscribed.   Although I have reviewed the note for such errors, some may still exist.

## 2020-10-29 ENCOUNTER — HOSPITAL ENCOUNTER (OUTPATIENT)
Dept: WOUND CARE | Age: 70
Discharge: HOME | End: 2020-10-29
Payer: MEDICARE

## 2020-10-29 ENCOUNTER — HOSPITAL ENCOUNTER (OUTPATIENT)
Dept: NON INVASIVE DIAGNOSTICS | Age: 70
Discharge: HOME | End: 2020-10-29
Payer: MEDICARE

## 2020-10-29 VITALS
WEIGHT: 293 LBS | HEART RATE: 70 BPM | SYSTOLIC BLOOD PRESSURE: 113 MMHG | TEMPERATURE: 96.2 F | RESPIRATION RATE: 16 BRPM | DIASTOLIC BLOOD PRESSURE: 68 MMHG | BODY MASS INDEX: 53.92 KG/M2 | HEIGHT: 62 IN

## 2020-10-29 PROCEDURE — 97597 DBRDMT OPN WND 1ST 20 CM/<: CPT | Performed by: NURSE PRACTITIONER

## 2020-10-29 PROCEDURE — 97598 DBRDMT OPN WND ADDL 20CM/<: CPT | Performed by: NURSE PRACTITIONER

## 2020-10-29 PROCEDURE — 93923 UPR/LXTR ART STDY 3+ LVLS: CPT

## 2020-10-29 PROCEDURE — 97597 DBRDMT OPN WND 1ST 20 CM/<: CPT

## 2020-10-29 PROCEDURE — 97598 DBRDMT OPN WND ADDL 20CM/<: CPT

## 2020-10-29 NOTE — PROGRESS NOTES
[employer] Application   Below Knee    NAME:  Stu Trevizo  YOB: 1950  MEDICAL RECORD NUMBER:  [id number]  DATE:  10/29/2020     [x] Applied moisturizing agent to dry skin as needed.  [x] Appied primary and secondary dressing as ordered     [x] Applied Unna roll from toes to knee overlapping each time.  [x] Applied ace wrap or coban from toes to below the knee.  [x] Instructed patient/caregiver to keep dressing dry and intact. DO NOT REMOVE DRESSING.  [x] Instructed pt/family/caregiver to report excessive draining, loose bandage, wet dressing, severe pain or tingling in toes.  [x] Applied [employer] dressing below the knee to Bilateral lower leg(s)    [x]  Unna Boot(s) were applied per  Guidelines.      Electronically signed by Micki Zaman RN on 10/29/2020 at 11:37 AM

## 2020-10-29 NOTE — PROGRESS NOTES
[address]   Progress Note and Procedure Note      Rashi Velazco  MEDICAL RECORD NUMBER:  [id number]  AGE: 71 y.o. GENDER: female  : 1950  EPISODE DATE:  10/29/2020    Subjective:     Chief Complaint   Patient presents with    Wound Check     bilateral leg wounds         HISTORY of PRESENT ILLNESS HPI     Rashi Velazco is a 71 y.o. female who presents today for wound/ulcer evaluation. History of Wound Context: bilateral leg wound follow up  Ulcer Identification:  Ulcer Type: venous  Contributing Factors: edema, venous stasis, diabetes, shear force and obesity    PAST MEDICAL HISTORY        Diagnosis Date    Asthma 2015    Bilateral carpal tunnel syndrome 2018    sees dr. Shelby Whitaker.     Colon polyp     Diabetes mellitus (HCC)     GERD (gastroesophageal reflux disease)     HTN (hypertension)     Hyperlipidemia     Mild mitral regurgitation by prior echocardiogram 2016    Morbid obesity ([address] 10/18/2017    Normal cardiac stress test 4-2-09    no ischemia at attained level of excercise    Palliative care patient 2020    Parkinson disease [facility])     Paroxysmal atrial fibrillation [facility])     sees dr. Elle De Jesus Post-menopausal     Prolonged emergence from general anesthesia     Restrictive airway disease     Stage 3 chronic kidney disease 10/18/2017       PAST SURGICAL HISTORY    Past Surgical History:   Procedure Laterality Date    APPENDECTOMY       SECTION      x3    COLONOSCOPY      Dr Anahi Barbour polyp-5 yr recall    COLONOSCOPY N/A 2019    Dr Alexys Ariza 2 internal hemorrhoids without stigmata, diverticulosis, suboptimal prep--5 yr recall    GALLBLADDER SURGERY  2014    dr Carissa Bustillo N/CARPAL TUNNEL SURG Right 2018    OPEN CARPAL TUNNEL RELEASE performed by Robb Michele MD at [address] [place] [address] TYMPANOSTOMY TUBE PLACEMENT       Eligio Santoyo in [place]    UPPER GASTROINTESTINAL ENDOSCOPY         FAMILY HISTORY    Family History   Problem Relation Age of Onset    High Blood Pressure Father     Diabetes Father     Parkinsonism Father     High Blood Pressure Mother     Diabetes Sister     Other Paternal Grandmother         hiatal hernia    Heart Disease Paternal Grandfather     Colon Cancer Neg Hx     Colon Polyps Neg Hx     Esophageal Cancer Neg Hx     Liver Cancer Neg Hx     Liver Disease Neg Hx     Rectal Cancer Neg Hx     Stomach Cancer Neg Hx        SOCIAL HISTORY    Social History     Tobacco Use    Smoking status: Never Smoker    Smokeless tobacco: Never Used   Substance Use Topics    Alcohol use: No    Drug use: No       ALLERGIES    Allergies   Allergen Reactions    Codeine      itching    Hydrocodone-Acetaminophen Nausea Only       MEDICATIONS    Current Outpatient Medications on File Prior to Encounter   Medication Sig Dispense Refill    atorvastatin (LIPITOR) 10 MG tablet TAKE ONE TABLET BY MOUTH EACH EVENING 30 tablet 3    COMFORT EZ PEN NEEDLES 32G X 4 MM MISC APPLY USE WITH INSULIN  THREE TIMES PER DAY. DX: E11.8, Z79.4 100 each 3    Cholecalciferol (VITAMIN D3) 50 MCG (2000 UT) CAPS Take 1 capsule by mouth      LINZESS 290 MCG CAPS capsule TAKE ONE CAPSULE IN THE MORNING BEFORE BREAKFAST 30 capsule 5    traMADol (ULTRAM) 50 MG tablet Take 1 tablet by mouth 2 times daily as needed for Pain for up to 30 days. 60 tablet 0    blood glucose monitor strips Test 3times a day & as needed for symptoms of irregular blood glucose.  100 strip 3    albuterol (ACCUNEB) 1.25 MG/3ML nebulizer solution Inhale 3 mLs into the lungs every 6 hours as needed for Wheezing 360 mL 3    losartan (COZAAR) 100 MG tablet TAKE ONE TABLET BY MOUTH EVERY DAY 30 tablet 5    insulin aspart (NOVOLOG) 100 UNIT/ML injection vial Inject 22 Units into the skin 3 times daily (before meals) 3 vial 3    tiZANidine (ZANAFLEX) 2 MG tablet Take 1 tablet by mouth nightly as needed (muscle spasms) 30 tablet 0    fluconazole (DIFLUCAN) 150 MG tablet Take one on the 15th of every month 1 tablet 11    allopurinol (ZYLOPRIM) 100 MG tablet Take 1 tablet by mouth daily      erythromycin (ROMYCIN) 5 MG/GM ophthalmic ointment Place into both eyes nightly as needed      insulin detemir (LEVEMIR FLEXTOUCH) 100 UNIT/ML injection pen Inject 70 units subcu nightly for type 2 diabetes 5 pen 3    gabapentin (NEURONTIN) 600 MG tablet TAKE ONE TABLET BY MOUTH 3 TIMES DAILY AS NEEDED 90 tablet 5    EASY COMFORT INSULIN SYRINGE 31G X 5/16\" 1 ML MISC INJECT AS DIRECTED DAILY 100 each 3    omeprazole (PRILOSEC) 40 MG delayed release capsule TAKE ONE CAPSULE BY MOUTH EVERY DAY 30 capsule 3    carbidopa-levodopa (SINEMET)  MG per tablet TAKE TWO TABLETS BY MOUTH 3 TIMES DAILY 180 tablet 5    rOPINIRole (REQUIP) 4 MG tablet TAKE TWO TABLETS EVERY MORNING TAKE ONE TABLET AT NOON AND TAKE TWO TABLETS EVERY NIGHT AT BEDTIME 150 tablet 5    montelukast (SINGULAIR) 10 MG tablet TAKE ONE TABLET BY MOUTH EVERY NIGHT AT BEDTIME 30 tablet 5    Liraglutide (VICTOZA) 18 MG/3ML SOPN SC injection Inject 1.8 mg into the skin daily 9 mL 3    nystatin (NYSTATIN) 697088 UNIT/GM powder Apply topically 3 times daily Apply topically 4 times daily. 60 g 3    torsemide (DEMADEX) 20 MG tablet Take 20 mg by mouth daily 3 tablets for three days then two tablets daily       blood glucose monitor kit and supplies Use as directed for diabetes TID checks.  1 kit 0    sotalol (BETAPACE) 120 MG tablet Take 1 tablet by mouth 2 times daily 180 tablet 3    TRUEPLUS INSULIN SYRINGE 30G X 5/16\" 1 ML MISC INJECT AS DIRECTED DAILY 100 each 3    clobetasol (TEMOVATE) 0.05 % ointment Apply external vagina 3 x a day for week one, then decrease to BID on week two, on week 3 once a day, on week four every other day then stop 1 Tube 1    Melatonin 10 MG CAPS Take 10 mg by mouth every evening Takes 20 mg a night      denosumab (PROLIA) 60 MG/ML SOSY SC injection Inject 60 mg into the skin every 6 months      spironolactone (ALDACTONE) 25 MG tablet TAKE ONE TABLET DAILY (Patient taking differently: One tablet in the am and one tablet in the evening) 30 tablet 5    nystatin (MYCOSTATIN) 583912 UNIT/GM ointment Apply topically 2 times daily. for yeast 60 Tube 5    glucose blood VI test strips (ONE TOUCH ULTRA TEST) strip Inject 1 each into the skin daily As needed. 100 each 3    Lancets MISC 1 lancet to check glucose daily 100 each 3    Insulin Pen Needle 31G X 8 MM MISC Inject 1 each into the skin daily 100 each 2    albuterol (PROVENTIL HFA;VENTOLIN HFA) 108 (90 BASE) MCG/ACT inhaler Inhale 2 puffs into the lungs every 6 hours as needed for Wheezing 1 Inhaler 1    OXYGEN 2L NC 12-24 hours (Patient taking differently: nightly as needed 2L NC 12-24 hours) 1 Device 0    aspirin 325 MG tablet Take 325 mg by mouth daily. No current facility-administered medications on file prior to encounter. REVIEW OF SYSTEMS    A comprehensive review of systems was negative.     Objective:      /68   Pulse 70   Temp 96.2 °F (35.7 °C) (Temporal)   Resp 16   Ht 5' 2\" (1.575 m)   Wt 296 lb (134.3 kg)   BMI 54.14 kg/m²     Wt Readings from Last 3 Encounters:   10/29/20 296 lb (134.3 kg)   10/28/20 296 lb (134.3 kg)   10/21/20 289 lb (131.1 kg)       PHYSICAL EXAM    General Appearance: alert and oriented to person, place and time, well developed and well- nourished, in no acute distress  Skin: warm and dry, no rash or erythema  Head: normocephalic and atraumatic  Eyes: pupils equal, round, and reactive to light, extraocular eye movements intact, conjunctivae normal  ENT: tympanic membrane, external ear and ear canal normal bilaterally, nose without deformity, nasal mucosa and turbinates normal without polyps  Neck: supple and non-tender without mass, no thyromegaly or thyroid nodules, no cervical lymphadenopathy  Pulmonary/Chest: clear to auscultation bilaterally- no wheezes, rales or rhonchi, normal air movement, no respiratory distress  Extremities: no cyanosis, clubbing or edema  Musculoskeletal: normal range of motion, no joint swelling, deformity or tenderness  Neurologic: reflexes normal and symmetric, no cranial nerve deficit, gait, coordination and speech normal      Assessment:      Patient Active Problem List   Diagnosis Code    Parkinson disease ([facility] G20    GERD (gastroesophageal reflux disease) K85.0    Lichen sclerosus U72.9    Palpitations R00.2    Fatigue R53.83    Asthma J45.909    Edema R60.9    Hypokalemia E87.6    PAF (paroxysmal atrial fibrillation) ([facility]) I48.0    Mild mitral regurgitation by prior echocardiogram I34.0    Restless legs syndrome G25.81    Hypoesthesia of skin R20.1    Somnolence, daytime R40.0    Morbid obesity ([facility]) E66.01    Stage 4 chronic kidney disease ([facility]) N18.4    Mixed hyperlipidemia E78.2    Cellulitis of left lower extremity L03. 116    Chronic obstructive pulmonary disease ([facility] J44.9    Diabetic ulcer of left lower leg associated with type 2 diabetes mellitus, with fat layer exposed ([facility] E11.622, M30.350    Varicose veins of left lower extremity with ulcer of calf (CODE) ([facility]) I83.022    Diabetic ulcer of right lower leg associated with type 2 diabetes mellitus, with fat layer exposed ([facility] E11.622, Y83.393    Edema of both lower extremities due to peripheral venous insufficiency I87.2        Procedure Note  Indications:  Based on my examination of this patient's wound(s)/ulcer(s) today, debridement is required to promote healing and evaluate the wound base.     Performed by: INGRID Sullivan CNP    Consent obtained:  Yes    Time out taken:  Yes    Pain Control:         Debridement:Non-excisional Debridement    Using curette the wound(s)/ulcer(s) was/were sharply debrided down through and including the removal of epidermis and dermis. Devitalized Tissue Debrided:  fibrin, biofilm, slough and exudate    Pre Debridement Measurements:  Are located in the [place]  Documentation Flow Sheet    Wound/Ulcer #: 1, 2 and 3    Post Debridement Measurements:  Wound/Ulcer Descriptions are Pre Debridement except measurements:      Percent of Wound/Ulcer Debrided: 100%    Total Surface Area Debrided:  22.54 sq cm     Wound 10/21/20 Pretibial Distal;Left wound 1- left venous anterior (Active)   Wound Image   10/29/20 1108   Wound Etiology Venous 10/29/20 1108   Dressing Status Old drainage noted 10/29/20 1108   Wound Cleansed Soap and water 10/21/20 1416   Dressing/Treatment Other (comment) 10/21/20 1416   Wound Length (cm) 6.5 cm 10/29/20 1108   Wound Width (cm) 3 cm 10/29/20 1108   Wound Depth (cm) 0.1 cm 10/29/20 1108   Wound Surface Area (cm^2) 19.5 cm^2 10/29/20 1108   Change in Wound Size % (l*w) 14.29 10/29/20 1108   Wound Volume (cm^3) 1.95 cm^3 10/29/20 1108   Wound Healing % 14 10/29/20 1108   Post-Procedure Length (cm) 6.5 cm 10/29/20 1117   Post-Procedure Width (cm) 3 cm 10/29/20 1117   Post-Procedure Depth (cm) 0.1 cm 10/29/20 1117   Post-Procedure Surface Area (cm^2) 19.5 cm^2 10/29/20 1117   Post-Procedure Volume (cm^3) 1.95 cm^3 10/29/20 1117   Wound Assessment Slough; Fluid filled blister 10/29/20 1108   Drainage Amount Copious 10/29/20 1108   Drainage Description Serous 10/29/20 1108   Odor None 10/29/20 1108   [place]-wound Assessment Intact;Edematous; Blanchable erythema 10/29/20 1108   Margins Attached edges 10/29/20 1108   Wound Thickness Description not for Pressure Injury Full thickness 10/29/20 1108   Number of days: 7       Wound 10/21/20 Pretibial Left;Medial wound 2- left medial venous (Active)   Wound Image   10/29/20 1108   Wound Etiology Venous 10/29/20 1108   Dressing Status Old drainage noted 10/29/20 1108   Wound Cleansed Soap and water 10/21/20 1416   Dressing/Treatment Other (comment) 10/21/20 1416   Wound Length (cm) 1 cm 10/29/20 1108   Wound Width (cm) 3 cm 10/29/20 1108   Wound Depth (cm) 0.1 cm 10/29/20 1108   Wound Surface Area (cm^2) 3 cm^2 10/29/20 1108   Change in Wound Size % (l*w) 65.71 10/29/20 1108   Wound Volume (cm^3) 0.3 cm^3 10/29/20 1108   Wound Healing % 66 10/29/20 1108   Post-Procedure Length (cm) 1 cm 10/29/20 1117   Post-Procedure Width (cm) 3 cm 10/29/20 1117   Post-Procedure Depth (cm) 0.1 cm 10/29/20 1117   Post-Procedure Surface Area (cm^2) 3 cm^2 10/29/20 1117   Post-Procedure Volume (cm^3) 0.3 cm^3 10/29/20 1117   Wound Assessment Slough 10/29/20 1108   Drainage Amount Copious 10/29/20 1108   Drainage Description Serous 10/29/20 1108   Odor None 10/29/20 1108   [place]-wound Assessment Intact;Edematous 10/29/20 1108   Margins Attached edges 10/29/20 1108   Wound Thickness Description not for Pressure Injury Full thickness 10/29/20 1108   Number of days: 7       Wound 10/21/20 Pretibial Right;Posterior wound 3- right posterior venous (Active)   Wound Image   10/29/20 1108   Wound Etiology Venous 10/29/20 1108   Dressing Status Old drainage noted 10/29/20 1108   Wound Cleansed Soap and water 10/29/20 1108   Dressing/Treatment Other (comment) 10/29/20 1108   Wound Length (cm) 0.2 cm 10/29/20 1108   Wound Width (cm) 0.2 cm 10/29/20 1108   Wound Depth (cm) 0.1 cm 10/29/20 1108   Wound Surface Area (cm^2) 0.04 cm^2 10/29/20 1108   Change in Wound Size % (l*w) 96 10/29/20 1108   Wound Volume (cm^3) 0 cm^3 10/29/20 1108   Wound Healing % 100 10/29/20 1108   Post-Procedure Length (cm) 0.2 cm 10/29/20 1108   Post-Procedure Width (cm) 0.2 cm 10/29/20 1108   Post-Procedure Depth (cm) 0.1 cm 10/29/20 1108   Post-Procedure Surface Area (cm^2) 0.04 cm^2 10/29/20 1108   Post-Procedure Volume (cm^3) 0 cm^3 10/29/20 1108   Wound Assessment Slough 10/29/20 1108   Drainage Amount Moderate 10/29/20 1108   Drainage Description Serous 10/29/20 1108   Odor None 10/29/20 1108   [place]-wound Assessment Edematous 10/29/20 1108 Margins Attached edges 10/29/20 1108   Wound Thickness Description not for Pressure Injury Full thickness 10/29/20 1108   Number of days: 7            Diabetic/Pressure/Non Pressure Ulcers only:  Ulcer: Non-Pressure ulcer, fat layer exposed      Estimated Blood Loss:  Minimal    Hemostasis Achieved:  by pressure    Procedural Pain:  10  / 10     Post Procedural Pain:  10 / 10     Response to treatment:  Poorly tolerated by patient., With complaints of pain.        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 calf (CODE) (HCC)    Relevant Medications    Lidocaine 2 % GEL (Start on 10/29/2020 12:00 PM)    Other Relevant Orders    Supply: Wound Cleanser    Supply: Wound Dressings    Supply: Pack Wound    Supply: Cover and Secure    Supply: Edema Control    * (Principal) Diabetic ulcer of right lower leg associated with type 2 diabetes mellitus, with fat layer exposed ([address] - Primary    Relevant Medications    Lidocaine 2 % GEL (Start on 10/29/2020 12:00 PM)    Other Relevant Orders    Supply: Wound Cleanser    Supply: Wound Dressings    Supply: Pack Wound    Supply: Cover and Secure    Supply: Edema Control    Edema of both lower extremities due to peripheral venous insufficiency    Relevant Medications    Lidocaine 2 % GEL (Start on 10/29/2020 12:00 PM)    Other Relevant Orders    Supply: Wound Cleanser    Supply: Wound Dressings    Supply: Pack Wound    Supply: Cover and Secure    Supply: Edema Control          Treatment Note please see attached Discharge Instructions    In my professional opinion this patient would benefit from HBO Therapy: No    Written patient dismissal instructions given to patient and signed by patient or POA. Ms. Montel Hammans has intense pain near her wound, I want to get aggressive compression on her with a coflex. She will follow up in 1 week with Dr. Jeanine Morel.     Electronically signed by INGRID Ramos CNP on 10/29/2020 at 11:43 AM

## 2020-10-30 ENCOUNTER — CARE COORDINATION (OUTPATIENT)
Dept: CASE MANAGEMENT | Age: 70
End: 2020-10-30

## 2020-10-30 NOTE — CARE COORDINATION
[facility] Transitions Follow Up Call    10/30/2020    Patient: Diane Palma  Patient : 1950   MRN: [id number]  Reason for Admission:   Discharge Date: 8/10/20 RARS: Readmission Risk Score: 29    Follow Up: Attempted to contact patient for BPCI-A follow up. Unable to reach patient. Unable to leave a message. Mailbox is full. Will try again at a later time.       Future Appointments   Date Time Provider Marquis Pang   2020  2:00 PM Ricky Powell MD [facility]   12/3/2020 10:45 AM Rober Crigler, MD [facility]   2021 10:00 AM Miles Claude, APRN - CNP [facility]       Samira Lovett, RN

## 2020-11-05 ENCOUNTER — CARE COORDINATION (OUTPATIENT)
Dept: CASE MANAGEMENT | Age: 70
End: 2020-11-05

## 2020-11-05 ENCOUNTER — HOSPITAL ENCOUNTER (OUTPATIENT)
Dept: WOUND CARE | Age: 70
Discharge: HOME | End: 2020-11-05
Payer: MEDICARE

## 2020-11-05 VITALS
RESPIRATION RATE: 16 BRPM | DIASTOLIC BLOOD PRESSURE: 61 MMHG | BODY MASS INDEX: 53.92 KG/M2 | WEIGHT: 293 LBS | HEIGHT: 62 IN | HEART RATE: 71 BPM | TEMPERATURE: 98 F | SYSTOLIC BLOOD PRESSURE: 89 MMHG

## 2020-11-05 PROBLEM — E11.622 DIABETIC ULCER OF RIGHT LOWER LEG ASSOCIATED WITH TYPE 2 DIABETES MELLITUS, WITH FAT LAYER EXPOSED (HCC): Chronic | Status: ACTIVE | Noted: 2020-10-21

## 2020-11-05 PROBLEM — I87.2 EDEMA OF BOTH LOWER EXTREMITIES DUE TO PERIPHERAL VENOUS INSUFFICIENCY: Chronic | Status: ACTIVE | Noted: 2020-10-21

## 2020-11-05 PROBLEM — L97.922 DIABETIC ULCER OF LEFT LOWER LEG ASSOCIATED WITH TYPE 2 DIABETES MELLITUS, WITH FAT LAYER EXPOSED (HCC): Chronic | Status: ACTIVE | Noted: 2020-07-31

## 2020-11-05 PROBLEM — E11.622 DIABETIC ULCER OF LEFT LOWER LEG ASSOCIATED WITH TYPE 2 DIABETES MELLITUS, WITH FAT LAYER EXPOSED (HCC): Chronic | Status: ACTIVE | Noted: 2020-07-31

## 2020-11-05 PROBLEM — L97.912 DIABETIC ULCER OF RIGHT LOWER LEG ASSOCIATED WITH TYPE 2 DIABETES MELLITUS, WITH FAT LAYER EXPOSED (HCC): Chronic | Status: ACTIVE | Noted: 2020-10-21

## 2020-11-05 PROCEDURE — 97597 DBRDMT OPN WND 1ST 20 CM/<: CPT | Performed by: SURGERY

## 2020-11-05 PROCEDURE — 97597 DBRDMT OPN WND 1ST 20 CM/<: CPT

## 2020-11-05 ASSESSMENT — PAIN DESCRIPTION - DESCRIPTORS: DESCRIPTORS: ACHING;BURNING

## 2020-11-05 ASSESSMENT — PAIN DESCRIPTION - ONSET: ONSET: GRADUAL

## 2020-11-05 ASSESSMENT — PAIN DESCRIPTION - FREQUENCY: FREQUENCY: INTERMITTENT

## 2020-11-05 ASSESSMENT — PAIN DESCRIPTION - PAIN TYPE: TYPE: ACUTE PAIN

## 2020-11-05 ASSESSMENT — PAIN DESCRIPTION - LOCATION: LOCATION: LEG

## 2020-11-05 ASSESSMENT — PAIN SCALES - GENERAL: PAINLEVEL_OUTOF10: 5

## 2020-11-05 NOTE — PROGRESS NOTES
[place] Application   Below Knee    NAME:  Joan Villar  YOB: 1950  MEDICAL RECORD NUMBER:  [id number]  DATE:  11/5/2020     [x] Applied moisturizing agent to dry skin as needed.  [x] Appied primary and secondary dressing as ordered     [x] Applied Unna roll from toes to knee overlapping each time.  [x] Applied ace wrap or coban from toes to below the knee. Harvinder Culp [x] Instructed patient/caregiver to keep dressing dry and intact. DO NOT REMOVE DRESSING.  [x] Instructed pt/family/caregiver to report excessive draining, loose bandage, wet dressing, severe pain or tingling in toes.  [x] Applied [place] dressing below the knee to RIGHT LEFT lower leg(s)    [x]  Unna Boot(s) were applied per  Guidelines.      Electronically signed by Cristal Dinero RN on 11/5/2020 at 2:29 PM

## 2020-11-05 NOTE — CARE COORDINATION
Attempted to reach pt for BPCI-A follow up call. Unable to leave message as mailbox is full.    Shanti Khanna RN  Care Transition Coordinator  [phone]

## 2020-11-05 NOTE — PROGRESS NOTES
[address]   Progress Note and Procedure Note      Stu Trevizo  MEDICAL RECORD NUMBER:  [id number]  AGE: 71 y.o. GENDER: female  : 1950  EPISODE DATE:  2020    Subjective:     Chief Complaint   Patient presents with    Wound Check     Wound Check         HISTORY of PRESENT ILLNESS HPI     Stu Trevizo is a 71 y.o. female who presents today for wound/ulcer evaluation. Wound Context: Pt with R&L LE wounds here for eval/treat  Wound/Ulcer Pain Timing/Severity: none  Quality of pain: N/A  Severity:  0 / 10   Modifying Factors: None  Associated Signs/Symptoms: edema    Ulcer Identification:  Ulcer Type: venous  Contributing Factors: edema, venous stasis, shear force and obesity    Wound: venous        PAST MEDICAL HISTORY        Diagnosis Date    Asthma 2015    Bilateral carpal tunnel syndrome 2018    sees dr. [facility].     Colon polyp     Diabetes mellitus (HCC)     GERD (gastroesophageal reflux disease)     HTN (hypertension)     Hyperlipidemia     Mild mitral regurgitation by prior echocardiogram 2016    Morbid obesity ([address] 10/18/2017    Normal cardiac stress test 4-2-09    no ischemia at attained level of excercise    Palliative care patient 2020    Parkinson disease [facility])     Paroxysmal atrial fibrillation [facility])     sees dr. Richard Nixon Post-menopausal     Prolonged emergence from general anesthesia     Restrictive airway disease     Stage 3 chronic kidney disease 10/18/2017       PAST SURGICAL HISTORY    Past Surgical History:   Procedure Laterality Date    APPENDECTOMY       SECTION      x3    COLONOSCOPY      Dr Cheatham Spearmanuel polyp-5 yr recall    COLONOSCOPY N/A 2019    Dr Estela Bailey 2 internal hemorrhoids without stigmata, diverticulosis, suboptimal prep--5 yr recall    GALLBLADDER SURGERY  2014    dr Juliane Goel N/CARPAL TUNNEL SURG Right 8/29/2018    OPEN CARPAL TUNNEL RELEASE performed by Jose Ortiz MD at [address] [facility] TYMPANOSTOMY TUBE PLACEMENT  2013    dr Araceli Zeng in [facility] November UPPER GASTROINTESTINAL ENDOSCOPY         FAMILY HISTORY    Family History   Problem Relation Age of Onset    High Blood Pressure Father    Salem Sicard Diabetes Father     Parkinsonism Father     High Blood Pressure Mother     Diabetes Sister     Other Paternal Grandmother         hiatal hernia    Heart Disease Paternal Grandfather     Colon Cancer Neg Hx     Colon Polyps Neg Hx     Esophageal Cancer Neg Hx     Liver Cancer Neg Hx     Liver Disease Neg Hx     Rectal Cancer Neg Hx     Stomach Cancer Neg Hx        SOCIAL HISTORY    Social History     Tobacco Use    Smoking status: Never Smoker    Smokeless tobacco: Never Used   Substance Use Topics    Alcohol use: No    Drug use: No       ALLERGIES    Allergies   Allergen Reactions    Codeine      itching    Hydrocodone-Acetaminophen Nausea Only       MEDICATIONS    Current Outpatient Medications on File Prior to Encounter   Medication Sig Dispense Refill    atorvastatin (LIPITOR) 10 MG tablet TAKE ONE TABLET BY MOUTH EACH EVENING 30 tablet 3    COMFORT EZ PEN NEEDLES 32G X 4 MM MISC APPLY USE WITH INSULIN  THREE TIMES PER DAY. DX: E11.8, Z79.4 100 each 3    Cholecalciferol (VITAMIN D3) 50 MCG (2000 UT) CAPS Take 1 capsule by mouth      LINZESS 290 MCG CAPS capsule TAKE ONE CAPSULE IN THE MORNING BEFORE BREAKFAST 30 capsule 5    traMADol (ULTRAM) 50 MG tablet Take 1 tablet by mouth 2 times daily as needed for Pain for up to 30 days. 60 tablet 0    blood glucose monitor strips Test 3times a day & as needed for symptoms of irregular blood glucose.  100 strip 3    albuterol (ACCUNEB) 1.25 MG/3ML nebulizer solution Inhale 3 mLs into the lungs every 6 hours as needed for Wheezing 360 mL 3    losartan (COZAAR) 100 MG tablet TAKE ONE TABLET BY MOUTH EVERY DAY 30 tablet 5    insulin aspart (NOVOLOG) 100 UNIT/ML once a day, on week four every other day then stop 1 Tube 1    Melatonin 10 MG CAPS Take 10 mg by mouth every evening Takes 20 mg a night      denosumab (PROLIA) 60 MG/ML SOSY SC injection Inject 60 mg into the skin every 6 months      spironolactone (ALDACTONE) 25 MG tablet TAKE ONE TABLET DAILY (Patient taking differently: One tablet in the am and one tablet in the evening) 30 tablet 5    nystatin (MYCOSTATIN) 877588 UNIT/GM ointment Apply topically 2 times daily. for yeast 60 Tube 5    glucose blood VI test strips (ONE TOUCH ULTRA TEST) strip Inject 1 each into the skin daily As needed. 100 each 3    Lancets MISC 1 lancet to check glucose daily 100 each 3    Insulin Pen Needle 31G X 8 MM MISC Inject 1 each into the skin daily 100 each 2    albuterol (PROVENTIL HFA;VENTOLIN HFA) 108 (90 BASE) MCG/ACT inhaler Inhale 2 puffs into the lungs every 6 hours as needed for Wheezing 1 Inhaler 1    OXYGEN 2L NC 12-24 hours (Patient taking differently: nightly as needed 2L NC 12-24 hours) 1 Device 0    aspirin 325 MG tablet Take 325 mg by mouth daily. No current facility-administered medications on file prior to encounter. REVIEW OF SYSTEMS    A comprehensive review of systems was negative.     Objective:      BP 89/61   Pulse 71   Temp 98 °F (36.7 °C) (Temporal)   Resp 16   Ht 5' 2\" (1.575 m)   Wt 296 lb (134.3 kg)   BMI 54.14 kg/m²     Wt Readings from Last 3 Encounters:   11/05/20 296 lb (134.3 kg)   10/29/20 296 lb (134.3 kg)   10/28/20 296 lb (134.3 kg)       PHYSICAL EXAM    General Appearance: alert and oriented to person, place and time, well developed and well- nourished, in no acute distress  Skin: warm and dry, no rash or erythema  Head: normocephalic and atraumatic  Eyes: pupils equal, round, and reactive to light, extraocular eye movements intact, conjunctivae normal  ENT: tympanic membrane, external ear and ear canal normal bilaterally, nose without deformity, nasal mucosa and turbinates normal without polyps, lips teeth and gums normal  Neck: supple and non-tender without mass, no thyromegaly or thyroid nodules, no cervical lymphadenopathy  Pulmonary/Chest: clear to auscultation bilaterally- no wheezes, rales or rhonchi, normal air movement, no respiratory distress  Cardiovascular: normal rate, regular rhythm, normal S1 and S2, no murmurs, rubs, clicks, or gallops, distal pulses intact, no carotid bruits  Abdomen: soft, non-tender, non-distended, normal bowel sounds, no masses or organomegaly  Extremities: no cyanosis, clubbing or edema  Musculoskeletal: normal range of motion, no joint swelling, deformity or tenderness  Neurologic: reflexes normal and symmetric, no cranial nerve deficit, gait, coordination and speech normal, skin sensation normal      Assessment:      Problem List Items Addressed This Visit     * (Principal) Diabetic ulcer of left lower leg associated with type 2 diabetes mellitus, with fat layer exposed (HCC) (Chronic)    Diabetic ulcer of right lower leg associated with type 2 diabetes mellitus, with fat layer exposed (HCC) (Chronic)    Edema of both lower extremities due to peripheral venous insufficiency (Chronic)    Varicose veins of left lower extremity with ulcer of calf (CODE) ([facility] [address]           Procedure Note  Indications:  Based on my examination of this patient's wound(s)/ulcer(s) today, debridement is required to promote healing and evaluate the wound base. Performed by: Brooke Samaniego MD    Consent obtained:  Yes    Time out taken:  Yes    Pain Control: Anesthetic  Anesthetic: None       Debridement:Non-excisional Debridement    Using curette the wound(s)/ulcer(s) was/were sharply debrided down through and including the removal of epidermis and dermis.         Devitalized Tissue Debrided:  fibrin, biofilm, slough, necrotic/eschar and exudate      Pre Debridement Measurements:  Are located in the [facility]  Documentation Flow Sheet    Wound/Ulcer #: 1, 2 and 3    Percent of Wound(s)/Ulcer(s) Debrided: 100%    Total Surface Area Debrided:  12 sq cm       Diabetic/Pressure/Non Pressure Ulcers only:  Ulcer: Non-Pressure ulcer, fat layer exposed           Post Debridement Measurements:    Wound/Ulcer Descriptions are Pre Debridement --EXCEPT MEASUREMENTS    Wound 10/21/20 Pretibial Distal;Left wound 1- left venous anterior (Active)   Wound Image   10/29/20 1108   Wound Etiology Venous 11/05/20 1358   Dressing Status Old drainage noted 11/05/20 1358   Wound Cleansed Soap and water 11/05/20 1358   Dressing/Treatment Alginate with Ag 11/05/20 1428   Wound Length (cm) 5 cm 11/05/20 1358   Wound Width (cm) 2 cm 11/05/20 1358   Wound Depth (cm) 0.1 cm 11/05/20 1358   Wound Surface Area (cm^2) 10 cm^2 11/05/20 1358   Change in Wound Size % (l*w) 56.04 11/05/20 1358   Wound Volume (cm^3) 1 cm^3 11/05/20 1358   Wound Healing % 56 11/05/20 1358   Post-Procedure Length (cm) 5 cm 11/05/20 1410   Post-Procedure Width (cm) 2 cm 11/05/20 1410   Post-Procedure Depth (cm) 0.1 cm 11/05/20 1410   Post-Procedure Surface Area (cm^2) 10 cm^2 11/05/20 1410   Post-Procedure Volume (cm^3) 1 cm^3 11/05/20 1410   Wound Assessment Slough 11/05/20 1358   Drainage Amount Copious 11/05/20 1358   Drainage Description Serous 11/05/20 1358   Odor None 11/05/20 1358   [place]-wound Assessment Intact 11/05/20 1358   Margins Attached edges 11/05/20 1358   Wound Thickness Description not for Pressure Injury Full thickness 11/05/20 1358   Number of days: 14       Wound 10/21/20 Pretibial Left;Medial wound 2- left medial venous (Active)   Wound Image   10/29/20 1108   Wound Etiology Venous 11/05/20 1358   Dressing Status Old drainage noted 11/05/20 1358   Wound Cleansed Soap and water 11/05/20 1358   Dressing/Treatment Alginate with Ag 11/05/20 1428   Wound Length (cm) 1 cm 11/05/20 1358   Wound Width (cm) 2 cm 11/05/20 1358   Wound Depth (cm) 0.1 cm 11/05/20 1358   Wound Surface Area (cm^2) 2 cm^2 11/05/20 1358 Change in Wound Size % (l*w) 77.14 11/05/20 1358   Wound Volume (cm^3) 0.2 cm^3 11/05/20 1358   Wound Healing % 77 11/05/20 1358   Post-Procedure Length (cm) 1 cm 11/05/20 1410   Post-Procedure Width (cm) 2 cm 11/05/20 1410   Post-Procedure Depth (cm) 0.1 cm 11/05/20 1410   Post-Procedure Surface Area (cm^2) 2 cm^2 11/05/20 1410   Post-Procedure Volume (cm^3) 0.2 cm^3 11/05/20 1410   Wound Assessment Slough 11/05/20 1358   Drainage Amount Copious 11/05/20 1358   Drainage Description Serous 11/05/20 1358   Odor None 11/05/20 1358   [place]-wound Assessment Intact 11/05/20 1358   Margins Attached edges 11/05/20 1358   Wound Thickness Description not for Pressure Injury Full thickness 11/05/20 1358   Number of days: 14       Wound 10/21/20 Pretibial Right;Posterior wound 3- right posterior venous (Active)   Wound Image   10/29/20 1108   Wound Etiology Venous 11/05/20 1358   Dressing Status Old drainage noted 11/05/20 1358   Wound Cleansed Soap and water 11/05/20 1358   Dressing/Treatment Alginate with Ag 11/05/20 1428   Wound Length (cm) 0.1 cm 11/05/20 1358   Wound Width (cm) 0.1 cm 11/05/20 1358   Wound Depth (cm) 0.1 cm 11/05/20 1358   Wound Surface Area (cm^2) 0.01 cm^2 11/05/20 1358   Change in Wound Size % (l*w) 99 11/05/20 1358   Wound Volume (cm^3) 0 cm^3 11/05/20 1358   Wound Healing % 100 11/05/20 1358   Post-Procedure Length (cm) 0.1 cm 11/05/20 1410   Post-Procedure Width (cm) 0.1 cm 11/05/20 1410   Post-Procedure Depth (cm) 0.1 cm 11/05/20 1410   Post-Procedure Surface Area (cm^2) 0.01 cm^2 11/05/20 1410   Post-Procedure Volume (cm^3) 0 cm^3 11/05/20 1410   Wound Assessment Slough 11/05/20 1358   Drainage Amount Moderate 11/05/20 1358   Drainage Description Serous 11/05/20 1358   Odor None 11/05/20 1358   [place]-wound Assessment Edematous 11/05/20 1358   Margins Attached edges 11/05/20 1358   Wound Thickness Description not for Pressure Injury Full thickness 11/05/20 1358   Number of days: 14 you are unable to keep, kindly give a 24 hour notice. Thank you.)    If you experience any of the following, please call the [facility] during business hours:    * Increase in Pain  * Temperature over 101  * Increase in drainage from your wound  * Drainage with a foul odor  * Bleeding  * Increase in swelling  * Need for compression bandage changes due to slippage, breakthrough drainage. If you need medical attention outside of the business hours of the [facility] please contact your PCP or go to the nearest emergency room.         Electronically signed by Jorge Howard MD on 11/5/2020 at 2:39 PM

## 2020-11-09 RX ORDER — LIRAGLUTIDE 6 MG/ML
1.8 INJECTION SUBCUTANEOUS DAILY
Qty: 2 PEN | Refills: 5 | Status: SHIPPED | OUTPATIENT
Start: 2020-11-09 | End: 2021-06-30

## 2020-11-12 ENCOUNTER — HOSPITAL ENCOUNTER (OUTPATIENT)
Dept: WOUND CARE | Age: 70
Discharge: HOME | End: 2020-11-12
Payer: MEDICARE

## 2020-11-12 VITALS
BODY MASS INDEX: 53.92 KG/M2 | RESPIRATION RATE: 16 BRPM | WEIGHT: 293 LBS | TEMPERATURE: 96.8 F | HEIGHT: 62 IN | HEART RATE: 70 BPM

## 2020-11-12 PROCEDURE — 97597 DBRDMT OPN WND 1ST 20 CM/<: CPT | Performed by: SURGERY

## 2020-11-12 PROCEDURE — 97597 DBRDMT OPN WND 1ST 20 CM/<: CPT

## 2020-11-12 NOTE — PROGRESS NOTES
[address]   Progress Note and Procedure Note      Kailey Wheat  MEDICAL RECORD NUMBER:  [id number]  AGE: 71 y.o. GENDER: female  : 1950  EPISODE DATE:  2020    Subjective:     Chief Complaint   Patient presents with    Wound Check     Bilateral leg wounds, coflex in place         HISTORY of PRESENT ILLNESS HPI     Kailey Wheat is a 71 y.o. female who presents today for wound/ulcer evaluation. Wound Context: Pt with R&L LE wounds here for eval/treat  Wound/Ulcer Pain Timing/Severity: none  Quality of pain: N/A  Severity:  0 / 10   Modifying Factors: None  Associated Signs/Symptoms: edema    Ulcer Identification:  Ulcer Type: venous  Contributing Factors: edema and venous stasis    Wound: venous        PAST MEDICAL HISTORY        Diagnosis Date    Asthma 2015    Bilateral carpal tunnel syndrome 2018    sees dr. Hieu Burns.     Colon polyp     Diabetes mellitus (HCC)     GERD (gastroesophageal reflux disease)     HTN (hypertension)     Hyperlipidemia     Mild mitral regurgitation by prior echocardiogram 2016    Morbid obesity ([address] 10/18/2017    Normal cardiac stress test 4-2-09    no ischemia at attained level of excercise    Palliative care patient 2020    Parkinson disease [facility])     Paroxysmal atrial fibrillation [facility])     sees dr. Jeremy Briseno Post-menopausal     Prolonged emergence from general anesthesia     Restrictive airway disease     Stage 3 chronic kidney disease 10/18/2017       PAST SURGICAL HISTORY    Past Surgical History:   Procedure Laterality Date    APPENDECTOMY       SECTION      x3    COLONOSCOPY      Dr Dennis Fuentes polyp-5 yr recall    COLONOSCOPY N/A 2019    Dr Aisha To 2 internal hemorrhoids without stigmata, diverticulosis, suboptimal prep--5 yr recall    GALLBLADDER SURGERY  2014    dr Jonah Cooper N/CARPAL TUNNEL SURG Right 8/29/2018    OPEN CARPAL TUNNEL RELEASE performed by Gerardo Funez MD at [facility] TYMPANOSTOMY TUBE PLACEMENT  2013    dr Aleksandra Ordonez in [facility] UPPER GASTROINTESTINAL ENDOSCOPY         FAMILY HISTORY    Family History   Problem Relation Age of Onset    High Blood Pressure Father    Unk [place] Diabetes Father     Parkinsonism Father     High Blood Pressure Mother     Diabetes Sister     Other Paternal Grandmother         hiatal hernia    Heart Disease Paternal Grandfather     Colon Cancer Neg Hx     Colon Polyps Neg Hx     Esophageal Cancer Neg Hx     Liver Cancer Neg Hx     Liver Disease Neg Hx     Rectal Cancer Neg Hx     Stomach Cancer Neg Hx        SOCIAL HISTORY    Social History     Tobacco Use    Smoking status: Never Smoker    Smokeless tobacco: Never Used   Substance Use Topics    Alcohol use: No    Drug use: No       ALLERGIES    Allergies   Allergen Reactions    Codeine      itching    Hydrocodone-Acetaminophen Nausea Only       MEDICATIONS    Current Outpatient Medications on File Prior to Encounter   Medication Sig Dispense Refill    VICTOZA 18 MG/3ML SOPN SC injection INJECT 1.8 MG INTO THE SKIN DAILY 2 pen 5    atorvastatin (LIPITOR) 10 MG tablet TAKE ONE TABLET BY MOUTH EACH EVENING 30 tablet 3    COMFORT EZ PEN NEEDLES 32G X 4 MM MISC APPLY USE WITH INSULIN  THREE TIMES PER DAY. DX: E11.8, Z79.4 100 each 3    Cholecalciferol (VITAMIN D3) 50 MCG (2000 UT) CAPS Take 1 capsule by mouth      LINZESS 290 MCG CAPS capsule TAKE ONE CAPSULE IN THE MORNING BEFORE BREAKFAST 30 capsule 5    blood glucose monitor strips Test 3times a day & as needed for symptoms of irregular blood glucose.  100 strip 3    albuterol (ACCUNEB) 1.25 MG/3ML nebulizer solution Inhale 3 mLs into the lungs every 6 hours as needed for Wheezing 360 mL 3    losartan (COZAAR) 100 MG tablet TAKE ONE TABLET BY MOUTH EVERY DAY 30 tablet 5    insulin aspart (NOVOLOG) 100 UNIT/ML injection vial Inject 22 Units into the skin 3 times daily (before meals) 3 vial 3    tiZANidine (ZANAFLEX) 2 MG tablet Take 1 tablet by mouth nightly as needed (muscle spasms) 30 tablet 0    fluconazole (DIFLUCAN) 150 MG tablet Take one on the 15th of every month 1 tablet 11    allopurinol (ZYLOPRIM) 100 MG tablet Take 1 tablet by mouth daily      erythromycin (ROMYCIN) 5 MG/GM ophthalmic ointment Place into both eyes nightly as needed      insulin detemir (LEVEMIR FLEXTOUCH) 100 UNIT/ML injection pen Inject 70 units subcu nightly for type 2 diabetes 5 pen 3    gabapentin (NEURONTIN) 600 MG tablet TAKE ONE TABLET BY MOUTH 3 TIMES DAILY AS NEEDED 90 tablet 5    EASY COMFORT INSULIN SYRINGE 31G X 5/16\" 1 ML MISC INJECT AS DIRECTED DAILY 100 each 3    omeprazole (PRILOSEC) 40 MG delayed release capsule TAKE ONE CAPSULE BY MOUTH EVERY DAY 30 capsule 3    carbidopa-levodopa (SINEMET)  MG per tablet TAKE TWO TABLETS BY MOUTH 3 TIMES DAILY 180 tablet 5    rOPINIRole (REQUIP) 4 MG tablet TAKE TWO TABLETS EVERY MORNING TAKE ONE TABLET AT NOON AND TAKE TWO TABLETS EVERY NIGHT AT BEDTIME 150 tablet 5    montelukast (SINGULAIR) 10 MG tablet TAKE ONE TABLET BY MOUTH EVERY NIGHT AT BEDTIME 30 tablet 5    nystatin (NYSTATIN) 583316 UNIT/GM powder Apply topically 3 times daily Apply topically 4 times daily. 60 g 3    torsemide (DEMADEX) 20 MG tablet Take 20 mg by mouth daily 3 tablets for three days then two tablets daily       blood glucose monitor kit and supplies Use as directed for diabetes TID checks.  1 kit 0    sotalol (BETAPACE) 120 MG tablet Take 1 tablet by mouth 2 times daily 180 tablet 3    TRUEPLUS INSULIN SYRINGE 30G X 5/16\" 1 ML MISC INJECT AS DIRECTED DAILY 100 each 3    clobetasol (TEMOVATE) 0.05 % ointment Apply external vagina 3 x a day for week one, then decrease to BID on week two, on week 3 once a day, on week four every other day then stop 1 Tube 1    Melatonin 10 MG CAPS Take 10 mg by mouth every evening Takes 20 mg a night      denosumab (PROLIA) 60 MG/ML SOSY SC injection Inject 60 mg into the skin every 6 months      spironolactone (ALDACTONE) 25 MG tablet TAKE ONE TABLET DAILY (Patient taking differently: One tablet in the am and one tablet in the evening) 30 tablet 5    nystatin (MYCOSTATIN) 884824 UNIT/GM ointment Apply topically 2 times daily. for yeast 60 Tube 5    glucose blood VI test strips (ONE TOUCH ULTRA TEST) strip Inject 1 each into the skin daily As needed. 100 each 3    Lancets MISC 1 lancet to check glucose daily 100 each 3    Insulin Pen Needle 31G X 8 MM MISC Inject 1 each into the skin daily 100 each 2    albuterol (PROVENTIL HFA;VENTOLIN HFA) 108 (90 BASE) MCG/ACT inhaler Inhale 2 puffs into the lungs every 6 hours as needed for Wheezing 1 Inhaler 1    OXYGEN 2L NC 12-24 hours (Patient taking differently: nightly as needed 2L NC 12-24 hours) 1 Device 0    aspirin 325 MG tablet Take 325 mg by mouth daily. No current facility-administered medications on file prior to encounter. REVIEW OF SYSTEMS    A comprehensive review of systems was negative.     Objective:      Pulse 70   Temp 96.8 °F (36 °C) (Temporal)   Resp 16   Ht 5' 2\" (1.575 m)   Wt 296 lb (134.3 kg)   BMI 54.14 kg/m²     Wt Readings from Last 3 Encounters:   11/12/20 296 lb (134.3 kg)   11/05/20 296 lb (134.3 kg)   10/29/20 296 lb (134.3 kg)       PHYSICAL EXAM    General Appearance: alert and oriented to person, place and time, well developed and well- nourished, in no acute distress  Skin: warm and dry, no rash or erythema  Head: normocephalic and atraumatic  Eyes: pupils equal, round, and reactive to light, extraocular eye movements intact, conjunctivae normal  ENT: tympanic membrane, external ear and ear canal normal bilaterally, nose without deformity, nasal mucosa and turbinates normal without polyps, lips teeth and gums normal  Neck: supple and non-tender without mass, no thyromegaly or thyroid nodules, no cervical lymphadenopathy  Pulmonary/Chest: clear to auscultation bilaterally- no wheezes, rales or rhonchi, normal air movement, no respiratory distress  Cardiovascular: normal rate, regular rhythm, normal S1 and S2, no murmurs, rubs, clicks, or gallops, distal pulses intact, no carotid bruits  Abdomen: soft, non-tender, non-distended, normal bowel sounds, no masses or organomegaly  Extremities: no cyanosis, clubbing or edema  Musculoskeletal: normal range of motion, no joint swelling, deformity or tenderness  Neurologic: reflexes normal and symmetric, no cranial nerve deficit, gait, coordination and speech normal, skin sensation normal      Assessment:      Problem List Items Addressed This Visit     * (Principal) Diabetic ulcer of left lower leg associated with type 2 diabetes mellitus, with fat layer exposed (HCC) (Chronic)    Diabetic ulcer of right lower leg associated with type 2 diabetes mellitus, with fat layer exposed (HCC) (Chronic)    Edema of both lower extremities due to peripheral venous insufficiency (Chronic)    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 Performed by: Perry Schwab, MD    Consent obtained:  Yes    Time out taken:  Yes    Pain Control:         Debridement:Non-excisional Debridement    Using curette the wound(s)/ulcer(s) was/were sharply debrided down through and including the removal of epidermis and dermis.         Devitalized Tissue Debrided:  fibrin, biofilm, slough, necrotic/eschar and exudate      Pre Debridement Measurements:  Are located in the Wound/Ulcer Documentation Flow Sheet    Wound/Ulcer #: 2 and 3    Percent of Wound(s)/Ulcer(s) Debrided: 100%    Total Surface Area Debrided:  0.35 sq cm       Diabetic/Pressure/Non Pressure Ulcers only:  Ulcer: Non-Pressure Length (cm) 0.2 cm 11/12/20 1040   Post-Procedure Width (cm) 1.5 cm 11/12/20 1040   Post-Procedure Depth (cm) 0.1 cm 11/12/20 1040   Post-Procedure Surface Area (cm^2) 0.3 cm^2 11/12/20 1040   Post-Procedure Volume (cm^3) 0.03 cm^3 11/12/20 1040   Distance Tunneling (cm) 0 cm 11/12/20 1033   Tunneling Position ___ O'Clock 0 11/12/20 1033   Undermining Starts ___ O'Clock 0 11/12/20 1033   Undermining Ends___ O'Clock 0 11/12/20 1033   Undermining Maxium Distance (cm) 0 11/12/20 1033   Wound Assessment Slough 11/12/20 1033   Drainage Amount Small 11/12/20 1033   Drainage Description Serous 11/12/20 1033   Odor None 11/12/20 1033   [place]-wound Assessment Intact 11/12/20 1033   Margins Attached edges 11/12/20 1033   Wound Thickness Description not for Pressure Injury Full thickness 11/12/20 1033   Number of days: 21       Wound 10/21/20 Pretibial Right;Posterior wound 3- right posterior venous (Active)   Wound Image   11/12/20 1033   Wound Etiology Venous 11/12/20 1033   Dressing Status Old drainage noted 11/12/20 1033   Wound Cleansed Soap and water 11/12/20 1033   Dressing/Treatment Alginate with Ag 11/05/20 1428   Wound Length (cm) 0.1 cm 11/12/20 1033   Wound Width (cm) 0.1 cm 11/12/20 1033   Wound Depth (cm) 0.1 cm 11/12/20 1033   Wound Surface Area (cm^2) 0.01 cm^2 11/12/20 1033   Change in Wound Size % (l*w) 99 11/12/20 1033   Wound Volume (cm^3) 0 cm^3 11/12/20 1033   Wound Healing % 100 11/12/20 1033   Post-Procedure Length (cm) 0.1 cm 11/12/20 1040   Post-Procedure Width (cm) 0.1 cm 11/12/20 1040   Post-Procedure Depth (cm) 0.1 cm 11/12/20 1040   Post-Procedure Surface Area (cm^2) 0.01 cm^2 11/12/20 1040   Post-Procedure Volume (cm^3) 0 cm^3 11/12/20 1040   Distance Tunneling (cm) 0 cm 11/12/20 1033   Tunneling Position ___ O'Clock 0 11/12/20 1033   Undermining Starts ___ O'Clock 0 11/12/20 1033   Undermining Ends___ O'Clock 0 11/12/20 1033   Undermining Maxium Distance (cm) 0 11/12/20 1033   Wound Assessment [facility] 11/12/20 1033   Drainage Amount Moderate 11/12/20 1033   Drainage Description Serous 11/12/20 1033   Odor None 11/12/20 1033   [place]-wound Assessment Edematous 11/12/20 1033   Margins Attached edges 11/12/20 1033   Wound Thickness Description not for Pressure Injury Full thickness 11/12/20 1033   Number of days: 21             Estimated Blood Loss:  Minimal    Hemostasis Achieved:  by pressure and by silver nitrate stick    Procedural Pain:  0  / 10     Post Procedural Pain:  0 / 10     Response to treatment:  Well tolerated by patient. 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 RTO 1 week    Treatment Note please see attached Discharge Instructions    In my professional opinion this patient would benefit from HBO Therapy: No    Written patient dismissal instructions given to patient and signed by patient or POA.          Discharge [address] and Hyperbaric Oxygen Therapy   Physician Orders and Discharge Instructions  [address]  Telephone: [phone]    NAME:  [facility] D [address] [place] [address] [address]:  1950  MEDICAL RECORD NUMBER:  [id number]  DATE:  11/12/2020    Discharge condition: Stable    Discharge to: Home    Left via:Private automobile    Accompanied by: Family     ECF/[facility]HA: [facility]     Dressing Orders: Right and Left Lower Ext Ulcer  Aquacel Ag to open areas  Copa to pad as needed, Coflex [place], Keep clean and Dry  Elevate feet to level or above heart 3-4 times daily and as needed to for 30 minutes to reduce swelling  Remove wraps and call office if- Wraps get wet, Cause increased pain, Slide down or you are unable to make your next scheduled appointment   Multi Vitamin, High Protein diet as tolerated    [address] [place] [address],[address] follow up visit ____________1 week_________________  (Please note your next appointment above and if you are unable to keep, kindly give a 24 hour notice. Thank you.)    If you experience any of the following, please call the [facility] during business hours:    * Increase in Pain  * Temperature over 101  * Increase in drainage from your wound  * Drainage with a foul odor  * Bleeding  * Increase in swelling  * Need for compression bandage changes due to slippage, breakthrough drainage. If you need medical attention outside of the business hours of the [facility] please contact your PCP or go to the nearest emergency room.         Electronically signed by Shalonda Rutledge MD on 11/12/2020 at 10:42 AM

## 2020-11-12 NOTE — PLAN OF CARE
[facility] Application   Below Knee    NAME:  Kailey Wheat  YOB: 1950  MEDICAL RECORD NUMBER:  [id number]  DATE:  11/12/2020     [x] Applied moisturizing agent to dry skin as needed.  [x] Appied primary and secondary dressing as ordered     [x] Applied Unna roll from toes to knee overlapping each time.  [x] Applied ace wrap or coban from toes to below the knee.   Instructed patient/caregiver to keep dressing dry and intact. DO NOT REMOVE DRESSING.  [x] Instructed pt/family/caregiver to report excessive draining, loose bandage, wet dressing, severe pain or tingling in toes.  [x] Applied [facility] dressing below the knee to Bilateral lower leg(s)    [x]  Unna Boot(s) were applied per  Guidelines.      Electronically signed by Surjit Dhaliwal RN on 11/12/2020 at 11:17 AM

## 2020-11-19 ENCOUNTER — HOSPITAL ENCOUNTER (OUTPATIENT)
Dept: WOUND CARE | Age: 70
Discharge: HOME | End: 2020-11-19
Payer: MEDICARE

## 2020-11-19 VITALS — HEIGHT: 62 IN | BODY MASS INDEX: 53.92 KG/M2 | WEIGHT: 293 LBS

## 2020-11-19 PROCEDURE — 99212 OFFICE O/P EST SF 10 MIN: CPT

## 2020-11-19 PROCEDURE — 99212 OFFICE O/P EST SF 10 MIN: CPT | Performed by: SURGERY

## 2020-11-19 PROCEDURE — 99213 OFFICE O/P EST LOW 20 MIN: CPT

## 2020-11-19 ASSESSMENT — PAIN DESCRIPTION - PAIN TYPE: TYPE: ACUTE PAIN

## 2020-11-19 ASSESSMENT — PAIN DESCRIPTION - ONSET: ONSET: ON-GOING

## 2020-11-19 ASSESSMENT — PAIN DESCRIPTION - ORIENTATION: ORIENTATION: LEFT

## 2020-11-19 ASSESSMENT — PAIN DESCRIPTION - FREQUENCY: FREQUENCY: INTERMITTENT

## 2020-11-19 ASSESSMENT — PAIN SCALES - GENERAL: PAINLEVEL_OUTOF10: 4

## 2020-11-19 ASSESSMENT — PAIN DESCRIPTION - PROGRESSION: CLINICAL_PROGRESSION: NOT CHANGED

## 2020-11-19 ASSESSMENT — PAIN DESCRIPTION - LOCATION: LOCATION: LEG

## 2020-11-19 ASSESSMENT — PAIN DESCRIPTION - DESCRIPTORS: DESCRIPTORS: BURNING;ACHING

## 2020-11-19 NOTE — PROGRESS NOTES
[address]   Progress Note and Procedure Note      Felice Jennings  MEDICAL RECORD NUMBER:  [id number]  AGE: 71 y.o. GENDER: female  : 1950  EPISODE DATE:  2020    Subjective:     Chief Complaint   Patient presents with    Wound Check     follow up         HISTORY of PRESENT ILLNESS HPI     Felice Jennings is a 71 y.o. female who presents today for wound/ulcer evaluation. History of Wound Context: Pt with R&L LE wounds here for eval/treat  Wound/Ulcer Pain Timing/Severity: none  Quality of pain: N/A  Severity:  0 / 10   Modifying Factors: None  Associated Signs/Symptoms: edema    Ulcer Identification:  Ulcer Type: venous  Contributing Factors: edema and venous stasis    Wound: venous        PAST MEDICAL HISTORY        Diagnosis Date    Asthma 2015    Bilateral carpal tunnel syndrome 2018    sees dr. Moira Luna.     Colon polyp     Diabetes mellitus (HCC)     GERD (gastroesophageal reflux disease)     HTN (hypertension)     Hyperlipidemia     Mild mitral regurgitation by prior echocardiogram 2016    Morbid obesity ([address] 10/18/2017    Normal cardiac stress test 4-209    no ischemia at attained level of excercise    Palliative care patient 2020    Parkinson disease [facility])     Paroxysmal atrial fibrillation [facility])     sees dr. China Renteria Post-menopausal     Prolonged emergence from general anesthesia     Restrictive airway disease     Stage 3 chronic kidney disease 10/18/2017       PAST SURGICAL HISTORY    Past Surgical History:   Procedure Laterality Date    APPENDECTOMY       SECTION      x3    COLONOSCOPY      Dr Sweet Minor polyp-5 yr recall    COLONOSCOPY N/A 2019    Dr July Blackwell 2 internal hemorrhoids without stigmata, diverticulosis, suboptimal prep--5 yr recall    GALLBLADDER SURGERY  2014    dr Jitendra Morales N/CARPAL TUNNEL SURG Right 8/29/2018    OPEN CARPAL TUNNEL RELEASE performed by Kian Avila MD at [address] [facility] TYMPANOSTOMY TUBE PLACEMENT  2013    dr Catherine Sears in [facility] UPPER GASTROINTESTINAL ENDOSCOPY         FAMILY HISTORY    Family History   Problem Relation Age of Onset    High Blood Pressure Father    [facility] Diabetes Father     Parkinsonism Father     High Blood Pressure Mother     Diabetes Sister     Other Paternal Grandmother         hiatal hernia    Heart Disease Paternal Grandfather     Colon Cancer Neg Hx     Colon Polyps Neg Hx     Esophageal Cancer Neg Hx     Liver Cancer Neg Hx     Liver Disease Neg Hx     Rectal Cancer Neg Hx     Stomach Cancer Neg Hx        SOCIAL HISTORY    Social History     Tobacco Use    Smoking status: Never Smoker    Smokeless tobacco: Never Used   Substance Use Topics    Alcohol use: No    Drug use: No       ALLERGIES    Allergies   Allergen Reactions    Codeine      itching    Hydrocodone-Acetaminophen Nausea Only       MEDICATIONS    Current Outpatient Medications on File Prior to Encounter   Medication Sig Dispense Refill    VICTOZA 18 MG/3ML SOPN SC injection INJECT 1.8 MG INTO THE SKIN DAILY 2 pen 5    atorvastatin (LIPITOR) 10 MG tablet TAKE ONE TABLET BY MOUTH EACH EVENING 30 tablet 3    Cholecalciferol (VITAMIN D3) 50 MCG (2000 UT) CAPS Take 1 capsule by mouth      LINZESS 290 MCG CAPS capsule TAKE ONE CAPSULE IN THE MORNING BEFORE BREAKFAST 30 capsule 5    albuterol (ACCUNEB) 1.25 MG/3ML nebulizer solution Inhale 3 mLs into the lungs every 6 hours as needed for Wheezing 360 mL 3    losartan (COZAAR) 100 MG tablet TAKE ONE TABLET BY MOUTH EVERY DAY 30 tablet 5    insulin aspart (NOVOLOG) 100 UNIT/ML injection vial Inject 22 Units into the skin 3 times daily (before meals) 3 vial 3    tiZANidine (ZANAFLEX) 2 MG tablet Take 1 tablet by mouth nightly as needed (muscle spasms) 30 tablet 0    fluconazole (DIFLUCAN) 150 MG tablet Take one on the 15th of every month 1 tablet 11    allopurinol (ZYLOPRIM) 100 MG tablet Take 1 tablet by mouth daily      erythromycin (ROMYCIN) 5 MG/GM ophthalmic ointment Place into both eyes nightly as needed      insulin detemir (LEVEMIR FLEXTOUCH) 100 UNIT/ML injection pen Inject 70 units subcu nightly for type 2 diabetes 5 pen 3    gabapentin (NEURONTIN) 600 MG tablet TAKE ONE TABLET BY MOUTH 3 TIMES DAILY AS NEEDED 90 tablet 5    omeprazole (PRILOSEC) 40 MG delayed release capsule TAKE ONE CAPSULE BY MOUTH EVERY DAY 30 capsule 3    carbidopa-levodopa (SINEMET)  MG per tablet TAKE TWO TABLETS BY MOUTH 3 TIMES DAILY 180 tablet 5    rOPINIRole (REQUIP) 4 MG tablet TAKE TWO TABLETS EVERY MORNING TAKE ONE TABLET AT NOON AND TAKE TWO TABLETS EVERY NIGHT AT BEDTIME 150 tablet 5    montelukast (SINGULAIR) 10 MG tablet TAKE ONE TABLET BY MOUTH EVERY NIGHT AT BEDTIME 30 tablet 5    torsemide (DEMADEX) 20 MG tablet Take 20 mg by mouth daily 3 tablets for three days then two tablets daily       sotalol (BETAPACE) 120 MG tablet Take 1 tablet by mouth 2 times daily 180 tablet 3    Melatonin 10 MG CAPS Take 10 mg by mouth every evening Takes 20 mg a night      spironolactone (ALDACTONE) 25 MG tablet TAKE ONE TABLET DAILY (Patient taking differently: One tablet in the am and one tablet in the evening) 30 tablet 5    albuterol (PROVENTIL HFA;VENTOLIN HFA) 108 (90 BASE) MCG/ACT inhaler Inhale 2 puffs into the lungs every 6 hours as needed for Wheezing 1 Inhaler 1    OXYGEN 2L NC 12-24 hours (Patient taking differently: nightly as needed 2L NC 12-24 hours) 1 Device 0    aspirin 325 MG tablet Take 325 mg by mouth daily.  COMFORT EZ PEN NEEDLES 32G X 4 MM MISC APPLY USE WITH INSULIN  THREE TIMES PER DAY. DX: E11.8, Z79.4 100 each 3    blood glucose monitor strips Test 3times a day & as needed for symptoms of irregular blood glucose.  100 strip 3    EASY COMFORT INSULIN SYRINGE 31G X 5/16\" 1 ML MISC INJECT AS DIRECTED DAILY 100 each 3    nystatin (NYSTATIN) 979010 UNIT/GM powder Apply topically 3 times daily Apply topically 4 times daily. 60 g 3    blood glucose monitor kit and supplies Use as directed for diabetes TID checks. 1 kit 0    TRUEPLUS INSULIN SYRINGE 30G X 5/16\" 1 ML MISC INJECT AS DIRECTED DAILY 100 each 3    clobetasol (TEMOVATE) 0.05 % ointment Apply external vagina 3 x a day for week one, then decrease to BID on week two, on week 3 once a day, on week four every other day then stop 1 Tube 1    denosumab (PROLIA) 60 MG/ML SOSY SC injection Inject 60 mg into the skin every 6 months      nystatin (MYCOSTATIN) 877980 UNIT/GM ointment Apply topically 2 times daily. for yeast 60 Tube 5    glucose blood VI test strips (ONE TOUCH ULTRA TEST) strip Inject 1 each into the skin daily As needed. 100 each 3    Lancets MISC 1 lancet to check glucose daily 100 each 3    Insulin Pen Needle 31G X 8 MM MISC Inject 1 each into the skin daily 100 each 2     No current facility-administered medications on file prior to encounter. REVIEW OF SYSTEMS    A comprehensive review of systems was negative.     Objective:      Ht 5' 2\" (1.575 m)   Wt 296 lb (134.3 kg)   BMI 54.14 kg/m²     Wt Readings from Last 3 Encounters:   11/19/20 296 lb (134.3 kg)   11/12/20 296 lb (134.3 kg)   11/05/20 296 lb (134.3 kg)       PHYSICAL EXAM    General Appearance: alert and oriented to person, place and time, well developed and well- nourished, in no acute distress  Skin: warm and dry, no rash or erythema  Head: normocephalic and atraumatic  Eyes: pupils equal, round, and reactive to light, extraocular eye movements intact, conjunctivae normal  ENT: tympanic membrane, external ear and ear canal normal bilaterally, nose without deformity, nasal mucosa and turbinates normal without polyps, lips teeth and gums normal  Neck: supple and non-tender without mass, no thyromegaly or thyroid nodules, no cervical lymphadenopathy  Pulmonary/Chest: clear to auscultation bilaterally- no wheezes, rales or rhonchi, normal air movement, no respiratory distress  Cardiovascular: normal rate, regular rhythm, normal S1 and S2, no murmurs, rubs, clicks, or gallops, distal pulses intact, no carotid bruits  Abdomen: soft, non-tender, non-distended, normal bowel sounds, no masses or organomegaly  Extremities: no cyanosis, clubbing or edema  Musculoskeletal: normal range of motion, no joint swelling, deformity or tenderness  Neurologic: reflexes normal and symmetric, no cranial nerve deficit, gait, coordination and speech normal, skin sensation normal      Assessment:      Patient Active Problem List   Diagnosis Code    Parkinson disease ([facility] G20    GERD (gastroesophageal reflux disease) U05.0    Lichen sclerosus Y38.9    Palpitations R00.2    Fatigue R53.83    Asthma J45.909    Edema R60.9    Hypokalemia E87.6    PAF (paroxysmal atrial fibrillation) ([facility]) I48.0    Mild mitral regurgitation by prior echocardiogram I34.0    Restless legs syndrome G25.81    Hypoesthesia of skin R20.1    Somnolence, daytime R40.0    Morbid obesity ([facility]) E66.01    Stage 4 chronic kidney disease ([facility]) N18.4    Mixed hyperlipidemia E78.2    Cellulitis of left lower extremity L03. 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 (CODE) ([facility] I83.022    Diabetic ulcer of right lower leg associated with type 2 diabetes mellitus, with fat layer exposed ([facility] E11.622, L97.912    Edema of both lower extremities due to peripheral venous insufficiency I87.2             Wound 10/21/20 Pretibial Left;Medial wound 2- left medial venous (Active)   Wound Image   11/19/20 1124   Wound Etiology Venous 11/19/20 1124   Dressing Status Old drainage noted 11/19/20 1124   Wound Cleansed Soap and water 11/12/20 1033 Dressing/Treatment Alginate with Ag 11/12/20 1115   Wound Length (cm) 0 cm 11/19/20 1124   Wound Width (cm) 0 cm 11/19/20 1124   Wound Depth (cm) 0 cm 11/19/20 1124   Wound Surface Area (cm^2) 0 cm^2 11/19/20 1124   Change in Wound Size % (l*w) 100 11/19/20 1124   Wound Volume (cm^3) 0 cm^3 11/19/20 1124   Wound Healing % 100 11/19/20 1124   Post-Procedure Length (cm) 0.2 cm 11/12/20 1040   Post-Procedure Width (cm) 1.5 cm 11/12/20 1040   Post-Procedure Depth (cm) 0.1 cm 11/12/20 1040   Post-Procedure Surface Area (cm^2) 0.3 cm^2 11/12/20 1040   Post-Procedure Volume (cm^3) 0.03 cm^3 11/12/20 1040   Distance Tunneling (cm) 0 cm 11/19/20 1124   Tunneling Position ___ O'Clock 0 11/19/20 1124   Undermining Starts ___ O'Clock 0 11/19/20 1124   Undermining Ends___ O'Clock 0 11/19/20 1124   Undermining Maxium Distance (cm) 0 11/19/20 1124   Wound Assessment Dry 11/19/20 1124   Drainage Amount None 11/19/20 1124   Drainage Description Serous 11/12/20 1033   Odor None 11/19/20 1124   [place]-wound Assessment Intact 11/19/20 1124   Margins Attached edges 11/19/20 1124   Wound Thickness Description not for Pressure Injury Full thickness 11/12/20 1033   Number of days: 28       Wound 10/21/20 Pretibial Right;Posterior wound 3- right posterior venous (Active)   Wound Image   11/19/20 1124   Wound Etiology Venous 11/19/20 1124   Dressing Status Dry 11/19/20 1124   Wound Cleansed Soap and water 11/12/20 1033   Dressing/Treatment Alginate with Ag 11/12/20 1115   Wound Length (cm) 0 cm 11/19/20 1124   Wound Width (cm) 0 cm 11/19/20 1124   Wound Depth (cm) 0 cm 11/19/20 1124   Wound Surface Area (cm^2) 0 cm^2 11/19/20 1124   Change in Wound Size % (l*w) 100 11/19/20 1124   Wound Volume (cm^3) 0 cm^3 11/19/20 1124   Wound Healing % 100 11/19/20 1124   Post-Procedure Length (cm) 0.1 cm 11/12/20 1040   Post-Procedure Width (cm) 0.1 cm 11/12/20 1040   Post-Procedure Depth (cm) 0.1 cm 11/12/20 1040   Post-Procedure Surface Area (cm^2) 0.01 cm^2 11/12/20 1040   Post-Procedure Volume (cm^3) 0 cm^3 11/12/20 1040   Distance Tunneling (cm) 0 cm 11/19/20 1124   Tunneling Position ___ O'Clock 0 11/19/20 1124   Undermining Starts ___ O'Clock 0 11/19/20 1124   Undermining Ends___ O'Clock 0 11/19/20 1124   Undermining Maxium Distance (cm) 0 11/19/20 1124   Wound Assessment Dry 11/19/20 1124   Drainage Amount None 11/19/20 1124   Drainage Description Serous 11/12/20 1033   Odor None 11/19/20 1124   [place]-wound Assessment Edematous 11/12/20 1033   Margins Attached edges 11/12/20 1033   Wound Thickness Description not for Pressure Injury Full thickness 11/12/20 1033   Number of days: 28             Plan:     Problem List Items Addressed This Visit     * (Principal) Diabetic ulcer of left lower leg associated with type 2 diabetes mellitus, with fat layer exposed ([address] (Chronic)    Diabetic ulcer of right lower leg associated with type 2 diabetes mellitus, with fat layer exposed ([address] (Chronic)    Edema of both lower extremities due to peripheral venous insufficiency (Chronic)    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 Wear stockings. RTO PRN! Treatment Note please see attached Discharge Instructions    In my professional opinion this patient would benefit from HBO Therapy: No    Written patient dismissal instructions given to patient and signed by patient or POA.          Discharge [address] and Hyperbaric Oxygen Therapy   Physician Orders and Discharge Instructions  [address] [facility] [place] [address]  Telephone: [phone]    NAME:  [place] Annager:  1950  MEDICAL RECORD NUMBER:  [id number]  DATE:  11/19/2020    Discharge condition: Stable    Discharge to: Home    Left via:Private automobile    Accompanied by: Family     ECF/[facility]: [facility]     Dressing Orders: Right and Left Lower Ext Ulcer  Healed, Moisturize and Protect   Wear compression stockings daily as tolerated   Follow up with your Family Practitioner as instructed   Elevate feet to level or above heart 3-4 times daily and as needed to for 30 minutes to reduce swelling  Multi Vitamin, High Protein diet as tolerated    [facility] follow up visit ___________PRN________________  (Please note your next appointment above and if you are unable to keep, kindly give a 24 hour notice. Thank you.)    If you experience any of the following, please call the [facility] during business hours:    * Increase in Pain  * Temperature over 101  * Increase in drainage from your wound  * Drainage with a foul odor  * Bleeding  * Increase in swelling  * Need for compression bandage changes due to slippage, breakthrough drainage. If you need medical attention outside of the business hours of the [facility] please contact your PCP or go to the nearest emergency room.         Electronically signed by Ray Styles MD on 11/19/2020 at 11:53 AM

## 2020-12-03 ENCOUNTER — TELEPHONE (OUTPATIENT)
Dept: NEUROLOGY | Age: 70
End: 2020-12-03

## 2020-12-03 NOTE — TELEPHONE ENCOUNTER
Called and spoke with patient to let her know that her appointment for today 12-03-20 with Dr. Daniel Martins had to be rescheduled due to the provider is out sick. Patient is aware of when I have her appointment rescheduled too.

## 2020-12-09 RX ORDER — MONTELUKAST SODIUM 10 MG/1
TABLET ORAL
Qty: 30 TABLET | Refills: 5 | Status: SHIPPED | OUTPATIENT
Start: 2020-12-09 | End: 2021-07-08

## 2020-12-14 RX ORDER — INSULIN DETEMIR 100 [IU]/ML
INJECTION, SOLUTION SUBCUTANEOUS
Qty: 5 PEN | Refills: 2 | Status: SHIPPED | OUTPATIENT
Start: 2020-12-14 | End: 2021-04-26

## 2020-12-18 RX ORDER — OMEPRAZOLE 40 MG/1
CAPSULE, DELAYED RELEASE ORAL
Qty: 30 CAPSULE | Refills: 3 | Status: SHIPPED | OUTPATIENT
Start: 2020-12-18 | End: 2021-04-20

## 2020-12-21 RX ORDER — ROPINIROLE 4 MG/1
TABLET, FILM COATED ORAL
Qty: 150 TABLET | Refills: 5 | Status: SHIPPED | OUTPATIENT
Start: 2020-12-21 | End: 2021-07-21

## 2020-12-21 NOTE — TELEPHONE ENCOUNTER
Requested Prescriptions     Pending Prescriptions Disp Refills    rOPINIRole (REQUIP) 4 MG tablet [Pharmacy Med Name: ROPINIROLE TAB 4MG TABLET] 150 tablet 5     Sig: TAKE TWO TABLETS BY MOUTH EVERY MORNING , ONE AT NOON, AND TWO AT BEDTIME       Last Office Visit: 5/28/2020  Next Office Visit: 2/15/21  Last Medication Refill: 5/18/20 with 5 refills

## 2021-01-18 DIAGNOSIS — G25.81 RESTLESS LEGS SYNDROME: ICD-10-CM

## 2021-01-18 NOTE — TELEPHONE ENCOUNTER
Requested Prescriptions     Pending Prescriptions Disp Refills    gabapentin (NEURONTIN) 600 MG tablet [Pharmacy Med Name: GABAPENTIN TAB 600MG TABLET] 90 tablet      Sig: TAKE ONE TABLET BY MOUTH 3 TIMES DAILY AS NEEDED       Last Office Visit:  5/28/2020  Next Office Visit:  2/15/2021  Last Medication Refill:  7/23/2020 5 refills  Tushar Huynh up to date:  1/18/2021    *RX updated to reflect   1/21/2021  fill date*

## 2021-01-19 RX ORDER — GABAPENTIN 600 MG/1
TABLET ORAL
Qty: 90 TABLET | Refills: 5 | Status: SHIPPED | OUTPATIENT
Start: 2021-01-21 | End: 2021-07-15

## 2021-01-25 RX ORDER — SOTALOL HYDROCHLORIDE 120 MG/1
TABLET ORAL
Qty: 60 TABLET | Refills: 5 | Status: SHIPPED | OUTPATIENT
Start: 2021-01-25 | End: 2022-03-09 | Stop reason: ALTCHOICE

## 2021-01-28 ENCOUNTER — OFFICE VISIT (OUTPATIENT)
Dept: PRIMARY CARE CLINIC | Age: 71
End: 2021-01-28
Payer: MEDICARE

## 2021-01-28 VITALS
SYSTOLIC BLOOD PRESSURE: 132 MMHG | WEIGHT: 293 LBS | OXYGEN SATURATION: 97 % | RESPIRATION RATE: 18 BRPM | BODY MASS INDEX: 53.92 KG/M2 | HEART RATE: 74 BPM | TEMPERATURE: 97.6 F | DIASTOLIC BLOOD PRESSURE: 80 MMHG | HEIGHT: 62 IN

## 2021-01-28 DIAGNOSIS — Z23 NEED FOR INFLUENZA VACCINATION: ICD-10-CM

## 2021-01-28 DIAGNOSIS — N18.4 STAGE 4 CHRONIC KIDNEY DISEASE (HCC): ICD-10-CM

## 2021-01-28 DIAGNOSIS — E78.2 MIXED HYPERLIPIDEMIA: ICD-10-CM

## 2021-01-28 DIAGNOSIS — J44.9 CHRONIC OBSTRUCTIVE PULMONARY DISEASE, UNSPECIFIED COPD TYPE (HCC): ICD-10-CM

## 2021-01-28 DIAGNOSIS — E11.42 DIABETIC PERIPHERAL NEUROPATHY ASSOCIATED WITH TYPE 2 DIABETES MELLITUS (HCC): ICD-10-CM

## 2021-01-28 DIAGNOSIS — E11.622 DIABETIC ULCER OF LEFT LOWER LEG ASSOCIATED WITH TYPE 2 DIABETES MELLITUS, WITH FAT LAYER EXPOSED (HCC): ICD-10-CM

## 2021-01-28 DIAGNOSIS — E66.01 MORBID OBESITY (HCC): ICD-10-CM

## 2021-01-28 DIAGNOSIS — I48.0 PAF (PAROXYSMAL ATRIAL FIBRILLATION) (HCC): ICD-10-CM

## 2021-01-28 DIAGNOSIS — I25.5 ISCHEMIC CARDIOMYOPATHY: ICD-10-CM

## 2021-01-28 DIAGNOSIS — G20 PARKINSON DISEASE (HCC): ICD-10-CM

## 2021-01-28 DIAGNOSIS — L97.922 DIABETIC ULCER OF LEFT LOWER LEG ASSOCIATED WITH TYPE 2 DIABETES MELLITUS, WITH FAT LAYER EXPOSED (HCC): ICD-10-CM

## 2021-01-28 DIAGNOSIS — E11.8 TYPE 2 DIABETES MELLITUS WITH COMPLICATION (HCC): Primary | ICD-10-CM

## 2021-01-28 LAB
ALBUMIN SERPL-MCNC: 4.1 G/DL (ref 3.5–5.2)
ALP BLD-CCNC: 79 U/L (ref 35–104)
ALT SERPL-CCNC: <5 U/L (ref 5–33)
ANION GAP SERPL CALCULATED.3IONS-SCNC: 12 MMOL/L (ref 7–19)
AST SERPL-CCNC: 13 U/L (ref 5–32)
BILIRUB SERPL-MCNC: <0.2 MG/DL (ref 0.2–1.2)
BUN BLDV-MCNC: 34 MG/DL (ref 8–23)
CALCIUM SERPL-MCNC: 8.8 MG/DL (ref 8.8–10.2)
CHLORIDE BLD-SCNC: 104 MMOL/L (ref 98–111)
CHOLESTEROL, TOTAL: 139 MG/DL (ref 160–199)
CO2: 23 MMOL/L (ref 22–29)
CREAT SERPL-MCNC: 1.9 MG/DL (ref 0.5–0.9)
GFR AFRICAN AMERICAN: 32
GFR NON-AFRICAN AMERICAN: 26
GLUCOSE BLD-MCNC: 216 MG/DL (ref 74–109)
HBA1C MFR BLD: 9.1 % (ref 4–6)
HDLC SERPL-MCNC: 37 MG/DL (ref 65–121)
LDL CHOLESTEROL CALCULATED: 69 MG/DL
POTASSIUM SERPL-SCNC: 5 MMOL/L (ref 3.5–5)
SODIUM BLD-SCNC: 139 MMOL/L (ref 136–145)
TOTAL PROTEIN: 6.5 G/DL (ref 6.6–8.7)
TRIGL SERPL-MCNC: 163 MG/DL (ref 0–149)

## 2021-01-28 PROCEDURE — G8926 SPIRO NO PERF OR DOC: HCPCS | Performed by: NURSE PRACTITIONER

## 2021-01-28 PROCEDURE — G8399 PT W/DXA RESULTS DOCUMENT: HCPCS | Performed by: NURSE PRACTITIONER

## 2021-01-28 PROCEDURE — 1036F TOBACCO NON-USER: CPT | Performed by: NURSE PRACTITIONER

## 2021-01-28 PROCEDURE — 36415 COLL VENOUS BLD VENIPUNCTURE: CPT | Performed by: NURSE PRACTITIONER

## 2021-01-28 PROCEDURE — 3046F HEMOGLOBIN A1C LEVEL >9.0%: CPT | Performed by: NURSE PRACTITIONER

## 2021-01-28 PROCEDURE — 90686 IIV4 VACC NO PRSV 0.5 ML IM: CPT | Performed by: NURSE PRACTITIONER

## 2021-01-28 PROCEDURE — G8417 CALC BMI ABV UP PARAM F/U: HCPCS | Performed by: NURSE PRACTITIONER

## 2021-01-28 PROCEDURE — 3023F SPIROM DOC REV: CPT | Performed by: NURSE PRACTITIONER

## 2021-01-28 PROCEDURE — 4040F PNEUMOC VAC/ADMIN/RCVD: CPT | Performed by: NURSE PRACTITIONER

## 2021-01-28 PROCEDURE — 3017F COLORECTAL CA SCREEN DOC REV: CPT | Performed by: NURSE PRACTITIONER

## 2021-01-28 PROCEDURE — 1090F PRES/ABSN URINE INCON ASSESS: CPT | Performed by: NURSE PRACTITIONER

## 2021-01-28 PROCEDURE — 1123F ACP DISCUSS/DSCN MKR DOCD: CPT | Performed by: NURSE PRACTITIONER

## 2021-01-28 PROCEDURE — G8427 DOCREV CUR MEDS BY ELIG CLIN: HCPCS | Performed by: NURSE PRACTITIONER

## 2021-01-28 PROCEDURE — G8482 FLU IMMUNIZE ORDER/ADMIN: HCPCS | Performed by: NURSE PRACTITIONER

## 2021-01-28 PROCEDURE — 99214 OFFICE O/P EST MOD 30 MIN: CPT | Performed by: NURSE PRACTITIONER

## 2021-01-28 PROCEDURE — G0008 ADMIN INFLUENZA VIRUS VAC: HCPCS | Performed by: NURSE PRACTITIONER

## 2021-01-28 PROCEDURE — 2022F DILAT RTA XM EVC RTNOPTHY: CPT | Performed by: NURSE PRACTITIONER

## 2021-01-28 ASSESSMENT — PATIENT HEALTH QUESTIONNAIRE - PHQ9
SUM OF ALL RESPONSES TO PHQ QUESTIONS 1-9: 0
SUM OF ALL RESPONSES TO PHQ QUESTIONS 1-9: 0

## 2021-01-28 NOTE — PROGRESS NOTES
[facility] PHYSICIAN SERVICES  [facility]  [address] [place] [address]  [address] [place] [address]  Dept: [phone]  Dept Fax: [phone]  Loc: [phone]    Edward David is a 79 y.o. female who presents today for her medical conditions/complaints as noted below. Edward David is c/o of 3 Month Follow-Up (patient presents today for follow up. )        HPI:     HPI   Chief Complaint   Patient presents with    3 Month Follow-Up     patient presents today for follow up. The leg ulcer has completely healed. She finished her wound care in November 2020. Her daughter is performing her daily foot care now and watching closely for signs and symptoms of breakdown. She is wearing RENARD hose every day which has helped the swelling tremendously and helped her peripheral vascular disease. She would like a prescription for diabetic shoes as she cannot wear regular shoes anymore because of the swelling in her feet on occasion. She is still using all of her inhalers. She had Covid back in 2020 and she has recovered well from it. She admits that her blood sugars are still running high. Her daughter is with her today and says that her mom does not eat well. She is still taking her insulin and Victoza. She is fasting today for blood work. She also has Parkinson's in is controlled on her medications and sees neurology. She also sees the kidney Dr. Tae Cope for chronic kidney disease stage IV. She recently saw him in January the labs are in the chart. Peripheral neuropathy and has to take gabapentin for this. She also takes tramadol on occasion for chronic back and hip pain. Her daughter says actually the muscle relaxer works better at night for her hip pain. Past Medical History:   Diagnosis Date    Asthma 4/21/2015    Bilateral carpal tunnel syndrome 04/09/2018    sees dr. Ze Doan.     Colon polyp     Diabetes mellitus (HCC)     GERD (gastroesophageal reflux disease)     HTN (hypertension)  Hyperlipidemia     Mild mitral regurgitation by prior echocardiogram 2016    Morbid obesity ([address] 10/18/2017    Normal cardiac stress test 4-2-09    no ischemia at attained level of excercise    Palliative care patient 2020    Parkinson disease [facility])     Paroxysmal atrial fibrillation [facility])     sees dr. Preston Apple Post-menopausal     Prolonged emergence from general anesthesia     Restrictive airway disease     Stage 3 chronic kidney disease 10/18/2017      Past Surgical History:   Procedure Laterality Date    APPENDECTOMY       SECTION      x3    COLONOSCOPY      Dr Mariah Morgan polyp-5 yr recall    COLONOSCOPY N/A 2019    Dr Aime Landers 2 internal hemorrhoids without stigmata, diverticulosis, suboptimal prep--5 yr recall    GALLBLADDER SURGERY  2014    dr Monte Speaker N/CARPAL TUNNEL SURG Right 2018    OPEN CARPAL TUNNEL RELEASE performed by Otis Ta MD at [address] [place] [address]      dr Servando Nicole in [address] [place] [address] 2021 2020 2020 2020 10/29/2020    SYSTOLIC 438 - - 89 346 224   DIASTOLIC 80 - - 61 68 82   Pulse 74 - 70 71 70 78   Temp 97.6 - 96.8 98 96.2 98.5   Resp 18 - 16 16 16 16   SpO2 97 - - - - 95   Weight 304 lb 296 lb 296 lb 296 lb 296 lb 296 lb   Height 5' 2\" 5' 2\" 5' 2\" 5' 2\" 5' 2\" 5' 2.5\"   Body mass index 55.6 kg/m2 54.13 kg/m2 54.13 kg/m2 54.13 kg/m2 54.13 kg/m2 53.27 kg/m2   Pain Level - 4 5 5 5 -   Some recent data might be hidden       Family History   Problem Relation Age of Onset    High Blood Pressure Father     Diabetes Father     Parkinsonism Father     High Blood Pressure Mother     Diabetes Sister     Other Paternal Grandmother         hiatal hernia    Heart Disease Paternal Grandfather     Colon Cancer Neg Hx     Colon Polyps Neg Hx     Esophageal Cancer Neg Hx  Liver Cancer Neg Hx     Liver Disease Neg Hx     Rectal Cancer Neg Hx     Stomach Cancer Neg Hx        Social History     Tobacco Use    Smoking status: Never Smoker    Smokeless tobacco: Never Used   Substance Use Topics    Alcohol use: No      Current Outpatient Medications   Medication Sig Dispense Refill    Diabetic Shoe MISC by Does not apply route 1 each 0    sotalol (BETAPACE) 120 MG tablet TAKE ONE TABLET BY MOUTH TWICE A DAY 60 tablet 5    gabapentin (NEURONTIN) 600 MG tablet TAKE ONE TABLET BY MOUTH 3 TIMES DAILY AS NEEDED 90 tablet 5    traMADol (ULTRAM) 50 MG tablet Take 1 tablet by mouth 2 times daily as needed for Pain for up to 30 days. 60 tablet 0    tiZANidine (ZANAFLEX) 2 MG tablet Take 1 tablet by mouth nightly as needed (muscle spasms) 30 tablet 0    carbidopa-levodopa (SINEMET)  MG per tablet TAKE TWO TABLETS BY MOUTH 3 TIMES DAILY 180 tablet 5    rOPINIRole (REQUIP) 4 MG tablet TAKE TWO TABLETS BY MOUTH EVERY MORNING , ONE AT NOON, AND TWO AT BEDTIME 150 tablet 5    omeprazole (PRILOSEC) 40 MG delayed release capsule TAKE ONE CAPSULE BY MOUTH EVERY DAY 30 capsule 3    montelukast (SINGULAIR) 10 MG tablet TAKE ONE TABLET BY MOUTH EVERY NIGHT AT BEDTIME 30 tablet 5    VICTOZA 18 MG/3ML SOPN SC injection INJECT 1.8 MG INTO THE SKIN DAILY 2 pen 5    atorvastatin (LIPITOR) 10 MG tablet TAKE ONE TABLET BY MOUTH EACH EVENING 30 tablet 3    COMFORT EZ PEN NEEDLES 32G X 4 MM MISC APPLY USE WITH INSULIN  THREE TIMES PER DAY. DX: E11.8, Z79.4 100 each 3    Cholecalciferol (VITAMIN D3) 50 MCG (2000 UT) CAPS Take 1 capsule by mouth      LINZESS 290 MCG CAPS capsule TAKE ONE CAPSULE IN THE MORNING BEFORE BREAKFAST 30 capsule 5    blood glucose monitor strips Test 3times a day & as needed for symptoms of irregular blood glucose.  100 strip 3    albuterol (ACCUNEB) 1.25 MG/3ML nebulizer solution Inhale 3 mLs into the lungs every 6 hours as needed for Wheezing 360 mL 3  losartan (COZAAR) 100 MG tablet TAKE ONE TABLET BY MOUTH EVERY DAY 30 tablet 5    insulin aspart (NOVOLOG) 100 UNIT/ML injection vial Inject 22 Units into the skin 3 times daily (before meals) 3 vial 3    fluconazole (DIFLUCAN) 150 MG tablet Take one on the 15th of every month 1 tablet 11    allopurinol (ZYLOPRIM) 100 MG tablet Take 1 tablet by mouth daily      erythromycin (ROMYCIN) 5 MG/GM ophthalmic ointment Place into both eyes nightly as needed      EASY COMFORT INSULIN SYRINGE 31G X 5/16\" 1 ML MISC INJECT AS DIRECTED DAILY 100 each 3    nystatin (NYSTATIN) 984537 UNIT/GM powder Apply topically 3 times daily Apply topically 4 times daily. 60 g 3    torsemide (DEMADEX) 20 MG tablet Take 20 mg by mouth daily 3 tablets for three days then two tablets daily       blood glucose monitor kit and supplies Use as directed for diabetes TID checks. 1 kit 0    TRUEPLUS INSULIN SYRINGE 30G X 5/16\" 1 ML MISC INJECT AS DIRECTED DAILY 100 each 3    clobetasol (TEMOVATE) 0.05 % ointment Apply external vagina 3 x a day for week one, then decrease to BID on week two, on week 3 once a day, on week four every other day then stop 1 Tube 1    Melatonin 10 MG CAPS Take 10 mg by mouth every evening Takes 20 mg a night      denosumab (PROLIA) 60 MG/ML SOSY SC injection Inject 60 mg into the skin every 6 months      spironolactone (ALDACTONE) 25 MG tablet TAKE ONE TABLET DAILY (Patient taking differently: One tablet in the am and one tablet in the evening) 30 tablet 5    nystatin (MYCOSTATIN) 711466 UNIT/GM ointment Apply topically 2 times daily. for yeast 60 Tube 5    glucose blood VI test strips (ONE TOUCH ULTRA TEST) strip Inject 1 each into the skin daily As needed.  100 each 3    Lancets MISC 1 lancet to check glucose daily 100 each 3    Insulin Pen Needle 31G X 8 MM MISC Inject 1 each into the skin daily 100 each 2  albuterol (PROVENTIL HFA;VENTOLIN HFA) 108 (90 BASE) MCG/ACT inhaler Inhale 2 puffs into the lungs every 6 hours as needed for Wheezing 1 Inhaler 1    OXYGEN 2L NC 12-24 hours (Patient taking differently: nightly as needed 2L NC 12-24 hours) 1 Device 0    aspirin 325 MG tablet Take 325 mg by mouth daily.  LEVEMIR FLEXTOUCH 100 UNIT/ML injection pen INJECT 70 UNITS SUBCU NIGHTLY FOR TYPE 2 DIABETES 5 pen 2     No current facility-administered medications for this visit. Allergies   Allergen Reactions    Codeine      itching    Hydrocodone-Acetaminophen Nausea Only       Health Maintenance   Topic Date Due    COVID-19 Vaccine (1 of 2) 12/27/1966    DTaP/Tdap/Td vaccine (1 - Tdap) 12/27/1969    Shingles Vaccine (1 of 2) 12/27/2000    Annual Wellness Visit (AWV)  05/29/2019    Breast cancer screen  03/22/2020    Lipid screen  12/17/2020    Diabetic retinal exam  05/14/2021    A1C test (Diabetic or Prediabetic)  08/07/2021    Potassium monitoring  10/28/2021    Creatinine monitoring  10/28/2021    Diabetic foot exam  01/28/2022    Colon cancer screen colonoscopy  06/18/2024    DEXA (modify frequency per FRAX score)  Completed    Flu vaccine  Completed    Pneumococcal 65+ yrs at Risk Vaccine  Completed    Hepatitis C screen  Addressed    Hepatitis A vaccine  Aged Out    Hib vaccine  Aged Out    Meningococcal (ACWY) vaccine  Aged Out       Subjective:      Review of Systems    Objective:     Physical Exam  Vitals signs and nursing note reviewed. Constitutional:       Appearance: She is well-developed. She is obese. HENT:      Head: Normocephalic. Right Ear: External ear normal.      Left Ear: External ear normal.   Eyes:      Pupils: Pupils are equal, round, and reactive to light. Neck:      Musculoskeletal: Normal range of motion. Cardiovascular:      Rate and Rhythm: Normal rate and regular rhythm. Heart sounds: Normal heart sounds.    Pulmonary: Orders Placed This Encounter   Medications    Diabetic Shoe MISC     Sig: by Does not apply route     Dispense:  1 each     Refill:  0         ORDERS:  Orders Placed This Encounter   Procedures    INFLUENZA, QUADV, 3 YRS AND OLDER, IM PF, PREFILL SYR OR SDV, 0.5ML (AFLURIA QUADV, PF)    Comprehensive Metabolic Panel    Hemoglobin A1C    Lipid Panel     DIABETES FOOT EXAM       Follow-up:  Return in about 6 months (around 7/28/2021) for maw. PATIENT INSTRUCTIONS:  There are no Patient Instructions on file for this visit. Electronically signed by INGRID Montaño CNP on 1/28/2021 at 11:20 AM    EMR Dragon/transcription disclaimer:  Much of thisencounter note is electronic transcription/translation of spoken language to printed texts. The electronic translation of spoken language may be erroneous, or at times, nonsensical words or phrases may be inadvertentlytranscribed.   Although I have reviewed the note for such errors, some may still exist.

## 2021-03-26 DIAGNOSIS — R52 PAIN: ICD-10-CM

## 2021-03-26 RX ORDER — TRAMADOL HYDROCHLORIDE 50 MG/1
50 TABLET ORAL 2 TIMES DAILY PRN
Qty: 60 TABLET | Refills: 0 | Status: SHIPPED | OUTPATIENT
Start: 2021-03-26 | End: 2021-07-07 | Stop reason: SDUPTHER

## 2021-03-26 RX ORDER — TIZANIDINE 2 MG/1
2 TABLET ORAL NIGHTLY PRN
Qty: 30 TABLET | Refills: 0 | Status: SHIPPED | OUTPATIENT
Start: 2021-03-26 | End: 2021-07-07 | Stop reason: SDUPTHER

## 2021-03-26 RX ORDER — LORAZEPAM 1 MG/1
1 TABLET ORAL DAILY PRN
Qty: 30 TABLET | Refills: 0 | OUTPATIENT
Start: 2021-03-26 | End: 2021-04-25

## 2021-03-26 NOTE — TELEPHONE ENCOUNTER
Patient said the she does not take it very often at all. She just takes it when she really needs it.

## 2021-03-26 NOTE — TELEPHONE ENCOUNTER
refilled the tramadol and Zanaflex but I did not know why she wanted the Ativan its been 3 years since I filled it.   Could you call and see 1

## 2021-03-30 ENCOUNTER — OFFICE VISIT (OUTPATIENT)
Dept: NEUROLOGY | Age: 71
End: 2021-03-30
Payer: MEDICARE

## 2021-03-30 VITALS
WEIGHT: 286 LBS | DIASTOLIC BLOOD PRESSURE: 73 MMHG | RESPIRATION RATE: 18 BRPM | SYSTOLIC BLOOD PRESSURE: 135 MMHG | HEART RATE: 68 BPM | HEIGHT: 62 IN | BODY MASS INDEX: 52.63 KG/M2

## 2021-03-30 DIAGNOSIS — G47.34 NOCTURNAL HYPOXEMIA: ICD-10-CM

## 2021-03-30 DIAGNOSIS — E11.42 DIABETIC POLYNEUROPATHY ASSOCIATED WITH TYPE 2 DIABETES MELLITUS (HCC): ICD-10-CM

## 2021-03-30 DIAGNOSIS — Z91.89 AT RISK FOR OBSTRUCTIVE SLEEP APNEA: ICD-10-CM

## 2021-03-30 DIAGNOSIS — G20 PARKINSON DISEASE (HCC): Primary | ICD-10-CM

## 2021-03-30 DIAGNOSIS — G25.81 RESTLESS LEGS SYNDROME: ICD-10-CM

## 2021-03-30 DIAGNOSIS — E66.01 MORBID OBESITY (HCC): ICD-10-CM

## 2021-03-30 DIAGNOSIS — R44.1 VISUAL HALLUCINATION: ICD-10-CM

## 2021-03-30 PROCEDURE — 1036F TOBACCO NON-USER: CPT | Performed by: PSYCHIATRY & NEUROLOGY

## 2021-03-30 PROCEDURE — 3017F COLORECTAL CA SCREEN DOC REV: CPT | Performed by: PSYCHIATRY & NEUROLOGY

## 2021-03-30 PROCEDURE — G8482 FLU IMMUNIZE ORDER/ADMIN: HCPCS | Performed by: PSYCHIATRY & NEUROLOGY

## 2021-03-30 PROCEDURE — 4040F PNEUMOC VAC/ADMIN/RCVD: CPT | Performed by: PSYCHIATRY & NEUROLOGY

## 2021-03-30 PROCEDURE — 3046F HEMOGLOBIN A1C LEVEL >9.0%: CPT | Performed by: PSYCHIATRY & NEUROLOGY

## 2021-03-30 PROCEDURE — 1123F ACP DISCUSS/DSCN MKR DOCD: CPT | Performed by: PSYCHIATRY & NEUROLOGY

## 2021-03-30 PROCEDURE — 1090F PRES/ABSN URINE INCON ASSESS: CPT | Performed by: PSYCHIATRY & NEUROLOGY

## 2021-03-30 PROCEDURE — G8417 CALC BMI ABV UP PARAM F/U: HCPCS | Performed by: PSYCHIATRY & NEUROLOGY

## 2021-03-30 PROCEDURE — 2022F DILAT RTA XM EVC RTNOPTHY: CPT | Performed by: PSYCHIATRY & NEUROLOGY

## 2021-03-30 PROCEDURE — 99215 OFFICE O/P EST HI 40 MIN: CPT | Performed by: PSYCHIATRY & NEUROLOGY

## 2021-03-30 PROCEDURE — G8399 PT W/DXA RESULTS DOCUMENT: HCPCS | Performed by: PSYCHIATRY & NEUROLOGY

## 2021-03-30 PROCEDURE — G8427 DOCREV CUR MEDS BY ELIG CLIN: HCPCS | Performed by: PSYCHIATRY & NEUROLOGY

## 2021-03-30 RX ORDER — NYSTATIN 100000 U/G
OINTMENT TOPICAL
Qty: 60 TUBE | Refills: 5 | Status: SHIPPED | OUTPATIENT
Start: 2021-03-30

## 2021-03-30 RX ORDER — NYSTATIN 100000 [USP'U]/G
POWDER TOPICAL 3 TIMES DAILY
Qty: 60 G | Refills: 3 | Status: SHIPPED | OUTPATIENT
Start: 2021-03-30

## 2021-03-30 RX ORDER — CLOBETASOL PROPIONATE 0.5 MG/G
OINTMENT TOPICAL
Qty: 1 TUBE | Refills: 1 | Status: SHIPPED | OUTPATIENT
Start: 2021-03-30 | End: 2021-04-29

## 2021-03-30 NOTE — PROGRESS NOTES
[address] [facility] Neurology  [address] [place] [address], [address] [place] [address],[address]  [address] [facility], [place] [address]  Phone [phone]  Fax [phone] [address] [facility] Neurology Follow Up Encounter  3/30/21 9:12 AM CDT    Information:   Patient Name: Iam Alcantar  :   1950  Age:   79 y.o. MRN:   [id number]  Account #:  [de-identified]  Today:  3/30/21    Provider: Gamaliel Rosario M.D. Chief Complaint:   Chief Complaint   Patient presents with    6 Month Follow-Up       Subjective:   Iam Alcantar is a 79 y.o. woman with a history of Parkinson's disease, visual hallucinations, restless legs syndrome, and diabetic polyneuropathy who is following up. She is morbidly obese and has difficulty with her mobility. She can stand and transfer. She gets pain in her hips, knees, back when standing too long. She requires some assistance for personal care, mostly from her obesity. She has been dropping things. She complains of twitching which causes her to drop items. She is on gabapentin and tramadol. She uses oxygen during the night. She has had active dreams. She wakens screaming, hollering, or talking. She does take melatonin at bedtime. She was advised and arranged to have a sleep study. She cancelled. She says that she would not use CPAP. She has had some visual hallucinations. Objective:     Past Medical History:  Past Medical History:   Diagnosis Date    Asthma 2015    Bilateral carpal tunnel syndrome 2018    sees dr. Shahbaz Edward.     Colon polyp     Diabetes mellitus [address]     GERD (gastroesophageal reflux disease)     HTN (hypertension)     Hyperlipidemia     Mild mitral regurgitation by prior echocardiogram 2016    Morbid obesity ([address] 10/18/2017    Normal cardiac stress test 09    no ischemia at attained level of excercise    Palliative care patient 2020    Parkinson disease [facility])     Paroxysmal atrial fibrillation [facility])     sees dr. Dax Torres Post-menopausal     Prolonged emergence from general anesthesia     Restrictive airway disease     Stage 3 chronic kidney disease 10/18/2017       Past Surgical History:   Procedure Laterality Date    APPENDECTOMY       SECTION      x3    COLONOSCOPY      Dr Mariah Morgan polyp-5 yr recall    COLONOSCOPY N/A 2019    Dr Aime Landers 2 internal hemorrhoids without stigmata, diverticulosis, suboptimal prep--5 yr recall    GALLBLADDER SURGERY  2014    dr Korey Mohamud ARTHROSCOPY      [place] [facility]IAN N/CARPAL TUNNEL SURG Right 2018    OPEN CARPAL TUNNEL RELEASE performed by Otis Ta MD at [facility] TYMPANOSTOMY TUBE PLACEMENT      dr Servando Nicole in [address]  · None    Significant Injuries  · None    Habits  Humza Rand reports that she has never smoked. She has never used smokeless tobacco. She reports that she does not drink alcohol or use drugs. Family History   Problem Relation Age of Onset    High Blood Pressure Father     Diabetes Father     Parkinsonism Father     High Blood Pressure Mother     Diabetes Sister     Other Paternal Grandmother         hiatal hernia    Heart Disease Paternal Grandfather     Colon Cancer Neg Hx     Colon Polyps Neg Hx     Esophageal Cancer Neg Hx     Liver Cancer Neg Hx     Liver Disease Neg Hx     Rectal Cancer Neg Hx     Stomach Cancer Neg Hx        Social History  Venancio Chen is , lives in [place], [place], and is retired. Medications:  Current Outpatient Medications   Medication Sig Dispense Refill    traMADol (ULTRAM) 50 MG tablet Take 1 tablet by mouth 2 times daily as needed for Pain for up to 30 days.  60 tablet 0    tiZANidine (ZANAFLEX) 2 MG tablet Take 1 tablet by mouth nightly as needed (muscle spasms) 30 tablet 0    Diabetic Shoe MISC by Does not apply route 1 each 0    sotalol (BETAPACE) 120 MG tablet TAKE ONE TABLET BY MOUTH TWICE A DAY 60 tablet 5    gabapentin (NEURONTIN) 600 MG tablet TAKE ONE TABLET BY MOUTH 3 TIMES DAILY AS NEEDED 90 tablet 5    carbidopa-levodopa (SINEMET)  MG per tablet TAKE TWO TABLETS BY MOUTH 3 TIMES DAILY 180 tablet 5    omeprazole (PRILOSEC) 40 MG delayed release capsule TAKE ONE CAPSULE BY MOUTH EVERY DAY 30 capsule 3    LEVEMIR FLEXTOUCH 100 UNIT/ML injection pen INJECT 70 UNITS SUBCU NIGHTLY FOR TYPE 2 DIABETES 5 pen 2    montelukast (SINGULAIR) 10 MG tablet TAKE ONE TABLET BY MOUTH EVERY NIGHT AT BEDTIME 30 tablet 5    VICTOZA 18 MG/3ML SOPN SC injection INJECT 1.8 MG INTO THE SKIN DAILY 2 pen 5    atorvastatin (LIPITOR) 10 MG tablet TAKE ONE TABLET BY MOUTH EACH EVENING 30 tablet 3    COMFORT EZ PEN NEEDLES 32G X 4 MM MISC APPLY USE WITH INSULIN  THREE TIMES PER DAY. DX: E11.8, Z79.4 100 each 3    Cholecalciferol (VITAMIN D3) 50 MCG (2000 UT) CAPS Take 1 capsule by mouth      LINZESS 290 MCG CAPS capsule TAKE ONE CAPSULE IN THE MORNING BEFORE BREAKFAST 30 capsule 5    blood glucose monitor strips Test 3times a day & as needed for symptoms of irregular blood glucose. 100 strip 3    albuterol (ACCUNEB) 1.25 MG/3ML nebulizer solution Inhale 3 mLs into the lungs every 6 hours as needed for Wheezing 360 mL 3    losartan (COZAAR) 100 MG tablet TAKE ONE TABLET BY MOUTH EVERY DAY 30 tablet 5    insulin aspart (NOVOLOG) 100 UNIT/ML injection vial Inject 22 Units into the skin 3 times daily (before meals) 3 vial 3    fluconazole (DIFLUCAN) 150 MG tablet Take one on the 15th of every month 1 tablet 11    allopurinol (ZYLOPRIM) 100 MG tablet Take 1 tablet by mouth daily      EASY COMFORT INSULIN SYRINGE 31G X 5/16\" 1 ML MISC INJECT AS DIRECTED DAILY 100 each 3    nystatin (NYSTATIN) 624939 UNIT/GM powder Apply topically 3 times daily Apply topically 4 times daily.  60 g 3    torsemide (DEMADEX) 20 MG tablet Take 20 mg by mouth daily 3 tablets for three days then two tablets daily       blood glucose monitor kit and supplies Use as directed for diabetes TID checks. 1 kit 0    TRUEPLUS INSULIN SYRINGE 30G X 5/16\" 1 ML MISC INJECT AS DIRECTED DAILY 100 each 3    clobetasol (TEMOVATE) 0.05 % ointment Apply external vagina 3 x a day for week one, then decrease to BID on week two, on week 3 once a day, on week four every other day then stop 1 Tube 1    Melatonin 10 MG CAPS Take 10 mg by mouth every evening Takes 20 mg a night      denosumab (PROLIA) 60 MG/ML SOSY SC injection Inject 60 mg into the skin every 6 months      spironolactone (ALDACTONE) 25 MG tablet TAKE ONE TABLET DAILY (Patient taking differently: One tablet in the am and one tablet in the evening) 30 tablet 5    glucose blood VI test strips (ONE TOUCH ULTRA TEST) strip Inject 1 each into the skin daily As needed. 100 each 3    Lancets MISC 1 lancet to check glucose daily 100 each 3    Insulin Pen Needle 31G X 8 MM MISC Inject 1 each into the skin daily 100 each 2    albuterol (PROVENTIL HFA;VENTOLIN HFA) 108 (90 BASE) MCG/ACT inhaler Inhale 2 puffs into the lungs every 6 hours as needed for Wheezing 1 Inhaler 1    OXYGEN 2L NC 12-24 hours (Patient taking differently: nightly as needed 2L NC 12-24 hours) 1 Device 0    aspirin 325 MG tablet Take 325 mg by mouth daily.  rOPINIRole (REQUIP) 4 MG tablet TAKE TWO TABLETS BY MOUTH EVERY MORNING , ONE AT NOON, AND TWO AT BEDTIME 150 tablet 5     No current facility-administered medications for this visit. Allergies:   Allergies as of 03/30/2021 - Review Complete 03/30/2021   Allergen Reaction Noted    Codeine  07/06/2017    Hydrocodone-acetaminophen Nausea Only        Review of Systems:  Constitutional: negative for - chills and fever  Eyes:  negative for - visual disturbance and photophobia  HENMT: negative for - headaches and sinus pain  Respiratory: negative for - cough, hemoptysis  positive for - shortness of breath  Cardiovascular: negative for - chest pain and palpitations Gastrointestinal: negative for - blood in stools, constipation, diarrhea, nausea, and vomiting  Genito-Urinary: negative for - hematuria  Positive for - urinary frequency, urinary urgency  Musculoskeletal: positive for - joint pain, joint stiffness, and joint swelling  Hematological and Lymphatic: negative for - bleeding problems, abnormal bruising, and swollen lymph nodes  Endocrine:  negative for - polydipsia and polyphagia  Allergy/Immunology:  negative for - rhinorrhea, sinus congestion, hives  Integument:  negative for - negative for - rash, change in moles, new or changing lesions  Psychological: negative for - anxiety and depression  Neurological: positive for - memory loss, numbness/tingling, and weakness     PHYSICAL EXAMINATION:  Vitals:  /73   Pulse 68   Resp 18   Ht 5' 2\" (1.575 m)   Wt 286 lb (129.7 kg)   BMI 52.31 kg/m²   General appearance:  She is a morbidly obese woman in a wheelchair with chronic debilitation who appears in no distress  HEENT:  normocephalic, atraumatic, sclera appear normal, no nasal abnormalities, no rhinorrhea, Ears appear normal, oral mucous membranes are moist without erythema, trachea midline, thyroid is normal, no lymphadenopathy or neck mass. Type 4 oropharynx. Cardiovascular:  Regular rate and rhythm without murmer. 3+ peripheral edema below knees bilaterally. No cyanosis or clubbing. No carotid bruits. Pulmonary:  Lungs are clear to auscultation. Breathing appears normal, good expansion, normal effort without use of accessory muscles  Musculoskeletal:  Joints are osteoarthritic. Integument:  No rash, erythema, or pallor  Psychiatric:  Mood, affect, and behavior appear normal      NEUROLOGIC EXAMINATION:  Mental Status:  alert, oriented to person, place, and time.   Speech:  Clear without dysarthria or dysphonia  Language:  Fluent without aphasia  Cranial Nerves:   II Visual fields are full to confrontation   III,IV, VI Extraocular movements are full VII Facial movements are symmetrical without weakness   VIII Hearing is intact   IX,X Shoulder shrug and head rotation strength are intact   XII No tongue atrophy or fasciculations. Normal tongue protrusion. No tongue weakness  Motor:  Normal strength in both upper and lower extremities. Normal muscle tone and bulk. Deep tendon reflexes are absent in both upper and lower extremities. She has an intermittent rest tremor in the right hand. Mckeon's signs are absent bilaterally. There is no ankle clonus on either side. Sensation:  Sensation is reduced to light touch, temperature, and vibration in distal extremities. Coordination:  Rapid alternating movements are mildly bradykinetic. Finger to nose testing is unimpaired bilaterally. Assessment:       ICD-10-CM    1. Parkinson disease ([address]  G20    2. Diabetic polyneuropathy associated with type 2 diabetes mellitus (HCC)  E11.42    3. Restless legs syndrome  G25.81    4. Visual hallucination  R44.1    5. At risk for obstructive sleep apnea  Z91.89    6. Nocturnal hypoxemia  G47.34    7. Morbid obesity ([address]  E66.01    I had a very long discussion with her and her daughter regarding the above and medicine side effects. She likely has asterixis from gabapentin that causes her to drop items. Cannot further treat the RSBD without treating her suspected sleep apnea. Discussed that her PD seems to me to be the least of her medical problems. Plan:   1. I advised a sleep study and likely PAP use. 2. Continue the same medications.   3. FU in 6 months    Electronically signed by Lashonda Chavez MD on 3/30/21

## 2021-04-01 RX ORDER — LINACLOTIDE 290 UG/1
CAPSULE, GELATIN COATED ORAL
Qty: 30 CAPSULE | Refills: 5 | Status: SHIPPED | OUTPATIENT
Start: 2021-04-01 | End: 2021-09-29

## 2021-04-12 DIAGNOSIS — F41.9 ANXIETY: Primary | ICD-10-CM

## 2021-04-13 RX ORDER — LORAZEPAM 1 MG/1
1 TABLET ORAL DAILY PRN
Qty: 30 TABLET | Refills: 0 | Status: SHIPPED | OUTPATIENT
Start: 2021-04-13 | End: 2021-05-17

## 2021-04-26 RX ORDER — INSULIN DETEMIR 100 [IU]/ML
INJECTION, SOLUTION SUBCUTANEOUS
Qty: 5 PEN | Refills: 3 | Status: SHIPPED | OUTPATIENT
Start: 2021-04-26 | End: 2021-04-26

## 2021-04-29 ENCOUNTER — PATIENT MESSAGE (OUTPATIENT)
Dept: PRIMARY CARE CLINIC | Age: 71
End: 2021-04-29

## 2021-04-29 RX ORDER — INSULIN DETEMIR 100 [IU]/ML
INJECTION, SOLUTION SUBCUTANEOUS
Qty: 5 PEN | Refills: 1 | Status: SHIPPED | OUTPATIENT
Start: 2021-04-29 | End: 2021-07-13 | Stop reason: SDUPTHER

## 2021-04-29 NOTE — TELEPHONE ENCOUNTER
From: Alexis Draper  To: INGRID Carlisle - CNP  Sent: 4/29/2021 10:17 AM CDT  Subject: Prescription Question    The pharmacy is saying the tamivat ointment isn't covered on insurance for [place]

## 2021-05-03 RX ORDER — LANCETS 33 GAUGE
EACH MISCELLANEOUS
Qty: 100 EACH | Refills: 3 | Status: SHIPPED | OUTPATIENT
Start: 2021-05-03

## 2021-05-17 DIAGNOSIS — F41.9 ANXIETY: ICD-10-CM

## 2021-05-17 RX ORDER — LORAZEPAM 1 MG/1
1 TABLET ORAL DAILY PRN
Qty: 30 TABLET | Refills: 0 | Status: SHIPPED | OUTPATIENT
Start: 2021-05-17 | End: 2022-01-13 | Stop reason: SDUPTHER

## 2021-06-15 RX ORDER — INSULIN ASPART 100 [IU]/ML
INJECTION, SOLUTION INTRAVENOUS; SUBCUTANEOUS
Qty: 30 ML | Refills: 5 | Status: SHIPPED | OUTPATIENT
Start: 2021-06-15 | End: 2021-10-04

## 2021-07-07 ENCOUNTER — PATIENT MESSAGE (OUTPATIENT)
Dept: PRIMARY CARE CLINIC | Age: 71
End: 2021-07-07

## 2021-07-07 DIAGNOSIS — R52 PAIN: ICD-10-CM

## 2021-07-07 RX ORDER — TRAMADOL HYDROCHLORIDE 50 MG/1
50 TABLET ORAL 2 TIMES DAILY PRN
Qty: 60 TABLET | Refills: 0 | Status: SHIPPED | OUTPATIENT
Start: 2021-07-07 | End: 2021-09-14 | Stop reason: SDUPTHER

## 2021-07-07 NOTE — TELEPHONE ENCOUNTER
From: Stu Trevizo  To: INGRID Hernandez CNP  Sent: 7/7/2021 10:04 AM CDT  Subject: Prescription Question    Betos Lebron needs a refill sent in for Tramadol please

## 2021-07-08 RX ORDER — MONTELUKAST SODIUM 10 MG/1
TABLET ORAL
Qty: 30 TABLET | Refills: 5 | Status: SHIPPED | OUTPATIENT
Start: 2021-07-08 | End: 2021-07-15

## 2021-07-13 RX ORDER — INSULIN DETEMIR 100 [IU]/ML
INJECTION, SOLUTION SUBCUTANEOUS
Qty: 5 PEN | Refills: 1 | Status: SHIPPED | OUTPATIENT
Start: 2021-07-13 | End: 2021-07-23 | Stop reason: SDUPTHER

## 2021-07-15 DIAGNOSIS — G25.81 RESTLESS LEGS SYNDROME: ICD-10-CM

## 2021-07-15 RX ORDER — MONTELUKAST SODIUM 10 MG/1
TABLET ORAL
Qty: 30 TABLET | Refills: 5 | Status: SHIPPED | OUTPATIENT
Start: 2021-07-15 | End: 2021-10-21 | Stop reason: SDUPTHER

## 2021-07-15 RX ORDER — GABAPENTIN 600 MG/1
TABLET ORAL
Qty: 90 TABLET | Refills: 5 | Status: SHIPPED | OUTPATIENT
Start: 2021-07-15 | End: 2022-01-18

## 2021-07-15 NOTE — TELEPHONE ENCOUNTER
Requested Prescriptions     Pending Prescriptions Disp Refills    gabapentin (NEURONTIN) 600 MG tablet [Pharmacy Med Name: GABAPENTIN TAB 600MG TABLET] 90 tablet      Sig: TAKE ONE TABLET BY MOUTH 3 TIMES DAILY AS NEEDED       Last Office Visit:  3/30/2021  Next Office Visit:  10/4/2021  Last Medication Refill:  1/21/2021 5 refills   Gomez up to date:  7/15/2021    *RX updated to reflect   7/15/2021  fill date*    *Dr. Angel Sanchez patient

## 2021-07-20 NOTE — TELEPHONE ENCOUNTER
Requested Prescriptions     Pending Prescriptions Disp Refills    rOPINIRole (REQUIP) 4 MG tablet [Pharmacy Med Name: ROPINIROLE TAB 4MG TABLET] 150 tablet 5     Sig: TAKE TWO TABLETS BY MOUTH EVERY MORNING , ONE AT NOON AND TWO AT BEDTIME       Last Office Visit: 3/30/2021  Next Office Visit: 10/4/2021  Last Medication Refill: 12/21/2020 5 refills

## 2021-07-21 ENCOUNTER — OFFICE VISIT (OUTPATIENT)
Dept: PRIMARY CARE CLINIC | Age: 71
End: 2021-07-21
Payer: MEDICARE

## 2021-07-21 VITALS
SYSTOLIC BLOOD PRESSURE: 130 MMHG | OXYGEN SATURATION: 95 % | WEIGHT: 293 LBS | HEART RATE: 70 BPM | BODY MASS INDEX: 53.92 KG/M2 | HEIGHT: 62 IN | DIASTOLIC BLOOD PRESSURE: 82 MMHG | TEMPERATURE: 97 F | RESPIRATION RATE: 18 BRPM

## 2021-07-21 DIAGNOSIS — I83.022 VARICOSE VEINS OF LEFT LOWER EXTREMITY WITH ULCER OF CALF (CODE) (HCC): ICD-10-CM

## 2021-07-21 DIAGNOSIS — N18.4 STAGE 4 CHRONIC KIDNEY DISEASE (HCC): ICD-10-CM

## 2021-07-21 DIAGNOSIS — D68.9 COAGULATION DEFECT (HCC): ICD-10-CM

## 2021-07-21 DIAGNOSIS — F41.9 ANXIETY: ICD-10-CM

## 2021-07-21 DIAGNOSIS — E11.8 TYPE 2 DIABETES MELLITUS WITH COMPLICATION (HCC): Primary | ICD-10-CM

## 2021-07-21 DIAGNOSIS — G20 PARKINSON DISEASE (HCC): ICD-10-CM

## 2021-07-21 DIAGNOSIS — E78.2 MIXED HYPERLIPIDEMIA: ICD-10-CM

## 2021-07-21 LAB
ALBUMIN SERPL-MCNC: 4.4 G/DL (ref 3.5–5.2)
ALP BLD-CCNC: 81 U/L (ref 35–104)
ALT SERPL-CCNC: <5 U/L (ref 5–33)
ANION GAP SERPL CALCULATED.3IONS-SCNC: 14 MMOL/L (ref 7–19)
AST SERPL-CCNC: 12 U/L (ref 5–32)
BILIRUB SERPL-MCNC: 0.3 MG/DL (ref 0.2–1.2)
BUN BLDV-MCNC: 44 MG/DL (ref 8–23)
CALCIUM SERPL-MCNC: 9.5 MG/DL (ref 8.8–10.2)
CHLORIDE BLD-SCNC: 102 MMOL/L (ref 98–111)
CHOLESTEROL, TOTAL: 145 MG/DL (ref 160–199)
CO2: 24 MMOL/L (ref 22–29)
CREAT SERPL-MCNC: 2.1 MG/DL (ref 0.5–0.9)
GFR AFRICAN AMERICAN: 28
GFR NON-AFRICAN AMERICAN: 23
GLUCOSE BLD-MCNC: 152 MG/DL (ref 74–109)
HBA1C MFR BLD: 8.6 % (ref 4–6)
HDLC SERPL-MCNC: 36 MG/DL (ref 65–121)
LDL CHOLESTEROL CALCULATED: 58 MG/DL
POTASSIUM SERPL-SCNC: 4.6 MMOL/L (ref 3.5–5)
SODIUM BLD-SCNC: 140 MMOL/L (ref 136–145)
TOTAL PROTEIN: 6.7 G/DL (ref 6.6–8.7)
TRIGL SERPL-MCNC: 256 MG/DL (ref 0–149)

## 2021-07-21 PROCEDURE — 4040F PNEUMOC VAC/ADMIN/RCVD: CPT | Performed by: NURSE PRACTITIONER

## 2021-07-21 PROCEDURE — G8427 DOCREV CUR MEDS BY ELIG CLIN: HCPCS | Performed by: NURSE PRACTITIONER

## 2021-07-21 PROCEDURE — G8399 PT W/DXA RESULTS DOCUMENT: HCPCS | Performed by: NURSE PRACTITIONER

## 2021-07-21 PROCEDURE — 1090F PRES/ABSN URINE INCON ASSESS: CPT | Performed by: NURSE PRACTITIONER

## 2021-07-21 PROCEDURE — 2022F DILAT RTA XM EVC RTNOPTHY: CPT | Performed by: NURSE PRACTITIONER

## 2021-07-21 PROCEDURE — G8417 CALC BMI ABV UP PARAM F/U: HCPCS | Performed by: NURSE PRACTITIONER

## 2021-07-21 PROCEDURE — 3046F HEMOGLOBIN A1C LEVEL >9.0%: CPT | Performed by: NURSE PRACTITIONER

## 2021-07-21 PROCEDURE — 1036F TOBACCO NON-USER: CPT | Performed by: NURSE PRACTITIONER

## 2021-07-21 PROCEDURE — 1123F ACP DISCUSS/DSCN MKR DOCD: CPT | Performed by: NURSE PRACTITIONER

## 2021-07-21 PROCEDURE — 99214 OFFICE O/P EST MOD 30 MIN: CPT | Performed by: NURSE PRACTITIONER

## 2021-07-21 PROCEDURE — 3017F COLORECTAL CA SCREEN DOC REV: CPT | Performed by: NURSE PRACTITIONER

## 2021-07-21 RX ORDER — ROPINIROLE 4 MG/1
TABLET, FILM COATED ORAL
Qty: 150 TABLET | Refills: 5 | Status: SHIPPED | OUTPATIENT
Start: 2021-07-21 | End: 2022-03-07

## 2021-07-21 ASSESSMENT — ENCOUNTER SYMPTOMS
SHORTNESS OF BREATH: 1
ABDOMINAL DISTENTION: 1
EYES NEGATIVE: 1
DIARRHEA: 1
ALLERGIC/IMMUNOLOGIC NEGATIVE: 1

## 2021-07-21 NOTE — PROGRESS NOTES
[facility] PHYSICIAN SERVICES  [facility]  [address]  [address] [address]  Dept: [phone]  Dept Fax: [phone]  Loc: [phone]    Glenn Mcgovern is a 79 y.o. female who presents today for her medical conditions/complaints as noted below. Glenn Mcgovern is c/o of 6 Month Follow-Up (patient presents today for 6 month follow up. She has been fasting for labs. )        HPI:     HPI   Ms. Laureen Devine presents today for six month follow-up. She has four separate complaints today. First is her fluid overload. She has noticed increased swelling in her bilateral lower extremities. She does not have associated shortness of breath with this. She says she is complaint with her spironolactone. She is due for a follow-up with her Nephrologist soon. She is having left upper leg pain that starts near her hip joint and runs down the side of her leg. She denies back pain. She has increased weakness in this leg. Her blood sugars have been running higher than she would like. She typically sees readings of 180s at home, even when she is fasting. Lastly, her gastritis is bothering her. She is having to take Gas X everyday. This causes diarrhea for her as well. Chief Complaint   Patient presents with    6 Month Follow-Up     patient presents today for 6 month follow up. She has been fasting for labs. Past Medical History:   Diagnosis Date    Asthma 4/21/2015    Bilateral carpal tunnel syndrome 04/09/2018    sees dr. Daphane Goodell.     Colon polyp     Diabetes mellitus ([facility] [address]     GERD (gastroesophageal reflux disease)     HTN (hypertension)     Hyperlipidemia     Mild mitral regurgitation by prior echocardiogram 2/11/2016    Morbid obesity ([address] 10/18/2017    Normal cardiac stress test 4-2-09    no ischemia at attained level of excercise    Palliative care patient 08/05/2020    Parkinson disease [facility])     Paroxysmal atrial fibrillation [facility])     sees dr. Danette Mosley Prolonged emergence from general anesthesia     Restrictive airway disease     Stage 3 chronic kidney disease ([facility] [address] 10/18/2017      Past Surgical History:   Procedure Laterality Date    APPENDECTOMY       SECTION      x3    COLONOSCOPY      Dr Isai Salazar polyp-5 yr recall    COLONOSCOPY N/A 2019    Dr Judy Pro 2 internal hemorrhoids without stigmata, diverticulosis, suboptimal prep--5 yr recall    GALLBLADDER SURGERY  2014    dr Azul Thurston ARTHROSCOPY      [place] REVISE MEDIAN N/CARPAL TUNNEL SURG Right 2018    OPEN CARPAL TUNNEL RELEASE performed by Britt Tsai MD at [address] [place] [address] TYMPANOSTOMY TUBE PLACEMENT      dr Louisa Lai in [address] [facility] 2021 3/30/2021 2021 2020 2020 [id number]   SYSTOLIC 224 843 862 - - 89   DIASTOLIC 82 73 80 - - 61   Pulse 70 68 74 - 70 71   Temp 97 - 97.6 - 96.8 98   Resp 18  -  16   SpO2 95 - 97 - - -   Weight 293 lb 286 lb 304 lb 296 lb 296 lb 296 lb   Height 5' 2\" 5' 2\" 5' 2\" 5' 2\" 5' 2\" 5' 2\"   Body mass index 53.58 kg/m2 52.3 kg/m2 55.6 kg/m2 54.13 kg/m2 54.13 kg/m2 54.13 kg/m2   Pain Level - - - 4 5 5   Some recent data might be hidden       Family History   Problem Relation Age of Onset    High Blood Pressure Father     Diabetes Father     Parkinsonism Father     High Blood Pressure Mother     Diabetes Sister     Other Paternal Grandmother         hiatal hernia    Heart Disease Paternal Grandfather     Colon Cancer Neg Hx     Colon Polyps Neg Hx     Esophageal Cancer Neg Hx     Liver Cancer Neg Hx     Liver Disease Neg Hx     Rectal Cancer Neg Hx     Stomach Cancer Neg Hx        Social History     Tobacco Use    Smoking status: Never Smoker    Smokeless tobacco: Never Used   Substance Use Topics    Alcohol use: No      Current Outpatient Medications on File Prior to Visit   Medication Sig Dispense Refill    rOPINIRole (REQUIP) 4 MG 3 mLs into the lungs every 6 hours as needed for Wheezing 360 mL 3    losartan (COZAAR) 100 MG tablet TAKE ONE TABLET BY MOUTH EVERY DAY 30 tablet 5    insulin aspart (NOVOLOG) 100 UNIT/ML injection vial Inject 22 Units into the skin 3 times daily (before meals) 3 vial 3    fluconazole (DIFLUCAN) 150 MG tablet Take one on the 15th of every month 1 tablet 11    allopurinol (ZYLOPRIM) 100 MG tablet Take 1 tablet by mouth daily      EASY COMFORT INSULIN SYRINGE 31G X 5/16\" 1 ML MISC INJECT AS DIRECTED DAILY 100 each 3    torsemide (DEMADEX) 20 MG tablet Take 20 mg by mouth daily 3 tablets for three days then two tablets daily       blood glucose monitor kit and supplies Use as directed for diabetes TID checks. 1 kit 0    TRUEPLUS INSULIN SYRINGE 30G X 5/16\" 1 ML MISC INJECT AS DIRECTED DAILY 100 each 3    Melatonin 10 MG CAPS Take 10 mg by mouth every evening Takes 20 mg a night      denosumab (PROLIA) 60 MG/ML SOSY SC injection Inject 60 mg into the skin every 6 months      spironolactone (ALDACTONE) 25 MG tablet TAKE ONE TABLET DAILY (Patient taking differently: One tablet in the am and one tablet in the evening) 30 tablet 5    glucose blood VI test strips (ONE TOUCH ULTRA TEST) strip Inject 1 each into the skin daily As needed. 100 each 3    Lancets MISC 1 lancet to check glucose daily 100 each 3    Insulin Pen Needle 31G X 8 MM MISC Inject 1 each into the skin daily 100 each 2    albuterol (PROVENTIL HFA;VENTOLIN HFA) 108 (90 BASE) MCG/ACT inhaler Inhale 2 puffs into the lungs every 6 hours as needed for Wheezing 1 Inhaler 1    OXYGEN 2L NC 12-24 hours (Patient taking differently: nightly as needed 2L NC 12-24 hours) 1 Device 0    aspirin 325 MG tablet Take 325 mg by mouth daily. No current facility-administered medications on file prior to visit.      Allergies   Allergen Reactions    Codeine      itching    Hydrocodone-Acetaminophen Nausea Only       Health Maintenance   Topic Date Due  COVID-19 Vaccine (1) Never done    DTaP/Tdap/Td vaccine (1 - Tdap) Never done    Shingles Vaccine (1 of 2) Never done   [employer] Visit (AWV)  Never done    Breast cancer screen  03/22/2020    A1C test (Diabetic or Prediabetic)  04/28/2021    Diabetic retinal exam  05/14/2021    Flu vaccine (1) 09/01/2021    Diabetic foot exam  01/28/2022    Lipid screen  01/28/2022    Potassium monitoring  01/28/2022    Creatinine monitoring  01/28/2022    Colon cancer screen colonoscopy  06/18/2024    DEXA (modify frequency per FRAX score)  Completed    Pneumococcal 65+ yrs at Risk Vaccine  Completed    Hepatitis C screen  Addressed    Hepatitis A vaccine  Aged Out    Hib vaccine  Aged Out    Meningococcal (ACWY) vaccine  Aged Out       Subjective:      Review of Systems   Constitutional: Positive for fatigue. HENT: Negative. Eyes: Negative. Respiratory: Positive for shortness of breath. Cardiovascular: Negative. Gastrointestinal: Positive for abdominal distention and diarrhea. Endocrine: Negative. Genitourinary: Negative. Musculoskeletal: Positive for arthralgias. Skin: Negative. Allergic/Immunologic: Negative. Neurological: Negative. Hematological: Negative. Psychiatric/Behavioral: Negative. Objective:     Physical Exam  Constitutional:       Appearance: Normal appearance. HENT:      Head: Normocephalic. Nose: Nose normal.      Mouth/Throat:      Mouth: Mucous membranes are moist.   Eyes:      Pupils: Pupils are equal, round, and reactive to light. Cardiovascular:      Rate and Rhythm: Normal rate and regular rhythm. Pulses: Normal pulses. Heart sounds: Normal heart sounds. Pulmonary:      Effort: Pulmonary effort is normal.      Breath sounds: Normal breath sounds. Abdominal:      General: Bowel sounds are normal.   Musculoskeletal:      Cervical back: Normal range of motion and neck supple. Left upper leg: Tenderness present. Skin:     General: Skin is warm. Capillary Refill: Capillary refill takes less than 2 seconds. Neurological:      General: No focal deficit present. Mental Status: She is alert. Psychiatric:         Mood and Affect: Mood normal.       /82   Pulse 70   Temp 97 °F (36.1 °C) (Temporal)   Resp 18   Ht 5' 2\" (1.575 m)   Wt 293 lb (132.9 kg)   SpO2 95%   Breastfeeding No   BMI 53.59 kg/m²     Assessment:       Diagnosis Orders   1. Type 2 diabetes mellitus with complication (HCC)  Hemoglobin A1C   2. Stage 4 chronic kidney disease (HCC)  Comprehensive Metabolic Panel   3. Anxiety     4. Mixed hyperlipidemia  Lipid Panel   5. Parkinson disease ([address]     6. Coagulation defect ([address]     7. Varicose veins of left lower extremity with ulcer of calf (CODE) ([facility] [address]           Plan:   More than 50% of the time was spent counseling and coordinating care for a total time of 40min face to face. I again encouraged her to get up and move around and try not to stay in her wheelchair or her electric chair all the time. I would love to see her get knee surgery if that is what is keeping her from being ambulatory. She is going to discuss this with orthopedic and we could try to help her meet the goals that he sets for establishing where she needs to be for surgery. In the past she has been noncompliant with her diabetes and let her sugar run over 9 on a hemoglobin A1c. The gas that she is having in the bloating is likely due to the mechanism of the Victoza so I will probably stop this but I want to see her labs first.  I reviewed the notes from Dr. Troy Montez today regarding her kidneys. He had given her permission to take half of a Lasix extra for 3 days in a row for the fluid but she did not want to do that because she said she stayed in the bathroom all the time. I told her this is what that should be doing to help get the weight off.   I encouraged her to weigh herself daily to see if she is gaining fluid weight. Patient given educational materials -see patient instructions. Discussed use, benefit, and side effects of prescribed medications. All patient questions answered. Pt voiced understanding. Reviewed health maintenance. Instructed to continue currentmedications, diet and exercise. Patient agreed with treatment plan. Follow up as directed. MEDICATIONS:  No orders of the defined types were placed in this encounter. ORDERS:  Orders Placed This Encounter   Procedures    Hemoglobin A1C    Lipid Panel    Comprehensive Metabolic Panel       Follow-up:  Return in about 3 months (around 10/21/2021). PATIENT INSTRUCTIONS:  Patient Instructions   Take the 1/2 fluid pill extra x 3 days like kidney doctor , you can weigh every am and see how much you lose. Tramadol may continue for severe pain and tylenol for mild pain  Get with orthopedic and see what it would take to have knee surgery. May discuss stopping victoza and upping the novalog or levimire after a1c back  Will likely stop victoza it is probably causing stomach issues    Electronically signed by INGRID Sanford CNP on 7/21/2021 at 1:22 PM    EMR Dragon/transcription disclaimer:  Much of thisencounter note is electronic transcription/translation of spoken language to printed texts. The electronic translation of spoken language may be erroneous, or at times, nonsensical words or phrases may be inadvertentlytranscribed.   Although I have reviewed the note for such errors, some may still exist.

## 2021-07-21 NOTE — PATIENT INSTRUCTIONS
Take the 1/2 fluid pill extra x 3 days like kidney doctor , you can weigh every am and see how much you lose. Tramadol may continue for severe pain and tylenol for mild pain  Get with orthopedic and see what it would take to have knee surgery.   May discuss stopping victoza and upping the novalog or levimire after a1c back  Will likely stop victoza it is probably causing stomach issues

## 2021-07-23 ENCOUNTER — PATIENT MESSAGE (OUTPATIENT)
Dept: PRIMARY CARE CLINIC | Age: 71
End: 2021-07-23

## 2021-07-23 DIAGNOSIS — E11.8 TYPE 2 DIABETES MELLITUS WITH COMPLICATION (HCC): Primary | ICD-10-CM

## 2021-07-23 DIAGNOSIS — Z79.4 DIABETES MELLITUS WITH INSULIN THERAPY (HCC): ICD-10-CM

## 2021-07-23 DIAGNOSIS — E11.9 DIABETES MELLITUS WITH INSULIN THERAPY (HCC): ICD-10-CM

## 2021-07-23 RX ORDER — BLOOD-GLUCOSE TRANSMITTER
EACH MISCELLANEOUS
Qty: 1 EACH | Refills: 3 | Status: SHIPPED | OUTPATIENT
Start: 2021-07-23 | End: 2021-10-04

## 2021-07-23 RX ORDER — INSULIN DETEMIR 100 [IU]/ML
INJECTION, SOLUTION SUBCUTANEOUS
Qty: 5 PEN | Refills: 1 | Status: SHIPPED | OUTPATIENT
Start: 2021-07-23 | End: 2021-11-16 | Stop reason: SDUPTHER

## 2021-07-23 RX ORDER — BLOOD-GLUCOSE SENSOR
EACH MISCELLANEOUS
Qty: 3 EACH | Refills: 5 | Status: SHIPPED | OUTPATIENT
Start: 2021-07-23 | End: 2021-10-04

## 2021-07-23 RX ORDER — BLOOD-GLUCOSE,RECEIVER,CONT
EACH MISCELLANEOUS
Qty: 1 DEVICE | Refills: 1 | Status: SHIPPED | OUTPATIENT
Start: 2021-07-23 | End: 2021-10-04

## 2021-07-23 NOTE — TELEPHONE ENCOUNTER
From: Haley Bermudez  To: Ba Barbour, APRN - CNP  Sent: 7/23/2021 3:26 PM CDT  Subject: Non-Urgent Medical Question    Yes the dexcom sounds good and I will fix her insulin.       Thank you

## 2021-08-02 NOTE — TELEPHONE ENCOUNTER
Requested Prescriptions     Pending Prescriptions Disp Refills    carbidopa-levodopa (SINEMET)  MG per tablet [Pharmacy Med Name: CARBID+LEV TB 25/100 ACTA 500@ 25MG-100MG TABLET] 180 tablet 5     Sig: TAKE TWO TABLETS BY MOUTH 3 TIMES DAILY       Last Office Visit: 3/30/21  Next Office Visit: 10/4/21  Last Medication Refill: 12/9/20 with 5 refills

## 2021-09-09 RX ORDER — GLUCOSAMINE HCL/CHONDROITIN SU 500-400 MG
CAPSULE ORAL
Qty: 100 STRIP | Refills: 3 | Status: SHIPPED | OUTPATIENT
Start: 2021-09-09

## 2021-09-13 RX ORDER — FLUCONAZOLE 150 MG/1
TABLET ORAL
Qty: 1 TABLET | Refills: 11 | Status: SHIPPED | OUTPATIENT
Start: 2021-09-13 | End: 2022-03-09 | Stop reason: ALTCHOICE

## 2021-09-14 DIAGNOSIS — R52 PAIN: ICD-10-CM

## 2021-09-14 RX ORDER — TRAMADOL HYDROCHLORIDE 50 MG/1
50 TABLET ORAL 2 TIMES DAILY PRN
Qty: 60 TABLET | Refills: 0 | Status: SHIPPED | OUTPATIENT
Start: 2021-09-14 | End: 2021-10-21 | Stop reason: SDUPTHER

## 2021-09-29 RX ORDER — LINACLOTIDE 290 UG/1
CAPSULE, GELATIN COATED ORAL
Qty: 30 CAPSULE | Refills: 5 | Status: SHIPPED | OUTPATIENT
Start: 2021-09-29 | End: 2022-04-08

## 2021-10-04 ENCOUNTER — OFFICE VISIT (OUTPATIENT)
Dept: NEUROLOGY | Age: 71
End: 2021-10-04
Payer: MEDICARE

## 2021-10-04 VITALS
HEIGHT: 63 IN | BODY MASS INDEX: 51.74 KG/M2 | RESPIRATION RATE: 18 BRPM | DIASTOLIC BLOOD PRESSURE: 72 MMHG | WEIGHT: 292 LBS | HEART RATE: 59 BPM | SYSTOLIC BLOOD PRESSURE: 134 MMHG

## 2021-10-04 DIAGNOSIS — E11.42 DIABETIC POLYNEUROPATHY ASSOCIATED WITH TYPE 2 DIABETES MELLITUS (HCC): ICD-10-CM

## 2021-10-04 DIAGNOSIS — G25.81 RESTLESS LEGS SYNDROME: ICD-10-CM

## 2021-10-04 DIAGNOSIS — R44.1 VISUAL HALLUCINATION: ICD-10-CM

## 2021-10-04 DIAGNOSIS — G20 PARKINSON DISEASE (HCC): Primary | ICD-10-CM

## 2021-10-04 PROCEDURE — G8484 FLU IMMUNIZE NO ADMIN: HCPCS | Performed by: PSYCHIATRY & NEUROLOGY

## 2021-10-04 PROCEDURE — 1090F PRES/ABSN URINE INCON ASSESS: CPT | Performed by: PSYCHIATRY & NEUROLOGY

## 2021-10-04 PROCEDURE — 3046F HEMOGLOBIN A1C LEVEL >9.0%: CPT | Performed by: PSYCHIATRY & NEUROLOGY

## 2021-10-04 PROCEDURE — 2022F DILAT RTA XM EVC RTNOPTHY: CPT | Performed by: PSYCHIATRY & NEUROLOGY

## 2021-10-04 PROCEDURE — 3017F COLORECTAL CA SCREEN DOC REV: CPT | Performed by: PSYCHIATRY & NEUROLOGY

## 2021-10-04 PROCEDURE — 4040F PNEUMOC VAC/ADMIN/RCVD: CPT | Performed by: PSYCHIATRY & NEUROLOGY

## 2021-10-04 PROCEDURE — 1036F TOBACCO NON-USER: CPT | Performed by: PSYCHIATRY & NEUROLOGY

## 2021-10-04 PROCEDURE — G8399 PT W/DXA RESULTS DOCUMENT: HCPCS | Performed by: PSYCHIATRY & NEUROLOGY

## 2021-10-04 PROCEDURE — G8427 DOCREV CUR MEDS BY ELIG CLIN: HCPCS | Performed by: PSYCHIATRY & NEUROLOGY

## 2021-10-04 PROCEDURE — G8417 CALC BMI ABV UP PARAM F/U: HCPCS | Performed by: PSYCHIATRY & NEUROLOGY

## 2021-10-04 PROCEDURE — 99214 OFFICE O/P EST MOD 30 MIN: CPT | Performed by: PSYCHIATRY & NEUROLOGY

## 2021-10-04 PROCEDURE — 1123F ACP DISCUSS/DSCN MKR DOCD: CPT | Performed by: PSYCHIATRY & NEUROLOGY

## 2021-10-04 NOTE — PROGRESS NOTES
[id number] [facility]  [address] [place] [address] Drive, [address]  [place], Chel[place] Vazquez  Phone [phone]  Fax [phone] [id number] [facility] Neu[facility] Follow Up Encounter  10/4/21 10:16 AM CDT    Information:   Patient Name: Dorthula Hodgkins  :   1950  Age:   79 y.o. MRN:   [id number]  Account #:  [de-identified]  Today:  10/4/21    Provider: Jessica Gerber M.D. Chief Complaint:   Chief Complaint   Patient presents with    6 Month Follow-Up       Subjective:   Dorthula Hodgkins is a 79 y.o. woman with a history of Parkinson's disease, visual hallucinations, restless legs syndrome, and diabetic polyneuropathy who is following up. She is morbidly obese and has difficulty with her mobility. She can stand and transfer. She gets pain in her hips, knees, back when standing too long. She requires some assistance for personal care, mostly from her obesity. She has been dropping things. She complains of twitching which causes her to drop items. She is on gabapentin and tramadol. She uses oxygen during the night. She has had active dreams. She wakens screaming, hollering, or talking. She does take melatonin at bedtime. She was advised and arranged to have a sleep study. She cancelled. She says that she would not use CPAP. She has had some visual hallucinations. She has seen a cat in the house that was not there. She has not noted sinemet wearing off but when her ropinirole wears off, she gets RLS. She complains that she drops items. Her hands do feel like they have gone to sleep sometimes. Objective:     Past Medical History:  Past Medical History:   Diagnosis Date    Asthma 2015    Bilateral carpal tunnel syndrome 2018    sees dr. Ba Rivera.     Colon polyp     Diabetes mellitus ([facility] [address]     GERD (gastroesophageal reflux disease)     HTN (hypertension)     Hyperlipidemia     Mild mitral regurgitation by prior echocardiogram 2016    Morbid obesity ([facility] [address] 10/18/2017    Normal cardiac stress test 09    no ischemia at attained level of excercise    Palliative care patient 2020    Parkinson disease [facility])     Paroxysmal atrial fibrillation [facility])     sees dr. Prabhakar Dykes Post-menopausal     Prolonged emergence from general anesthesia     Restrictive airway disease     Stage 3 chronic kidney disease ([facility] [address] 10/18/2017       Past Surgical History:   Procedure Laterality Date    APPENDECTOMY       SECTION      x3    COLONOSCOPY      Dr Ortega Home polyp-5 yr recall    COLONOSCOPY N/A 2019    Dr Eladia Bowers 2 internal hemorrhoids without stigmata, diverticulosis, suboptimal prep--5 yr recall    EYE SURGERY      GALLBLADDER SURGERY  2014    dr Martini Reason N/CARPAL TUNNEL SURG Right 2018    OPEN CARPAL TUNNEL RELEASE performed by Mayte Wong MD at [address] [place] [address]      dr Mikey Spencer in [address] [place] [address]  · None    Significant Injuries  · None    Habits  Maude Thompson reports that she has never smoked. She has never used smokeless tobacco. She reports that she does not drink alcohol and does not use drugs. Family History   Problem Relation Age of Onset    High Blood Pressure Father     Diabetes Father     Parkinsonism Father     High Blood Pressure Mother     Diabetes Sister     Other Paternal Grandmother         hiatal hernia    Heart Disease Paternal Grandfather     Colon Cancer Neg Hx     Colon Polyps Neg Hx     Esophageal Cancer Neg Hx     Liver Cancer Neg Hx     Liver Disease Neg Hx     Rectal Cancer Neg Hx     Stomach Cancer Neg Hx        Social History  Allyson Chen is , lives in [address] [place] [address], and is retired.     Medications:  Current Outpatient Medications   Medication Sig Dispense Refill    LINZESS 290 MCG CAPS capsule TAKE ONE CAPSULE IN THE MORNING BEFORE BREAKFAST 30 capsule 5    traMADol (ULTRAM) 50 MG tablet Take 1 tablet by mouth 2 times daily as needed for Pain for up to 30 days. 60 tablet 0    fluconazole (DIFLUCAN) 150 MG tablet Take one on the 15th of every month 1 tablet 11    insulin aspart (NOVOLOG) 100 UNIT/ML injection vial 25 U TID with meals 1 each 3    blood glucose monitor strips Test 3times a day & as needed for symptoms of irregular blood glucose. 100 strip 3    carbidopa-levodopa (SINEMET)  MG per tablet TAKE TWO TABLETS BY MOUTH 3 TIMES DAILY 180 tablet 5    insulin detemir (LEVEMIR FLEXTOUCH) 100 UNIT/ML injection pen INJECT 80 UNITS SUBCU NIGHTLY FOR TYPE 2 DIABETES 5 pen 1    rOPINIRole (REQUIP) 4 MG tablet TAKE TWO TABLETS BY MOUTH EVERY MORNING , ONE AT NOON AND TWO AT BEDTIME 150 tablet 5    gabapentin (NEURONTIN) 600 MG tablet TAKE ONE TABLET BY MOUTH 3 TIMES DAILY AS NEEDED 90 tablet 5    montelukast (SINGULAIR) 10 MG tablet TAKE ONE TABLET BY MOUTH EVERY NIGHT AT BEDTIME 30 tablet 5    tiZANidine (ZANAFLEX) 2 MG tablet Take 1 tablet by mouth nightly as needed (muscle spasms) 30 tablet 3    atorvastatin (LIPITOR) 10 MG tablet TAKE ONE TABLET BY MOUTH EACH EVENING 30 tablet 3    COMFORT EZ PEN NEEDLES 32G X 4 MM MISC USE WITH INSULIN  THREE TIMES PER DAY. 100 each 3    omeprazole (PRILOSEC) 40 MG delayed release capsule TAKE ONE CAPSULE BY MOUTH EVERY DAY 90 capsule 3    nystatin (MYCOSTATIN) 399020 UNIT/GM ointment Apply topically 2 times daily. for yeast 60 Tube 5    nystatin (NYSTATIN) 723570 UNIT/GM powder Apply topically 3 times daily Apply topically 4 times daily.  60 g 3    Diabetic Shoe MISC by Does not apply route 1 each 0    sotalol (BETAPACE) 120 MG tablet TAKE ONE TABLET BY MOUTH TWICE A DAY 60 tablet 5    Cholecalciferol (VITAMIN D3) 50 MCG (2000 UT) CAPS Take 1 capsule by mouth      albuterol (ACCUNEB) 1.25 MG/3ML nebulizer solution Inhale 3 mLs into the lungs every 6 hours as needed for Wheezing 360 mL 3    losartan (COZAAR) 100 MG tablet TAKE ONE TABLET BY MOUTH EVERY DAY 30 tablet 5    allopurinol (ZYLOPRIM) 100 MG tablet Take 1 tablet by mouth daily      EASY COMFORT INSULIN SYRINGE 31G X 5/16\" 1 ML MISC INJECT AS DIRECTED DAILY 100 each 3    torsemide (DEMADEX) 20 MG tablet Take 20 mg by mouth daily 3 tablets for three days then two tablets daily       blood glucose monitor kit and supplies Use as directed for diabetes TID checks. 1 kit 0    TRUEPLUS INSULIN SYRINGE 30G X 5/16\" 1 ML MISC INJECT AS DIRECTED DAILY 100 each 3    Melatonin 10 MG CAPS Take 10 mg by mouth every evening Takes 20 mg a night      denosumab (PROLIA) 60 MG/ML SOSY SC injection Inject 60 mg into the skin every 6 months      spironolactone (ALDACTONE) 25 MG tablet TAKE ONE TABLET DAILY (Patient taking differently: One tablet in the am and one tablet in the evening) 30 tablet 5    glucose blood VI test strips (ONE TOUCH ULTRA TEST) strip Inject 1 each into the skin daily As needed. 100 each 3    Lancets MISC 1 lancet to check glucose daily 100 each 3    Insulin Pen Needle 31G X 8 MM MISC Inject 1 each into the skin daily 100 each 2    albuterol (PROVENTIL HFA;VENTOLIN HFA) 108 (90 BASE) MCG/ACT inhaler Inhale 2 puffs into the lungs every 6 hours as needed for Wheezing 1 Inhaler 1    OXYGEN 2L NC 12-24 hours (Patient taking differently: nightly as needed 2L NC 12-24 hours) 1 Device 0    aspirin 325 MG tablet Take 325 mg by mouth daily. No current facility-administered medications for this visit. Allergies:   Allergies as of 10/04/2021 - Fully Reviewed 10/04/2021   Allergen Reaction Noted    Codeine  07/06/2017    Hydrocodone-acetaminophen Nausea Only        Review of Systems:  Constitutional: negative for - chills and fever  Eyes:  negative for - visual disturbance and photophobia  HENMT: negative for - headaches and sinus pain  Respiratory: negative for - cough, hemoptysis, and shortness of breath  Cardiovascular: negative for - chest pain and palpitations  Gastrointestinal: negative for - blood in stools, constipation, diarrhea, nausea, and vomiting  Genito-Urinary: negative for - hematuria, urinary frequency, urinary urgency, and urinary retention  Musculoskeletal: positive for - joint pain, joint stiffness, and joint swelling  Hematological and Lymphatic: negative for - bleeding problems, abnormal bruising, and swollen lymph nodes  Endocrine:  negative for - polydipsia and polyphagia  Allergy/Immunology:  negative for - rhinorrhea, sinus congestion, hives  Integument:  negative for - negative for - rash, change in moles, new or changing lesions  Psychological: negative for - anxiety and depression  Neurological: positive for - memory loss, numbness/tingling, and weakness     PHYSICAL EXAMINATION:  Vitals:  /72   Pulse 59   Resp 18   Ht 5' 2.6\" (1.59 m)   Wt 292 lb (132.5 kg)   BMI 52.39 kg/m²   General appearance:  She is a morbidly obese woman in a wheelchair with chronic debilitation who appears in no distress  HEENT:  normocephalic, atraumatic, sclera appear normal, no nasal abnormalities, no rhinorrhea, Ears appear normal, oral mucous membranes are moist without erythema, trachea midline, thyroid is normal, no lymphadenopathy or neck mass. 4 oropharynx. Cardiovascular:  Regular rate and rhythm without murmer. 3+ peripheral edema below knees bilaterally. No cyanosis or clubbing. No carotid bruits. Pulmonary:  Lungs are clear to auscultation. Breathing appears normal, good expansion, normal effort without use of accessory muscles  Musculoskeletal:  Joints are osteoarthritic. Integument:  No rash, erythema, or pallor  Psychiatric:  Mood, affect, and behavior appear normal      NEUROLOGIC EXAMINATION:  Mental Status:  alert, oriented to person, place, and time.   Speech:  Clear without dysarthria or dysphonia  Language:  Fluent without aphasia  Cranial Nerves:              II          Visual fields are full to confrontation              III,IV, VI           Extraocular movements are full              VII        Facial movements are symmetrical without weakness              VIII       Hearing is intact              IX,X     Shoulder shrug and head rotation strength are intact              XII        No tongue atrophy or fasciculations. Normal tongue protrusion. No tongue weakness  Motor:  Normal strength in both upper and lower extremities. Normal muscle tone and bulk. Deep tendon reflexes are absent in both upper and lower extremities. She has an intermittent rest tremor in the right hand. Mckeon's signs are absent bilaterally. There is no ankle clonus on either side. Sensation:  Sensation is reduced to light touch, temperature, and vibration in distal extremities. Coordination:  Rapid alternating movements are mildly bradykinetic. Finger to nose testing is unimpaired bilaterally. Pertinent Diagnostic Studies:  Narrative   Examination. CT HEAD WO CONTRAST 8/7/2020 10:13 AM   History: Altered mental status. DLP: 825 mGycm. The CT scan of the head is performed without intravenous contrast   enhancement. The images are acquired in axial plane with subsequent   reconstruction in coronal and sagittal planes. The comparison is made with the previous study dated 1/3/2018. There is no evidence of a mass or midline shift. There is no evidence   of intracranial hemorrhage or hematoma. The ventricles, the basal   cisterns cortical sulci are unremarkable. There is normal gray-white   matter differentiation. The images reviewed in bone window show no acute bony abnormality. There is evidence of hyperostosis frontalis interna. There is   opacification of the maxillary antrum bilaterally. The remaining   paranasal sinuses and mastoid air cells appear clear.       Impression   No acute intracranial abnormality. The above finding are recorded on a digital voice clip in PACS.    Signed by Dr Brooke Diamond on 8/7/2020 12:36 PM         Assessment:       ICD-10-CM    1. Parkinson disease ([facility] [address]  G20    2. Diabetic polyneuropathy associated with type 2 diabetes mellitus (HCC)  E11.42    3. Visual hallucination  R44.1    4. Restless legs syndrome  G25.81    I discussed the diagnosis, pathophysiology, symptoms, risks, prognosis, and treatment options of Parkinson's disease with Paralee Moras. I discussed the diagnosis of obstructive sleep apnea with Paralee Moras including the pathophysiology (namely the mechanism of breathing and obstruction of upper airway, interruptions of sleep, hypoxemia, hypercapnia, and results of repetitive sympathetic activation), risks, evaluation, and treatment options. Plan:   1. Continue the same Sinemet and ropinirole dose  2. Consider evaluation for CTS. She has had right CTR.   3. Advised sleep study  4. FU in 6 months    Electronically signed by Katina Alexander MD on 10/4/21

## 2021-10-21 ENCOUNTER — OFFICE VISIT (OUTPATIENT)
Dept: PRIMARY CARE CLINIC | Age: 71
End: 2021-10-21
Payer: MEDICARE

## 2021-10-21 VITALS
DIASTOLIC BLOOD PRESSURE: 82 MMHG | TEMPERATURE: 98.4 F | SYSTOLIC BLOOD PRESSURE: 122 MMHG | HEART RATE: 59 BPM | WEIGHT: 292 LBS | OXYGEN SATURATION: 98 % | HEIGHT: 62 IN | RESPIRATION RATE: 18 BRPM | BODY MASS INDEX: 53.73 KG/M2

## 2021-10-21 DIAGNOSIS — R52 PAIN: ICD-10-CM

## 2021-10-21 DIAGNOSIS — E11.8 TYPE 2 DIABETES MELLITUS WITH COMPLICATION (HCC): Primary | ICD-10-CM

## 2021-10-21 DIAGNOSIS — G20 PARKINSON DISEASE (HCC): ICD-10-CM

## 2021-10-21 DIAGNOSIS — E78.2 MIXED HYPERLIPIDEMIA: ICD-10-CM

## 2021-10-21 DIAGNOSIS — Z12.31 ENCOUNTER FOR SCREENING MAMMOGRAM FOR MALIGNANT NEOPLASM OF BREAST: ICD-10-CM

## 2021-10-21 DIAGNOSIS — N18.4 STAGE 4 CHRONIC KIDNEY DISEASE (HCC): ICD-10-CM

## 2021-10-21 DIAGNOSIS — R53.83 FATIGUE, UNSPECIFIED TYPE: ICD-10-CM

## 2021-10-21 DIAGNOSIS — E66.01 MORBID OBESITY (HCC): ICD-10-CM

## 2021-10-21 LAB
ALBUMIN SERPL-MCNC: 3.8 G/DL (ref 3.5–5.2)
ALP BLD-CCNC: 78 U/L (ref 35–104)
ALT SERPL-CCNC: <5 U/L (ref 5–33)
ANION GAP SERPL CALCULATED.3IONS-SCNC: 13 MMOL/L (ref 7–19)
AST SERPL-CCNC: 9 U/L (ref 5–32)
BILIRUB SERPL-MCNC: <0.2 MG/DL (ref 0.2–1.2)
BUN BLDV-MCNC: 46 MG/DL (ref 8–23)
CALCIUM SERPL-MCNC: 9.8 MG/DL (ref 8.8–10.2)
CHLORIDE BLD-SCNC: 99 MMOL/L (ref 98–111)
CO2: 25 MMOL/L (ref 22–29)
CREAT SERPL-MCNC: 1.8 MG/DL (ref 0.5–0.9)
GFR AFRICAN AMERICAN: 34
GFR NON-AFRICAN AMERICAN: 28
GLUCOSE BLD-MCNC: 166 MG/DL (ref 74–109)
HBA1C MFR BLD: 9.1 % (ref 4–6)
HCT VFR BLD CALC: 40.7 % (ref 37–47)
HEMOGLOBIN: 12.4 G/DL (ref 12–16)
MCH RBC QN AUTO: 29.9 PG (ref 27–31)
MCHC RBC AUTO-ENTMCNC: 30.5 G/DL (ref 33–37)
MCV RBC AUTO: 98.1 FL (ref 81–99)
PDW BLD-RTO: 13.7 % (ref 11.5–14.5)
PLATELET # BLD: 160 K/UL (ref 130–400)
PMV BLD AUTO: 12 FL (ref 9.4–12.3)
POTASSIUM SERPL-SCNC: 4.7 MMOL/L (ref 3.5–5)
RBC # BLD: 4.15 M/UL (ref 4.2–5.4)
SODIUM BLD-SCNC: 137 MMOL/L (ref 136–145)
TOTAL PROTEIN: 6.7 G/DL (ref 6.6–8.7)
WBC # BLD: 9.6 K/UL (ref 4.8–10.8)

## 2021-10-21 PROCEDURE — 1090F PRES/ABSN URINE INCON ASSESS: CPT | Performed by: NURSE PRACTITIONER

## 2021-10-21 PROCEDURE — G8417 CALC BMI ABV UP PARAM F/U: HCPCS | Performed by: NURSE PRACTITIONER

## 2021-10-21 PROCEDURE — 99214 OFFICE O/P EST MOD 30 MIN: CPT | Performed by: NURSE PRACTITIONER

## 2021-10-21 PROCEDURE — 4040F PNEUMOC VAC/ADMIN/RCVD: CPT | Performed by: NURSE PRACTITIONER

## 2021-10-21 PROCEDURE — 1123F ACP DISCUSS/DSCN MKR DOCD: CPT | Performed by: NURSE PRACTITIONER

## 2021-10-21 PROCEDURE — G8427 DOCREV CUR MEDS BY ELIG CLIN: HCPCS | Performed by: NURSE PRACTITIONER

## 2021-10-21 PROCEDURE — 3052F HG A1C>EQUAL 8.0%<EQUAL 9.0%: CPT | Performed by: NURSE PRACTITIONER

## 2021-10-21 PROCEDURE — 3017F COLORECTAL CA SCREEN DOC REV: CPT | Performed by: NURSE PRACTITIONER

## 2021-10-21 PROCEDURE — 2022F DILAT RTA XM EVC RTNOPTHY: CPT | Performed by: NURSE PRACTITIONER

## 2021-10-21 PROCEDURE — G8399 PT W/DXA RESULTS DOCUMENT: HCPCS | Performed by: NURSE PRACTITIONER

## 2021-10-21 PROCEDURE — G8484 FLU IMMUNIZE NO ADMIN: HCPCS | Performed by: NURSE PRACTITIONER

## 2021-10-21 PROCEDURE — 1036F TOBACCO NON-USER: CPT | Performed by: NURSE PRACTITIONER

## 2021-10-21 RX ORDER — ATORVASTATIN CALCIUM 10 MG/1
TABLET, FILM COATED ORAL
Qty: 90 TABLET | Refills: 3 | Status: SHIPPED | OUTPATIENT
Start: 2021-10-21 | End: 2022-03-09

## 2021-10-21 RX ORDER — LOSARTAN POTASSIUM 100 MG/1
TABLET ORAL
Qty: 90 TABLET | Refills: 3 | Status: SHIPPED | OUTPATIENT
Start: 2021-10-21 | End: 2022-04-07

## 2021-10-21 RX ORDER — MOXIFLOXACIN 5 MG/ML
SOLUTION/ DROPS OPHTHALMIC
COMMUNITY
Start: 2021-09-09 | End: 2021-12-28 | Stop reason: ALTCHOICE

## 2021-10-21 RX ORDER — MONTELUKAST SODIUM 10 MG/1
TABLET ORAL
Qty: 90 TABLET | Refills: 3 | Status: SHIPPED | OUTPATIENT
Start: 2021-10-21

## 2021-10-21 RX ORDER — TRAMADOL HYDROCHLORIDE 50 MG/1
50 TABLET ORAL 2 TIMES DAILY PRN
Qty: 60 TABLET | Refills: 0 | Status: SHIPPED | OUTPATIENT
Start: 2021-10-21 | End: 2021-11-20

## 2021-10-21 RX ORDER — KETOROLAC TROMETHAMINE 5 MG/ML
SOLUTION OPHTHALMIC
COMMUNITY
Start: 2021-09-09

## 2021-10-21 ASSESSMENT — ENCOUNTER SYMPTOMS
GASTROINTESTINAL NEGATIVE: 1
EYES NEGATIVE: 1
BACK PAIN: 1
RESPIRATORY NEGATIVE: 1

## 2021-10-21 NOTE — PROGRESS NOTES
[facility] PHYSICIAN SERVICES  [facility]  [address] [place] [address]  [address] [place] [address]  Dept: [phone]  Dept Fax: [phone]  Loc: [phone]    Rusty Byrne is a 79 y.o. female who presents today for her medical conditions/complaints as noted below. Rusty Byrne is c/o of 3 Month Follow-Up (Pt is here for 3 month follow up. Pt cites no nw concerns. )        HPI:     HPI   Chief Complaint   Patient presents with    3 Month Follow-Up     Pt is here for 3 month follow up. Pt cites no nw concerns. fauziaemir was at 40 and 40 and had low sugar so backed up to 40 and 30 now. She is doing 25U of novolog at a time. Cant take metformin due to gi upset. She checks sugar 3-5 times a week. This am 164. Proved for Dexcom machine. Her daughter is with her today the patient is in a wheelchair most of the time she says she only gets up to cook meals and go to the bathroom. Basically she transfers herself most of the time out of the wheelchair into the chair. She sees Dr Marti Arzate for kidney. She sees neurology for parkingsons  She uses tylenol for pain and tramadol on occasion. [place] orthopedic said he would do knee replacement if hgba1c down under 7. She is mainly in wheelchair. She transfers herself and stands short periods. Of time. Family helps her in shower. Lab Results   Component Value Date    GLUCOSE 152 07/21/2021    GLUCOSE 216 01/28/2021    GLUCOSE 263 10/28/2020    LABA1C 8.6 07/21/2021    LABA1C 9.1 01/28/2021    LABA1C 8.7 08/07/2020    LABA1C 8.9 10/28/2015    LABA1C 8.5 07/17/2015    LABA1C 8.2 11/14/2014     Past Medical History:   Diagnosis Date    Asthma 4/21/2015    Bilateral carpal tunnel syndrome 04/09/2018    sees dr. Zapata Brochure.     Colon polyp     Diabetes mellitus ([address]     GERD (gastroesophageal reflux disease)     HTN (hypertension)     Hyperlipidemia     Mild mitral regurgitation by prior echocardiogram 2/11/2016    Morbid obesity ([facility] 10/18/2017    Cancer Neg Hx     Liver Cancer Neg Hx     Liver Disease Neg Hx     Rectal Cancer Neg Hx     Stomach Cancer Neg Hx        Social History     Tobacco Use    Smoking status: Never Smoker    Smokeless tobacco: Never Used   Substance Use Topics    Alcohol use: No      Current Outpatient Medications on File Prior to Visit   Medication Sig Dispense Refill    ketorolac (ACULAR) 0.5 % ophthalmic solution       moxifloxacin (VIGAMOX) 0.5 % ophthalmic solution       LINZESS 290 MCG CAPS capsule TAKE ONE CAPSULE IN THE MORNING BEFORE BREAKFAST 30 capsule 5    fluconazole (DIFLUCAN) 150 MG tablet Take one on the 15th of every month 1 tablet 11    insulin aspart (NOVOLOG) 100 UNIT/ML injection vial 25 U TID with meals 1 each 3    blood glucose monitor strips Test 3times a day & as needed for symptoms of irregular blood glucose. 100 strip 3    carbidopa-levodopa (SINEMET)  MG per tablet TAKE TWO TABLETS BY MOUTH 3 TIMES DAILY 180 tablet 5    insulin detemir (LEVEMIR FLEXTOUCH) 100 UNIT/ML injection pen INJECT 80 UNITS SUBCU NIGHTLY FOR TYPE 2 DIABETES 5 pen 1    rOPINIRole (REQUIP) 4 MG tablet TAKE TWO TABLETS BY MOUTH EVERY MORNING , ONE AT NOON AND TWO AT BEDTIME 150 tablet 5    gabapentin (NEURONTIN) 600 MG tablet TAKE ONE TABLET BY MOUTH 3 TIMES DAILY AS NEEDED 90 tablet 5    tiZANidine (ZANAFLEX) 2 MG tablet Take 1 tablet by mouth nightly as needed (muscle spasms) 30 tablet 3    COMFORT EZ PEN NEEDLES 32G X 4 MM MISC USE WITH INSULIN  THREE TIMES PER DAY. 100 each 3    omeprazole (PRILOSEC) 40 MG delayed release capsule TAKE ONE CAPSULE BY MOUTH EVERY DAY 90 capsule 3    nystatin (MYCOSTATIN) 371425 UNIT/GM ointment Apply topically 2 times daily. for yeast 60 Tube 5    nystatin (NYSTATIN) 326109 UNIT/GM powder Apply topically 3 times daily Apply topically 4 times daily.  60 g 3    Diabetic Shoe MISC by Does not apply route 1 each 0    sotalol (BETAPACE) 120 MG tablet TAKE ONE TABLET BY MOUTH TWICE A DAY 60 tablet 5    Cholecalciferol (VITAMIN D3) 50 MCG (2000 UT) CAPS Take 1 capsule by mouth      albuterol (ACCUNEB) 1.25 MG/3ML nebulizer solution Inhale 3 mLs into the lungs every 6 hours as needed for Wheezing 360 mL 3    allopurinol (ZYLOPRIM) 100 MG tablet Take 1 tablet by mouth daily      EASY COMFORT INSULIN SYRINGE 31G X 5/16\" 1 ML MISC INJECT AS DIRECTED DAILY 100 each 3    torsemide (DEMADEX) 20 MG tablet Take 20 mg by mouth daily 3 tablets for three days then two tablets daily       blood glucose monitor kit and supplies Use as directed for diabetes TID checks. 1 kit 0    TRUEPLUS INSULIN SYRINGE 30G X 5/16\" 1 ML MISC INJECT AS DIRECTED DAILY 100 each 3    Melatonin 10 MG CAPS Take 10 mg by mouth every evening Takes 20 mg a night      denosumab (PROLIA) 60 MG/ML SOSY SC injection Inject 60 mg into the skin every 6 months      spironolactone (ALDACTONE) 25 MG tablet TAKE ONE TABLET DAILY (Patient taking differently: One tablet in the am and one tablet in the evening) 30 tablet 5    glucose blood VI test strips (ONE TOUCH ULTRA TEST) strip Inject 1 each into the skin daily As needed. 100 each 3    Lancets MISC 1 lancet to check glucose daily 100 each 3    Insulin Pen Needle 31G X 8 MM MISC Inject 1 each into the skin daily 100 each 2    albuterol (PROVENTIL HFA;VENTOLIN HFA) 108 (90 BASE) MCG/ACT inhaler Inhale 2 puffs into the lungs every 6 hours as needed for Wheezing 1 Inhaler 1    OXYGEN 2L NC 12-24 hours (Patient taking differently: nightly as needed 2L NC 12-24 hours) 1 Device 0    aspirin 325 MG tablet Take 325 mg by mouth daily. No current facility-administered medications on file prior to visit.      Allergies   Allergen Reactions    Codeine      itching    Hydrocodone-Acetaminophen Nausea Only       Health Maintenance   Topic Date Due    COVID-19 Vaccine (1) Never done    DTaP/Tdap/Td vaccine (1 - Tdap) Never done    Shingles Vaccine (1 of 2) Never done    Breast cancer screen  03/22/2020    Flu vaccine (1) 09/01/2021    Annual Wellness Visit (AWV)  10/19/2021    Diabetic foot exam  01/28/2022    Diabetic retinal exam  06/18/2022    A1C test (Diabetic or Prediabetic)  07/21/2022    Lipid screen  07/21/2022    Potassium monitoring  07/21/2022    Creatinine monitoring  07/21/2022    Colon cancer screen colonoscopy  06/18/2024    DEXA (modify frequency per FRAX score)  Completed    Pneumococcal 65+ yrs at Risk Vaccine  Completed    Hepatitis C screen  Addressed    Hepatitis A vaccine  Aged Out    Hib vaccine  Aged Out    Meningococcal (ACWY) vaccine  Aged Out       Subjective:      Review of Systems   Constitutional: Positive for activity change. HENT: Negative. Eyes: Negative. Respiratory: Negative. Cardiovascular: Negative. Gastrointestinal: Negative. Endocrine:        Diabetes   Genitourinary: Negative. Musculoskeletal: Positive for arthralgias and back pain. Bilat knee pain   Skin: Negative. Neurological: Positive for tremors and numbness. Psychiatric/Behavioral: The patient is nervous/anxious. Objective:     Physical Exam  Vitals and nursing note reviewed. Constitutional:       Appearance: She is well-developed. She is obese. Comments: Morbid obese , sitting up in wheelchair. HENT:      Head: Normocephalic. Right Ear: External ear normal.      Left Ear: External ear normal.   Eyes:      Pupils: Pupils are equal, round, and reactive to light. Neck:      Vascular: No carotid bruit. Cardiovascular:      Rate and Rhythm: Normal rate and regular rhythm. Heart sounds: Normal heart sounds. Pulmonary:      Effort: Pulmonary effort is normal.      Breath sounds: Normal breath sounds. Abdominal:      General: Bowel sounds are normal.   Musculoskeletal:      Cervical back: Normal range of motion. Right lower leg: Edema (1 plus both ankles) present. Left lower leg: Edema present.    Skin: General: Skin is warm and dry. Capillary Refill: Capillary refill takes less than 2 seconds. Neurological:      General: No focal deficit present. Mental Status: She is alert and oriented to person, place, and time. Psychiatric:         Mood and Affect: Mood normal.         Behavior: Behavior normal.         Thought Content: Thought content normal.         Judgment: Judgment normal.       /82 (Site: Left Lower Arm, Position: Sitting, Cuff Size: Large Adult)   Pulse 59   Temp 98.4 °F (36.9 °C) (Temporal)   Resp 18   Ht 5' 2\" (1.575 m)   Wt 292 lb (132.5 kg)   SpO2 98%   BMI 53.41 kg/m²     Assessment:       Diagnosis Orders   1. Type 2 diabetes mellitus with complication (HCC)  Comprehensive Metabolic Panel    Hemoglobin A1C   2. Encounter for screening mammogram for malignant neoplasm of breast  [facility] DIGITAL SCREEN W OR WO CAD BILATERAL    Comprehensive Metabolic Panel   3. Pain  traMADol (ULTRAM) 50 MG tablet   4. Stage 4 chronic kidney disease ([address]     5. Mixed hyperlipidemia     6. Parkinson disease ([address]     7. Morbid obesity ([address]     8. Fatigue, unspecified type  CBC         Plan:   More than 50% of the time was spent counseling and coordinating care for a total time of 35 min face to face. Osiris Brown she gets the tramadol sparingly for me and she gets the gabapentin from her neurologist  Does not would like the spironolactone's been written in a while but it comes from cardiology so I did not renew it  I have discussed with her again the importance of compliance with monitoring her blood sugar. Her daughter is with her again today. I have asked her to try to get up and move more and not sit in the chair as much. Needs bilateral knee replacement but the orthopedic will not do it unless she loses weight in gets her sugar down. At this point she does not think she wants to have surgery anyway. He did put an injection in the knee and it did not help her.   I will continue writing her the tramadol as needed for pain since she is using it appropriately. PDMP Monitoring:    Last PDMP Maria Antonia Traore as Reviewed [facility]):  Review User Review Instant Review Result          Last Controlled Substance Monitoring Documentation      Telemedicine from 5/8/2020 in [facility] [address]  The Prescription Monitoring Report for this patient was reviewed today. filed at 05/08/2020 1049   Periodic Controlled Substance Monitoring  Possible medication side effects, risk of tolerance/dependence & alternative treatments discussed., No signs of potential drug abuse or diversion identified. filed at 05/08/2020 1049        Urine Drug Screenings (1 yr)    No resulted procedures found. Medication Contract and Consent for Opioid Use Documents Filed     Patient Documents       Type of Document Status Date Received Received By Description     Medication Contract Received 5/24/2019 10:27 AM Samuel SCOTT neuro     Medication Contract Received 12/17/2019 10:48 AM MEGHAN VALDIVIA Medication Agreement Rosa Mclani 11/25/2019                 Patient given educational materials -see patient instructions. Discussed use, benefit, and side effects of prescribed medications. All patient questions answered. Pt voiced understanding. Reviewed health maintenance. Instructed to continue currentmedications, diet and exercise. Patient agreed with treatment plan. Follow up as directed.    MEDICATIONS:  Orders Placed This Encounter   Medications    losartan (COZAAR) 100 MG tablet     Sig: TAKE ONE TABLET BY MOUTH EVERY DAY     Dispense:  90 tablet     Refill:  3    montelukast (SINGULAIR) 10 MG tablet     Sig: TAKE ONE TABLET BY MOUTH EVERY NIGHT AT BEDTIME     Dispense:  90 tablet     Refill:  3    atorvastatin (LIPITOR) 10 MG tablet     Sig: TAKE ONE TABLET BY MOUTH EACH EVENING     Dispense:  90 tablet     Refill:  3    traMADol (ULTRAM) 50 MG tablet     Sig: Take 1 tablet by mouth 2 times daily as needed for Pain for up to 30 days.     Dispense:  60 tablet     Refill:  0     Reduce doses taken as pain becomes manageable         ORDERS:  Orders Placed This Encounter   Procedures    [facility] DIGITAL SCREEN W OR WO CAD BILATERAL    CBC    Comprehensive Metabolic Panel    Hemoglobin A1C       Follow-up:  Return for maw in Dec 2021. PATIENT INSTRUCTIONS:  Patient Instructions   Labs today  Have mammogram  Continue with neruology for parkinsons  Continue with kidney dr Alma Del Cid. Electronically signed by INGRID Trejo CNP on 10/21/2021 at 11:42 AM    EMR Dragon/transcription disclaimer:  Much of thisencounter note is electronic transcription/translation of spoken language to printed texts. The electronic translation of spoken language may be erroneous, or at times, nonsensical words or phrases may be inadvertentlytranscribed.   Although I have reviewed the note for such errors, some may still exist.

## 2021-10-21 NOTE — PATIENT INSTRUCTIONS
Labs today  Have mammogram  Continue with neruology for parkinsons  Continue with kidney dr Zulema Bryant.

## 2021-11-01 RX ORDER — PEN NEEDLE, DIABETIC 31 GX3/16"
NEEDLE, DISPOSABLE MISCELLANEOUS
Qty: 100 EACH | Refills: 5 | Status: SHIPPED | OUTPATIENT
Start: 2021-11-01 | End: 2022-05-27

## 2021-11-04 ENCOUNTER — HOSPITAL ENCOUNTER (OUTPATIENT)
Dept: WOMENS IMAGING | Age: 71
Discharge: HOME | End: 2021-11-04
Payer: MEDICARE

## 2021-11-04 DIAGNOSIS — Z12.31 ENCOUNTER FOR SCREENING MAMMOGRAM FOR MALIGNANT NEOPLASM OF BREAST: ICD-10-CM

## 2021-11-04 PROCEDURE — 77067 SCR MAMMO BI INCL CAD: CPT

## 2021-11-09 ENCOUNTER — PATIENT MESSAGE (OUTPATIENT)
Dept: PRIMARY CARE CLINIC | Age: 71
End: 2021-11-09

## 2021-11-09 DIAGNOSIS — R52 PAIN: ICD-10-CM

## 2021-11-09 RX ORDER — TRAMADOL HYDROCHLORIDE 50 MG/1
50 TABLET ORAL 2 TIMES DAILY PRN
Qty: 60 TABLET | Refills: 0 | Status: SHIPPED | OUTPATIENT
Start: 2021-11-09 | End: 2022-04-12 | Stop reason: SDUPTHER

## 2021-11-09 NOTE — TELEPHONE ENCOUNTER
From: Brittney Reich  To: Katelyn Lee  Sent: 11/9/2021 4:05 PM CST  Subject: Prescription Question    Kiran Guajardo needs her Tramadol refilled please

## 2021-11-16 RX ORDER — INSULIN DETEMIR 100 [IU]/ML
INJECTION, SOLUTION SUBCUTANEOUS
Qty: 5 PEN | Refills: 5 | Status: SHIPPED | OUTPATIENT
Start: 2021-11-16 | End: 2022-05-16

## 2021-11-30 NOTE — TELEPHONE ENCOUNTER
Rusty Byrne has requested a refill on her medication.       Last office visit : 10/4/2021  Next office visit : 4/4/2022   Last medication refill :8/2/2021      Requested Prescriptions     Pending Prescriptions Disp Refills    carbidopa-levodopa (SINEMET)  MG per tablet [Pharmacy Med Name: CARBID+LEV TB 25/100 ACTA 500@ 25MG-100MG TABLET] 180 tablet 5     Sig: TAKE TWO TABLETS BY MOUTH 3 TIMES DAILY

## 2021-12-21 ENCOUNTER — HOSPITAL ENCOUNTER (OUTPATIENT)
Dept: WOUND CARE | Age: 71
Discharge: HOME | End: 2021-12-21
Payer: MEDICARE

## 2021-12-21 VITALS
RESPIRATION RATE: 18 BRPM | DIASTOLIC BLOOD PRESSURE: 71 MMHG | HEIGHT: 62 IN | SYSTOLIC BLOOD PRESSURE: 129 MMHG | TEMPERATURE: 97.6 F | HEART RATE: 65 BPM | BODY MASS INDEX: 53.73 KG/M2 | WEIGHT: 292 LBS

## 2021-12-21 DIAGNOSIS — L97.922 DIABETIC ULCER OF LEFT LOWER LEG ASSOCIATED WITH TYPE 2 DIABETES MELLITUS, WITH FAT LAYER EXPOSED (HCC): Primary | Chronic | ICD-10-CM

## 2021-12-21 DIAGNOSIS — L97.922 NON-PRESSURE CHRONIC ULCER OF LEFT LOWER LEG WITH FAT LAYER EXPOSED (HCC): ICD-10-CM

## 2021-12-21 DIAGNOSIS — E11.622 DIABETIC ULCER OF LEFT LOWER LEG ASSOCIATED WITH TYPE 2 DIABETES MELLITUS, WITH FAT LAYER EXPOSED (HCC): Primary | Chronic | ICD-10-CM

## 2021-12-21 DIAGNOSIS — I87.2 EDEMA OF BOTH LOWER EXTREMITIES DUE TO PERIPHERAL VENOUS INSUFFICIENCY: Chronic | ICD-10-CM

## 2021-12-21 PROCEDURE — 99214 OFFICE O/P EST MOD 30 MIN: CPT | Performed by: SURGERY

## 2021-12-21 PROCEDURE — 6370000000 HC RX 637 (ALT 250 FOR IP): Performed by: SURGERY

## 2021-12-21 PROCEDURE — 99214 OFFICE O/P EST MOD 30 MIN: CPT

## 2021-12-21 RX ORDER — LIDOCAINE HYDROCHLORIDE 20 MG/ML
JELLY TOPICAL ONCE
Status: COMPLETED | OUTPATIENT
Start: 2021-12-21 | End: 2021-12-21

## 2021-12-21 RX ORDER — MINOCYCLINE HYDROCHLORIDE 100 MG/1
100 CAPSULE ORAL 2 TIMES DAILY
Qty: 60 CAPSULE | Refills: 0 | Status: SHIPPED | OUTPATIENT
Start: 2021-12-21 | End: 2022-01-20

## 2021-12-21 RX ORDER — LIDOCAINE HYDROCHLORIDE 20 MG/ML
JELLY TOPICAL ONCE
Status: CANCELLED | OUTPATIENT
Start: 2021-12-21 | End: 2021-12-21

## 2021-12-21 RX ADMIN — LIDOCAINE HYDROCHLORIDE: 20 JELLY TOPICAL at 15:05

## 2021-12-21 ASSESSMENT — PAIN SCALES - GENERAL: PAINLEVEL_OUTOF10: 0

## 2021-12-21 NOTE — PROGRESS NOTES
WOUND CARE HISTORY AND PHYSICAL    Patient Care Team:  INGRID Gonzalez CNP as PCP - General (Family Nurse Practitioner)  INGRID Gonzalez CNP as PCP - [facility]  Anat Gillespie MD as Consulting Physician (Neurology)  MARTHA Harden (Physician Assistant Medical)  INGRID Phillips NP as Nurse Practitioner (Nurse Practitioner [facility])  Anat Gillespie MD as Consulting Physician (Neurology)     CC:     Brittney Reich is a 79 y.o. female who presents today for wound evaluation. Wound Type: venous  Wound Location: left lower leg(s): scattered, lower  Modifying factors: edema, venous stasis and lymphedema    Patient Active Problem List   Diagnosis Code    Parkinson disease ([facility] G20    GERD (gastroesophageal reflux disease) V53.1    Lichen sclerosus H49.3    Palpitations R00.2    Fatigue R53.83    Asthma J45.909    Edema R60.9    Hypokalemia E87.6    PAF (paroxysmal atrial fibrillation) ([facility]) I48.0    Mild mitral regurgitation by prior echocardiogram I34.0    Restless legs syndrome G25.81    Hypoesthesia of skin R20.1    Somnolence, daytime R40.0    Morbid obesity ([facility]) E66.01    Stage 4 chronic kidney disease ([facility]) N18.4    Mixed hyperlipidemia E78.2    Cellulitis of left lower extremity L03. 116    Chronic obstructive pulmonary disease ([address] J44.9    Diabetic ulcer of left lower leg associated with type 2 diabetes mellitus, with fat layer exposed ([address] E11.622, Q46.540    Varicose veins of left lower extremity with ulcer of calf (CODE) ([facility]) I83.022    Non-pressure chronic ulcer of left lower leg with fat layer exposed ([address] L97.922    Edema of both lower extremities due to peripheral venous insufficiency I87.2    Diabetic peripheral neuropathy associated with type 2 diabetes mellitus ([facility]) E11.42    Ischemic cardiomyopathy I25.5    At risk for obstructive sleep apnea Z91.89    Nocturnal hypoxemia G47.34    Coagulation defect ([address] D68.9 HPI:  She reports she developed a wound on left leg. This started 2 week(s) ago. She believes this is not healing. She has been applying nothing. She has not had  fever or chills. She has/with diabetes mellitus and venous disease.     Edward Natarajan is a 79 y.o. female with the following history reviewed and recorded in [facility]:  Patient Active Problem List    Diagnosis Date Noted    Coagulation defect ([facility] 07/21/2021    At risk for obstructive sleep apnea 03/30/2021    Nocturnal hypoxemia 03/30/2021    Diabetic peripheral neuropathy associated with type 2 diabetes mellitus ([address] 01/28/2021    Ischemic cardiomyopathy 01/28/2021    Non-pressure chronic ulcer of left lower leg with fat layer exposed ([address] 10/21/2020    Edema of both lower extremities due to peripheral venous insufficiency 10/21/2020    Diabetic ulcer of left lower leg associated with type 2 diabetes mellitus, with fat layer exposed ([address] 07/31/2020    Varicose veins of left lower extremity with ulcer of calf (CODE) ([address] 07/31/2020    Stage 4 chronic kidney disease ([address] 05/08/2020    Chronic obstructive pulmonary disease ([address] 05/08/2020    Cellulitis of left lower extremity 11/05/2019    Mixed hyperlipidemia 11/01/2018    Morbid obesity ([address] 10/18/2017    Somnolence, daytime 10/09/2017    Restless legs syndrome 04/06/2017    Hypoesthesia of skin 04/06/2017    PAF (paroxysmal atrial fibrillation) ([address] 02/11/2016    Mild mitral regurgitation by prior echocardiogram 02/11/2016    Hypokalemia 06/26/2015    Edema 06/12/2015    Asthma 04/21/2015    Palpitations 04/28/2014    Fatigue 57/55/7543    Lichen sclerosus 35/99/6681    GERD (gastroesophageal reflux disease) 05/20/2013    Parkinson disease ([address] 05/06/2013     Current Outpatient Medications   Medication Sig Dispense Refill    minocycline (MINOCIN;DYNACIN) 100 MG capsule Take 1 capsule by mouth 2 times daily 60 capsule 0    carbidopa-levodopa (SINEMET)  MG per tablet TAKE TWO TABLETS BY MOUTH 3 TIMES DAILY 180 tablet 5    insulin detemir (LEVEMIR FLEXTOUCH) 100 UNIT/ML injection pen INJECT 80 UNITS SUBCU NIGHTLY FOR TYPE 2 DIABETES 5 pen 5    SURE COMFORT PEN NEEDLES 31G X 8 MM MISC USE WITH INSULIN  THREE TIMES PER DAY. 100 each 5    ketorolac (ACULAR) 0.5 % ophthalmic solution       moxifloxacin (VIGAMOX) 0.5 % ophthalmic solution       losartan (COZAAR) 100 MG tablet TAKE ONE TABLET BY MOUTH EVERY DAY 90 tablet 3    montelukast (SINGULAIR) 10 MG tablet TAKE ONE TABLET BY MOUTH EVERY NIGHT AT BEDTIME 90 tablet 3    atorvastatin (LIPITOR) 10 MG tablet TAKE ONE TABLET BY MOUTH EACH EVENING 90 tablet 3    LINZESS 290 MCG CAPS capsule TAKE ONE CAPSULE IN THE MORNING BEFORE BREAKFAST 30 capsule 5    fluconazole (DIFLUCAN) 150 MG tablet Take one on the 15th of every month 1 tablet 11    insulin aspart (NOVOLOG) 100 UNIT/ML injection vial 25 U TID with meals 1 each 3    blood glucose monitor strips Test 3times a day & as needed for symptoms of irregular blood glucose. 100 strip 3    rOPINIRole (REQUIP) 4 MG tablet TAKE TWO TABLETS BY MOUTH EVERY MORNING , ONE AT NOON AND TWO AT BEDTIME 150 tablet 5    gabapentin (NEURONTIN) 600 MG tablet TAKE ONE TABLET BY MOUTH 3 TIMES DAILY AS NEEDED 90 tablet 5    tiZANidine (ZANAFLEX) 2 MG tablet Take 1 tablet by mouth nightly as needed (muscle spasms) 30 tablet 3    COMFORT EZ PEN NEEDLES 32G X 4 MM MISC USE WITH INSULIN  THREE TIMES PER DAY. 100 each 3    omeprazole (PRILOSEC) 40 MG delayed release capsule TAKE ONE CAPSULE BY MOUTH EVERY DAY 90 capsule 3    nystatin (MYCOSTATIN) 498071 UNIT/GM ointment Apply topically 2 times daily. for yeast 60 Tube 5    nystatin (NYSTATIN) 848948 UNIT/GM powder Apply topically 3 times daily Apply topically 4 times daily.  60 g 3    Diabetic Shoe MISC by Does not apply route 1 each 0    sotalol (BETAPACE) 120 MG tablet TAKE ONE TABLET BY MOUTH TWICE A DAY 60 tablet 5    Cholecalciferol (VITAMIN D3) 50 MCG (2000 UT) CAPS Take 1 capsule by mouth      albuterol (ACCUNEB) 1.25 MG/3ML nebulizer solution Inhale 3 mLs into the lungs every 6 hours as needed for Wheezing 360 mL 3    allopurinol (ZYLOPRIM) 100 MG tablet Take 1 tablet by mouth daily      EASY COMFORT INSULIN SYRINGE 31G X 5/16\" 1 ML MISC INJECT AS DIRECTED DAILY 100 each 3    torsemide (DEMADEX) 20 MG tablet Take 20 mg by mouth daily 3 tablets for three days then two tablets daily       blood glucose monitor kit and supplies Use as directed for diabetes TID checks. 1 kit 0    TRUEPLUS INSULIN SYRINGE 30G X 5/16\" 1 ML MISC INJECT AS DIRECTED DAILY 100 each 3    Melatonin 10 MG CAPS Take 10 mg by mouth every evening Takes 20 mg a night      denosumab (PROLIA) 60 MG/ML SOSY SC injection Inject 60 mg into the skin every 6 months      spironolactone (ALDACTONE) 25 MG tablet TAKE ONE TABLET DAILY (Patient taking differently: One tablet in the am and one tablet in the evening) 30 tablet 5    glucose blood VI test strips (ONE TOUCH ULTRA TEST) strip Inject 1 each into the skin daily As needed. 100 each 3    Lancets MISC 1 lancet to check glucose daily 100 each 3    albuterol (PROVENTIL HFA;VENTOLIN HFA) 108 (90 BASE) MCG/ACT inhaler Inhale 2 puffs into the lungs every 6 hours as needed for Wheezing 1 Inhaler 1    OXYGEN 2L NC 12-24 hours (Patient taking differently: nightly as needed 2L NC 12-24 hours) 1 Device 0    aspirin 325 MG tablet Take 325 mg by mouth daily. No current facility-administered medications for this encounter. Allergies: Codeine and Hydrocodone-acetaminophen  Past Medical History:   Diagnosis Date    Asthma 4/21/2015    Bilateral carpal tunnel syndrome 04/09/2018    sees dr. Tom Bradley.     Colon polyp     Diabetes mellitus (HCC)     GERD (gastroesophageal reflux disease)     HTN (hypertension)     Hyperlipidemia     Mild mitral regurgitation by prior echocardiogram 2/11/2016  Morbid obesity ([facility] 10/18/2017    Normal cardiac stress test 209    no ischemia at attained level of excercise    Palliative care patient 2020    Parkinson disease [facility])     Paroxysmal atrial fibrillation [facility])     sees dr. Rahul Wheeler Post-menopausal     Prolonged emergence from general anesthesia     Restrictive airway disease     Stage 3 chronic kidney disease ([facility] 10/18/2017       Past Surgical History:   Procedure Laterality Date    APPENDECTOMY      CATARACT REMOVAL WITH IMPLANT       SECTION      x3    COLONOSCOPY      Dr Dolores Anderson polyp-5 yr recall    COLONOSCOPY N/A 2019    Dr Aidee Judge 2 internal hemorrhoids without stigmata, diverticulosis, suboptimal prep--5 yr recall    EYE SURGERY      GALLBLADDER SURGERY  2014    dr Alecia Valladares N/CARPAL TUNNEL SURG Right 2018    OPEN CARPAL TUNNEL RELEASE performed by Nithya Masterson MD at [address] [place] [address] TYMPANOSTOMY TUBE PLACEMENT      dr Pam Hutchinson in [facility] UPPER GASTROINTESTINAL ENDOSCOPY       Family History   Problem Relation Age of Onset    High Blood Pressure Father     Diabetes Father     Parkinsonism Father     High Blood Pressure Mother     Diabetes Sister     Other Paternal Grandmother         hiatal hernia    Heart Disease Paternal Grandfather     Colon Cancer Neg Hx     Colon Polyps Neg Hx     Esophageal Cancer Neg Hx     Liver Cancer Neg Hx     Liver Disease Neg Hx     Rectal Cancer Neg Hx     Stomach Cancer Neg Hx      Social History     Tobacco Use    Smoking status: Never Smoker    Smokeless tobacco: Never Used   Substance Use Topics    Alcohol use: No         Review of Systems    Constitutional - no significant activity change, appetite change, or unexpected weight change. No fever or chills. No diaphoresis or significant fatigue. HENT - no significant rhinorrhea or epistaxis.   No tinnitus or significant hearing loss.  Eyes - no sudden vision change or amaurosis. Respiratory - no significant shortness of breath, wheezing, or stridor. No apnea, cough, or chest tightness associated with shortness of breath. Cardiovascular - no chest pain, syncope, or significant dizziness. No palpitations. Patient reports no claudication. Gastrointestinal - no abdominal swelling or pain. No blood in stool. No severe constipation, diarrhea, nausea, or vomiting. Genitourinary - No difficulty urinating, dysuria, frequency, or urgency. No flank pain or hematuria. Musculoskeletal - no back pain, gait disturbance, or myalgia. Skin - no color change, rash, pallor. Neurologic - no dizziness, facial asymmetry, or light headedness. No seizures. No speech difficulty or lateralizing weakness. Hematologic - no easy bruising or excessive bleeding. Psychiatric - no severe anxiety or nervousness. No confusion. All other review of systems are negative. Physical Exam    /71   Pulse 65   Temp 97.6 °F (36.4 °C) (Temporal)   Resp 18   Ht 5' 2\" (1.575 m)   Wt 292 lb (132.5 kg)   BMI 53.41 kg/m²     Constitutional - well developed, well nourished. No diaphoresis or acute distress. HENT - head normocephalic. Right external ear canal appears normal.  Left external ear canal appears normal.  Septum appears midline. Lips,teeth,gums normal  Eyes - conjunctiva normal.  EOMS normal.  No exudate. No icterus. PERRLA  Neck- ROM appears normal, no tracheal deviation. Cardiovascular - Regular rate and rhythm. Heart sounds are normal.  No murmur, rub, or gallop. Carotid pulses are 2+ to palpation bilaterally without bruit. Extremities - Radial and brachial pulses are 2+ to palpation bilaterally. Femoral pulses: present 2+bilaterally;Popliteal pulses: present 2+bilaterally Right DP: present 1+; Right PT present 1+; Left DP: present 1+; Left PT: present 1+  No cyanosis, clubbing. 1-2+ edema. No signs atheroembolic event.   Pulmonary - effort appears normal.  No respiratory distress. Lungs - Breath sounds normal. No wheezes or rales. GI - Abdomen - soft, non tender, bowel sounds X 4 quadrants. No guarding or rebound tenderness. No distension or palpable mass. Genitourinary - deferred. Musculoskeletal - ROM appears normal. 1-2+ edema. Skin: warm,dry  Neurologic - alert and oriented X 3. Sensation normal  Psychiatric - mood, affect, and behavior appear normal.  Judgment and thought processes appear normal.  Wound - left LE wounds    Wound Picture:                        Assessment     left LE wounds    1. Diabetic ulcer of left lower leg associated with type 2 diabetes mellitus, with fat layer exposed ([address]    2. Edema of both lower extremities due to peripheral venous insufficiency    3. Non-pressure chronic ulcer of left lower leg with fat layer exposed ([address]            Post Debridement Measurements and Assessment:  Wound 12/21/21 Leg Left; Lower WOUND 1 LEFT MEDIAL LEG VENOUS (Active)   Wound Image    12/21/21 1449   Wound Etiology Venous 12/21/21 1449   Dressing Status Old drainage noted; Intact 12/21/21 1449   Wound Cleansed Soap and water 12/21/21 1449   Wound Length (cm) 2.5 cm 12/21/21 1449   Wound Width (cm) 1.5 cm 12/21/21 1449   Wound Depth (cm) 0.2 cm 12/21/21 1449   Wound Surface Area (cm^2) 3.75 cm^2 12/21/21 1449   Wound Volume (cm^3) 0.75 cm^3 12/21/21 1449   Wound Assessment Slough;Pink/red 12/21/21 1449   Drainage Amount Large 12/21/21 1449   Drainage Description Serosanguinous 12/21/21 1449   Odor None 12/21/21 1449   [place]-wound Assessment Intact;Dry/flaky 12/21/21 1449   Margins Attached edges 12/21/21 1449   Wound Thickness Description not for Pressure Injury Full thickness 12/21/21 1449   Number of days: 0       Wound 12/21/21 Leg Left; Lower WOUND 2 LEFT ANTERIOR LEG VENOUS (Active)   Wound Image   12/21/21 1449   Wound Etiology Venous 12/21/21 1449   Dressing Status Old drainage noted; Intact 12/21/21 1449   Wound Cleansed Soap and water 12/21/21 1449   Wound Length (cm) 2.9 cm 12/21/21 1449   Wound Width (cm) 0.5 cm 12/21/21 1449   Wound Depth (cm) 0.1 cm 12/21/21 1449   Wound Surface Area (cm^2) 1.45 cm^2 12/21/21 1449   Wound Volume (cm^3) 0.145 cm^3 12/21/21 1449   Wound Assessment Pink/red;Slough 12/21/21 1449   Drainage Amount Large 12/21/21 1449   Drainage Description Serosanguinous 12/21/21 1449   Odor None 12/21/21 1449   [place]-wound Assessment Fragile 12/21/21 1449   Margins Attached edges 12/21/21 1449   Wound Thickness Description not for Pressure Injury Full thickness 12/21/21 1449   Number of days: 0       Wound 12/21/21 Leg Left; Lower WOUND 3 LEFT SUPERIOR ANTERIOR LEG VENOUS (Active)   Wound Image   12/21/21 1449   Wound Etiology Venous 12/21/21 1449   Dressing Status Old drainage noted; Intact 12/21/21 1449   Wound Cleansed Soap and water 12/21/21 1449   Wound Length (cm) 1 cm 12/21/21 1449   Wound Width (cm) 1 cm 12/21/21 1449   Wound Depth (cm) 0.1 cm 12/21/21 1449   Wound Surface Area (cm^2) 1 cm^2 12/21/21 1449   Wound Volume (cm^3) 0.1 cm^3 12/21/21 1449   Wound Assessment Pink/red;Slough 12/21/21 1449   Drainage Amount Large 12/21/21 1449   Drainage Description Serosanguinous 12/21/21 1449   Odor None 12/21/21 1449   [place]-wound Assessment Fragile 12/21/21 1449   Margins Attached edges 12/21/21 1449   Wound Thickness Description not for Pressure Injury Full thickness 12/21/21 1449   Number of days: 0       Wound 12/21/21 Leg Left;Lateral WOUND 4 LEFT LATERAL LEG VENOUS (Active)   Wound Image   12/21/21 1449   Wound Etiology Venous 12/21/21 1449   Dressing Status Old drainage noted; Intact 12/21/21 1449   Wound Cleansed Soap and water 12/21/21 1449   Wound Length (cm) 3 cm 12/21/21 1449   Wound Width (cm) 5 cm 12/21/21 1449   Wound Depth (cm) 0.2 cm 12/21/21 1449   Wound Surface Area (cm^2) 15 cm^2 12/21/21 1449   Wound Volume (cm^3) 3 cm^3 12/21/21 1449   Wound Assessment Pink/red;Slough 12/21/21 1449 Drainage Amount Moderate 12/21/21 1449   Drainage Description Serosanguinous 12/21/21 1449   Odor None 12/21/21 1449   [place]-wound Assessment Intact 12/21/21 1449   Margins Attached edges 12/21/21 1449   Wound Thickness Description not for Pressure Injury Full thickness 12/21/21 1449   Number of days: 0          The patients pain isPain Level: 0   0. Please refer to nursing measurements and assessment regarding wound pre and post debridement. Plan for wound - Dress per physician order    Treatment:     Compression : Yes after LEAS   Offloading : No   Dressing : SEE AVS   Additional Therapy : aquacel AG+ kerlix and 6 inch ace as tolerated. BIJU. Minocycline for 30 days BID  Discussed appropriate home care of this wound. Wound redressed. Patient instructions were given. Follow up: 1 week. Recommend no smoking  Offloading instructions given    Discharge Instructions         [address] [facility]   Physician Orders and Discharge Instructions  [address] [address]  Telephone: [phone]    NAME:  Ramesh Johnston:  1950  MEDICAL RECORD NUMBER:  [id number]  DATE:  12/21/2021    Discharge condition: Stable    Discharge to: Home    Left via:Private automobile    Accompanied by: Family    [facility] to order supplies     Wound Orders: Left Lower Ext  Soap and water wash  Aquacel Ag to open areas, cover with dry gauze, Secure with kerlex roll and 6 inch ace from toes to knee  Change daily   Take Antibiotic as prescribed   BIJU on 1/4 [place] in [address] Test will be done in [address] 1:00  Wound Care Appointment to follow test at 2:00    [facility] follow up visit __________1 week with Jonna Guerrero and 2 weeks with Dr Mundo Mares ___________________  (Please note your next appointment above and if you are unable to keep, kindly give a 24 hour notice.  Thank you.)    If you experience any of the following, please call the [email or web address] during business hours:    * Increase in Pain  * Temperature over 101  * Increase in drainage from your wound  * Drainage with a foul odor  * Bleeding  * Increase in swelling  * Need for compression bandage changes due to slippage, breakthrough drainage. If you need medical attention outside of the business hours of the [email or web address] please contact your PCP or go to the nearest emergency room.         Electronically signed by Madina Rodney MD on 12/21/21 at 3:32 PM CST

## 2021-12-21 NOTE — PROGRESS NOTES
[address] f: [phone] f: [phone]: [phone] [email or web address]      Ordering Center:     [address]  [address] [facility] [place] [address] [id number] [phone]  WOUND CARE Dept: [address] [facility] [phone]    Patient Information:      Senait Delacruz [id number] [id number]   [phone]   : 1950  AGE: 79 y.o. GENDER: female   EPISODE DATE: 2021    Insurance:      PRIMARY INSURANCE:  Plan: MEDICARE PART A AND B  Coverage: MEDICARE  Effective Date: 2019  Group Number: [unfilled]  Subscriber Number: [id number] - (Medicare)    Payor/Plan Subscr  Sex Relation Sub. Ins. ID Effective Group Num   1. MEDICARE - METerry Saris D 1950 Female Self [id number]                                    [address]    2. MEDICAID Billy Filter 1950 Female Self [id number]                                    [address]        Patient Wound Information:      Problem List Items Addressed This Visit     * (Principal) Diabetic ulcer of left lower leg associated with type 2 diabetes mellitus, with fat layer exposed ([address] - Primary (Chronic)    Relevant Medications    minocycline (MINOCIN;DYNACIN) 100 MG capsule    Other Relevant Orders    VL LOWER EXTREMITY ARTERIAL SEGMENTAL PRESSURES W PPG    Edema of both lower extremities due to peripheral venous insufficiency (Chronic)    Relevant Medications    minocycline (MINOCIN;DYNACIN) 100 MG capsule    Other Relevant Orders    VL LOWER EXTREMITY ARTERIAL SEGMENTAL PRESSURES W PPG    Non-pressure chronic ulcer of left lower leg with fat layer exposed ([address]    Relevant Orders    Initiate Outpatient Wound Care Protocol          WOUNDS REQUIRING DRESSING SUPPLIES:     Wound 21 Leg Left; Lower WOUND 1 LEFT MEDIAL LEG VENOUS (Active)   Wound Image    21 8220   Wound Etiology Venous 12/21/21 1449   Dressing Status Old drainage noted; Intact 12/21/21 1449   Wound Cleansed Soap and water 12/21/21 1449   Wound Length (cm) 2.5 cm 12/21/21 1449   Wound Width (cm) 1.5 cm 12/21/21 1449   Wound Depth (cm) 0.2 cm 12/21/21 1449   Wound Surface Area (cm^2) 3.75 cm^2 12/21/21 1449   Wound Volume (cm^3) 0.75 cm^3 12/21/21 1449   Wound Assessment Slough;Pink/red 12/21/21 1449   Drainage Amount Large 12/21/21 1449   Drainage Description Serosanguinous 12/21/21 1449   Odor None 12/21/21 1449   [place]-wound Assessment Intact;Dry/flaky 12/21/21 1449   Margins Attached edges 12/21/21 1449   Wound Thickness Description not for Pressure Injury Full thickness 12/21/21 1449   Number of days: 0       Wound 12/21/21 Leg Left; Lower WOUND 2 LEFT ANTERIOR LEG VENOUS (Active)   Wound Image   12/21/21 1449   Wound Etiology Venous 12/21/21 1449   Dressing Status Old drainage noted; Intact 12/21/21 1449   Wound Cleansed Soap and water 12/21/21 1449   Wound Length (cm) 2.9 cm 12/21/21 1449   Wound Width (cm) 0.5 cm 12/21/21 1449   Wound Depth (cm) 0.1 cm 12/21/21 1449   Wound Surface Area (cm^2) 1.45 cm^2 12/21/21 1449   Wound Volume (cm^3) 0.145 cm^3 12/21/21 1449   Wound Assessment Pink/red;Slough 12/21/21 1449   Drainage Amount Large 12/21/21 1449   Drainage Description Serosanguinous 12/21/21 1449   Odor None 12/21/21 1449   [place]-wound Assessment Fragile 12/21/21 1449   Margins Attached edges 12/21/21 1449   Wound Thickness Description not for Pressure Injury Full thickness 12/21/21 1449   Number of days: 0       Wound 12/21/21 Leg Left; Lower WOUND 3 LEFT SUPERIOR ANTERIOR LEG VENOUS (Active)   Wound Image   12/21/21 1449   Wound Etiology Venous 12/21/21 1449   Dressing Status Old drainage noted; Intact 12/21/21 1449   Wound Cleansed Soap and water 12/21/21 1449   Wound Length (cm) 1 cm 12/21/21 1449   Wound Width (cm) 1 cm 12/21/21 1449   Wound Depth (cm) 0.1 cm 12/21/21 1449   Wound Surface Area (cm^2) 1 cm^2 12/21/21 1449   Wound Volume (cm^3) 0.1 cm^3 12/21/21 1449   Wound Assessment Pink/red;Slough 12/21/21 1449   Drainage Amount Large 12/21/21 1449   Drainage Description Serosanguinous 12/21/21 1449   Odor None 12/21/21 1449   [place]-wound Assessment Fragile 12/21/21 1449   Margins Attached edges 12/21/21 1449   Wound Thickness Description not for Pressure Injury Full thickness 12/21/21 1449   Number of days: 0       Wound 12/21/21 Leg Left;Lateral WOUND 4 LEFT LATERAL LEG VENOUS (Active)   Wound Image   12/21/21 1449   Wound Etiology Venous 12/21/21 1449   Dressing Status Old drainage noted; Intact 12/21/21 1449   Wound Cleansed Soap and water 12/21/21 1449   Wound Length (cm) 3 cm 12/21/21 1449   Wound Width (cm) 5 cm 12/21/21 1449   Wound Depth (cm) 0.2 cm 12/21/21 1449   Wound Surface Area (cm^2) 15 cm^2 12/21/21 1449   Wound Volume (cm^3) 3 cm^3 12/21/21 1449   Wound Assessment Pink/red;Slough 12/21/21 1449   Drainage Amount Moderate 12/21/21 1449   Drainage Description Serosanguinous 12/21/21 1449   Odor None 12/21/21 1449   [place]-wound Assessment Intact 12/21/21 1449   Margins Attached edges 12/21/21 1449   Wound Thickness Description not for Pressure Injury Full thickness 12/21/21 1449   Number of days: 0          Supplies Requested :      WOUND #: All 4 Wounds   PRIMARY DRESSING:  Aquacel Ag   4x4, Kerlex, 6 inch ace, medipore tape     FREQUENCY OF DRESSING CHANGES:  Daily                                      ADDITIONAL ITEMS:  [] Gloves Small  [] Gloves Medium [] Gloves Large [] Gloves XLarge  [] Tape 1\" [x] Tape 2\" [] Tape 3\"  [] Medipore Tape  [x] Saline  [] Skin Prep   [] Adhesive Remover   [] Cotton Tip Applicators   [] Other:    Patient Wound(s) Debrided: [x] Yes if yes please add date 12/21   [] No    Debribement Type: Excisional/Sharp    Is the patient currently on an antibiotic for their Wound(s): [x] Yes if yes please add name and dose   [] No  Minocycline     Patient currently being seen by Home Health: [] Yes   [x] No    Duration for needed supplies:  []15  [x]30  []60  []90 Days    Electronically signed by Manny Washington RN on 12/21/2021 at 3:24 PM     Provider Information:      PROVIDER'S NAME: Dr Arias Kenn: [phone]

## 2021-12-28 ENCOUNTER — HOSPITAL ENCOUNTER (OUTPATIENT)
Dept: WOUND CARE | Age: 71
Discharge: HOME | End: 2021-12-28
Payer: MEDICARE

## 2021-12-28 VITALS
RESPIRATION RATE: 20 BRPM | SYSTOLIC BLOOD PRESSURE: 140 MMHG | DIASTOLIC BLOOD PRESSURE: 71 MMHG | BODY MASS INDEX: 53.73 KG/M2 | HEART RATE: 64 BPM | WEIGHT: 292 LBS | TEMPERATURE: 97.5 F | HEIGHT: 62 IN

## 2021-12-28 DIAGNOSIS — E11.622 DIABETIC ULCER OF LEFT LOWER LEG ASSOCIATED WITH TYPE 2 DIABETES MELLITUS, WITH FAT LAYER EXPOSED (HCC): Chronic | ICD-10-CM

## 2021-12-28 DIAGNOSIS — I87.2 EDEMA OF BOTH LOWER EXTREMITIES DUE TO PERIPHERAL VENOUS INSUFFICIENCY: Chronic | ICD-10-CM

## 2021-12-28 DIAGNOSIS — E66.01 MORBID OBESITY (HCC): ICD-10-CM

## 2021-12-28 DIAGNOSIS — L97.922 DIABETIC ULCER OF LEFT LOWER LEG ASSOCIATED WITH TYPE 2 DIABETES MELLITUS, WITH FAT LAYER EXPOSED (HCC): Chronic | ICD-10-CM

## 2021-12-28 DIAGNOSIS — L97.922 NON-PRESSURE CHRONIC ULCER OF LEFT LOWER LEG WITH FAT LAYER EXPOSED (HCC): Primary | ICD-10-CM

## 2021-12-28 PROCEDURE — 97597 DBRDMT OPN WND 1ST 20 CM/<: CPT

## 2021-12-28 PROCEDURE — 6370000000 HC RX 637 (ALT 250 FOR IP): Performed by: SURGERY

## 2021-12-28 PROCEDURE — 97597 DBRDMT OPN WND 1ST 20 CM/<: CPT | Performed by: NURSE PRACTITIONER

## 2021-12-28 RX ORDER — LIDOCAINE HYDROCHLORIDE 20 MG/ML
JELLY TOPICAL ONCE
Status: CANCELLED | OUTPATIENT
Start: 2021-12-28 | End: 2021-12-28

## 2021-12-28 RX ORDER — LIDOCAINE HYDROCHLORIDE 20 MG/ML
JELLY TOPICAL ONCE
Status: COMPLETED | OUTPATIENT
Start: 2021-12-28 | End: 2021-12-28

## 2021-12-28 RX ADMIN — LIDOCAINE HYDROCHLORIDE: 20 JELLY TOPICAL at 10:28

## 2021-12-28 ASSESSMENT — PAIN SCALES - GENERAL: PAINLEVEL_OUTOF10: 0

## 2021-12-28 NOTE — PROGRESS NOTES
[address]   Progress Note and Procedure Note      Jess Cam  MEDICAL RECORD NUMBER:  [id number]  AGE: 70 y.o. GENDER: female  : 1950  EPISODE DATE:  2021    Subjective:     Chief Complaint   Patient presents with    Wound Check     Pt could not tolerate the aquacel ag or the plain ag it burned her leg wounds, daughter stopped and used plain gauze         HISTORY of PRESENT ILLNESS HPI     Jess Cam is a 70 y.o. female who presents today for wound/ulcer evaluation. History of Wound Context:left leg wound follow up eval/treat    Ulcer Identification:  Ulcer Type: venous  Contributing Factors: edema, diabetes and obesity    Wound: N/A        PAST MEDICAL HISTORY        Diagnosis Date    Asthma 2015    Bilateral carpal tunnel syndrome 2018    sees dr. Marie Alva.     Colon polyp     Diabetes mellitus (HCC)     GERD (gastroesophageal reflux disease)     HTN (hypertension)     Hyperlipidemia     Mild mitral regurgitation by prior echocardiogram 2016    Morbid obesity ([facility] 10/18/2017    Normal cardiac stress test 4-209    no ischemia at attained level of excercise    Palliative care patient 2020    Parkinson disease [facility])     Paroxysmal atrial fibrillation [facility])     sees dr. Desmond Maradiaga Post-menopausal     Prolonged emergence from general anesthesia     Restrictive airway disease     Stage 3 chronic kidney disease ([facility] 10/18/2017       PAST SURGICAL HISTORY    Past Surgical History:   Procedure Laterality Date    APPENDECTOMY      CATARACT REMOVAL WITH IMPLANT       SECTION      x3    COLONOSCOPY      Dr Kassandra Hampton polyp-5 yr recall    COLONOSCOPY N/A 2019    Dr Beka Juarez 2 internal hemorrhoids without stigmata, diverticulosis, suboptimal prep--5 yr recall    EYE SURGERY      GALLBLADDER SURGERY  2014    dr Gaby Lu BREAKFAST 30 capsule 5    fluconazole (DIFLUCAN) 150 MG tablet Take one on the 15th of every month 1 tablet 11    insulin aspart (NOVOLOG) 100 UNIT/ML injection vial 25 U TID with meals 1 each 3    rOPINIRole (REQUIP) 4 MG tablet TAKE TWO TABLETS BY MOUTH EVERY MORNING , ONE AT NOON AND TWO AT BEDTIME 150 tablet 5    tiZANidine (ZANAFLEX) 2 MG tablet Take 1 tablet by mouth nightly as needed (muscle spasms) 30 tablet 3    omeprazole (PRILOSEC) 40 MG delayed release capsule TAKE ONE CAPSULE BY MOUTH EVERY DAY 90 capsule 3    sotalol (BETAPACE) 120 MG tablet TAKE ONE TABLET BY MOUTH TWICE A DAY 60 tablet 5    Cholecalciferol (VITAMIN D3) 50 MCG (2000 UT) CAPS Take 1 capsule by mouth      allopurinol (ZYLOPRIM) 100 MG tablet Take 1 tablet by mouth daily      torsemide (DEMADEX) 20 MG tablet Take 20 mg by mouth daily 3 tablets for three days then two tablets daily       Melatonin 10 MG CAPS Take 10 mg by mouth every evening Takes 20 mg a night      spironolactone (ALDACTONE) 25 MG tablet TAKE ONE TABLET DAILY (Patient taking differently: One tablet in the am and one tablet in the evening) 30 tablet 5    aspirin 325 MG tablet Take 325 mg by mouth daily.  SURE COMFORT PEN NEEDLES 31G X 8 MM MISC USE WITH INSULIN  THREE TIMES PER DAY. 100 each 5    ketorolac (ACULAR) 0.5 % ophthalmic solution       blood glucose monitor strips Test 3times a day & as needed for symptoms of irregular blood glucose. 100 strip 3    gabapentin (NEURONTIN) 600 MG tablet TAKE ONE TABLET BY MOUTH 3 TIMES DAILY AS NEEDED 90 tablet 5    COMFORT EZ PEN NEEDLES 32G X 4 MM MISC USE WITH INSULIN  THREE TIMES PER DAY. 100 each 3    nystatin (MYCOSTATIN) 985032 UNIT/GM ointment Apply topically 2 times daily. for yeast 60 Tube 5    nystatin (NYSTATIN) 231652 UNIT/GM powder Apply topically 3 times daily Apply topically 4 times daily.  60 g 3    Diabetic Shoe MISC by Does not apply route 1 each 0    albuterol (ACCUNEB) 1.25 MG/3ML nebulizer solution Inhale 3 mLs into the lungs every 6 hours as needed for Wheezing 360 mL 3    EASY COMFORT INSULIN SYRINGE 31G X 5/16\" 1 ML MISC INJECT AS DIRECTED DAILY 100 each 3    blood glucose monitor kit and supplies Use as directed for diabetes TID checks. 1 kit 0    TRUEPLUS INSULIN SYRINGE 30G X 5/16\" 1 ML MISC INJECT AS DIRECTED DAILY 100 each 3    denosumab (PROLIA) 60 MG/ML SOSY SC injection Inject 60 mg into the skin every 6 months      glucose blood VI test strips (ONE TOUCH ULTRA TEST) strip Inject 1 each into the skin daily As needed. 100 each 3    Lancets MISC 1 lancet to check glucose daily 100 each 3    albuterol (PROVENTIL HFA;VENTOLIN HFA) 108 (90 BASE) MCG/ACT inhaler Inhale 2 puffs into the lungs every 6 hours as needed for Wheezing 1 Inhaler 1    OXYGEN 2L NC 12-24 hours (Patient taking differently: nightly as needed 2L NC 12-24 hours) 1 Device 0     No current facility-administered medications on file prior to encounter. REVIEW OF SYSTEMS    Pertinent items are noted in HPI.     Objective:      BP (!) 140/71   Pulse 64   Temp 97.5 °F (36.4 °C) (Temporal)   Resp 20   Ht 5' 2\" (1.575 m)   Wt 292 lb (132.5 kg)   BMI 53.41 kg/m²     Wt Readings from Last 3 Encounters:   12/28/21 292 lb (132.5 kg)   12/21/21 292 lb (132.5 kg)   10/21/21 292 lb (132.5 kg)       PHYSICAL EXAM    General Appearance: alert and oriented to person, place and time, well developed and well- nourished, in no acute distress  Skin: warm and dry, no rash or erythema  Head: normocephalic and atraumatic  Eyes: pupils equal, round, and reactive to light, extraocular eye movements intact, conjunctivae normal  ENT: tympanic membrane, external ear and ear canal normal bilaterally, nose without deformity, nasal mucosa and turbinates normal without polyps  Neck: supple and non-tender without mass, no thyromegaly or thyroid nodules, no cervical lymphadenopathy  Pulmonary/Chest: clear to auscultation bilaterally- no wheezes, rales or rhonchi, normal air movement, no respiratory distress  Musculoskeletal: normal range of motion, no joint swelling, deformity or tenderness  Neurologic: reflexes normal and symmetric, no cranial nerve deficit, gait, coordination and speech normal      Assessment:      Patient Active Problem List   Diagnosis Code    Parkinson disease ([facility] G20    GERD (gastroesophageal reflux disease) P81.9    Lichen sclerosus W06.2    Palpitations R00.2    Fatigue R53.83    Asthma J45.909    Edema R60.9    Hypokalemia E87.6    PAF (paroxysmal atrial fibrillation) ([facility] I48.0    Mild mitral regurgitation by prior echocardiogram I34.0    Restless legs syndrome G25.81    Hypoesthesia of skin R20.1    Somnolence, daytime R40.0    Morbid obesity ([facility]) E66.01    Stage 4 chronic kidney disease ([facility]) N18.4    Mixed hyperlipidemia E78.2    Cellulitis of left lower extremity L03. 116    Chronic obstructive pulmonary disease ([facility] J44.9    Diabetic ulcer of left lower leg associated with type 2 diabetes mellitus, with fat layer exposed ([facility] E11.622, S07.686    Varicose veins of left lower extremity with ulcer of calf (CODE) ([facility]) I83.022    Edema of both lower extremities due to peripheral venous insufficiency I87.2    Diabetic peripheral neuropathy associated with type 2 diabetes mellitus ([facility]) E11.42    Ischemic cardiomyopathy I25.5    At risk for obstructive sleep apnea Z91.89    Nocturnal hypoxemia G47.34    Coagulation defect ([facility] D68.9        Procedure Note  Indications:  Based on my examination of this patient's wound(s)/ulcer(s) today, debridement is required to promote healing and evaluate the wound base.     Performed by: INGRID Mancia CNP    Consent obtained:  Yes    Time out taken:  Yes    Pain Control: Anesthetic  Anesthetic: 2% Lidocaine Gel Topical       Debridement:Non-excisional Debridement    Using curette the wound(s)/ulcer(s) was/were sharply debrided down through and including the removal of epidermis and dermis. Devitalized Tissue Debrided:  fibrin, biofilm, slough and exudate    Pre Debridement Measurements:  Are located in the Wound/Ulcer Documentation Flow Sheet    Wound/Ulcer #: 1 and 2    Post Debridement Measurements:  Wound/Ulcer Descriptions are Pre Debridement except measurements:      Percent of Wound/Ulcer Debrided: 100%    Total Surface Area Debrided:  5.25 sq cm     Wound 12/21/21 Leg Left; Lower WOUND 1 LEFT MEDIAL LEG VENOUS (Active)   Wound Image   12/28/21 1028   Wound Etiology Venous 12/28/21 1028   Dressing Status New dressing applied 12/28/21 1120   Wound Cleansed Not Cleansed 12/28/21 1028   Dressing/Treatment Ace wrap;Gauze dressing/dressing sponge;Roll gauze;Tape/Soft cloth adhesive tape;Xeroform 12/28/21 1120   Wound Length (cm) 2.5 cm 12/28/21 1028   Wound Width (cm) 1.3 cm 12/28/21 1028   Wound Depth (cm) 0.1 cm 12/28/21 1028   Wound Surface Area (cm^2) 3.25 cm^2 12/28/21 1028   Change in Wound Size % (l*w) 13.33 12/28/21 1028   Wound Volume (cm^3) 0.325 cm^3 12/28/21 1028   Wound Healing % 57 12/28/21 1028   Post-Procedure Length (cm) 2.5 cm 12/28/21 1100   Post-Procedure Width (cm) 1.3 cm 12/28/21 1100   Post-Procedure Depth (cm) 0.2 cm 12/28/21 1100   Post-Procedure Surface Area (cm^2) 3.25 cm^2 12/28/21 1100   Post-Procedure Volume (cm^3) 0.65 cm^3 12/28/21 1100   Distance Tunneling (cm) 0 cm 12/28/21 1028   Tunneling Position ___ O'Clock 0 12/28/21 1028   Undermining Starts ___ O'Clock 0 12/28/21 1028   Undermining Ends___ O'Clock 0 12/28/21 1028   Undermining Maxium Distance (cm) 0 12/28/21 1028   Wound Assessment Pink/red;Slough 12/28/21 1028   Drainage Amount Large 12/28/21 1028   Drainage Description Serosanguinous 12/28/21 1028   Odor None 12/28/21 1028   [place]-wound Assessment Blanchable erythema 12/28/21 1028   Margins Attached edges 12/28/21 1028   Wound Thickness Description not for Pressure Injury Full thickness 12/28/21 1028 Number of days: 6       Wound 12/21/21 Leg Left; Lower WOUND 2 LEFT ANTERIOR LEG VENOUS (Active)   Wound Image    12/28/21 1028   Wound Etiology Venous 12/28/21 1028   Dressing Status New dressing applied 12/28/21 1120   Wound Cleansed Not Cleansed 12/28/21 1028   Dressing/Treatment Ace wrap;Gauze dressing/dressing sponge;Roll gauze;Tape/Soft cloth adhesive tape;Xeroform 12/28/21 1120   Wound Length (cm) 2.5 cm 12/28/21 1028   Wound Width (cm) 0.8 cm 12/28/21 1028   Wound Depth (cm) 0.1 cm 12/28/21 1028   Wound Surface Area (cm^2) 2 cm^2 12/28/21 1028   Change in Wound Size % (l*w) -37.93 12/28/21 1028   Wound Volume (cm^3) 0.2 cm^3 12/28/21 1028   Wound Healing % -38 12/28/21 1028   Post-Procedure Length (cm) 2.5 cm 12/28/21 1100   Post-Procedure Width (cm) 0.8 cm 12/28/21 1100   Post-Procedure Depth (cm) 0.1 cm 12/28/21 1100   Post-Procedure Surface Area (cm^2) 2 cm^2 12/28/21 1100   Post-Procedure Volume (cm^3) 0.2 cm^3 12/28/21 1100   Tunneling Position ___ O'Clock 0 12/28/21 1028   Undermining Starts ___ O'Clock 0 12/28/21 1028   Undermining Ends___ O'Clock 0 12/28/21 1028   Undermining Maxium Distance (cm) 0 12/28/21 1028   Wound Assessment Pink/red;Slough 12/28/21 1028   Drainage Amount Small 12/28/21 1028   Drainage Description Serous 12/28/21 1028   Odor None 12/28/21 1028   [place]-wound Assessment Blanchable erythema 12/28/21 1028   Margins Attached edges 12/28/21 1028   Wound Thickness Description not for Pressure Injury Full thickness 12/28/21 1028   Number of days: 6       Wound 12/21/21 Leg Left; Lower WOUND 3 LEFT SUPERIOR ANTERIOR LEG VENOUS (Active)   Wound Image   12/28/21 1028   Wound Etiology Venous 12/28/21 1028   Dressing Status New dressing applied 12/28/21 1120   Wound Cleansed Not Cleansed 12/28/21 1028   Dressing/Treatment Ace wrap;Gauze dressing/dressing sponge;Roll gauze;Tape/Soft cloth adhesive tape;Xeroform 12/28/21 1120   Wound Length (cm) 1.4 cm 12/28/21 1028   Wound Width (cm) 1.1 cm 12/28/21 1028   Wound Depth (cm) 0.1 cm 12/28/21 1028   Wound Surface Area (cm^2) 1.54 cm^2 12/28/21 1028   Change in Wound Size % (l*w) -54 12/28/21 1028   Wound Volume (cm^3) 0.154 cm^3 12/28/21 1028   Wound Healing % -54 12/28/21 1028   Post-Procedure Length (cm) 1.4 cm 12/28/21 1100   Post-Procedure Width (cm) 1.1 cm 12/28/21 1100   Post-Procedure Depth (cm) 0.1 cm 12/28/21 1100   Post-Procedure Surface Area (cm^2) 1.54 cm^2 12/28/21 1100   Post-Procedure Volume (cm^3) 0.154 cm^3 12/28/21 1100   Distance Tunneling (cm) 0 cm 12/28/21 1028   Tunneling Position ___ O'Clock 0 12/28/21 1028   Undermining Starts ___ O'Clock 0 12/28/21 1028   Undermining Ends___ O'Clock 0 12/28/21 1028   Undermining Maxium Distance (cm) 0 12/28/21 1028   Wound Assessment Pink/red;Slough 12/28/21 1028   Drainage Amount Moderate 12/28/21 1028   Drainage Description Serous 12/28/21 1028   Odor None 12/28/21 1028   [place]-wound Assessment Blanchable erythema 12/28/21 1028   Margins Attached edges 12/28/21 1028   Wound Thickness Description not for Pressure Injury Full thickness 12/28/21 1028   Number of days: 6       Wound 12/21/21 Leg Left;Lateral WOUND 4 LEFT LATERAL LEG VENOUS (Active)   Wound Image    12/28/21 1028   Wound Etiology Venous 12/28/21 1028   Dressing Status New dressing applied 12/28/21 1120   Wound Cleansed Not Cleansed 12/28/21 1028   Dressing/Treatment Ace wrap;Gauze dressing/dressing sponge;Roll gauze;Tape/Soft cloth adhesive tape;Xeroform 12/28/21 1120   Wound Length (cm) 2.7 cm 12/28/21 1028   Wound Width (cm) 4.8 cm 12/28/21 1028   Wound Depth (cm) 0.1 cm 12/28/21 1028   Wound Surface Area (cm^2) 12.96 cm^2 12/28/21 1028   Change in Wound Size % (l*w) 13.6 12/28/21 1028   Wound Volume (cm^3) 1.296 cm^3 12/28/21 1028   Wound Healing % 57 12/28/21 1028   Post-Procedure Length (cm) 2.7 cm 12/28/21 1100   Post-Procedure Width (cm) 4.8 cm 12/28/21 1100   Post-Procedure Depth (cm) 0.1 cm 12/28/21 1100   Post-Procedure Surface Area (cm^2) 12.96 cm^2 12/28/21 1100   Post-Procedure Volume (cm^3) 1.296 cm^3 12/28/21 1100   Distance Tunneling (cm) 0 cm 12/28/21 1028   Tunneling Position ___ O'Clock 0 12/28/21 1028   Undermining Starts ___ O'Clock 0 12/28/21 1028   Undermining Ends___ O'Clock 0 12/28/21 1028   Undermining Maxium Distance (cm) 0 12/28/21 1028   Wound Assessment Slough;Pink/red 12/28/21 1028   Drainage Amount Large 12/28/21 1028   Drainage Description Serosanguinous 12/28/21 1028   Odor None 12/28/21 1028   [place]-wound Assessment Blanchable erythema 12/28/21 1028   Margins Attached edges 12/28/21 1028   Wound Thickness Description not for Pressure Injury Full thickness 12/28/21 1028   Number of days: 6            Diabetic/Pressure/Non Pressure Ulcers only:  Ulcer: N/A      Estimated Blood Loss:  Minimal    Hemostasis Achieved:  by pressure    Procedural Pain:  8  / 10     Post Procedural Pain:  8 / 10     Response to treatment:  Well tolerated by patient., With complaints of pain. Plan:     Problem List Items Addressed This Visit     * (Principal) Diabetic ulcer of left lower leg associated with type 2 diabetes mellitus, with fat layer exposed ([address] (Chronic)    Edema of both lower extremities due to peripheral venous insufficiency (Chronic)    Morbid obesity (HCC)      Other Visit Diagnoses     Non-pressure chronic ulcer of left lower leg with fat layer exposed ([address]    -  Primary    Relevant Medications    lidocaine (XYLOCAINE) 2 % uro-jet (Completed)          Treatment Note please see attached Discharge Instructions    In my professional opinion this patient would benefit from HBO Therapy: No    Written patient dismissal instructions given to patient and signed by patient or POA. Ms. Ede Greer has a large amount of slough and is not doing well with aqua zahraa. I want to add santyl to the dressing regimen. I suspect she injured her leg somehow initiating the wounds.  She gets an BIJU next week and follow up with Dr. Ruth Way.     Electronically signed by INGRID Valderrama CNP on 12/28/2021 at 2:10 PM

## 2022-01-04 ENCOUNTER — HOSPITAL ENCOUNTER (OUTPATIENT)
Dept: NON INVASIVE DIAGNOSTICS | Age: 72
Discharge: HOME | End: 2022-01-04
Payer: MEDICARE

## 2022-01-04 ENCOUNTER — HOSPITAL ENCOUNTER (OUTPATIENT)
Dept: WOUND CARE | Age: 72
Discharge: HOME | End: 2022-01-04
Payer: MEDICARE

## 2022-01-04 VITALS
TEMPERATURE: 97.2 F | RESPIRATION RATE: 20 BRPM | SYSTOLIC BLOOD PRESSURE: 140 MMHG | BODY MASS INDEX: 53.73 KG/M2 | HEIGHT: 62 IN | DIASTOLIC BLOOD PRESSURE: 65 MMHG | WEIGHT: 292 LBS | HEART RATE: 59 BPM

## 2022-01-04 DIAGNOSIS — L97.922 DIABETIC ULCER OF LEFT LOWER LEG ASSOCIATED WITH TYPE 2 DIABETES MELLITUS, WITH FAT LAYER EXPOSED (HCC): ICD-10-CM

## 2022-01-04 DIAGNOSIS — E66.01 MORBID OBESITY (HCC): ICD-10-CM

## 2022-01-04 DIAGNOSIS — L97.922 DIABETIC ULCER OF LEFT LOWER LEG ASSOCIATED WITH TYPE 2 DIABETES MELLITUS, WITH FAT LAYER EXPOSED (HCC): Chronic | ICD-10-CM

## 2022-01-04 DIAGNOSIS — E11.622 DIABETIC ULCER OF LEFT LOWER LEG ASSOCIATED WITH TYPE 2 DIABETES MELLITUS, WITH FAT LAYER EXPOSED (HCC): Chronic | ICD-10-CM

## 2022-01-04 DIAGNOSIS — N18.4 STAGE 4 CHRONIC KIDNEY DISEASE (HCC): ICD-10-CM

## 2022-01-04 DIAGNOSIS — I87.2 EDEMA OF BOTH LOWER EXTREMITIES DUE TO PERIPHERAL VENOUS INSUFFICIENCY: Chronic | ICD-10-CM

## 2022-01-04 DIAGNOSIS — I87.2 EDEMA OF BOTH LOWER EXTREMITIES DUE TO PERIPHERAL VENOUS INSUFFICIENCY: Primary | ICD-10-CM

## 2022-01-04 DIAGNOSIS — E11.622 DIABETIC ULCER OF LEFT LOWER LEG ASSOCIATED WITH TYPE 2 DIABETES MELLITUS, WITH FAT LAYER EXPOSED (HCC): ICD-10-CM

## 2022-01-04 PROCEDURE — 11042 DBRDMT SUBQ TIS 1ST 20SQCM/<: CPT | Performed by: SURGERY

## 2022-01-04 PROCEDURE — 97597 DBRDMT OPN WND 1ST 20 CM/<: CPT

## 2022-01-04 PROCEDURE — 6370000000 HC RX 637 (ALT 250 FOR IP): Performed by: SURGERY

## 2022-01-04 PROCEDURE — 11045 DBRDMT SUBQ TISS EACH ADDL: CPT | Performed by: SURGERY

## 2022-01-04 PROCEDURE — 11042 DBRDMT SUBQ TIS 1ST 20SQCM/<: CPT

## 2022-01-04 PROCEDURE — 11045 DBRDMT SUBQ TISS EACH ADDL: CPT

## 2022-01-04 PROCEDURE — 93923 UPR/LXTR ART STDY 3+ LVLS: CPT

## 2022-01-04 PROCEDURE — 97597 DBRDMT OPN WND 1ST 20 CM/<: CPT | Performed by: SURGERY

## 2022-01-04 RX ORDER — LIDOCAINE HYDROCHLORIDE 20 MG/ML
JELLY TOPICAL ONCE
Status: COMPLETED | OUTPATIENT
Start: 2022-01-04 | End: 2022-01-04

## 2022-01-04 RX ORDER — LIDOCAINE HYDROCHLORIDE 20 MG/ML
JELLY TOPICAL ONCE
Status: CANCELLED | OUTPATIENT
Start: 2022-01-04 | End: 2022-01-04

## 2022-01-04 RX ADMIN — LIDOCAINE HYDROCHLORIDE: 20 JELLY TOPICAL at 14:04

## 2022-01-04 NOTE — PLAN OF CARE
Problem: Wound:  Goal: Will show signs of wound healing; wound closure and no evidence of infection  Description: Will show signs of wound healing; wound closure and no evidence of infection  1/4/2022 1401 by Van Sever, RN  Outcome: Ongoing  1/4/2022 1400 by Van Sever, RN  Outcome: Ongoing     Problem: Venous:  Goal: Signs of wound healing will improve  Description: Signs of wound healing will improve  1/4/2022 1401 by Van Sever, RN  Outcome: Ongoing  1/4/2022 1400 by Van Sever, RN  Outcome: Ongoing     Problem: Compression therapy:  Goal: Will be free from complications associated with compression therapy  Description: Will be free from complications associated with compression therapy  1/4/2022 1401 by Van Sever, RN  Outcome: Ongoing  1/4/2022 1400 by Van Sever, RN  Outcome: Ongoing     Problem: Weight control:  Goal: Ability to maintain an optimal weight for height and age will be supported  Description: Ability to maintain an optimal weight for height and age will be supported  1/4/2022 1401 by Van Sever, RN  Outcome: Ongoing  1/4/2022 1400 by Van Sever, RN  Outcome: Ongoing     Problem: Falls - Risk of:  Goal: Will remain free from falls  Description: Will remain free from falls  1/4/2022 1401 by Van Sever, RN  Outcome: Ongoing  1/4/2022 1400 by Van Sever, RN  Outcome: Ongoing

## 2022-01-04 NOTE — PROGRESS NOTES
[address]   Progress Note and Procedure Note      Carrie Fuentes  MEDICAL RECORD NUMBER:  [id number]  AGE: 70 y.o. GENDER: female  : 1950  EPISODE DATE:  2022    Subjective:     Chief Complaint   Patient presents with    Wound Check         HISTORY of PRESENT ILLNESS HPI     Carrie Fuentes is a 70 y.o. female who presents today for wound/ulcer evaluation. Wound Context: Pt with LLE wounds here for eval/treat  Wound/Ulcer Pain Timing/Severity: none  Quality of pain: N/A  Severity:  0 / 10   Modifying Factors: None  Associated Signs/Symptoms: edema and drainage    Ulcer Identification:  Ulcer Type: venous  Contributing Factors: edema, venous stasis, lymphedema and diabetes    Wound: venous        PAST MEDICAL HISTORY        Diagnosis Date    Asthma 2015    Bilateral carpal tunnel syndrome 2018    sees dr. Shari Marcano.     Colon polyp     Diabetes mellitus (HCC)     GERD (gastroesophageal reflux disease)     HTN (hypertension)     Hyperlipidemia     Mild mitral regurgitation by prior echocardiogram 2016    Morbid obesity ([facility] [address] 10/18/2017    Normal cardiac stress test 4-209    no ischemia at attained level of excercise    Palliative care patient 2020    Parkinson disease [facility])     Paroxysmal atrial fibrillation [facility])     sees dr. Angelita Pappas Post-menopausal     Prolonged emergence from general anesthesia     Restrictive airway disease     Stage 3 chronic kidney disease ([facility] [address] 10/18/2017       PAST SURGICAL HISTORY    Past Surgical History:   Procedure Laterality Date    APPENDECTOMY      CATARACT REMOVAL WITH IMPLANT       SECTION      x3    COLONOSCOPY      Dr Janis Aguilera polyp-5 yr recall    COLONOSCOPY N/A 2019    Dr Su Peed 2 internal hemorrhoids without stigmata, diverticulosis, suboptimal prep--5 yr recall    EYE SURGERY      GALLBLADDER SURGERY  2014    dr Cuellar Pulling ARTHROSCOPY      NH REVISE MEDIAN N/CARPAL TUNNEL SURG Right 8/29/2018    OPEN CARPAL TUNNEL RELEASE performed by Donna Barragan MD at [address] [facility] TYMPANOSTOMY TUBE PLACEMENT  2013    dr Dominic Wild in [facility] UPPER GASTROINTESTINAL ENDOSCOPY         FAMILY HISTORY    Family History   Problem Relation Age of Onset    High Blood Pressure Father    [facility] Diabetes Father     Parkinsonism Father     High Blood Pressure Mother     Diabetes Sister     Other Paternal Grandmother         hiatal hernia    Heart Disease Paternal Grandfather     Colon Cancer Neg Hx     Colon Polyps Neg Hx     Esophageal Cancer Neg Hx     Liver Cancer Neg Hx     Liver Disease Neg Hx     Rectal Cancer Neg Hx     Stomach Cancer Neg Hx        SOCIAL HISTORY    Social History     Tobacco Use    Smoking status: Never Smoker    Smokeless tobacco: Never Used   Vaping Use    Vaping Use: Never used   Substance Use Topics    Alcohol use: No    Drug use: No       ALLERGIES    Allergies   Allergen Reactions    Aquacel Extra Hydrofiber [Wound Dressings]      Burning and stinging    Codeine      itching    Hydrocodone-Acetaminophen Nausea Only    Silvadene [Silver Sulfadiazine]      Stinging and burning       MEDICATIONS    Current Outpatient Medications on File Prior to Encounter   Medication Sig Dispense Refill    minocycline (MINOCIN;DYNACIN) 100 MG capsule Take 1 capsule by mouth 2 times daily 60 capsule 0    carbidopa-levodopa (SINEMET)  MG per tablet TAKE TWO TABLETS BY MOUTH 3 TIMES DAILY 180 tablet 5    insulin detemir (LEVEMIR FLEXTOUCH) 100 UNIT/ML injection pen INJECT 80 UNITS SUBCU NIGHTLY FOR TYPE 2 DIABETES 5 pen 5    SURE COMFORT PEN NEEDLES 31G X 8 MM MISC USE WITH INSULIN  THREE TIMES PER DAY.  100 each 5    ketorolac (ACULAR) 0.5 % ophthalmic solution       losartan (COZAAR) 100 MG tablet TAKE ONE TABLET BY MOUTH EVERY DAY 90 tablet 3    montelukast (SINGULAIR) 10 MG tablet TAKE ONE TABLET BY MOUTH EVERY NIGHT AT BEDTIME 90 tablet 3    atorvastatin (LIPITOR) 10 MG tablet TAKE ONE TABLET BY MOUTH EACH EVENING 90 tablet 3    LINZESS 290 MCG CAPS capsule TAKE ONE CAPSULE IN THE MORNING BEFORE BREAKFAST 30 capsule 5    fluconazole (DIFLUCAN) 150 MG tablet Take one on the 15th of every month 1 tablet 11    insulin aspart (NOVOLOG) 100 UNIT/ML injection vial 25 U TID with meals 1 each 3    blood glucose monitor strips Test 3times a day & as needed for symptoms of irregular blood glucose. 100 strip 3    rOPINIRole (REQUIP) 4 MG tablet TAKE TWO TABLETS BY MOUTH EVERY MORNING , ONE AT NOON AND TWO AT BEDTIME 150 tablet 5    gabapentin (NEURONTIN) 600 MG tablet TAKE ONE TABLET BY MOUTH 3 TIMES DAILY AS NEEDED 90 tablet 5    tiZANidine (ZANAFLEX) 2 MG tablet Take 1 tablet by mouth nightly as needed (muscle spasms) 30 tablet 3    COMFORT EZ PEN NEEDLES 32G X 4 MM MISC USE WITH INSULIN  THREE TIMES PER DAY. 100 each 3    omeprazole (PRILOSEC) 40 MG delayed release capsule TAKE ONE CAPSULE BY MOUTH EVERY DAY 90 capsule 3    nystatin (MYCOSTATIN) 906296 UNIT/GM ointment Apply topically 2 times daily. for yeast 60 Tube 5    nystatin (NYSTATIN) 032468 UNIT/GM powder Apply topically 3 times daily Apply topically 4 times daily. 60 g 3    Diabetic Shoe MISC by Does not apply route 1 each 0    sotalol (BETAPACE) 120 MG tablet TAKE ONE TABLET BY MOUTH TWICE A DAY 60 tablet 5    Cholecalciferol (VITAMIN D3) 50 MCG (2000 UT) CAPS Take 1 capsule by mouth      allopurinol (ZYLOPRIM) 100 MG tablet Take 1 tablet by mouth daily      EASY COMFORT INSULIN SYRINGE 31G X 5/16\" 1 ML MISC INJECT AS DIRECTED DAILY 100 each 3    torsemide (DEMADEX) 20 MG tablet Take 20 mg by mouth daily 3 tablets for three days then two tablets daily       blood glucose monitor kit and supplies Use as directed for diabetes TID checks.  1 kit 0    TRUEPLUS INSULIN SYRINGE 30G X 5/16\" 1 ML MISC INJECT AS DIRECTED DAILY 100 each 3    Melatonin 10 MG CAPS Take 10 mg by mouth every evening Takes 20 mg a night      spironolactone (ALDACTONE) 25 MG tablet TAKE ONE TABLET DAILY (Patient taking differently: One tablet in the am and one tablet in the evening) 30 tablet 5    glucose blood VI test strips (ONE TOUCH ULTRA TEST) strip Inject 1 each into the skin daily As needed. 100 each 3    Lancets MISC 1 lancet to check glucose daily 100 each 3    OXYGEN 2L NC 12-24 hours (Patient taking differently: nightly as needed 2L NC 12-24 hours) 1 Device 0    aspirin 325 MG tablet Take 325 mg by mouth daily.  albuterol (ACCUNEB) 1.25 MG/3ML nebulizer solution Inhale 3 mLs into the lungs every 6 hours as needed for Wheezing 360 mL 3    denosumab (PROLIA) 60 MG/ML SOSY SC injection Inject 60 mg into the skin every 6 months      albuterol (PROVENTIL HFA;VENTOLIN HFA) 108 (90 BASE) MCG/ACT inhaler Inhale 2 puffs into the lungs every 6 hours as needed for Wheezing 1 Inhaler 1     No current facility-administered medications on file prior to encounter. REVIEW OF SYSTEMS    A comprehensive review of systems was negative.     Objective:      BP (!) 140/65   Pulse 59   Temp 97.2 °F (36.2 °C) (Temporal)   Resp 20   Ht 5' 2\" (1.575 m)   Wt 292 lb (132.5 kg)   BMI 53.41 kg/m²     Wt Readings from Last 3 Encounters:   01/04/22 292 lb (132.5 kg)   12/28/21 292 lb (132.5 kg)   12/21/21 292 lb (132.5 kg)       PHYSICAL EXAM    General Appearance: alert and oriented to person, place and time, well developed and well- nourished, in no acute distress  Skin: warm and dry, no rash or erythema  Head: normocephalic and atraumatic  Eyes: pupils equal, round, and reactive to light, extraocular eye movements intact, conjunctivae normal  ENT: tympanic membrane, external ear and ear canal normal bilaterally, nose without deformity, nasal mucosa and turbinates normal without polyps, lips teeth and gums normal  Neck: supple and non-tender without mass, no thyromegaly or thyroid nodules, no cervical lymphadenopathy  Pulmonary/Chest: clear to auscultation bilaterally- no wheezes, rales or rhonchi, normal air movement, no respiratory distress  Cardiovascular: normal rate, regular rhythm, normal S1 and S2, no murmurs, rubs, clicks, or gallops, distal pulses intact, no carotid bruits  Abdomen: soft, non-tender, non-distended, normal bowel sounds, no masses or organomegaly  Extremities: no cyanosis, clubbing or edema  Musculoskeletal: normal range of motion, no joint swelling, deformity or tenderness  Neurologic: reflexes normal and symmetric, no cranial nerve deficit, gait, coordination and speech normal, skin sensation normal      Assessment:      Problem List Items Addressed This Visit     * (Principal) Diabetic ulcer of left lower leg associated with type 2 diabetes mellitus, with fat layer exposed (HCC) (Chronic)    Relevant Orders    Initiate Outpatient Wound Care Protocol    Edema of both lower extremities due to peripheral venous insufficiency - Primary (Chronic)    Relevant Orders    Initiate Outpatient Wound Care Protocol    Morbid obesity ([facility] [address]    Relevant Orders    Initiate Outpatient Wound Care Protocol    Stage 4 chronic kidney disease ([facility] [address]    Relevant Orders    Initiate Outpatient Wound Care Protocol           Procedure Note  Indications:  Based on my examination of this patient's wound(s)/ulcer(s) today, debridement is required to promote healing and evaluate the wound base. Performed by: Buffy Benavides MD    Consent obtained:  Yes    Time out taken:  Yes    Pain Control: Anesthetic  Anesthetic: 2% Lidocaine Gel Topical       Debridement:Excisional Debridement    Using curette the wound(s)/ulcer(s) was/were sharply debrided down through and including the removal of epidermis, dermis and subcutaneous tissue.         Devitalized Tissue Debrided:  fibrin, biofilm, slough, necrotic/eschar and exudate      Pre Debridement Measurements:  Are located in the [place]  Documentation Flow Sheet    Wound/Ulcer #: 1 and 4    Percent of Wound(s)/Ulcer(s) Debrided: 100%    Total Surface Area Debrided:  26 sq cm       Diabetic/Pressure/Non Pressure Ulcers only:  Ulcer: Diabetic ulcer, fat layer exposed      Debridement:Non-excisional Debridement    Using curette the wound(s)/ulcer(s) was/were sharply debrided down through and including the removal of epidermis and dermis. Devitalized Tissue Debrided:  fibrin, biofilm, slough, necrotic/eschar and exudate    Pre Debridement Measurements:  Are located in the Wound/Ulcer Documentation Flow Sheet    Wound/Ulcer #: 2 and 3    Percent of Wound(s)/Ulcer(s) Debrided: 100%    Total Surface Area Debrided:  0.9 sq cm       Diabetic/Pressure/Non Pressure Ulcers only:  Ulcer: Diabetic ulcer, fat layer exposed                Post Debridement Measurements:    Wound/Ulcer Descriptions are Pre Debridement --EXCEPT MEASUREMENTS    Wound 12/21/21 Leg Left; Lower WOUND 1 LEFT MEDIAL LEG VENOUS (Active)   Wound Image   01/04/22 1405   Wound Etiology Venous 01/04/22 1405   Dressing Status Old drainage noted 01/04/22 1405   Wound Cleansed Soap and water 01/04/22 1405   Dressing/Treatment Ace wrap;Gauze dressing/dressing sponge;Roll gauze;Tape/Soft cloth adhesive tape;Xeroform 12/28/21 1120   Wound Length (cm) 2.6 cm 01/04/22 1405   Wound Width (cm) 1.7 cm 01/04/22 1405   Wound Depth (cm) 0.4 cm 01/04/22 1405   Wound Surface Area (cm^2) 4.42 cm^2 01/04/22 1405   Change in Wound Size % (l*w) -17.87 01/04/22 1405   Wound Volume (cm^3) 1.768 cm^3 01/04/22 1405   Wound Healing % -136 01/04/22 1405   Post-Procedure Length (cm) 2.6 cm 01/04/22 1451   Post-Procedure Width (cm) 1.7 cm 01/04/22 1451   Post-Procedure Depth (cm) 0.4 cm 01/04/22 1451   Post-Procedure Surface Area (cm^2) 4.42 cm^2 01/04/22 1451   Post-Procedure Volume (cm^3) 1.768 cm^3 01/04/22 1451   Distance Tunneling (cm) 0 cm 01/04/22 1405   Tunneling Position ___ O'Clock 0 01/04/22 1405   Undermining Starts ___ O'Clock 0 01/04/22 1405   Undermining Ends___ O'Clock 0 01/04/22 1405   Undermining Maxium Distance (cm) 0 01/04/22 1405   Wound Assessment [place]/red;Slough 01/04/22 1405   Drainage Amount Moderate 01/04/22 1405   Drainage Description Serosanguinous 01/04/22 1405   Odor None 01/04/22 1405   [place]-wound Assessment Blanchable erythema 01/04/22 1405   Margins Defined edges 01/04/22 1405   Wound Thickness Description not for Pressure Injury Full thickness 01/04/22 1405   Number of days: 14       Wound 12/21/21 Leg Left; Lower WOUND 2 LEFT ANTERIOR LEG VENOUS (Active)   Wound Image   01/04/22 1405   Wound Etiology Venous 01/04/22 1405   Dressing Status Old drainage noted 01/04/22 1405   Wound Cleansed Soap and water 01/04/22 1405   Dressing/Treatment Ace wrap;Gauze dressing/dressing sponge;Roll gauze;Tape/Soft cloth adhesive tape;Xeroform 12/28/21 1120   Wound Length (cm) 1.5 cm 01/04/22 1405   Wound Width (cm) 0.5 cm 01/04/22 1405   Wound Depth (cm) 0.2 cm 01/04/22 1405   Wound Surface Area (cm^2) 0.75 cm^2 01/04/22 1405   Change in Wound Size % (l*w) 48.28 01/04/22 1405   Wound Volume (cm^3) 0.15 cm^3 01/04/22 1405   Wound Healing % -3 01/04/22 1405   Post-Procedure Length (cm) 1.5 cm 01/04/22 1451   Post-Procedure Width (cm) 0.5 cm 01/04/22 1451   Post-Procedure Depth (cm) 0.2 cm 01/04/22 1451   Post-Procedure Surface Area (cm^2) 0.75 cm^2 01/04/22 1451   Post-Procedure Volume (cm^3) 0.15 cm^3 01/04/22 1451   Distance Tunneling (cm) 0 cm 01/04/22 1405   Tunneling Position ___ O'Clock 0 01/04/22 1405   Undermining Starts ___ O'Clock 0 01/04/22 1405   Undermining Ends___ O'Clock 0 01/04/22 1405   Undermining Maxium Distance (cm) 0 01/04/22 1405   Wound Assessment [place]/red;Slough 01/04/22 1405   Drainage Amount Small 01/04/22 1405   Drainage Description Serous 01/04/22 1405   Odor None 01/04/22 1405   [place]-wound Assessment Blanchable erythema 01/04/22 1405   Margins Attached edges 01/04/22 1405   Wound Thickness Description not for Pressure Injury Full thickness 01/04/22 1405   Number of days: 14       Wound 12/21/21 Leg Left; Lower WOUND 3 LEFT SUPERIOR ANTERIOR LEG VENOUS (Active)   Wound Image   01/04/22 1405   Wound Etiology Venous 01/04/22 1405   Dressing Status Old drainage noted 01/04/22 1405   Wound Cleansed Soap and water 01/04/22 1405   Dressing/Treatment Ace wrap;Gauze dressing/dressing sponge;Roll gauze;Tape/Soft cloth adhesive tape;Xeroform 12/28/21 1120   Wound Length (cm) 0.5 cm 01/04/22 1405   Wound Width (cm) 0.3 cm 01/04/22 1405   Wound Depth (cm) 0.1 cm 01/04/22 1405   Wound Surface Area (cm^2) 0.15 cm^2 01/04/22 1405   Change in Wound Size % (l*w) 85 01/04/22 1405   Wound Volume (cm^3) 0.015 cm^3 01/04/22 1405   Wound Healing % 85 01/04/22 1405   Post-Procedure Length (cm) 0.5 cm 01/04/22 1451   Post-Procedure Width (cm) 0.3 cm 01/04/22 1451   Post-Procedure Depth (cm) 0.1 cm 01/04/22 1451   Post-Procedure Surface Area (cm^2) 0.15 cm^2 01/04/22 1451   Post-Procedure Volume (cm^3) 0.015 cm^3 01/04/22 1451   Distance Tunneling (cm) 0 cm 01/04/22 1405   Tunneling Position ___ O'Clock 0 01/04/22 1405   Undermining Starts ___ O'Clock 0 01/04/22 1405   Undermining Ends___ O'Clock 0 01/04/22 1405   Undermining Max[place] Distance (cm) 0 01/04/22 1405   Wound Assessment [place]/red;Slough 01/04/22 1405   Drainage Amount Moderate 01/04/22 1405   Drainage Description Serous 01/04/22 1405   Odor None 01/04/22 1405   [place]-wound Assessment Blanchable erythema 01/04/22 1405   Margins Attached edges 01/04/22 1405   Wound Thickness Description not for Pressure Injury Full thickness 01/04/22 1405   Number of days: 14       Wound 12/21/21 Leg Left;Lateral WOUND 4 LEFT LATERAL LEG VENOUS (Active)   Wound Image   01/04/22 1405   Wound Etiology Venous 01/04/22 1405   Dressing Status Old drainage noted 01/04/22 1405   Wound Cleansed Soap and water 01/04/22 1405   Dressing/Treatment Ace wrap;Gauze dressing/dressing sponge;Roll gauze;Tape/Soft cloth adhesive tape;Xeroform 12/28/21 1120   Wound Length (cm) 3.5 cm 01/04/22 1405   Wound Width (cm) 6.3 cm 01/04/22 1405   Wound Depth (cm) 0.5 cm 01/04/22 1405   Wound Surface Area (cm^2) 22.05 cm^2 01/04/22 1405   Change in Wound Size % (l*w) -47 01/04/22 1405   Wound Volume (cm^3) 11. 025 cm^3 01/04/22 1405   Wound Healing % -268 01/04/22 1405   Post-Procedure Length (cm) 3.5 cm 01/04/22 1451   Post-Procedure Width (cm) 6.3 cm 01/04/22 1451   Post-Procedure Depth (cm) 0.5 cm 01/04/22 1451   Post-Procedure Surface Area (cm^2) 22.05 cm^2 01/04/22 1451   Post-Procedure Volume (cm^3) 11. 025 cm^3 01/04/22 1451   Distance Tunneling (cm) 0 cm 01/04/22 1405   Tunneling Position ___ O'Clock 0 01/04/22 1405   Undermining Starts ___ O'Clock 0 01/04/22 1405   Undermining Ends___ O'Clock 0 01/04/22 1405   Undermining Maxium Distance (cm) 0 01/04/22 1405   Wound Assessment [place]/red;Slough 01/04/22 1405   Drainage Amount Moderate 01/04/22 1405   Drainage Description Serous 01/04/22 1405   Odor None 01/04/22 1405   [place]-wound Assessment Blanchable erythema 01/04/22 1405   Margins Attached edges 01/04/22 1405   Wound Thickness Description not for Pressure Injury Full thickness 01/04/22 1405   Number of days: 13             Estimated Blood Loss:  Minimal    Hemostasis Achieved:  by pressure    Procedural Pain:  0  / 10     Post Procedural Pain:  0 / 10     Response to treatment:  Well tolerated by patient.          Plan:     Problem List Items Addressed This Visit     * (Principal) Diabetic ulcer of left lower leg associated with type 2 diabetes mellitus, with fat layer exposed ([address] (Chronic)    Relevant Orders    Initiate Outpatient Wound Care Protocol    Edema of both lower extremities due to peripheral venous insufficiency - Primary (Chronic)    Relevant Orders    Initiate Outpatient Wound Care Protocol    Morbid obesity [facility])    Relevant Orders    Initiate Outpatient Wound Care Protocol    Stage 4 chronic kidney disease [facility])    Relevant Orders    Initiate Outpatient Wound Care Protocol          BIJU foote will  Start coflex and RTO 1 week    Treatment Note please see attached Discharge Instructions    In my professional opinion this patient would benefit from HBO Therapy: No    Written patient dismissal instructions given to patient and signed by patient or POA. Discharge [address] and Hyperbaric Oxygen Therapy   Physician Orders and Discharge Instructions  [facility]  Telephone: [phone]    NAME:  Cade Fritz OF BIRTH:  1950  MEDICAL RECORD NUMBER:  [id number]  DATE:  1/4/2022    Discharge condition: Stable    Discharge to: Home    Left via:Private automobile    Accompanied by: Daughter    [facility]      Dressing Orders: Left Lower Ext Ulcer  Soap and water   Moisten Hydroferra Blue to open areas,   Absorbant layer, Copa to pad as needed, Calamine Coflex Unnaboot, Keep clean and Dry  Elevate feet to level or above heart 3-4 times daily and as needed to for 30 minutes to reduce swelling  Remove wraps and call office if- Wraps get wet, Cause increased pain, Slide down or you are unable to make your next scheduled appointment   Multi Vitamin, High Protein diet as tolerated    [facility] follow up visit ____________1 week_________________  (Please note your next appointment above and if you are unable to keep, kindly give a 24 hour notice. Thank you.)    If you experience any of the following, please call the [facility] during business hours:    * Increase in Pain  * Temperature over 101  * Increase in drainage from your wound  * Drainage with a foul odor  * Bleeding  * Increase in swelling  * Need for compression bandage changes due to slippage, breakthrough drainage.     If you need medical attention outside of the business hours of the [facility] please contact your PCP or go to the nearest emergency room.         Electronically signed by Meliton Dodson MD on 1/4/2022 at 2:54 PM

## 2022-01-04 NOTE — PLAN OF CARE
[facility] Application   Below Knee    NAME:  Rissa Chris  YOB: 1950  MEDICAL RECORD NUMBER:  [id number]  DATE:  1/4/2022    Sharri Serrano boot: Applied moisturizing agent to dry skin as needed. Appied primary and secondary dressing as ordered. Applied Unna roll from toes to knee overlapping each time. Applied ace wrap or coban from toes to below the knee. Instructed patient/caregiver to keep dressing dry and intact. DO NOT REMOVE DRESSING. Instructed pt/family/caregiver to report excessive draining, loose bandage, wet dressing, severe pain or tingling in toes. Applied [facility] dressing below the knee to left lower leg. Unna Boot(s) were applied per  Guidelines.      Electronically signed by Maximiliano Riley RN on 1/4/2022 at 3:42 PM

## 2022-01-10 ENCOUNTER — HOSPITAL ENCOUNTER (OUTPATIENT)
Dept: WOUND CARE | Age: 72
Discharge: HOME | End: 2022-01-10
Payer: MEDICARE

## 2022-01-10 VITALS
DIASTOLIC BLOOD PRESSURE: 81 MMHG | RESPIRATION RATE: 24 BRPM | HEART RATE: 63 BPM | SYSTOLIC BLOOD PRESSURE: 142 MMHG | TEMPERATURE: 96.3 F

## 2022-01-10 DIAGNOSIS — N18.4 STAGE 4 CHRONIC KIDNEY DISEASE (HCC): ICD-10-CM

## 2022-01-10 DIAGNOSIS — E66.01 MORBID OBESITY (HCC): ICD-10-CM

## 2022-01-10 DIAGNOSIS — I87.2 EDEMA OF BOTH LOWER EXTREMITIES DUE TO PERIPHERAL VENOUS INSUFFICIENCY: Primary | ICD-10-CM

## 2022-01-10 DIAGNOSIS — E11.622 DIABETIC ULCER OF LEFT LOWER LEG ASSOCIATED WITH TYPE 2 DIABETES MELLITUS, WITH FAT LAYER EXPOSED (HCC): ICD-10-CM

## 2022-01-10 DIAGNOSIS — L97.922 DIABETIC ULCER OF LEFT LOWER LEG ASSOCIATED WITH TYPE 2 DIABETES MELLITUS, WITH FAT LAYER EXPOSED (HCC): ICD-10-CM

## 2022-01-10 PROCEDURE — 97597 DBRDMT OPN WND 1ST 20 CM/<: CPT | Performed by: SURGERY

## 2022-01-10 PROCEDURE — 6370000000 HC RX 637 (ALT 250 FOR IP): Performed by: SURGERY

## 2022-01-10 PROCEDURE — 97597 DBRDMT OPN WND 1ST 20 CM/<: CPT

## 2022-01-10 RX ORDER — LIDOCAINE HYDROCHLORIDE 20 MG/ML
JELLY TOPICAL ONCE
Status: CANCELLED | OUTPATIENT
Start: 2022-01-10 | End: 2022-01-10

## 2022-01-10 RX ORDER — LIDOCAINE HYDROCHLORIDE 20 MG/ML
JELLY TOPICAL ONCE
Status: COMPLETED | OUTPATIENT
Start: 2022-01-10 | End: 2022-01-10

## 2022-01-10 RX ADMIN — LIDOCAINE HYDROCHLORIDE: 20 JELLY TOPICAL at 10:07

## 2022-01-10 ASSESSMENT — PAIN DESCRIPTION - DESCRIPTORS: DESCRIPTORS: BURNING

## 2022-01-10 ASSESSMENT — PAIN DESCRIPTION - FREQUENCY: FREQUENCY: CONTINUOUS

## 2022-01-10 ASSESSMENT — PAIN DESCRIPTION - LOCATION: LOCATION: LEG

## 2022-01-10 ASSESSMENT — PAIN DESCRIPTION - ORIENTATION: ORIENTATION: LEFT

## 2022-01-10 ASSESSMENT — PAIN DESCRIPTION - PAIN TYPE: TYPE: ACUTE PAIN

## 2022-01-10 ASSESSMENT — PAIN SCALES - GENERAL: PAINLEVEL_OUTOF10: 7

## 2022-01-10 NOTE — PLAN OF CARE
[facility] Application   Below Knee    NAME:  Giancarlo Huang  YOB: 1950  MEDICAL RECORD NUMBER:  [id number]  DATE:  1/10/2022    Eloisa Homes boot: Applied moisturizing agent to dry skin as needed. Appied primary and secondary dressing as ordered. Applied Unna roll from toes to knee overlapping each time. Applied ace wrap or coban from toes to below the knee. Instructed patient/caregiver to keep dressing dry and intact. DO NOT REMOVE DRESSING. Instructed pt/family/caregiver to report excessive draining, loose bandage, wet dressing, severe pain or tingling in toes. Applied Surjit Mustard dressing below the knee to left lower leg. Unna Boot(s) were applied per  Guidelines.      Electronically signed by Yue Jenkins RN on 1/10/2022 at 11:35 AM

## 2022-01-10 NOTE — PROGRESS NOTES
 GALLBLADDER SURGERY  12/11/2014    dr Thomson Human ARTHROSCOPY      NV REVISE MEDIAN N/CARPAL TUNNEL SURG Right 8/29/2018    OPEN CARPAL TUNNEL RELEASE performed by Mitul Anderson MD at [address] [place] [address] TYMPANOSTOMY TUBE PLACEMENT  2013    dr Harvinder Kaiser in [facility] UPPER GASTROINTESTINAL ENDOSCOPY         FAMILY HISTORY    Family History   Problem Relation Age of Onset    High Blood Pressure Father    [place]yatt Diabetes Father     Parkinsonism Father     High Blood Pressure Mother     Diabetes Sister     Other Paternal Grandmother         hiatal hernia    Heart Disease Paternal Grandfather     Colon Cancer Neg Hx     Colon Polyps Neg Hx     Esophageal Cancer Neg Hx     Liver Cancer Neg Hx     Liver Disease Neg Hx     Rectal Cancer Neg Hx     Stomach Cancer Neg Hx        SOCIAL HISTORY    Social History     Tobacco Use    Smoking status: Never Smoker    Smokeless tobacco: Never Used   Vaping Use    Vaping Use: Never used   Substance Use Topics    Alcohol use: No    Drug use: No       ALLERGIES    Allergies   Allergen Reactions    Aquacel Extra Hydrofiber [Wound Dressings]      Burning and stinging    Codeine      itching    Hydrocodone-Acetaminophen Nausea Only    Silvadene [Silver Sulfadiazine]      Stinging and burning       MEDICATIONS    Current Outpatient Medications on File Prior to Encounter   Medication Sig Dispense Refill    minocycline (MINOCIN;DYNACIN) 100 MG capsule Take 1 capsule by mouth 2 times daily 60 capsule 0    carbidopa-levodopa (SINEMET)  MG per tablet TAKE TWO TABLETS BY MOUTH 3 TIMES DAILY 180 tablet 5    insulin detemir (LEVEMIR FLEXTOUCH) 100 UNIT/ML injection pen INJECT 80 UNITS SUBCU NIGHTLY FOR TYPE 2 DIABETES 5 pen 5    losartan (COZAAR) 100 MG tablet TAKE ONE TABLET BY MOUTH EVERY DAY 90 tablet 3    atorvastatin (LIPITOR) 10 MG tablet TAKE ONE TABLET BY MOUTH EACH EVENING 90 tablet 3    LINZESS 290 MCG CAPS capsule TAKE ONE CAPSULE IN THE MORNING BEFORE BREAKFAST 30 capsule 5    insulin aspart (NOVOLOG) 100 UNIT/ML injection vial 25 U TID with meals 1 each 3    rOPINIRole (REQUIP) 4 MG tablet TAKE TWO TABLETS BY MOUTH EVERY MORNING , ONE AT NOON AND TWO AT BEDTIME 150 tablet 5    gabapentin (NEURONTIN) 600 MG tablet TAKE ONE TABLET BY MOUTH 3 TIMES DAILY AS NEEDED 90 tablet 5    omeprazole (PRILOSEC) 40 MG delayed release capsule TAKE ONE CAPSULE BY MOUTH EVERY DAY 90 capsule 3    Cholecalciferol (VITAMIN D3) 50 MCG (2000 UT) CAPS Take 1 capsule by mouth      allopurinol (ZYLOPRIM) 100 MG tablet Take 1 tablet by mouth daily      torsemide (DEMADEX) 20 MG tablet Take 20 mg by mouth daily 3 tablets for three days then two tablets daily       Melatonin 10 MG CAPS Take 10 mg by mouth every evening Takes 20 mg a night      denosumab (PROLIA) 60 MG/ML SOSY SC injection Inject 60 mg into the skin every 6 months      spironolactone (ALDACTONE) 25 MG tablet TAKE ONE TABLET DAILY (Patient taking differently: One tablet in the am and one tablet in the evening) 30 tablet 5    OXYGEN 2L NC 12-24 hours (Patient taking differently: nightly as needed 2L NC 12-24 hours) 1 Device 0    aspirin 325 MG tablet Take 325 mg by mouth daily.  SURE COMFORT PEN NEEDLES 31G X 8 MM MISC USE WITH INSULIN  THREE TIMES PER DAY. 100 each 5    ketorolac (ACULAR) 0.5 % ophthalmic solution       montelukast (SINGULAIR) 10 MG tablet TAKE ONE TABLET BY MOUTH EVERY NIGHT AT BEDTIME 90 tablet 3    fluconazole (DIFLUCAN) 150 MG tablet Take one on the 15th of every month 1 tablet 11    blood glucose monitor strips Test 3times a day & as needed for symptoms of irregular blood glucose. 100 strip 3    tiZANidine (ZANAFLEX) 2 MG tablet Take 1 tablet by mouth nightly as needed (muscle spasms) 30 tablet 3    COMFORT EZ PEN NEEDLES 32G X 4 MM MISC USE WITH INSULIN  THREE TIMES PER DAY. 100 each 3    nystatin (MYCOSTATIN) 426857 UNIT/GM ointment Apply topically 2 times daily. for yeast 60 Tube 5    nystatin (NYSTATIN) 547206 UNIT/GM powder Apply topically 3 times daily Apply topically 4 times daily. 60 g 3    Diabetic Shoe MISC by Does not apply route 1 each 0    sotalol (BETAPACE) 120 MG tablet TAKE ONE TABLET BY MOUTH TWICE A DAY 60 tablet 5    albuterol (ACCUNEB) 1.25 MG/3ML nebulizer solution Inhale 3 mLs into the lungs every 6 hours as needed for Wheezing 360 mL 3    EASY COMFORT INSULIN SYRINGE 31G X 5/16\" 1 ML MISC INJECT AS DIRECTED DAILY 100 each 3    blood glucose monitor kit and supplies Use as directed for diabetes TID checks. 1 kit 0    TRUEPLUS INSULIN SYRINGE 30G X 5/16\" 1 ML MISC INJECT AS DIRECTED DAILY 100 each 3    glucose blood VI test strips (ONE TOUCH ULTRA TEST) strip Inject 1 each into the skin daily As needed. 100 each 3    Lancets MISC 1 lancet to check glucose daily 100 each 3    albuterol (PROVENTIL HFA;VENTOLIN HFA) 108 (90 BASE) MCG/ACT inhaler Inhale 2 puffs into the lungs every 6 hours as needed for Wheezing 1 Inhaler 1     No current facility-administered medications on file prior to encounter. REVIEW OF SYSTEMS    A comprehensive review of systems was negative.     Objective:      BP (!) 142/81   Pulse 63   Temp 96.3 °F (35.7 °C) (Temporal)   Resp 24     Wt Readings from Last 3 Encounters:   01/04/22 292 lb (132.5 kg)   12/28/21 292 lb (132.5 kg)   12/21/21 292 lb (132.5 kg)       PHYSICAL EXAM    General Appearance: alert and oriented to person, place and time, well developed and well- nourished, in no acute distress  Skin: warm and dry, no rash or erythema  Head: normocephalic and atraumatic  Eyes: pupils equal, round, and reactive to light, extraocular eye movements intact, conjunctivae normal  ENT: tympanic membrane, external ear and ear canal normal bilaterally, nose without deformity, nasal mucosa and turbinates normal without polyps, lips teeth and gums normal  Neck: supple and non-tender without mass, no thyromegaly or thyroid nodules, no #: 1, 2, 3 and 4    Percent of Wound(s)/Ulcer(s) Debrided: 100%    Total Surface Area Debrided:  17 sq cm       Diabetic/Pressure/Non Pressure Ulcers only:  Ulcer: Diabetic ulcer, fat layer exposed             Post Debridement Measurements:    Wound/Ulcer Descriptions are Pre Debridement --EXCEPT MEASUREMENTS    Wound 12/21/21 Leg Left; Lower WOUND 1 LEFT MEDIAL LEG VENOUS (Active)   Wound Image   01/10/22 1008   Wound Etiology Venous 01/10/22 1008   Dressing Status Old drainage noted 01/10/22 1008   Wound Cleansed Soap and water 01/10/22 1008   Dressing/Treatment Hydrofera blue;Moisten with saline 01/04/22 1510   Wound Length (cm) 2.5 cm 01/10/22 1008   Wound Width (cm) 1.7 cm 01/10/22 1008   Wound Depth (cm) 0.2 cm 01/10/22 1008   Wound Surface Area (cm^2) 4.25 cm^2 01/10/22 1008   Change in Wound Size % (l*w) -13.33 01/10/22 1008   Wound Volume (cm^3) 0.85 cm^3 01/10/22 1008   Wound Healing % -13 01/10/22 1008   Post-Procedure Length (cm) 2.5 cm 01/10/22 1024   Post-Procedure Width (cm) 1.7 cm 01/10/22 1024   Post-Procedure Depth (cm) 0.2 cm 01/10/22 1024   Post-Procedure Surface Area (cm^2) 4.25 cm^2 01/10/22 1024   Post-Procedure Volume (cm^3) 0.85 cm^3 01/10/22 1024   Distance Tunneling (cm) 0 cm 01/10/22 1008   Tunneling Position ___ O'Clock 0 01/10/22 1008   Undermining Starts ___ O'Clock 0 01/10/22 1008   Undermining Ends___ O'Clock 0 01/10/22 1008   Undermining Maxium Distance (cm) 0 01/10/22 1008   Wound Assessment Slough 01/10/22 1008   Drainage Amount Large 01/10/22 1008   Drainage Description Serous 01/10/22 1008   Odor None 01/10/22 1008   [place]-wound Assessment Blanchable erythema;Edematous 01/10/22 1008   Margins Attached edges 01/10/22 1008   Wound Thickness Description not for Pressure Injury Full thickness 01/10/22 1008   Number of days: 19       Wound 12/21/21 Leg Left; Lower WOUND 2 LEFT ANTERIOR LEG VENOUS (Active)   Wound Image    01/10/22 1008   Wound Etiology Venous 01/10/22 1008   Dressing Status Old drainage noted 01/10/22 1008   Wound Cleansed Soap and water 01/10/22 1008   Dressing/Treatment Ace wrap;Gauze dressing/dressing sponge;Roll gauze;Tape/Soft cloth adhesive tape;Xeroform 12/28/21 1120   Wound Length (cm) 2 cm 01/10/22 1008   Wound Width (cm) 0.8 cm 01/10/22 1008   Wound Depth (cm) 0.1 cm 01/10/22 1008   Wound Surface Area (cm^2) 1.6 cm^2 01/10/22 1008   Change in Wound Size % (l*w) -10.34 01/10/22 1008   Wound Volume (cm^3) 0.16 cm^3 01/10/22 1008   Wound Healing % -10 01/10/22 1008   Post-Procedure Length (cm) 2 cm 01/10/22 1024   Post-Procedure Width (cm) 0.8 cm 01/10/22 1024   Post-Procedure Depth (cm) 0.1 cm 01/10/22 1024   Post-Procedure Surface Area (cm^2) 1.6 cm^2 01/10/22 1024   Post-Procedure Volume (cm^3) 0.16 cm^3 01/10/22 1024   Distance Tunneling (cm) 0 cm 01/10/22 1008   Tunneling Position ___ O'Clock 0 01/10/22 1008   Undermining Starts ___ O'Clock 0 01/10/22 1008   Undermining Ends___ O'Clock 0 01/10/22 1008   Undermining Maxium Distance (cm) 0 01/10/22 1008   Wound Assessment Pink/red;Slough 01/10/22 1008   Drainage Amount Large 01/10/22 1008   Drainage Description Serous 01/10/22 1008   Odor None 01/10/22 1008   [place]-wound Assessment Blanchable erythema;Edematous 01/10/22 1008   Margins Attached edges 01/10/22 1008   Wound Thickness Description not for Pressure Injury Full thickness 01/10/22 1008   Number of days: 19       Wound 12/21/21 Leg Left; Lower WOUND 3 LEFT SUPERIOR ANTERIOR LEG VENOUS (Active)   Wound Image   01/10/22 1008   Wound Etiology Venous 01/10/22 1008   Dressing Status Old drainage noted 01/10/22 1008   Wound Cleansed Soap and water 01/10/22 1008   Dressing/Treatment Ace wrap;Gauze dressing/dressing sponge;Roll gauze;Tape/Soft cloth adhesive tape;Xeroform 12/28/21 1120   Wound Length (cm) 1.5 cm 01/10/22 1008   Wound Width (cm) 1.5 cm 01/10/22 1008   Wound Depth (cm) 0.1 cm 01/10/22 1008   Wound Surface Area (cm^2) 2.25 cm^2 01/10/22 1008   Change in Wound Size % (l*w) -125 01/10/22 1008   Wound Volume (cm^3) 0.225 cm^3 01/10/22 1008   Wound Healing % -125 01/10/22 1008   Post-Procedure Length (cm) 1.5 cm 01/10/22 1024   Post-Procedure Width (cm) 1.5 cm 01/10/22 1024   Post-Procedure Depth (cm) 0.1 cm 01/10/22 1024   Post-Procedure Surface Area (cm^2) 2.25 cm^2 01/10/22 1024   Post-Procedure Volume (cm^3) 0.225 cm^3 01/10/22 1024   Distance Tunneling (cm) 0 cm 01/10/22 1008   Tunneling Position ___ O'Clock 0 01/10/22 1008   Undermining Starts ___ O'Clock 0 01/10/22 1008   Undermining Ends___ O'Clock 0 01/10/22 1008   Undermining Maxium Distance (cm) 0 01/10/22 1008   Wound Assessment Pink/red;Slough 01/10/22 1008   Drainage Amount Large 01/10/22 1008   Drainage Description Serous 01/10/22 1008   Odor None 01/10/22 1008   [place]-wound Assessment Blanchable erythema;Edematous 01/10/22 1008   Margins Attached edges 01/10/22 1008   Wound Thickness Description not for Pressure Injury Full thickness 01/10/22 1008   Number of days: 19       Wound 12/21/21 Leg Left;Lateral WOUND 4 LEFT LATERAL LEG VENOUS (Active)   Wound Image    01/10/22 1008   Wound Etiology Venous 01/10/22 1008   Dressing Status Old drainage noted 01/10/22 1008   Wound Cleansed Soap and water 01/10/22 1008   Dressing/Treatment Ace wrap;Gauze dressing/dressing sponge;Roll gauze;Tape/Soft cloth adhesive tape;Xeroform 12/28/21 1120   Wound Length (cm) 1.5 cm 01/10/22 1008   Wound Width (cm) 4.8 cm 01/10/22 1008   Wound Depth (cm) 0.2 cm 01/10/22 1008   Wound Surface Area (cm^2) 7.2 cm^2 01/10/22 1008   Change in Wound Size % (l*w) 52 01/10/22 1008   Wound Volume (cm^3) 1.44 cm^3 01/10/22 1008   Wound Healing % 52 01/10/22 1008   Post-Procedure Length (cm) 1.5 cm 01/10/22 1024   Post-Procedure Width (cm) 4.8 cm 01/10/22 1024   Post-Procedure Depth (cm) 0.2 cm 01/10/22 1024   Post-Procedure Surface Area (cm^2) 7.2 cm^2 01/10/22 1024   Post-Procedure Volume (cm^3) 1.44 cm^3 01/10/22 1024   Distance Tunneling (cm) 0 cm 01/10/22 1008   Tunneling Position ___ O'Clock 0 01/10/22 1008   Undermining Starts ___ O'Clock 0 01/10/22 1008   Undermining Ends___ O'Clock 0 01/10/22 1008   Undermining Maxium Distance (cm) 0 01/10/22 1008   Wound Assessment Eschar moist;Slough;Pink/red 01/10/22 1008   Drainage Amount Large 01/10/22 1008   Drainage Description Serous 01/10/22 1008   Odor None 01/10/22 1008   [place]-wound Assessment Blanchable erythema;Edematous 01/10/22 1008   Margins Attached edges 01/10/22 1008   Wound Thickness Description not for Pressure Injury Full thickness 01/10/22 1008   Number of days: 19             Estimated Blood Loss:  Minimal    Hemostasis Achieved:  by pressure    Procedural Pain:  0  / 10     Post Procedural Pain:  0 / 10     Response to treatment:  Well tolerated by patient. Plan:     Problem List Items Addressed This Visit     * (Principal) Diabetic ulcer of left lower leg associated with type 2 diabetes mellitus, with fat layer exposed ([facility] [address] (Chronic)    Relevant Orders    Initiate Outpatient Wound Care Protocol    Edema of both lower extremities due to peripheral venous insufficiency - Primary (Chronic)    Relevant Orders    Initiate Outpatient Wound Care Protocol    Morbid obesity [facility])    Relevant Orders    Initiate Outpatient Wound Care Protocol    Stage 4 chronic kidney disease ([facility] [address]    Relevant Orders    Initiate Outpatient Wound Care Protocol          Start [facility] and cont compression. RTO 1 week    Treatment Note please see attached Discharge Instructions    In my professional opinion this patient would benefit from HBO Therapy: No    Written patient dismissal instructions given to patient and signed by patient or POA.          Discharge [address] and Hyperbaric Oxygen Therapy   Physician Orders and Discharge Instructions  [address]  Telephone: [phone]    NAME:  Jessee Goodrich Rubi Muniz OF BIRTH:  1950  MEDICAL RECORD NUMBER:  [id number]  DATE:  1/10/2022    Discharge condition: Stable    Discharge to: Home    Left via:Private automobile    Accompanied by: Daughter    Abel Александр Mendoza to change coflex on Monday, Wednesday and Friday unless patient has an appointment that day      Dressing Orders: Left Lower Ext Ulcer  Soap and water   Moisten Hydroferra Blue to open areas,   Absorbant layer, Copa to pad as needed, Calamine Coflex Unn[place]oot, Keep clean and Dry  Elevate feet to level or above heart 3-4 times daily and as needed to for 30 minutes to reduce swelling  Remove wraps and call office if- Wraps get wet, Cause increased pain, Slide down or you are unable to make your next scheduled appointment   Multi Vitamin, High Protein diet as tolerated  Continue Antibiotic, Use hospital bed with leg elevated to reduce swelling     [facility] follow up visit _____________1 week________________  (Please note your next appointment above and if you are unable to keep, kindly give a 24 hour notice. Thank you.)    If you experience any of the following, please call the [facility] during business hours:    * Increase in Pain  * Temperature over 101  * Increase in drainage from your wound  * Drainage with a foul odor  * Bleeding  * Increase in swelling  * Need for compression bandage changes due to slippage, breakthrough drainage. If you need medical attention outside of the business hours of the [facility] please contact your PCP or go to the nearest emergency room.         Electronically signed by Mariel Lazcano MD on 1/10/2022 at 10:45 AM

## 2022-01-12 LAB — HBA1C MFR BLD: 9.4 % (ref 4–6)

## 2022-01-13 ENCOUNTER — OFFICE VISIT (OUTPATIENT)
Dept: PRIMARY CARE CLINIC | Age: 72
End: 2022-01-13
Payer: MEDICARE

## 2022-01-13 VITALS
HEIGHT: 62 IN | DIASTOLIC BLOOD PRESSURE: 68 MMHG | SYSTOLIC BLOOD PRESSURE: 110 MMHG | WEIGHT: 292 LBS | BODY MASS INDEX: 53.73 KG/M2 | HEART RATE: 62 BPM | TEMPERATURE: 97.4 F | OXYGEN SATURATION: 96 %

## 2022-01-13 DIAGNOSIS — F41.9 ANXIETY: ICD-10-CM

## 2022-01-13 DIAGNOSIS — Z00.00 ROUTINE GENERAL MEDICAL EXAMINATION AT A HEALTH CARE FACILITY: Primary | ICD-10-CM

## 2022-01-13 DIAGNOSIS — N18.4 STAGE 4 CHRONIC KIDNEY DISEASE (HCC): ICD-10-CM

## 2022-01-13 DIAGNOSIS — E11.8 TYPE 2 DIABETES MELLITUS WITH COMPLICATION (HCC): ICD-10-CM

## 2022-01-13 DIAGNOSIS — G20 PARKINSON DISEASE (HCC): ICD-10-CM

## 2022-01-13 DIAGNOSIS — I83.022 VARICOSE VEINS OF LEFT LOWER EXTREMITY WITH ULCER OF CALF (CODE) (HCC): ICD-10-CM

## 2022-01-13 LAB
ALBUMIN SERPL-MCNC: 3 G/DL (ref 3.5–5.2)
ALP BLD-CCNC: 91 U/L (ref 35–104)
ALT SERPL-CCNC: <5 U/L (ref 5–33)
ANION GAP SERPL CALCULATED.3IONS-SCNC: 11 MMOL/L (ref 7–19)
AST SERPL-CCNC: 11 U/L (ref 5–32)
BILIRUB SERPL-MCNC: <0.2 MG/DL (ref 0.2–1.2)
BUN BLDV-MCNC: 46 MG/DL (ref 8–23)
CALCIUM SERPL-MCNC: 9.1 MG/DL (ref 8.8–10.2)
CHLORIDE BLD-SCNC: 108 MMOL/L (ref 98–111)
CO2: 20 MMOL/L (ref 22–29)
CREAT SERPL-MCNC: 1.4 MG/DL (ref 0.5–0.9)
GFR AFRICAN AMERICAN: 45
GFR NON-AFRICAN AMERICAN: 37
GLUCOSE BLD-MCNC: 83 MG/DL (ref 74–109)
POTASSIUM SERPL-SCNC: 5.4 MMOL/L (ref 3.5–5)
SODIUM BLD-SCNC: 139 MMOL/L (ref 136–145)
TOTAL PROTEIN: 6 G/DL (ref 6.6–8.7)

## 2022-01-13 PROCEDURE — G8399 PT W/DXA RESULTS DOCUMENT: HCPCS | Performed by: NURSE PRACTITIONER

## 2022-01-13 PROCEDURE — 1123F ACP DISCUSS/DSCN MKR DOCD: CPT | Performed by: NURSE PRACTITIONER

## 2022-01-13 PROCEDURE — 2022F DILAT RTA XM EVC RTNOPTHY: CPT | Performed by: NURSE PRACTITIONER

## 2022-01-13 PROCEDURE — 1036F TOBACCO NON-USER: CPT | Performed by: NURSE PRACTITIONER

## 2022-01-13 PROCEDURE — 3017F COLORECTAL CA SCREEN DOC REV: CPT | Performed by: NURSE PRACTITIONER

## 2022-01-13 PROCEDURE — 3046F HEMOGLOBIN A1C LEVEL >9.0%: CPT | Performed by: NURSE PRACTITIONER

## 2022-01-13 PROCEDURE — G0439 PPPS, SUBSEQ VISIT: HCPCS | Performed by: NURSE PRACTITIONER

## 2022-01-13 PROCEDURE — 99213 OFFICE O/P EST LOW 20 MIN: CPT | Performed by: NURSE PRACTITIONER

## 2022-01-13 PROCEDURE — G8417 CALC BMI ABV UP PARAM F/U: HCPCS | Performed by: NURSE PRACTITIONER

## 2022-01-13 PROCEDURE — G8482 FLU IMMUNIZE ORDER/ADMIN: HCPCS | Performed by: NURSE PRACTITIONER

## 2022-01-13 PROCEDURE — 1090F PRES/ABSN URINE INCON ASSESS: CPT | Performed by: NURSE PRACTITIONER

## 2022-01-13 PROCEDURE — 4040F PNEUMOC VAC/ADMIN/RCVD: CPT | Performed by: NURSE PRACTITIONER

## 2022-01-13 PROCEDURE — G8427 DOCREV CUR MEDS BY ELIG CLIN: HCPCS | Performed by: NURSE PRACTITIONER

## 2022-01-13 RX ORDER — HYDROCORTISONE 10 MG/G
CREAM TOPICAL
Qty: 1 EACH | Refills: 2 | Status: SHIPPED | OUTPATIENT
Start: 2022-01-13 | End: 2022-01-14 | Stop reason: SDUPTHER

## 2022-01-13 RX ORDER — HYDROCODONE BITARTRATE AND ACETAMINOPHEN 10; 325 MG/1; MG/1
1 TABLET ORAL 2 TIMES DAILY PRN
Qty: 60 TABLET | Refills: 0 | Status: SHIPPED | OUTPATIENT
Start: 2022-01-13 | End: 2022-01-14 | Stop reason: SDUPTHER

## 2022-01-13 RX ORDER — LORAZEPAM 1 MG/1
1 TABLET ORAL DAILY PRN
Qty: 30 TABLET | Refills: 0 | Status: SHIPPED | OUTPATIENT
Start: 2022-01-13 | End: 2022-01-14 | Stop reason: SDUPTHER

## 2022-01-13 RX ORDER — INSULIN ASPART 100 [IU]/ML
INJECTION, SOLUTION INTRAVENOUS; SUBCUTANEOUS
COMMUNITY
Start: 2022-01-03 | End: 2022-01-13

## 2022-01-13 ASSESSMENT — PATIENT HEALTH QUESTIONNAIRE - PHQ9
SUM OF ALL RESPONSES TO PHQ QUESTIONS 1-9: 0
1. LITTLE INTEREST OR PLEASURE IN DOING THINGS: 0
2. FEELING DOWN, DEPRESSED OR HOPELESS: 0
SUM OF ALL RESPONSES TO PHQ QUESTIONS 1-9: 0
SUM OF ALL RESPONSES TO PHQ9 QUESTIONS 1 & 2: 0
SUM OF ALL RESPONSES TO PHQ QUESTIONS 1-9: 0
SUM OF ALL RESPONSES TO PHQ QUESTIONS 1-9: 0
1. LITTLE INTEREST OR PLEASURE IN DOING THINGS: 0
2. FEELING DOWN, DEPRESSED OR HOPELESS: 0
SUM OF ALL RESPONSES TO PHQ QUESTIONS 1-9: 0
SUM OF ALL RESPONSES TO PHQ9 QUESTIONS 1 & 2: 0
SUM OF ALL RESPONSES TO PHQ QUESTIONS 1-9: 0

## 2022-01-13 ASSESSMENT — LIFESTYLE VARIABLES: HOW OFTEN DO YOU HAVE A DRINK CONTAINING ALCOHOL: 0

## 2022-01-13 NOTE — PATIENT INSTRUCTIONS
Stop the tramadol and start the norco twice a day if needing more will go up to 3 times a day due to wound, if needing more than that pain management  If wound resolves and want to go to Tramadol we can  Really watch sugar and will refer to [facility]   stool softener every day due to lack of activity and pain meds. Continue linzess. Hemorrhoid cream  Ativan every ngiht for sleep may take with pain meds. Personalized Preventive Plan for Jaky Fall - 1/13/2022  Medicare offers a range of preventive health benefits. Some of the tests and screenings are paid in full while other may be subject to a deductible, co-insurance, and/or copay. Some of these benefits include a comprehensive review of your medical history including lifestyle, illnesses that may run in your family, and various assessments and screenings as appropriate. After reviewing your medical record and screening and assessments performed today your provider may have ordered immunizations, labs, imaging, and/or referrals for you. A list of these orders (if applicable) as well as your Preventive Care list are included within your After Visit Summary for your review. Other Preventive Recommendations:    · A preventive eye exam performed by an eye specialist is recommended every 1-2 years to screen for glaucoma; cataracts, macular degeneration, and other eye disorders. · A preventive dental visit is recommended every 6 months. · Try to get at least 150 minutes of exercise per week or 10,000 steps per day on a pedometer . · Order or download the FREE \"Exercise & Physical Activity: Your Everyday Guide\" from [employer]. Call 1-799.984.3838 or search [employer] online. · You need 0980-5998 mg of calcium and 0222-9701 IU of vitamin D per day.  It is possible to meet your calcium requirement with diet alone, but a vitamin D supplement is usually necessary to meet this goal.  · When exposed to the sun, use a sunscreen that protects against both UVA and UVB radiation with an SPF of 30 or greater. Reapply every 2 to 3 hours or after sweating, drying off with a towel, or swimming. · Always wear a seat belt when traveling in a car. Always wear a helmet when riding a bicycle or motorcycle.

## 2022-01-13 NOTE — PROGRESS NOTES
Medicare Annual Wellness Visit  Name: Luz Leblanc Date: 2022   MRN: [id number] Sex: Female   Age: 70 y.o. Ethnicity: Non- / Non    : 1950 Race: White (non-)      Chris Grimm is here for Follow-up (Chronic conditions. Recent fall 2021 and was taken to [facility] without any broken bones or anything.  ) and Skin Problem (patient being treated @ [facility] wound care for left lower leg and home health comes out twice weekly for dressing changes.  )  she is diabetic and sugar has been high . levimir 40 u am and 30 in PM. Takes nov 25 U every meal.  Today she had low blood sugar of 32 when she was eating lunch. She had only done her morning Levemir and her morning fast acting insulin she had not done anything for lunch. They were able to give her some food to get the sugar up. She was sweaty and confused for a few minutes. They are sure that gave her the right amount of insulin. This does not happen very often. Tramadol is not helping her pain any more. She has a large ulcer on her lower leg that she is seeing wound care for and having to have dressing changes and an Unna boot. It burns and stings all the time. She did have a few Lortab left from a procedure and took them and that did help with pain. They were 10 mg. She would prefer to take the Lortab during this wound care especially with dressing changes. She is not sleeping well. She has insomnia every night. They have been giving her her Ativan and it has helped. They need a refill on this  Screenings for behavioral, psychosocial and functional/safety risks, and cognitive dysfunction are all negative except as indicated below. These results, as well as other patient data from the [address] form, are documented in Flowsheets linked to this Encounter.     Allergies   Allergen Reactions    Aquacel Extra Hydrofiber [Wound Dressings]      Burning and stinging    Codeine      itching    Hydrocodone-Acetaminophen Nausea Only    Silvadene [Silver Sulfadiazine]      Stinging and burning         Prior to Visit Medications    Medication Sig Taking? Authorizing Provider   HYDROcodone-acetaminophen (NORCO)  MG per tablet Take 1 tablet by mouth 2 times daily as needed for Pain for up to 30 days. Yes INGRID Argueta CNP   LORazepam (ATIVAN) 1 MG tablet Take 1 tablet by mouth daily as needed for Anxiety for up to 30 days. Mainly night Yes INGRID Argueta CNP   Hydrocortisone, Perianal, (PROCTO-FARNAZ) 1 % cream Apply topically 2 times daily. Yes INGRID Argueta CNP   minocycline (MINOCIN;DYNACIN) 100 MG capsule Take 1 capsule by mouth 2 times daily Yes Magalis Merino MD   carbidopa-levodopa (SINEMET)  MG per tablet TAKE TWO TABLETS BY MOUTH 3 TIMES DAILY Yes Alanna Grullon MD   insulin detemir (LEVEMIR FLEXTOUCH) 100 UNIT/ML injection pen INJECT 80 UNITS SUBCU NIGHTLY FOR TYPE 2 DIABETES Yes INGRID Argueta CNP   SURE COMFORT PEN NEEDLES 31G X 8 MM MISC USE WITH INSULIN  THREE TIMES PER DAY. Yes Anamaria Muhammad MD   ketorolac (ACULAR) 0.5 % ophthalmic solution  Yes Historical Provider, MD   losartan (COZAAR) 100 MG tablet TAKE ONE TABLET BY MOUTH EVERY DAY Yes INGRID Argueta CNP   montelukast (SINGULAIR) 10 MG tablet TAKE ONE TABLET BY MOUTH EVERY NIGHT AT BEDTIME Yes INGRID Argueta CNP   atorvastatin (LIPITOR) 10 MG tablet TAKE ONE TABLET BY MOUTH EACH EVENING Yes INGRID Cruz CNP   LINZESS 290 MCG CAPS capsule TAKE ONE CAPSULE IN THE MORNING BEFORE BREAKFAST Yes INGRID Argueta CNP   fluconazole (DIFLUCAN) 150 MG tablet Take one on the 15th of every month Yes INGRID Argueta CNP   insulin aspart (NOVOLOG) 100 UNIT/ML injection vial 25 U TID with meals Yes INGRID Argueta CNP   blood glucose monitor strips Test 3times a day & as needed for symptoms of irregular blood glucose.  Yes Marilu INGRID Gay CNP   rOPINIRole (REQUIP) 4 MG tablet TAKE TWO TABLETS BY MOUTH EVERY MORNING , ONE AT NOON AND TWO AT BEDTIME Yes Moses Cisse MD   tiZANidine (ZANAFLEX) 2 MG tablet Take 1 tablet by mouth nightly as needed (muscle spasms) Yes INGRID Graner CNP   COMFORT EZ PEN NEEDLES 32G X 4 MM MISC USE WITH INSULIN  THREE TIMES PER DAY. Yes Mary Posada MD   omeprazole (PRILOSEC) 40 MG delayed release capsule TAKE ONE CAPSULE BY MOUTH EVERY DAY Yes INGRID Garner CNP   nystatin (MYCOSTATIN) 831416 UNIT/GM ointment Apply topically 2 times daily. for yeast Yes INGRID Garner CNP   nystatin (NYSTATIN) 090624 UNIT/GM powder Apply topically 3 times daily Apply topically 4 times daily. Yes INGRID Garner CNP   Diabetic Shoe MISC by Does not apply route Yes INGRID Garner CNP   sotalol (BETAPACE) 120 MG tablet TAKE ONE TABLET BY MOUTH TWICE A DAY Yes INGRID Carlson   Cholecalciferol (VITAMIN D3) 50 MCG (2000 UT) CAPS Take 1 capsule by mouth Yes Historical Provider, MD   albuterol (ACCUNEB) 1.25 MG/3ML nebulizer solution Inhale 3 mLs into the lungs every 6 hours as needed for Wheezing Yes INGRID Garner CNP   allopurinol (ZYLOPRIM) 100 MG tablet Take 1 tablet by mouth daily Yes Historical Provider, MD   [address] [place] [address] X 5/16\" 1 ML MISC INJECT AS DIRECTED DAILY Yes INGRID Garner CNP   torsemide (DEMADEX) 20 MG tablet Take 20 mg by mouth daily 3 tablets for three days then two tablets daily  Yes Historical Provider, MD   blood glucose monitor kit and supplies Use as directed for diabetes TID checks.  Yes INGRID Garner CNP   TRUEPLUS INSULIN SYRINGE 30G X 5/16\" 1 ML MISC INJECT AS DIRECTED DAILY Yes INGRID Garner CNP   Melatonin 10 MG CAPS Take 10 mg by mouth every evening Takes 20 mg a night Yes Historical Provider, MD   denosumab (PROLIA) 60 MG/ML SOSY SC injection Inject 60 mg into the skin every 6 months Yes Historical Provider, MD   spironolactone (ALDACTONE) 25 MG tablet TAKE ONE TABLET DAILY  Patient taking differently: One tablet in the am and one tablet in the evening Yes INGRID Valencia   glucose blood VI test strips (ONE TOUCH ULTRA TEST) strip Inject 1 each into the skin daily As needed. Yes INGRID Benavides CNP   Lancets MISC 1 lancet to check glucose daily Yes INGRID Benavides CNP   albuterol (PROVENTIL HFA;VENTOLIN HFA) 108 (90 BASE) MCG/ACT inhaler Inhale 2 puffs into the lungs every 6 hours as needed for Wheezing Yes INGRID Benavides CNP   OXYGEN 2L NC 12-24 hours  Patient taking differently: nightly as needed 2L NC 12-24 hours Yes INGRID Benavides CNP   aspirin 325 MG tablet Take 325 mg by mouth daily. Yes Historical Provider, MD   gabapentin (NEURONTIN) 600 MG tablet TAKE ONE TABLET BY MOUTH 3 TIMES DAILY AS NEEDED  Edmond Steinberg DO         Past Medical History:   Diagnosis Date    Asthma 2015    Bilateral carpal tunnel syndrome 2018    sees dr. Vicenta Rodriguez.     Colon polyp     Diabetes mellitus (HCC)     GERD (gastroesophageal reflux disease)     HTN (hypertension)     Hyperlipidemia     Mild mitral regurgitation by prior echocardiogram 2016    Morbid obesity ([facility] [address] 10/18/2017    Normal cardiac stress test 4-209    no ischemia at attained level of excercise    Palliative care patient 2020    Parkinson disease [facility])     Paroxysmal atrial fibrillation [facility])     sees dr. Dev Allen Post-menopausal     Prolonged emergence from general anesthesia     Restrictive airway disease     Stage 3 chronic kidney disease ([facility] [address] 10/18/2017       Past Surgical History:   Procedure Laterality Date    APPENDECTOMY      CATARACT REMOVAL WITH IMPLANT       SECTION      x3    COLONOSCOPY      Dr Rashid Chen polyp-5 yr recall    COLONOSCOPY N/A 2019    Dr Marita Hernandez 2 internal hemorrhoids without stigmata, diverticulosis, suboptimal prep--5 yr recall    EYE SURGERY      GALLBLADDER SURGERY  12/11/2014    dr Edwards Sep ARTHROSCOPY      CA REVISE MEDIAN N/CARPAL TUNNEL SURG Right 8/29/2018    OPEN CARPAL TUNNEL RELEASE performed by Nathalie Butler MD at [facility] TYMPANOSTOMY TUBE PLACEMENT  2013    dr Alison Valadez in [facility] UPPER GASTROINTESTINAL ENDOSCOPY           Family History   Problem Relation Age of Onset    High Blood Pressure Father    [facility] Diabetes Father     Parkinsonism Father     High Blood Pressure Mother     Diabetes Sister     Other Paternal Grandmother         hiatal hernia    Heart Disease Paternal Grandfather     Colon Cancer Neg Hx     Colon Polyps Neg Hx     Esophageal Cancer Neg Hx     Liver Cancer Neg Hx     Liver Disease Neg Hx     Rectal Cancer Neg Hx     Stomach Cancer Neg Hx        CareTeam (Including outside providers/suppliers regularly involved in providing care):   Patient Care Team:  INGRID Allen CNP as PCP - General (Family Nurse Practitioner)  INGRID Allen CNP as PCP - [facility] Empaneled Provider  Lord Fidelina MD as Consulting Physician (Neurology)  [facility] (Physician Assistant Medical)  INGRID Loza NP as Nurse Practitioner (Nurse Practitioner Adult Health)  Lord Fidelina MD as Consulting Physician (Neurology)    Wt Readings from Last 3 Encounters:   01/13/22 292 lb (132.5 kg)   01/04/22 292 lb (132.5 kg)   12/28/21 292 lb (132.5 kg)     Vitals:    01/13/22 1113   BP: 110/68   Pulse: 62   Temp: 97.4 °F (36.3 °C)   SpO2: 96%   Weight: 292 lb (132.5 kg)   Height: 5' 2\" (1.575 m)     Body mass index is 53.41 kg/m². Based upon direct observation of the patient, evaluation of cognition reveals recent and remote memory intact.     General Appearance: alert and oriented to person, place and time, well developed and well- nourished, in no acute distress  Skin: warm and dry, no rash or erythema  Head: normocephalic and atraumatic  Eyes: pupils equal, round, and reactive to light, extraocular eye movements intact, conjunctivae normal  ENT: tympanic membrane, external ear and ear canal normal bilaterally, nose without deformity, nasal mucosa and turbinates normal without polyps  Neck: supple and non-tender without mass, no thyromegaly or thyroid nodules, no cervical lymphadenopathy  Pulmonary/Chest: clear to auscultation bilaterally- no wheezes, rales or rhonchi, normal air movement, no respiratory distress  Cardiovascular: normal rate, regular rhythm, normal S1 and S2, no murmurs, rubs, clicks, or gallops, distal pulses intact, no carotid bruits  Abdomen: soft, non-tender, non-distended, normal bowel sounds, no masses or organomegaly  Extremities: no cyanosis, clubbing or edema  Musculoskeletal: normal range of motion, no joint swelling, deformity or tenderness  Neurologic: reflexes normal and symmetric, no cranial nerve deficit, gait, coordination and speech normal  Left lower leg in uniboot. Patient's complete Health Risk Assessment and screening values have been reviewed and are found in Flowsheets. The following problems were reviewed today and where indicated follow up appointments were made and/or referrals ordered. Positive Risk Factor Screenings with Interventions:     Fall Risk:  Timed Up and Go Test > 12 seconds? (Complete if either Fall Risk answers are Yes): no (cant stand up due to wound on leg)  2 or more falls in past year?: no  Fall with injury in past year?: (!) yes (went to [facility])            General Health and ACP:  General  In general, how would you say your health is?: Fair  In the past 7 days, have you experienced any of the following?  New or Increased Pain, New or Increased Fatigue, Loneliness, Social Isolation, Stress or Anger?: (!) New or Increased Pain (leg wound)  Do you get the social and emotional support that you need?: Yes  Do you have a Living Will?: Yes  Advance Directives     Power of [address] Will ACP-Advance Directive ACP-Power of     Not on File Not on File Not on File Not on File      General Health Risk Interventions:  · Pain issues: patient declines any further evaluation/treatment for this issue    Health Habits/Nutrition:  Health Habits/Nutrition  Do you exercise for at least 20 minutes 2-3 times per week?: (!) No (leg wound)  Have you lost any weight without trying in the past 3 months?: No  Do you eat only one meal per day?: No  Have you seen the dentist within the past year?: Yes  Body mass index: (!) 53.4  Health Habits/Nutrition Interventions:  · Inadequate physical activity:  patient is not ready to increase his/her physical activity level at this time      ADL:  ADLs  In the past 7 days, did you need help from others to perform any of the following everyday activities? Eating, dressing, grooming, bathing, toileting, or walking/balance?: (!) Dressing,Grooming,Walking/Balance,Bathing (daughters help)  In the past 7 days, did you need help from others to take care of any of the following?  Laundry, housekeeping, banking/finances, shopping, telephone use, food preparation, transportation, or taking medications?: (!) Laundry,Banking/Finances,Housekeeping,Taking [address] Preparation,Transportation (daughters help)    Daughters do this  Personalized Preventive Plan   Current Health Maintenance Status  Immunization History   Administered Date(s) Administered    Influenza Virus Vaccine 11/14/2014, 10/29/2015, 10/28/2019    Influenza, High Dose (Fluzone 65 yrs and older) 12/14/2018, 10/28/2019, 11/01/2021    Influenza, Intradermal, Preservative free 11/11/2013    Influenza, Quadv, IM, (6 mo and older Fluzone, Flulaval, Fluarix and 3 yrs and older Afluria) 10/30/2017    Influenza, Quadv, IM, PF (6 mo and older Fluzone, Flulaval, Fluarix, and 3 yrs and older Afluria) 11/21/2016, 01/28/2021    Pneumococcal Conjugate 13-valent ([id number]) 04/12/2016    Pneumococcal Polysaccharide ([id number]) 12/14/2018        Health Maintenance   Topic Date Due    COVID-19 Vaccine (1) Never done    DTaP/Tdap/Td vaccine (1 - Tdap) Never done    Shingles Vaccine (1 of 2) Never done    Annual Wellness Visit (AWV)  10/25/2021    Diabetic foot exam  01/28/2022    Depression Screen  01/28/2022    A1C test (Diabetic or Prediabetic)  04/12/2022    Diabetic retinal exam  06/18/2022    Lipid screen  07/21/2022    Potassium monitoring  10/21/2022    Creatinine monitoring  10/21/2022    Breast cancer screen  11/04/2023    Colon cancer screen colonoscopy  06/18/2024    DEXA (modify frequency per FRAX score)  Completed    Flu vaccine  Completed    Pneumococcal 65+ yrs at Risk Vaccine  Completed    Hepatitis C screen  Addressed    Hepatitis A vaccine  Aged Out    Hib vaccine  Aged Out    Meningococcal (ACWY) vaccine  Aged Out     Recommendations for [employer] Due: see orders and patient instructions/AVS.  . Recommended screening schedule for the next 5-10 years is provided to the patient in written form: see Patient Sania Rocha was seen today for follow-up and skin problem. Diagnoses and all orders for this visit:    Routine general medical examination at a health care facility    Varicose veins of left lower extremity with ulcer of calf (CODE) ([facility])  -     HYDROcodone-acetaminophen (NORCO)  MG per tablet; Take 1 tablet by mouth 2 times daily as needed for Pain for up to 30 days. Stage 4 chronic kidney disease (HCC)  -     Comprehensive Metabolic Panel    Parkinson disease ([facility])    Anxiety  -     LORazepam (ATIVAN) 1 MG tablet; Take 1 tablet by mouth daily as needed for Anxiety for up to 30 days.  Mainly night    Type 2 diabetes mellitus with complication ([facility] [address]  -     [facility] Diabetic Management    Other orders  -     Hydrocortisone, Perianal, (PROCTO-FARNAZ) 1 % cream; Apply topically 2 times daily. Lab Results   Component Value Date    GLUCOSE 166 10/21/2021    GLUCOSE 152 07/21/2021    GLUCOSE 216 01/28/2021    LABA1C 9.4 01/12/2022    LABA1C 9.1 10/21/2021    LABA1C 8.6 07/21/2021    LABA1C 8.9 10/28/2015    LABA1C 8.5 07/17/2015    LABA1C 8.2 11/14/2014   Was here for her Medicare annual wellness but also had multiple other complaints including a pain contract we had to sign today. See the plan below for all of the medication changes we made today I did review her Teodoran Enter    Stop the tramadol and start the norco twice a day if needing more will go up to 3 times a day due to wound, if needing more than that pain management  If wound resolves and want to go to Tramadol we can  Really watch sugar and will refer to [facility] program   stool softener every day due to lack of activity and pain meds. Continue linzess. Hemorrhoid cream  Ativan every ngiht for sleep may take with pain meds.

## 2022-01-14 ENCOUNTER — PATIENT MESSAGE (OUTPATIENT)
Dept: PRIMARY CARE CLINIC | Age: 72
End: 2022-01-14

## 2022-01-14 DIAGNOSIS — F41.9 ANXIETY: ICD-10-CM

## 2022-01-14 DIAGNOSIS — I83.022 VARICOSE VEINS OF LEFT LOWER EXTREMITY WITH ULCER OF CALF (CODE) (HCC): ICD-10-CM

## 2022-01-14 RX ORDER — HYDROCORTISONE 10 MG/G
CREAM TOPICAL
Qty: 1 EACH | Refills: 2 | Status: SHIPPED | OUTPATIENT
Start: 2022-01-14

## 2022-01-14 RX ORDER — HYDROCODONE BITARTRATE AND ACETAMINOPHEN 10; 325 MG/1; MG/1
1 TABLET ORAL 2 TIMES DAILY PRN
Qty: 60 TABLET | Refills: 0 | Status: SHIPPED | OUTPATIENT
Start: 2022-01-14 | End: 2022-05-02 | Stop reason: SDUPTHER

## 2022-01-14 RX ORDER — LORAZEPAM 1 MG/1
1 TABLET ORAL DAILY PRN
Qty: 30 TABLET | Refills: 0 | Status: SHIPPED | OUTPATIENT
Start: 2022-01-14 | End: 2022-02-13

## 2022-01-14 RX ORDER — LORAZEPAM 1 MG/1
TABLET ORAL
Qty: 30 TABLET | Refills: 0 | Status: SHIPPED | OUTPATIENT
Start: 2022-01-14 | End: 2022-02-13

## 2022-01-14 NOTE — TELEPHONE ENCOUNTER
From: Rissa Chris  To: Mar Speak  Sent: 1/14/2022 11:23 AM CST  Subject: Prescription    [facility] they have not received them yet

## 2022-01-14 NOTE — TELEPHONE ENCOUNTER
Provider needs to review PDMP    PDMP Monitoring:    Last PDMP Lloyd Carnes as Reviewed [facility]):  Review User Review Instant Review Result   Sweta Larios 1/13/2022 11:53 AM Reviewed PDMP [1]     Urine Drug Screenings (1 yr)    No resulted procedures found.        Medication Contract and Consent for Opioid Use Documents Filed     Patient Documents     Type of Document Status Date Received Received By Description    Medication Contract Received 5/24/2019 10:27 AM ROSALBA Neil neuro    Medication Contract Received 12/17/2019 10:48 AM MEGHAN Escudero Medication Agreement Isaac Walker 11/25/2019

## 2022-01-17 ENCOUNTER — HOSPITAL ENCOUNTER (OUTPATIENT)
Dept: WOUND CARE | Age: 72
Discharge: HOME | End: 2022-01-17
Payer: MEDICARE

## 2022-01-17 VITALS
RESPIRATION RATE: 22 BRPM | HEART RATE: 59 BPM | DIASTOLIC BLOOD PRESSURE: 68 MMHG | WEIGHT: 292 LBS | BODY MASS INDEX: 53.73 KG/M2 | SYSTOLIC BLOOD PRESSURE: 124 MMHG | HEIGHT: 62 IN | TEMPERATURE: 97.4 F

## 2022-01-17 DIAGNOSIS — L97.922 DIABETIC ULCER OF LEFT LOWER LEG ASSOCIATED WITH TYPE 2 DIABETES MELLITUS, WITH FAT LAYER EXPOSED (HCC): ICD-10-CM

## 2022-01-17 DIAGNOSIS — N18.4 STAGE 4 CHRONIC KIDNEY DISEASE (HCC): ICD-10-CM

## 2022-01-17 DIAGNOSIS — E66.01 MORBID OBESITY (HCC): ICD-10-CM

## 2022-01-17 DIAGNOSIS — I87.2 EDEMA OF BOTH LOWER EXTREMITIES DUE TO PERIPHERAL VENOUS INSUFFICIENCY: Primary | ICD-10-CM

## 2022-01-17 DIAGNOSIS — E11.622 DIABETIC ULCER OF LEFT LOWER LEG ASSOCIATED WITH TYPE 2 DIABETES MELLITUS, WITH FAT LAYER EXPOSED (HCC): ICD-10-CM

## 2022-01-17 PROCEDURE — 97597 DBRDMT OPN WND 1ST 20 CM/<: CPT

## 2022-01-17 PROCEDURE — 97597 DBRDMT OPN WND 1ST 20 CM/<: CPT | Performed by: SURGERY

## 2022-01-17 PROCEDURE — 6370000000 HC RX 637 (ALT 250 FOR IP): Performed by: SURGERY

## 2022-01-17 PROCEDURE — 97598 DBRDMT OPN WND ADDL 20CM/<: CPT | Performed by: SURGERY

## 2022-01-17 PROCEDURE — 97598 DBRDMT OPN WND ADDL 20CM/<: CPT

## 2022-01-17 RX ORDER — LIDOCAINE HYDROCHLORIDE 20 MG/ML
JELLY TOPICAL ONCE
Status: CANCELLED | OUTPATIENT
Start: 2022-01-17 | End: 2022-01-17

## 2022-01-17 RX ORDER — LIDOCAINE HYDROCHLORIDE 20 MG/ML
JELLY TOPICAL ONCE
Status: COMPLETED | OUTPATIENT
Start: 2022-01-17 | End: 2022-01-17

## 2022-01-17 RX ADMIN — LIDOCAINE HYDROCHLORIDE: 20 JELLY TOPICAL at 10:34

## 2022-01-17 ASSESSMENT — PAIN SCALES - GENERAL: PAINLEVEL_OUTOF10: 0

## 2022-01-17 NOTE — PLAN OF CARE
Problem: Pain:  Goal: Pain level will decrease  Description: Pain level will decrease  1/17/2022 1049 by Anastasia Wilson RN  Outcome: Ongoing  1/17/2022 1049 by Anastasia Wilson RN  Outcome: Ongoing     Problem: Pain:  Goal: Control of chronic pain  Description: Control of chronic pain  1/17/2022 1049 by Anastasia Wilson RN  Outcome: Ongoing  1/17/2022 1049 by Anastasia Wilson RN  Outcome: Ongoing     Problem: Pain:  Goal: Control of acute pain  Description: Control of acute pain  1/17/2022 1049 by Anastasia Wilson RN  Outcome: Ongoing  1/17/2022 1049 by Anastasia Wilson RN  Outcome: Ongoing
Problem: Wound:  Goal: Will show signs of wound healing; wound closure and no evidence of infection  Description: Will show signs of wound healing; wound closure and no evidence of infection  Outcome: Ongoing     Problem: Venous:  Goal: Signs of wound healing will improve  Description: Signs of wound healing will improve  Outcome: Ongoing     Problem: Compression therapy:  Goal: Will be free from complications associated with compression therapy  Description: Will be free from complications associated with compression therapy  Outcome: Ongoing     Problem: Weight control:  Goal: Ability to maintain an optimal weight for height and age will be supported  Description: Ability to maintain an optimal weight for height and age will be supported  Outcome: Ongoing     Problem: Falls - Risk of:  Goal: Will remain free from falls  Description: Will remain free from falls  Outcome: Ongoing     Problem: Pain:  Goal: Pain level will decrease  Description: Pain level will decrease  Outcome: Ongoing  Goal: Control of acute pain  Description: Control of acute pain  Outcome: Ongoing  Goal: Control of chronic pain  Description: Control of chronic pain  Outcome: Ongoing
English

## 2022-01-17 NOTE — PROGRESS NOTES
[employer] Application   Below Knee    NAME:  Marcy Figueredo  YOB: 1950  MEDICAL RECORD NUMBER:  [id number]  DATE:  1/17/2022    Merl Heaven boot: Appied primary and secondary dressing as ordered. Applied Unna roll from toes to knee overlapping each time. Applied ace wrap or coban from toes to below the knee. Instructed patient/caregiver to keep dressing dry and intact. DO NOT REMOVE DRESSING. Instructed pt/family/caregiver to report excessive draining, loose bandage, wet dressing, severe pain or tingling in toes. Applied [employer] dressing below the knee to left lower leg. Unna Boot(s) were applied per  Guidelines.      Electronically signed by Quinten Castaneda RN on 1/17/2022 at 1:55 PM

## 2022-01-17 NOTE — PROGRESS NOTES
[address]   Progress Note and Procedure Note      Matilde Thomas  MEDICAL RECORD NUMBER:  [id number]  AGE: 70 y.o. GENDER: female  : 1950  EPISODE DATE:  2022    Subjective:     Chief Complaint   Patient presents with    Wound Check     took unna boot off  morning due to excessive drainage, burning in the leg. HISTORY of PRESENT ILLNESS HPI     Matilde Thomas is a 70 y.o. female who presents today for wound/ulcer evaluation. Wound Context: Ptwith LLE wounds here for eval/treat  Wound/Ulcer Pain Timing/Severity: none  Quality of pain: N/A  Severity:  0 / 10   Modifying Factors: None  Associated Signs/Symptoms: edema    Ulcer Identification:  Ulcer Type: venous and diabetic  Contributing Factors: edema, venous stasis and diabetes    Wound: venous/DM        PAST MEDICAL HISTORY        Diagnosis Date    Asthma 2015    Bilateral carpal tunnel syndrome 2018    sees dr. Cely Bueno.     Colon polyp     Diabetes mellitus (HCC)     GERD (gastroesophageal reflux disease)     HTN (hypertension)     Hyperlipidemia     Mild mitral regurgitation by prior echocardiogram 2016    Morbid obesity ([facility] [address] 10/18/2017    Normal cardiac stress test 4-209    no ischemia at attained level of excercise    Palliative care patient 2020    Parkinson disease [facility])     Paroxysmal atrial fibrillation [facility])     sees dr. Destiney Burleson Post-menopausal     Prolonged emergence from general anesthesia     Restrictive airway disease     Stage 3 chronic kidney disease ([facility] [address] 10/18/2017       PAST SURGICAL HISTORY    Past Surgical History:   Procedure Laterality Date    APPENDECTOMY      CATARACT REMOVAL WITH IMPLANT       SECTION      x3    COLONOSCOPY      Dr Kar Phillip polyp-5 yr recall    COLONOSCOPY N/A 2019    Dr Johnson Height 2 internal hemorrhoids without stigmata, diverticulosis, suboptimal prep--5 yr recall   [facility] EYE SURGERY      GALLBLADDER SURGERY  12/11/2014    dr Aydee Serna ARTHROSCOPY      PA REVISE MEDIAN N/CARPAL TUNNEL SURG Right 8/29/2018    OPEN CARPAL TUNNEL RELEASE performed by Jomar Jean Baptiste MD at [address] [facility] TYMPANOSTOMY TUBE PLACEMENT  2013    dr Rupert Fraga in [place] UPPER GASTROINTESTINAL ENDOSCOPY         FAMILY HISTORY    Family History   Problem Relation Age of Onset    High Blood Pressure Father     Diabetes Father     Parkinsonism Father     High Blood Pressure Mother     Diabetes Sister     Other Paternal Grandmother         hiatal hernia    Heart Disease Paternal Grandfather     Colon Cancer Neg Hx     Colon Polyps Neg Hx     Esophageal Cancer Neg Hx     Liver Cancer Neg Hx     Liver Disease Neg Hx     Rectal Cancer Neg Hx     Stomach Cancer Neg Hx        SOCIAL HISTORY    Social History     Tobacco Use    Smoking status: Never Smoker    Smokeless tobacco: Never Used   Vaping Use    Vaping Use: Never used   Substance Use Topics    Alcohol use: No    Drug use: No       ALLERGIES    Allergies   Allergen Reactions    Aquacel Extra Hydrofiber [Wound Dressings]      Burning and stinging    Codeine      itching    Hydrocodone-Acetaminophen Nausea Only    Silvadene [Silver Sulfadiazine]      Stinging and burning       MEDICATIONS    Current Outpatient Medications on File Prior to Encounter   Medication Sig Dispense Refill    Hydrocortisone, Perianal, (PROCTO-FARNAZ) 1 % cream Apply topically 2 times daily. 1 each 2    minocycline (MINOCIN;DYNACIN) 100 MG capsule Take 1 capsule by mouth 2 times daily 60 capsule 0    carbidopa-levodopa (SINEMET)  MG per tablet TAKE TWO TABLETS BY MOUTH 3 TIMES DAILY 180 tablet 5    insulin detemir (LEVEMIR FLEXTOUCH) 100 UNIT/ML injection pen INJECT 80 UNITS SUBCU NIGHTLY FOR TYPE 2 DIABETES 5 pen 5    SURE COMFORT PEN NEEDLES 31G X 8 MM MISC USE WITH INSULIN  THREE TIMES PER DAY.  100 each 5    ketorolac (ACULAR) 0.5 % ophthalmic solution  losartan (COZAAR) 100 MG tablet TAKE ONE TABLET BY MOUTH EVERY DAY 90 tablet 3    montelukast (SINGULAIR) 10 MG tablet TAKE ONE TABLET BY MOUTH EVERY NIGHT AT BEDTIME 90 tablet 3    atorvastatin (LIPITOR) 10 MG tablet TAKE ONE TABLET BY MOUTH EACH EVENING 90 tablet 3    LINZESS 290 MCG CAPS capsule TAKE ONE CAPSULE IN THE MORNING BEFORE BREAKFAST 30 capsule 5    fluconazole (DIFLUCAN) 150 MG tablet Take one on the 15th of every month 1 tablet 11    insulin aspart (NOVOLOG) 100 UNIT/ML injection vial 25 U TID with meals 1 each 3    blood glucose monitor strips Test 3times a day & as needed for symptoms of irregular blood glucose. 100 strip 3    rOPINIRole (REQUIP) 4 MG tablet TAKE TWO TABLETS BY MOUTH EVERY MORNING , ONE AT NOON AND TWO AT BEDTIME 150 tablet 5    COMFORT EZ PEN NEEDLES 32G X 4 MM MISC USE WITH INSULIN  THREE TIMES PER DAY. 100 each 3    omeprazole (PRILOSEC) 40 MG delayed release capsule TAKE ONE CAPSULE BY MOUTH EVERY DAY 90 capsule 3    Diabetic Shoe MISC by Does not apply route 1 each 0    sotalol (BETAPACE) 120 MG tablet TAKE ONE TABLET BY MOUTH TWICE A DAY 60 tablet 5    allopurinol (ZYLOPRIM) 100 MG tablet Take 1 tablet by mouth daily      EASY COMFORT INSULIN SYRINGE 31G X 5/16\" 1 ML MISC INJECT AS DIRECTED DAILY 100 each 3    torsemide (DEMADEX) 20 MG tablet Take 20 mg by mouth daily 3 tablets for three days then two tablets daily       blood glucose monitor kit and supplies Use as directed for diabetes TID checks. 1 kit 0    TRUEPLUS INSULIN SYRINGE 30G X 5/16\" 1 ML MISC INJECT AS DIRECTED DAILY 100 each 3    Melatonin 10 MG CAPS Take 10 mg by mouth every evening Takes 20 mg a night      spironolactone (ALDACTONE) 25 MG tablet TAKE ONE TABLET DAILY (Patient taking differently: One tablet in the am and one tablet in the evening) 30 tablet 5    glucose blood VI test strips (ONE TOUCH ULTRA TEST) strip Inject 1 each into the skin daily As needed.  100 each 3    Lancets MISC 1 lancet to check glucose daily 100 each 3    albuterol (PROVENTIL HFA;VENTOLIN HFA) 108 (90 BASE) MCG/ACT inhaler Inhale 2 puffs into the lungs every 6 hours as needed for Wheezing 1 Inhaler 1    OXYGEN 2L NC 12-24 hours (Patient taking differently: nightly as needed 2L NC 12-24 hours) 1 Device 0    aspirin 325 MG tablet Take 325 mg by mouth daily.  HYDROcodone-acetaminophen (NORCO)  MG per tablet Take 1 tablet by mouth 2 times daily as needed for Pain for up to 30 days. 60 tablet 0    LORazepam (ATIVAN) 1 MG tablet TAKE 1 TABLET BY MOUTH DAILY AS NEEDED FOR ANXIETY FOR UP TO 30 DAYS. 30 tablet 0    LORazepam (ATIVAN) 1 MG tablet Take 1 tablet by mouth daily as needed for Anxiety for up to 30 days. Mainly night 30 tablet 0    gabapentin (NEURONTIN) 600 MG tablet TAKE ONE TABLET BY MOUTH 3 TIMES DAILY AS NEEDED 90 tablet 5    tiZANidine (ZANAFLEX) 2 MG tablet Take 1 tablet by mouth nightly as needed (muscle spasms) 30 tablet 3    nystatin (MYCOSTATIN) 772312 UNIT/GM ointment Apply topically 2 times daily. for yeast 60 Tube 5    nystatin (NYSTATIN) 857676 UNIT/GM powder Apply topically 3 times daily Apply topically 4 times daily. 60 g 3    Cholecalciferol (VITAMIN D3) 50 MCG (2000 UT) CAPS Take 1 capsule by mouth      albuterol (ACCUNEB) 1.25 MG/3ML nebulizer solution Inhale 3 mLs into the lungs every 6 hours as needed for Wheezing 360 mL 3    denosumab (PROLIA) 60 MG/ML SOSY SC injection Inject 60 mg into the skin every 6 months       No current facility-administered medications on file prior to encounter. REVIEW OF SYSTEMS    A comprehensive review of systems was negative.     Objective:      /68   Pulse 59   Temp 97.4 °F (36.3 °C) (Temporal)   Resp 22   Ht 5' 2\" (1.575 m)   Wt 292 lb (132.5 kg)   BMI 53.41 kg/m²     Wt Readings from Last 3 Encounters:   01/17/22 292 lb (132.5 kg)   01/13/22 292 lb (132.5 kg)   01/04/22 292 lb (132.5 kg)       PHYSICAL EXAM    General Appearance: alert and oriented to person, place and time, well developed and well- nourished, in no acute distress  Skin: warm and dry, no rash or erythema  Head: normocephalic and atraumatic  Eyes: pupils equal, round, and reactive to light, extraocular eye movements intact, conjunctivae normal  ENT: tympanic membrane, external ear and ear canal normal bilaterally, nose without deformity, nasal mucosa and turbinates normal without polyps, lips teeth and gums normal  Neck: supple and non-tender without mass, no thyromegaly or thyroid nodules, no cervical lymphadenopathy  Pulmonary/Chest: clear to auscultation bilaterally- no wheezes, rales or rhonchi, normal air movement, no respiratory distress  Cardiovascular: normal rate, regular rhythm, normal S1 and S2, no murmurs, rubs, clicks, or gallops, distal pulses intact, no carotid bruits  Abdomen: soft, non-tender, non-distended, normal bowel sounds, no masses or organomegaly  Extremities: no cyanosis, clubbing or edema  Musculoskeletal: normal range of motion, no joint swelling, deformity or tenderness  Neurologic: reflexes normal and symmetric, no cranial nerve deficit, gait, coordination and speech normal, skin sensation normal      Assessment:      Problem List Items Addressed This Visit     * (Principal) Diabetic ulcer of left lower leg associated with type 2 diabetes mellitus, with fat layer exposed (HCC) (Chronic)    Relevant Orders    Initiate Outpatient Wound Care Protocol    Edema of both lower extremities due to peripheral venous insufficiency - Primary (Chronic)    Relevant Orders    Initiate Outpatient Wound Care Protocol    Morbid obesity ([address]    Relevant Orders    Initiate Outpatient Wound Care Protocol    Stage 4 chronic kidney disease ([address]    Relevant Orders    Initiate Outpatient Wound Care Protocol           Procedure Note  Indications:  Based on my examination of this patient's wound(s)/ulcer(s) today, debridement is required to promote healing and evaluate the wound base. Performed by: Mariel Lazcano MD    Consent obtained:  Yes    Time out taken:  Yes    Pain Control: Anesthetic  Anesthetic: 2% Lidocaine Gel Topical       Debridement:Non-excisional Debridement    Using curette the wound(s)/ulcer(s) was/were sharply debrided down through and including the removal of epidermis and dermis. Devitalized Tissue Debrided:  fibrin, biofilm, slough, necrotic/eschar and exudate      Pre Debridement Measurements:  Are located in the Wound/Ulcer Documentation Flow Sheet    Wound/Ulcer #: 1, 2, 3, 4 and 5    Percent of Wound(s)/Ulcer(s) Debrided: 100%    Total Surface Area Debrided:  43 sq cm       Diabetic/Pressure/Non Pressure Ulcers only:  Ulcer: Diabetic ulcer, fat layer exposed             Post Debridement Measurements:    Wound/Ulcer Descriptions are Pre Debridement --EXCEPT MEASUREMENTS    Wound 12/21/21 Leg Left; Lower WOUND 1 LEFT MEDIAL LEG VENOUS (Active)   Wound Image   01/17/22 1036   Wound Etiology Venous 01/17/22 1036   Dressing Status Old drainage noted 01/17/22 1036   Wound Cleansed Wound cleanser 01/17/22 1036   Dressing/Treatment Hydrofera blue;Moisten with saline 01/10/22 1110   Wound Length (cm) 2.5 cm 01/17/22 1036   Wound Width (cm) 2.2 cm 01/17/22 1036   Wound Depth (cm) 0.3 cm 01/17/22 1036   Wound Surface Area (cm^2) 5.5 cm^2 01/17/22 1036   Change in Wound Size % (l*w) -46.67 01/17/22 1036   Wound Volume (cm^3) 1.65 cm^3 01/17/22 1036   Wound Healing % -120 01/17/22 1036   Post-Procedure Length (cm) 2.5 cm 01/17/22 1048   Post-Procedure Width (cm) 2.2 cm 01/17/22 1048   Post-Procedure Depth (cm) 0.3 cm 01/17/22 1048   Post-Procedure Surface Area (cm^2) 5.5 cm^2 01/17/22 1048   Post-Procedure Volume (cm^3) 1.65 cm^3 01/17/22 1048   Distance Tunneling (cm) 0 cm 01/10/22 1008   Tunneling Position ___ O'Clock 0 01/10/22 1008   Undermining Starts ___ O'Clock 0 01/10/22 1008   Undermining Ends___ O'Clock 0 01/10/22 1008   Undermining Maxium Distance (cm) 0 01/10/22 1008   Wound Assessment Pink/red;Slough 01/17/22 1036   Drainage Amount Large 01/17/22 1036   Drainage Description Serous 01/17/22 1036   Odor None 01/17/22 1036   [place]-wound Assessment Blanchable erythema;Edematous 01/17/22 1036   Margins Attached edges 01/17/22 1036   Wound Thickness Description not for Pressure Injury Full thickness 01/17/22 1036   Number of days: 26       Wound 12/21/21 Leg Left; Lower WOUND 2 LEFT ANTERIOR LEG VENOUS (Active)   Wound Image   01/17/22 1036   Wound Etiology Venous 01/17/22 1036   Dressing Status Old drainage noted 01/17/22 1036   Wound Cleansed Soap and water 01/17/22 1036   Dressing/Treatment Hydrofera blue;Moisten with saline 01/10/22 1110   Wound Length (cm) 3.4 cm 01/17/22 1036   Wound Width (cm) 1.5 cm 01/17/22 1036   Wound Depth (cm) 0.2 cm 01/17/22 1036   Wound Surface Area (cm^2) 5.1 cm^2 01/17/22 1036   Change in Wound Size % (l*w) -251.72 01/17/22 1036   Wound Volume (cm^3) 1.02 cm^3 01/17/22 1036   Wound Healing % -603 01/17/22 1036   Post-Procedure Length (cm) 3.4 cm 01/17/22 1048   Post-Procedure Width (cm) 1.5 cm 01/17/22 1048   Post-Procedure Depth (cm) 0.2 cm 01/17/22 1048   Post-Procedure Surface Area (cm^2) 5.1 cm^2 01/17/22 1048   Post-Procedure Volume (cm^3) 1.02 cm^3 01/17/22 1048   Distance Tunneling (cm) 0 cm 01/10/22 1008   Tunneling Position ___ O'Clock 0 01/10/22 1008   Undermining Starts ___ O'Clock 0 01/10/22 1008   Undermining Ends___ O'Clock 0 01/10/22 1008   Undermining Maxium Distance (cm) 0 01/10/22 1008   Wound Assessment Pink/red;Slough 01/17/22 1036   Drainage Amount Large 01/17/22 1036   Drainage Description Serous 01/17/22 1036   Odor None 01/17/22 1036   [place]-wound Assessment Blanchable erythema;Edematous 01/17/22 1036   Margins Attached edges 01/17/22 1036   Wound Thickness Description not for Pressure Injury Full thickness 01/17/22 1036   Number of days: 26       Wound 12/21/21 Leg Left; Lower WOUND 3 LEFT SUPERIOR ANTERIOR LEG VENOUS (Active)   Wound Image   01/17/22 1036   Wound Etiology Venous 01/17/22 1036   Dressing Status Old drainage noted 01/17/22 1036   Wound Cleansed Soap and water 01/17/22 1036   Dressing/Treatment Hydrofera blue;Moisten with saline 01/10/22 1110   Wound Length (cm) 1.7 cm 01/17/22 1036   Wound Width (cm) 2 cm 01/17/22 1036   Wound Depth (cm) 0.1 cm 01/17/22 1036   Wound Surface Area (cm^2) 3.4 cm^2 01/17/22 1036   Change in Wound Size % (l*w) -240 01/17/22 1036   Wound Volume (cm^3) 0.34 cm^3 01/17/22 1036   Wound Healing % -240 01/17/22 1036   Post-Procedure Length (cm) 1.7 cm 01/17/22 1048   Post-Procedure Width (cm) 2 cm 01/17/22 1048   Post-Procedure Depth (cm) 0.1 cm 01/17/22 1048   Post-Procedure Surface Area (cm^2) 3.4 cm^2 01/17/22 1048   Post-Procedure Volume (cm^3) 0.34 cm^3 01/17/22 1048   Distance Tunneling (cm) 0 cm 01/10/22 1008   Tunneling Position ___ O'Clock 0 01/10/22 1008   Undermining Starts ___ O'Clock 0 01/10/22 1008   Undermining Ends___ O'Clock 0 01/10/22 1008   Undermining Maxium Distance (cm) 0 01/10/22 1008   Wound Assessment Pink/red;Slough 01/17/22 1036   Drainage Amount Large 01/17/22 1036   Drainage Description Serous 01/17/22 1036   Odor None 01/17/22 1036   [place]-wound Assessment Blanchable erythema;Edematous 01/17/22 1036   Margins Attached edges 01/17/22 1036   Wound Thickness Description not for Pressure Injury Full thickness 01/17/22 1036   Number of days: 26       Wound 12/21/21 Leg Left;Lateral WOUND 4 LEFT LATERAL LEG VENOUS (Active)   Wound Image   01/17/22 1036   Wound Etiology Venous 01/17/22 1036   Dressing Status Old drainage noted 01/17/22 1036   Wound Cleansed Soap and water 01/17/22 1036   Dressing/Treatment Hydrofera blue;Moisten with saline 01/10/22 1110   Wound Length (cm) 3.5 cm 01/17/22 1036   Wound Width (cm) 7.8 cm 01/17/22 1036   Wound Depth (cm) 0.2 cm 01/17/22 1036   Wound Surface Area (cm^2) 27.3 cm^2 01/17/22 1036   Change in Wound Size % (l*w) -82 01/17/22 1036   Wound Volume (cm^3) 5.46 cm^3 01/17/22 1036   Wound Healing % -82 01/17/22 1036   Post-Procedure Length (cm) 3.5 cm 01/17/22 1048   Post-Procedure Width (cm) 7.8 cm 01/17/22 1048   Post-Procedure Depth (cm) 0.2 cm 01/17/22 1048   Post-Procedure Surface Area (cm^2) 27.3 cm^2 01/17/22 1048   Post-Procedure Volume (cm^3) 5.46 cm^3 01/17/22 1048   Distance Tunneling (cm) 0 cm 01/10/22 1008   Tunneling Position ___ O'Clock 0 01/10/22 1008   Undermining Starts ___ O'Clock 0 01/10/22 1008   Undermining Ends___ O'Clock 0 01/10/22 1008   Undermining Maxium Distance (cm) 0 01/10/22 1008   Wound Assessment Pink/red;Slough 01/17/22 1036   Drainage Amount Large 01/17/22 1036   Drainage Description Serous 01/17/22 1036   Odor None 01/17/22 1036   [place]-wound Assessment Blanchable erythema;Edematous 01/17/22 1036   Margins Attached edges 01/17/22 1036   Wound Thickness Description not for Pressure Injury Full thickness 01/17/22 1036   Number of days: 26       Wound 01/17/22 Toe (Comment  which one) Anterior; Left #5 L 4th toe (Active)   Wound Image   01/17/22 1036   Wound Etiology Traumatic 01/17/22 1036   Dressing Status Old drainage noted 01/17/22 1036   Wound Cleansed Soap and water 01/17/22 1036   Wound Length (cm) 1 cm 01/17/22 1036   Wound Width (cm) 1.2 cm 01/17/22 1036   Wound Depth (cm) 0.1 cm 01/17/22 1036   Wound Surface Area (cm^2) 1.2 cm^2 01/17/22 1036   Wound Volume (cm^3) 0.12 cm^3 01/17/22 1036   Post-Procedure Length (cm) 1 cm 01/17/22 1048   Post-Procedure Width (cm) 1.2 cm 01/17/22 1048   Post-Procedure Depth (cm) 0.1 cm 01/17/22 1048   Post-Procedure Surface Area (cm^2) 1.2 cm^2 01/17/22 1048   Post-Procedure Volume (cm^3) 0.12 cm^3 01/17/22 1048   Wound Assessment Pink/red;Slough 01/17/22 1036   Drainage Amount Moderate 01/17/22 1036   Drainage Description Serous 01/17/22 1036   Odor None 01/17/22 1036   [place]-wound Assessment Ecchymosis; Intact 01/17/22 1036   Margins Attached edges 01/17/22 1036   Wound Thickness Description not for Pressure Injury Full thickness 01/17/22 1036   Number of days: 0             Estimated Blood Loss:  Minimal    Hemostasis Achieved:  by pressure    Procedural Pain:  0  / 10     Post Procedural Pain:  0 / 10     Response to treatment:  Well tolerated by patient. Plan:     Problem List Items Addressed This Visit     * (Principal) Diabetic ulcer of left lower leg associated with type 2 diabetes mellitus, with fat layer exposed ([facility] [address] (Chronic)    Relevant Orders    Initiate Outpatient Wound Care Protocol    Edema of both lower extremities due to peripheral venous insufficiency - Primary (Chronic)    Relevant Orders    Initiate Outpatient Wound Care Protocol    Morbid obesity [facility])    Relevant Orders    Initiate Outpatient Wound Care Protocol    Stage 4 chronic kidney disease ([facility] [address]    Relevant Orders    Initiate Outpatient Wound Care Protocol          Cont current care. RTO 1 week    Treatment Note please see attached Discharge Instructions    In my professional opinion this patient would benefit from HBO Therapy: No    Written patient dismissal instructions given to patient and signed by patient or POA.              Electronically signed by Teddy Underwood MD on 1/17/2022 at 10:53 AM

## 2022-01-18 DIAGNOSIS — G25.81 RESTLESS LEGS SYNDROME: ICD-10-CM

## 2022-01-18 RX ORDER — GABAPENTIN 600 MG/1
TABLET ORAL
Qty: 90 TABLET | Refills: 5 | Status: SHIPPED | OUTPATIENT
Start: 2022-01-18 | End: 2022-07-19

## 2022-01-18 NOTE — TELEPHONE ENCOUNTER
iGancarlo Huang has requested a refill on her medication. Last office visit : 10/4/2021   Next office visit : 4/4/2022   Last medication refill :7/15/2021 w/[facility]  Radha Mullins :11/9/2021       Requested Prescriptions     Pending Prescriptions Disp Refills    gabapentin (NEURONTIN) 600 MG tablet [Pharmacy Med Name: GABAPENTIN TAB 600MG TABLET] 90 tablet      Sig: TAKE ONE TABLET BY MOUTH 3 TIMES DAILY .  . .MAY CAUSE DROWSINESS!!

## 2022-03-07 RX ORDER — ROPINIROLE 4 MG/1
TABLET, FILM COATED ORAL
Qty: 150 TABLET | Refills: 5 | Status: SHIPPED | OUTPATIENT
Start: 2022-03-07 | End: 2022-09-26

## 2022-03-07 NOTE — TELEPHONE ENCOUNTER
Ansley Jimenez has requested a refill on her medication.       Last office visit : 10/4/2021   Next office visit : 4/4/2022       Requested Prescriptions     Pending Prescriptions Disp Refills    rOPINIRole (REQUIP) 4 MG tablet [Pharmacy Med Name: ROPINIROLE TAB 4MG TABLET] 150 tablet 5     Sig: TAKE TWO TABLETS BY MOUTH EVERY MORNING , ONE AT NOON AND TWO AT BEDTIME

## 2022-03-09 ENCOUNTER — OFFICE VISIT (OUTPATIENT)
Dept: PRIMARY CARE CLINIC | Age: 72
End: 2022-03-09
Payer: MEDICARE

## 2022-03-09 VITALS
WEIGHT: 293 LBS | HEART RATE: 82 BPM | TEMPERATURE: 97.1 F | HEIGHT: 62 IN | BODY MASS INDEX: 53.92 KG/M2 | RESPIRATION RATE: 16 BRPM | SYSTOLIC BLOOD PRESSURE: 130 MMHG | DIASTOLIC BLOOD PRESSURE: 80 MMHG | OXYGEN SATURATION: 97 %

## 2022-03-09 DIAGNOSIS — Z91.81 AT HIGH RISK FOR FALLS: ICD-10-CM

## 2022-03-09 DIAGNOSIS — E11.8 TYPE 2 DIABETES MELLITUS WITH COMPLICATION (HCC): ICD-10-CM

## 2022-03-09 DIAGNOSIS — I48.0 PAF (PAROXYSMAL ATRIAL FIBRILLATION) (HCC): Primary | ICD-10-CM

## 2022-03-09 DIAGNOSIS — N18.4 STAGE 4 CHRONIC KIDNEY DISEASE (HCC): ICD-10-CM

## 2022-03-09 DIAGNOSIS — J44.9 CHRONIC OBSTRUCTIVE PULMONARY DISEASE, UNSPECIFIED COPD TYPE (HCC): ICD-10-CM

## 2022-03-09 DIAGNOSIS — D68.9 COAGULATION DEFECT (HCC): ICD-10-CM

## 2022-03-09 PROCEDURE — G8482 FLU IMMUNIZE ORDER/ADMIN: HCPCS | Performed by: NURSE PRACTITIONER

## 2022-03-09 PROCEDURE — 1090F PRES/ABSN URINE INCON ASSESS: CPT | Performed by: NURSE PRACTITIONER

## 2022-03-09 PROCEDURE — 4040F PNEUMOC VAC/ADMIN/RCVD: CPT | Performed by: NURSE PRACTITIONER

## 2022-03-09 PROCEDURE — 3017F COLORECTAL CA SCREEN DOC REV: CPT | Performed by: NURSE PRACTITIONER

## 2022-03-09 PROCEDURE — G8427 DOCREV CUR MEDS BY ELIG CLIN: HCPCS | Performed by: NURSE PRACTITIONER

## 2022-03-09 PROCEDURE — G8399 PT W/DXA RESULTS DOCUMENT: HCPCS | Performed by: NURSE PRACTITIONER

## 2022-03-09 PROCEDURE — G8417 CALC BMI ABV UP PARAM F/U: HCPCS | Performed by: NURSE PRACTITIONER

## 2022-03-09 PROCEDURE — 3023F SPIROM DOC REV: CPT | Performed by: NURSE PRACTITIONER

## 2022-03-09 PROCEDURE — 99214 OFFICE O/P EST MOD 30 MIN: CPT | Performed by: NURSE PRACTITIONER

## 2022-03-09 PROCEDURE — 1123F ACP DISCUSS/DSCN MKR DOCD: CPT | Performed by: NURSE PRACTITIONER

## 2022-03-09 PROCEDURE — 3046F HEMOGLOBIN A1C LEVEL >9.0%: CPT | Performed by: NURSE PRACTITIONER

## 2022-03-09 PROCEDURE — 1111F DSCHRG MED/CURRENT MED MERGE: CPT | Performed by: NURSE PRACTITIONER

## 2022-03-09 PROCEDURE — 2022F DILAT RTA XM EVC RTNOPTHY: CPT | Performed by: NURSE PRACTITIONER

## 2022-03-09 PROCEDURE — 1036F TOBACCO NON-USER: CPT | Performed by: NURSE PRACTITIONER

## 2022-03-09 RX ORDER — FERROUS SULFATE 325(65) MG
325 TABLET ORAL
COMMUNITY
End: 2022-07-07

## 2022-03-09 RX ORDER — DAPAGLIFLOZIN 10 MG/1
10 TABLET, FILM COATED ORAL EVERY MORNING
COMMUNITY
Start: 2022-02-18 | End: 2022-07-07

## 2022-03-09 RX ORDER — ATORVASTATIN CALCIUM 80 MG/1
80 TABLET, FILM COATED ORAL DAILY
COMMUNITY
Start: 2022-02-21

## 2022-03-09 RX ORDER — ISOSORBIDE MONONITRATE 30 MG/1
TABLET, EXTENDED RELEASE ORAL
COMMUNITY
Start: 2022-02-21

## 2022-03-09 NOTE — PROGRESS NOTES
Post-Discharge Transitional Care Management Progress Note      Ana Ray   YOB: 1950    Date of Office Visit:  3/9/2022  Date of Hospital Admission: 2-3-22  Date of Hospital Discharge: 2-17-22    Care management risk score Rising risk (score 2-5) and Complex Care (Scores >=6): 8     Non face to face  following discharge, date last encounter closed (first attempt may have been earlier): *No documented post hospital discharge outreach found in the last 14 days *No documented post hospital discharge outreach found in the last 14 days    Call initiated 2 business days of discharge: *No response recorded in the last 14 days    ASSESSMENT/PLAN:   PAF (paroxysmal atrial fibrillation) ([facility])  Stage 4 chronic kidney disease ([facility]  -     MA DISCHARGE MEDS RECONCILED W/ CURRENT OUTPATIENT MED LIST  Chronic obstructive pulmonary disease, unspecified COPD type ([facility]  Coagulation defect ([facility]  At high risk for falls  She says her fasting blood sugar was 140 this morning I discussed the importance of keeping her blood sugar under control. I know they saw the heart doctor at [facility] but I would like them to make an appointment with her regular heart doctor within the next couple weeks for recheck since they changed her beta-blocker. They verbalized understanding. It is too early to check her hemoglobin A1c but she has an appointment on April 8 with the [facility] endocrinology and they may be able to recheck it then. Its not been 90 days so Medicare will not pay for it  Lab Results   Component Value Date    GLUCOSE 83 01/13/2022    GLUCOSE 166 10/21/2021    GLUCOSE 152 07/21/2021    LABA1C 9.4 01/12/2022    LABA1C 9.1 10/21/2021    LABA1C 8.6 07/21/2021    LABA1C 8.9 10/28/2015    LABA1C 8.5 07/17/2015    LABA1C 8.2 11/14/2014     Medical Decision Making: moderate complexity  Return for has appt in July , sign consent for hosptial d/c from [facility].  .    On this date 3/9/2022 I have spent 32 minutes reviewing previous notes, test results and face to face with the patient discussing the diagnosis and importance of compliance with the treatment plan as well as documenting on the day of the visit. Subjective:   HPI:  Follow up of Hospital problems/diagnosis(es): weight gain , swelling and sob. She had covid in Jan and was home for 9 days and gained 20 pounds went in hospital at [facility]. She went there because it was close. He did end up seeing the cardiologist and the lung doctor as well as the kidney doctor there. Blood sugar 150. Her daughter can pull up the labs and her last BNP was 1400. I do not have access to any of the other records. Saw heart doctor in [address] [place] [address] , Dr Eliazar Albright. Last [id number]  Inpatient course: Discharge summary reviewed- see chart. Interval history/Current status: guarded.      Patient Active Problem List   Diagnosis    Parkinson disease ([address]    GERD (gastroesophageal reflux disease)    Lichen sclerosus    Palpitations    Fatigue    Asthma    Edema    Hypokalemia    PAF (paroxysmal atrial fibrillation) (HCC)    Mild mitral regurgitation by prior echocardiogram    Restless legs syndrome    Hypoesthesia of skin    Somnolence, daytime    Morbid obesity (HCC)    Stage 4 chronic kidney disease ([address]    Mixed hyperlipidemia    Cellulitis of left lower extremity    Chronic obstructive pulmonary disease ([address]    Diabetic ulcer of left lower leg associated with type 2 diabetes mellitus, with fat layer exposed ([address]    Varicose veins of left lower extremity with ulcer of calf (CODE) (HCC)    Edema of both lower extremities due to peripheral venous insufficiency    Diabetic peripheral neuropathy associated with type 2 diabetes mellitus ([address]    Ischemic cardiomyopathy    At risk for obstructive sleep apnea    Nocturnal hypoxemia    Coagulation defect (HCC)       Medications listed as ordered at the time of discharge from hospital  [unfilled]      Medications marked \"taking\" at this time  Outpatient Medications Marked as Taking for the 3/9/22 encounter (Office Visit) with INGRID Allen - CNP   Medication Sig Dispense Refill    atorvastatin (LIPITOR) 80 MG tablet Take 80 mg by mouth daily       FARXIGA 10 MG tablet Take 10 mg by mouth every morning       isosorbide mononitrate (IMDUR) 30 MG extended release tablet       metoprolol tartrate (LOPRESSOR) 25 MG tablet       ferrous sulfate (IRON 325) 325 (65 Fe) MG tablet Take 325 mg by mouth daily (with breakfast)      rOPINIRole (REQUIP) 4 MG tablet TAKE TWO TABLETS BY MOUTH EVERY MORNING , ONE AT NOON AND TWO AT BEDTIME 150 tablet 5    gabapentin (NEURONTIN) 600 MG tablet TAKE ONE TABLET BY MOUTH 3 TIMES DAILY . . .MAY CAUSE DROWSINESS!! 90 tablet 5    carbidopa-levodopa (SINEMET)  MG per tablet TAKE TWO TABLETS BY MOUTH 3 TIMES DAILY 180 tablet 5    insulin detemir (LEVEMIR FLEXTOUCH) 100 UNIT/ML injection pen INJECT 80 UNITS SUBCU NIGHTLY FOR TYPE 2 DIABETES 5 pen 5    SURE COMFORT PEN NEEDLES 31G X 8 MM MISC USE WITH INSULIN  THREE TIMES PER DAY. 100 each 5    ketorolac (ACULAR) 0.5 % ophthalmic solution       losartan (COZAAR) 100 MG tablet TAKE ONE TABLET BY MOUTH EVERY DAY 90 tablet 3    montelukast (SINGULAIR) 10 MG tablet TAKE ONE TABLET BY MOUTH EVERY NIGHT AT BEDTIME 90 tablet 3    LINZESS 290 MCG CAPS capsule TAKE ONE CAPSULE IN THE MORNING BEFORE BREAKFAST 30 capsule 5    insulin aspart (NOVOLOG) 100 UNIT/ML injection vial 25 U TID with meals 1 each 3    blood glucose monitor strips Test 3times a day & as needed for symptoms of irregular blood glucose. 100 strip 3    tiZANidine (ZANAFLEX) 2 MG tablet Take 1 tablet by mouth nightly as needed (muscle spasms) 30 tablet 3    COMFORT EZ PEN NEEDLES 32G X 4 MM MISC USE WITH INSULIN  THREE TIMES PER DAY.  100 each 3    omeprazole (PRILOSEC) 40 MG delayed release capsule TAKE ONE CAPSULE BY MOUTH EVERY DAY 90 capsule 3    nystatin (MYCOSTATIN) 053911 UNIT/GM ointment Apply topically 2 times daily. for yeast 60 Tube 5    nystatin (NYSTATIN) 208797 UNIT/GM powder Apply topically 3 times daily Apply topically 4 times daily. 60 g 3    Diabetic Shoe MISC by Does not apply route 1 each 0    Cholecalciferol (VITAMIN D3) 50 MCG (2000 UT) CAPS Take 1 capsule by mouth      albuterol (ACCUNEB) 1.25 MG/3ML nebulizer solution Inhale 3 mLs into the lungs every 6 hours as needed for Wheezing 360 mL 3    allopurinol (ZYLOPRIM) 100 MG tablet Take 1 tablet by mouth daily      EASY COMFORT INSULIN SYRINGE 31G X 5/16\" 1 ML MISC INJECT AS DIRECTED DAILY 100 each 3    torsemide (DEMADEX) 20 MG tablet Take 20 mg by mouth daily 3 tablets for three days then two tablets daily       blood glucose monitor kit and supplies Use as directed for diabetes TID checks. 1 kit 0    TRUEPLUS INSULIN SYRINGE 30G X 5/16\" 1 ML MISC INJECT AS DIRECTED DAILY 100 each 3    Melatonin 10 MG CAPS Take 10 mg by mouth every evening Takes 20 mg a night      denosumab (PROLIA) 60 MG/ML SOSY SC injection Inject 60 mg into the skin every 6 months      spironolactone (ALDACTONE) 25 MG tablet TAKE ONE TABLET DAILY (Patient taking differently: One tablet in the am and one tablet in the evening) 30 tablet 5    glucose blood VI test strips (ONE TOUCH ULTRA TEST) strip Inject 1 each into the skin daily As needed. 100 each 3    Lancets MISC 1 lancet to check glucose daily 100 each 3    albuterol (PROVENTIL HFA;VENTOLIN HFA) 108 (90 BASE) MCG/ACT inhaler Inhale 2 puffs into the lungs every 6 hours as needed for Wheezing 1 Inhaler 1    OXYGEN 2L NC 12-24 hours (Patient taking differently: nightly as needed 2L NC 12-24 hours) 1 Device 0    aspirin 325 MG tablet Take 325 mg by mouth daily. Medications patient taking as of now reconciled against medications ordered at time of hospital discharge:  Yes    A comprehensive review of systems was negative except for what was noted in the HPI. Objective:    /80   Pulse 82   Temp 97.1 °F (36.2 °C) (Temporal)   Resp 16   Ht 5' 2\" (1.575 m)   Wt 295 lb (133.8 kg)   SpO2 97%   Breastfeeding No   BMI 53.96 kg/m²   General Appearance: alert and oriented to person, place and time, well developed and well- nourished, in no acute distress. Obese. Sitting up in wheelchair  Skin: warm and dry, no rash or erythema  Head: normocephalic and atraumatic  Neck: supple and non-tender without mass, no thyromegaly or thyroid nodules, no cervical lymphadenopathy  Pulmonary/Chest: clear to auscultation bilaterally- no wheezes, rales or rhonchi, normal air movement, no respiratory distress  Cardiovascular: normal rate, regular rhythm, normal S1 and S2, no murmurs, rubs, clicks, or gallops, distal pulses intact, no carotid bruits  Abdomen: soft, non-tender, non-distended, normal bowel sounds, no masses or organomegaly  Extremities: no cyanosis, clubbing . Mild edema non pitting in right lower leg. Left leg wrapped in dressing from wound being treatment by wound care. Musculoskeletal: normal range of motion, no joint swelling, deformity or tenderness  Neurologic: reflexes normal and symmetric, no cranial nerve deficit, gait, coordination and speech normal    An electronic signature was used to authenticate this note. --INGRID Samson - CNP  On the basis of positive falls risk screening, assessment and plan is as follows: home safety tips provided.

## 2022-04-04 ENCOUNTER — OFFICE VISIT (OUTPATIENT)
Dept: NEUROLOGY | Age: 72
End: 2022-04-04
Payer: MEDICARE

## 2022-04-04 VITALS
OXYGEN SATURATION: 98 % | BODY MASS INDEX: 53.92 KG/M2 | WEIGHT: 293 LBS | DIASTOLIC BLOOD PRESSURE: 74 MMHG | HEIGHT: 62 IN | SYSTOLIC BLOOD PRESSURE: 112 MMHG | HEART RATE: 72 BPM

## 2022-04-04 DIAGNOSIS — G20 PARKINSON DISEASE (HCC): Primary | ICD-10-CM

## 2022-04-04 DIAGNOSIS — G25.81 RESTLESS LEGS SYNDROME: ICD-10-CM

## 2022-04-04 DIAGNOSIS — G47.34 NOCTURNAL HYPOXEMIA: ICD-10-CM

## 2022-04-04 DIAGNOSIS — E11.42 DIABETIC POLYNEUROPATHY ASSOCIATED WITH TYPE 2 DIABETES MELLITUS (HCC): ICD-10-CM

## 2022-04-04 DIAGNOSIS — R44.1 VISUAL HALLUCINATION: ICD-10-CM

## 2022-04-04 DIAGNOSIS — E66.01 MORBID OBESITY (HCC): ICD-10-CM

## 2022-04-04 PROCEDURE — 3046F HEMOGLOBIN A1C LEVEL >9.0%: CPT | Performed by: PSYCHIATRY & NEUROLOGY

## 2022-04-04 PROCEDURE — 1123F ACP DISCUSS/DSCN MKR DOCD: CPT | Performed by: PSYCHIATRY & NEUROLOGY

## 2022-04-04 PROCEDURE — G8399 PT W/DXA RESULTS DOCUMENT: HCPCS | Performed by: PSYCHIATRY & NEUROLOGY

## 2022-04-04 PROCEDURE — 2022F DILAT RTA XM EVC RTNOPTHY: CPT | Performed by: PSYCHIATRY & NEUROLOGY

## 2022-04-04 PROCEDURE — G8427 DOCREV CUR MEDS BY ELIG CLIN: HCPCS | Performed by: PSYCHIATRY & NEUROLOGY

## 2022-04-04 PROCEDURE — 1036F TOBACCO NON-USER: CPT | Performed by: PSYCHIATRY & NEUROLOGY

## 2022-04-04 PROCEDURE — 3017F COLORECTAL CA SCREEN DOC REV: CPT | Performed by: PSYCHIATRY & NEUROLOGY

## 2022-04-04 PROCEDURE — G8417 CALC BMI ABV UP PARAM F/U: HCPCS | Performed by: PSYCHIATRY & NEUROLOGY

## 2022-04-04 PROCEDURE — 99214 OFFICE O/P EST MOD 30 MIN: CPT | Performed by: PSYCHIATRY & NEUROLOGY

## 2022-04-04 PROCEDURE — 4040F PNEUMOC VAC/ADMIN/RCVD: CPT | Performed by: PSYCHIATRY & NEUROLOGY

## 2022-04-04 PROCEDURE — 1090F PRES/ABSN URINE INCON ASSESS: CPT | Performed by: PSYCHIATRY & NEUROLOGY

## 2022-04-04 NOTE — PROGRESS NOTES
[facility]  [address] [place] [address] [address], [address]  [place], [place]  Phone [phone]  Fax [phone]     [facility] Follow Up Encounter  22 10:52 AM CDT    Information:   Patient Name: Kailey Wheat  :   1950  Age:   70 y.o. MRN:   [id number]  Account #:  [de-identified]  Today:  22    Provider: Aranza Johnson M.D. Chief Complaint:   Chief Complaint   Patient presents with    Tremors     6 month follow up        Subjective:   Kailey Wheat is a 70 y.o. woman with a history of Parkinson's disease, visual hallucinations, restless legs syndrome, and diabetic polyneuropathy who is following up. Her PD is doing fairly well. She has a left foot wound and is to have an arteriogram tomorrow. She complains of shortness of breath and daytime drowsiness. She will not do a sleep study and refuses PAP use. She sleeps with oxygen. She is morbidly obese and has difficulty with her mobility.  She can stand and transfer.  She gets pain in her hips, knees, back when standing too long.  She requires some assistance for personal care, mostly from her obesity.  She has numbness, tingling, dysesthesias, and allodynia in her feet up to the knees. She takes gabapentin. She has not previously tolerated Cymbalta. She has not used Lyrica due to the risk for peripheral edema and she already has severe peripheral edema. She has had no dyskinesias, lightheadedness, or hallucinations. Objective:     Past Medical History:  Past Medical History:   Diagnosis Date    Asthma 2015    Bilateral carpal tunnel syndrome 2018    sees dr. Hieu Burns.     Colon polyp     Diabetes mellitus ([facility]     GERD (gastroesophageal reflux disease)     HTN (hypertension)     Hyperlipidemia     Mild mitral regurgitation by prior echocardiogram 2016    Morbid obesity ([facility] 10/18/2017    Normal cardiac stress test 09    no ischemia at attained level of excercise    Palliative care patient 2020    Parkinson disease ([facility]     Paroxysmal atrial fibrillation [facility])     sees dr. Tracy Gonzalez Post-menopausal     Prolonged emergence from general anesthesia     Restrictive airway disease     Stage 3 chronic kidney disease ([facility] [address] 10/18/2017       Past Surgical History:   Procedure Laterality Date    APPENDECTOMY      CATARACT REMOVAL WITH IMPLANT       SECTION      x3    COLONOSCOPY      Dr Justin Stuart polyp-5 yr recall    COLONOSCOPY N/A 2019    Dr Silvina Philip 2 internal hemorrhoids without stigmata, diverticulosis, suboptimal prep--5 yr recall    EYE SURGERY      GALLBLADDER SURGERY  2014    dr Latasha Betancourt N/CARPAL TUNNEL SURG Right 2018    OPEN CARPAL TUNNEL RELEASE performed by Miguelangel Moy MD at [address] [place] [address] TYMPANOSTOMY TUBE PLACEMENT      dr Reynaldo Mason in [address] [place] [address]  · None    Significant Injuries  · None    Habits  Sofya Jang reports that she has never smoked. She has never used smokeless tobacco. She reports that she does not drink alcohol and does not use drugs. Family History   Problem Relation Age of Onset    High Blood Pressure Father     Diabetes Father     Parkinsonism Father     High Blood Pressure Mother     Diabetes Sister     Other Paternal Grandmother         hiatal hernia    Heart Disease Paternal Grandfather     Colon Cancer Neg Hx     Colon Polyps Neg Hx     Esophageal Cancer Neg Hx     Liver Cancer Neg Hx     Liver Disease Neg Hx     Rectal Cancer Neg Hx     Stomach Cancer Neg Hx        Social History  Estela Primrose D Cecil is , lives in [address], and is retired.     Medications:  Current Outpatient Medications   Medication Sig Dispense Refill    atorvastatin (LIPITOR) 80 MG tablet Take 80 mg by mouth daily       FARXIGA 10 MG tablet Take 10 mg by mouth every morning       isosorbide mononitrate (IMDUR) 30 MG extended release tablet       metoprolol tartrate (LOPRESSOR) 25 MG tablet       ferrous sulfate (IRON 325) 325 (65 Fe) MG tablet Take 325 mg by mouth daily (with breakfast)      rOPINIRole (REQUIP) 4 MG tablet TAKE TWO TABLETS BY MOUTH EVERY MORNING , ONE AT NOON AND TWO AT BEDTIME 150 tablet 5    gabapentin (NEURONTIN) 600 MG tablet TAKE ONE TABLET BY MOUTH 3 TIMES DAILY . . .MAY CAUSE DROWSINESS!! 90 tablet 5    Hydrocortisone, Perianal, (PROCTO-FARNAZ) 1 % cream Apply topically 2 times daily. 1 each 2    carbidopa-levodopa (SINEMET)  MG per tablet TAKE TWO TABLETS BY MOUTH 3 TIMES DAILY 180 tablet 5    insulin detemir (LEVEMIR FLEXTOUCH) 100 UNIT/ML injection pen INJECT 80 UNITS SUBCU NIGHTLY FOR TYPE 2 DIABETES 5 pen 5    SURE COMFORT PEN NEEDLES 31G X 8 MM MISC USE WITH INSULIN  THREE TIMES PER DAY. 100 each 5    ketorolac (ACULAR) 0.5 % ophthalmic solution       losartan (COZAAR) 100 MG tablet TAKE ONE TABLET BY MOUTH EVERY DAY 90 tablet 3    montelukast (SINGULAIR) 10 MG tablet TAKE ONE TABLET BY MOUTH EVERY NIGHT AT BEDTIME 90 tablet 3    LINZESS 290 MCG CAPS capsule TAKE ONE CAPSULE IN THE MORNING BEFORE BREAKFAST 30 capsule 5    insulin aspart (NOVOLOG) 100 UNIT/ML injection vial 25 U TID with meals 1 each 3    blood glucose monitor strips Test 3times a day & as needed for symptoms of irregular blood glucose. 100 strip 3    tiZANidine (ZANAFLEX) 2 MG tablet Take 1 tablet by mouth nightly as needed (muscle spasms) 30 tablet 3    COMFORT EZ PEN NEEDLES 32G X 4 MM MISC USE WITH INSULIN  THREE TIMES PER DAY. 100 each 3    omeprazole (PRILOSEC) 40 MG delayed release capsule TAKE ONE CAPSULE BY MOUTH EVERY DAY 90 capsule 3    nystatin (MYCOSTATIN) 865747 UNIT/GM ointment Apply topically 2 times daily. for yeast 60 Tube 5    nystatin (NYSTATIN) 650668 UNIT/GM powder Apply topically 3 times daily Apply topically 4 times daily.  60 g 3    Diabetic Shoe MISC by Does not apply route 1 each 0    Cholecalciferol (VITAMIN D3) 50 MCG (2000 UT) CAPS Take 1 capsule by mouth      albuterol (ACCUNEB) 1.25 MG/3ML nebulizer solution Inhale 3 mLs into the lungs every 6 hours as needed for Wheezing 360 mL 3    allopurinol (ZYLOPRIM) 100 MG tablet Take 1 tablet by mouth daily      EASY COMFORT INSULIN SYRINGE 31G X 5/16\" 1 ML MISC INJECT AS DIRECTED DAILY 100 each 3    torsemide (DEMADEX) 20 MG tablet Take 20 mg by mouth daily 3 tablets for three days then two tablets daily       blood glucose monitor kit and supplies Use as directed for diabetes TID checks. 1 kit 0    TRUEPLUS INSULIN SYRINGE 30G X 5/16\" 1 ML MISC INJECT AS DIRECTED DAILY 100 each 3    Melatonin 10 MG CAPS Take 10 mg by mouth every evening Takes 20 mg a night      denosumab (PROLIA) 60 MG/ML SOSY SC injection Inject 60 mg into the skin every 6 months      spironolactone (ALDACTONE) 25 MG tablet TAKE ONE TABLET DAILY (Patient taking differently: One tablet in the am and one tablet in the evening) 30 tablet 5    glucose blood VI test strips (ONE TOUCH ULTRA TEST) strip Inject 1 each into the skin daily As needed. 100 each 3    Lancets MISC 1 lancet to check glucose daily 100 each 3    albuterol (PROVENTIL HFA;VENTOLIN HFA) 108 (90 BASE) MCG/ACT inhaler Inhale 2 puffs into the lungs every 6 hours as needed for Wheezing 1 Inhaler 1    OXYGEN 2L NC 12-24 hours (Patient taking differently: nightly as needed 2L NC 12-24 hours) 1 Device 0    aspirin 325 MG tablet Take 325 mg by mouth daily. No current facility-administered medications for this visit. Allergies:   Allergies as of 04/04/2022 - Fully Reviewed 04/04/2022   Allergen Reaction Noted    Aquacel extra hydrofiber [wound dressings]  12/28/2021    Codeine  07/06/2017    Hydrocodone-acetaminophen Nausea Only     Silvadene [silver sulfadiazine]  12/28/2021       Review of Systems:  Constitutional: negative for - chills and fever  Eyes:  negative for - visual disturbance and photophobia  HENMT: negative for - headaches and sinus pain  Respiratory: negative for - cough, hemoptysis, and shortness of breath  Cardiovascular: negative for - chest pain and palpitations  Gastrointestinal: negative for - blood in stools, constipation, diarrhea, nausea, and vomiting  Genito-Urinary: negative for - hematuria, urinary frequency, urinary urgency, and urinary retention  Musculoskeletal: positive for - joint pain, joint stiffness, and joint swelling  Hematological and Lymphatic: negative for - bleeding problems, abnormal bruising, and swollen lymph nodes  Endocrine:  negative for - polydipsia and polyphagia  Allergy/Immunology:  negative for - rhinorrhea, sinus congestion, hives  Integument:  negative for - negative for - rash, change in moles, new or changing lesions  Psychological: negative for - anxiety and depression  Neurological: positive for - memory loss, numbness/tingling, and weakness     PHYSICAL EXAMINATION:  Vitals:  /74   Pulse 72   Ht 5' 2\" (1.575 m)   Wt 295 lb (133.8 kg)   SpO2 98%   BMI 53.96 kg/m²   General appearance:  Alert, well developed, well nourished, in no distress  HEENT:  normocephalic, atraumatic, sclera appear normal, no nasal abnormalities, no rhinorrhea, Ears appear normal, oral mucous membranes are moist without erythema, trachea midline, thyroid is normal, no lymphadenopathy or neck mass. Cardiovascular:  Regular rate and rhythm without murmer. 4+ pitting peripheral edema below the knees and in the hands, No cyanosis or clubbing. No carotid bruits. Pulmonary:  Lungs are clear to auscultation. Breathing appears normal, good expansion, normal effort without use of accessory muscles  Musculoskeletal:  Joints are osteoarthritic  Integument:  No rash, erythema, or pallor  Psychiatric:  Mood, affect, and behavior appear normal      NEUROLOGIC EXAMINATION:  Mental Status:  alert, oriented to person, place, and time.   Speech: Clear without dysarthria or dysphonia  Language:  Fluent without aphasia  Cranial Nerves:   II Visual fields are full to confrontation   III,IV, VI Extraocular movements are full   VII Facial movements are symmetrical without weakness   VIII Hearing is intact   IX,X Shoulder shrug and head rotation strength are intact   XII No tongue atrophy or fasciculations. Normal tongue protrusion. No tongue weakness  Motor:  Normal strength in both upper and lower extremities.  Normal muscle tone and bulk.  Deep tendon reflexes are absent in both upper and lower extremities.  She has an intermittent rest tremor in the right hand.  Mckeon's signs are absent bilaterally.  There is no ankle clonus on either side.    Sensation:  Sensation is reduced to light touch, temperature, and vibration in distal extremities. Coordination:  Rapid alternating movements are mildly bradykinetic.  Finger to nose testing is unimpaired bilaterally. Assessment:       ICD-10-CM    1. Parkinson disease ([address]  G20    2. Diabetic polyneuropathy associated with type 2 diabetes mellitus (HCC)  E11.42    3. Visual hallucination  R44.1    4. Restless legs syndrome  G25.81    5. Nocturnal hypoxemia  G47.34    6. Morbid obesity ([address]  E66.01    She has multiple medical problems. Her PD is stable but she is still fairly immobile. She has a sever diabetic polyneuropathy. She has untreated sleep apnea. I discussed the above with her. She is being evaluated for peripheral vascular disease. Plan:   1. Continue the same medications  2.  FU in 6 months    Electronically signed by Milady Franco MD on 4/4/22

## 2022-04-07 ENCOUNTER — OFFICE VISIT (OUTPATIENT)
Dept: CARDIOLOGY CLINIC | Age: 72
End: 2022-04-07
Payer: MEDICARE

## 2022-04-07 VITALS
SYSTOLIC BLOOD PRESSURE: 118 MMHG | WEIGHT: 284 LBS | HEART RATE: 68 BPM | DIASTOLIC BLOOD PRESSURE: 68 MMHG | HEIGHT: 62 IN | BODY MASS INDEX: 52.26 KG/M2

## 2022-04-07 DIAGNOSIS — I87.2 EDEMA OF BOTH LOWER EXTREMITIES DUE TO PERIPHERAL VENOUS INSUFFICIENCY: ICD-10-CM

## 2022-04-07 DIAGNOSIS — I10 ESSENTIAL HYPERTENSION: ICD-10-CM

## 2022-04-07 DIAGNOSIS — I48.0 PAF (PAROXYSMAL ATRIAL FIBRILLATION) (HCC): Primary | ICD-10-CM

## 2022-04-07 DIAGNOSIS — I73.9 PVD (PERIPHERAL VASCULAR DISEASE) (HCC): ICD-10-CM

## 2022-04-07 DIAGNOSIS — Z86.79 H/O DIASTOLIC DYSFUNCTION: ICD-10-CM

## 2022-04-07 DIAGNOSIS — E66.01 MORBID OBESITY (HCC): ICD-10-CM

## 2022-04-07 PROCEDURE — 3017F COLORECTAL CA SCREEN DOC REV: CPT | Performed by: CLINICAL NURSE SPECIALIST

## 2022-04-07 PROCEDURE — 1036F TOBACCO NON-USER: CPT | Performed by: CLINICAL NURSE SPECIALIST

## 2022-04-07 PROCEDURE — 99214 OFFICE O/P EST MOD 30 MIN: CPT | Performed by: CLINICAL NURSE SPECIALIST

## 2022-04-07 PROCEDURE — 1123F ACP DISCUSS/DSCN MKR DOCD: CPT | Performed by: CLINICAL NURSE SPECIALIST

## 2022-04-07 PROCEDURE — 1090F PRES/ABSN URINE INCON ASSESS: CPT | Performed by: CLINICAL NURSE SPECIALIST

## 2022-04-07 PROCEDURE — G8417 CALC BMI ABV UP PARAM F/U: HCPCS | Performed by: CLINICAL NURSE SPECIALIST

## 2022-04-07 PROCEDURE — 4040F PNEUMOC VAC/ADMIN/RCVD: CPT | Performed by: CLINICAL NURSE SPECIALIST

## 2022-04-07 PROCEDURE — G8427 DOCREV CUR MEDS BY ELIG CLIN: HCPCS | Performed by: CLINICAL NURSE SPECIALIST

## 2022-04-07 PROCEDURE — 93000 ELECTROCARDIOGRAM COMPLETE: CPT | Performed by: CLINICAL NURSE SPECIALIST

## 2022-04-07 PROCEDURE — G8399 PT W/DXA RESULTS DOCUMENT: HCPCS | Performed by: CLINICAL NURSE SPECIALIST

## 2022-04-07 RX ORDER — LOSARTAN POTASSIUM 100 MG/1
50 TABLET ORAL DAILY
Qty: 90 TABLET | Refills: 3 | Status: SHIPPED | OUTPATIENT
Start: 2022-04-07

## 2022-04-07 NOTE — PATIENT INSTRUCTIONS
Decrease your losartan to 50mg daily  Take your metoprolol 25mg at night  Stay on the isosorbide  Follow up in 3 mos With Dr. Nakul Ni  Daily weights and take extra demadex if you gain 2-3 lbs over night or 5-6 lbs in a week   Call with any questions or concerns  Follow up with INGRID Gonzalez CNP for non cardiac problems  Report any new problems  Cardiovascular Fitness-Exercise as tolerated. Cardiac / Healthy Diet  Continue current medications as directed  Continue plan of treatment  It is always recommended that you bring your medications bottles with you to each visit - this is for your safety!

## 2022-04-07 NOTE — PROGRESS NOTES
[address] [place], [address] [address]  Phone: [phone]  Fax: [phone]    OFFICE VISIT:  2022    Laura Farrell - : 1950    Reason For Visit:  Elinor Cast is a 70 y.o. female who is here for Follow-up (pt has low BP after med change), Hypertension, and Atrial Fibrillation  History hypertension and paroxysmal atrial fibrillation, diabetes, Parkinson's disease and diabetic polyneuropathy  2D echo 2019 showed normal LV systolic function and EF 55 to 60%. Mild diastolic dysfunction with no significant valvular disease. Patient has peripheral vascular disease and wounds on lower extremities following with wound care in [place] and seeing DR FREEMAN for her PVD  Tried angiogram on Tues but BP got low and he stopped procedure. Patient reports being diagnosed with COVID in January. Had fluid retention and was evaluated at [facility] and hospitalized last month. She was diuresed and lost 20+ pounds  In the hospital they decreased her losartan but when she went home they had not decreased her dose. She is now taking Lopressor 25 mg daily. Her Betapace was discontinued while hospitalized. She reports that she did have a Jocelyn scan while in the hospital due to elevated troponin but it was okay    Her daughter states that she has been home she is also been having some low blood pressure. Will be as low as 70/40. Patient feels listlessness when this happens. She is not noticed any sustained arrhythmias    Patient has not been seen since 2020-not sure how she was lost to follow-up    She is weighing at home and has been stable. She still has significant fluid retention and worse in her left leg. She has a wound there that is slow to heal    Activity tolerance over the last year has greatly declined.   Has difficulty standing         Subjective  Elinor Cast denies exertional chest pain, shortness of breath, orthopnea, paroxysmal nocturnal dyspnea, syncope, presyncope, arrhythmia, edema and fatigue. The patient denies numbness or weakness to suggest cerebrovascular accident or transient ischemic attack. INGRID Serna CNP is PCP  She follows with [facility] and INGRID Lorenzana is her nephrologist  Dr. Maegan Rutledge is her neurologist  Alyssa Dixon has the following history as recorded in [facility]:    Patient Active Problem List    Diagnosis Date Noted    Coagulation defect ([address] 07/21/2021    At risk for obstructive sleep apnea 03/30/2021    Nocturnal hypoxemia 03/30/2021    Diabetic peripheral neuropathy associated with type 2 diabetes mellitus ([address] 01/28/2021    Ischemic cardiomyopathy 01/28/2021    Edema of both lower extremities due to peripheral venous insufficiency 10/21/2020    Diabetic ulcer of left lower leg associated with type 2 diabetes mellitus, with fat layer exposed ([address] 07/31/2020    Varicose veins of left lower extremity with ulcer of calf (CODE) ([address] 07/31/2020    Stage 4 chronic kidney disease ([address] 05/08/2020    Chronic obstructive pulmonary disease ([address] 05/08/2020    Cellulitis of left lower extremity 11/05/2019    Mixed hyperlipidemia 11/01/2018    Morbid obesity ([address] 10/18/2017    Somnolence, daytime 10/09/2017    Restless legs syndrome 04/06/2017    Hypoesthesia of skin 04/06/2017    PAF (paroxysmal atrial fibrillation) ([address] 02/11/2016    Mild mitral regurgitation by prior echocardiogram 02/11/2016    Hypokalemia 06/26/2015    Edema 06/12/2015    Asthma 04/21/2015    Palpitations 04/28/2014    Fatigue 51/22/1761    Lichen sclerosus 17/20/4030    GERD (gastroesophageal reflux disease) 05/20/2013    Parkinson disease ([address] 05/06/2013     Past Medical History:   Diagnosis Date    Asthma 4/21/2015    Bilateral carpal tunnel syndrome 04/09/2018    sees dr. Eddie Flores.     CHF (congestive heart failure) (HCC)     Colon polyp     Diabetes mellitus (HCC)     GERD (gastroesophageal reflux disease)     HTN (hypertension)     Hyperlipidemia     Mild mitral regurgitation by prior echocardiogram 2016    Morbid obesity ([facility] 10/18/2017    Normal cardiac stress test 4-2-09    no ischemia at attained level of excercise    Palliative care patient 2020    Parkinson disease [facility])     Paroxysmal atrial fibrillation [facility])     sees dr. Vish Angel Post-menopausal     Prolonged emergence from general anesthesia     Restrictive airway disease     Stage 3 chronic kidney disease ([facility] 10/18/2017     Past Surgical History:   Procedure Laterality Date    APPENDECTOMY      CATARACT REMOVAL WITH IMPLANT       SECTION      x3    COLONOSCOPY      Dr Arlin Landa polyp-5 yr recall    COLONOSCOPY N/A 2019    Dr Schwarz Peoples 2 internal hemorrhoids without stigmata, diverticulosis, suboptimal prep--5 yr recall    EYE SURGERY      GALLBLADDER SURGERY  2014    dr Yoni Willis N/CARPAL TUNNEL SURG Right 2018    OPEN CARPAL TUNNEL RELEASE performed by Harry Marino MD at [facility] TYMPANOSTOMY TUBE PLACEMENT      dr Jeffery Horne in [place] UPPER GASTROINTESTINAL ENDOSCOPY       Family History   Problem Relation Age of Onset    High Blood Pressure Father     Diabetes Father     Parkinsonism Father     High Blood Pressure Mother     Diabetes Sister     Other Paternal Grandmother         hiatal hernia    Heart Disease Paternal Grandfather     Colon Cancer Neg Hx     Colon Polyps Neg Hx     Esophageal Cancer Neg Hx     Liver Cancer Neg Hx     Liver Disease Neg Hx     Rectal Cancer Neg Hx     Stomach Cancer Neg Hx      Social History     Tobacco Use    Smoking status: Never Smoker    Smokeless tobacco: Never Used   Substance Use Topics    Alcohol use: No      Current Outpatient Medications   Medication Sig Dispense Refill    losartan (COZAAR) 100 MG tablet Take 0.5 tablets by mouth daily TAKE ONE TABLET BY MOUTH EVERY DAY 90 tablet 3    atorvastatin (LIPITOR) 80 MG tablet Take 80 mg by mouth daily       FARXIGA 10 MG tablet Take 10 mg by mouth every morning       isosorbide mononitrate (IMDUR) 30 MG extended release tablet       metoprolol tartrate (LOPRESSOR) 25 MG tablet       rOPINIRole (REQUIP) 4 MG tablet TAKE TWO TABLETS BY MOUTH EVERY MORNING , ONE AT NOON AND TWO AT BEDTIME (Patient taking differently: TAKE TWO TABLETS BY MOUTH EVERY MORNING , ONE AT NOON AND 2 AT BEDTIME) 150 tablet 5    gabapentin (NEURONTIN) 600 MG tablet TAKE ONE TABLET BY MOUTH 3 TIMES DAILY . . .MAY CAUSE DROWSINESS!! 90 tablet 5    Hydrocortisone, Perianal, (PROCTO-FARNAZ) 1 % cream Apply topically 2 times daily. 1 each 2    carbidopa-levodopa (SINEMET)  MG per tablet TAKE TWO TABLETS BY MOUTH 3 TIMES DAILY 180 tablet 5    insulin detemir (LEVEMIR FLEXTOUCH) 100 UNIT/ML injection pen INJECT 80 UNITS SUBCU NIGHTLY FOR TYPE 2 DIABETES 5 pen 5    SURE COMFORT PEN NEEDLES 31G X 8 MM MISC USE WITH INSULIN  THREE TIMES PER DAY. 100 each 5    montelukast (SINGULAIR) 10 MG tablet TAKE ONE TABLET BY MOUTH EVERY NIGHT AT BEDTIME 90 tablet 3    LINZESS 290 MCG CAPS capsule TAKE ONE CAPSULE IN THE MORNING BEFORE BREAKFAST 30 capsule 5    insulin aspart (NOVOLOG) 100 UNIT/ML injection vial 25 U TID with meals 1 each 3    blood glucose monitor strips Test 3times a day & as needed for symptoms of irregular blood glucose. 100 strip 3    tiZANidine (ZANAFLEX) 2 MG tablet Take 1 tablet by mouth nightly as needed (muscle spasms) 30 tablet 3    COMFORT EZ PEN NEEDLES 32G X 4 MM MISC USE WITH INSULIN  THREE TIMES PER DAY. 100 each 3    omeprazole (PRILOSEC) 40 MG delayed release capsule TAKE ONE CAPSULE BY MOUTH EVERY DAY 90 capsule 3    nystatin (MYCOSTATIN) 243938 UNIT/GM ointment Apply topically 2 times daily. for yeast 60 Tube 5    nystatin (NYSTATIN) 435314 UNIT/GM powder Apply topically 3 times daily Apply topically 4 times daily. 60 g 3    Diabetic Shoe MISC by Does not apply route 1 each 0    Cholecalciferol (VITAMIN D3) 50 MCG (2000 UT) CAPS Take 1 capsule by mouth      EASY COMFORT INSULIN SYRINGE 31G X 5/16\" 1 ML MISC INJECT AS DIRECTED DAILY 100 each 3    torsemide (DEMADEX) 20 MG tablet Take 20 mg by mouth daily IS TAKING 2 IN THE AM EVERY OTHER DAY  1 ON ODD DAYS      TRUEPLUS INSULIN SYRINGE 30G X 5/16\" 1 ML MISC INJECT AS DIRECTED DAILY 100 each 3    Melatonin 10 MG CAPS Take 10 mg by mouth every evening NIGHTLY      denosumab (PROLIA) 60 MG/ML SOSY SC injection Inject 60 mg into the skin every 6 months      spironolactone (ALDACTONE) 25 MG tablet TAKE ONE TABLET DAILY (Patient taking differently: One tablet in the am and one tablet in the evening) 30 tablet 5    glucose blood VI test strips (ONE TOUCH ULTRA TEST) strip Inject 1 each into the skin daily As needed. 100 each 3    Lancets MISC 1 lancet to check glucose daily 100 each 3    OXYGEN 2L NC 12-24 hours (Patient taking differently: nightly as needed 2L NC 12-24 hours) 1 Device 0    ferrous sulfate (IRON 325) 325 (65 Fe) MG tablet Take 325 mg by mouth daily (with breakfast) (Patient not taking: Reported on 4/7/2022)      ketorolac (ACULAR) 0.5 % ophthalmic solution  (Patient not taking: Reported on 4/7/2022)      albuterol (ACCUNEB) 1.25 MG/3ML nebulizer solution Inhale 3 mLs into the lungs every 6 hours as needed for Wheezing 360 mL 3    allopurinol (ZYLOPRIM) 100 MG tablet Take 1 tablet by mouth daily      blood glucose monitor kit and supplies Use as directed for diabetes TID checks. 1 kit 0    albuterol (PROVENTIL HFA;VENTOLIN HFA) 108 (90 BASE) MCG/ACT inhaler Inhale 2 puffs into the lungs every 6 hours as needed for Wheezing 1 Inhaler 1    aspirin 325 MG tablet Take 325 mg by mouth daily. No current facility-administered medications for this visit.      Allergies: Aquacel extra hydrofiber [wound dressings], Codeine, Hydrocodone-acetaminophen, and Silvadene [silver sulfadiazine]    Review of Systems  Constitutional - no significant activity change, appetite change, or unexpected weight change. No fever, chills or diaphoresis. + fatigue. HEENT - no significant rhinorrhea or epistaxis. No tinnitus or significant hearing loss. Eyes - no sudden vision change or amaurosis. Respiratory - no significant wheezing, stridor, apnea or cough. + dyspnea on exertion no resting shortness of breath. Cardiovascular - no exertional chest pain, orthopnea or PND. No sensation of arrhythmia or slow heart rate. No claudication + leg edema. Gastrointestinal - no abdominal swelling or pain. No blood in stool. No severe constipation, diarrhea, nausea, or vomiting. Genitourinary - no difficulty urinating, dysuria, frequency, or urgency. No flank pain or hematuria. Musculoskeletal - no back pain, gait disturbance, or myalgia. Skin - no color change or rash. No pallor. Left lower leg wound  Neurologic - no speech difficulty, facial asymmetry or lateralizing weakness. No seizures, presyncope, syncope, or significant dizziness. Hematologic - no easy bruising or excessive bleeding. Psychiatric - no severe anxiety or insomnia. No confusion. All other review of systems are negative. Objective  Vital Signs - /68   Pulse 68   Ht 5' 2\" (1.575 m)   Wt 284 lb (128.8 kg)   BMI 51.94 kg/m²   General - Jey Sullivan is alert, cooperative, and pleasant. Well groomed. No acute distress. Body habitus is morbidly obese. HEENT - The head is normocephalic. No circumoral cyanosis. Dentition is normal.   EYES -  No Xanthelasma, no arcus senilis, no conjunctival hemorrhages or discharge. Neck - Supple, without increased jugular venous pressures. No carotid bruits. No mass. Respiratory - Lungs are clear bilaterally. No wheezes or rales. Normal effort without use of accessory muscles.   Cardiovascular - Heart has regular rhythm and rate. No murmurs, rubs or gallops. + pedal pulses and no varicosities. Abdominal -  Soft, nontender, nondistended. Bowel sounds are intact. Extremities - No clubbing, cyanosis, or  edema. Musculoskeletal -  No clubbing . No Osler's nodes. Gait -in wheelchair. No kyphosis or scoliosis. Skin -left lower extremity wound with dry dressing. Neurological - No focal signs are identified. Oriented to person, place and time. Psychiatric -  Appropriate affect and mood. Assessment:     Diagnosis Orders   1. PAF (paroxysmal atrial fibrillation) ([facility])  EKG 12 lead   2. Essential hypertension     3. H/O diastolic dysfunction     4. Edema of both lower extremities due to peripheral venous insufficiency     5. Morbid obesity ([facility]     6. PVD (peripheral vascular disease) ([facility]       Data:  BP Readings from Last 3 Encounters:   04/07/22 118/68   04/04/22 112/74   03/09/22 130/80    Pulse Readings from Last 3 Encounters:   04/07/22 68   04/04/22 72   03/09/22 82        Wt Readings from Last 3 Encounters:   04/07/22 284 lb (128.8 kg)   04/04/22 295 lb (133.8 kg)   03/09/22 295 lb (133.8 kg)   EKG today shows normal sinus rhythm with rate of 68. As before old anterior inferior infarct. Stable EKG    Recent hospitalization at [facility] with medication adjustments due to some hypotension  Blood pressure and heart rate controlled today. Current management includes beta-blocker, ARB, long-acting nitrate and diuretic  Remains on aspirin statin     Reviewed recent notes hospitalization in January at [facility]. 2D echo unremarkable preserved LV function albeit  difficult study  CT negative for PE but possible pneumonia versus fluid  Her sotalol was discontinued while hospitalized and put on low-dose beta-blocker. She was given isosorbide. Had increase in troponin. Daughter reports negative Jocelyn scan    Having some hypotension at home.   Will cut her losartan back to half a tablet to help with hypotension have her start taking her beta-blocker in the evening  She has lost about 20 pounds of fluid. We discussed the importance of low-sodium diet, cutting out soda which she drinks a fair amount and elevating feet appropriately  In the meantime we discussed watching for any recurrent sustained arrhythmias    She will follow back up with vascular for reattempt of lower extremity study and possible intervention. She continues to follow with wound care for nonhealing wound on left leg    She was seen by nephrologist while hospitalized there but her regular nephrologist is Dr. Darrel Denise and INGRID Stokes. She follow-up with him as well    Ongoing peripheral edema and SEXTON in activity tolerance multifactorial.  Again explained the balance of keeping fluid off and using diuretics as well as preventing worsening kidney function. States taking medications as prescribed  Stable cardiovascular status. No evidence of overt heart failure, angina or dysrhythmia. 30 minutes were spent preparing, reviewing and seeing patient. All questions answered    Plan  Decrease your losartan to 50mg daily  Take your metoprolol 25mg at night  Stay on the isosorbide  Follow up in 3 mos With Dr. Oscar Bo  Daily weights and take extra demadex if you gain 2-3 lbs over night or 5-6 lbs in a week   Call with any questions or concerns  Follow up with INGRID Eden - CNP for non cardiac problems  Report any new problems  Cardiovascular Fitness-Exercise as tolerated. Cardiac / Healthy Diet  Continue current medications as directed  Continue plan of treatment  It is always recommended that you bring your medications bottles with you to each visit - this is for your safety! INGRID Leung    EMR dragon/transcription disclaimer: Much of this encounter note is electronic transcription/translation of spoken language to printed tach.  Electronic translation of spoken language may be erroneous, or at times, nonsensical words or phrases may be inadvertently transcribed.  Although, I have reviewed the note for such errors, some may still exist.

## 2022-04-08 RX ORDER — LINACLOTIDE 290 UG/1
CAPSULE, GELATIN COATED ORAL
Qty: 30 CAPSULE | Refills: 5 | Status: SHIPPED | OUTPATIENT
Start: 2022-04-08 | End: 2022-09-20

## 2022-04-11 ENCOUNTER — PATIENT MESSAGE (OUTPATIENT)
Dept: PRIMARY CARE CLINIC | Age: 72
End: 2022-04-11

## 2022-04-11 DIAGNOSIS — R52 PAIN: ICD-10-CM

## 2022-04-11 NOTE — TELEPHONE ENCOUNTER
From: Dalila Ca  To: Dora Mina  Sent: 4/11/2022 10:44 AM CDT  Subject: Willi     Wade Mcconnell need a refill on her Tramadol please
Provider needs to review PDMP    PDMP Monitoring:    Last PDMP Sandi Wolf as Reviewed [facility]):  Review User Review Instant Review Result   Debra Hsu 1/13/2022 11:53 AM Reviewed PDMP [1]     Urine Drug Screenings (1 yr)    No resulted procedures found.        Medication Contract and Consent for Opioid Use Documents Filed     Patient Documents     Type of Document Status Date Received Received By Description    Medication Contract Received 5/24/2019 10:27 AM ROSALBA [place] neuro    Medication Contract Received 12/17/2019 10:48 AM MEGHAN Sylvester Medication Agreement Ian Alvarez 11/25/2019
The patient is a 77y Female complaining of fall.

## 2022-04-12 RX ORDER — TRAMADOL HYDROCHLORIDE 50 MG/1
50 TABLET ORAL 2 TIMES DAILY PRN
Qty: 60 TABLET | Refills: 0 | Status: SHIPPED | OUTPATIENT
Start: 2022-04-12 | End: 2022-07-13 | Stop reason: SDUPTHER

## 2022-04-15 ENCOUNTER — APPOINTMENT (OUTPATIENT)
Dept: CT IMAGING | Age: 72
End: 2022-04-15
Payer: MEDICARE

## 2022-04-15 ENCOUNTER — APPOINTMENT (OUTPATIENT)
Dept: GENERAL RADIOLOGY | Age: 72
End: 2022-04-15
Payer: MEDICARE

## 2022-04-15 ENCOUNTER — HOSPITAL ENCOUNTER (EMERGENCY)
Age: 72
Discharge: HOME | End: 2022-04-15
Attending: EMERGENCY MEDICINE
Payer: MEDICARE

## 2022-04-15 VITALS
OXYGEN SATURATION: 94 % | HEART RATE: 66 BPM | SYSTOLIC BLOOD PRESSURE: 100 MMHG | DIASTOLIC BLOOD PRESSURE: 58 MMHG | TEMPERATURE: 97.5 F | RESPIRATION RATE: 18 BRPM

## 2022-04-15 DIAGNOSIS — S32.039A CLOSED FRACTURE OF THIRD LUMBAR VERTEBRA, UNSPECIFIED FRACTURE MORPHOLOGY, INITIAL ENCOUNTER (HCC): ICD-10-CM

## 2022-04-15 DIAGNOSIS — I95.9 HYPOTENSION, UNSPECIFIED HYPOTENSION TYPE: ICD-10-CM

## 2022-04-15 DIAGNOSIS — N18.9 CHRONIC KIDNEY DISEASE, UNSPECIFIED CKD STAGE: Primary | ICD-10-CM
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
## 2022-04-15 LAB
ALBUMIN SERPL-MCNC: 3.9 G/DL (ref 3.5–5.2)
ALP BLD-CCNC: 83 U/L (ref 35–104)
ALT SERPL-CCNC: <5 U/L (ref 5–33)
ANION GAP SERPL CALCULATED.3IONS-SCNC: 18 MMOL/L (ref 7–19)
AST SERPL-CCNC: 10 U/L (ref 5–32)
BACTERIA: NEGATIVE /HPF
BILIRUB SERPL-MCNC: 0.3 MG/DL (ref 0.2–1.2)
BILIRUBIN URINE: NEGATIVE
BLOOD, URINE: NEGATIVE
BUN BLDV-MCNC: 48 MG/DL (ref 8–23)
CALCIUM SERPL-MCNC: 9.9 MG/DL (ref 8.8–10.2)
CHLORIDE BLD-SCNC: 100 MMOL/L (ref 98–111)
CLARITY: CLEAR
CO2: 23 MMOL/L (ref 22–29)
COLOR: YELLOW
CREAT SERPL-MCNC: 1.8 MG/DL (ref 0.5–0.9)
CRYSTALS, UA: ABNORMAL /HPF
EPITHELIAL CELLS, UA: 5 /HPF (ref 0–5)
GFR AFRICAN AMERICAN: 34
GFR NON-AFRICAN AMERICAN: 28
GLUCOSE BLD-MCNC: 134 MG/DL (ref 74–109)
GLUCOSE URINE: 500 MG/DL
HCT VFR BLD CALC: 37.9 % (ref 37–47)
HEMOGLOBIN: 11.7 G/DL (ref 12–16)
HYALINE CASTS: 8 /HPF (ref 0–8)
KETONES, URINE: NEGATIVE MG/DL
LEUKOCYTE ESTERASE, URINE: ABNORMAL
MCH RBC QN AUTO: 30.3 PG (ref 27–31)
MCHC RBC AUTO-ENTMCNC: 30.9 G/DL (ref 33–37)
MCV RBC AUTO: 98.2 FL (ref 81–99)
NITRITE, URINE: NEGATIVE
PDW BLD-RTO: 15 % (ref 11.5–14.5)
PH UA: 5 (ref 5–8)
PLATELET # BLD: 182 K/UL (ref 130–400)
PMV BLD AUTO: 11 FL (ref 9.4–12.3)
POTASSIUM REFLEX MAGNESIUM: 4.9 MMOL/L (ref 3.5–5)
PROTEIN UA: NEGATIVE MG/DL
RBC # BLD: 3.86 M/UL (ref 4.2–5.4)
RBC UA: 0 /HPF (ref 0–4)
SODIUM BLD-SCNC: 141 MMOL/L (ref 136–145)
SPECIFIC GRAVITY UA: 1.01 (ref 1–1.03)
TOTAL PROTEIN: 6.3 G/DL (ref 6.6–8.7)
TROPONIN: 0.02 NG/ML (ref 0–0.03)
UROBILINOGEN, URINE: 0.2 E.U./DL
WBC # BLD: 11.2 K/UL (ref 4.8–10.8)
WBC UA: 6 /HPF (ref 0–5)

## 2022-04-15 PROCEDURE — 73030 X-RAY EXAM OF SHOULDER: CPT

## 2022-04-15 PROCEDURE — 72125 CT NECK SPINE W/O DYE: CPT

## 2022-04-15 PROCEDURE — 71045 X-RAY EXAM CHEST 1 VIEW: CPT

## 2022-04-15 PROCEDURE — 87086 URINE CULTURE/COLONY COUNT: CPT

## 2022-04-15 PROCEDURE — 73060 X-RAY EXAM OF HUMERUS: CPT

## 2022-04-15 PROCEDURE — 85027 COMPLETE CBC AUTOMATED: CPT

## 2022-04-15 PROCEDURE — 81001 URINALYSIS AUTO W/SCOPE: CPT

## 2022-04-15 PROCEDURE — 90471 IMMUNIZATION ADMIN: CPT | Performed by: EMERGENCY MEDICINE

## 2022-04-15 PROCEDURE — 90715 TDAP VACCINE 7 YRS/> IM: CPT | Performed by: EMERGENCY MEDICINE

## 2022-04-15 PROCEDURE — 36415 COLL VENOUS BLD VENIPUNCTURE: CPT

## 2022-04-15 PROCEDURE — 80053 COMPREHEN METABOLIC PANEL: CPT

## 2022-04-15 PROCEDURE — 72192 CT PELVIS W/O DYE: CPT

## 2022-04-15 PROCEDURE — 93005 ELECTROCARDIOGRAM TRACING: CPT | Performed by: EMERGENCY MEDICINE

## 2022-04-15 PROCEDURE — 70450 CT HEAD/BRAIN W/O DYE: CPT

## 2022-04-15 PROCEDURE — 73080 X-RAY EXAM OF ELBOW: CPT

## 2022-04-15 PROCEDURE — 72131 CT LUMBAR SPINE W/O DYE: CPT

## 2022-04-15 PROCEDURE — 84484 ASSAY OF TROPONIN QUANT: CPT

## 2022-04-15 PROCEDURE — 99282 EMERGENCY DEPT VISIT SF MDM: CPT

## 2022-04-15 PROCEDURE — 73560 X-RAY EXAM OF KNEE 1 OR 2: CPT

## 2022-04-15 PROCEDURE — 6360000002 HC RX W HCPCS: Performed by: EMERGENCY MEDICINE

## 2022-04-15 PROCEDURE — 73502 X-RAY EXAM HIP UNI 2-3 VIEWS: CPT

## 2022-04-15 RX ADMIN — TETANUS TOXOID, REDUCED DIPHTHERIA TOXOID AND ACELLULAR PERTUSSIS VACCINE, ADSORBED 0.5 ML: 5; 2.5; 8; 8; 2.5 SUSPENSION INTRAMUSCULAR at 14:32

## 2022-04-15 ASSESSMENT — PAIN - FUNCTIONAL ASSESSMENT: PAIN_FUNCTIONAL_ASSESSMENT: 0-10

## 2022-04-15 ASSESSMENT — PAIN SCALES - GENERAL: PAINLEVEL_OUTOF10: 9

## 2022-04-15 ASSESSMENT — PAIN DESCRIPTION - PAIN TYPE: TYPE: ACUTE PAIN

## 2022-04-15 NOTE — ED PROVIDER NOTES
[facility]  eMERGENCY dEPARTMENT eNCOUnter      Pt Name: Laura Farrell  MRN: [id number]  [place] 1950  Date of evaluation: 4/15/2022  Provider: Reji Zamora MD    CHIEF COMPLAINT       Chief Complaint   Patient presents with    Fall     a few days ago    Hypotension     recently decreased meds but not improved          HISTORY OF PRESENT ILLNESS   (Location/Symptom, Timing/Onset,Context/Setting, Quality, Duration, Modifying Factors, Severity)  Note limiting factors. Laura Farrell is a 70 y.o. female who presents to the emergency department due to mechanical fall 2 days ago. Since patient's fall she has had pain in left elbow, left upper arm and left shoulder since. Also having pain in neck and low back. Also hit her head. Also has pain in the buttocks. Has some bruising in the left buttock since her fall but hurting more in the right hip. Pain in the right knee but has not had trouble bearing weight on the right leg. Patient very clear that her fall couple of days ago was a mechanical fall. Additionally, has been having problems with low blood pressure for over a month. Because of this she has had blood pressure medications gradually adjusted but is still having issues with low blood pressure. Has history of chronic kidney disease and when she went to her office visit today blood pressure was found to be low which is why patient was sent here. She denies any feelings of lightheadedness. Said she does feel little more fatigue since her blood pressures been running low but this is no different than its been for over a month now. No vision changes numbness or weakness. No chest pain or shortness of breath. No abdominal pain nausea vomiting diarrhea. Currently, resting comfortably and asymptomatic except for the complaints of discomfort related to her fall a couple days ago. HPI    NursingNotes were reviewed.     REVIEW OF SYSTEMS    (2-9 systems for level 4, 10 or more for 12/11/2014    dr Juan Daniel Serna N/CARPAL TUNNEL SURG Right 8/29/2018    OPEN CARPAL TUNNEL RELEASE performed by Joleen Josue MD at [facility] TYMPANOSTOMY TUBE PLACEMENT  2013    dr Rosie Dobson in [address]       Discharge Medication List as of 4/15/2022  4:38 PM      CONTINUE these medications which have NOT CHANGED    Details   traMADol (ULTRAM) 50 MG tablet Take 1 tablet by mouth 2 times daily as needed for Pain for up to 30 days. , Disp-60 tablet, R-0Normal      insulin aspart (NOVOLOG) 100 UNIT/ML injection vial 25 U TID with meals, Disp-1 each, R-3Normal      LINZESS 290 MCG CAPS capsule TAKE ONE CAPSULE BY MOUTH EVERY MORNING BEFORE BREAKFAST, Disp-30 capsule, R-5Normal      losartan (COZAAR) 100 MG tablet Take 0.5 tablets by mouth daily TAKE ONE TABLET BY MOUTH EVERY DAY, Disp-90 tablet, R-3Normal      atorvastatin (LIPITOR) 80 MG tablet Take 80 mg by mouth daily Historical Med      FARXIGA 10 MG tablet Take 10 mg by mouth every morning , DAWHistorical Med      isosorbide mononitrate (IMDUR) 30 MG extended release tablet Historical Med      metoprolol tartrate (LOPRESSOR) 25 MG tablet Historical Med      ferrous sulfate (IRON 325) 325 (65 Fe) MG tablet Take 325 mg by mouth daily (with breakfast)Historical Med      rOPINIRole (REQUIP) 4 MG tablet TAKE TWO TABLETS BY MOUTH EVERY MORNING , ONE AT NOON AND TWO AT BEDTIME, Disp-150 tablet, R-5Normal      gabapentin (NEURONTIN) 600 MG tablet TAKE ONE TABLET BY MOUTH 3 TIMES DAILY . . .MAY CAUSE DROWSINESS!!, Disp-90 tablet, R-5Normal      Hydrocortisone, Perianal, (PROCTO-FARNAZ) 1 % cream Apply topically 2 times daily. , Disp-1 each, R-2, Normal      carbidopa-levodopa (SINEMET)  MG per tablet TAKE TWO TABLETS BY MOUTH 3 TIMES DAILY, Disp-180 tablet, R-5Normal      insulin detemir (LEVEMIR FLEXTOUCH) 100 UNIT/ML injection pen INJECT 80 UNITS SUBCU NIGHTLY FOR TYPE 2 DIABETES, Disp-5 pen, R-5Normal      !! SURE COMFORT PEN NEEDLES 31G X 8 MM MISC Disp-100 each, R-5, Normal      ketorolac (ACULAR) 0.5 % ophthalmic solution Historical Med      montelukast (SINGULAIR) 10 MG tablet TAKE ONE TABLET BY MOUTH EVERY NIGHT AT BEDTIME, Disp-90 tablet, R-3Normal      !! blood glucose monitor strips Test 3times a day & as needed for symptoms of irregular blood glucose., Disp-100 strip, R-3, Normal      tiZANidine (ZANAFLEX) 2 MG tablet Take 1 tablet by mouth nightly as needed (muscle spasms), Disp-30 tablet, R-3Normal      !! COMFORT EZ PEN NEEDLES 32G X 4 MM MISC Disp-100 each, R-3, Normal      omeprazole (PRILOSEC) 40 MG delayed release capsule TAKE ONE CAPSULE BY MOUTH EVERY DAY, Disp-90 capsule, R-3NO REFILLSNormal      nystatin (MYCOSTATIN) 207490 UNIT/GM ointment Apply topically 2 times daily. for yeast, Disp-60 Tube, R-5, Normal      nystatin (NYSTATIN) 873752 UNIT/GM powder Apply topically 3 times daily Apply topically 4 times daily. , Topical, 3 TIMES DAILY Starting Tue 3/30/2021, Disp-60 g, R-3, Normal      Diabetic Shoe MISC Starting Thu 1/28/2021, Disp-1 each, R-0, Print      Cholecalciferol (VITAMIN D3) 50 MCG (2000 UT) CAPS Take 1 capsule by mouthHistorical Med      albuterol (ACCUNEB) 1.25 MG/3ML nebulizer solution Inhale 3 mLs into the lungs every 6 hours as needed for Wheezing, Disp-360 mL,R-3Normal      allopurinol (ZYLOPRIM) 100 MG tablet Take 1 tablet by mouth dailyHistorical Med      EASY COMFORT INSULIN SYRINGE 31G X 5/16\" 1 ML MISC INJECT AS DIRECTED DAILY, Disp-100 each,R-3NO REFILLSNormal      torsemide (DEMADEX) 20 MG tablet Take 20 mg by mouth daily IS TAKING 2 IN THE AM EVERY OTHER DAY  1 [facility]SHisR[facility]ical Med      blood glucose monitor kit and supplies Use as directed for diabetes TID checks. , Disp-1 kit, R-0, Normal      TRUEPLUS INSULIN SYRINGE 30G X 5/16\" 1 ML MISC Disp-100 each, R-3, Normal      Melatonin 10 MG CAPS Take 10 mg by mouth every evening Male   Other Topics Concern    None   Social History Narrative    None     Social Determinants of Health     Financial Resource Strain:     Difficulty of Paying Living Expenses: Not on file   Food Insecurity:     Worried About Running Out of Food in the Last Year: Not on file    Parrish of Food in the Last Year: Not on file   Transportation Needs:     Lack of Transportation (Medical): Not on file    Lack of Transportation (Non-Medical): Not on file   Physical Activity:     Days of Exercise per Week: Not on file    Minutes of Exercise per Session: Not on file   Stress:     Feeling of Stress : Not on file   Social Connections:     Frequency of Communication with Friends and Family: Not on file    Frequency of Social Gatherings with Friends and Family: Not on file    Attends Jain Services: Not on file    Active Member of [address] [place] [address] [employer] or Organizations: Not on file    Attends Club or Organization Meetings: Not on file    Marital Status: Not on file   Intimate Partner Violence:     Fear of Current or Ex-Partner: Not on file    Emotionally Abused: Not on file    Physically Abused: Not on file    Sexually Abused: Not on file   Housing Stability:     Unable to Pay for Housing in the Last Year: Not on file    Number of [place] in the Last Year: Not on file    Unstable Housing in the Last Year: Not on file       SCREENINGS    Cornelia Coma Scale  Eye Opening: Spontaneous  Best Verbal Response: Oriented  Best Motor Response: Obeys commands  Cornelia Coma Scale Score: 15        PHYSICAL EXAM    (up to 7 for level 4, 8 or more for level 5)     ED Triage Vitals [04/15/22 1107]   BP Temp Temp src Pulse Resp SpO2 Height Weight   108/60 97.5 °F (36.4 °C) -- 74 18 98 % -- --       Physical Exam  Vitals reviewed. Constitutional:       General: She is not in acute distress. Appearance: She is well-developed. She is obese. She is not ill-appearing. HENT:      Head: Normocephalic and atraumatic.       Ears: Comments: No hemotympanum     Mouth/Throat:      Mouth: Mucous membranes are moist.      Pharynx: Oropharynx is clear. Eyes:      General: No scleral icterus. Extraocular Movements: Extraocular movements intact. Pupils: Pupils are equal, round, and reactive to light. Visual Fields: Right eye visual fields normal and left eye visual fields normal.      Comments: No hyphema   Neck:      Vascular: No JVD. Cardiovascular:      Rate and Rhythm: Normal rate and regular rhythm. Pulses: Normal pulses. Heart sounds: Normal heart sounds. Pulmonary:      Effort: Pulmonary effort is normal. No respiratory distress. Breath sounds: Normal breath sounds. Chest:      Chest wall: No tenderness. Abdominal:      General: There is no distension. Palpations: Abdomen is soft. Tenderness: There is no abdominal tenderness. There is no guarding or rebound. Musculoskeletal:         General: No deformity. Normal range of motion. Arms:       Cervical back: Normal range of motion and neck supple. Tenderness present. No rigidity. Right knee: No deformity, effusion or ecchymosis. Normal range of motion. Tenderness (mild) present. Legs:       Comments: No T-spine tenderness. Mild tenderness in the L and C-spine without step-off. Pelvis stable. Mild tenderness in the right hip. Mild tenderness in the left upper arm elbow and shoulder without any deformity. Has some bruising and healing abrasions in the left upper arm. Mild tenderness in the right knee without deformity. Normal range of motion throughout all 4 extremities. Neurovascularly intact distally all 4 extremities   Lymphadenopathy:      Cervical: No cervical adenopathy. Skin:     General: Skin is warm and dry. Capillary Refill: Capillary refill takes less than 2 seconds. Neurological:      General: No focal deficit present. Mental Status: She is alert and oriented to person, place, and time. Cranial Nerves: Cranial nerves are intact. Sensory: Sensation is intact. Motor: Motor function is intact. Coordination: Coordination is intact. Gait: Gait is intact. Psychiatric:         Mood and Affect: Mood normal.         Behavior: Behavior normal.         DIAGNOSTIC RESULTS     EKG: All EKG's are interpreted by the Emergency Department Physician who either signs or Co-signs this chart in the absence of a cardiologist.    Normal sinus rhythm. Normal QT. Borderline ST changes. Low voltage    RADIOLOGY:   Non-plain film images such as CT, Ultrasound and MRI are read by the radiologist. Rustam Agustin images are visualized and preliminarily interpreted by the emergency physician with the below findings:        Interpretation per the Radiologist below, if available at the time of this note:    XR SHOULDER LEFT (MIN 2 VIEWS)   Final Result   1. Moderate arthritic changes left shoulder with no acute osseous   injury. Signed by Dr Yelitza Rees      XR HUMERUS LEFT (MIN 2 VIEWS)   Final Result   No fracture, no acute osseous abnormality. Signed by Dr Kelli Jama. [place]      XR ELBOW LEFT (MIN 3 VIEWS)   Final Result   No fracture, no acute osseous abnormality. Signed by Dr Kelli Jama. [place]      XR CHEST PORTABLE   Final Result   Persistent but improved central vascular congestion and   probable pulmonary venous hypertension. Normal heart size. No   consolidating infiltrates. Signed by Dr Kelli Jama. [place]      XR HIP 2-3 VW W PELVIS RIGHT   Final Result   No acute fracture or dislocation. Healed fractures of the   superior and inferior pubic rami are demonstrated bilaterally. Signed by Dr Kelli Jama. [place]      XR KNEE RIGHT (1-2 VIEWS)   Final Result   1. Moderate tricompartmental osteoarthritis with no acute osseous   injury. Signed by Dr Jennifer Waldron   Final Result   1.  Minimal impression of the superior endplate of L3 is age-indeterminate without prior studies. A chronic process is favored   with a regional Schmorl's node. No convincing acute appearing lumbar   vertebral fracture. 2. Grade 1 spondylolisthesis at L4/5 secondary to advanced facet   arthropathy. Moderate canal stenosis with mild to moderate foraminal   stenosis suspected at this L4/5 level. Signed by Dr Marily Brandt Additional Contrast? None   Final Result   1. There are old inferior bilateral pelvic fractures. No acute bony   injury identified of the pelvis including coccyx and sacrum. Signed by Dr Elizabeth Martínez   Final Result   Extensive artifact, particularly on the reformatted   images, however without evidence of a fracture or acute osseous   cervical spine findings. Multilevel degenerative changes are present. Chronic bilateral maxillary sinusitis. Signed by Dr Deandra Lyn. Sivan      CT HEAD WO CONTRAST   Final Result   Impression: No acute intracranial abnormality. There are chronic   findings associated with aging. Chronic bilateral maxillary sinusitis. Signed by Dr Deandra Lyn.  Sivan            ED BEDSIDE ULTRASOUND:   Performed by ED Physician - none    LABS:  Labs Reviewed   CULTURE, URINE - Abnormal; Notable for the following components:       Result Value    Urine Culture, Routine   (*)     Value: <50,000 CFU/ml  Mixed skin brenden present      Organism Strep agalactiae (Beta Strep Group B) (*)     All other components within normal limits    Narrative:     ORDER#: [id number]                          ORDERED BY: Janine Villanueva  SOURCE: Urine Clean Catch                  COLLECTED:  04/15/22 13:54  ANTIBIOTICS AT RUSSELL.:                      RECEIVED :  04/15/22 16:36   CBC - Abnormal; Notable for the following components:    WBC 11.2 (*)     RBC 3.86 (*)     Hemoglobin 11.7 (*)     MCHC 30.9 (*)     RDW 15.0 (*)     All other components within normal limits   COMPREHENSIVE METABOLIC PANEL W/ REFLEX TO MG FOR LOW K - Abnormal; Notable for the following components:    Glucose 134 (*)     BUN 48 (*)     CREATININE 1.8 (*)     GFR Non- 28 (*)     GFR  34 (*)     Total Protein 6.3 (*)     ALT <5 (*)     All other components within normal limits   URINALYSIS WITH REFLEX TO CULTURE - Abnormal; Notable for the following components:    Glucose, Ur 500 (*)     Leukocyte Esterase, Urine SMALL (*)     All other components within normal limits   MICROSCOPIC URINALYSIS - Abnormal; Notable for the following components:    Bacteria, UA NEGATIVE (*)     Crystals, UA NEG (*)     WBC, UA 6 (*)     All other components within normal limits   TROPONIN       All other labs were within normal range or not returned as of this dictation. EMERGENCY DEPARTMENT COURSE and DIFFERENTIALDIAGNOSIS/MDM:   Vitals:    Vitals:    04/15/22 1107 04/15/22 1400 04/15/22 1415   BP: 108/60 (!) 104/59 (!) 100/58   Pulse: 74 67 66   Resp: 18     Temp: 97.5 °F (36.4 °C)     SpO2: 98% 95% 94%       MDM  Patient denies urinary symptoms so doubt UTI. Patient declined empiric antibiotics. No other complaints on repeat exam    Discussed patient's imaging results with Dr. Luz Elena Page. Recommended LSO and follow-up. Patient fitted for LSO brace. Anxious to be discharged. Resting comfortably no distress. BP little low but patient and daughter said this is actually better than its been for the past month. She is stable for discharge and see no indication for admission and patient is anxious to be discharged home. Recommended she follow-up with Dr. Luz Elena Page and primary care return to the ER for change or worsening symptoms or new concerns. Patient agreeable plan    CONSULTS:  None    PROCEDURES:  Unless otherwise notedbelow, none     Procedures    FINAL IMPRESSION     1. Chronic kidney disease, unspecified CKD stage    2. Fall, initial encounter    3.  Closed fracture of third lumbar vertebra, unspecified fracture morphology, initial encounter ([facility] [address]    4.  Hypotension, unspecified hypotension type          DISPOSITION/PLAN   DISPOSITION Decision To Discharge 04/15/2022 04:35:59 PM      PATIENT REFERRED TO:  @FUP@    DISCHARGE MEDICATIONS:  Discharge Medication List as of 4/15/2022  4:38 PM             (Please note that portions of this note were completed with a voice recognition program.  Efforts were made to edit the dictations butoccasionally words are mis-transcribed.)    Luis Bonner MD (electronically signed)  AttendingEmergency Physician          Luis Bonner MD  04/18/22 1118

## 2022-04-17 LAB
ORGANISM: ABNORMAL
URINE CULTURE, ROUTINE: ABNORMAL
URINE CULTURE, ROUTINE: ABNORMAL

## 2022-04-18 LAB
EKG P AXIS: 52 DEGREES
EKG P-R INTERVAL: 196 MS
EKG Q-T INTERVAL: 398 MS
EKG QRS DURATION: 100 MS
EKG QTC CALCULATION (BAZETT): 410 MS
EKG T AXIS: 24 DEGREES

## 2022-04-18 PROCEDURE — 93010 ELECTROCARDIOGRAM REPORT: CPT | Performed by: INTERNAL MEDICINE

## 2022-04-18 ASSESSMENT — ENCOUNTER SYMPTOMS
ABDOMINAL PAIN: 0
EYE PAIN: 0
VOMITING: 0
DIARRHEA: 0
BACK PAIN: 1
SHORTNESS OF BREATH: 0

## 2022-04-20 ENCOUNTER — PATIENT MESSAGE (OUTPATIENT)
Dept: PRIMARY CARE CLINIC | Age: 72
End: 2022-04-20

## 2022-04-20 DIAGNOSIS — G20 PARKINSON DISEASE (HCC): ICD-10-CM

## 2022-04-20 DIAGNOSIS — I48.0 PAF (PAROXYSMAL ATRIAL FIBRILLATION) (HCC): Primary | ICD-10-CM

## 2022-04-20 RX ORDER — WHEELCHAIR
EACH MISCELLANEOUS
Qty: 1 EACH | Refills: 0 | Status: SHIPPED | OUTPATIENT
Start: 2022-04-20

## 2022-04-20 NOTE — TELEPHONE ENCOUNTER
From: Julito Camarena  To: Wolf Yan  Sent: 4/20/2022 10:08 AM CDT  Subject: Wheelchair     How do I go about getting Mark Forrester a new wheelchair on her insurance?

## 2022-04-21 ENCOUNTER — TELEPHONE (OUTPATIENT)
Dept: PRIMARY CARE CLINIC | Age: 72
End: 2022-04-21

## 2022-04-21 DIAGNOSIS — S32.009D CLOSED FRACTURE OF TRANSVERSE PROCESS OF LUMBAR VERTEBRA WITH ROUTINE HEALING, SUBSEQUENT ENCOUNTER: Primary | ICD-10-CM

## 2022-04-21 NOTE — TELEPHONE ENCOUNTER
----- Message from Pedrito Davenport sent at 4/21/2022  3:02 PM CDT -----  Subject: Message to Provider    QUESTIONS  Information for Provider? Daughter is calling in stating Mom has a   fracture of the lower back, was seen in the ED on 4/15/2022 [facility] ED. pt was   told to see Juan M Fu MD Neurosurgery, pt has not been able to get   ahold of them. pt would like a call back on what to do.   ---------------------------------------------------------------------------  --------------  CALL BACK INFO  What is the best way for the office to contact you? OK to leave message on   voicemail  Preferred Call Back Phone Number? [phone]  ---------------------------------------------------------------------------  --------------  SCRIPT ANSWERS  Relationship to Patient? Other  Representative Name? Marlen Metcalf  Is the Representative on the appropriate HIPAA document in Epic?  Yes

## 2022-04-21 NOTE — TELEPHONE ENCOUNTER
Lety Oneil I put the referral in and someone from Dr. Daxa Mercado office should call her but you could give her the phone number to call them and they will be able to see that I put the referral in.

## 2022-04-22 ENCOUNTER — PATIENT MESSAGE (OUTPATIENT)
Dept: PRIMARY CARE CLINIC | Age: 72
End: 2022-04-22

## 2022-04-22 RX ORDER — AMOXICILLIN 500 MG/1
500 CAPSULE ORAL 3 TIMES DAILY
Qty: 21 CAPSULE | Refills: 0 | Status: SHIPPED | OUTPATIENT
Start: 2022-04-22 | End: 2022-04-29

## 2022-04-22 NOTE — TELEPHONE ENCOUNTER
Spoke with pt's daughter and notified her of results. She states she would [facility] [place].

## 2022-04-22 NOTE — TELEPHONE ENCOUNTER
From: Daniel Gabriel  To: Sowmya Mcrae  Sent: 4/22/2022 5:40 AM CDT  Subject: Test results     On the 15th Pennelope Mis went to the er for low blood pressure they did a urine test and the results are in her chart can you look at it and see what it says please thank you

## 2022-04-25 ENCOUNTER — TELEPHONE (OUTPATIENT)
Dept: NEUROLOGY | Age: 72
End: 2022-04-25

## 2022-04-25 NOTE — TELEPHONE ENCOUNTER
Pt was seen in the ED on 4/15 and told to f/u with Dr. Luz Elena Page for a back fracture. Pt was also seen by her PCP and was referred to our office on 4/21/22. Pt has not been called yet to scheduled appt and daughter states she is in a lot of pain still.      Please call daughter Asad Moran (on HIPAA) at [phone] to schedule new pt appointment

## 2022-04-26 ENCOUNTER — TELEPHONE (OUTPATIENT)
Dept: NEUROSURGERY | Age: 72
End: 2022-04-26

## 2022-04-26 NOTE — TELEPHONE ENCOUNTER
[facility] Neurosurgery New Patient Questionnaire    1. Diagnosis/Reason for Referral?    Closed fracture of transverse process of lumbar vertebra with routine healing, subsequent encounter    2. Who is completing questionnaire? Patient x Caregiver Family      3. Has the patient had any previous spinal/brain surgeries? NO      A. If yes, what is the name of the facility in which the surgery was performed? B. Procedure/Surgery performed? C. Who was the surgeon? D. When was the surgery? MM/YY       E. Did the patient improve after the surgery? 4. Is this a second opinion? If yes, Dr. Paula Morales would like to review patient first before making the appointment. 5. Have MRI Images been obtain within the last year? Yes x No      XR  CT x     If yes, where [facility]   If yes, what part of the body? Lumbar x Cervical  Thoracic  Brain     If yes, when was it obtained? 4-15-22    Note: if the scan was performed at a facility other than [facility], the disc will need to be brought to the appointment or we need to reach out to obtain the disc. A. Was the patient instructed to provide the disc? Yes   No x      8. Has the patient had a NCV/EMG within the last year? Yes  No x   If yes, where was it performed and date? MM/YY  Location:      9. Has the patient been to Physical Therapy? Yes  No x     If yes, what location, how long attended, and last visit? Location:        Therapy Lasted:    Date of Last Visit:      10. Has the patient been to Pain Management? Yes  No x     If yes, what location and last visit     Location:   Last Visit:   Is it helping?

## 2022-04-26 NOTE — TELEPHONE ENCOUNTER
Returned call to [facility] to schedule appointment for Anne, [employer] for [facility] to return my call at [phone]

## 2022-04-27 ENCOUNTER — HOSPITAL ENCOUNTER (OUTPATIENT)
Dept: GENERAL RADIOLOGY | Age: 72
Discharge: HOME | End: 2022-04-27
Payer: MEDICARE

## 2022-04-27 ENCOUNTER — OFFICE VISIT (OUTPATIENT)
Dept: NEUROSURGERY | Age: 72
End: 2022-04-27
Payer: MEDICARE

## 2022-04-27 VITALS
WEIGHT: 284 LBS | SYSTOLIC BLOOD PRESSURE: 124 MMHG | HEIGHT: 62 IN | DIASTOLIC BLOOD PRESSURE: 78 MMHG | BODY MASS INDEX: 52.26 KG/M2

## 2022-04-27 DIAGNOSIS — S32.030D CLOSED COMPRESSION FRACTURE OF L3 LUMBAR VERTEBRA WITH ROUTINE HEALING, SUBSEQUENT ENCOUNTER: ICD-10-CM

## 2022-04-27 DIAGNOSIS — S39.92XD INJURY OF LOW BACK, SUBSEQUENT ENCOUNTER: ICD-10-CM

## 2022-04-27 DIAGNOSIS — S32.030D CLOSED COMPRESSION FRACTURE OF L3 LUMBAR VERTEBRA WITH ROUTINE HEALING, SUBSEQUENT ENCOUNTER: Primary | ICD-10-CM

## 2022-04-27 PROCEDURE — 1090F PRES/ABSN URINE INCON ASSESS: CPT | Performed by: NEUROLOGICAL SURGERY

## 2022-04-27 PROCEDURE — 99204 OFFICE O/P NEW MOD 45 MIN: CPT | Performed by: NEUROLOGICAL SURGERY

## 2022-04-27 PROCEDURE — 72100 X-RAY EXAM L-S SPINE 2/3 VWS: CPT

## 2022-04-27 PROCEDURE — G8427 DOCREV CUR MEDS BY ELIG CLIN: HCPCS | Performed by: NEUROLOGICAL SURGERY

## 2022-04-27 PROCEDURE — G8399 PT W/DXA RESULTS DOCUMENT: HCPCS | Performed by: NEUROLOGICAL SURGERY

## 2022-04-27 PROCEDURE — G8417 CALC BMI ABV UP PARAM F/U: HCPCS | Performed by: NEUROLOGICAL SURGERY

## 2022-04-27 PROCEDURE — 3017F COLORECTAL CA SCREEN DOC REV: CPT | Performed by: NEUROLOGICAL SURGERY

## 2022-04-27 PROCEDURE — 1036F TOBACCO NON-USER: CPT | Performed by: NEUROLOGICAL SURGERY

## 2022-04-27 PROCEDURE — 4040F PNEUMOC VAC/ADMIN/RCVD: CPT | Performed by: NEUROLOGICAL SURGERY

## 2022-04-27 PROCEDURE — 1123F ACP DISCUSS/DSCN MKR DOCD: CPT | Performed by: NEUROLOGICAL SURGERY

## 2022-04-27 NOTE — PROGRESS NOTES
[facility]  Office Visit        Chief Complaint   Patient presents with   Blase Liming Consultation     back pain. HISTORY OF PRESENT ILLNESS:      The patient is a 70 y.o. female with multiple medical problems as well as morbid obesity (BMI 52) who presents for neurosurgical evaluation of acute lower back pain after a fall ~2 weeks ago. She states that she fell from standing. She cannot recall exactly how she landed but she did sustain abrasions to her left arm. She did not seek medical attention immediately but presented to the ED ~2d later complaining of severe lower back pain. She had CT scans done of her head, cervical, thoracic and lumbar spine as well as the pelvis. The CT of her lumbar spine revealed an age-indeterminate compression deformity in the superior endplate of L3 and she has been referred for neurosurgical evaluation. She continues to endorse severe back pain. The pain is located across her sacrum and radiates into her buttocks and hips. She endorses some leg pain as well but cannot describe a clear radicular pattern. Her pain is worsened by prolonged sitting and laying on her back. MEDICAL HISTORY:             Past Medical History:   Diagnosis Date    Asthma 4/21/2015    Bilateral carpal tunnel syndrome 04/09/2018    sees dr. Jaden Yancey.     CHF (congestive heart failure) (HCC)     Colon polyp     Diabetes mellitus ([address]     GERD (gastroesophageal reflux disease)     HTN (hypertension)     Hyperlipidemia     Mild mitral regurgitation by prior echocardiogram 2/11/2016    Morbid obesity ([address] 10/18/2017    Normal cardiac stress test 4-2-09    no ischemia at attained level of excercise    Palliative care patient 08/05/2020    Parkinson disease [facility])     Paroxysmal atrial fibrillation [facility])     sees dr. Gabriella Campos Post-menopausal     Prolonged emergence from general anesthesia     Restrictive airway disease     Stage 3 chronic kidney disease ([facility] 10/18/2017 Past Surgical History:   Procedure Laterality Date    APPENDECTOMY      CATARACT REMOVAL WITH IMPLANT       SECTION      x3    COLONOSCOPY      Dr Dayana Early polyp-5 yr recall    COLONOSCOPY N/A 2019    Dr Nikki Galeas 2 internal hemorrhoids without stigmata, diverticulosis, suboptimal prep--5 yr recall    EYE SURGERY      GALLBLADDER SURGERY  2014    dr Juliette Dennis ARTHROSCOPY     [facility] N/CARPAL TUNNEL SURG Right 2018    OPEN CARPAL TUNNEL RELEASE performed by Thad Choi MD at [address] [place] [address] TYMPANOSTOMY TUBE PLACEMENT      dr Snell in [facility] ENDOSCOPY           Current Outpatient Medications:     amoxicillin (AMOXIL) 500 MG capsule, Take 1 capsule by mouth 3 times daily for 7 days, Disp: 21 capsule, Rfl: 0    Misc. Devices [facility]) [address] [facility], [facility] wheelchair for decreased mobility. , Disp: 1 each, Rfl: 0    traMADol (ULTRAM) 50 MG tablet, Take 1 tablet by mouth 2 times daily as needed for Pain for up to 30 days. , Disp: 60 tablet, Rfl: 0    insulin aspart (NOVOLOG) 100 UNIT/ML injection vial, 25 U TID with meals, Disp: 1 each, Rfl: 3    LINZESS 290 MCG CAPS capsule, TAKE ONE CAPSULE BY MOUTH EVERY MORNING BEFORE BREAKFAST, Disp: 30 capsule, Rfl: 5    losartan (COZAAR) 100 MG tablet, Take 0.5 tablets by mouth daily TAKE ONE TABLET BY MOUTH EVERY DAY, Disp: 90 tablet, Rfl: 3    atorvastatin (LIPITOR) 80 MG tablet, Take 80 mg by mouth daily , Disp: , Rfl:     FARXIGA 10 MG tablet, Take 10 mg by mouth every morning , Disp: , Rfl:     isosorbide mononitrate (IMDUR) 30 MG extended release tablet, , Disp: , Rfl:     metoprolol tartrate (LOPRESSOR) 25 MG tablet, , Disp: , Rfl:     ferrous sulfate (IRON 325) 325 (65 Fe) MG tablet, Take 325 mg by mouth daily (with breakfast) , Disp: , Rfl:     rOPINIRole (REQUIP) 4 MG tablet, TAKE TWO TABLETS BY MOUTH EVERY MORNING , ONE AT NOON AND TWO AT BEDTIME (Patient taking differently: TAKE TWO TABLETS BY MOUTH EVERY MORNING , ONE AT NOON AND 2 AT BEDTIME), Disp: 150 tablet, Rfl: 5    gabapentin (NEURONTIN) 600 MG tablet, TAKE ONE TABLET BY MOUTH 3 TIMES DAILY . . .MAY CAUSE DROWSINESS!!, Disp: 90 tablet, Rfl: 5    Hydrocortisone, Perianal, (PROCTO-FARNAZ) 1 % cream, Apply topically 2 times daily. , Disp: 1 each, Rfl: 2    carbidopa-levodopa (SINEMET)  MG per tablet, TAKE TWO TABLETS BY MOUTH 3 TIMES DAILY, Disp: 180 tablet, Rfl: 5    insulin detemir (LEVEMIR FLEXTOUCH) 100 UNIT/ML injection pen, INJECT 80 UNITS SUBCU NIGHTLY FOR TYPE 2 DIABETES, Disp: 5 pen, Rfl: 5    SURE COMFORT PEN NEEDLES 31G X 8 MM MISC, USE WITH INSULIN  THREE TIMES PER DAY., Disp: 100 each, Rfl: 5    ketorolac (ACULAR) 0.5 % ophthalmic solution, , Disp: , Rfl:     montelukast (SINGULAIR) 10 MG tablet, TAKE ONE TABLET BY MOUTH EVERY NIGHT AT BEDTIME, Disp: 90 tablet, Rfl: 3    blood glucose monitor strips, Test 3times a day & as needed for symptoms of irregular blood glucose., Disp: 100 strip, Rfl: 3    tiZANidine (ZANAFLEX) 2 MG tablet, Take 1 tablet by mouth nightly as needed (muscle spasms), Disp: 30 tablet, Rfl: 3    COMFORT EZ PEN NEEDLES 32G X 4 MM MISC, USE WITH INSULIN  THREE TIMES PER DAY., Disp: 100 each, Rfl: 3    omeprazole (PRILOSEC) 40 MG delayed release capsule, TAKE ONE CAPSULE BY MOUTH EVERY DAY, Disp: 90 capsule, Rfl: 3    nystatin (MYCOSTATIN) 742911 UNIT/GM ointment, Apply topically 2 times daily. for yeast, Disp: 60 Tube, Rfl: 5    nystatin (NYSTATIN) 912951 UNIT/GM powder, Apply topically 3 times daily Apply topically 4 times daily. , Disp: 60 g, Rfl: 3    Diabetic Shoe MISC, by Does not apply route, Disp: 1 each, Rfl: 0    Cholecalciferol (VITAMIN D3) 50 MCG (2000 UT) CAPS, Take 1 capsule by mouth, Disp: , Rfl:     albuterol (ACCUNEB) 1.25 MG/3ML nebulizer solution, Inhale 3 mLs into the lungs every 6 hours as needed for Wheezing, Disp: 360 mL, Rfl: 3   allopurinol (ZYLOPRIM) 100 MG tablet, Take 1 tablet by mouth daily, Disp: , Rfl:     EASY COMFORT INSULIN SYRINGE 31G X 5/16\" 1 ML MISC, INJECT AS DIRECTED DAILY, Disp: 100 each, Rfl: 3    torsemide (DEMADEX) 20 MG tablet, Take 20 mg by mouth daily IS TAKING 2 IN THE AM EVERY OTHER DAY  1 ON ODD DAYS, Disp: , Rfl:     blood glucose monitor kit and supplies, Use as directed for diabetes TID checks. , Disp: 1 kit, Rfl: 0    TRUEPLUS INSULIN SYRINGE 30G X 5/16\" 1 ML MISC, INJECT AS DIRECTED DAILY, Disp: 100 each, Rfl: 3    Melatonin 10 MG CAPS, Take 10 mg by mouth every evening NIGHTLY, Disp: , Rfl:     denosumab (PROLIA) 60 MG/ML SOSY SC injection, Inject 60 mg into the skin every 6 months, Disp: , Rfl:     spironolactone (ALDACTONE) 25 MG tablet, TAKE ONE TABLET DAILY (Patient taking differently: One tablet in the am and one tablet in the evening), Disp: 30 tablet, Rfl: 5    glucose blood VI test strips (ONE TOUCH ULTRA TEST) strip, Inject 1 each into the skin daily As needed. , Disp: 100 each, Rfl: 3    Lancets MISC, 1 lancet to check glucose daily, Disp: 100 each, Rfl: 3    albuterol (PROVENTIL HFA;VENTOLIN HFA) 108 (90 BASE) MCG/ACT inhaler, Inhale 2 puffs into the lungs every 6 hours as needed for Wheezing, Disp: 1 Inhaler, Rfl: 1    OXYGEN, 2L NC 12-24 hours (Patient taking differently: nightly as needed 2L NC 12-24 hours), Disp: 1 Device, Rfl: 0    aspirin 325 MG tablet, Take 325 mg by mouth daily.   , Disp: , Rfl:     Allergies:  Aquacel extra hydrofiber [wound dressings], Codeine, Hydrocodone-acetaminophen, and Silvadene [silver sulfadiazine]    Social History:   Social History     Tobacco Use   Smoking Status Never Smoker   Smokeless Tobacco Never Used     Social History     Substance and Sexual Activity   Alcohol Use No         Family History:   Family History   Problem Relation Age of Onset    High Blood Pressure Father     Diabetes Father     Parkinsonism Father     High Blood Pressure Mother  Diabetes Sister     Other Paternal Grandmother         hiatal hernia    Heart Disease Paternal Grandfather     Colon Cancer Neg Hx     Colon Polyps Neg Hx     Esophageal Cancer Neg Hx     Liver Cancer Neg Hx     Liver Disease Neg Hx     Rectal Cancer Neg Hx     Stomach Cancer Neg Hx        REVIEW OF SYSTEMS:    Constitutional: Negative. HENT: Negative. Eyes: Negative. Respiratory: Negative. Cardiovascular: Negative. Gastrointestinal: Negative. Genitourinary: Negative. Musculoskeletal: Positive for back pain. Skin: Negative. Neurological: Negative. Endo/Heme/Allergies: Negative. Psychiatric/Behavioral: Negative.         Review of Systems was obtained by the medical assistant and reviewed by myself. PHYSICAL EXAM:    Vitals:    04/27/22 1415   BP: 124/78       Constitutional: Appears well-developed and well-nourished. Eyes  conjunctiva normal.  Pupils react to light  Ear, nose,throat -Normal pinna and tragus, No scars, or lesions over external nose or ears, no obvious atrophy of tongue  Neck- symmetric, trachea midline, no jugular vein distension  Respiration- chest wall symmetric, normal effort without use of accessory muscles  Musculoskeletal  no significant wasting of muscles noted, no bony deformities, symmetric bulk  Extremities- no clubbing, cyanosis or edema  Skin  warm, dry, and intact. No rash,erythema, or pallor.   Psychiatric  mood, affect, and behavior appear normal.       NEUROLOGIC EXAM:    MENTAL STATUS: Alert, oriented, thought content appropriate    CRANIAL NERVES: PERRL, EOMI, symmetric facies, tongue midline    MOTOR:     Right Upper Extremity:    Deltoid: 5/5  Biceps: 5/5  Triceps: 5/5  Wrist Extension: 5/5  Finger Abduction: 5/5      Left Upper Extremity:    Deltoid: 5/5  Biceps: 5/5  Triceps: 5/5  Wrist Extension: 5/5  Finger Abduction: 5/5      Right Lower Extremity:    Hip Flexion: 5/5  Knee Extension: 5/5  Ankle Plantarflexion: 5/5  Ankle Dorsiflexion: 5/5        Left Lower Extremity:    Hip Flexion: 5/5  Knee Extension: 5/5  Ankle Plantarflexion: 5/5  Ankle Dorsiflexion: 5/5        SOMATOSENSORY:     Right Upper Extremity: normal light touch sensation  Left Upper Extremity: normal light touch sensation  Right Lower Extremity: normal light touch sensation  Left Lower Extremity: normal light touch sensation      REFLEXES:    Biceps: 1+  Patella: 1+    Mckeon's: Negative      COORDINATION and GAIT:    Gait: Not tested due to pain      DATA:    Lab Results   Component Value Date    WBC 11.2 (H) 04/15/2022    HGB 11.7 (L) 04/15/2022    HCT 37.9 04/15/2022    MCV 98.2 04/15/2022     04/15/2022     Lab Results   Component Value Date     04/15/2022    K 4.9 04/15/2022     04/15/2022    CO2 23 04/15/2022    BUN 48 (H) 04/15/2022    CREATININE 1.8 (H) 04/15/2022    GLUCOSE 134 (H) 04/15/2022    CALCIUM 9.9 04/15/2022    PROT 6.3 (L) 04/15/2022    LABALBU 3.9 04/15/2022    BILITOT 0.3 04/15/2022    ALKPHOS 83 04/15/2022    AST 10 04/15/2022    ALT <5 (A) 04/15/2022    LABGLOM 28 (A) 04/15/2022    GFRAA 34 (L) 04/15/2022    GLOB 2.7 11/21/2016     Lab Results   Component Value Date    INR 0.98 08/04/2020    INR 0.97 08/16/2018    INR 1.08 03/17/2014    PROTIME 12.9 08/04/2020    PROTIME 12.8 08/16/2018    PROTIME 13.5 03/17/2014       No results found. IMAGING:    My interpretation of imaging studies: CT of the lumbar spine reviewed. I concur with the radiology report. Minimal superior-endplate compression of L3 and an adjacent Schmorl's node. There is DDD at L2/3, L4/5 and L5/S1 and minimal anterolisthesis of L4 on L5 with multilevel facet arthropathy. ASSESSMENT AND PLAN:  This is a 70 y.o. female who presents for neurosurgical evaluation of lower back pain following a fall ~2 weeks ago.   She does have an age-indeterminate (and minimal) compression deformity at L3 but this does not correlate well with the site of her pain, which is primarily sacral in location. She does not have any evidence of sacral fracture on CT. I recommended that we obtain repeat standing x-rays to ensure that her alignment remains stable and that there has not been any further height loss at L3 (though I suspect this fracture is chronic). I recommended she continue her current regimen for pain control and I will plan to follow up with her in 1 month.             Kenya Valladares MD

## 2022-05-02 ENCOUNTER — PATIENT MESSAGE (OUTPATIENT)
Dept: PRIMARY CARE CLINIC | Age: 72
End: 2022-05-02

## 2022-05-02 DIAGNOSIS — I83.022 VARICOSE VEINS OF LEFT LOWER EXTREMITY WITH ULCER OF CALF (CODE) (HCC): ICD-10-CM

## 2022-05-02 RX ORDER — TIZANIDINE 2 MG/1
2 TABLET ORAL NIGHTLY PRN
Qty: 30 TABLET | Refills: 3 | Status: SHIPPED | OUTPATIENT
Start: 2022-05-02

## 2022-05-02 RX ORDER — HYDROCODONE BITARTRATE AND ACETAMINOPHEN 10; 325 MG/1; MG/1
1 TABLET ORAL 2 TIMES DAILY PRN
Qty: 60 TABLET | Refills: 0 | Status: SHIPPED | OUTPATIENT
Start: 2022-05-02 | End: 2022-06-01

## 2022-05-02 NOTE — TELEPHONE ENCOUNTER
Provider needs to review PDMP    PDMP Monitoring:    Last PDMP Karen Jarvis as Reviewed [facility]):  Review User Review Instant Review Result   Nidia Hur 1/13/2022 11:53 AM Reviewed PDMP [1]     Urine Drug Screenings (1 yr)    No resulted procedures found.        Medication Contract and Consent for Opioid Use Documents Filed     Patient Documents     Type of Document Status Date Received Received By Description    Medication Contract Received 5/24/2019 10:27 AM ROSALBA Hamilton neuro    Medication Contract Received 12/17/2019 10:48 AM MEGHAN Myrick Medication Agreement Darnelle Quiet 11/25/2019

## 2022-05-02 NOTE — TELEPHONE ENCOUNTER
From: Glenn Mcgovern  To: Elpidio Hashimoto  Sent: 5/2/2022 10:20 AM CDT  Subject: Refill    [facility] needs a refill on Tizanidine and hydrocdone please

## 2022-05-03 ENCOUNTER — TELEPHONE (OUTPATIENT)
Dept: NEUROLOGY | Age: 72
End: 2022-05-03

## 2022-05-03 NOTE — TELEPHONE ENCOUNTER
Patients daughter called wanting the results of Xray of spine. Daughter says her mother is in a lot of pain and cant sit up, is having to lay on her side in a hospital bed at home and is very uncomfortable. Daughter requesting results & Next steps for her mother.

## 2022-05-04 NOTE — TELEPHONE ENCOUNTER
I was able to speak to patients daughter Beverly Ivory, and she was given the message. She states her mother would have to go to [facility] and get a sedated mri. I explained all of this to the provider and he states he would need to see patient again. I called daughter and was able to speak to hr.   She is now scheduled for tomorrow @ 1 pm

## 2022-05-05 ENCOUNTER — OFFICE VISIT (OUTPATIENT)
Dept: NEUROSURGERY | Age: 72
End: 2022-05-05
Payer: MEDICARE

## 2022-05-05 VITALS
HEIGHT: 62 IN | DIASTOLIC BLOOD PRESSURE: 67 MMHG | WEIGHT: 278 LBS | HEART RATE: 72 BPM | BODY MASS INDEX: 51.16 KG/M2 | SYSTOLIC BLOOD PRESSURE: 104 MMHG

## 2022-05-05 DIAGNOSIS — S39.92XD INJURY OF LOW BACK, SUBSEQUENT ENCOUNTER: Primary | ICD-10-CM

## 2022-05-05 DIAGNOSIS — S32.030D CLOSED COMPRESSION FRACTURE OF L3 LUMBAR VERTEBRA WITH ROUTINE HEALING, SUBSEQUENT ENCOUNTER: ICD-10-CM

## 2022-05-05 PROCEDURE — 4040F PNEUMOC VAC/ADMIN/RCVD: CPT | Performed by: NEUROLOGICAL SURGERY

## 2022-05-05 PROCEDURE — G8399 PT W/DXA RESULTS DOCUMENT: HCPCS | Performed by: NEUROLOGICAL SURGERY

## 2022-05-05 PROCEDURE — 1090F PRES/ABSN URINE INCON ASSESS: CPT | Performed by: NEUROLOGICAL SURGERY

## 2022-05-05 PROCEDURE — G8417 CALC BMI ABV UP PARAM F/U: HCPCS | Performed by: NEUROLOGICAL SURGERY

## 2022-05-05 PROCEDURE — 1036F TOBACCO NON-USER: CPT | Performed by: NEUROLOGICAL SURGERY

## 2022-05-05 PROCEDURE — 3017F COLORECTAL CA SCREEN DOC REV: CPT | Performed by: NEUROLOGICAL SURGERY

## 2022-05-05 PROCEDURE — G8427 DOCREV CUR MEDS BY ELIG CLIN: HCPCS | Performed by: NEUROLOGICAL SURGERY

## 2022-05-05 PROCEDURE — 1123F ACP DISCUSS/DSCN MKR DOCD: CPT | Performed by: NEUROLOGICAL SURGERY

## 2022-05-05 PROCEDURE — 99213 OFFICE O/P EST LOW 20 MIN: CPT | Performed by: NEUROLOGICAL SURGERY

## 2022-05-05 NOTE — PROGRESS NOTES
NEUROSURGERY FOLLOW UP      Chief Complaint:   Chief Complaint   Patient presents with    Follow-up     needs a new H & P         Interval Update:  Unplanned follow-up due to persistent and severe pelvic/coccygeal pain. Since our last visit, the pain has localized more clearly to the lower sacrum and coccygeal region. She cannot lie on her back or sit upright without severe pain. She states that the only position she can find that is comfortable is lying on her side or sitting on the commode. She denies any radicular leg pain, numbness or focal weakness. She has had great difficulty mobilizing due to pain and today presents in a wheelchair. Standing x-rays obtained after her last visit did not show any progressive height loss from the age-indeterminate compression fracture that was identified on the CT scan in the Emergency Department on 4/15. HPI: The patient is a 70 y.o. female with multiple medical problems as well as morbid obesity (BMI 52) who presents for neurosurgical evaluation of acute lower back pain after a fall ~2 weeks ago. She states that she fell from standing. She cannot recall exactly how she landed but she did sustain abrasions to her left arm. She did not seek medical attention immediately but presented to the ED ~2d later complaining of severe lower back pain. She had CT scans done of her head, cervical, thoracic and lumbar spine as well as the pelvis. The CT of her lumbar spine revealed an age-indeterminate compression deformity in the superior endplate of L3 and she has been referred for neurosurgical evaluation. She continues to endorse severe back pain. The pain is located across her sacrum and radiates into her buttocks and hips. She endorses some leg pain as well but cannot describe a clear radicular pattern. Her pain is worsened by prolonged sitting and laying on her back. ROS:    Constitutional: Negative. HENT: Negative. Eyes: Negative.     Respiratory: Negative. Cardiovascular: Negative. Gastrointestinal: Negative. Genitourinary: Negative. Musculoskeletal: Positive for back pain. Skin: Negative. Neurological: Negative. Endo/Heme/Allergies: Negative. Psychiatric/Behavioral: Negative. Review of systems was obtained by the medical assistant and reviewed by myself. Objective:    /67   Pulse 72   Ht 5' 2\" (1.575 m)   Wt 278 lb (126.1 kg)   BMI 50.85 kg/m²         Physical Exam:    General: alert, cooperative, no distress  Cardiorespiratory: unlabored breathing      Neurologic Exam:    Mental Status: Alert, oriented, thought content appropriate  Cranial Nerves: PERRL, EOMI, symmetric facies, tongue midline  Motor: Motor exam is symmetrical 5 out of 5 all extremities bilaterally  Somatosensory: normal light touch sensation      Imaging:    CT scans of the lumbar spine and pelvis re-reviewed with special attention to the sacrum and coccyx. There is no obvious sacral fracture or coccygeal fracture. Assessment and Plan:  70 y.o. F returns for unplanned follow-up due to severe back pain following a fall ~4 weeks ago. Her imaging only reveals an age-indeterminate L3 compression fracture. Her pain has localized more clearly to the sacrum and coccyx however no clear imaging abnormalities were evident there on a recent CT scan. I offered her an MRI scan to attempt to identify further pathology that may not be evident on a CT scan, but I also explained that a non-displaced sacral or coccygeal fracture would not be treated with any type of surgical intervention regardless of the MRI findings. She did not wish to proceed with the MRI. I recommended that she obtain a sacral/coccygeal support pillow and continue to treat her symptoms with rest, ice and medications as needed. I will plan to follow up with her in six weeks.         Electronically signed by Alfred Humphries MD on 5/5/2022 at 1:16 PM

## 2022-05-16 RX ORDER — INSULIN DETEMIR 100 [IU]/ML
INJECTION, SOLUTION SUBCUTANEOUS
Qty: 15 ML | Refills: 5 | Status: SHIPPED | OUTPATIENT
Start: 2022-05-16

## 2022-05-27 RX ORDER — PEN NEEDLE, DIABETIC 31 GX3/16"
NEEDLE, DISPOSABLE MISCELLANEOUS
Qty: 100 EACH | Refills: 3 | Status: SHIPPED | OUTPATIENT
Start: 2022-05-27 | End: 2022-10-28

## 2022-06-08 ENCOUNTER — TELEMEDICINE (OUTPATIENT)
Dept: PRIMARY CARE CLINIC | Age: 72
End: 2022-06-08
Payer: MEDICARE

## 2022-06-08 DIAGNOSIS — S32.009D CLOSED FRACTURE OF TRANSVERSE PROCESS OF LUMBAR VERTEBRA WITH ROUTINE HEALING, SUBSEQUENT ENCOUNTER: ICD-10-CM

## 2022-06-08 DIAGNOSIS — G20 PARKINSON DISEASE (HCC): Primary | ICD-10-CM

## 2022-06-08 PROCEDURE — 1124F ACP DISCUSS-NO DSCNMKR DOCD: CPT | Performed by: FAMILY MEDICINE

## 2022-06-08 PROCEDURE — 3017F COLORECTAL CA SCREEN DOC REV: CPT | Performed by: FAMILY MEDICINE

## 2022-06-08 PROCEDURE — 1090F PRES/ABSN URINE INCON ASSESS: CPT | Performed by: FAMILY MEDICINE

## 2022-06-08 PROCEDURE — G8427 DOCREV CUR MEDS BY ELIG CLIN: HCPCS | Performed by: FAMILY MEDICINE

## 2022-06-08 PROCEDURE — 99212 OFFICE O/P EST SF 10 MIN: CPT | Performed by: FAMILY MEDICINE

## 2022-06-08 PROCEDURE — G8399 PT W/DXA RESULTS DOCUMENT: HCPCS | Performed by: FAMILY MEDICINE

## 2022-06-08 ASSESSMENT — ENCOUNTER SYMPTOMS
VOMITING: 0
WHEEZING: 0
DIARRHEA: 0
ABDOMINAL PAIN: 0
COUGH: 0
NAUSEA: 0
EYE DISCHARGE: 0
BACK PAIN: 1
COLOR CHANGE: 0

## 2022-06-08 NOTE — PROGRESS NOTES
Verna Kassandra, [facility], [place]otah Ambrose  Phone:  [phone]      2022     TELEHEALTH EVALUATION -- Audio/Visual (During EXY[place]- public health emergency)    HPI:    Alyssa Dixon (:  1950) has requested an audio/video evaluation for the following concern(s):    Patient is seen today via video visit because she is needing a wheelchair. She has a history of a compression fracture in her lumbar spine and has a lot of pain related to this. She states she has difficulty getting around because of the pain. She would like a regular wheelchair to use at home and when she is going out places. She states that she does have 1 currently but it is \"rickety\". She states that she would like a new one. Review of Systems   Constitutional: Positive for fatigue. Negative for activity change, appetite change and fever. HENT: Negative for congestion and nosebleeds. Eyes: Negative for discharge. Respiratory: Negative for cough and wheezing. Cardiovascular: Negative for chest pain and leg swelling. Gastrointestinal: Negative for abdominal pain, diarrhea, nausea and vomiting. Genitourinary: Negative for difficulty urinating, frequency and urgency. Musculoskeletal: Positive for arthralgias and back pain. Negative for gait problem. Skin: Negative for color change and rash. Neurological: Negative for dizziness and headaches. Hematological: Does not bruise/bleed easily. Psychiatric/Behavioral: Negative for sleep disturbance and suicidal ideas. Prior to Visit Medications    Medication Sig Taking? Authorizing Provider   SURE COMFORT PEN NEEDLES 31G X 8 MM MISC USE WITH INSULIN THREE TIMES PER DAY.  Yes Garret Quinn MD   insulin detemir (LEVEMIR FLEXTOUCH) 100 UNIT/ML injection pen INJECT 80 UNITS SUBQ EVERY NIGHT AT BEDTIME AS DIRECTED  Patient taking differently: 50 units in am and 20u at pm Yes INGRID Serna - CNP   tiZANidine (ZANAFLEX) 2 MG tablet Take 1 tablet by mouth nightly as needed (muscle spasms) Yes Guy Brar MD   [facility] [address] [facility] wheelchair for decreased mobility. Yes INGRID Benson CNP   insulin aspart (NOVOLOG) 100 UNIT/ML injection vial 25 U TID with meals Yes INGRID Cruz CNP   LINZESS 290 MCG CAPS capsule TAKE ONE CAPSULE BY MOUTH EVERY MORNING BEFORE BREAKFAST Yes INGRID Benson CNP   losartan (COZAAR) 100 MG tablet Take 0.5 tablets by mouth daily TAKE ONE TABLET BY MOUTH EVERY DAY Yes INGRID Esposito   atorvastatin (LIPITOR) 80 MG tablet Take 80 mg by mouth daily  Yes Historical Provider, MD   isosorbide mononitrate (IMDUR) 30 MG extended release tablet  Yes Historical Provider, MD   metoprolol tartrate (LOPRESSOR) 25 MG tablet  Yes Historical Provider, MD   rOPINIRole (REQUIP) 4 MG tablet TAKE TWO TABLETS BY MOUTH EVERY MORNING , ONE AT NOON AND TWO AT BEDTIME  Patient taking differently: TAKE TWO TABLETS BY MOUTH EVERY MORNING , ONE AT NOON AND 2 AT BEDTIME Yes Sweta Serrano MD   gabapentin (NEURONTIN) 600 MG tablet TAKE ONE TABLET BY MOUTH 3 TIMES DAILY . . .MAY CAUSE DROWSINESS!! Yes Sweta Serrano MD   carbidopa-levodopa (SINEMET)  MG per tablet TAKE TWO TABLETS BY MOUTH 3 TIMES DAILY Yes Sweta Serrano MD   ketorolac (ACULAR) 0.5 % ophthalmic solution  Yes Historical Provider, MD   montelukast (SINGULAIR) 10 MG tablet TAKE ONE TABLET BY MOUTH EVERY NIGHT AT BEDTIME Yes INGRID Benson CNP   blood glucose monitor strips Test 3times a day & as needed for symptoms of irregular blood glucose. Yes INGRID Benson CNP   COMFORT EZ PEN NEEDLES 32G X 4 MM MISC USE WITH INSULIN  THREE TIMES PER DAY. Yes Guy Brar MD   omeprazole (PRILOSEC) 40 MG delayed release capsule TAKE ONE CAPSULE BY MOUTH EVERY DAY Yes INGRID Benson CNP   nystatin (MYCOSTATIN) 468410 UNIT/GM ointment Apply topically 2 times daily. for yeast Yes INGRID Eden CNP   nystatin (NYSTATIN) 036497 UNIT/GM powder Apply topically 3 times daily Apply topically 4 times daily. Yes INGRID Eden CNP   Diabetic Shoe MISC by Does not apply route Yes INGRID Eden CNP   Cholecalciferol (VITAMIN D3) 50 MCG (2000 UT) CAPS Take 1 capsule by mouth Yes Historical Provider, MD   albuterol (ACCUNEB) 1.25 MG/3ML nebulizer solution Inhale 3 mLs into the lungs every 6 hours as needed for Wheezing Yes INGRID Eden CNP   allopurinol (ZYLOPRIM) 100 MG tablet Take 1 tablet by mouth daily Yes Historical Provider, MD   [address] [place] [address] X 5/16\" 1 ML MISC INJECT AS DIRECTED DAILY Yes INGRID Eden CNP   torsemide (DEMADEX) 20 MG tablet Take 20 mg by mouth daily IS TAKING 2 IN THE AM EVERY OTHER DAY  1 ON ODD DAYS Yes Historical Provider, MD   blood glucose monitor kit and supplies Use as directed for diabetes TID checks. Yes INGRID Eden CNP   TRUEPLUS INSULIN SYRINGE 30G X 5/16\" 1 ML MISC INJECT AS DIRECTED DAILY Yes INGRID Eden CNP   Melatonin 10 MG CAPS Take 10 mg by mouth every evening NIGHTLY Yes Historical Provider, MD   denosumab (PROLIA) 60 MG/ML SOSY SC injection Inject 60 mg into the skin every 6 months Yes Historical Provider, MD   spironolactone (ALDACTONE) 25 MG tablet TAKE ONE TABLET DAILY  Patient taking differently: One tablet in the am and one tablet in the evening Yes INGRID Ferrera   glucose blood VI test strips (ONE TOUCH ULTRA TEST) strip Inject 1 each into the skin daily As needed.  Yes INGRID Eden CNP   Lancets MISC 1 lancet to check glucose daily Yes INGRID Eden CNP   albuterol (PROVENTIL HFA;VENTOLIN HFA) 108 (90 BASE) MCG/ACT inhaler Inhale 2 puffs into the lungs every 6 hours as needed for Wheezing Yes INGRID Eden CNP   OXYGEN 2L NC 12-24 hours  Patient taking differently: nightly as needed 2L NC 12-24 hours Yes Dali Patterson APRN - CNP   aspirin 325 MG tablet Take 325 mg by mouth daily. Yes Historical Provider, MD   FARXIGA 10 MG tablet Take 10 mg by mouth every morning   Historical Provider, MD   ferrous sulfate (IRON 325) 325 (65 Fe) MG tablet Take 325 mg by mouth daily (with breakfast)   Patient not taking: Reported on 6/8/2022  Historical Provider, MD   Hydrocortisone, Perianal, (PROCTO-FARNAZ) 1 % cream Apply topically 2 times daily. Patient not taking: Reported on 6/8/2022  INGRID Rock CNP       Social History     Tobacco Use    Smoking status: Never Smoker    Smokeless tobacco: Never Used   Vaping Use    Vaping Use: Never used   Substance Use Topics    Alcohol use: No    Drug use: No            PHYSICAL EXAMINATION:  Physical Exam  Constitutional:       General: She is not in acute distress. Appearance: Normal appearance. She is not ill-appearing. HENT:      Head: Normocephalic and atraumatic. Nose: Nose normal.   Eyes:      Extraocular Movements: Extraocular movements intact. Conjunctiva/sclera: Conjunctivae normal.   Musculoskeletal:      Cervical back: Normal range of motion. Skin:     Findings: No rash. Neurological:      General: No focal deficit present. Mental Status: She is alert and oriented to person, place, and time. Mental status is at baseline. Cranial Nerves: No cranial nerve deficit. Psychiatric:         Attention and Perception: Attention normal.         Mood and Affect: Mood and affect normal.         Speech: Speech normal.         Behavior: Behavior normal. Behavior is cooperative. Thought Content: Thought content normal.         Cognition and Memory: Cognition and memory normal.         Judgment: Judgment normal.         Due to this being a TeleHealth encounter, evaluation of the following organ systems is limited: Vitals/Constitutional/EENT/Resp/CV/GI//MS/Neuro/Skin/Heme-Lymph-Imm. ASSESSMENT/PLAN:  1.  Parkinson disease [facility])  Prescription was written for wheelchair. We will fax this to javi in [address] [place] [address] St. 2. Closed fracture of transverse process of lumbar vertebra with routine healing, subsequent encounter    The beneficiary has a mobility limitation that significantly impairs his or her ability to participate in 1 or more mobility related activities of daily living such as toileting, feeding, dressing, grooming and bathing in customary locations in the home. Admit beneficiaries mobility limitation cannot be sufficiently resolved with use of an appropriately fitted cane or walker because she is not able to stand for that long. The beneficiaries home provides adequate access between rooms maneuvering space, and surfaces for use of the manual wheelchair that is provided. Use of a manual wheelchair will significantly improve the beneficiaries ability to participate in MR ADLs and the beneficiary will use it on a regular basis in the home. The beneficiary has not expressed an unwillingness to use the manual wheelchair that is provided in the home. The beneficiary has sufficient upper extremity function and other physical and mental capabilities needed to safely self propel the manual wheelchair that is provided in the home during a typical day. The beneficiary has a caregiver who is available willing and able to provide assistance with the wheelchair. No follow-ups on file. An  electronic signature was used to authenticate this note. --Irais Cintron MD on 6/8/2022 at 2:00 PM      Pursuant to the emergency declaration under the [facility], [facility] waiver authority and the [employer] Act, this Virtual  Visit was conducted, with patient's consent, to reduce the patient's risk of exposure to COVID-19 and provide continuity of care for an established patient.     Services were provided through a video synchronous discussion virtually to substitute for in-person clinic visit.

## 2022-06-16 RX ORDER — OMEPRAZOLE 40 MG/1
CAPSULE, DELAYED RELEASE ORAL
Qty: 30 CAPSULE | Refills: 5 | Status: SHIPPED | OUTPATIENT
Start: 2022-06-16

## 2022-06-30 NOTE — TELEPHONE ENCOUNTER
Requested Prescriptions     Pending Prescriptions Disp Refills    carbidopa-levodopa (SINEMET)  MG per tablet [Pharmacy Med Name: CARBIDOPA-LEVODOPA  T  Tablet] 180 tablet 6     Sig: TAKE TWO TABLETS BY MOUTH 3 TIMES DAILY       Last Office Visit: 4/4/2022  Next Office Visit: 10/10/2022  Last Medication Refill: 11/30/21 with 5 RF    Dr. Toya Mclain pt

## 2022-07-07 ENCOUNTER — OFFICE VISIT (OUTPATIENT)
Dept: CARDIOLOGY CLINIC | Age: 72
End: 2022-07-07
Payer: MEDICARE

## 2022-07-07 VITALS
BODY MASS INDEX: 53.92 KG/M2 | HEIGHT: 62 IN | SYSTOLIC BLOOD PRESSURE: 130 MMHG | HEART RATE: 71 BPM | DIASTOLIC BLOOD PRESSURE: 64 MMHG | WEIGHT: 293 LBS

## 2022-07-07 DIAGNOSIS — I73.9 PVD (PERIPHERAL VASCULAR DISEASE) (HCC): ICD-10-CM

## 2022-07-07 DIAGNOSIS — I10 ESSENTIAL HYPERTENSION: ICD-10-CM

## 2022-07-07 DIAGNOSIS — I48.0 PAF (PAROXYSMAL ATRIAL FIBRILLATION) (HCC): Primary | ICD-10-CM

## 2022-07-07 DIAGNOSIS — Z86.79 H/O DIASTOLIC DYSFUNCTION: ICD-10-CM

## 2022-07-07 PROCEDURE — 99214 OFFICE O/P EST MOD 30 MIN: CPT | Performed by: CLINICAL NURSE SPECIALIST

## 2022-07-07 PROCEDURE — 3017F COLORECTAL CA SCREEN DOC REV: CPT | Performed by: CLINICAL NURSE SPECIALIST

## 2022-07-07 PROCEDURE — 1090F PRES/ABSN URINE INCON ASSESS: CPT | Performed by: CLINICAL NURSE SPECIALIST

## 2022-07-07 PROCEDURE — G8399 PT W/DXA RESULTS DOCUMENT: HCPCS | Performed by: CLINICAL NURSE SPECIALIST

## 2022-07-07 PROCEDURE — G8417 CALC BMI ABV UP PARAM F/U: HCPCS | Performed by: CLINICAL NURSE SPECIALIST

## 2022-07-07 PROCEDURE — 1124F ACP DISCUSS-NO DSCNMKR DOCD: CPT | Performed by: CLINICAL NURSE SPECIALIST

## 2022-07-07 PROCEDURE — 93000 ELECTROCARDIOGRAM COMPLETE: CPT | Performed by: CLINICAL NURSE SPECIALIST

## 2022-07-07 PROCEDURE — G8427 DOCREV CUR MEDS BY ELIG CLIN: HCPCS | Performed by: CLINICAL NURSE SPECIALIST

## 2022-07-07 PROCEDURE — 1036F TOBACCO NON-USER: CPT | Performed by: CLINICAL NURSE SPECIALIST

## 2022-07-07 NOTE — PATIENT INSTRUCTIONS
Keep your losartan to 50mg daily  Take your metoprolol 25mg at night  Stay on the isosorbide  Follow up in 6 mos With Dr. Izzy Klein  Daily weights and take extra demadex if you gain 2-3 lbs over night or 5-6 lbs in a week   Call with any questions or concerns  Follow up with INGRID Starks CNP for non cardiac problems  Report any new problems  Cardiovascular Fitness-Exercise as tolerated. Cardiac / Healthy Diet  Continue current medications as directed  Continue plan of treatment  It is always recommended that you bring your medications bottles with you to each visit - this is for your safety!

## 2022-07-07 NOTE — PROGRESS NOTES
[id number] [facility]  [address] [place] [address] [address] [place] [address]  Phone: [phone]  Fax: [phone]    OFFICE VISIT:  2022    Natan Mccray - : 1950    Reason For Visit:  Kendell Jeter is a 70 y.o. female who is here for 3 Month Follow-Up (pt has palpitations with soa  and some edema), Atrial Fibrillation, and Hypertension  History hypertension and paroxysmal atrial fibrillation, diabetes, Parkinson's disease and diabetic polyneuropathy  2D echo 2019 showed normal LV systolic function and EF 55 to 60%. Mild diastolic dysfunction with no significant valvular disease. Patient has peripheral vascular disease and wounds on lower extremities following with wound care in [place] and seeing DR Carmen Shukla for her PVD  Was able have angiogram with balloon on her LLE and wound is healing better now. She was diagnosed with COVID in January. Had fluid retention and was evaluated at [facility] and hospitalized in March. She was diuresed and lost 20+ pounds  In the hospital they decreased her losartan but when she went home they had not decreased her dose. She is now taking Lopressor 25 mg daily.  -She was in sinus rhythm in office visit in April  Her Betapace was discontinued while hospitalized. She reports that she did have a Jocelyn scan while in the hospital due to elevated troponin but it was okay  At last office visit 3 months ago she was having some hypotension. Her losartan was decreased. Has CGM now her glucoses are being better controlled. She still has significant activity intolerance due to knee pain. Wears oxygen at night. She is had a few palpitations and flutters but nothing that lasted more than a couple minutes. Patricia Carmona denies exertional chest pain, shortness of breath, orthopnea, paroxysmal nocturnal dyspnea, syncope, presyncope, arrhythmia, edema and fatigue.   The patient denies numbness or weakness to suggest cerebrovascular accident or transient ischemic attack. INGRID Gipson CNP is PCP  She follows with [facility] and INGRID Patel is her nephrologist  Dr. Michael Ramey is her neurologist  Bettyann Barthel has the following history as recorded in [facility]:    Patient Active Problem List    Diagnosis Date Noted    Coagulation defect ([facility] [address] 07/21/2021    At risk for obstructive sleep apnea 03/30/2021    Nocturnal hypoxemia 03/30/2021    Diabetic peripheral neuropathy associated with type 2 diabetes mellitus ([address] 01/28/2021    Ischemic cardiomyopathy 01/28/2021    Edema of both lower extremities due to peripheral venous insufficiency 10/21/2020    Diabetic ulcer of left lower leg associated with type 2 diabetes mellitus, with fat layer exposed ([address] 07/31/2020    Varicose veins of left lower extremity with ulcer of calf (CODE) ([address] 07/31/2020    Stage 4 chronic kidney disease ([address] 05/08/2020    Chronic obstructive pulmonary disease ([address] 05/08/2020    Cellulitis of left lower extremity 11/05/2019    Mixed hyperlipidemia 11/01/2018    Morbid obesity ([address] 10/18/2017    Somnolence, daytime 10/09/2017    Restless legs syndrome 04/06/2017    Hypoesthesia of skin 04/06/2017    PAF (paroxysmal atrial fibrillation) ([address] 02/11/2016    Mild mitral regurgitation by prior echocardiogram 02/11/2016    Hypokalemia 06/26/2015    Edema 06/12/2015    Asthma 04/21/2015    Palpitations 04/28/2014    Fatigue 89/55/0306    Lichen sclerosus 87/23/0351    GERD (gastroesophageal reflux disease) 05/20/2013    Parkinson disease ([address] 05/06/2013     Past Medical History:   Diagnosis Date    Asthma 4/21/2015    Bilateral carpal tunnel syndrome 04/09/2018    sees dr. Flores Pabon.     CHF (congestive heart failure) (HCC)     Colon polyp     Diabetes mellitus (HCC)     GERD (gastroesophageal reflux disease)     HTN (hypertension)     Hyperlipidemia     Mild mitral regurgitation by prior echocardiogram 2/11/2016    Morbid obesity capsule 5    SURE COMFORT PEN NEEDLES 31G X 8 MM MISC USE WITH INSULIN THREE TIMES PER DAY. 100 each 3    insulin detemir (LEVEMIR FLEXTOUCH) 100 UNIT/ML injection pen INJECT 80 UNITS SUBQ EVERY NIGHT AT BEDTIME AS DIRECTED (Patient taking differently: 50 units in am and 20u at pm) 15 mL 5    tiZANidine (ZANAFLEX) 2 MG tablet Take 1 tablet by mouth nightly as needed (muscle spasms) 30 tablet 3    Misc. Devices [facility]) [address] [employer] wheelchair for decreased mobility. 1 each 0    insulin aspart (NOVOLOG) 100 UNIT/ML injection vial 25 U TID with meals 1 each 3    LINZESS 290 MCG CAPS capsule TAKE ONE CAPSULE BY MOUTH EVERY MORNING BEFORE BREAKFAST 30 capsule 5    losartan (COZAAR) 100 MG tablet Take 0.5 tablets by mouth daily TAKE ONE TABLET BY MOUTH EVERY DAY (Patient taking differently: Take 50 mg by mouth daily TAKE ONE HALF TABLET BY MOUTH EVERY DAY) 90 tablet 3    atorvastatin (LIPITOR) 80 MG tablet Take 80 mg by mouth daily       isosorbide mononitrate (IMDUR) 30 MG extended release tablet       metoprolol tartrate (LOPRESSOR) 25 MG tablet       rOPINIRole (REQUIP) 4 MG tablet TAKE TWO TABLETS BY MOUTH EVERY MORNING , ONE AT NOON AND TWO AT BEDTIME (Patient taking differently: TAKE TWO TABLETS BY MOUTH EVERY MORNING , ONE AT NOON AND 2 AT BEDTIME) 150 tablet 5    gabapentin (NEURONTIN) 600 MG tablet TAKE ONE TABLET BY MOUTH 3 TIMES DAILY . . .MAY CAUSE DROWSINESS!! 90 tablet 5    ketorolac (ACULAR) 0.5 % ophthalmic solution       montelukast (SINGULAIR) 10 MG tablet TAKE ONE TABLET BY MOUTH EVERY NIGHT AT BEDTIME 90 tablet 3    blood glucose monitor strips Test 3times a day & as needed for symptoms of irregular blood glucose. 100 strip 3    COMFORT EZ PEN NEEDLES 32G X 4 MM MISC USE WITH INSULIN  THREE TIMES PER DAY. 100 each 3    nystatin (MYCOSTATIN) 170772 UNIT/GM ointment Apply topically 2 times daily. for yeast 60 Tube 5    nystatin (NYSTATIN) 560817 UNIT/GM powder Apply topically 3 times daily Apply topically 4 times daily. 60 g 3    Cholecalciferol (VITAMIN D3) 50 MCG (2000 UT) CAPS Take 1 capsule by mouth      albuterol (ACCUNEB) 1.25 MG/3ML nebulizer solution Inhale 3 mLs into the lungs every 6 hours as needed for Wheezing 360 mL 3    allopurinol (ZYLOPRIM) 100 MG tablet Take 1 tablet by mouth daily      EASY COMFORT INSULIN SYRINGE 31G X 5/16\" 1 ML MISC INJECT AS DIRECTED DAILY 100 each 3    torsemide (DEMADEX) 20 MG tablet Take 20 mg by mouth daily IS TAKING 2 IN THE AM EVERY OTHER DAY  1 ON ODD DAYS      blood glucose monitor kit and supplies Use as directed for diabetes TID checks. 1 kit 0    TRUEPLUS INSULIN SYRINGE 30G X 5/16\" 1 ML MISC INJECT AS DIRECTED DAILY 100 each 3    Melatonin 10 MG CAPS Take 10 mg by mouth every evening NIGHTLY      denosumab (PROLIA) 60 MG/ML SOSY SC injection Inject 60 mg into the skin every 6 months      spironolactone (ALDACTONE) 25 MG tablet TAKE ONE TABLET DAILY (Patient taking differently: One tablet in the am and one tablet in the evening) 30 tablet 5    glucose blood VI test strips (ONE TOUCH ULTRA TEST) strip Inject 1 each into the skin daily As needed. 100 each 3    Lancets MISC 1 lancet to check glucose daily 100 each 3    albuterol (PROVENTIL HFA;VENTOLIN HFA) 108 (90 BASE) MCG/ACT inhaler Inhale 2 puffs into the lungs every 6 hours as needed for Wheezing 1 Inhaler 1    OXYGEN 2L NC 12-24 hours (Patient taking differently: nightly as needed 2L NC 12-24 hours) 1 Device 0    aspirin 325 MG tablet Take 325 mg by mouth daily.  Hydrocortisone, Perianal, (PROCTO-FARNAZ) 1 % cream Apply topically 2 times daily. (Patient not taking: Reported on 6/8/2022) 1 each 2    Diabetic Shoe MISC by Does not apply route 1 each 0     No current facility-administered medications for this visit.      Allergies: Aquacel extra hydrofiber [wound dressings], Codeine, Hydrocodone-acetaminophen, and Silvadene [silver sulfadiazine]    Review of Systems  Constitutional - no significant activity change, appetite change, or unexpected weight change. No fever, chills or diaphoresis. + fatigue. HEENT - no significant rhinorrhea or epistaxis. No tinnitus or significant hearing loss. Eyes - no sudden vision change or amaurosis. Respiratory - no significant wheezing, stridor, apnea or cough. + dyspnea on exertion no resting shortness of breath. Cardiovascular - no exertional chest pain, orthopnea or PND. No sensation of arrhythmia or slow heart rate. No claudication + leg edema. Gastrointestinal - no abdominal swelling or pain. No blood in stool. No severe constipation, diarrhea, nausea, or vomiting. Genitourinary - no difficulty urinating, dysuria, frequency, or urgency. No flank pain or hematuria. Musculoskeletal - no back pain, gait disturbance, or myalgia. Skin - no color change or rash. No pallor. Left lower leg wound healing  Neurologic - no speech difficulty, facial asymmetry or lateralizing weakness. No seizures, presyncope, syncope, or significant dizziness. Hematologic - no easy bruising or excessive bleeding. Psychiatric - no severe anxiety or insomnia. No confusion. All other review of systems are negative. Objective  Vital Signs - /64   Pulse 71   Ht 5' 2\" (1.575 m)   Wt 294 lb (133.4 kg)   BMI 53.77 kg/m²   General - Sue Feliz is alert, cooperative, and pleasant. Well groomed. No acute distress. Body habitus is morbidly obese. HEENT - The head is normocephalic. No circumoral cyanosis. Dentition is normal.   EYES -  No Xanthelasma, no arcus senilis, no conjunctival hemorrhages or discharge. Neck - Supple, without increased jugular venous pressures. No carotid bruits. No mass. Respiratory - Lungs are clear bilaterally. No wheezes or rales. Normal effort without use of accessory muscles. Cardiovascular - Heart has regular rhythm and rate. No murmurs, rubs or gallops.     + pedal pulses and no varicosities. Abdominal -  Soft, nontender, nondistended. Bowel sounds are intact. Extremities - No clubbing, cyanosis, or  edema. Musculoskeletal -  No clubbing . No Osler's nodes. Gait -in wheelchair. No kyphosis or scoliosis. Skin -left lower extremity wound with dry dressing. Neurological - No focal signs are identified. Oriented to person, place and time. Psychiatric -  Appropriate affect and mood. Assessment:     Diagnosis Orders   1. PAF (paroxysmal atrial fibrillation) ([facility])  EKG 12 lead   2. Essential hypertension     3. H/O diastolic dysfunction     4. PVD (peripheral vascular disease) ([facility] [address]       Data:  BP Readings from Last 3 Encounters:   07/07/22 130/64   05/05/22 104/67   04/27/22 124/78    Pulse Readings from Last 3 Encounters:   07/07/22 71   05/05/22 72   04/15/22 66        Wt Readings from Last 3 Encounters:   07/07/22 294 lb (133.4 kg)   05/05/22 278 lb (126.1 kg)   04/27/22 284 lb (128.8 kg)   EKG today shows normal sinus rhythm with rate of 68. As before old anterior inferior infarct. Stable EKG    Recent hospitalization at [facility] with medication adjustments due to some hypotension  Blood pressure and heart rate controlled today. Current management includes beta-blocker, ARB, long-acting nitrate and diuretic  That her blood pressure control with lower losartan dose  Remains on aspirin statin     Reviewed recent notes hospitalization in January at [facility]. 2D echo unremarkable preserved LV function albeit  difficult study  CT negative for PE but possible pneumonia versus fluid  Her sotalol was discontinued while hospitalized and put on low-dose beta-blocker. She was given isosorbide. Had increase in troponin. Daughter reports negative Jocelyn scan    She now has a CGM and is getting better control of her glucose.     She was seen by nephrologist while hospitalized there but her regular nephrologist is Dr. Arelis Oseguera and INGRID Martin. She follow-up with him as well    Ongoing peripheral edema and SEXTON in activity tolerance multifactorial.  Again explained the balance of keeping fluid off and using diuretics as well as preventing worsening kidney function. States taking medications as prescribed  Stable cardiovascular status. No evidence of overt heart failure, angina or dysrhythmia. 30 minutes were spent preparing, reviewing and seeing patient. All questions answered    Plan  Keep your losartan to 50mg daily  Take your metoprolol 25mg at night  Stay on the isosorbide  Follow up in 6 mos With Dr. Vidal Castellanos  Daily weights and take extra demadex if you gain 2-3 lbs over night or 5-6 lbs in a week   Call with any questions or concerns  Follow up with INGRID Mena - CNP for non cardiac problems  Report any new problems  Cardiovascular Fitness-Exercise as tolerated. Cardiac / Healthy Diet  Continue current medications as directed  Continue plan of treatment  It is always recommended that you bring your medications bottles with you to each visit - this is for your safety! INGRID Villanueva    EMR dragon/transcription disclaimer: Much of this encounter note is electronic transcription/translation of spoken language to printed tach. Electronic translation of spoken language may be erroneous, or at times, nonsensical words or phrases may be inadvertently transcribed.  Although, I have reviewed the note for such errors, some may still exist.

## 2022-07-13 ENCOUNTER — OFFICE VISIT (OUTPATIENT)
Dept: PRIMARY CARE CLINIC | Age: 72
End: 2022-07-13
Payer: MEDICARE

## 2022-07-13 VITALS
BODY MASS INDEX: 53.92 KG/M2 | TEMPERATURE: 96.9 F | HEIGHT: 62 IN | WEIGHT: 293 LBS | OXYGEN SATURATION: 95 % | DIASTOLIC BLOOD PRESSURE: 60 MMHG | HEART RATE: 82 BPM | RESPIRATION RATE: 16 BRPM | SYSTOLIC BLOOD PRESSURE: 110 MMHG

## 2022-07-13 DIAGNOSIS — J44.9 CHRONIC OBSTRUCTIVE PULMONARY DISEASE, UNSPECIFIED COPD TYPE (HCC): ICD-10-CM

## 2022-07-13 DIAGNOSIS — R52 PAIN: ICD-10-CM

## 2022-07-13 DIAGNOSIS — E11.8 TYPE 2 DIABETES MELLITUS WITH COMPLICATION (HCC): Primary | ICD-10-CM

## 2022-07-13 DIAGNOSIS — N18.4 STAGE 4 CHRONIC KIDNEY DISEASE (HCC): ICD-10-CM

## 2022-07-13 DIAGNOSIS — G20 PARKINSON DISEASE (HCC): ICD-10-CM

## 2022-07-13 LAB
ALBUMIN SERPL-MCNC: 3.7 G/DL (ref 3.5–5.2)
ALP BLD-CCNC: 78 U/L (ref 35–104)
ALT SERPL-CCNC: 6 U/L (ref 5–33)
ANION GAP SERPL CALCULATED.3IONS-SCNC: 13 MMOL/L (ref 7–19)
AST SERPL-CCNC: 13 U/L (ref 5–32)
BILIRUB SERPL-MCNC: <0.2 MG/DL (ref 0.2–1.2)
BUN BLDV-MCNC: 36 MG/DL (ref 8–23)
CALCIUM SERPL-MCNC: 9 MG/DL (ref 8.8–10.2)
CHLORIDE BLD-SCNC: 106 MMOL/L (ref 98–111)
CO2: 23 MMOL/L (ref 22–29)
CREAT SERPL-MCNC: 1.7 MG/DL (ref 0.5–0.9)
GFR AFRICAN AMERICAN: 36
GFR NON-AFRICAN AMERICAN: 30
GLUCOSE BLD-MCNC: 125 MG/DL (ref 74–109)
HBA1C MFR BLD: 7.8 % (ref 4–6)
POTASSIUM SERPL-SCNC: 4.7 MMOL/L (ref 3.5–5)
SODIUM BLD-SCNC: 142 MMOL/L (ref 136–145)
TOTAL PROTEIN: 6.6 G/DL (ref 6.6–8.7)

## 2022-07-13 PROCEDURE — G8427 DOCREV CUR MEDS BY ELIG CLIN: HCPCS | Performed by: NURSE PRACTITIONER

## 2022-07-13 PROCEDURE — 1124F ACP DISCUSS-NO DSCNMKR DOCD: CPT | Performed by: NURSE PRACTITIONER

## 2022-07-13 PROCEDURE — G8399 PT W/DXA RESULTS DOCUMENT: HCPCS | Performed by: NURSE PRACTITIONER

## 2022-07-13 PROCEDURE — 1036F TOBACCO NON-USER: CPT | Performed by: NURSE PRACTITIONER

## 2022-07-13 PROCEDURE — 2022F DILAT RTA XM EVC RTNOPTHY: CPT | Performed by: NURSE PRACTITIONER

## 2022-07-13 PROCEDURE — 3051F HG A1C>EQUAL 7.0%<8.0%: CPT | Performed by: NURSE PRACTITIONER

## 2022-07-13 PROCEDURE — 3017F COLORECTAL CA SCREEN DOC REV: CPT | Performed by: NURSE PRACTITIONER

## 2022-07-13 PROCEDURE — 3023F SPIROM DOC REV: CPT | Performed by: NURSE PRACTITIONER

## 2022-07-13 PROCEDURE — 1090F PRES/ABSN URINE INCON ASSESS: CPT | Performed by: NURSE PRACTITIONER

## 2022-07-13 PROCEDURE — G8417 CALC BMI ABV UP PARAM F/U: HCPCS | Performed by: NURSE PRACTITIONER

## 2022-07-13 PROCEDURE — 99214 OFFICE O/P EST MOD 30 MIN: CPT | Performed by: NURSE PRACTITIONER

## 2022-07-13 RX ORDER — TRAMADOL HYDROCHLORIDE 50 MG/1
50 TABLET ORAL 2 TIMES DAILY PRN
Qty: 60 TABLET | Refills: 0 | Status: SHIPPED | OUTPATIENT
Start: 2022-07-13 | End: 2022-08-12

## 2022-07-13 ASSESSMENT — PATIENT HEALTH QUESTIONNAIRE - PHQ9
SUM OF ALL RESPONSES TO PHQ QUESTIONS 1-9: 0
SUM OF ALL RESPONSES TO PHQ QUESTIONS 1-9: 0
2. FEELING DOWN, DEPRESSED OR HOPELESS: 0
SUM OF ALL RESPONSES TO PHQ QUESTIONS 1-9: 0
SUM OF ALL RESPONSES TO PHQ QUESTIONS 1-9: 0
1. LITTLE INTEREST OR PLEASURE IN DOING THINGS: 0
SUM OF ALL RESPONSES TO PHQ9 QUESTIONS 1 & 2: 0

## 2022-07-13 NOTE — PROGRESS NOTES
[facility] PHYSICIAN SERVICES  [facility]  [address] [place] [address]  [address] [place] [address]  Dept: [phone]  Dept Fax: [phone]  Loc: [phone]    Jean Ragsdale is a 70 y.o. female who presents today for her medical conditions/complaints as noted below. Jean Ragsdale is c/o of 6 Month Follow-Up (Patient presents today for follow up on diabetes.)        HPI:     HPI   Chief Complaint   Patient presents with    6 Month Follow-Up     Patient presents today for follow up on diabetes. She is doing 80 units of Levemir at bedtime and for mealtime she is doing 25 units of NovoLog 3 times a day. She last had a visit with [facility] endocrinology on April 22 of this year, they had told her to do the Levemir 55in the morning and 20 in the evening and the NovoLog 25 units plus correction. They stopped the Sergio Macon due to worsening GFR and her back fracture. They did order her Dexcom. She is seeing Dr. Pedro Barry for her decreased kidney function. She has worsening Parkinson's disease in is mainly in a wheelchair she does get up for short periods of time to cook and do ADLs. She did have a compression fracture and saw the neurosurgeon and it was nonsurgical.  Seeing Dr Radhika Wood in [facility] for vascular and wound care. Lab Results   Component Value Date/Time    GLUCOSE 125 07/13/2022 12:25 PM    GLUCOSE 134 04/15/2022 12:50 PM    GLUCOSE 83 01/13/2022 12:15 PM    LABA1C 7.8 07/13/2022 12:25 PM    LABA1C 9.4 01/12/2022 11:30 AM    LABA1C 9.1 10/21/2021 11:50 AM    LABA1C 8.9 10/28/2015 09:15 AM    LABA1C 8.5 07/17/2015 09:28 AM    LABA1C 8.2 11/14/2014 10:30 AM     Past Medical History:   Diagnosis Date    Asthma 4/21/2015    Bilateral carpal tunnel syndrome 04/09/2018    sees dr. Antonio Lundberg.     CHF (congestive heart failure) (HCC)     Colon polyp     Diabetes mellitus (HCC)     GERD (gastroesophageal reflux disease)     HTN (hypertension)     Hyperlipidemia     Mild mitral regurgitation by prior echocardiogram 2016    Morbid obesity ([facility] [address] 10/18/2017    Normal cardiac stress test 4-2-09    no ischemia at attained level of excercise    Palliative care patient 2020    Parkinson disease [facility])     Paroxysmal atrial fibrillation [facility])     sees dr. Zak Salinas    Post-menopausal     Prolonged emergence from general anesthesia     Restrictive airway disease     Stage 3 chronic kidney disease ([facility] [address] 10/18/2017      Past Surgical History:   Procedure Laterality Date    APPENDECTOMY      CATARACT REMOVAL WITH IMPLANT       SECTION      x3    COLONOSCOPY      Dr Machuca Child polyp-5 yr recall    COLONOSCOPY N/A 2019    Dr Stacey Holt 2 internal hemorrhoids without stigmata, diverticulosis, suboptimal prep--5 yr recall    EYE SURGERY      GALLBLADDER SURGERY  2014    dr Macy Molina N/CARPAL TUNNEL SURG Right 2018    OPEN CARPAL TUNNEL RELEASE performed by Madhuri Mack MD at [address] [place] [address]      dr Magalis Haro in [address] 2022 2022 2022 2022 4/15/2022    SYSTOLIC [phone]   DIASTOLIC 60 64 67 78 58 59   Site - - - - - -   Position - - - - - -   Cuff Size - - - - - -   Pulse 82 71 72 - 66 67   Temp 96.9 - - - - -   Resp 16 - - - - -   SpO2 95 - - - 94 95   Weight 294 lb 294 lb 278 lb 284 lb - -   Height 5' 2\" 5' 2\" 5' 2\" 5' 2\" - -   Body mass index 53.77 kg/m2 53.77 kg/m2 50.84 kg/m2 51.94 kg/m2 - -   Pain Level - - - - - -   Some recent data might be hidden       Family History   Problem Relation Age of Onset    High Blood Pressure Father     Diabetes Father     Parkinsonism Father     High Blood Pressure Mother     Diabetes Sister     Other Paternal Grandmother         hiatal hernia    Heart Disease Paternal Grandfather     Colon Cancer Neg Hx     Colon Polyps Neg Hx     Esophageal Cancer Neg Hx     Liver Cancer Neg Hx     Liver Disease Neg Hx     Rectal Cancer Neg Hx     Stomach Cancer Neg Hx        Social History     Tobacco Use    Smoking status: Never    Smokeless tobacco: Never   Substance Use Topics    Alcohol use: No      Current Outpatient Medications on File Prior to Visit   Medication Sig Dispense Refill    carbidopa-levodopa (SINEMET)  MG per tablet TAKE TWO TABLETS BY MOUTH 3 TIMES DAILY 180 tablet 0    omeprazole (PRILOSEC) 40 MG delayed release capsule TAKE ONE CAPSULE BY MOUTH EVERY DAY 30 capsule 5    SURE COMFORT PEN NEEDLES 31G X 8 MM MISC USE WITH INSULIN THREE TIMES PER DAY. 100 each 3    insulin detemir (LEVEMIR FLEXTOUCH) 100 UNIT/ML injection pen INJECT 80 UNITS SUBQ EVERY NIGHT AT BEDTIME AS DIRECTED (Patient taking differently: 50 units in am and 20u at pm) 15 mL 5    tiZANidine (ZANAFLEX) 2 MG tablet Take 1 tablet by mouth nightly as needed (muscle spasms) 30 tablet 3    Misc. Devices [facility]) [address] [facility] wheelchair for decreased mobility. 1 each 0    insulin aspart (NOVOLOG) 100 UNIT/ML injection vial 25 U TID with meals 1 each 3    LINZESS 290 MCG CAPS capsule TAKE ONE CAPSULE BY MOUTH EVERY MORNING BEFORE BREAKFAST 30 capsule 5    losartan (COZAAR) 100 MG tablet Take 0.5 tablets by mouth daily TAKE ONE TABLET BY MOUTH EVERY DAY (Patient taking differently: Take 50 mg by mouth daily TAKE ONE HALF TABLET BY MOUTH EVERY DAY) 90 tablet 3    atorvastatin (LIPITOR) 80 MG tablet Take 80 mg by mouth daily       isosorbide mononitrate (IMDUR) 30 MG extended release tablet       metoprolol tartrate (LOPRESSOR) 25 MG tablet       rOPINIRole (REQUIP) 4 MG tablet TAKE TWO TABLETS BY MOUTH EVERY MORNING , ONE AT NOON AND TWO AT BEDTIME (Patient taking differently: TAKE TWO TABLETS BY MOUTH EVERY MORNING , ONE AT NOON AND 2 AT BEDTIME) 150 tablet 5    gabapentin (NEURONTIN) 600 MG tablet TAKE ONE TABLET BY MOUTH 3 TIMES DAILY .  . .MAY CAUSE DROWSINESS!! 90 tablet 5    Hydrocortisone, Perianal, (PROCTO-FARNAZ) 1 % cream puffs into the lungs every 6 hours as needed for Wheezing 1 Inhaler 1    OXYGEN 2L NC 12-24 hours (Patient taking differently: nightly as needed 2L NC 12-24 hours) 1 Device 0    aspirin 325 MG tablet Take 325 mg by mouth daily. No current facility-administered medications on file prior to visit. Allergies   Allergen Reactions    Aquacel Extra Hydrofiber [Wound Dressings]      Burning and stinging    Codeine      itching    Hydrocodone-Acetaminophen Nausea Only    Silvadene [Silver Sulfadiazine]      Stinging and burning       Health Maintenance   Topic Date Due    Shingles vaccine (1 of 2) Never done    Diabetic foot exam  01/28/2022    Diabetic retinal exam  06/18/2022    Lipids  07/21/2022    COVID-19 Vaccine (1) 03/09/2030 (Originally 6/27/1951)    Flu vaccine (1) 09/01/2022    Annual Wellness Visit (AWV)  01/14/2023    A1C test (Diabetic or Prediabetic)  07/13/2023    Depression Screen  07/13/2023    Breast cancer screen  11/04/2023    Colorectal Cancer Screen  06/18/2024    DTaP/Tdap/Td vaccine (2 - Td or Tdap) 04/15/2032    DEXA (modify frequency per FRAX score)  Completed    Pneumococcal 65+ years Vaccine  Completed    Hepatitis C screen  Addressed    Hepatitis A vaccine  Aged Out    Hib vaccine  Aged Out    Meningococcal (ACWY) vaccine  Aged Out       Subjective:      Review of Systems   Constitutional:  Positive for activity change. Eyes: Negative. Cardiovascular: Negative. Gastrointestinal: Negative. Musculoskeletal:  Positive for arthralgias, back pain and myalgias. Skin:  Positive for wound (lower left leg). Neurological:         Parkinson tremor   Psychiatric/Behavioral:  The patient is nervous/anxious. Objective:     Physical Exam  Vitals and nursing note reviewed. Constitutional:       Appearance: Normal appearance. She is well-developed. She is obese. Comments: Sitting up in wheelchair   HENT:      Head: Normocephalic.    Eyes:      Pupils: Pupils are equal, round, and reactive to light. Neck:      Vascular: No carotid bruit. Cardiovascular:      Rate and Rhythm: Normal rate and regular rhythm. Heart sounds: Normal heart sounds. Pulmonary:      Effort: Pulmonary effort is normal.      Breath sounds: Normal breath sounds. Musculoskeletal:      Cervical back: Normal range of motion. Right lower leg: Edema present. Left lower leg: Edema present. Comments: Left leg wrapped with Coban. Both lower legs with 1-2+ edema   Skin:     General: Skin is warm and dry. Capillary Refill: Capillary refill takes less than 2 seconds. Neurological:      General: No focal deficit present. Mental Status: She is alert and oriented to person, place, and time. Mental status is at baseline. Psychiatric:         Mood and Affect: Mood normal.         Behavior: Behavior normal.         Thought Content: Thought content normal.         Judgment: Judgment normal.     /60   Pulse 82   Temp 96.9 °F (36.1 °C) (Temporal)   Resp 16   Ht 5' 2\" (1.575 m)   Wt 294 lb (133.4 kg)   SpO2 95%   Breastfeeding No   BMI 53.77 kg/m²     Assessment:       Diagnosis Orders   1. Type 2 diabetes mellitus with complication (HCC)  Comprehensive Metabolic Panel    Hemoglobin A1C      2. Parkinson disease ([facility]        3. Stage 4 chronic kidney disease ([facility] [address] [facility]  Comprehensive Metabolic Panel      4. Chronic obstructive pulmonary disease, unspecified COPD type ([facility])        5. Pain  traMADol (ULTRAM) 50 MG tablet            Plan:   More than 50% of the time was spent counseling and coordinating care for a total time of 32min face to face. I think the Dexcom has helped her to manage her blood sugar better her daughter is with her today and she agrees. They will continue with wound care in [place] because that is closer to them.   She will let me know if she needs any referrals  She will continue with endocrinology  I did refill her tramadol for chronic pain in her hips and knees. Lab Results   Component Value Date/Time    GLUCOSE 125 07/13/2022 12:25 PM    GLUCOSE 134 04/15/2022 12:50 PM    GLUCOSE 83 01/13/2022 12:15 PM    LABA1C 7.8 07/13/2022 12:25 PM    LABA1C 9.4 01/12/2022 11:30 AM    LABA1C 9.1 10/21/2021 11:50 AM    LABA1C 8.9 10/28/2015 09:15 AM    LABA1C 8.5 07/17/2015 09:28 AM    LABA1C 8.2 11/14/2014 10:30 AM       PDMP Monitoring:    Last PDMP Paul as Reviewed [facility]):  Review User Review Instant Review Result   Gita Bernstein 7/16/2022  5:02 PM Reviewed PDMP [1]     Last Controlled Substance Monitoring Documentation        Office Visit from 1/13/2022 in [id number] [facility]   Periodic Controlled Substance Monitoring Possible medication side effects, risk of tolerance/dependence & alternative treatments discussed., No signs of potential drug abuse or diversion identified. filed at 01/13/2022 1153   Chronic Pain > 120 MEDD Obtained or confirmed \"Medication Contract\" on file. filed at 01/13/2022 1153          Urine Drug Screenings (1 yr)    No resulted procedures found. Medication Contract and Consent for Opioid Use Documents Filed       Patient Documents       Type of Document Status Date Received Received By Description    Medication Contract Received 5/24/2019 10:27 AM Wendy SCOTT neuro    Medication Contract Received 12/17/2019 10:48 AM MEGHAN VALDIVIA Medication Agreement Rachel Musa 11/25/2019                     Patient given educational materials -see patient instructions. Discussed use, benefit, and side effects of prescribed medications. All patient questions answered. Pt voiced understanding. Reviewed health maintenance. Instructed to continue currentmedications, diet and exercise. Patient agreed with treatment plan. Follow up as directed. MEDICATIONS:  Orders Placed This Encounter   Medications    traMADol (ULTRAM) 50 MG tablet     Sig: Take 1 tablet by mouth 2 times daily as needed for Pain for up to 30 days.      Dispense:  60 tablet     Refill:  0 Reduce doses taken as pain becomes manageable         ORDERS:  Orders Placed This Encounter   Procedures    Comprehensive Metabolic Panel    Hemoglobin A1C       Follow-up:  Return in about 6 months (around 1/13/2023) for maw. PATIENT INSTRUCTIONS:  Patient Instructions   Continue with vascular  Labs today, endocrinology with [facility] for pain, see ortho at [facility] about your knee  Cardiology would have to clear your for surgery  Continue insulin the same. Electronically signed by INGRID Griffin CNP on 7/16/2022 at 5:02 PM    EMR Dragon/transcription disclaimer:  Much of thisencounter note is electronic transcription/translation of spoken language to printed texts. The electronic translation of spoken language may be erroneous, or at times, nonsensical words or phrases may be inadvertentlytranscribed.   Although I have reviewed the note for such errors, some may still exist.

## 2022-07-13 NOTE — PATIENT INSTRUCTIONS
Continue with vascular  Labs today, endocrinology with [facility] for pain, see ortho at [facility] about your knee  Cardiology would have to clear your for surgery  Continue insulin the same.

## 2022-07-16 ASSESSMENT — ENCOUNTER SYMPTOMS
EYES NEGATIVE: 1
GASTROINTESTINAL NEGATIVE: 1
BACK PAIN: 1

## 2022-07-18 DIAGNOSIS — G25.81 RESTLESS LEGS SYNDROME: ICD-10-CM

## 2022-07-18 NOTE — TELEPHONE ENCOUNTER
Requested Prescriptions     Pending Prescriptions Disp Refills    gabapentin (NEURONTIN) 600 MG tablet [Pharmacy Med Name: GABAPENTIN 600 MG TABS 600 Tablet] 90 tablet 5     Sig: TAKE ONE TABLET BY MOUTH 3 TIMES DAILY. . .MAY CAUSE DROWSINESS!!        Last Office Visit:  4/4/2022  Next Office Visit:  10/10/2022  Last Medication Refill:  1/18/22 with 5 RF  Deysi Pinon up to date:  7/18/22    *RX updated to reflect   7/18/22  fill date*

## 2022-07-19 RX ORDER — GABAPENTIN 600 MG/1
TABLET ORAL
Qty: 90 TABLET | Refills: 5 | Status: SHIPPED | OUTPATIENT
Start: 2022-07-19 | End: 2022-10-10 | Stop reason: SDUPTHER

## 2022-09-20 RX ORDER — LINACLOTIDE 290 UG/1
CAPSULE, GELATIN COATED ORAL
Qty: 30 CAPSULE | Refills: 5 | Status: SHIPPED | OUTPATIENT
Start: 2022-09-20

## 2022-09-20 RX ORDER — FLUCONAZOLE 150 MG/1
TABLET ORAL
Qty: 1 TABLET | Refills: 11 | Status: SHIPPED | OUTPATIENT
Start: 2022-09-20

## 2022-09-20 NOTE — TELEPHONE ENCOUNTER
Requested Prescriptions     Pending Prescriptions Disp Refills    carbidopa-levodopa (SINEMET)  MG per tablet [Pharmacy Med Name: CARBIDOPA-LEVODOPA  T  Tablet] 180 tablet 8     Sig: TAKE TWO TABLETS BY MOUTH 3 TIMES DAILY       Last Office Visit: 4/4/2022  Next Office Visit: 10/10/2022  Last Medication Refill: 6/30/22 Patient informed

## 2022-09-26 RX ORDER — ROPINIROLE 4 MG/1
TABLET, FILM COATED ORAL
Qty: 150 TABLET | Refills: 5 | Status: SHIPPED | OUTPATIENT
Start: 2022-09-26 | End: 2022-10-10 | Stop reason: SDUPTHER

## 2022-09-26 NOTE — TELEPHONE ENCOUNTER
Requested Prescriptions     Pending Prescriptions Disp Refills    rOPINIRole (REQUIP) 4 MG tablet [Pharmacy Med Name: ROPINIROLE HCL 4 MG TABS 4 Tablet] 150 tablet 5     Sig: TAKE TWO TABLETS BY MOUTH EVERY MORNING , ONE AT NOON AND TWO AT BEDTIME       Last Office Visit: 4/4/2022  Next Office Visit: 10/10/2022  Last Medication Refill: 3/7/22 with 5 RF

## 2022-10-07 LAB — HBA1C MFR BLD: 7.5 % (ref 4–6)

## 2022-10-10 ENCOUNTER — OFFICE VISIT (OUTPATIENT)
Dept: NEUROLOGY | Age: 72
End: 2022-10-10
Payer: MEDICARE

## 2022-10-10 VITALS
WEIGHT: 293 LBS | HEART RATE: 66 BPM | DIASTOLIC BLOOD PRESSURE: 63 MMHG | SYSTOLIC BLOOD PRESSURE: 115 MMHG | HEIGHT: 62 IN | BODY MASS INDEX: 53.92 KG/M2 | RESPIRATION RATE: 20 BRPM

## 2022-10-10 DIAGNOSIS — G20 PARKINSON DISEASE (HCC): Primary | ICD-10-CM

## 2022-10-10 DIAGNOSIS — E11.42 DIABETIC POLYNEUROPATHY ASSOCIATED WITH TYPE 2 DIABETES MELLITUS (HCC): ICD-10-CM

## 2022-10-10 DIAGNOSIS — G25.81 RESTLESS LEGS SYNDROME: ICD-10-CM

## 2022-10-10 PROCEDURE — G8417 CALC BMI ABV UP PARAM F/U: HCPCS | Performed by: PSYCHIATRY & NEUROLOGY

## 2022-10-10 PROCEDURE — 3051F HG A1C>EQUAL 7.0%<8.0%: CPT | Performed by: PSYCHIATRY & NEUROLOGY

## 2022-10-10 PROCEDURE — G8427 DOCREV CUR MEDS BY ELIG CLIN: HCPCS | Performed by: PSYCHIATRY & NEUROLOGY

## 2022-10-10 PROCEDURE — G8484 FLU IMMUNIZE NO ADMIN: HCPCS | Performed by: PSYCHIATRY & NEUROLOGY

## 2022-10-10 PROCEDURE — G8399 PT W/DXA RESULTS DOCUMENT: HCPCS | Performed by: PSYCHIATRY & NEUROLOGY

## 2022-10-10 PROCEDURE — 1036F TOBACCO NON-USER: CPT | Performed by: PSYCHIATRY & NEUROLOGY

## 2022-10-10 PROCEDURE — 1124F ACP DISCUSS-NO DSCNMKR DOCD: CPT | Performed by: PSYCHIATRY & NEUROLOGY

## 2022-10-10 PROCEDURE — 99213 OFFICE O/P EST LOW 20 MIN: CPT | Performed by: PSYCHIATRY & NEUROLOGY

## 2022-10-10 PROCEDURE — 1090F PRES/ABSN URINE INCON ASSESS: CPT | Performed by: PSYCHIATRY & NEUROLOGY

## 2022-10-10 PROCEDURE — 3017F COLORECTAL CA SCREEN DOC REV: CPT | Performed by: PSYCHIATRY & NEUROLOGY

## 2022-10-10 PROCEDURE — 2022F DILAT RTA XM EVC RTNOPTHY: CPT | Performed by: PSYCHIATRY & NEUROLOGY

## 2022-10-10 RX ORDER — GABAPENTIN 600 MG/1
TABLET ORAL
Qty: 90 TABLET | Refills: 5 | Status: SHIPPED | OUTPATIENT
Start: 2022-10-10 | End: 2023-04-10

## 2022-10-10 RX ORDER — ROPINIROLE 4 MG/1
TABLET, FILM COATED ORAL
Qty: 150 TABLET | Refills: 11 | Status: SHIPPED | OUTPATIENT
Start: 2022-10-10

## 2022-10-10 NOTE — PROGRESS NOTES
[id number] [facility]  [address] [place] [address] Drive, [address] [place] [address],[address]  Chelo Duran  Phone [phone]  Fax [phone] [address] [facility] Follow Up Encounter  10/10/22 12:00 PM CDT    Information:   Patient Name: Merlene Anne  :   1950  Age:   70 y.o. MRN:   [id number]  Account #:  [de-identified]  Today:  10/10/22    Provider: Bibiana Santana M.D. Chief Complaint:   Chief Complaint   Patient presents with    Follow-up     Parkinson disease       Subjective:   Merlene Anne is a 70 y.o. woman with a history of Parkinson's disease, visual hallucinations, restless legs syndrome, and diabetic polyneuropathy who is following up. Her PD is doing fairly well. She takes Sinemet 25/100, 2 tablets TID, ropinirole 8mg/4mg/8mg. Her RLS is doing well.  he has had no dyskinesias, lightheadedness, or hallucinations. She is morbidly obese and has difficulty with her mobility. She can stand and transfer. She gets pain in her hips, knees, back when standing too long. She requires some assistance for personal care, mostly from her obesity. She has numbness, tingling, dysesthesias, and allodynia in her feet up to the knees. She takes gabapentin. She has not previously tolerated Cymbalta. She has not used Lyrica due to the risk for peripheral edema and she already has severe peripheral edema. She fell back in April and sustained an L3 compression fracture. It was treated conservatively. Her back and pelvic pain are finally subsiding. She has peripheral vascular disease and did require a LLE angioplasty. She has chronic lower extremity wounds and sees wound care. Objective:     Past Medical History:  Past Medical History:   Diagnosis Date    Asthma 2015    Bilateral carpal tunnel syndrome 2018    sees dr. Ebonie De Jesus.     CHF (congestive heart failure) (HCC)     Colon polyp     Diabetes mellitus (HCC)     GERD (gastroesophageal reflux disease)     HTN (hypertension)     Hyperlipidemia Mild mitral regurgitation by prior echocardiogram 2016    Morbid obesity ([facility] [address] 10/18/2017    Normal cardiac stress test 4-2-09    no ischemia at attained level of excercise    Palliative care patient 2020    Parkinson disease [facility])     Paroxysmal atrial fibrillation [facility])     sees dr. Boni Farfan    Post-menopausal     Prolonged emergence from general anesthesia     Restrictive airway disease     Stage 3 chronic kidney disease ([facility] [address] 10/18/2017       Past Surgical History:   Procedure Laterality Date    APPENDECTOMY      CATARACT REMOVAL WITH IMPLANT       SECTION      x3    COLONOSCOPY      Dr Scott Macedo polyp-5 yr recall    COLONOSCOPY N/A 2019    Dr Ruben Man 2 internal hemorrhoids without stigmata, diverticulosis, suboptimal prep--5 yr recall    EYE SURGERY      GALLBLADDER SURGERY  2014    dr Rashi Mireles N/CARPAL TUNNEL SURG Right 2018    OPEN CARPAL TUNNEL RELEASE performed by Jorge Luis Sol MD at [address] [place] [address]       [facility]  None    Significant Injuries  None    Habits  Ronal Page reports that she has never smoked. She has never used smokeless tobacco. She reports that she does not drink alcohol and does not use drugs. Family History   Problem Relation Age of Onset    High Blood Pressure Father     Diabetes Father     Parkinsonism Father     High Blood Pressure Mother     Diabetes Sister     Other Paternal Grandmother         hiatal hernia    Heart Disease Paternal Grandfather     Colon Cancer Neg Hx     Colon Polyps Neg Hx     Esophageal Cancer Neg Hx     Liver Cancer Neg Hx     Liver Disease Neg Hx     Rectal Cancer Neg Hx     Stomach Cancer Neg Hx        Social History  Ronal Page is , lives in [place] [employer], [place], and is retired.     Medications:  Current Outpatient Medications   Medication Sig Dispense Refill    rOPINIRole (REQUIP) 4 MG tablet TAKE TWO TABLETS BY MOUTH EVERY MORNING, ONE AT NOON AND TWO AT BEDTIME 150 tablet 5    carbidopa-levodopa (SINEMET)  MG per tablet TAKE TWO TABLETS BY MOUTH 3 TIMES DAILY 180 tablet 11    fluconazole (DIFLUCAN) 150 MG tablet Take one on the 15th of every month 1 tablet 11    LINZESS 290 MCG CAPS capsule TAKE ONE CAPSULE BY MOUTH EVERY MORNING BEFORE BREAKFAST 30 capsule 5    gabapentin (NEURONTIN) 600 MG tablet TAKE ONE TABLET BY MOUTH 3 TIMES DAILY. . .MAY CAUSE DROWSINESS!! 90 tablet 5    omeprazole (PRILOSEC) 40 MG delayed release capsule TAKE ONE CAPSULE BY MOUTH EVERY DAY 30 capsule 5    SURE COMFORT PEN NEEDLES 31G X 8 MM MISC USE WITH INSULIN THREE TIMES PER DAY. 100 each 3    insulin detemir (LEVEMIR FLEXTOUCH) 100 UNIT/ML injection pen INJECT 80 UNITS SUBQ EVERY NIGHT AT BEDTIME AS DIRECTED (Patient taking differently: 50 units in am and 20u at pm) 15 mL 5    tiZANidine (ZANAFLEX) 2 MG tablet Take 1 tablet by mouth nightly as needed (muscle spasms) 30 tablet 3    Misc. Devices [facility]) [address] wheelchair for decreased mobility. 1 each 0    insulin aspart (NOVOLOG) 100 UNIT/ML injection vial 25 U TID with meals 1 each 3    losartan (COZAAR) 100 MG tablet Take 0.5 tablets by mouth daily TAKE ONE TABLET BY MOUTH EVERY DAY (Patient taking differently: Take 50 mg by mouth daily TAKE ONE HALF TABLET BY MOUTH EVERY DAY) 90 tablet 3    atorvastatin (LIPITOR) 80 MG tablet Take 80 mg by mouth daily       isosorbide mononitrate (IMDUR) 30 MG extended release tablet       metoprolol tartrate (LOPRESSOR) 25 MG tablet       Hydrocortisone, Perianal, (PROCTO-FARNAZ) 1 % cream Apply topically 2 times daily.  1 each 2    ketorolac (ACULAR) 0.5 % ophthalmic solution       montelukast (SINGULAIR) 10 MG tablet TAKE ONE TABLET BY MOUTH EVERY NIGHT AT BEDTIME 90 tablet 3    blood glucose monitor strips Test 3times a day & as needed for symptoms of irregular blood glucose. 100 strip 3    COMFORT EZ PEN NEEDLES 32G X 4 MM MISC USE WITH INSULIN  THREE TIMES PER DAY. 100 each 3    nystatin (MYCOSTATIN) 541133 UNIT/GM ointment Apply topically 2 times daily. for yeast 60 Tube 5    nystatin (NYSTATIN) 368236 UNIT/GM powder Apply topically 3 times daily Apply topically 4 times daily. 60 g 3    Diabetic Shoe MISC by Does not apply route 1 each 0    Cholecalciferol (VITAMIN D3) 50 MCG (2000 UT) CAPS Take 1 capsule by mouth      albuterol (ACCUNEB) 1.25 MG/3ML nebulizer solution Inhale 3 mLs into the lungs every 6 hours as needed for Wheezing 360 mL 3    allopurinol (ZYLOPRIM) 100 MG tablet Take 1 tablet by mouth daily      EASY COMFORT INSULIN SYRINGE 31G X 5/16\" 1 ML MISC INJECT AS DIRECTED DAILY 100 each 3    torsemide (DEMADEX) 20 MG tablet Take 20 mg by mouth daily IS TAKING 2 IN THE AM EVERY OTHER DAY  1 ON ODD DAYS      blood glucose monitor kit and supplies Use as directed for diabetes TID checks. 1 kit 0    TRUEPLUS INSULIN SYRINGE 30G X 5/16\" 1 ML MISC INJECT AS DIRECTED DAILY 100 each 3    Melatonin 10 MG CAPS Take 10 mg by mouth every evening NIGHTLY      denosumab (PROLIA) 60 MG/ML SOSY SC injection Inject 60 mg into the skin every 6 months      spironolactone (ALDACTONE) 25 MG tablet TAKE ONE TABLET DAILY 30 tablet 5    glucose blood VI test strips (ONE TOUCH ULTRA TEST) strip Inject 1 each into the skin daily As needed. 100 each 3    Lancets MISC 1 lancet to check glucose daily 100 each 3    albuterol (PROVENTIL HFA;VENTOLIN HFA) 108 (90 BASE) MCG/ACT inhaler Inhale 2 puffs into the lungs every 6 hours as needed for Wheezing 1 Inhaler 1    OXYGEN 2L NC 12-24 hours (Patient taking differently: nightly as needed 2L NC 12-24 hours) 1 Device 0    aspirin 325 MG tablet Take 325 mg by mouth daily. No current facility-administered medications for this visit. Allergies:   Allergies as of 10/10/2022 - Fully Reviewed 10/10/2022   Allergen Reaction Noted    Aquacel extra hydrofiber [wound dressings]  12/28/2021    Codeine  07/06/2017    Hydrocodone-acetaminophen Nausea Only     Silvadene [silver sulfadiazine]  12/28/2021       Review of Systems:  Constitutional: negative for - chills and fever  Eyes:  negative for - visual disturbance and photophobia  HENMT: negative for - headaches and sinus pain  Respiratory: negative for - cough, hemoptysis, and shortness of breath  Cardiovascular: negative for - chest pain and palpitations  Gastrointestinal: negative for - blood in stools, constipation, diarrhea, nausea, and vomiting  Genito-Urinary: negative for - hematuria, urinary frequency, urinary urgency, and urinary retention  Musculoskeletal: positivefor - joint pain, joint stiffness, and joint swelling  Hematological and Lymphatic: negative for - bleeding problems, abnormal bruising, and swollen lymph nodes  Endocrine:  negative for - polydipsia and polyphagia  Allergy/Immunology:  negative for - rhinorrhea, sinus congestion, hives  Integument:  negative for - negative for - rash, change in moles, new or changing lesions  Psychological: negative for - anxiety and depression  Neurological: positive for - memory loss, numbness/tingling, and weakness     PHYSICAL EXAMINATION:  Vitals:  /63   Pulse 66   Resp 20   Ht 5' 2\" (1.575 m)   Wt 294 lb (133.4 kg)   BMI 53.77 kg/m²   General appearance:  Alert, well developed, well nourished, in no distress  HEENT:  normocephalic, atraumatic, sclera appear normal, no nasal abnormalities, no rhinorrhea, Ears appear normal, oral mucous membranes are moist without erythema, trachea midline, thyroid is normal, no lymphadenopathy or neck mass. Cardiovascular:  Regular rate and rhythm without murmer. 4+ pitting peripheral edema below the knees and in the hands, No cyanosis or clubbing. No carotid bruits. Pulmonary:  Lungs are clear to auscultation.   Breathing appears normal, good expansion, normal effort without use of accessory muscles  Musculoskeletal:  Joints are osteoarthritic  Integument:  No rash, erythema, or pallor  Psychiatric:  Mood, affect, and behavior appear normal      NEUROLOGIC EXAMINATION:  Mental Status:  alert, oriented to person, place, and time. Speech:  Clear without dysarthria or dysphonia  Language:  Fluent without aphasia  Cranial Nerves:              II          Visual fields are full to confrontation              III,IV, VI           Extraocular movements are full              VII        Facial movements are symmetrical without weakness              VIII       Hearing is intact              IX,X     Shoulder shrug and head rotation strength are intact              XII        No tongue atrophy or fasciculations. Normal tongue protrusion. No tongue weakness  Motor:  Normal strength in both upper and lower extremities. Normal muscle tone and bulk. Deep tendon reflexes are absent in both upper and lower extremities. She has an intermittent rest tremor in the right hand. Mckeon's signs are absent bilaterally. There is no ankle clonus on either side. Sensation:  Sensation is reduced to light touch, temperature, and vibration in distal extremities. Coordination:  Rapid alternating movements are mildly bradykinetic. Finger to nose testing is unimpaired bilaterally. Assessment:       ICD-10-CM    1. Parkinson disease ([address]  G20       2. Diabetic polyneuropathy associated with type 2 diabetes mellitus (HCC)  E11.42       3. Restless legs syndrome  G25.81       She has multiple medical problems. Her PD is stable but she is still fairly immobile. She has a sever diabetic polyneuropathy. She has untreated sleep apnea and refuses evaluation and PAP use. I discussed the above with her. Plan:   1. Continue the same medications including Sinemet, Requip, gabapentin  2.          Follow up in 6 months    Electronically signed by Vanessa Muhammad MD on 10/10/22

## 2022-10-28 RX ORDER — LANCETS 33 GAUGE
EACH MISCELLANEOUS
Qty: 100 EACH | Refills: 3 | Status: SHIPPED | OUTPATIENT
Start: 2022-10-28

## 2022-11-07 RX ORDER — MONTELUKAST SODIUM 10 MG/1
TABLET ORAL
Qty: 30 TABLET | Refills: 3 | Status: SHIPPED | OUTPATIENT
Start: 2022-11-07

## 2022-11-17 RX ORDER — LOSARTAN POTASSIUM 100 MG/1
50 TABLET ORAL DAILY
Qty: 90 TABLET | Refills: 1 | Status: SHIPPED | OUTPATIENT
Start: 2022-11-17

## 2022-12-27 RX ORDER — INSULIN ASPART 100 [IU]/ML
INJECTION, SOLUTION INTRAVENOUS; SUBCUTANEOUS
Qty: 15 ML | Refills: 3 | Status: SHIPPED | OUTPATIENT
Start: 2022-12-27

## 2023-01-03 DIAGNOSIS — R52 PAIN: ICD-10-CM

## 2023-01-03 RX ORDER — OMEPRAZOLE 40 MG/1
CAPSULE, DELAYED RELEASE ORAL
Qty: 30 CAPSULE | Refills: 6 | Status: SHIPPED | OUTPATIENT
Start: 2023-01-03

## 2023-01-03 RX ORDER — TRAMADOL HYDROCHLORIDE 50 MG/1
50 TABLET ORAL 2 TIMES DAILY PRN
Qty: 60 TABLET | Refills: 0 | Status: SHIPPED | OUTPATIENT
Start: 2023-01-03 | End: 2023-02-02

## 2023-01-03 NOTE — TELEPHONE ENCOUNTER
Provider needs to review PDMP    PDMP Monitoring:    Last PDMP Rodriguez Frederick as Reviewed [facility]):  Review User Review Instant Review Result   Rene Ashton 7/16/2022  5:02 PM Reviewed PDMP [1]     Urine Drug Screenings (1 yr)    No resulted procedures found.        Medication Contract and Consent for Opioid Use Documents Filed       Patient Documents       Type of Document Status Date Received Received By Description    Medication Contract Received 5/24/2019 10:27 AM ROSALBA Estrada neuro    Medication Contract Received 12/17/2019 10:48 AM MEGHAN Perkins Medication Agreement Alyssa Rivera 11/25/2019

## 2023-01-10 ENCOUNTER — OFFICE VISIT (OUTPATIENT)
Dept: CARDIOLOGY CLINIC | Age: 73
End: 2023-01-10
Payer: MEDICARE

## 2023-01-10 VITALS
SYSTOLIC BLOOD PRESSURE: 128 MMHG | WEIGHT: 293 LBS | HEIGHT: 62 IN | BODY MASS INDEX: 53.92 KG/M2 | DIASTOLIC BLOOD PRESSURE: 88 MMHG | HEART RATE: 68 BPM

## 2023-01-10 DIAGNOSIS — I87.2 EDEMA OF BOTH LOWER EXTREMITIES DUE TO PERIPHERAL VENOUS INSUFFICIENCY: ICD-10-CM

## 2023-01-10 DIAGNOSIS — E66.01 MORBID OBESITY (HCC): ICD-10-CM

## 2023-01-10 DIAGNOSIS — Z86.79 H/O DIASTOLIC DYSFUNCTION: ICD-10-CM

## 2023-01-10 DIAGNOSIS — I48.0 PAF (PAROXYSMAL ATRIAL FIBRILLATION) (HCC): Primary | ICD-10-CM

## 2023-01-10 DIAGNOSIS — I10 ESSENTIAL HYPERTENSION: ICD-10-CM

## 2023-01-10 LAB
ALBUMIN SERPL-MCNC: 3.9 G/DL (ref 3.5–5.2)
ALP BLD-CCNC: 79 U/L (ref 35–104)
ALT SERPL-CCNC: <5 U/L (ref 5–33)
ANION GAP SERPL CALCULATED.3IONS-SCNC: 13 MMOL/L (ref 7–19)
AST SERPL-CCNC: 14 U/L (ref 5–32)
BACTERIA: NEGATIVE /HPF
BASOPHILS ABSOLUTE: 0.1 K/UL (ref 0–0.2)
BASOPHILS RELATIVE PERCENT: 0.5 % (ref 0–1)
BILIRUB SERPL-MCNC: <0.2 MG/DL (ref 0.2–1.2)
BILIRUBIN URINE: NEGATIVE
BLOOD, URINE: NEGATIVE
BUN BLDV-MCNC: 60 MG/DL (ref 8–23)
CALCIUM SERPL-MCNC: 9.5 MG/DL (ref 8.8–10.2)
CHLORIDE BLD-SCNC: 105 MMOL/L (ref 98–111)
CLARITY: CLEAR
CO2: 24 MMOL/L (ref 22–29)
COLOR: YELLOW
CREAT SERPL-MCNC: 1.5 MG/DL (ref 0.5–0.9)
CREATININE URINE: 102.1 MG/DL (ref 4.2–622)
CRYSTALS, UA: ABNORMAL /HPF
EOSINOPHILS ABSOLUTE: 0.2 K/UL (ref 0–0.6)
EOSINOPHILS RELATIVE PERCENT: 2 % (ref 0–5)
EPITHELIAL CELLS, UA: 4 /HPF (ref 0–5)
GFR SERPL CREATININE-BSD FRML MDRD: 37 ML/MIN/{1.73_M2}
GLUCOSE BLD-MCNC: 79 MG/DL (ref 74–109)
GLUCOSE URINE: NEGATIVE MG/DL
HCT VFR BLD CALC: 39.4 % (ref 37–47)
HEMOGLOBIN: 12.2 G/DL (ref 12–16)
HYALINE CASTS: 4 /HPF (ref 0–8)
IMMATURE GRANULOCYTES #: 0.1 K/UL
KETONES, URINE: NEGATIVE MG/DL
LEUKOCYTE ESTERASE, URINE: ABNORMAL
LYMPHOCYTES ABSOLUTE: 2.3 K/UL (ref 1.1–4.5)
LYMPHOCYTES RELATIVE PERCENT: 20.7 % (ref 20–40)
MAGNESIUM: 2.1 MG/DL (ref 1.6–2.4)
MCH RBC QN AUTO: 30.2 PG (ref 27–31)
MCHC RBC AUTO-ENTMCNC: 31 G/DL (ref 33–37)
MCV RBC AUTO: 97.5 FL (ref 81–99)
MONOCYTES ABSOLUTE: 0.8 K/UL (ref 0–0.9)
MONOCYTES RELATIVE PERCENT: 7.1 % (ref 0–10)
NEUTROPHILS ABSOLUTE: 7.6 K/UL (ref 1.5–7.5)
NEUTROPHILS RELATIVE PERCENT: 69 % (ref 50–65)
NITRITE, URINE: NEGATIVE
PARATHYROID HORMONE INTACT: 275.1 PG/ML (ref 15–65)
PDW BLD-RTO: 14.8 % (ref 11.5–14.5)
PH UA: 5 (ref 5–8)
PHOSPHORUS: 3.7 MG/DL (ref 2.5–4.5)
PLATELET # BLD: 209 K/UL (ref 130–400)
PMV BLD AUTO: 11.1 FL (ref 9.4–12.3)
POTASSIUM SERPL-SCNC: 4.9 MMOL/L (ref 3.5–5)
PROTEIN PROTEIN: 21 MG/DL (ref 15–45)
PROTEIN UA: ABNORMAL MG/DL
RBC # BLD: 4.04 M/UL (ref 4.2–5.4)
RBC UA: 1 /HPF (ref 0–4)
SODIUM BLD-SCNC: 142 MMOL/L (ref 136–145)
SPECIFIC GRAVITY UA: 1.02 (ref 1–1.03)
TOTAL PROTEIN: 6.7 G/DL (ref 6.6–8.7)
URIC ACID, SERUM: 6.1 MG/DL (ref 2.4–5.7)
UROBILINOGEN, URINE: 0.2 E.U./DL
WBC # BLD: 11 K/UL (ref 4.8–10.8)
WBC UA: 74 /HPF (ref 0–5)

## 2023-01-10 PROCEDURE — 1036F TOBACCO NON-USER: CPT | Performed by: CLINICAL NURSE SPECIALIST

## 2023-01-10 PROCEDURE — G8399 PT W/DXA RESULTS DOCUMENT: HCPCS | Performed by: CLINICAL NURSE SPECIALIST

## 2023-01-10 PROCEDURE — 1124F ACP DISCUSS-NO DSCNMKR DOCD: CPT | Performed by: CLINICAL NURSE SPECIALIST

## 2023-01-10 PROCEDURE — G8484 FLU IMMUNIZE NO ADMIN: HCPCS | Performed by: CLINICAL NURSE SPECIALIST

## 2023-01-10 PROCEDURE — 3074F SYST BP LT 130 MM HG: CPT | Performed by: CLINICAL NURSE SPECIALIST

## 2023-01-10 PROCEDURE — 3017F COLORECTAL CA SCREEN DOC REV: CPT | Performed by: CLINICAL NURSE SPECIALIST

## 2023-01-10 PROCEDURE — 93000 ELECTROCARDIOGRAM COMPLETE: CPT | Performed by: CLINICAL NURSE SPECIALIST

## 2023-01-10 PROCEDURE — 99214 OFFICE O/P EST MOD 30 MIN: CPT | Performed by: CLINICAL NURSE SPECIALIST

## 2023-01-10 PROCEDURE — G8417 CALC BMI ABV UP PARAM F/U: HCPCS | Performed by: CLINICAL NURSE SPECIALIST

## 2023-01-10 PROCEDURE — 3079F DIAST BP 80-89 MM HG: CPT | Performed by: CLINICAL NURSE SPECIALIST

## 2023-01-10 PROCEDURE — 1090F PRES/ABSN URINE INCON ASSESS: CPT | Performed by: CLINICAL NURSE SPECIALIST

## 2023-01-10 PROCEDURE — G8427 DOCREV CUR MEDS BY ELIG CLIN: HCPCS | Performed by: CLINICAL NURSE SPECIALIST

## 2023-01-10 NOTE — PATIENT INSTRUCTIONS
Wear monitor for about a week. Pushbutton for any symptoms. Watch for fluid retention. With quick fluid retention worsening shortness of breath and swelling double Demadex for couple days  See specialist as planned  Maintain good blood pressure control-goal<130/80 at rest  Maintain good cholesterol control LDL goal<70 with arterial disease  If you are diabetic work to keep/obtain hemoglobin A1c< 7    Follow up in after testing  Call with any questions or concerns  Follow up with INGRID Claros - CNP for non cardiac problems  Report any new problems  Cardiovascular Fitness-Exercise as tolerated. Cardiac / Healthy Diet- Avoid processed high fat foods, maintain low sodium/salt   Continue current medications as directed  Continue plan of treatment  It is always recommended that you bring your medications bottles with you to each visit - this is for your safety!

## 2023-01-10 NOTE — PROGRESS NOTES
[facility] Cardiology  [facility] [address]  [address]  Phone: [phone]  Fax: [phone]    OFFICE VISIT:  1/10/2023    Ricky Counter - : 1950    Reason For Visit:  Rosario Estrella is a 67 y.o. female who is here for 6 Month Follow-Up (Pt had fluttering 1 month ago ), Atrial Fibrillation, and Hypertension  History hypertension and paroxysmal atrial fibrillation, diabetes, Parkinson's disease and diabetic polyneuropathy  2D echo 2019 showed normal LV systolic function and EF 55 to 60%. Mild diastolic dysfunction with no significant valvular disease. Patient has peripheral vascular disease and wounds on lower extremities following with wound care in [place] and seeing DR Evan Brandt for her PVD  Was able have angiogram with balloon on her LLE    Patient was seen at [facility] earlier last year. Her sotalol was discontinued and transition to low-dose beta-blocker. Daughter reported at last office visit her nuclear stress test showed no evidence of ischemia. She is here today in follow-up accompanied with her daughter. She states she has had more shortness of breath with activity. She wears oxygen at night. She has checked her SPO2 and staying above 90 during the day  She noted she has gained some fluid weight  She has follow-up with nephrologist in a couple weeks. Daughter reports her having some confusion at home and was taken to [facility]. Found to have some infection and given antibiotics    Patient is noticed increase in palpitations. She states she will have episodes where her heart will flutter and race sometimes up to an hour or more. She states it happens most days. Activity is limited due to significant knee pain         Subjective  Rosario Estrella denies exertional chest pain,  orthopnea, paroxysmal nocturnal dyspnea, syncope, presyncope, arrhythmia, edema and fatigue.   The patient denies numbness or weakness to suggest cerebrovascular accident or transient ischemic attack. INGRID Garcia CNP is PCP and follows labs. She follows with Dr. Grisel Ramirez for neurologist  Ze Marin has the following history as recorded in [facility]:    Patient Active Problem List    Diagnosis Date Noted    Coagulation defect ([address] 07/21/2021    At risk for obstructive sleep apnea 03/30/2021    Nocturnal hypoxemia 03/30/2021    Diabetic peripheral neuropathy associated with type 2 diabetes mellitus ([address] 01/28/2021    Ischemic cardiomyopathy 01/28/2021    Edema of both lower extremities due to peripheral venous insufficiency 10/21/2020    Diabetic ulcer of left lower leg associated with type 2 diabetes mellitus, with fat layer exposed ([address] 07/31/2020    Varicose veins of left lower extremity with ulcer of calf (CODE) ([address] 07/31/2020    Stage 4 chronic kidney disease ([address] 05/08/2020    Chronic obstructive pulmonary disease ([address] 05/08/2020    Cellulitis of left lower extremity 11/05/2019    Mixed hyperlipidemia 11/01/2018    Morbid obesity ([address] 10/18/2017    Somnolence, daytime 10/09/2017    Restless legs syndrome 04/06/2017    Hypoesthesia of skin 04/06/2017    PAF (paroxysmal atrial fibrillation) ([address] 02/11/2016    Mild mitral regurgitation by prior echocardiogram 02/11/2016    Hypokalemia 06/26/2015    Edema 06/12/2015    Asthma 04/21/2015    Palpitations 04/28/2014    Fatigue 04/92/3722    Lichen sclerosus 08/34/2179    GERD (gastroesophageal reflux disease) 05/20/2013    Parkinson disease ([address] 05/06/2013     Past Medical History:   Diagnosis Date    Asthma 4/21/2015    Bilateral carpal tunnel syndrome 04/09/2018    sees dr. Gil Mobley.     CHF (congestive heart failure) (HCC)     Colon polyp     Diabetes mellitus (HCC)     GERD (gastroesophageal reflux disease)     HTN (hypertension)     Hyperlipidemia     Mild mitral regurgitation by prior echocardiogram 2/11/2016    Morbid obesity ([address] 10/18/2017    Normal cardiac stress test 4-2-09    no ischemia at attained level of excercise    Palliative care patient 2020    Parkinson disease [facility])     Paroxysmal atrial fibrillation [facility])     sees dr. Prateek Cook    Post-menopausal     Prolonged emergence from general anesthesia     Restrictive airway disease     Stage 3 chronic kidney disease ([facility] 10/18/2017     Past Surgical History:   Procedure Laterality Date    APPENDECTOMY      CATARACT REMOVAL WITH IMPLANT       SECTION      x3    COLONOSCOPY      Dr Estephania Chowdhury polyp-5 yr recall    COLONOSCOPY N/A 2019    Dr Sarita Soto 2 internal hemorrhoids without stigmata, diverticulosis, suboptimal prep--5 yr recall    EYE SURGERY      GALLBLADDER SURGERY  2014     [place] Coil ARTHROSCOPY      ID NEUROPLASTY &/TRANSPOS MEDIAN NRV CARPAL TUNNE Right 2018    OPEN CARPAL TUNNEL RELEASE performed by Ge Henley MD at [address] [place] [address]    dr Nubia Jimenez in [facility]       Family History   Problem Relation Age of Onset    High Blood Pressure Father     Diabetes Father     Parkinsonism Father     High Blood Pressure Mother     Diabetes Sister     Other Paternal Grandmother         hiatal hernia    Heart Disease Paternal Grandfather     Colon Cancer Neg Hx     Colon Polyps Neg Hx     Esophageal Cancer Neg Hx     Liver Cancer Neg Hx     Liver Disease Neg Hx     Rectal Cancer Neg Hx     Stomach Cancer Neg Hx      Social History     Tobacco Use    Smoking status: Never    Smokeless tobacco: Never   Substance Use Topics    Alcohol use: No      Current Outpatient Medications   Medication Sig Dispense Refill    omeprazole (PRILOSEC) 40 MG delayed release capsule TAKE ONE CAPSULE BY MOUTH EVERY DAY 30 capsule 6    traMADol (ULTRAM) 50 MG tablet Take 1 tablet by mouth 2 times daily as needed for Pain for up to 30 days.  60 tablet 0    NOVOLOG FLEXPEN 100 UNIT/ML injection pen INJECT 25 UNITS 3 TIMES DAILY WITH MEALS 15 mL 3    metoprolol tartrate (LOPRESSOR) 25 MG tablet TAKE ONE TABLET BY MOUTH EVERY DAY 30 tablet 6    losartan (COZAAR) 100 MG tablet Take 0.5 tablets by mouth daily TAKE ONE HALF TABLET BY MOUTH EVERY DAY 90 tablet 1    montelukast (SINGULAIR) 10 MG tablet TAKE ONE TABLET BY MOUTH EVERY NIGHT AT BEDTIME 30 tablet 3    COMFORT EZ PEN NEEDLES 32G X 6 MM MISC USE WITH INSULIN THREE TIMES PER DAY. 100 each 3    carbidopa-levodopa (SINEMET)  MG per tablet 2 PO  tablet 11    gabapentin (NEURONTIN) 600 MG tablet TAKE ONE TABLET BY MOUTH 3 TIMES DAILY. . .MAY CAUSE DROWSINESS!! 90 tablet 5    rOPINIRole (REQUIP) 4 MG tablet 2 PO every am, one at noon, and 2 at bedtime 150 tablet 11    fluconazole (DIFLUCAN) 150 MG tablet Take one on the 15th of every month 1 tablet 11    LINZESS 290 MCG CAPS capsule TAKE ONE CAPSULE BY MOUTH EVERY MORNING BEFORE BREAKFAST 30 capsule 5    insulin detemir (LEVEMIR FLEXTOUCH) 100 UNIT/ML injection pen INJECT 80 UNITS SUBQ EVERY NIGHT AT BEDTIME AS DIRECTED (Patient taking differently: 50 units in am and 20u at pm) 15 mL 5    tiZANidine (ZANAFLEX) 2 MG tablet Take 1 tablet by mouth nightly as needed (muscle spasms) 30 tablet 3    Misc. Devices [facility] [address] [facility] wheelchair for decreased mobility. 1 each 0    atorvastatin (LIPITOR) 80 MG tablet Take 80 mg by mouth daily       isosorbide mononitrate (IMDUR) 30 MG extended release tablet       Hydrocortisone, Perianal, (PROCTO-FARNAZ) 1 % cream Apply topically 2 times daily. 1 each 2    ketorolac (ACULAR) 0.5 % ophthalmic solution       blood glucose monitor strips Test 3times a day & as needed for symptoms of irregular blood glucose. 100 strip 3    COMFORT EZ PEN NEEDLES 32G X 4 MM MISC USE WITH INSULIN  THREE TIMES PER DAY. 100 each 3    nystatin (MYCOSTATIN) 758409 UNIT/GM ointment Apply topically 2 times daily. for yeast 60 Tube 5    nystatin (NYSTATIN) 348643 UNIT/GM powder Apply topically 3 times daily Apply topically 4 times daily. 60 g 3    Diabetic Shoe MISC by Does not apply route 1 each 0    Cholecalciferol (VITAMIN D3) 50 MCG (2000 UT) CAPS Take 1 capsule by mouth      albuterol (ACCUNEB) 1.25 MG/3ML nebulizer solution Inhale 3 mLs into the lungs every 6 hours as needed for Wheezing 360 mL 3    allopurinol (ZYLOPRIM) 100 MG tablet Take 1 tablet by mouth daily      EASY COMFORT INSULIN SYRINGE 31G X 5/16\" 1 ML MISC INJECT AS DIRECTED DAILY 100 each 3    torsemide (DEMADEX) 20 MG tablet Take 20 mg by mouth daily IS TAKING 2 IN THE AM EVERY OTHER DAY  1 ON ODD DAYS      blood glucose monitor kit and supplies Use as directed for diabetes TID checks. 1 kit 0    TRUEPLUS INSULIN SYRINGE 30G X 5/16\" 1 ML MISC INJECT AS DIRECTED DAILY 100 each 3    Melatonin 10 MG CAPS Take 10 mg by mouth every evening NIGHTLY      denosumab (PROLIA) 60 MG/ML SOSY SC injection Inject 60 mg into the skin every 6 months      spironolactone (ALDACTONE) 25 MG tablet TAKE ONE TABLET DAILY 30 tablet 5    glucose blood VI test strips (ONE TOUCH ULTRA TEST) strip Inject 1 each into the skin daily As needed. 100 each 3    Lancets MISC 1 lancet to check glucose daily 100 each 3    albuterol (PROVENTIL HFA;VENTOLIN HFA) 108 (90 BASE) MCG/ACT inhaler Inhale 2 puffs into the lungs every 6 hours as needed for Wheezing 1 Inhaler 1    OXYGEN 2L NC 12-24 hours (Patient taking differently: nightly as needed 2L NC 12-24 hours) 1 Device 0    aspirin 325 MG tablet Take 325 mg by mouth daily. No current facility-administered medications for this visit. Allergies: Aquacel extra hydrofiber [wound dressings], Codeine, Hydrocodone-acetaminophen, and Silvadene [silver sulfadiazine]    Review of Systems  Constitutional - no significant activity change, appetite change, or unexpected weight change. No fever, chills or diaphoresis. No fatigue. HEENT - no significant rhinorrhea or epistaxis. No tinnitus or significant hearing loss.    Eyes - no sudden vision change or amaurosis. Respiratory - no significant wheezing, stridor, apnea or cough. + dyspnea on exertion +shortness of breath. Cardiovascular - no exertional chest pain, orthopnea or PND. + sensation of arrhythmia no  slow heart rate. No claudication + leg edema. Gastrointestinal - no abdominal swelling or pain. No blood in stool. No severe constipation, diarrhea, nausea, or vomiting. Genitourinary - no difficulty urinating, dysuria, frequency, or urgency. No flank pain or hematuria. Musculoskeletal -+ knee pain. No recent falls  Skin - no color change or rash. No pallor. No new surgical incision. Neurologic - no speech difficulty, facial asymmetry or lateralizing weakness. No seizures, presyncope, syncope, or significant dizziness. Hematologic - no easy bruising or excessive bleeding. Psychiatric - no severe anxiety or insomnia. No confusion. All other review of systems are negative. Objective  Vital Signs - /88   Pulse 68   Ht 5' 2\" (1.575 m)   Wt 295 lb (133.8 kg)   BMI 53.96 kg/m²   General - Aura Cordova is alert, cooperative, and pleasant. Well groomed. No acute distress. Body habitus is morbidly obese. HEENT - The head is normocephalic. No circumoral cyanosis. Dentition is normal.   EYES -  No Xanthelasma, no arcus senilis, no conjunctival hemorrhages or discharge. Neck - Supple, without increased jugular venous pressures. No carotid bruits. No mass. Respiratory - Lungs are clear bilaterally. No wheezes or rales. Normal effort without use of accessory muscles. Cardiovascular - Heart has regular rhythm and rate. No murmurs, rubs or gallops. + pedal pulses and no varicosities. Abdominal -  Soft, nontender, nondistended. Bowel sounds are intact. Extremities - No clubbing, cyanosis, or  edema. Musculoskeletal -  No clubbing . No Osler's nodes. Gait-in transport wheelchair. No kyphosis or scoliosis.    Skin -  no statis ulcers or dermatitis. Neurological - No focal signs are identified. Oriented to person, place and time. Psychiatric -  Appropriate affect and mood. Assessment:     Diagnosis Orders   1. PAF (paroxysmal atrial fibrillation) ([facility])  EKG 12 lead    LONGTERM CONTINUOUS CARDIAC EVENT MONITOR (ZIO)      2. Essential hypertension        3. Body mass index (BMI) 50.0-59.9, adult ([facility]        4. H/O diastolic dysfunction        5. Morbid obesity ([facility]        6. Edema of both lower extremities due to peripheral venous insufficiency          Data:  BP Readings from Last 3 Encounters:   01/10/23 128/88   10/10/22 115/63   07/13/22 110/60    Pulse Readings from Last 3 Encounters:   01/10/23 68   10/10/22 66   07/13/22 82        Wt Readings from Last 3 Encounters:   01/10/23 295 lb (133.8 kg)   10/10/22 294 lb (133.4 kg)   07/13/22 294 lb (133.4 kg)       EKG today shows sinus rhythm VCs. Old inferior and anterior infarct. Low voltage. Stable EKG    Blood pressure and heart rate controlled. Medical management includes beta-blocker, ARB, long-acting nitrate and spironolactone. Also on aspirin and statin      Patient has had a history of paroxysmal atrial fibrillation previously on sotalol. It was discontinued last year when she was at [facility]. May have been due to declining kidney function and/or drug interactions. She is been maintained on beta-blocker. She is not on any anticoagulation. She states she is had an increase in flutters that will last up to an hour at a time. We will have her wear a monitor to assess for rate and arrhythmia burden    Patient has significant decline in kidney function following closely with nephrology. Has a follow-up appointment with them on the 17th. Peripheral edema and worsening SEXTON multifactorial but definitely can be worsening diastolic dysfunction and CHF.   We will have her take extra Demadex sparingly if significant quick fluid retention occurs    Patient is morbidly obese and wears oxygen at night. We discussed watching her oxygenation during the day and u to supplement if SPO2 drops <90%. Reviewed PCP recent notes   Reviewed recent labs    Last 2D echo here December 2019   Normal left ventricular size and function. Moderate concentric left ventricular hypertrophy. Left ventricular ejection fraction is estimated at 55-60%. E/A flow reversal noted. Suggestive of diastolic dysfunction.  ----------------------------------------------------   Electronically signed by Domenica Poole MD(Interpreting   physician) on 12/27/2019 06:51 PM   States taking medications as prescribed    30 minutes were spent preparing, reviewing and seeing patient. All questions answered    Plan    Wear monitor for about a week. Pushbutton for any symptoms. Watch for fluid retention. With quick fluid retention worsening shortness of breath and swelling double Demadex for couple days  See specialist as planned  Maintain good blood pressure control-goal<130/80 at rest  Maintain good cholesterol control LDL goal<70 with arterial disease  If you are diabetic work to keep/obtain hemoglobin A1c< 7    Follow up in after testing  Call with any questions or concerns  Follow up with INGRID Rivero - CNP for non cardiac problems  Report any new problems  Cardiovascular Fitness-Exercise as tolerated. Cardiac / Healthy Diet- Avoid processed high fat foods, maintain low sodium/salt   Continue current medications as directed  Continue plan of treatment  It is always recommended that you bring your medications bottles with you to each visit - this is for your safety! INGRID Brewer    EMR dragon/transcription disclaimer: Much of this encounter note is electronic transcription/translation of spoken language to printed tach. Electronic translation of spoken language may be erroneous, or at times, nonsensical words or phrases may be inadvertently transcribed.  Although, I have reviewed the note for such errors, some may still exist.

## 2023-01-17 ENCOUNTER — OFFICE VISIT (OUTPATIENT)
Dept: PRIMARY CARE CLINIC | Age: 73
End: 2023-01-17
Payer: MEDICARE

## 2023-01-17 VITALS
BODY MASS INDEX: 53.92 KG/M2 | HEART RATE: 62 BPM | TEMPERATURE: 97.2 F | SYSTOLIC BLOOD PRESSURE: 130 MMHG | HEIGHT: 62 IN | OXYGEN SATURATION: 94 % | DIASTOLIC BLOOD PRESSURE: 78 MMHG | WEIGHT: 293 LBS

## 2023-01-17 DIAGNOSIS — N18.4 STAGE 4 CHRONIC KIDNEY DISEASE (HCC): ICD-10-CM

## 2023-01-17 DIAGNOSIS — E11.622 DIABETIC ULCER OF LEFT LOWER LEG (HCC): ICD-10-CM

## 2023-01-17 DIAGNOSIS — E11.42 DIABETIC POLYNEUROPATHY ASSOCIATED WITH TYPE 2 DIABETES MELLITUS (HCC): ICD-10-CM

## 2023-01-17 DIAGNOSIS — Z00.00 MEDICARE ANNUAL WELLNESS VISIT, SUBSEQUENT: Primary | ICD-10-CM

## 2023-01-17 DIAGNOSIS — G20 PARKINSON DISEASE (HCC): ICD-10-CM

## 2023-01-17 DIAGNOSIS — I83.022 VARICOSE VEINS OF LEFT LOWER EXTREMITY WITH ULCER OF CALF (CODE) (HCC): ICD-10-CM

## 2023-01-17 DIAGNOSIS — J44.9 CHRONIC OBSTRUCTIVE PULMONARY DISEASE, UNSPECIFIED COPD TYPE (HCC): ICD-10-CM

## 2023-01-17 DIAGNOSIS — L97.929 DIABETIC ULCER OF LEFT LOWER LEG (HCC): ICD-10-CM

## 2023-01-17 DIAGNOSIS — L84 CALLUS OF FOOT: ICD-10-CM

## 2023-01-17 DIAGNOSIS — D68.9 COAGULATION DEFECT (HCC): ICD-10-CM

## 2023-01-17 PROBLEM — L97.922 DIABETIC ULCER OF LEFT LOWER LEG ASSOCIATED WITH TYPE 2 DIABETES MELLITUS, WITH FAT LAYER EXPOSED (HCC): Chronic | Status: RESOLVED | Noted: 2020-07-31 | Resolved: 2023-01-17

## 2023-01-17 PROCEDURE — G0439 PPPS, SUBSEQ VISIT: HCPCS | Performed by: NURSE PRACTITIONER

## 2023-01-17 PROCEDURE — 3017F COLORECTAL CA SCREEN DOC REV: CPT | Performed by: NURSE PRACTITIONER

## 2023-01-17 PROCEDURE — 1124F ACP DISCUSS-NO DSCNMKR DOCD: CPT | Performed by: NURSE PRACTITIONER

## 2023-01-17 PROCEDURE — G8484 FLU IMMUNIZE NO ADMIN: HCPCS | Performed by: NURSE PRACTITIONER

## 2023-01-17 PROCEDURE — 3046F HEMOGLOBIN A1C LEVEL >9.0%: CPT | Performed by: NURSE PRACTITIONER

## 2023-01-17 RX ORDER — CEFDINIR 300 MG/1
CAPSULE ORAL
COMMUNITY
Start: 2023-01-11

## 2023-01-17 RX ORDER — ACETAMINOPHEN 325 MG/1
650 TABLET ORAL
COMMUNITY

## 2023-01-17 RX ORDER — CALCITRIOL 0.25 UG/1
CAPSULE, LIQUID FILLED ORAL
COMMUNITY
Start: 2023-01-03

## 2023-01-17 SDOH — ECONOMIC STABILITY: FOOD INSECURITY: WITHIN THE PAST 12 MONTHS, YOU WORRIED THAT YOUR FOOD WOULD RUN OUT BEFORE YOU GOT MONEY TO BUY MORE.: SOMETIMES TRUE

## 2023-01-17 SDOH — ECONOMIC STABILITY: FOOD INSECURITY: WITHIN THE PAST 12 MONTHS, THE FOOD YOU BOUGHT JUST DIDN'T LAST AND YOU DIDN'T HAVE MONEY TO GET MORE.: SOMETIMES TRUE

## 2023-01-17 ASSESSMENT — PATIENT HEALTH QUESTIONNAIRE - PHQ9
1. LITTLE INTEREST OR PLEASURE IN DOING THINGS: 0
SUM OF ALL RESPONSES TO PHQ9 QUESTIONS 1 & 2: 0
2. FEELING DOWN, DEPRESSED OR HOPELESS: 0
SUM OF ALL RESPONSES TO PHQ QUESTIONS 1-9: 0

## 2023-01-17 ASSESSMENT — SOCIAL DETERMINANTS OF HEALTH (SDOH): HOW HARD IS IT FOR YOU TO PAY FOR THE VERY BASICS LIKE FOOD, HOUSING, MEDICAL CARE, AND HEATING?: VERY HARD

## 2023-01-17 ASSESSMENT — LIFESTYLE VARIABLES
HOW MANY STANDARD DRINKS CONTAINING ALCOHOL DO YOU HAVE ON A TYPICAL DAY: PATIENT DOES NOT DRINK
HOW OFTEN DO YOU HAVE A DRINK CONTAINING ALCOHOL: NEVER

## 2023-01-17 NOTE — PROGRESS NOTES
Medicare Annual Wellness Visit    Elizabet Torres is here for Medicare AWV    Assessment & Plan   Medicare annual wellness visit, subsequent  Parkinson disease ([facility] [address]  Varicose veins of left lower extremity with ulcer of calf (CODE) ([facility] [address]  Chronic obstructive pulmonary disease, unspecified COPD type ([facility] [address]  Diabetic polyneuropathy associated with type 2 diabetes mellitus ([address]  Stage 4 chronic kidney disease ([address]  Coagulation defect ([facility] [address]  Diabetic ulcer of left lower leg ([facility] [address]  Callus of foot      Recommendations for Preventive Services Due: see orders and patient instructions/AVS.  Recommended screening schedule for the next 5-10 years is provided to the patient in written form: see Patient Instructions/AVS.  Continue dexcom  May do mammo after knee surgery in Nov  If colon still bothering you will do GI referral Dr Villavicencio Payment  Diabetic shoes     Return for Medicare Annual Wellness Visit in 1 year. Subjective   The following acute and/or chronic problems were also addressed today:  Not fasting. On novalog around 25u, leveimir 50 U am, 20pm  On  cholesterol meds daily. Not fasting today for blood work. Lab Results   Component Value Date/Time    GLUCOSE 79 01/10/2023 12:07 PM    GLUCOSE 125 07/13/2022 12:25 PM    GLUCOSE 134 04/15/2022 12:50 PM    LABA1C 7.5 10/07/2022 12:40 PM    LABA1C 7.8 07/13/2022 12:25 PM    LABA1C 9.4 01/12/2022 11:30 AM    LABA1C 8.9 10/28/2015 09:15 AM    LABA1C 8.5 07/17/2015 09:28 AM    LABA1C 8.2 11/14/2014 10:30 AM     Patient's complete Health Risk Assessment and screening values have been reviewed and are found in Flowsheets. The following problems were reviewed today and where indicated follow up appointments were made and/or referrals ordered.     Positive Risk Factor Screenings with Interventions:    Fall Risk:  Do you feel unsteady or are you worried about falling? : (!) yes  2 or more falls in past year?: (!) yes  Fall with injury in past year?: no     Interventions:    Seeing orthopedic Friday for knees. Opioid Risk: (Low risk score <55) Opioid risk score: 35    Patient is low risk for opioid use disorder or overdose. Last PDMP Neena Samples as Reviewed:  Review User Review Instant Review Result   Idalia Davis 1/3/2023  5:47 PM     Reviewed PDMP [1]     Last Controlled Substance Monitoring Documentation      [id number] Office Visit from 1/17/2023 in [id number] [facility]   Periodic Controlled Substance Monitoring Possible medication side effects, risk of tolerance/dependence & alternative treatments discussed., No signs of potential drug abuse or diversion identified. filed at 01/17/2023 1116   Chronic Pain > 120 MEDD Obtained or confirmed \"Medication Contract\" on file. filed at 01/17/2023 1116              General HRA Questions:  Select all that apply: (!) New or Increased Pain    Pain Interventions:  Continued pain contract for tramadol       Weight and Activity:  Physical Activity: Inactive    Days of Exercise per Week: 0 days    Minutes of Exercise per Session: 0 min     On average, how many days per week do you engage in moderate to strenuous exercise (like a brisk walk)?: 0 days  Have you lost any weight without trying in the past 3 months?: No  Body mass index: (!) 54.94      Inactivity Interventions:  Patient declined any further interventions or treatment  Going to have knee surgery  Obesity Interventions:  Patient declines any further evaluation or treatment          Dentist Screen:  Have you seen the dentist within the past year?: (!) No    Intervention:  Advised to schedule with their dentist       ADL's:   Patient reports needing help with:  Select all that apply: (!) Dressing, Grooming, Bathing, Walking/Balance  Select all that apply: [employer], Housekeeping, Banking/Finances, Shopping, Telephone Use, Food Preparation, Transportation, Taking Medications  Interventions:  Daughters are helping her.      Advanced Directives:  Do you have a Living Will?: (!) No    Intervention:  has NO advanced directive - information provided         See detailed foot exam in a separate note scanned in media              Objective   Vitals:    01/17/23 1036   BP: 130/78   Pulse: 62   Temp: 97.2 °F (36.2 °C)   SpO2: 94%   Weight: (!) 300 lb 6.4 oz (136.3 kg)   Height: 5' 2\" (1.575 m)      Body mass index is 54.94 kg/m². General Appearance: alert and oriented to person, place and time, well developed and well- nourished, in no acute distress. Obese sitting up in wheelchair  Skin: warm and dry, no rash or erythema  Head: normocephalic and atraumatic  Eyes: pupils equal, round, and reactive to light, extraocular eye movements intact, conjunctivae normal  ENT: tympanic membrane, external ear and ear canal normal bilaterally, nose without deformity, nasal mucosa and turbinates normal without polyps  Neck: supple and non-tender without mass, no thyromegaly or thyroid nodules, no cervical lymphadenopathy  Pulmonary/Chest: clear to auscultation bilaterally- no wheezes, rales or rhonchi, normal air movement, no respiratory distress  Cardiovascular: normal rate, regular rhythm, normal S1 and S2, no murmurs, rubs, clicks, or gallops, distal pulses intact, no carotid bruits  Abdomen: soft, non-tender, non-distended, normal bowel sounds, no masses or organomegaly  Extremities: no cyanosis, clubbing or edema  Musculoskeletal: bilat knee swelling , sitting in wheelchair. Neurologic: reflexes normal and symmetric, no cranial nerve deficit, gait, coordination and speech normal     See foot exam  Allergies   Allergen Reactions    Aquacel Extra Hydrofiber [Wound Dressings]      Burning and stinging    Codeine      itching    Hydrocodone-Acetaminophen Nausea Only    Silvadene [Silver Sulfadiazine]      Stinging and burning     Prior to Visit Medications    Medication Sig Taking?  Authorizing Provider   calcitRIOL (ROCALTROL) 0.25 MCG capsule  Yes Historical Provider, MD   acetaminophen (TYLENOL) 325 MG tablet 650 mg Yes Historical Provider, MD   cefdinir (OMNICEF) 300 MG capsule  Yes Historical Provider, MD   Diabetic Shoe MISC by Does not apply route Yes INGRID Smith CNP   omeprazole (PRILOSEC) 40 MG delayed release capsule TAKE ONE CAPSULE BY MOUTH EVERY DAY Yes INGRID Smith CNP   traMADol (ULTRAM) 50 MG tablet Take 1 tablet by mouth 2 times daily as needed for Pain for up to 30 days. Yes INGRID Smith CNP   NOVOLOG FLEXPEN 100 UNIT/ML injection pen INJECT 25 UNITS 3 TIMES DAILY WITH MEALS Yes INGRID Smith CNP   metoprolol tartrate (LOPRESSOR) 25 MG tablet TAKE ONE TABLET BY MOUTH EVERY DAY Yes INGRID Smith CNP   losartan (COZAAR) 100 MG tablet Take 0.5 tablets by mouth daily TAKE ONE HALF TABLET BY MOUTH EVERY DAY Yes INGRID Cruz CNP   montelukast (SINGULAIR) 10 MG tablet TAKE ONE TABLET BY MOUTH EVERY NIGHT AT BEDTIME Yes Christelle Jauregui MD   COMFORT EZ PEN NEEDLES 32G X 6 MM MISC USE WITH INSULIN THREE TIMES PER DAY. Yes INGRID Smith CNP   carbidopa-levodopa (SINEMET)  MG per tablet 2 PO TID Yes Brad Smith MD   gabapentin (NEURONTIN) 600 MG tablet TAKE ONE TABLET BY MOUTH 3 TIMES DAILY. . .MAY CAUSE DROWSINESS!!  Yes Brad Smtih MD   rOPINIRole (REQUIP) 4 MG tablet 2 PO every am, one at noon, and 2 at bedtime Yes Brad Smith MD   fluconazole (DIFLUCAN) 150 MG tablet Take one on the 15th of every month Yes INGRID Smith CNP   LINZESS 290 MCG CAPS capsule TAKE ONE CAPSULE BY MOUTH EVERY MORNING BEFORE BREAKFAST Yes INGRID Smith CNP   insulin detemir (LEVEMIR FLEXTOUCH) 100 UNIT/ML injection pen INJECT 80 UNITS SUBQ EVERY NIGHT AT BEDTIME AS DIRECTED  Patient taking differently: 50 units in am and 20u at pm Yes INGRID Smith CNP   tiZANidine (ZANAFLEX) 2 MG tablet Take 1 tablet by mouth nightly as needed (muscle spasms) Yes Lorice Rinne, MD Misc. Devices [facility] [address] [facility] wheelchair for decreased mobility. Yes INGRID Jenkins CNP   atorvastatin (LIPITOR) 80 MG tablet Take 80 mg by mouth daily  Yes Historical Provider, MD   isosorbide mononitrate (IMDUR) 30 MG extended release tablet  Yes Historical Provider, MD   Hydrocortisone, Perianal, (PROCTO-FARNAZ) 1 % cream Apply topically 2 times daily. Yes INGRID Jenkins CNP   blood glucose monitor strips Test 3times a day & as needed for symptoms of irregular blood glucose. Yes INGRID Jenkins CNP   COMFORT EZ PEN NEEDLES 32G X 4 MM MISC USE WITH INSULIN  THREE TIMES PER DAY. Yes Amira Hassan MD   nystatin (MYCOSTATIN) 677061 UNIT/GM ointment Apply topically 2 times daily. for yeast Yes INGRID Jenkins CNP   nystatin (NYSTATIN) 420067 UNIT/GM powder Apply topically 3 times daily Apply topically 4 times daily. Yes INGRID Jenkins CNP   Cholecalciferol (VITAMIN D3) 50 MCG (2000 UT) CAPS Take 1 capsule by mouth Yes Historical Provider, MD   albuterol (ACCUNEB) 1.25 MG/3ML nebulizer solution Inhale 3 mLs into the lungs every 6 hours as needed for Wheezing Yes INGRID Jenkins CNP   allopurinol (ZYLOPRIM) 100 MG tablet Take 1 tablet by mouth daily Yes Historical Provider, MD   [address] [place] [address] X 5/16\" 1 ML MISC INJECT AS DIRECTED DAILY Yes INGRID Jenkins CNP   torsemide (DEMADEX) 20 MG tablet Take 20 mg by mouth daily IS TAKING 2 IN THE AM EVERY OTHER DAY  1 ON ODD DAYS Yes Historical Provider, MD   blood glucose monitor kit and supplies Use as directed for diabetes TID checks.  Yes INGRID Jenkins CNP   TRUEPLUS INSULIN SYRINGE 30G X 5/16\" 1 ML MISC INJECT AS DIRECTED DAILY Yes INGRID Jenkins CNP   Melatonin 10 MG CAPS Take 10 mg by mouth every evening NIGHTLY Yes Historical Provider, MD   denosumab (PROLIA) 60 MG/ML SOSY SC injection Inject 60 mg into the skin every 6 months Yes Historical Provider, MD spironolactone (ALDACTONE) 25 MG tablet TAKE ONE TABLET DAILY Yes INGRID Escalera   glucose blood VI test strips (ONE TOUCH ULTRA TEST) strip Inject 1 each into the skin daily As needed. Yes INGRID Blair CNP   Lancets MISC 1 lancet to check glucose daily Yes INGRID Blair CNP   albuterol (PROVENTIL HFA;VENTOLIN HFA) 108 (90 BASE) MCG/ACT inhaler Inhale 2 puffs into the lungs every 6 hours as needed for Wheezing Yes INGRID Blair CNP   OXYGEN 2L NC 12-24 hours  Patient taking differently: nightly as needed 2L NC 12-24 hours Yes INGRID Blair CNP   aspirin 325 MG tablet Take 325 mg by mouth daily.    Yes Historical Provider, MD Jacinto (Including outside providers/suppliers regularly involved in providing care):   Patient Care Team:  INGRID Blair CNP as PCP - General (Family Nurse Practitioner)  INGRID Blair CNP as PCP - [address] [place] [address]  [facility] Provider  Lizandro Staley MD as Consulting Physician (Neurology)  MARTHA Lam (Physician Assistant Medical)  INGRID Berg NP as Nurse Practitioner (Nurse Practitioner [facility])  Lizandro Staley MD as Consulting Physician (Neurology)  Anat Lopez MD as Consulting Physician (Neurosurgery)     Reviewed and updated this visit:  Tobacco  Allergies  Meds  Problems  Med Hx  Surg Hx  Soc Hx  Fam Hx

## 2023-01-17 NOTE — PATIENT INSTRUCTIONS
Continue dexcom  May do mammo after knee surgery in Nov  If colon still bothering you will do GI referral Dr Lizzie Vargas  Diabetic shoes       Preventing Falls: Care Instructions  Overview     Getting around your home safely can be a challenge if you have injuries or health problems that make it easy for you to fall. Loose rugs and furniture in walkways are among the dangers for many older people who have problems walking or who have poor eyesight. People who have conditions such as arthritis, osteoporosis, or dementia also have to be careful not to fall. You can make your home safer with a few simple measures. Follow-up care is a key part of your treatment and safety. Be sure to make and go to all appointments, and call your doctor if you are having problems. It's also a good idea to know your test results and keep a list of the medicines you take. How can you care for yourself at home? Taking care of yourself  Exercise regularly to improve your strength, muscle tone, and balance. Walk if you can. Swimming may be a good choice if you cannot walk easily. Have your vision and hearing checked each year or any time you notice a change. If you have trouble seeing and hearing, you might not be able to avoid objects and could lose your balance. Know the side effects of the medicines you take. Ask your doctor or pharmacist whether the medicines you take can affect your balance. Sleeping pills or sedatives can affect your balance. Limit the amount of alcohol you drink. Alcohol can impair your balance and other senses. Ask your doctor whether calluses or corns on your feet need to be removed. If you wear loose-fitting shoes because of calluses or corns, you can lose your balance and fall. Talk to your doctor if you have numbness in your feet. You may get dizzy if you do not drink enough water. To prevent dehydration, drink plenty of fluids. Choose water and other clear liquids.  If you have kidney, heart, or liver disease and have to limit fluids, talk with your doctor before you increase the amount of fluids you drink. Preventing falls at home  Remove raised doorway thresholds, throw rugs, and clutter. Repair loose carpet or raised areas in the floor. Move furniture and electrical cords to keep them out of walking paths. Use nonskid floor wax, and wipe up spills right away, especially on ceramic tile floors. If you use a walker or cane, put rubber tips on it. If you use crutches, clean the bottoms of them regularly with an abrasive pad, such as steel wool. Keep your house well lit, especially stairways, porches, and outside walkways. Use night-lights in areas such as hallways and bathrooms. Add extra light switches or use remote switches (such as switches that go on or off when you clap your hands) to make it easier to turn lights on if you have to get up during the night. Install sturdy handrails on stairways. Move items in your cabinets so that the things you use a lot are on the lower shelves (about waist level). Keep a cordless phone and a flashlight with new batteries by your bed. If possible, put a phone in each of the main rooms of your house, or carry a cell phone in case you fall and cannot reach a phone. Or, you can wear a device around your neck or wrist. You push a button that sends a signal for help. Wear low-heeled shoes that fit well and give your feet good support. Use footwear with nonskid soles. Check the heels and soles of your shoes for wear. Repair or replace worn heels or soles. Do not wear socks without shoes on smooth floors, such as wood. Walk on the grass when the sidewalks are slippery. If you live in an area that gets snow and ice in the winter, sprinkle salt on slippery steps and sidewalks. Or ask a family member or friend to do this for you. Preventing falls in the bath  Install grab bars and nonskid mats inside and outside your shower or tub and near the toilet and sinks.   Use shower chairs and bath benches. Use a hand-held shower head that will allow you to sit while showering. Get into a tub or shower by putting the weaker leg in first. Get out of a tub or shower with your strong side first.  Repair loose toilet seats and consider installing a raised toilet seat to make getting on and off the toilet easier. Keep your bathroom door unlocked while you are in the shower. Where can you learn more? Go to http://www.oquendo.com/ and enter G117 to learn more about \"Preventing Falls: Care Instructions. \"  Current as of: May 4, 2022               Content Version: 13.5  © 2006-2022 [employer]. Care instructions adapted under license by [facility] ([facility]). If you have questions about a medical condition or this instruction, always ask your healthcare professional. [place] [address] any warranty or liability for your use of this information. Learning About Being Active as an Older Adult  Why is being active important as you get older? Being active is one of the best things you can do for your health. And it's never too late to start. Being active--or getting active, if you aren't already--has definite benefits. It can:  Give you more energy,  Keep your mind sharp. Improve balance to reduce your risk of falls. Help you manage chronic illness with fewer medicines. No matter how old you are, how fit you are, or what health problems you have, there is a form of activity that will work for you. And the more physical activity you can do, the better your overall health will be. What kinds of activity can help you stay healthy? Being more active will make your daily activities easier. Physical activity includes planned exercise and things you do in daily life. There are four types of activity:  Aerobic. Doing aerobic activity makes your heart and lungs strong. Includes walking, dancing, and gardening.   Aim for at least 2½ hours spread throughout the week. It improves your energy and can help you sleep better. Muscle-strengthening. This type of activity can help maintain muscle and strengthen bones. Includes climbing stairs, using resistance bands, and lifting or carrying heavy loads. Aim for at least twice a week. It can help protect the knees and other joints. Stretching. Stretching gives you better range of motion in joints and muscles. Includes upper arm stretches, calf stretches, and gentle yoga. Aim for at least twice a week, preferably after your muscles are warmed up from other activities. It can help you function better in daily life. Balancing. This helps you stay coordinated and have good posture. Includes heel-to-toe walking, elysia chi, and certain types of yoga. Aim for at least 3 days a week. It can reduce your risk of falling. Even if you have a hard time meeting the recommendations, it's better to be more active than less active. All activity done in each category counts toward your weekly total. You'd be surprised how daily things like carrying groceries, keeping up with grandchildren, and taking the stairs can add up. What keeps you from being active? If you've had a hard time being more active, you're not alone. Maybe you remember being able to do more. Or maybe you've never thought of yourself as being active. It's frustrating when you can't do the things you want. Being more active can help. What's holding you back? Getting started. Have a goal, but break it into easy tasks. Small steps build into big accomplishments. Staying motivated. If you feel like skipping your activity, remember your goal. Maybe you want to move better and stay independent. Every activity gets you one step closer. Not feeling your best.  Start with 5 minutes of an activity you enjoy. Prove to yourself you can do it. As you get comfortable, increase your time. You may not be where you want to be.  But you're in the process of getting there. Everyone starts somewhere. How can you find safe ways to stay active? Talk with your doctor about any physical challenges you're facing. Make a plan with your doctor if you have a health problem or aren't sure how to get started with activity. If you're already active, ask your doctor if there is anything you should change to stay safe as your body and health change. If you tend to feel dizzy after you take medicine, avoid activity at that time. Try being active before you take your medicine. This will reduce your risk of falls. If you plan to be active at home, make sure to clear your space before you get started. Remove things like TV cords, coffee tables, and throw rugs. It's safest to have plenty of space to move freely. The key to getting more active is to take it slow and steady. Try to improve only a little bit at a time. Pick just one area to improve on at first. And if an activity hurts, stop and talk to your doctor. Where can you learn more? Go to http://www.oquendo.com/ and enter P600 to learn more about \"Learning About Being Active as an Older Adult. \"  Current as of: October 10, 2022               Content Version: 13.5  © 2006-2022 Healthwise, Incorporated. Care instructions adapted under license by [facility] ([facility]). If you have questions about a medical condition or this instruction, always ask your healthcare professional. [place] [address] any warranty or liability for your use of this information. Learning About Dental Care for Older Adults  Dental care for older adults: Overview  Dental care for older people is much the same as for younger adults. But older adults do have concerns that younger adults do not. Older adults may have problems with gum disease and decay on the roots of their teeth. They may need missing teeth replaced or broken fillings fixed. Or they may have dentures that need to be cared for.  Some older adults may have trouble holding a toothbrush. You can help remind the person you are caring for to brush and floss their teeth or to clean their dentures. In some cases, you may need to do the brushing and other dental care tasks. People who have trouble using their hands or who have dementia may need this extra help. How can you help with dental care? Normal dental care  To keep the teeth and gums healthy:  Brush the teeth with fluoride toothpaste twice a day--in the morning and at night--and floss at least once a day. Plaque can quickly build up on the teeth of older adults. Watch for the signs of gum disease. These signs include gums that bleed after brushing or after eating hard foods, such as apples. See a dentist regularly. Many experts recommend checkups every 6 months. Keep the dentist up to date on any new medications the person is taking. Encourage a balanced diet that includes whole grains, vegetables, and fruits, and that is low in saturated fat and sodium. Encourage the person you're caring for not to use tobacco products. They can affect dental and general health. Many older adults have a fixed income and feel that they can't afford dental care. But most towns and cities have programs in which dentists help older adults by lowering fees. Contact your area's public health offices or  for information about dental care in your area. Using a toothbrush  Older adults with arthritis sometimes have trouble brushing their teeth because they can't easily hold the toothbrush. Their hands and fingers may be stiff, painful, or weak. If this is the case, you can: Offer an electric toothbrush. Enlarge the handle of a non-electric toothbrush by wrapping a sponge, an elastic bandage, or adhesive tape around it. Push the toothbrush handle through a ball made of rubber or soft foam.  Make the handle longer and thicker by taping Popsicle sticks or tongue depressors to it.   You may also be able to buy special toothbrushes, toothpaste dispensers, and floss holders. Your doctor may recommend a soft-bristle toothbrush if the person you care for bleeds easily. Bleeding can happen because of a health problem or from certain medicines. A toothpaste for sensitive teeth may help if the person you care for has sensitive teeth. How do you brush and floss someone's teeth? If the person you are caring for has a hard time cleaning their teeth on their own, you may need to brush and floss their teeth for them. It may be easiest to have the person sit and face away from you, and to sit or stand behind them. That way you can steady their head against your arm as you reach around to floss and brush their teeth. Choose a place that has good lighting and is comfortable for both of you. Before you begin, gather your supplies. You will need gloves, floss, a toothbrush, and a container to hold water if you are not near a sink. Wash and dry your hands well and put on gloves. Start by flossing:  Gently work a piece of floss between each of the teeth toward the gums. A plastic flossing tool may make this easier, and they are available at most [facility]. Curve the floss around each tooth into a U-shape and gently slide it under the gum line. Move the floss firmly up and down several times to scrape off the plaque. After you've finished flossing, throw away the used floss and begin brushing:  Wet the brush and apply toothpaste. Place the brush at a 45-degree angle where the teeth meet the gums. Press firmly, and move the brush in small circles over the surface of the teeth. Be careful not to brush too hard. Vigorous brushing can make the gums pull away from the teeth and can scratch the tooth enamel. Brush all surfaces of the teeth, on the tongue side and on the cheek side. Pay special attention to the front teeth and all surfaces of the back teeth. Brush chewing surfaces with short back-and-forth strokes.   After you've finished, help the person rinse the remaining toothpaste from their mouth. Where can you learn more? Go to http://www.woods.com/ and enter F944 to learn more about \"Learning About Dental Care for Older Adults. \"  Current as of: June 16, 2022               Content Version: 13.5  © 2279-7177 [employer]. Care instructions adapted under license by [facility] [place] ([facility]). If you have questions about a medical condition or this instruction, always ask your healthcare professional. [place] [address] any warranty or liability for your use of this information. Hearing Loss: Care Instructions  Overview     Hearing loss is a sudden or slow decrease in how well you hear. It can range from mild to severe. Permanent hearing loss can occur with aging. It also can happen when you are exposed long-term to loud noise. Examples include listening to loud music, riding motorcycles, or being around other loud machines. Hearing loss can affect your work and home life. It can make you feel lonely or depressed. You may feel that you have lost your independence. But hearing aids and other devices can help you hear better and feel connected to others. Follow-up care is a key part of your treatment and safety. Be sure to make and go to all appointments, and call your doctor if you are having problems. It's also a good idea to know your test results and keep a list of the medicines you take. How can you care for yourself at home? Avoid loud noises whenever possible. This helps keep your hearing from getting worse. Always wear hearing protection around loud noises. Wear a hearing aid as directed. See a professional who can help you pick a hearing aid that fits you. Have hearing tests as your doctor suggests. They can show whether your hearing has changed. Your hearing aid may need to be adjusted. Use other devices as needed.  These may include:  Telephone amplifiers and hearing aids that can connect to a television, stereo, radio, or microphone. Devices that use lights or vibrations. These alert you to the doorbell, a ringing telephone, or a baby monitor. Television closed-captioning. This shows the words at the bottom of the screen. Most new TVs can do this. TTY (text telephone). This lets you type messages back and forth on the telephone instead of talking or listening. These devices are also called TDD. When messages are typed on the keyboard, they are sent over the phone line to a receiving TTY. The message is shown on a monitor. Use text messaging, social media, and email if it is hard for you to communicate by telephone. Try to learn a listening technique called speechreading. It is not lipreading. You pay attention to people's gestures, expressions, posture, and tone of voice. These clues can help you understand what a person is saying. Face the person you are talking to, and have them face you. Make sure the lighting is good. You need to see the other person's face clearly. Think about counseling if you need help to adjust to your hearing loss. When should you call for help? Watch closely for changes in your health, and be sure to contact your doctor if:    You think your hearing is getting worse. You have new symptoms, such as dizziness or nausea. Where can you learn more? Go to http://www.oquendo.com/ and enter R798 to learn more about \"Hearing Loss: Care Instructions. \"  Current as of: May 4, 2022               Content Version: 13.5  © 2006-2022 Healthwise, Incorporated. Care instructions adapted under license by [facility] ([facility]). If you have questions about a medical condition or this instruction, always ask your healthcare professional. [place] [address] any warranty or liability for your use of this information. Learning About Activities of Daily Living  What are activities of daily living?      Activities of daily living (ADLs) are the basic self-care tasks you do every day. As you age, and if you have health problems, you may find that it's harder to do these things for yourself. That's when you may need some help. Your doctor uses ADLs to measure how much help you need. Knowing what you can and can't do for yourself is an important first step to getting help. And when you have the help you need, you can stay as independent as possible. Your doctor will want to know if you are able to do tasks such as: Take a bath or shower without help. Go to the bathroom by yourself. Dress and undress without help. Shave, comb your hair, and brush teeth on your own. Get in and out of bed or a chair without help. Feed yourself without help. If you are having trouble doing basic self-care tasks, talk with your doctor. You may want to bring a caregiver or family member who can help the doctor understand your needs and abilities. How will a doctor assess your ADLs? Asking about ADLs is part of a routine health checkup your doctor will likely do as you age. Your health check might be done in a doctor's office, in your home, or at a hospital. The goal is to find out if you are having any problems that could make your health problems worse or that make it unsafe for you to be on your own. To measure your ADLs, your doctor will ask how hard it is for you to do routine tasks. He or she may also want to know if you have changed the way you do a task because of a health problem. He or she may watch how you:  Walk back and forth. Keep your balance while you stand or walk. Move from sitting to standing or from a bed to a chair. Button or unbutton a shirt or sweater. Remove and put on your shoes. It's normal to feel a little worried or anxious if you find you can't do all the things you used to be able to do. Talking with your doctor about ADLs isn't a test that you either pass or fail. It's just a way to get more information about your health and safety.   Follow-up care is a key part of your treatment and safety. Be sure to make and go to all appointments, and call your doctor if you are having problems. It's also a good idea to know your test results and keep a list of the medicines you take. Current as of: October 6, 2021               Content Version: 13.5  © 2006-2022 Healthwise, Incorporated. Care instructions adapted under license by [facility] ([facility]). If you have questions about a medical condition or this instruction, always ask your healthcare professional. [place] [address] any warranty or liability for your use of this information. Advance Directives: Care Instructions  Overview  An advance directive is a legal way to state your wishes at the end of your life. It tells your family and your doctor what to do if you can't say what you want. There are two main types of advance directives. You can change them any time your wishes change. Living will. This form tells your family and your doctor your wishes about life support and other treatment. The form is also called a declaration. Medical power of . This form lets you name a person to make treatment decisions for you when you can't speak for yourself. This person is called a health care agent (health care proxy, health care surrogate). The form is also called a durable power of  for health care. If you do not have an advance directive, decisions about your medical care may be made by a family member, or by a doctor or a  who doesn't know you. It may help to think of an advance directive as a gift to the people who care for you. If you have one, they won't have to make tough decisions by themselves. For more information, including forms for your state, see the [address] website (www.caringinfo.org/planning/advance-directives/). Follow-up care is a key part of your treatment and safety.  Be sure to make and go to all appointments, and call your doctor if you are having problems. It's also a good idea to know your test results and keep a list of the medicines you take. What should you include in an advance directive? Many states have a unique advance directive form. (It may ask you to address specific issues.) Or you might use a universal form that's approved by many states. If your form doesn't tell you what to address, it may be hard to know what to include in your advance directive. Use the questions below to help you get started. Who do you want to make decisions about your medical care if you are not able to? What life-support measures do you want if you have a serious illness that gets worse over time or can't be cured? What are you most afraid of that might happen? (Maybe you're afraid of having pain, losing your independence, or being kept alive by machines.)  Where would you prefer to die? (Your home? A hospital? A nursing home?)  Do you want to donate your organs when you die? Do you want certain Mandaen practices performed before you die? When should you call for help? Be sure to contact your doctor if you have any questions. Where can you learn more? Go to http://www.oquendo.com/ and enter R264 to learn more about \"Advance Directives: Care Instructions. \"  Current as of: June 16, 2022               Content Version: 13.5  © 3102-9854 Healthwise, Incorporated. Care instructions adapted under license by [facility] ([facility]). If you have questions about a medical condition or this instruction, always ask your healthcare professional. [place] [address] any warranty or liability for your use of this information. Personalized Preventive Plan for Elizabeth Poles - 1/17/2023  Medicare offers a range of preventive health benefits. Some of the tests and screenings are paid in full while other may be subject to a deductible, co-insurance, and/or copay.     Some of these benefits include a comprehensive review of your medical history including lifestyle, illnesses that may run in your family, and various assessments and screenings as appropriate. After reviewing your medical record and screening and assessments performed today your provider may have ordered immunizations, labs, imaging, and/or referrals for you. A list of these orders (if applicable) as well as your Preventive Care list are included within your After Visit Summary for your review. Other Preventive Recommendations:    A preventive eye exam performed by an eye specialist is recommended every 1-2 years to screen for glaucoma; cataracts, macular degeneration, and other eye disorders. A preventive dental visit is recommended every 6 months. Try to get at least 150 minutes of exercise per week or 10,000 steps per day on a pedometer . Order or download the FREE \"Exercise & Physical Activity: Your Everyday Guide\" from [employer]. Call 6-763.778.6310 or search [employer] online. You need 9022-7738 mg of calcium and 6020-0042 IU of vitamin D per day. It is possible to meet your calcium requirement with diet alone, but a vitamin D supplement is usually necessary to meet this goal.  When exposed to the sun, use a sunscreen that protects against both UVA and UVB radiation with an SPF of 30 or greater. Reapply every 2 to 3 hours or after sweating, drying off with a towel, or swimming. Always wear a seat belt when traveling in a car. Always wear a helmet when riding a bicycle or motorcycle.

## 2023-01-18 ENCOUNTER — PATIENT MESSAGE (OUTPATIENT)
Dept: PRIMARY CARE CLINIC | Age: 73
End: 2023-01-18

## 2023-01-18 RX ORDER — FLUCONAZOLE 150 MG/1
TABLET ORAL
Qty: 2 TABLET | Refills: 11 | Status: SHIPPED | OUTPATIENT
Start: 2023-01-18

## 2023-01-18 NOTE — TELEPHONE ENCOUNTER
From: Sandi Gallegos  To: Donte Lopez  Sent: 1/18/2023 10:52 AM CST  Subject: Fluconazole     Can Amarjit Teresa have 2 fluconazole a month?

## 2023-01-24 DIAGNOSIS — I48.0 PAF (PAROXYSMAL ATRIAL FIBRILLATION) (HCC): Primary | ICD-10-CM

## 2023-01-24 PROCEDURE — 93244 EXT ECG>48HR<7D REV&INTERPJ: CPT | Performed by: CLINICAL NURSE SPECIALIST

## 2023-01-24 PROCEDURE — 93242 EXT ECG>48HR<7D RECORDING: CPT | Performed by: CLINICAL NURSE SPECIALIST

## 2023-02-07 ENCOUNTER — TELEPHONE (OUTPATIENT)
Dept: CARDIOLOGY CLINIC | Age: 73
End: 2023-02-07

## 2023-02-07 NOTE — TELEPHONE ENCOUNTER
Date: 2-27-23    Cardiologist: Only seen Leodan Valentin     Procedure: Left Total knee    Surgeon: Dr. Jeffory Bosworth     Last Office Visit: 1-10-23    Reason for office visit and medical concerns addressed at this office visit: PAF, HTN, Edema, CHF, DM, hyperlipidemia, CKD    Testing Performed and Date of Service:  EKG 1-10-23    RCRI = 2 pt, evelated, 6.6%  METs 4    Current Medications: diflucan, rocaltrol, tylenol, omnicef, prilosec, insulin, lopressor, cozaar, singulair, neurontin, requip, linzess, lipitor, imdur, nystatin, albuterol, demadix, spironolactone, albuterol, ASA    Is the patient currently taking an anticoagulant? If so, what is the diagnosis the patient has been given to warrant the need for the anticoagulant?  ASA    Additional Notes: Cardiac Risk Request and med hold ASA

## 2023-02-07 NOTE — TELEPHONE ENCOUNTER
Had negative nuclear stress test at outlying hospital last year. Definitely elevated risk but can send letter.

## 2023-02-10 ENCOUNTER — SCHEDULED TELEPHONE ENCOUNTER (OUTPATIENT)
Dept: CARDIOLOGY CLINIC | Age: 73
End: 2023-02-10

## 2023-02-10 DIAGNOSIS — I10 ESSENTIAL HYPERTENSION: ICD-10-CM

## 2023-02-10 DIAGNOSIS — I48.0 PAF (PAROXYSMAL ATRIAL FIBRILLATION) (HCC): Primary | ICD-10-CM

## 2023-02-10 DIAGNOSIS — Z86.79 H/O DIASTOLIC DYSFUNCTION: ICD-10-CM

## 2023-02-10 NOTE — PROGRESS NOTES
Quintin Mishra is a 67 y.o. female evaluated via telephone on 2/10/2023. Quintin Mishra was evaluated through a patient-initiated, synchronous (real-time) audio only encounter. She (or guardian if applicable) is aware that it is a billable service, which includes applicable co-pays, with coverage as determined by her insurance carrier. This visit was conducted with the patient's (and/or Linda Rodriguez guardian's) verbal consent. She has not had a related appointment within my department in the past 7 days or scheduled within the next 24 hours. The patient was located in a state where the provider was licensed to provide care. The patient was located at: Home: [place] [address]  The provider was located at: Facility (Appt Dept): [address] [place] [address] 281,  [address] [facility]    Total Time: minutes: 11-20 minutes    Note: not billable if this call serves to triage the patient into an appointment for the relevant concern      INGRID Adair     Quintin Mishra is a 67 y.o. female with the following history as recorded in [facility]:  History hypertension and paroxysmal atrial fibrillation, diabetes, Parkinson's disease and diabetic polyneuropathy  2D echo 12/27/2019 showed normal LV systolic function and EF 55 to 60%. Mild diastolic dysfunction with no significant valvular disease. Patient has peripheral vascular disease and wounds on lower extremities following with wound care in [place] and seeing DR Kimberly Ashford for her PVD  Was able have angiogram with balloon on her LLE     Patient was seen at [facility] earlier last year. Her sotalol was discontinued and transition to low-dose beta-blocker. Daughter reported at last office visit her nuclear stress test showed no evidence of ischemia. Patient was seen in January with more shortness of breath with activity. Nightly oxygen and SPO2 staying greater than 90% during the day. Weight gain contributed to fluid. Confusion related to infection and resolved with antibiotics. Had noted increased in palpitations as well. Activity severely limited due to knee pain. Due to history of atrial fibrillation no longer on antiarrhythmic recommended wearing monitor assess for A-fib burden. We discussed daily weights and when and how to take extra diuretic    ZIO monitor worn for about 3 days. Showed normal sinus rhythm with average heart rate 71. There was 1 short run of fast heart rate but only lasted 6 beats. Rare premature beats  There was no atrial fibrillation. No need to change medications at this time    Follow-up today to discuss results. She states she still feels occasional flutters but no sustained arrhythmias. She still swelling but has not taken any extra Demadex. She follows with Dr. Colin Root carefully. She had an upcoming knee surgery later this month.           Patient Active Problem List    Diagnosis Date Noted    Coagulation defect ([facility] 07/21/2021    At risk for obstructive sleep apnea 03/30/2021    Nocturnal hypoxemia 03/30/2021    Diabetic peripheral neuropathy associated with type 2 diabetes mellitus ([address] 01/28/2021    Ischemic cardiomyopathy 01/28/2021    Edema of both lower extremities due to peripheral venous insufficiency 10/21/2020    Varicose veins of left lower extremity with ulcer of calf (CODE) ([facility] 07/31/2020    Stage 4 chronic kidney disease ([address] 05/08/2020    Chronic obstructive pulmonary disease ([address] 05/08/2020    Cellulitis of left lower extremity 11/05/2019    Mixed hyperlipidemia 11/01/2018    Morbid obesity ([address] 10/18/2017    Somnolence, daytime 10/09/2017    Restless legs syndrome 04/06/2017    Hypoesthesia of skin 04/06/2017    PAF (paroxysmal atrial fibrillation) ([address] 02/11/2016    Mild mitral regurgitation by prior echocardiogram 02/11/2016    Hypokalemia 06/26/2015    Edema 06/12/2015    Asthma 04/21/2015    Palpitations 04/28/2014    Fatigue 59/19/8190    Lichen sclerosus 06/24/2013    GERD (gastroesophageal reflux disease) 05/20/2013    Parkinson disease ([facility] 05/06/2013     Current Outpatient Medications   Medication Sig Dispense Refill    fluconazole (DIFLUCAN) 150 MG tablet Take one on the 15th of every month (Patient taking differently: Take one on the 1st and 15th of every month) 2 tablet 11    calcitRIOL (ROCALTROL) 0.25 MCG capsule       acetaminophen (TYLENOL) 325 MG tablet 650 mg      Diabetic Shoe MISC by Does not apply route 1 each 0    omeprazole (PRILOSEC) 40 MG delayed release capsule TAKE ONE CAPSULE BY MOUTH EVERY DAY 30 capsule 6    NOVOLOG FLEXPEN 100 UNIT/ML injection pen INJECT 25 UNITS 3 TIMES DAILY WITH MEALS 15 mL 3    metoprolol tartrate (LOPRESSOR) 25 MG tablet TAKE ONE TABLET BY MOUTH EVERY DAY 30 tablet 6    losartan (COZAAR) 100 MG tablet Take 0.5 tablets by mouth daily TAKE ONE HALF TABLET BY MOUTH EVERY DAY 90 tablet 1    montelukast (SINGULAIR) 10 MG tablet TAKE ONE TABLET BY MOUTH EVERY NIGHT AT BEDTIME 30 tablet 3    COMFORT EZ PEN NEEDLES 32G X 6 MM MISC USE WITH INSULIN THREE TIMES PER DAY. 100 each 3    carbidopa-levodopa (SINEMET)  MG per tablet 2 PO  tablet 11    gabapentin (NEURONTIN) 600 MG tablet TAKE ONE TABLET BY MOUTH 3 TIMES DAILY. . .MAY CAUSE DROWSINESS!! 90 tablet 5    rOPINIRole (REQUIP) 4 MG tablet 2 PO every am, one at noon, and 2 at bedtime 150 tablet 11    LINZESS 290 MCG CAPS capsule TAKE ONE CAPSULE BY MOUTH EVERY MORNING BEFORE BREAKFAST 30 capsule 5    insulin detemir (LEVEMIR FLEXTOUCH) 100 UNIT/ML injection pen INJECT 80 UNITS SUBQ EVERY NIGHT AT BEDTIME AS DIRECTED (Patient taking differently: 50 units in am and 20u at pm) 15 mL 5    tiZANidine (ZANAFLEX) 2 MG tablet Take 1 tablet by mouth nightly as needed (muscle spasms) 30 tablet 3    Misc. Devices [facility] [address] [facility] wheelchair for decreased mobility.  1 each 0    atorvastatin (LIPITOR) 80 MG tablet Take 80 mg by mouth daily       isosorbide mononitrate (IMDUR) 30 MG extended release tablet       Hydrocortisone, Perianal, (PROCTO-FARNAZ) 1 % cream Apply topically 2 times daily. 1 each 2    blood glucose monitor strips Test 3times a day & as needed for symptoms of irregular blood glucose. 100 strip 3    COMFORT EZ PEN NEEDLES 32G X 4 MM MISC USE WITH INSULIN  THREE TIMES PER DAY. 100 each 3    nystatin (MYCOSTATIN) 132264 UNIT/GM ointment Apply topically 2 times daily. for yeast 60 Tube 5    nystatin (NYSTATIN) 282980 UNIT/GM powder Apply topically 3 times daily Apply topically 4 times daily. 60 g 3    Cholecalciferol (VITAMIN D3) 50 MCG (2000 UT) CAPS Take 1 capsule by mouth      albuterol (ACCUNEB) 1.25 MG/3ML nebulizer solution Inhale 3 mLs into the lungs every 6 hours as needed for Wheezing 360 mL 3    allopurinol (ZYLOPRIM) 100 MG tablet Take 1 tablet by mouth daily      EASY COMFORT INSULIN SYRINGE 31G X 5/16\" 1 ML MISC INJECT AS DIRECTED DAILY 100 each 3    torsemide (DEMADEX) 20 MG tablet Take 20 mg by mouth daily IS TAKING 2 IN THE AM EVERY OTHER DAY  1 ON ODD DAYS      blood glucose monitor kit and supplies Use as directed for diabetes TID checks. 1 kit 0    TRUEPLUS INSULIN SYRINGE 30G X 5/16\" 1 ML MISC INJECT AS DIRECTED DAILY 100 each 3    Melatonin 10 MG CAPS Take 10 mg by mouth every evening NIGHTLY      denosumab (PROLIA) 60 MG/ML SOSY SC injection Inject 60 mg into the skin every 6 months      spironolactone (ALDACTONE) 25 MG tablet TAKE ONE TABLET DAILY 30 tablet 5    glucose blood VI test strips (ONE TOUCH ULTRA TEST) strip Inject 1 each into the skin daily As needed. 100 each 3    Lancets MISC 1 lancet to check glucose daily 100 each 3    albuterol (PROVENTIL HFA;VENTOLIN HFA) 108 (90 BASE) MCG/ACT inhaler Inhale 2 puffs into the lungs every 6 hours as needed for Wheezing 1 Inhaler 1    OXYGEN 2L NC 12-24 hours (Patient taking differently: nightly as needed 2L NC 12-24 hours) 1 Device 0    aspirin 325 MG tablet Take 325 mg by mouth daily. cefdinir (OMNICEF) 300 MG capsule  (Patient not taking: Reported on 2/10/2023)       No current facility-administered medications for this visit. Allergies: Aquacel extra hydrofiber [wound dressings], Codeine, Hydrocodone-acetaminophen, and Silvadene [silver sulfadiazine]  Past Medical History:   Diagnosis Date    Asthma 2015    Bilateral carpal tunnel syndrome 2018    sees dr. Chao Verduzco.     CHF (congestive heart failure) (HCC)     Colon polyp     Diabetes mellitus (HCC)     GERD (gastroesophageal reflux disease)     HTN (hypertension)     Hyperlipidemia     Mild mitral regurgitation by prior echocardiogram 2016    Morbid obesity ([facility] 10/18/2017    Normal cardiac stress test 09    no ischemia at attained level of excercise    Palliative care patient 2020    Parkinson disease [facility])     Paroxysmal atrial fibrillation [facility])     sees dr. Jesus Shields    Post-menopausal     Prolonged emergence from general anesthesia     Restrictive airway disease     Stage 3 chronic kidney disease ([facility] 10/18/2017     Past Surgical History:   Procedure Laterality Date    APPENDECTOMY      CATARACT REMOVAL WITH IMPLANT       SECTION      x3    COLONOSCOPY      Dr Agustina Holliday polyp-5 yr recall    COLONOSCOPY N/A 2019    Dr Liam Crouch 2 internal hemorrhoids without stigmata, diverticulosis, suboptimal prep--5 yr recall    EYE SURGERY      GALLBLADDER SURGERY  2014    dr Claire Miller ARTHROSCOPY      CT NEUROPLASTY &/TRANSPOS MEDIAN NRV CARPAL TUNNE Right 2018    OPEN CARPAL TUNNEL RELEASE performed by Zoey Basilio MD at [address] [place] [address]      dr Richard Pinto in [facility]       Family History   Problem Relation Age of Onset    High Blood Pressure Father     Diabetes Father     Parkinsonism Father     High Blood Pressure Mother     Diabetes Sister     Other Paternal Grandmother         hiatal hernia Heart Disease Paternal Grandfather     Colon Cancer Neg Hx     Colon Polyps Neg Hx     Esophageal Cancer Neg Hx     Liver Cancer Neg Hx     Liver Disease Neg Hx     Rectal Cancer Neg Hx     Stomach Cancer Neg Hx      Social History     Tobacco Use    Smoking status: Never    Smokeless tobacco: Never   Substance Use Topics    Alcohol use: No        Diagnosis Orders   1. PAF (paroxysmal atrial fibrillation) ([facility]        2. Essential hypertension        3. H/O diastolic dysfunction              Brandi Echeverria is a 67 y.o. female evaluated via audio-only technology on 2/10/2023 for Follow-up (Pt has some fluttering ), Atrial Fibrillation, and Hypertension  . Assessment & Plan:   PAF (paroxysmal atrial fibrillation) ([facility])-monitor showed no sustained arrhythmias. Symptoms associated with PVCs. On beta-blocker and ACE inhibitor  Essential hypertension-trolled. Also has CKD and follows with PCP  H/O diastolic dysfunction-stable with ongoing fluid retention that is multifactorial associated with her CKD. We discussed low-sodium diet and elevating feet. We discussed really watching for extra fluid retention post knee surgery    We will see her back in 6 months. Subjective:       Prior to Admission medications    Medication Sig Start Date End Date Taking?  Authorizing Provider   fluconazole (DIFLUCAN) 150 MG tablet Take one on the 15th of every month  Patient taking differently: Take one on the 1st and 15th of every month 1/18/23  Yes INGRID Nath CNP   calcitRIOL (ROCALTROL) 0.25 MCG capsule  1/3/23  Yes Historical Provider, MD   acetaminophen (TYLENOL) 325 MG tablet 650 mg   Yes Historical Provider, MD   Diabetic Shoe MISC by Does not apply route 1/17/23  Yes INGRID Cruz CNP   omeprazole (PRILOSEC) 40 MG delayed release capsule TAKE ONE CAPSULE BY MOUTH EVERY DAY 1/3/23  Yes INGRID Cruz CNP   NOVOLOG FLEXPEN 100 UNIT/ML injection pen INJECT 25 UNITS 3 TIMES DAILY WITH MEALS 12/27/22  Yes INGRID Hess CNP   metoprolol tartrate (LOPRESSOR) 25 MG tablet TAKE ONE TABLET BY MOUTH EVERY DAY 12/12/22  Yes INGRID Ordonez CNP   losartan (COZAAR) 100 MG tablet Take 0.5 tablets by mouth daily TAKE ONE HALF TABLET BY MOUTH EVERY DAY 11/17/22  Yes INGRID Ordonez CNP   montelukast (SINGULAIR) 10 MG tablet TAKE ONE TABLET BY MOUTH EVERY NIGHT AT BEDTIME 11/7/22  Yes Arik Martinez MD   COMFORT EZ PEN NEEDLES 32G X 6 MM MISC USE WITH INSULIN THREE TIMES PER DAY. 10/28/22  Yes INGRID Hess CNP   carbidopa-levodopa (SINEMET)  MG per tablet 2 PO TID 10/10/22  Yes Ashli Keller MD   gabapentin (NEURONTIN) 600 MG tablet TAKE ONE TABLET BY MOUTH 3 TIMES DAILY. . .MAY CAUSE DROWSINESS!! 10/10/22 4/10/23 Yes Ashli Keller MD   rOPINIRole (REQUIP) 4 MG tablet 2 PO every am, one at noon, and 2 at bedtime 10/10/22  Yes Ashli Keller MD   LINZESS 290 MCG CAPS capsule TAKE ONE CAPSULE BY MOUTH EVERY MORNING BEFORE BREAKFAST 9/20/22  Yes INGRID Ordonez CNP   insulin detemir (LEVEMIR FLEXTOUCH) 100 UNIT/ML injection pen INJECT 80 UNITS SUBQ EVERY NIGHT AT BEDTIME AS DIRECTED  Patient taking differently: 50 units in am and 20u at pm 5/16/22  Yes INGRID Ordonez CNP   tiZANidine (ZANAFLEX) 2 MG tablet Take 1 tablet by mouth nightly as needed (muscle spasms) 5/2/22  Yes Arik Martinez MD   [facility]) [address] [facility] wheelchair for decreased mobility. 4/20/22  Yes INGRID Ordonez CNP   atorvastatin (LIPITOR) 80 MG tablet Take 80 mg by mouth daily  2/21/22  Yes Historical Provider, MD   isosorbide mononitrate (IMDUR) 30 MG extended release tablet  2/21/22  Yes Historical Provider, MD   Hydrocortisone, Perianal, (PROCTO-FARNAZ) 1 % cream Apply topically 2 times daily.  1/14/22  Yes Efraín Restrepo, APRN - CNP   blood glucose monitor strips Test 3times a day & as needed for symptoms of irregular blood glucose. 9/9/21  Yes INGRID Mcclelland CNP   COMFORT EZ PEN NEEDLES 32G X 4 MM MISC USE WITH INSULIN  THREE TIMES PER DAY. 5/3/21  Yes Bethany Strange MD   nystatin (MYCOSTATIN) 971625 UNIT/GM ointment Apply topically 2 times daily. for yeast 3/30/21  Yes INGRID Medina CNP   nystatin (NYSTATIN) 720794 UNIT/GM powder Apply topically 3 times daily Apply topically 4 times daily. 3/30/21  Yes INGRID Mcclelland CNP   Cholecalciferol (VITAMIN D3) 50 MCG (2000 UT) CAPS Take 1 capsule by mouth   Yes Historical Provider, MD   albuterol (ACCUNEB) 1.25 MG/3ML nebulizer solution Inhale 3 mLs into the lungs every 6 hours as needed for Wheezing 8/24/20  Yes INGRID Mcclelland CNP   allopurinol (ZYLOPRIM) 100 MG tablet Take 1 tablet by mouth daily 7/16/20  Yes Historical Provider, MD   [address] [place] [address] X 5/16\" 1 ML MISC INJECT AS DIRECTED DAILY 7/21/20  Yes INGRID Mcclelland CNP   torsemide (DEMADEX) 20 MG tablet Take 20 mg by mouth daily IS TAKING 2 IN THE AM EVERY OTHER DAY  1 ON ODD DAYS   Yes Historical Provider, MD   blood glucose monitor kit and supplies Use as directed for diabetes TID checks. 12/20/19  Yes INGRID Medina CNP   TRUEPLUS INSULIN SYRINGE 30G X 5/16\" 1 ML MISC INJECT AS DIRECTED DAILY 8/13/19  Yes INGRID Mcclelland CNP   Melatonin 10 MG CAPS Take 10 mg by mouth every evening NIGHTLY   Yes Historical Provider, MD   denosumab (PROLIA) 60 MG/ML SOSY SC injection Inject 60 mg into the skin every 6 months   Yes Historical Provider, MD   spironolactone (ALDACTONE) 25 MG tablet TAKE ONE TABLET DAILY 10/23/18  Yes INGRID Estrada   glucose blood VI test strips (ONE TOUCH ULTRA TEST) strip Inject 1 each into the skin daily As needed.  6/4/18  Yes INGRID Medina CNP   Lancets MISC 1 lancet to check glucose daily 1/21/16  Yes INGRID Medina CNP   albuterol (PROVENTIL HFA;VENTOLIN HFA) 108 (90 BASE) MCG/ACT inhaler Inhale 2 puffs into the lungs every 6 hours as needed for Wheezing 7/17/15  Yes INGRID Davis - CNP   OXYGEN 2L NC 12-24 hours  Patient taking differently: nightly as needed 2L NC 12-24 hours 6/29/15  Yes INGRID Lara - CNP   aspirin 325 MG tablet Take 325 mg by mouth daily. Yes Historical Provider, MD   cefdinir (OMNICEF) 300 MG capsule  1/11/23   Historical Provider, MD     Past Medical History:   Diagnosis Date    Asthma 4/21/2015    Bilateral carpal tunnel syndrome 04/09/2018    sees dr. Cecil Grubbs. CHF (congestive heart failure) (HCC)     Colon polyp     Diabetes mellitus (HCC)     GERD (gastroesophageal reflux disease)     HTN (hypertension)     Hyperlipidemia     Mild mitral regurgitation by prior echocardiogram 2/11/2016    Morbid obesity ([facility] 10/18/2017    Normal cardiac stress test 4-2-09    no ischemia at attained level of excercise    Palliative care patient 08/05/2020    Parkinson disease [facility])     Paroxysmal atrial fibrillation [facility])     sees dr. Radha Edward    Post-menopausal     Prolonged emergence from general anesthesia     Restrictive airway disease     Stage 3 chronic kidney disease ([facility] 10/18/2017     Review of Systems        INGRID Gunderson        15Minutes were spent speaking with patient and discussing plan of care. Follow-up will be made in 6 mos . Someone from the  will call and set up appointment.     Electronically signed, INGRID Muniz

## 2023-02-17 ENCOUNTER — TELEPHONE (OUTPATIENT)
Dept: PRIMARY CARE CLINIC | Age: 73
End: 2023-02-17

## 2023-02-17 NOTE — TELEPHONE ENCOUNTER
----- Message from Eliel Obando sent at 2/17/2023  8:47 AM CST -----  Subject: Message to Provider    QUESTIONS  Information for Provider? pt was seen on 1/17/23 and daughter Erlinda Morales   would like to know if they can just drop papers off for a pre op for pts   left knee total replacement on 2/27/23 or do they need to be seen again.  ---------------------------------------------------------------------------  --------------  Tran BLAS  [phone]; OK to leave message on voicemail  ---------------------------------------------------------------------------  --------------  SCRIPT ANSWERS  Relationship to Patient? Other  Representative Name? Madie Jaimes  Is the Representative on the appropriate HIPAA document in Epic?  Yes

## 2023-02-17 NOTE — TELEPHONE ENCOUNTER
Pt notified and voices that she will call the dr office and find out what they are wanting Marilu to do and let us know.  All of her clearance requirements have been done they just need to have releases for surgery from all of her drs

## 2023-02-21 RX ORDER — LANCETS 33 GAUGE
EACH MISCELLANEOUS
Qty: 300 EACH | Refills: 3 | Status: SHIPPED | OUTPATIENT
Start: 2023-02-21

## 2023-02-21 RX ORDER — INSULIN DETEMIR 100 [IU]/ML
INJECTION, SOLUTION SUBCUTANEOUS
Qty: 15 ML | Refills: 5 | Status: SHIPPED | OUTPATIENT
Start: 2023-02-21

## 2023-02-22 ENCOUNTER — TELEPHONE (OUTPATIENT)
Dept: PRIMARY CARE CLINIC | Age: 73
End: 2023-02-22

## 2023-02-22 NOTE — LETTER
[address], [address]             Phone [phone] Fax [phone]  Dr. Naren Maxwell, INGRID [address]      02/22/23     Rebecca Doan  [address] [place] [address]    To whom it may concern,  Stephanie Sales is under my medical care. Her YOB: 1950. The patient is a known diabetic but recently has been able to get her blood sugar under control her blood sugar levels are below. I last saw her for her Medicare annual wellness on January 17, 2023. At that time all of her vital signs were good. The patient is morbidly obese. We did discuss diet and exercise lose weight. She is unable to exercise due to her knee. I find the patient medically stable to have surgery for her knee.     Lab Results   Component Value Date/Time    GLUCOSE 79 01/10/2023 12:07 PM    GLUCOSE 125 07/13/2022 12:25 PM    GLUCOSE 134 04/15/2022 12:50 PM    LABA1C 7.5 10/07/2022 12:40 PM    LABA1C 7.8 07/13/2022 12:25 PM    LABA1C 9.4 01/12/2022 11:30 AM    LABA1C 8.9 10/28/2015 09:15 AM    LABA1C 8.5 07/17/2015 09:28 AM    LABA1C 8.2 11/14/2014 10:30 AM

## 2023-03-09 ENCOUNTER — PATIENT MESSAGE (OUTPATIENT)
Dept: PRIMARY CARE CLINIC | Age: 73
End: 2023-03-09

## 2023-03-10 RX ORDER — INSULIN GLARGINE 100 [IU]/ML
80 INJECTION, SOLUTION SUBCUTANEOUS NIGHTLY
Qty: 5 ADJUSTABLE DOSE PRE-FILLED PEN SYRINGE | Refills: 3 | Status: SHIPPED | OUTPATIENT
Start: 2023-03-10

## 2023-03-10 NOTE — TELEPHONE ENCOUNTER
From: Margo Corrales  To: Dora Natarajan  Sent: 3/9/2023 7:19 PM CST  Subject: Luther Shown     There is a shortage of Levemir and can not find it so need a prescription sent to [facility] for a similar insulin please

## 2023-03-14 RX ORDER — TIZANIDINE 2 MG/1
2 TABLET ORAL NIGHTLY PRN
Qty: 30 TABLET | Refills: 3 | Status: SHIPPED | OUTPATIENT
Start: 2023-03-14

## 2023-03-17 ENCOUNTER — TELEPHONE (OUTPATIENT)
Dept: PRIMARY CARE CLINIC | Age: 73
End: 2023-03-17

## 2023-03-17 NOTE — TELEPHONE ENCOUNTER
[facility] is requesting an order:    [employer] [address] [facility]     Fax [phone] (at home medical)

## 2023-03-22 LAB
AVERAGE GLUCOSE: NORMAL
HBA1C MFR BLD: 6.7 %

## 2023-04-21 RX ORDER — LINACLOTIDE 290 UG/1
CAPSULE, GELATIN COATED ORAL
Qty: 30 CAPSULE | Refills: 5 | Status: SHIPPED | OUTPATIENT
Start: 2023-04-21

## 2023-04-24 RX ORDER — INSULIN ASPART 100 [IU]/ML
INJECTION, SOLUTION INTRAVENOUS; SUBCUTANEOUS
Qty: 15 ML | Refills: 3 | Status: SHIPPED | OUTPATIENT
Start: 2023-04-24

## 2023-05-08 ENCOUNTER — PATIENT MESSAGE (OUTPATIENT)
Dept: PRIMARY CARE CLINIC | Age: 73
End: 2023-05-08

## 2023-05-08 DIAGNOSIS — R52 PAIN: ICD-10-CM

## 2023-05-08 RX ORDER — TRAMADOL HYDROCHLORIDE 50 MG/1
50 TABLET ORAL 2 TIMES DAILY PRN
Qty: 60 TABLET | Refills: 0 | Status: SHIPPED | OUTPATIENT
Start: 2023-05-08 | End: 2023-06-07

## 2023-05-08 NOTE — TELEPHONE ENCOUNTER
From: Kev Marin  To: Melvin Bravo  Sent: 5/8/2023 9:38 AM CDT  Subject: Refill     Jaime Kwan needs a refill on her Tramadol please

## 2023-05-08 NOTE — TELEPHONE ENCOUNTER
Provider needs to review PDMP    PDMP Monitoring:    Last PDMP Severo Arm as Reviewed [facility]):  Review User Review Instant Review Result   Sara Guthrie 3/22/2023  2:14 PM Reviewed PDMP [1]     Urine Drug Screenings (1 yr)    No resulted procedures found.        Medication Contract and Consent for Opioid Use Documents Filed       Patient Documents       Type of Document Status Date Received Received By Description    Medication Contract Received 5/24/2019 10:27 AM ROSALBA Rios neuro    Medication Contract Received 12/17/2019 10:48 AM MEGHAN Stewart Medication Agreement Jorge Alberto Burdick 11/25/2019

## 2023-05-11 RX ORDER — APIXABAN 5 MG/1
TABLET, FILM COATED ORAL
Qty: 60 TABLET | Refills: 0 | Status: SHIPPED | OUTPATIENT
Start: 2023-05-11

## 2023-06-05 RX ORDER — APIXABAN 5 MG/1
TABLET, FILM COATED ORAL
Qty: 60 TABLET | Refills: 3 | Status: SHIPPED | OUTPATIENT
Start: 2023-06-05

## 2023-06-23 DIAGNOSIS — R25.1 TREMOR DUE TO DISORDER OF CENTRAL NERVOUS SYSTEM: ICD-10-CM

## 2023-06-23 DIAGNOSIS — G96.9 TREMOR DUE TO DISORDER OF CENTRAL NERVOUS SYSTEM: ICD-10-CM

## 2023-06-23 DIAGNOSIS — G20 PARKINSON DISEASE (HCC): Primary | ICD-10-CM

## 2023-06-30 DIAGNOSIS — G25.81 RESTLESS LEGS SYNDROME: ICD-10-CM

## 2023-06-30 RX ORDER — GABAPENTIN 600 MG/1
TABLET ORAL
Qty: 90 TABLET | Refills: 0 | OUTPATIENT
Start: 2023-06-30 | End: 2023-07-30

## 2023-06-30 NOTE — TELEPHONE ENCOUNTER
Requested Prescriptions     Pending Prescriptions Disp Refills    gabapentin (NEURONTIN) 600 MG tablet [Pharmacy Med Name: GABAPENTIN 600 MG TABS 600 Tablet] 90 tablet 0     Sig: TAKE ONE TABLET BY MOUTH 3 TIMES DAILY. . .MAY CAUSE DROWSINESS!! Last Office Visit:  6/6/2023  Next Office Visit:  12/11/2023  Last Medication Refill:  10/10/22 with 5 RF  Blake Cruz up to date:  6/30/23    *RX updated to reflect   6/30/23  fill date*      Per Blake Cruz pt has been filling medication from 2 different providers. I spoke with Pharmacist at [facility] to verify this. Since pt is filling from a different provider outside of our office, this is a breech in medication contract with our office. Do you want to continue to fill this? Please refuse if choosing not to. Thank you.

## 2023-07-12 ENCOUNTER — TELEPHONE (OUTPATIENT)
Dept: NEUROLOGY | Age: 73
End: 2023-07-12

## 2023-07-12 DIAGNOSIS — G25.81 RESTLESS LEGS SYNDROME: ICD-10-CM

## 2023-07-12 RX ORDER — GABAPENTIN 600 MG/1
TABLET ORAL
Qty: 90 TABLET | Refills: 5 | Status: SHIPPED | OUTPATIENT
Start: 2023-07-12 | End: 2024-03-16

## 2023-07-12 NOTE — TELEPHONE ENCOUNTER
Pt is due for pill pack refill tomorrow and waiting for Gabapentin to finish the pill packs. Requested Prescriptions     Pending Prescriptions Disp Refills    gabapentin (NEURONTIN) 600 MG tablet 90 tablet 5     Sig: TAKE ONE TABLET BY MOUTH 3 TIMES DAILY. . .MAY CAUSE DROWSINESS!! Last Office Visit:  6/6/2023  Next Office Visit:  12/11/2023  Last Medication Refill:  10/10/22 with 5 RF  Gustavo Gonsalves up to date:  7/12/23    *RX updated to reflect   7/12/23  fill date*      Pts daughter Karine Gomez called needing clarification on why the Gabapentin was denied. I looked at the last refill request and Dr. Moon Vieira refused the Gabapentin due to pt filling from 2 different providers. I called pt's daughter Karine Gomez and told her why the medication was refused and she states in Feb 2023 pt had knee surgery and went to [facility] in [place], [place] for rehab. She states while she was there, she was not allowed to have any of her outside medications and could only come from the providers at the nursing home. This is why it shows the Gabapentin being filled by 2 different providers. I told her I am taking note of this and will let Dr. Moon Vieira know. Daughter is asking if you will continue to fill medication. If you choose not to, please refuse. Thank you.

## 2023-07-13 RX ORDER — INSULIN ASPART 100 [IU]/ML
INJECTION, SOLUTION INTRAVENOUS; SUBCUTANEOUS
Qty: 15 ML | Refills: 3 | Status: SHIPPED | OUTPATIENT
Start: 2023-07-13

## 2023-08-10 ENCOUNTER — PATIENT MESSAGE (OUTPATIENT)
Dept: PRIMARY CARE CLINIC | Age: 73
End: 2023-08-10

## 2023-08-10 DIAGNOSIS — R52 PAIN: ICD-10-CM

## 2023-08-10 RX ORDER — TIZANIDINE 2 MG/1
2 TABLET ORAL NIGHTLY PRN
Qty: 30 TABLET | Refills: 3 | Status: SHIPPED | OUTPATIENT
Start: 2023-08-10

## 2023-08-10 RX ORDER — TRAMADOL HYDROCHLORIDE 50 MG/1
50 TABLET ORAL 2 TIMES DAILY PRN
Qty: 60 TABLET | Refills: 0 | Status: SHIPPED | OUTPATIENT
Start: 2023-08-10 | End: 2023-09-09

## 2023-08-10 RX ORDER — ALBUTEROL SULFATE 90 UG/1
2 AEROSOL, METERED RESPIRATORY (INHALATION) EVERY 6 HOURS PRN
Qty: 1 EACH | Refills: 1 | Status: SHIPPED | OUTPATIENT
Start: 2023-08-10

## 2023-08-10 NOTE — TELEPHONE ENCOUNTER
Provider needs to review PDMP    PDMP Monitoring:    Last PDMP Anjel Points as Reviewed [facility]):  Review User Review Instant Review Result   Chucky Galeazzi 3/22/2023  2:14 PM Reviewed PDMP [1]     Urine Drug Screenings (1 yr)    No resulted procedures found.        Medication Contract and Consent for Opioid Use Documents Filed       Patient Documents       Type of Document Status Date Received Received By Description    Medication Contract Received 5/24/2019 10:27 AM ROSALBA Brady neuro    Medication Contract Received 12/17/2019 10:48 AM MEGHAN Snatos Medication Agreement Jaky Most 11/25/2019

## 2023-08-10 NOTE — TELEPHONE ENCOUNTER
From: Daryle Hartmann  To: Lawanda Mchugh  Sent: 8/10/2023 8:45 AM CDT  Subject: Tramadol     Swapnil Grossman is needing a refill on her Tramadol please

## 2023-08-22 ENCOUNTER — OFFICE VISIT (OUTPATIENT)
Dept: CARDIOLOGY CLINIC | Age: 73
End: 2023-08-22
Payer: MEDICARE

## 2023-08-22 VITALS
DIASTOLIC BLOOD PRESSURE: 72 MMHG | WEIGHT: 293 LBS | HEART RATE: 67 BPM | OXYGEN SATURATION: 94 % | HEIGHT: 63 IN | BODY MASS INDEX: 51.91 KG/M2 | SYSTOLIC BLOOD PRESSURE: 130 MMHG

## 2023-08-22 DIAGNOSIS — I48.0 PAF (PAROXYSMAL ATRIAL FIBRILLATION) (HCC): Primary | ICD-10-CM

## 2023-08-22 DIAGNOSIS — Z86.79 H/O DIASTOLIC DYSFUNCTION: ICD-10-CM

## 2023-08-22 DIAGNOSIS — I50.32 CHRONIC DIASTOLIC CONGESTIVE HEART FAILURE (HCC): ICD-10-CM

## 2023-08-22 DIAGNOSIS — I10 ESSENTIAL HYPERTENSION: ICD-10-CM

## 2023-08-22 PROCEDURE — G8399 PT W/DXA RESULTS DOCUMENT: HCPCS | Performed by: CLINICAL NURSE SPECIALIST

## 2023-08-22 PROCEDURE — 3078F DIAST BP <80 MM HG: CPT | Performed by: CLINICAL NURSE SPECIALIST

## 2023-08-22 PROCEDURE — 99214 OFFICE O/P EST MOD 30 MIN: CPT | Performed by: CLINICAL NURSE SPECIALIST

## 2023-08-22 PROCEDURE — G8417 CALC BMI ABV UP PARAM F/U: HCPCS | Performed by: CLINICAL NURSE SPECIALIST

## 2023-08-22 PROCEDURE — 1090F PRES/ABSN URINE INCON ASSESS: CPT | Performed by: CLINICAL NURSE SPECIALIST

## 2023-08-22 PROCEDURE — 1124F ACP DISCUSS-NO DSCNMKR DOCD: CPT | Performed by: CLINICAL NURSE SPECIALIST

## 2023-08-22 PROCEDURE — 3017F COLORECTAL CA SCREEN DOC REV: CPT | Performed by: CLINICAL NURSE SPECIALIST

## 2023-08-22 PROCEDURE — G8427 DOCREV CUR MEDS BY ELIG CLIN: HCPCS | Performed by: CLINICAL NURSE SPECIALIST

## 2023-08-22 PROCEDURE — 3075F SYST BP GE 130 - 139MM HG: CPT | Performed by: CLINICAL NURSE SPECIALIST

## 2023-08-22 PROCEDURE — 1036F TOBACCO NON-USER: CPT | Performed by: CLINICAL NURSE SPECIALIST

## 2023-08-22 NOTE — PROGRESS NOTES
dysfunction with last echo in 2022 showed EF 55 to 60% with mild concentric LVH and diastolic dysfunction. No significant valvular disease(scanned in media performed at [facility])       Reviewed PCP recent notes   Reviewed recent labs  Lab Results   Component Value Date     06/15/2023    K 4.4 06/15/2023     06/15/2023    CO2 28 06/15/2023    BUN 26 (H) 06/15/2023    CREATININE 1.3 (H) 06/15/2023    GLUCOSE 150 (H) 06/15/2023    CALCIUM 9.3 06/15/2023    PROT 6.5 (L) 06/15/2023    LABALBU 3.7 06/15/2023    BILITOT <0.2 06/15/2023    ALKPHOS 87 06/15/2023    AST 12 06/15/2023    ALT <5 (A) 06/15/2023    LABGLOM 44 (A) 06/15/2023    GFRAA 36 (L) 07/13/2022    GLOB 2.7 11/21/2016         30 minutes were spent preparing, reviewing and seeing patient. All questions answered    Plan    Take a torsemide when you get home  Double your torsemide for 3 days  Labs in a week   Daily weight -  .Weigh daily:  Learn what your \"dry\" or \"ideal\" weight is - Dry weight is your weight without extra water (fluid). Weigh yourself at the same time each day, preferably in the morning, in similar clothing, after urinating but before eating, and on the same scale. Record your weight in a diary or calendar. If you gain two pounds in a day or three pounds in two days, double your water pill until you are back down to your dry weight. Maintain good blood pressure control-goal<130/80 at rest  Maintain good cholesterol control LDL goal<70 with arterial disease  If you are diabetic work to keep/obtain hemoglobin A1c< 7    Follow up in 1 mos  Call with any questions or concerns  Follow up with INGRID Palmer CNP for non cardiac problems  Report any new problems  Cardiovascular Fitness-Exercise as tolerated.       Cardiac / Healthy Diet- Avoid processed high fat foods, maintain low sodium/salt   Continue current medications as directed  Continue plan of treatment  It is always recommended that you bring your

## 2023-08-22 NOTE — PATIENT INSTRUCTIONS
Plan    Take a torsemide when you get home  Double your torsemide for 3 days  Labs in a week   Daily weight -  .Weigh daily:  Learn what your \"dry\" or \"ideal\" weight is - Dry weight is your weight without extra water (fluid). Weigh yourself at the same time each day, preferably in the morning, in similar clothing, after urinating but before eating, and on the same scale. Record your weight in a diary or calendar. If you gain two pounds in a day or three pounds in two days, double your water pill until you are back down to your dry weight. Maintain good blood pressure control-goal<130/80 at rest  Maintain good cholesterol control LDL goal<70 with arterial disease  If you are diabetic work to keep/obtain hemoglobin A1c< 7    Follow up in 1 mos  Call with any questions or concerns  Follow up with INGRID Newton CNP for non cardiac problems  Report any new problems  Cardiovascular Fitness-Exercise as tolerated. Cardiac / Healthy Diet- Avoid processed high fat foods, maintain low sodium/salt   Continue current medications as directed  Continue plan of treatment  It is always recommended that you bring your medications bottles with you to each visit - this is for your safety!

## 2023-08-31 DIAGNOSIS — Z86.79 H/O DIASTOLIC DYSFUNCTION: ICD-10-CM

## 2023-08-31 DIAGNOSIS — I50.32 CHRONIC DIASTOLIC CONGESTIVE HEART FAILURE (HCC): ICD-10-CM

## 2023-08-31 DIAGNOSIS — I48.0 PAF (PAROXYSMAL ATRIAL FIBRILLATION) (HCC): ICD-10-CM

## 2023-09-27 ENCOUNTER — OFFICE VISIT (OUTPATIENT)
Dept: CARDIOLOGY CLINIC | Age: 73
End: 2023-09-27
Payer: MEDICARE

## 2023-09-27 ENCOUNTER — HOSPITAL ENCOUNTER (OUTPATIENT)
Dept: GENERAL RADIOLOGY | Age: 73
Discharge: HOME | End: 2023-09-27
Payer: MEDICARE

## 2023-09-27 VITALS
SYSTOLIC BLOOD PRESSURE: 130 MMHG | HEIGHT: 62 IN | HEART RATE: 71 BPM | DIASTOLIC BLOOD PRESSURE: 70 MMHG | WEIGHT: 293 LBS | OXYGEN SATURATION: 95 % | BODY MASS INDEX: 53.92 KG/M2

## 2023-09-27 DIAGNOSIS — I50.32 CHRONIC DIASTOLIC CONGESTIVE HEART FAILURE (HCC): Primary | ICD-10-CM

## 2023-09-27 DIAGNOSIS — R06.2 WHEEZING: ICD-10-CM

## 2023-09-27 DIAGNOSIS — I50.32 CHRONIC DIASTOLIC CONGESTIVE HEART FAILURE (HCC): ICD-10-CM

## 2023-09-27 DIAGNOSIS — I10 ESSENTIAL HYPERTENSION: ICD-10-CM

## 2023-09-27 DIAGNOSIS — I48.0 PAF (PAROXYSMAL ATRIAL FIBRILLATION) (HCC): ICD-10-CM

## 2023-09-27 LAB
25(OH)D3 SERPL-MCNC: 53.6 NG/ML
ALBUMIN SERPL-MCNC: 4.4 G/DL (ref 3.5–5.2)
ALP SERPL-CCNC: 91 U/L (ref 35–104)
ALT SERPL-CCNC: 6 U/L (ref 5–33)
ANION GAP SERPL CALCULATED.3IONS-SCNC: 18 MMOL/L (ref 7–19)
ANISOCYTOSIS BLD QL SMEAR: ABNORMAL
AST SERPL-CCNC: 14 U/L (ref 5–32)
BACTERIA URNS QL MICRO: NEGATIVE /HPF
BASOPHILS # BLD: 0.1 K/UL (ref 0–0.2)
BASOPHILS NFR BLD: 0.7 % (ref 0–1)
BILIRUB SERPL-MCNC: 0.3 MG/DL (ref 0.2–1.2)
BILIRUB UR QL STRIP: NEGATIVE
BNP BLD-MCNC: 450 PG/ML (ref 0–124)
BUN SERPL-MCNC: 36 MG/DL (ref 8–23)
CALCIUM SERPL-MCNC: 10.1 MG/DL (ref 8.8–10.2)
CHLORIDE SERPL-SCNC: 100 MMOL/L (ref 98–111)
CLARITY UR: CLEAR
CO2 SERPL-SCNC: 26 MMOL/L (ref 22–29)
COLOR UR: YELLOW
CREAT SERPL-MCNC: 1.6 MG/DL (ref 0.5–0.9)
CRYSTALS URNS MICRO: ABNORMAL /HPF
DACRYOCYTES BLD QL SMEAR: ABNORMAL
EOSINOPHIL # BLD: 0.4 K/UL (ref 0–0.6)
EOSINOPHIL NFR BLD: 4 % (ref 0–5)
EPI CELLS #/AREA URNS AUTO: 3 /HPF (ref 0–5)
ERYTHROCYTE [DISTWIDTH] IN BLOOD BY AUTOMATED COUNT: 17 % (ref 11.5–14.5)
GLUCOSE SERPL-MCNC: 150 MG/DL (ref 74–109)
GLUCOSE UR STRIP.AUTO-MCNC: NEGATIVE MG/DL
HCT VFR BLD AUTO: 32.2 % (ref 37–47)
HGB BLD-MCNC: 8.8 G/DL (ref 12–16)
HGB UR STRIP.AUTO-MCNC: NEGATIVE MG/L
HYALINE CASTS #/AREA URNS AUTO: 2 /HPF (ref 0–8)
HYPOCHROMIA BLD QL SMEAR: ABNORMAL
IMM GRANULOCYTES # BLD: 0 K/UL
KETONES UR STRIP.AUTO-MCNC: NEGATIVE MG/DL
LEUKOCYTE ESTERASE UR QL STRIP.AUTO: ABNORMAL
LYMPHOCYTES # BLD: 1.8 K/UL (ref 1.1–4.5)
LYMPHOCYTES NFR BLD: 20.4 % (ref 20–40)
MAGNESIUM SERPL-MCNC: 2.1 MG/DL (ref 1.6–2.4)
MCH RBC QN AUTO: 22.7 PG (ref 27–31)
MCHC RBC AUTO-ENTMCNC: 27.3 G/DL (ref 33–37)
MCV RBC AUTO: 83.2 FL (ref 81–99)
MONOCYTES # BLD: 0.8 K/UL (ref 0–0.9)
MONOCYTES NFR BLD: 9.5 % (ref 0–10)
NEUTROPHILS # BLD: 5.6 K/UL (ref 1.5–7.5)
NEUTS SEG NFR BLD: 64.9 % (ref 50–65)
NITRITE UR QL STRIP.AUTO: NEGATIVE
OVALOCYTES BLD QL SMEAR: ABNORMAL
PH UR STRIP.AUTO: 6.5 [PH] (ref 5–8)
PHOSPHATE SERPL-MCNC: 2.8 MG/DL (ref 2.5–4.5)
PLATELET # BLD AUTO: 259 K/UL (ref 130–400)
PLATELET SLIDE REVIEW: ADEQUATE
PMV BLD AUTO: 12 FL (ref 9.4–12.3)
POLYCHROMASIA BLD QL SMEAR: ABNORMAL
POTASSIUM SERPL-SCNC: 4.7 MMOL/L (ref 3.5–5)
PROT SERPL-MCNC: 7.3 G/DL (ref 6.6–8.7)
PROT UR STRIP.AUTO-MCNC: NEGATIVE MG/DL
PTH-INTACT SERPL-MCNC: 189.3 PG/ML (ref 15–65)
RBC # BLD AUTO: 3.87 M/UL (ref 4.2–5.4)
RBC #/AREA URNS AUTO: 1 /HPF (ref 0–4)
SODIUM SERPL-SCNC: 144 MMOL/L (ref 136–145)
SP GR UR STRIP.AUTO: 1.01 (ref 1–1.03)
URATE SERPL-MCNC: 7.5 MG/DL (ref 2.4–5.7)
UROBILINOGEN UR STRIP.AUTO-MCNC: 1 E.U./DL
WBC # BLD AUTO: 8.7 K/UL (ref 4.8–10.8)
WBC #/AREA URNS AUTO: 6 /HPF (ref 0–5)

## 2023-09-27 PROCEDURE — 1036F TOBACCO NON-USER: CPT | Performed by: CLINICAL NURSE SPECIALIST

## 2023-09-27 PROCEDURE — 99214 OFFICE O/P EST MOD 30 MIN: CPT | Performed by: CLINICAL NURSE SPECIALIST

## 2023-09-27 PROCEDURE — G8399 PT W/DXA RESULTS DOCUMENT: HCPCS | Performed by: CLINICAL NURSE SPECIALIST

## 2023-09-27 PROCEDURE — G8417 CALC BMI ABV UP PARAM F/U: HCPCS | Performed by: CLINICAL NURSE SPECIALIST

## 2023-09-27 PROCEDURE — 3017F COLORECTAL CA SCREEN DOC REV: CPT | Performed by: CLINICAL NURSE SPECIALIST

## 2023-09-27 PROCEDURE — G8427 DOCREV CUR MEDS BY ELIG CLIN: HCPCS | Performed by: CLINICAL NURSE SPECIALIST

## 2023-09-27 PROCEDURE — 3075F SYST BP GE 130 - 139MM HG: CPT | Performed by: CLINICAL NURSE SPECIALIST

## 2023-09-27 PROCEDURE — 3078F DIAST BP <80 MM HG: CPT | Performed by: CLINICAL NURSE SPECIALIST

## 2023-09-27 PROCEDURE — 1090F PRES/ABSN URINE INCON ASSESS: CPT | Performed by: CLINICAL NURSE SPECIALIST

## 2023-09-27 PROCEDURE — 1124F ACP DISCUSS-NO DSCNMKR DOCD: CPT | Performed by: CLINICAL NURSE SPECIALIST

## 2023-09-27 PROCEDURE — 71046 X-RAY EXAM CHEST 2 VIEWS: CPT

## 2023-09-27 NOTE — PROGRESS NOTES
inadvertently transcribed.  Although, I have reviewed the note for such errors, some may still exist.

## 2023-09-27 NOTE — PATIENT INSTRUCTIONS
Labs and chest x ray today    Daily weight -  .Weigh daily:  Learn what your \"dry\" or \"ideal\" weight is - Dry weight is your weight without extra water (fluid). Weigh yourself at the same time each day, preferably in the morning, in similar clothing, after urinating but before eating, and on the same scale. Record your weight in a diary or calendar. If you gain two pounds in a day or three pounds in two days, double your water pill until you are back down to your dry weight. Maintain good blood pressure control-goal<130/80 at rest  Maintain good cholesterol control LDL goal<70 with arterial disease  If you are diabetic work to keep/obtain hemoglobin A1c< 7    Follow up 3 mos   Call with any questions or concerns  Follow up with INGRID Cano CNP for non cardiac problems  Report any new problems  Cardiovascular Fitness-Exercise as tolerated. Cardiac / Healthy Diet- Avoid processed high fat foods, maintain low sodium/salt   Continue current medications as directed  Continue plan of treatment  It is always recommended that you bring your medications bottles with you to each visit - this is for your safety!

## 2023-09-28 ENCOUNTER — TELEPHONE (OUTPATIENT)
Dept: CARDIOLOGY CLINIC | Age: 73
End: 2023-09-28

## 2023-09-28 NOTE — TELEPHONE ENCOUNTER
Gave results to patient's caregiver, both voiced understanding.   Pt did get her chest xray yesterday and it has not been resulted yet

## 2023-09-28 NOTE — TELEPHONE ENCOUNTER
----- Message from INGRID Kahn sent at 9/28/2023  3:01 PM CDT -----  Chest x-ray does show a little congestion of fluid. Continue taking extra diuretic every other day and continue daily weights and watching for worsening breathing and fluid retention.

## 2023-09-28 NOTE — TELEPHONE ENCOUNTER
----- Message from INGRID Wilkerson sent at 9/28/2023  1:36 PM CDT -----  Let her know that her BNP is stable. Kidney doctor will go over her other labs at follow-up. Ask her if she got her chest x-ray.   Do not see the result

## 2023-10-02 RX ORDER — APIXABAN 5 MG/1
TABLET, FILM COATED ORAL
Qty: 60 TABLET | Refills: 3 | Status: SHIPPED | OUTPATIENT
Start: 2023-10-02

## 2023-10-02 RX ORDER — MONTELUKAST SODIUM 10 MG/1
TABLET ORAL
Qty: 30 TABLET | Refills: 8 | Status: SHIPPED | OUTPATIENT
Start: 2023-10-02

## 2023-10-16 RX ORDER — INSULIN ASPART 100 [IU]/ML
INJECTION, SOLUTION INTRAVENOUS; SUBCUTANEOUS
Qty: 15 ML | Refills: 3 | Status: SHIPPED | OUTPATIENT
Start: 2023-10-16

## 2023-10-24 ENCOUNTER — OFFICE VISIT (OUTPATIENT)
Dept: NEUROLOGY | Facility: CLINIC | Age: 73
End: 2023-10-24
Payer: MEDICARE

## 2023-10-24 VITALS
DIASTOLIC BLOOD PRESSURE: 60 MMHG | OXYGEN SATURATION: 97 % | SYSTOLIC BLOOD PRESSURE: 120 MMHG | HEIGHT: 62 IN | BODY MASS INDEX: 53.92 KG/M2 | WEIGHT: 293 LBS | HEART RATE: 67 BPM

## 2023-10-24 DIAGNOSIS — R25.1 TREMOR: ICD-10-CM

## 2023-10-24 DIAGNOSIS — G95.9 MYELOPATHY: Primary | ICD-10-CM

## 2023-10-24 RX ORDER — CALCITRIOL 0.25 UG/1
0.25 CAPSULE, LIQUID FILLED ORAL DAILY
COMMUNITY

## 2023-10-24 RX ORDER — APIXABAN 5 MG/1
1 TABLET, FILM COATED ORAL 2 TIMES DAILY
COMMUNITY
Start: 2023-10-02

## 2023-10-24 RX ORDER — ISOSORBIDE MONONITRATE 30 MG/1
30 TABLET, EXTENDED RELEASE ORAL DAILY
COMMUNITY
Start: 2023-10-02

## 2023-10-24 RX ORDER — ALLOPURINOL 100 MG/1
100 TABLET ORAL DAILY
COMMUNITY

## 2023-10-24 RX ORDER — TIZANIDINE 2 MG/1
2 TABLET ORAL EVERY 8 HOURS SCHEDULED
COMMUNITY

## 2023-10-24 RX ORDER — CITALOPRAM HYDROBROMIDE 10 MG/1
10 TABLET ORAL DAILY
COMMUNITY
Start: 2023-10-10

## 2023-10-24 NOTE — PROGRESS NOTES
Subjective   Violeta Romo is a 72 y.o. female.     History of Present Illness     Violeta Romo is a 72-year-old female who presents today as a new patient. This patient has a multitude of medical issues. One issue today is inadequate referral records. The patient has a history of Parkinson's disease and arrives for the stated purpose of a second opinion regarding Parkinson's disease presumptively for management of Parkinson's disease. However, the patient is clearly ill from the records I do have with a history of morbid obesity, congestive heart failure, structural heart disease, kidney failure, lower extremity vascular issues, non-ambulatory respiratory disease requiring oxygen in addition to her other issues. She is accompanied by her daughter today.           Parkinson's disease     The patient reports she was seeing Ramakrishna Box for treatment of Parkinson's. She states she would like a second opinion for better control of her Parkinson's disease. She notes she is unable to walk. She states she has been sick on and off for about 4 years and she started using her wheelchair more often. She notes she broke her pelvis. The patient reports that she has arthritis. She notes she received injections. The patient states that she could get up and walk to the door when she had her knee replacement surgery. She reports she was walking a couple of months ago. She notes her leg felt like it was glued to the floor. The patient reports MAURICIO Chavarria, has seen it and told her symptoms may be related to Parkinson's. She states she has been on the same medications ever since she was diagnosed. She denies noticing a difference in her symptoms when she did not take her Sinemet. She reports feeling stiff if she stops taking Requip or gabapentin. She notes she is scheduled to have some vascular work done on her other leg. The patient states Dr. Lane Martines saw her in the ER one weekend, and he talked to her about her  Parkinson's. She notes she sees Dr. Souza. The patient expresses concern about experiencing an electric shock throughout her body. She notes she is unable to lay flat for a scale. She states her kidney function has improved. She reports she has been diabetic for at least 10 years. She has been taking insulin for 10 years. The patient states she is having problems with her sugars leveling out in her eating correctly.            Congestive heart failure     The patient states she has been to a pulmonologist before. She denies having COPD, but she does have asthma. She notes that she wears oxygen at night. She expresses concern about symptoms of dyspnea. The patient states congestive heart failure was found before her surgery when she was hospitalized. She states she gained 20 pounds. The patient denies having sleep apnea. She denies using BiPAP. She states she told the staff at the hospital that she did not want it because she was very claustrophobic. The patient reports she has had trouble with wound healing. She notes they have been painful. She has an upcoming appointment to address her wound healing. She denies smoking. She reports she was told she had a heart attack in . The patient states they took 2 EKGs, and the EKGs told her it was her lower lobe. She states they were going to do a stent, but the doctor decided not to place the stent.        Tremors     The patient reports she has tremors. She notes it started with her left hand and then it went to the right. She notes she could hardly write. The patient states she could not sleep at night and could not rest her shoulders. She reports her father had Parkinson's when he . She states she shuffles when she does walk. She notes she pivots to get onto the commode or with guidance. She states she does not walk a lot. She denies being able walk out to the mailbox. She noticed a significant change while cooking about 2 months ago. She notes her daughter came  up and made the meal for her, but she could not stand up. She reports that she has taken her Sinemet this morning. The patient states she has some muscle spasms. She notes that she can stop it for a minute if she really concentrates, but the tremor always returns.     The following portions of the patient's history were reviewed and updated as appropriate: allergies, current medications, past family history, past medical history, past social history, past surgical history, and problem list.    Review of Systems    Objective   Physical Exam  Vitals and nursing note reviewed.   Cardiovascular:      Rate and Rhythm: Normal rate.   Pulmonary:      Effort: Pulmonary effort is normal.   Neurological:      Mental Status: She is alert and oriented to person, place, and time.      GCS: GCS eye subscore is 4. GCS verbal subscore is 5. GCS motor subscore is 6.      Cranial Nerves: Dysarthria present.      Sensory: Sensation is intact.      Motor: Weakness and tremor present.      Coordination: Impaired rapid alternating movements.      Gait: Gait abnormal.   Psychiatric:         Attention and Perception: Attention normal.         Mood and Affect: Mood normal.         Speech: Speech normal.         Behavior: Behavior normal.       Assessment & Plan   There are no diagnoses linked to this encounter.    1. Tremor     - I am not sure that this tremor represents Parkinson's disease. She has no hypophonia. She has no cogwheeling rigidity. However, she is on her Sinemet and Requip and it could be that her symptoms are just well controlled. She does also demonstrate a tremor with intention bilaterally. The patient demonstrates right greater than left weakness and sustained clonus at the right ankle, which she describes occurring spontaneously along with what I believe to be Lhermitte's phenomenon where she describes a shocking sensation with movement of her cervical spine. This has me concerned that there is some cervical pathology  there and secondarily maybe something higher up. The patient has a very complex medical presentation on top of this, having had diabetes for a long period of time, having a BMI of 53.9, being now mostly not ambulatory over the last couple of months where before she was ambulatory. She has some type of lung disease requiring medications and oxygen during the day and overnight. She has untreated presumably obstructive sleep apnea, hypertension, and congestive heart failure. She has had osteoporosis with pathologic fractures in the past. She has severe knee arthritis and has had a total knee replacement on the left and apparently needs one on the right. She has stage III chronic kidney disease. She has chronic lower extremity edema, worse on the right side, issues with frequent venous stasis ulcers requiring wound care and has had a previous MI 7 years ago. Given this picture, I think the most expeditious and reasonable way to proceed is:     1. Hold her Sinemet and have her return for follow-up.     2. Obtain a plain CT scan of the cervical spine and brain. An MRI would be difficult as the patient professes severe claustrophobia. She has a lot of respiratory issues. I am not sure how well she can lie flat for extended periods of time. I am not willing to proceed with any type of sedation at this point because of her pulmonary issues, which are not clear to what extent to me. Therefore, a plain CT of the neck and brain and follow up for reevaluation off Sinemet and evaluation of potential stenotic lesions in the cervical spine. I discussed with the patient today that her mortality and morbidity due to these chronic conditions will be life-limiting at 72 years of age, and she expresses understanding.        Transcribed from ambient dictation for SATNAM Eldridge by Ainsley Walter.  10/24/23   15:55 CDT    Patient or patient representative verbalized consent to the visit recording.  I have personally  performed the services described in this document as transcribed by the above individual, and it is both accurate and complete.

## 2023-10-30 ENCOUNTER — TELEPHONE (OUTPATIENT)
Dept: NEUROLOGY | Facility: CLINIC | Age: 73
End: 2023-10-30
Payer: MEDICARE

## 2023-10-30 RX ORDER — LINACLOTIDE 290 UG/1
CAPSULE, GELATIN COATED ORAL
Qty: 30 CAPSULE | Refills: 5 | Status: SHIPPED | OUTPATIENT
Start: 2023-10-30

## 2023-10-30 NOTE — TELEPHONE ENCOUNTER
Caller: ALEK SAMUELS    Relationship: DAUGHTER    Best call back number: [phone]    What orders are you requesting (i.e. lab or imaging):   IMAGING - CT OF HEAD, NECK AND SPINE    In what timeframe would the patient need to come in:   AT PT'S LAST OV ON 10-24-23, PT WAS TOLD SHE NEEDED TO HAVE CT SCANS DONE    Where will you receive your lab/imaging services:   OK WITH HAVING THEM DONE AT [facility]    Additional notes:   PT'S DAUGHTER CALL TO SEE ABOUT GETTING THOSE SCHEDULED.

## 2023-10-31 NOTE — TELEPHONE ENCOUNTER
Explained that Ramakrishna ordered the tests and the note has been signed.  Someone should call to schedule the tests.

## 2023-11-01 RX ORDER — OMEPRAZOLE 40 MG/1
CAPSULE, DELAYED RELEASE ORAL
Qty: 30 CAPSULE | Refills: 8 | Status: SHIPPED | OUTPATIENT
Start: 2023-11-01

## 2023-11-02 DIAGNOSIS — R52 PAIN: ICD-10-CM

## 2023-11-02 RX ORDER — TRAMADOL HYDROCHLORIDE 50 MG/1
50 TABLET ORAL 2 TIMES DAILY PRN
Qty: 60 TABLET | Refills: 0 | Status: SHIPPED | OUTPATIENT
Start: 2023-11-02 | End: 2023-12-02

## 2023-11-02 NOTE — TELEPHONE ENCOUNTER
PDMP Monitoring:    Last PDMP Francisco Sandrabess as Reviewed [facility]):  Review User Review Instant Review Result   So Arzate 8/10/2023  4:39 PM Reviewed PDMP [1]     Urine Drug Screenings (1 yr)    No resulted procedures found.        Medication Contract and Consent for Opioid Use Documents Filed       Patient Documents       Type of Document Status Date Received Received By Description    Medication Contract Received 5/24/2019 10:27 AM ROSALBA Martin neuro    Medication Contract Received 12/17/2019 10:48 AM MEGHAN Pozo Medication Agreement Aye Acuna 11/25/2019

## 2023-11-20 ENCOUNTER — HOSPITAL ENCOUNTER (OUTPATIENT)
Dept: CT IMAGING | Facility: HOSPITAL | Age: 73
Discharge: HOME | End: 2023-11-20
Admitting: PHYSICIAN ASSISTANT
Payer: MEDICARE

## 2023-11-20 DIAGNOSIS — R25.1 TREMOR: ICD-10-CM

## 2023-11-20 DIAGNOSIS — G95.9 MYELOPATHY: ICD-10-CM

## 2023-11-20 PROCEDURE — 70450 CT HEAD/BRAIN W/O DYE: CPT

## 2023-11-20 PROCEDURE — 72125 CT NECK SPINE W/O DYE: CPT

## 2023-11-28 ENCOUNTER — TELEPHONE (OUTPATIENT)
Dept: NEUROLOGY | Facility: CLINIC | Age: 73
End: 2023-11-28
Payer: MEDICARE

## 2023-11-28 NOTE — TELEPHONE ENCOUNTER
Caller: ALEK SAMUELS    Relationship: Child    Best call back number: [phone]    Caller requesting test results: ALEK    What test was performed: CT    When was the test performed: 11/20/23    Where was the test performed: [facility]

## 2023-11-29 NOTE — TELEPHONE ENCOUNTER
Explained that Ramakrishna is out of the office today, I will send him a message requesting the results.

## 2023-12-01 ENCOUNTER — TELEPHONE (OUTPATIENT)
Dept: NEUROLOGY | Facility: CLINIC | Age: 73
End: 2023-12-01
Payer: MEDICARE

## 2023-12-01 NOTE — TELEPHONE ENCOUNTER
Caller: ALEK ROJO    Relationship: Child    Best call back number: [phone]    What is the best time to reach you: ANYTIME    What was the call regarding: PATIENT'S DAUGHTER CALLED ABOUT THE CT RESULTS. SHE HAS CALLED PREVIOUSLY AND WOULD LIKE A CALL BACK.     Is it okay if the provider responds through [employer]: DOES NOT HAVE MYCHART    PLEASE REVIEW  THANK YOU

## 2023-12-01 NOTE — TELEPHONE ENCOUNTER
Explained that Ramakrishna is out of the office this afternoon, I will send him a message about the results.

## 2023-12-06 NOTE — TELEPHONE ENCOUNTER
Caller: ALEK ROJO    Relationship: Child    Best call back number: [phone]    Who are you requesting to speak with (clinical staff, provider,  specific staff member): CLINIC    What was the call regarding: CALLING TO CHECK ON THE STATUS OF RESULTS.

## 2023-12-07 ENCOUNTER — TELEPHONE (OUTPATIENT)
Dept: NEUROLOGY | Facility: CLINIC | Age: 73
End: 2023-12-07
Payer: MEDICARE

## 2023-12-07 NOTE — TELEPHONE ENCOUNTER
Received  call from the HUB and did speak with the patient's daughter.  She is requesting the imaging results ordered by SATNAM Meadows.  I did take her information and did relay this to SATNAM Meadows's MA.

## 2023-12-07 NOTE — TELEPHONE ENCOUNTER
Spoke to pt daughter regarding test results. I gave a brief description of results but explained that I would discuss with Ramakrishna Lorenzana PA-C in further detail for better clarification.

## 2023-12-21 RX ORDER — ROPINIROLE 4 MG/1
TABLET, FILM COATED ORAL
Qty: 150 TABLET | Refills: 1 | Status: SHIPPED | OUTPATIENT
Start: 2023-12-21

## 2023-12-21 NOTE — TELEPHONE ENCOUNTER
Caller: Gail    Relationship: daughter     Best call back number: [phone]    Requested Prescriptions:   Requested Prescriptions     Pending Prescriptions Disp Refills    rOPINIRole (REQUIP) 4 MG tablet [Pharmacy Med Name: ROPINIROLE HCL 4 MG TABS 4 Tablet] 150 tablet 11     Sig: TAKE TWO TABLETS BY MOUTH EVERY MORNING, 1 TABLET AT NOON, AND 2 TABLETS AT BEDTIME        Pharmacy where request should be sent: [facility] [place], [place] - [address] [phone] [facility] [phone]      Last office visit with prescribing clinician: 10/24/2023   Last telemedicine visit with prescribing clinician: Visit date not found   Next office visit with prescribing clinician: 1/23/2024     Additional details provided by patient: WILL BE OUT TOMORROW MORNING    Does the patient have less than a 3 day supply:  [x] Yes  [] No    Would you like a call back once the refill request has been completed: [] Yes [x] No    If the office needs to give you a call back, can they leave a voicemail: [] Yes [x] No    Yadira Steel Rep   12/21/23 12:39 CST

## 2024-01-01 DIAGNOSIS — G25.81 RESTLESS LEGS SYNDROME: ICD-10-CM

## 2024-01-01 RX ORDER — GABAPENTIN 600 MG/1
600 TABLET ORAL 3 TIMES DAILY
Qty: 90 TABLET | Refills: 0 | OUTPATIENT
Start: 2024-01-01

## 2024-01-03 DIAGNOSIS — G25.81 RESTLESS LEGS SYNDROME: ICD-10-CM

## 2024-01-03 RX ORDER — GABAPENTIN 600 MG/1
600 TABLET ORAL 3 TIMES DAILY
Status: CANCELLED | OUTPATIENT
Start: 2024-01-03

## 2024-01-03 RX ORDER — GABAPENTIN 600 MG/1
TABLET ORAL
Qty: 90 TABLET | Refills: 5 | Status: SHIPPED | OUTPATIENT
Start: 2024-01-03 | End: 2024-02-02

## 2024-01-03 NOTE — TELEPHONE ENCOUNTER
Requested Prescriptions     Pending Prescriptions Disp Refills    gabapentin (NEURONTIN) 600 MG tablet [Pharmacy Med Name: GABAPENTIN 600 MG TABS 600 Tablet] 90 tablet 5     Sig: TAKE ONE TABLET BY MOUTH 3 TIMES DAILY.. .MAY CAUSE DROWSINESS!!       Last Office Visit:  6/6/2023  Next Office Visit:  none  Last Medication Refill:  7/12/23  Gomez up to date:  1/3/24    *RX updated to reflect   1/3/24  fill date*

## 2024-01-03 NOTE — TELEPHONE ENCOUNTER
----- Message from SATNAM Eldridge sent at 1/3/2024  3:26 PM CST -----  PCP should do this, we can't see her as required      ----- Message -----  From: Anamika Webb LPN  Sent: 1/3/2024   2:46 PM CST  To: SATNAM Eldridge said Dr. Rosendo Henry was prescribing it for her and she discussed it with you during her appointment.

## 2024-01-03 NOTE — TELEPHONE ENCOUNTER
Caller: ALEK    Relationship:     Best call back number: [phone] (home)     Requested Prescriptions:   Requested Prescriptions     Pending Prescriptions Disp Refills    gabapentin (NEURONTIN) 600 MG tablet       Sig: Take 1 tablet by mouth 3 (Three) Times a Day.        Pharmacy where request should be sent: [facility] - [place] - [address] [place] [address] [phone] [facility] [phone]      Last office visit with prescribing clinician: 10/24/2023   Last telemedicine visit with prescribing clinician: Visit date not found   Next office visit with prescribing clinician: 1/23/2024     Additional details provided by patient: TODAY IS THE LAST DAY OF HER NEURONTIN RX     Does the patient have less than a 3 day supply:  [x] Yes  [] No    Would you like a call back once the refill request has been completed: [] Yes [x] No    If the office needs to give you a call back, can they leave a voicemail: [] Yes [x] No    Yadira Childs Rep   01/03/24 12:54 CST

## 2024-01-08 DIAGNOSIS — R52 PAIN: ICD-10-CM

## 2024-01-08 NOTE — TELEPHONE ENCOUNTER
Provider needs to review PDMP    PDMP Monitoring:    Last PDMP Paul as Reviewed ([place]):  Review User Review Instant Review Result   TAMARA NEW 8/10/2023  4:39 PM Reviewed PDMP [1]     Urine Drug Screenings (1 yr)    No resulted procedures found.       Medication Contract and Consent for Opioid Use Documents Filed       Patient Documents       Type of Document Status Date Received Received By Description    Medication Contract Received 5/24/2019 10:27 AM ROSALBA EASTON neuro    Medication Contract Received 12/17/2019 10:48 AM MEGHAN VALDIVIA Medication Agreement /Neuro 11/25/2019

## 2024-01-09 ENCOUNTER — SCHEDULED TELEPHONE ENCOUNTER (OUTPATIENT)
Dept: CARDIOLOGY CLINIC | Age: 74
End: 2024-01-09
Payer: MEDICARE

## 2024-01-09 DIAGNOSIS — I50.32 CHRONIC DIASTOLIC CONGESTIVE HEART FAILURE (HCC): Primary | ICD-10-CM

## 2024-01-09 DIAGNOSIS — I48.0 PAF (PAROXYSMAL ATRIAL FIBRILLATION) (HCC): ICD-10-CM

## 2024-01-09 DIAGNOSIS — E66.01 MORBID OBESITY (HCC): ICD-10-CM

## 2024-01-09 DIAGNOSIS — I10 ESSENTIAL HYPERTENSION: ICD-10-CM

## 2024-01-09 PROCEDURE — 99442 PR PHYS/QHP TELEPHONE EVALUATION 11-20 MIN: CPT | Performed by: CLINICAL NURSE SPECIALIST

## 2024-01-09 RX ORDER — TRAMADOL HYDROCHLORIDE 50 MG/1
50 TABLET ORAL 2 TIMES DAILY PRN
Qty: 60 TABLET | Refills: 0 | Status: SHIPPED | OUTPATIENT
Start: 2024-01-09 | End: 2024-02-08

## 2024-01-09 NOTE — PROGRESS NOTES
mononitrate (IMDUR) 30 MG extended release tablet  2/21/22  Yes Andriy Carbone MD   Hydrocortisone, Perianal, (PROCTO-FARNAZ) 1 % cream Apply topically 2 times daily. 1/14/22  Yes Marilu King APRN - CNP   blood glucose monitor strips Test 3times a day & as needed for symptoms of irregular blood glucose. 9/9/21  Yes Marilu King APRN - CNP   COMFORT EZ PEN NEEDLES 32G X 4 MM MISC USE WITH INSULIN  THREE TIMES PER DAY. 5/3/21  Yes Christelle Jauregui MD   nystatin (MYCOSTATIN) 705241 UNIT/GM ointment Apply topically 2 times daily.for yeast 3/30/21  Yes Marilu King APRN - CNP   nystatin (NYSTATIN) 859481 UNIT/GM powder Apply topically 3 times daily Apply topically 4 times daily. 3/30/21  Yes Marilu King APRN - CNP   Cholecalciferol (VITAMIN D3) 50 MCG (2000 UT) CAPS Take 1 capsule by mouth   Yes Andriy Carbone MD   albuterol (ACCUNEB) 1.25 MG/3ML nebulizer solution Inhale 3 mLs into the lungs every 6 hours as needed for Wheezing 8/24/20  Yes Marilu King APRN - CNP   allopurinol (ZYLOPRIM) 100 MG tablet Take 1 tablet by mouth daily 7/16/20  Yes Andriy Carbone MD   EASY COMFORT INSULIN SYRINGE 31G X 5/16\" 1 ML MISC INJECT AS DIRECTED DAILY 7/21/20  Yes Marilu King APRN - CNP   torsemide (DEMADEX) 20 MG tablet Take 1 tablet by mouth daily IS TAKING 2 IN THE AM EVERY OTHER DAY  1 ON ODD DAYS   Yes Andriy Carbone MD   blood glucose monitor kit and supplies Use as directed for diabetes TID checks. 12/20/19  Yes Marilu King APRN - CNP   TRUEPLUS INSULIN SYRINGE 30G X 5/16\" 1 ML MISC INJECT AS DIRECTED DAILY 8/13/19  Yes Marilu King APRN - CNP   Melatonin 10 MG CAPS Take 1 capsule by mouth every evening NIGHTLY   Yes Andriy Carbone MD   denosumab (PROLIA) 60 MG/ML SOSY SC injection Inject 1 mL into the skin every 6 months   Yes Andriy Carbone MD   spironolactone (ALDACTONE) 25 MG tablet TAKE ONE TABLET DAILY 10/23/18  Yes Kate Hernandez

## 2024-01-23 ENCOUNTER — OFFICE VISIT (OUTPATIENT)
Dept: NEUROLOGY | Facility: CLINIC | Age: 74
End: 2024-01-23
Payer: MEDICARE

## 2024-01-23 VITALS
OXYGEN SATURATION: 94 % | BODY MASS INDEX: 53.92 KG/M2 | HEIGHT: 62 IN | SYSTOLIC BLOOD PRESSURE: 138 MMHG | WEIGHT: 293 LBS | DIASTOLIC BLOOD PRESSURE: 82 MMHG | HEART RATE: 66 BPM

## 2024-01-23 DIAGNOSIS — R25.1 TREMOR: ICD-10-CM

## 2024-01-23 DIAGNOSIS — R26.89 DECREASED MOBILITY: ICD-10-CM

## 2024-01-23 DIAGNOSIS — G20.A2 PARKINSON'S DISEASE WITHOUT DYSKINESIA, WITH FLUCTUATING MANIFESTATIONS: Primary | ICD-10-CM

## 2024-01-23 DIAGNOSIS — E10.42 DIABETIC POLYNEUROPATHY ASSOCIATED WITH TYPE 1 DIABETES MELLITUS: ICD-10-CM

## 2024-01-23 PROCEDURE — 1160F RVW MEDS BY RX/DR IN RCRD: CPT | Performed by: NURSE PRACTITIONER

## 2024-01-23 PROCEDURE — 3075F SYST BP GE 130 - 139MM HG: CPT | Performed by: NURSE PRACTITIONER

## 2024-01-23 PROCEDURE — 99214 OFFICE O/P EST MOD 30 MIN: CPT | Performed by: NURSE PRACTITIONER

## 2024-01-23 PROCEDURE — 3079F DIAST BP 80-89 MM HG: CPT | Performed by: NURSE PRACTITIONER

## 2024-01-23 PROCEDURE — 1159F MED LIST DOCD IN RCRD: CPT | Performed by: NURSE PRACTITIONER

## 2024-01-23 NOTE — PROGRESS NOTES
"    Neurology Progress Note    Cheif Complaint:    Parkinson's  Weakness  Tremor    Subjective   History of Present Illness:  Violeta Romo is a 73 y.o. female who presents today for the above concerns.  She is routinely followed by Anna Rasmussen APRN for primary care. She has previously seen Ramakrishna Lorenzana PA-C.    Parkinson's Disease  She reports that she has held her Sinemet prior to this appointment.  However, she remains on a significant amount of Requip at this time.  She has noted worsening of symptoms such as ambulation, tremor, bradykinesia, and her feet getting stuck to the floor. Of note, she also has comorbid left knee issues and pain also is a factor to her ambulation difficulties. She is essentially wheelchair bound at this time. She is able to pivot and take a few steps at a time.  She reports that she will have \"glitches\" where she will freeze up at times.    CT head unremarkable for any concerns and her CT cervical spine is without any concerns for myelopathy.      Diabetic Neuropathy  She notes numbness and tingling in her feet. She does have pins/needles feelings as well.  She is currently on Gabapentin 600mg TID.    Allergies:    Codeine, Hydrocodone-acetaminophen, Silver, and Silver sulfadiazine    Medications:  Current Outpatient Medications   Medication Sig Dispense Refill    albuterol (ACCUNEB) 0.63 MG/3ML nebulizer solution Take 3 mL by nebulization Every 6 (Six) Hours As Needed for Wheezing or Shortness of Air. 360 mL 3    albuterol sulfate HFA (VENTOLIN HFA) 108 (90 Base) MCG/ACT inhaler Inhale 2 puffs Every 4 (Four) Hours As Needed for Wheezing or Shortness of Air. 1 inhaler 11    allopurinol (ZYLOPRIM) 100 MG tablet Take 1 tablet by mouth Daily.      atorvastatin (LIPITOR) 80 MG tablet Take 1 tablet by mouth Daily.      calcitriol (ROCALTROL) 0.25 MCG capsule Take 1 capsule by mouth Daily.      carbidopa-levodopa (SINEMET)  MG per tablet Take 2 tablets by mouth 4 (Four) " Times a Day. 240 tablet 3    Cholecalciferol (VITAMIN D3) 5000 units tablet tablet Take 1 tablet by mouth Daily.      citalopram (CeleXA) 10 MG tablet Take 1 tablet by mouth Daily.      clobetasol (TEMOVATE) 0.05 % ointment Apply external vagina 3 x a day for week one, then decrease to BID on week two, on week 3 once a day, on week four every other day then stop      Eliquis 5 MG tablet tablet Take 1 tablet by mouth 2 (Two) Times a Day.      fluconazole (DIFLUCAN) 150 MG tablet Take one on the 15th of every month      Fluticasone Furoate (ARNUITY ELLIPTA) 100 MCG/ACT aerosol powder  Inhale 1 puff Daily. 30 each 11    gabapentin (NEURONTIN) 600 MG tablet Take 1 tablet by mouth 3 (Three) Times a Day.      insulin aspart (novoLOG FLEXPEN) 100 UNIT/ML solution pen-injector sc pen INJECT 15 UNITS INTO THE SKIN 3 TIMES DAILY (BEFORE MEALS)      insulin detemir (LEVEMIR) 100 UNIT/ML injection Inject  under the skin into the appropriate area as directed As Needed.      isosorbide mononitrate (IMDUR) 30 MG 24 hr tablet Take 1 tablet by mouth Daily.      linaclotide (LINZESS) 145 MCG capsule capsule Take 1 capsule by mouth Every Morning Before Breakfast.      metoprolol tartrate (LOPRESSOR) 25 MG tablet Take 1 tablet by mouth 2 (Two) Times a Day With Meals.      montelukast (SINGULAIR) 10 MG tablet Take 1 tablet by mouth Every Night.      nystatin (MYCOSTATIN) 720201 UNIT/GM ointment Apply topically 2 times daily.for yeast      nystatin (MYCOSTATIN) 894164 UNIT/ML suspension nystatin 100,000 unit/mL oral suspension      O2 (OXYGEN) Inhale 2 L/min Every Night.      omeprazole (priLOSEC) 40 MG capsule Take 1 capsule by mouth Daily.      rOPINIRole (REQUIP) 4 MG tablet TAKE TWO TABLETS BY MOUTH EVERY MORNING, 1 TABLET AT NOON, AND 2 TABLETS AT BEDTIME 150 tablet 1    spironolactone (ALDACTONE) 25 MG tablet Take 1 tablet by mouth 2 (Two) Times a Day.      tiZANidine (ZANAFLEX) 2 MG tablet Take 1 tablet by mouth Every 8 (Eight)  "Hours.      torsemide (DEMADEX) 20 MG tablet Take 2 tablets by mouth Daily.      traMADol 50 MG tablet 2 tablet, acetaminophen 325 MG tablet 2 tablet Take  by mouth Every 6 (Six) Hours As Needed for Moderate Pain .       No current facility-administered medications for this visit.     Current outpatient and discharge medications have been reconciled for the patient.  Reviewed by: MAURICIO Lanier    Past Medical History:  Past Medical History:   Diagnosis Date    Diabetes mellitus     GERD (gastroesophageal reflux disease)     Hypertension      Past Surgical History:   Procedure Laterality Date    ENDOSCOPY       Family History   Problem Relation Age of Onset    Hypertension Mother     Hypertension Father     Diabetes Father      Social History     Tobacco Use    Smoking status: Never    Smokeless tobacco: Never   Vaping Use    Vaping Use: Never used   Substance Use Topics    Alcohol use: No    Drug use: No     Review of Systems   Constitutional:  Positive for activity change.   Musculoskeletal:  Positive for arthralgias and gait problem.   Neurological:  Positive for tremors and weakness.         Objective   Vital Signs:  Heart Rate:  [66] 66  BP: (138)/(82) 138/82      01/23/24  1122   Weight: 134 kg (296 lb)     157.5 cm (62.01\")  Body mass index is 54.13 kg/m².    Physical Exam  Vitals reviewed.   Constitutional:       Appearance: Normal appearance.   HENT:      Head: Normocephalic.      Mouth/Throat:      Pharynx: Oropharynx is clear.   Eyes:      General: Lids are normal.      Extraocular Movements: Extraocular movements intact.      Pupils: Pupils are equal, round, and reactive to light.   Cardiovascular:      Rate and Rhythm: Normal rate and regular rhythm.      Pulses: Normal pulses.   Pulmonary:      Effort: Pulmonary effort is normal.   Musculoskeletal:         General: Normal range of motion.      Cervical back: Normal range of motion and neck supple.   Skin:     General: Skin is warm and dry.      " Capillary Refill: Capillary refill takes less than 2 seconds.   Neurological:      Deep Tendon Reflexes:      Reflex Scores:       Tricep reflexes are 1+ on the right side and 1+ on the left side.       Bicep reflexes are 1+ on the right side and 1+ on the left side.       Brachioradialis reflexes are 1+ on the right side and 1+ on the left side.       Patellar reflexes are 1+ on the right side and 1+ on the left side.       Achilles reflexes are 1+ on the right side and 1+ on the left side.  Psychiatric:         Mood and Affect: Mood normal.         Speech: Speech normal.       Neurological Exam  Mental Status  Awake, alert and oriented to person, place and time. Recent and remote memory are intact. Speech is normal. Language is fluent with no aphasia. Attention and concentration are normal.    Cranial Nerves  CN II: Visual acuity is normal. Visual fields full to confrontation.  CN III, IV, VI: Extraocular movements intact bilaterally. Normal lids and orbits bilaterally. Pupils equal round and reactive to light bilaterally.  CN V: Facial sensation is normal.  CN VII: Full and symmetric facial movement.  CN IX, X: Palate elevates symmetrically. Normal gag reflex.  CN XI: Shoulder shrug strength is normal.  CN XII: Tongue midline without atrophy or fasciculations.    Motor   Strength is 5/5 in all four extremities except as noted.                                             Right                     Left  Hip flexion                              4                          4  Hip extension                         4                          4  Knee flexion                           3                          3  Knee extension                      3                          3  Plantarflexion                         4                          4  Dorsiflexion                            4                          4    Sensory  Decreased sensation in bilateral lower extremities.    Reflexes                                    "         Right                      Left  Brachioradialis                    1+                         1+  Biceps                                 1+                         1+  Triceps                                1+                         1+  Patellar                                1+                         1+  Achilles                                1+                         1+    Coordination    Combination resting and kinetic tremor noted on examination.  Cogwheel rigidity noted.  Bradykinesia noted.    Gait    In wheelchair.  Unable to test secondary to safety.    Results Review:    Lab Results   Component Value Date    GLUCOSE 125 (H) 07/13/2022    BUN 36 (H) 07/13/2022    CREATININE 1.7 (H) 07/13/2022    EGFRIFNONA 30 (A) 07/13/2022    EGFRIFAFRI 36 (L) 07/13/2022    K 4.7 07/13/2022    CO2 23 07/13/2022    CALCIUM 9.0 07/13/2022    ALBUMIN 3.7 07/13/2022    AST 13 07/13/2022    ALT 6 07/13/2022     Lab Results   Component Value Date    WBC 12.7 (H) 06/15/2023    HGB 9.9 (L) 06/15/2023    HCT 33.3 (L) 06/15/2023    MCV 91.0 06/15/2023     06/15/2023     Lab Results   Component Value Date    CHLPL 145 (L) 07/21/2021    TRIG 256 (H) 07/21/2021    HDL 36 (L) 07/21/2021    LDL 58 07/21/2021     Lab Results   Component Value Date    TSH 4.190 12/02/2019     Lab Results   Component Value Date    HGBA1C 7.5 (H) 10/07/2022     No results found for: \"FOLATE\"  No results found for: \"QNLZKFLV55\"    CT Cervical Spine Without Contrast (11/20/2023 14:30)   CT Head Without Contrast (11/20/2023 14:30)     Chart Review:  Office Visit with Dominic Lorenzana PA (10/24/2023)      Plan   Discussion:  Violeta Romo is a 73 y.o. female who presents for further evaluation of Parkinson's disease and other symptoms such as tremor and immobility as described above.      At this point form from my examination today, I feel that Parkinson's is a likely diagnosis.  She has had worsening of her symptoms since coming " off of Sinemet.  She now has findings such as bradykinesia, cogwheeling, freezing, stiffness, and masked face on examination.  This is also despite a quite high dose of Requip.  I also feel that this is contributing to her ambulation difficulties although these are very likely multifactorial in combination with her musculoskeletal issues.  At this time we should restart her Sinemet.  We will restart but will increase her dosing to 2 pills 4 times daily.  Discussed side effects.  We will continue with Requip as in the past she has noted even worsened symptoms without it. She would benefit from PT as she has been immobile for some time.  I feel that deconditioning is a factor here as well.  We will hold off on this for now to see how she responds to Sinemet. PT may also be limited in its effectiveness due to her knee pain.  She should get with her orthopedic surgeon for this.  Discussed importance of ambulation with Parkinson's disease. I feel that regaining her mobility will be very difficult at this point.     There are no signs of myelopathy on CT Cervical spine.  I do not feel at this point her symptoms are explained with this.     We will follow-up in the short term after starting Sinemet.  I feel that an additional agent such as Amantadine but I would like to see her response to Sinemet increase prior to addition of another agent.      I see no current concerns with her Polyneuropathy and recommend continued Gabapentin for this.  At this time I have requested PCP to fill gabapentin.  They will discuss with her.     Plan:  Restart Sinemet.  Increase to 25-100mg 2 pills 4 times daily.  Continue Requip 4mg TID  Continue Gabapentin  Recommend getting knee looked at by Orthopedic Surgery  Hold off on PT for now.  Will consider after starting Sinemet  Call with side effects or other concerns.     Diagnoses and all orders for this visit:    1. Parkinson's disease without dyskinesia, with fluctuating manifestations  (Primary)  -     carbidopa-levodopa (SINEMET)  MG per tablet; Take 2 tablets by mouth 4 (Four) Times a Day.  Dispense: 240 tablet; Refill: 3    2. Diabetic polyneuropathy associated with type 1 diabetes mellitus    3. Decreased mobility    4. Tremor      The patient and I have discussed the plan of care and she is in full agreement at this time.     Follow-Up:  Return in about 1 month (around 2/23/2024) for Parkinson's Disease.         Valerio Dempsey, MAURICIO  01/24/24  10:42 CST

## 2024-01-24 PROBLEM — G56.00 CARPAL TUNNEL SYNDROME: Status: ACTIVE | Noted: 2018-06-29

## 2024-01-24 PROBLEM — N18.4 STAGE 4 CHRONIC KIDNEY DISEASE: Status: ACTIVE | Noted: 2020-05-08

## 2024-01-24 PROBLEM — N39.0 UTI (URINARY TRACT INFECTION): Status: ACTIVE | Noted: 2024-01-24

## 2024-01-24 PROBLEM — L03.116 CELLULITIS OF LEFT LOWER EXTREMITY: Status: ACTIVE | Noted: 2019-11-05

## 2024-01-24 PROBLEM — U07.1 COVID-19: Status: ACTIVE | Noted: 2024-01-24

## 2024-01-24 PROBLEM — E78.2 MIXED HYPERLIPIDEMIA: Status: ACTIVE | Noted: 2018-11-01

## 2024-01-24 PROBLEM — Z79.4 TYPE 2 DIABETES MELLITUS, WITH LONG-TERM CURRENT USE OF INSULIN: Status: ACTIVE | Noted: 2023-09-15

## 2024-01-24 PROBLEM — R40.0 SOMNOLENCE, DAYTIME: Status: ACTIVE | Noted: 2017-10-09

## 2024-01-24 PROBLEM — E55.9 VITAMIN D DEFICIENCY: Status: ACTIVE | Noted: 2024-01-24

## 2024-01-24 PROBLEM — N18.32 STAGE 3B CHRONIC KIDNEY DISEASE: Status: ACTIVE | Noted: 2024-01-24

## 2024-01-24 PROBLEM — G25.81 RESTLESS LEGS SYNDROME: Status: ACTIVE | Noted: 2017-04-06

## 2024-01-24 PROBLEM — R09.02 HYPOXEMIA: Status: ACTIVE | Noted: 2024-01-24

## 2024-01-24 PROBLEM — M47.816 LUMBAR SPONDYLOSIS: Status: ACTIVE | Noted: 2018-09-06

## 2024-01-24 PROBLEM — I50.9 CHF (CONGESTIVE HEART FAILURE): Status: ACTIVE | Noted: 2024-01-24

## 2024-01-24 PROBLEM — I73.9 PERIPHERAL VASCULAR DISEASE: Status: ACTIVE | Noted: 2024-01-24

## 2024-01-24 PROBLEM — E11.9 TYPE 2 DIABETES MELLITUS, WITH LONG-TERM CURRENT USE OF INSULIN: Status: ACTIVE | Noted: 2023-09-15

## 2024-01-24 PROBLEM — N18.9 ANEMIA SECONDARY TO RENAL FAILURE: Status: ACTIVE | Noted: 2024-01-24

## 2024-01-24 PROBLEM — N25.81 SECONDARY HYPERPARATHYROIDISM: Status: ACTIVE | Noted: 2024-01-24

## 2024-01-24 PROBLEM — I10 BENIGN ESSENTIAL HYPERTENSION: Status: ACTIVE | Noted: 2024-01-24

## 2024-01-24 PROBLEM — M25.562 LEFT KNEE PAIN: Status: ACTIVE | Noted: 2024-01-24

## 2024-01-24 PROBLEM — Z79.4 LONG TERM CURRENT USE OF INSULIN: Status: ACTIVE | Noted: 2024-01-24

## 2024-01-24 PROBLEM — D63.1 ANEMIA SECONDARY TO RENAL FAILURE: Status: ACTIVE | Noted: 2024-01-24

## 2024-01-24 PROBLEM — I25.5 ISCHEMIC CARDIOMYOPATHY: Status: ACTIVE | Noted: 2021-01-28

## 2024-01-24 PROBLEM — E79.0 ASYMPTOMATIC HYPERURICEMIA: Status: ACTIVE | Noted: 2024-01-24

## 2024-01-24 PROBLEM — I83.022: Status: ACTIVE | Noted: 2020-07-31

## 2024-01-24 PROBLEM — D68.9 COAGULATION DEFECT: Status: ACTIVE | Noted: 2021-07-21

## 2024-01-24 PROBLEM — E11.42 DIABETIC PERIPHERAL NEUROPATHY ASSOCIATED WITH TYPE 2 DIABETES MELLITUS: Status: ACTIVE | Noted: 2021-01-28

## 2024-01-24 PROBLEM — E11.21 DIABETIC RENAL DISEASE: Status: ACTIVE | Noted: 2024-01-24

## 2024-01-24 PROBLEM — Z96.652 HISTORY OF LEFT KNEE REPLACEMENT: Status: ACTIVE | Noted: 2024-01-24

## 2024-01-24 PROBLEM — L03.90 CELLULITIS: Status: ACTIVE | Noted: 2024-01-24

## 2024-01-24 PROBLEM — Z98.62 HISTORY OF ANGIOPLASTY OF PERIPHERAL VESSEL: Status: ACTIVE | Noted: 2024-01-24

## 2024-01-24 PROBLEM — J44.9 CHRONIC OBSTRUCTIVE PULMONARY DISEASE: Status: ACTIVE | Noted: 2020-05-08

## 2024-01-24 PROBLEM — I87.2 EDEMA OF BOTH LOWER EXTREMITIES DUE TO PERIPHERAL VENOUS INSUFFICIENCY: Chronic | Status: ACTIVE | Noted: 2020-10-21

## 2024-01-25 ENCOUNTER — OFFICE VISIT (OUTPATIENT)
Dept: PRIMARY CARE CLINIC | Age: 74
End: 2024-01-25
Payer: MEDICARE

## 2024-01-25 VITALS
DIASTOLIC BLOOD PRESSURE: 74 MMHG | SYSTOLIC BLOOD PRESSURE: 130 MMHG | RESPIRATION RATE: 16 BRPM | BODY MASS INDEX: 53.92 KG/M2 | WEIGHT: 293 LBS | HEIGHT: 62 IN | TEMPERATURE: 97.9 F | OXYGEN SATURATION: 95 % | HEART RATE: 80 BPM

## 2024-01-25 DIAGNOSIS — I25.5 ISCHEMIC CARDIOMYOPATHY: ICD-10-CM

## 2024-01-25 DIAGNOSIS — Z00.00 MEDICARE ANNUAL WELLNESS VISIT, SUBSEQUENT: Primary | ICD-10-CM

## 2024-01-25 DIAGNOSIS — I13.0 HYPERTENSIVE HEART AND CHRONIC KIDNEY DISEASE WITH HEART FAILURE AND STAGE 1 THROUGH STAGE 4 CHRONIC KIDNEY DISEASE, OR UNSPECIFIED CHRONIC KIDNEY DISEASE (HCC): ICD-10-CM

## 2024-01-25 DIAGNOSIS — I87.2 VENOUS STASIS ULCER OF RIGHT CALF LIMITED TO BREAKDOWN OF SKIN WITHOUT VARICOSE VEINS (HCC): ICD-10-CM

## 2024-01-25 DIAGNOSIS — D68.9 COAGULATION DEFECT (HCC): ICD-10-CM

## 2024-01-25 DIAGNOSIS — G25.81 RESTLESS LEGS SYNDROME: ICD-10-CM

## 2024-01-25 DIAGNOSIS — I48.0 PAF (PAROXYSMAL ATRIAL FIBRILLATION) (HCC): ICD-10-CM

## 2024-01-25 DIAGNOSIS — B37.2 YEAST DERMATITIS: ICD-10-CM

## 2024-01-25 DIAGNOSIS — I87.2 VENOUS STASIS ULCER OF OTHER PART OF LEFT LOWER LEG LIMITED TO BREAKDOWN OF SKIN WITHOUT VARICOSE VEINS (HCC): ICD-10-CM

## 2024-01-25 DIAGNOSIS — N18.4 STAGE 4 CHRONIC KIDNEY DISEASE (HCC): ICD-10-CM

## 2024-01-25 DIAGNOSIS — L97.821 VENOUS STASIS ULCER OF OTHER PART OF LEFT LOWER LEG LIMITED TO BREAKDOWN OF SKIN WITHOUT VARICOSE VEINS (HCC): ICD-10-CM

## 2024-01-25 DIAGNOSIS — L97.211 VENOUS STASIS ULCER OF RIGHT CALF LIMITED TO BREAKDOWN OF SKIN WITHOUT VARICOSE VEINS (HCC): ICD-10-CM

## 2024-01-25 DIAGNOSIS — E11.622 DIABETIC ULCER OF LEFT LOWER LEG (HCC): ICD-10-CM

## 2024-01-25 DIAGNOSIS — I83.022 VARICOSE VEINS OF LEFT LOWER EXTREMITY WITH ULCER OF CALF (CODE) (HCC): ICD-10-CM

## 2024-01-25 DIAGNOSIS — I50.32 CHRONIC DIASTOLIC CONGESTIVE HEART FAILURE (HCC): ICD-10-CM

## 2024-01-25 DIAGNOSIS — J44.9 CHRONIC OBSTRUCTIVE PULMONARY DISEASE, UNSPECIFIED COPD TYPE (HCC): ICD-10-CM

## 2024-01-25 DIAGNOSIS — L97.929 DIABETIC ULCER OF LEFT LOWER LEG (HCC): ICD-10-CM

## 2024-01-25 DIAGNOSIS — E11.8 TYPE 2 DIABETES MELLITUS WITH COMPLICATION (HCC): ICD-10-CM

## 2024-01-25 LAB
ALBUMIN SERPL-MCNC: 4.1 G/DL (ref 3.5–5.2)
ALP SERPL-CCNC: 94 U/L (ref 35–104)
ALT SERPL-CCNC: 9 U/L (ref 5–33)
ANION GAP SERPL CALCULATED.3IONS-SCNC: 15 MMOL/L (ref 7–19)
AST SERPL-CCNC: 18 U/L (ref 5–32)
BILIRUB SERPL-MCNC: 0.3 MG/DL (ref 0.2–1.2)
BNP BLD-MCNC: 368 PG/ML (ref 0–124)
BUN SERPL-MCNC: 30 MG/DL (ref 8–23)
CALCIUM SERPL-MCNC: 9.9 MG/DL (ref 8.8–10.2)
CHLORIDE SERPL-SCNC: 101 MMOL/L (ref 98–111)
CO2 SERPL-SCNC: 25 MMOL/L (ref 22–29)
CREAT SERPL-MCNC: 1.2 MG/DL (ref 0.5–0.9)
GLUCOSE SERPL-MCNC: 82 MG/DL (ref 74–109)
HBA1C MFR BLD: 7.8 % (ref 4–6)
POTASSIUM SERPL-SCNC: 4.7 MMOL/L (ref 3.5–5)
PROT SERPL-MCNC: 7.1 G/DL (ref 6.6–8.7)
SODIUM SERPL-SCNC: 141 MMOL/L (ref 136–145)

## 2024-01-25 PROCEDURE — G8427 DOCREV CUR MEDS BY ELIG CLIN: HCPCS | Performed by: NURSE PRACTITIONER

## 2024-01-25 PROCEDURE — 3017F COLORECTAL CA SCREEN DOC REV: CPT | Performed by: NURSE PRACTITIONER

## 2024-01-25 PROCEDURE — 2022F DILAT RTA XM EVC RTNOPTHY: CPT | Performed by: NURSE PRACTITIONER

## 2024-01-25 PROCEDURE — G8399 PT W/DXA RESULTS DOCUMENT: HCPCS | Performed by: NURSE PRACTITIONER

## 2024-01-25 PROCEDURE — 3051F HG A1C>EQUAL 7.0%<8.0%: CPT | Performed by: NURSE PRACTITIONER

## 2024-01-25 PROCEDURE — G8484 FLU IMMUNIZE NO ADMIN: HCPCS | Performed by: NURSE PRACTITIONER

## 2024-01-25 PROCEDURE — G8417 CALC BMI ABV UP PARAM F/U: HCPCS | Performed by: NURSE PRACTITIONER

## 2024-01-25 PROCEDURE — 3023F SPIROM DOC REV: CPT | Performed by: NURSE PRACTITIONER

## 2024-01-25 PROCEDURE — 1090F PRES/ABSN URINE INCON ASSESS: CPT | Performed by: NURSE PRACTITIONER

## 2024-01-25 PROCEDURE — 99214 OFFICE O/P EST MOD 30 MIN: CPT | Performed by: NURSE PRACTITIONER

## 2024-01-25 PROCEDURE — 1124F ACP DISCUSS-NO DSCNMKR DOCD: CPT | Performed by: NURSE PRACTITIONER

## 2024-01-25 PROCEDURE — 1036F TOBACCO NON-USER: CPT | Performed by: NURSE PRACTITIONER

## 2024-01-25 PROCEDURE — G0439 PPPS, SUBSEQ VISIT: HCPCS | Performed by: NURSE PRACTITIONER

## 2024-01-25 RX ORDER — FLUCONAZOLE 100 MG/1
TABLET ORAL
Qty: 9 TABLET | Refills: 0 | Status: SHIPPED | OUTPATIENT
Start: 2024-01-25

## 2024-01-25 RX ORDER — INSULIN GLARGINE 100 [IU]/ML
80 INJECTION, SOLUTION SUBCUTANEOUS NIGHTLY
Qty: 5 ADJUSTABLE DOSE PRE-FILLED PEN SYRINGE | Refills: 3 | Status: SHIPPED | OUTPATIENT
Start: 2024-01-25

## 2024-01-25 RX ORDER — GABAPENTIN 600 MG/1
TABLET ORAL
Qty: 90 TABLET | Refills: 5 | Status: SHIPPED | OUTPATIENT
Start: 2024-01-25 | End: 2024-02-24

## 2024-01-25 ASSESSMENT — PATIENT HEALTH QUESTIONNAIRE - PHQ9
SUM OF ALL RESPONSES TO PHQ QUESTIONS 1-9: 0
SUM OF ALL RESPONSES TO PHQ QUESTIONS 1-9: 0
SUM OF ALL RESPONSES TO PHQ9 QUESTIONS 1 & 2: 0
1. LITTLE INTEREST OR PLEASURE IN DOING THINGS: 0
SUM OF ALL RESPONSES TO PHQ QUESTIONS 1-9: 0
SUM OF ALL RESPONSES TO PHQ QUESTIONS 1-9: 0
2. FEELING DOWN, DEPRESSED OR HOPELESS: 0

## 2024-01-25 NOTE — PROGRESS NOTES
dermatitis.  Under her role in her lower abdomen she has a lot of red excoriated skin consistent with yeast dermatitis              Dressings were removed from the lower legs.  Neosporin and a nonstick dressing were reapplied with gauze and nonstick Coban.  Allergies   Allergen Reactions    Aquacel Extra Hydrofiber [Wound Dressings]      Burning and stinging    Codeine      itching    Hydrocodone-Acetaminophen Nausea Only    Silvadene [Silver Sulfadiazine]      Stinging and burning    Silver Rash     Prior to Visit Medications    Medication Sig Taking? Authorizing Provider   insulin glargine (LANTUS SOLOSTAR) 100 UNIT/ML injection pen Inject 80 Units into the skin nightly , to replace levemir Yes Marilu King APRN - CNP   gabapentin (NEURONTIN) 600 MG tablet TAKE ONE TABLET BY MOUTH 3 TIMES DAILY.. .MAY CAUSE DROWSINESS!! Yes Marilu King APRN - CNP   fluconazole (DIFLUCAN) 100 MG tablet 2 pills daily x 3 days, then 1 pill every 3 days x 3 then go back to the 150mg every other week. Yes Marilu King APRN - CNP   traMADol (ULTRAM) 50 MG tablet Take 1 tablet by mouth 2 times daily as needed for Pain for up to 30 days. Yes Marilu King APRN - CNP   omeprazole (PRILOSEC) 40 MG delayed release capsule TAKE ONE CAPSULE BY MOUTH EVERY DAY Yes Marilu King APRN - CNP   LINZESS 290 MCG CAPS capsule TAKE ONE CAPSULE BY MOUTH EVERY MORNING BEFORE BREAKFAST Yes Marilu King APRN - CNP   NOVOLOG FLEXPEN 100 UNIT/ML injection pen INJECT 25 UNITS 3 TIMES DAILY WITH MEALS Yes Renetta Denney APRN - NP   ELIQUIS 5 MG TABS tablet TAKE ONE TABLET BY MOUTH TWICE DAILY Yes Renetta Denney APRN - NP   montelukast (SINGULAIR) 10 MG tablet TAKE ONE TABLET BY MOUTH EVERY NIGHT AT BEDTIME Yes Renetta Denney APRN - NP   tiZANidine (ZANAFLEX) 2 MG tablet Take 1 tablet by mouth nightly as needed (muscle spasms) Yes Marilu King APRN - CNP   albuterol sulfate HFA (PROVENTIL;VENTOLIN;PROAIR) 108 (90 Base)

## 2024-01-25 NOTE — PATIENT INSTRUCTIONS
Make appt with wound care , change dressing daily  Elevate legs often , try to wear zohaib hose if you can  Labs today and culture of leg wounds  Change levemir to lantus  Make sure taking your heart meds , fluid pills and blood thinner  Continue with neurology  Continue with cardiology  Continue with kidney doctor  Use dexcom to monitor sugar.   For the resistant yeast due to the Diflucan 200 mg for 3 days then 1 pill every 3 days x 3 and then go back to the 150 mg twice a month.  Preventing Falls: Care Instructions  Injuries and health problems such as trouble walking or poor eyesight can increase your risk of falling. So can some medicines. But there are things you can do to help prevent falls. You can exercise to get stronger. You can also arrange your home to make it safer.    Talk to your doctor about the medicines you take. Ask if any of them increase the risk of falls and whether they can be changed or stopped.   Try to exercise regularly. It can help improve your strength and balance. This can help lower your risk of falling.     Practice fall safety and prevention.    Wear low-heeled shoes that fit well and give your feet good support. Talk to your doctor if you have foot problems that make this hard.  Carry a cellphone or wear a medical alert device that you can use to call for help.  Use stepladders instead of chairs to reach high objects. Don't climb if you're at risk for falls. Ask for help, if needed.  Wear the correct eyeglasses, if you need them.    Make your home safer.    Remove rugs, cords, clutter, and furniture from walkways.  Keep your house well lit. Use night-lights in hallways and bathrooms.  Install and use sturdy handrails on stairways.  Wear nonskid footwear, even inside. Don't walk barefoot or in socks without shoes.    Be safe outside.    Use handrails, curb cuts, and ramps whenever possible.  Keep your hands free by using a shoulder bag or backpack.  Try to walk in well-lit areas. Watch

## 2024-01-27 LAB
BACTERIA SPEC ANAEROBE+AEROBE CULT: ABNORMAL
GRAM STN SPEC: ABNORMAL
ORGANISM: ABNORMAL

## 2024-01-29 LAB
BACTERIA SPEC ANAEROBE+AEROBE CULT: ABNORMAL
BACTERIA SPEC ANAEROBE+AEROBE CULT: ABNORMAL
GRAM STN SPEC: ABNORMAL
ORGANISM: ABNORMAL
ORGANISM: ABNORMAL

## 2024-01-29 RX ORDER — CEPHALEXIN 500 MG/1
500 CAPSULE ORAL 4 TIMES DAILY
Qty: 28 CAPSULE | Refills: 0 | Status: SHIPPED | OUTPATIENT
Start: 2024-01-29

## 2024-01-30 RX ORDER — AMOXICILLIN 875 MG/1
875 TABLET, COATED ORAL 2 TIMES DAILY
Qty: 20 TABLET | Refills: 0 | Status: SHIPPED | OUTPATIENT
Start: 2024-01-30 | End: 2024-02-09

## 2024-02-05 RX ORDER — APIXABAN 5 MG/1
TABLET, FILM COATED ORAL
Qty: 60 TABLET | Refills: 3 | Status: SHIPPED | OUTPATIENT
Start: 2024-02-05

## 2024-03-01 RX ORDER — FLUCONAZOLE 150 MG/1
TABLET ORAL
Qty: 2 TABLET | Refills: 11 | OUTPATIENT
Start: 2024-03-01

## 2024-03-01 RX ORDER — FLUCONAZOLE 100 MG/1
TABLET ORAL
Qty: 9 TABLET | Refills: 0 | Status: SHIPPED | OUTPATIENT
Start: 2024-03-01

## 2024-03-28 ENCOUNTER — OFFICE VISIT (OUTPATIENT)
Dept: NEUROLOGY | Facility: CLINIC | Age: 74
End: 2024-03-28
Payer: MEDICARE

## 2024-03-28 VITALS
HEART RATE: 62 BPM | DIASTOLIC BLOOD PRESSURE: 64 MMHG | HEIGHT: 62 IN | SYSTOLIC BLOOD PRESSURE: 122 MMHG | BODY MASS INDEX: 53.92 KG/M2 | OXYGEN SATURATION: 89 % | WEIGHT: 293 LBS

## 2024-03-28 DIAGNOSIS — G20.A2 PARKINSON'S DISEASE WITHOUT DYSKINESIA, WITH FLUCTUATING MANIFESTATIONS: Primary | ICD-10-CM

## 2024-03-28 PROCEDURE — 3074F SYST BP LT 130 MM HG: CPT | Performed by: NURSE PRACTITIONER

## 2024-03-28 PROCEDURE — 99214 OFFICE O/P EST MOD 30 MIN: CPT | Performed by: NURSE PRACTITIONER

## 2024-03-28 PROCEDURE — 3078F DIAST BP <80 MM HG: CPT | Performed by: NURSE PRACTITIONER

## 2024-03-28 PROCEDURE — 1160F RVW MEDS BY RX/DR IN RCRD: CPT | Performed by: NURSE PRACTITIONER

## 2024-03-28 PROCEDURE — 1159F MED LIST DOCD IN RCRD: CPT | Performed by: NURSE PRACTITIONER

## 2024-03-28 RX ORDER — AMANTADINE HYDROCHLORIDE 100 MG/1
100 CAPSULE, GELATIN COATED ORAL 2 TIMES DAILY
Qty: 60 CAPSULE | Refills: 5 | Status: SHIPPED | OUTPATIENT
Start: 2024-03-28

## 2024-03-28 RX ORDER — AMANTADINE HYDROCHLORIDE 100 MG/1
100 CAPSULE, GELATIN COATED ORAL 2 TIMES DAILY
Qty: 14 CAPSULE | Refills: 0 | Status: CANCELLED | OUTPATIENT
Start: 2024-03-28

## 2024-03-28 NOTE — PROGRESS NOTES
Neurology Progress Note    Mario Complaint:    Parkinson's  Weakness  Tremor    Subjective   History of Present Illness:  Violeta Romo is a 73 y.o. female who presents today for the above concerns.  She is routinely followed by Anna Rasmussen APRN for primary care. She has previously seen Ramakrishna Lorenzana PA-C.    Parkinson's Disease  She notes great improvement since she restarted her Sinemet.  She notes that she has seen improvement in her balance and notes that she has great improvement of her tremors and bradykinesia.  However, she does continue to note that she could have further improvement as she continues to have some relatively significant difficulties with her ambulation.  She continues to go through PT for her knees as well.    Allergies:    Codeine, Hydrocodone-acetaminophen, Silver, and Silver sulfadiazine    Medications:  Current Outpatient Medications   Medication Sig Dispense Refill    albuterol (ACCUNEB) 0.63 MG/3ML nebulizer solution Take 3 mL by nebulization Every 6 (Six) Hours As Needed for Wheezing or Shortness of Air. 360 mL 3    albuterol sulfate HFA (VENTOLIN HFA) 108 (90 Base) MCG/ACT inhaler Inhale 2 puffs Every 4 (Four) Hours As Needed for Wheezing or Shortness of Air. 1 inhaler 11    allopurinol (ZYLOPRIM) 100 MG tablet Take 1 tablet by mouth Daily.      atorvastatin (LIPITOR) 80 MG tablet Take 1 tablet by mouth Daily.      calcitriol (ROCALTROL) 0.25 MCG capsule Take 1 capsule by mouth Daily.      carbidopa-levodopa (SINEMET)  MG per tablet Take 2 tablets by mouth 4 (Four) Times a Day. 240 tablet 3    Cholecalciferol (VITAMIN D3) 5000 units tablet tablet Take 1 tablet by mouth Daily.      citalopram (CeleXA) 10 MG tablet Take 1 tablet by mouth Daily.      clobetasol (TEMOVATE) 0.05 % ointment Apply external vagina 3 x a day for week one, then decrease to BID on week two, on week 3 once a day, on week four every other day then stop      Eliquis 5 MG tablet tablet Take 1  tablet by mouth 2 (Two) Times a Day.      fluconazole (DIFLUCAN) 150 MG tablet Take one on the 15th of every month      Fluticasone Furoate (ARNUITY ELLIPTA) 100 MCG/ACT aerosol powder  Inhale 1 puff Daily. 30 each 11    gabapentin (NEURONTIN) 600 MG tablet Take 1 tablet by mouth 3 (Three) Times a Day.      insulin aspart (novoLOG FLEXPEN) 100 UNIT/ML solution pen-injector sc pen INJECT 15 UNITS INTO THE SKIN 3 TIMES DAILY (BEFORE MEALS)      insulin detemir (LEVEMIR) 100 UNIT/ML injection Inject  under the skin into the appropriate area as directed As Needed.      isosorbide mononitrate (IMDUR) 30 MG 24 hr tablet Take 1 tablet by mouth Daily.      linaclotide (LINZESS) 145 MCG capsule capsule Take 1 capsule by mouth Every Morning Before Breakfast.      metoprolol tartrate (LOPRESSOR) 25 MG tablet Take 1 tablet by mouth 2 (Two) Times a Day With Meals.      montelukast (SINGULAIR) 10 MG tablet Take 1 tablet by mouth Every Night.      nystatin (MYCOSTATIN) 436135 UNIT/GM ointment Apply topically 2 times daily.for yeast      nystatin (MYCOSTATIN) 768839 UNIT/ML suspension nystatin 100,000 unit/mL oral suspension      O2 (OXYGEN) Inhale 2 L/min Every Night.      omeprazole (priLOSEC) 40 MG capsule Take 1 capsule by mouth Daily.      rOPINIRole (REQUIP) 4 MG tablet TAKE TWO TABLETS BY MOUTH EVERY MORNING, 1 TABLET AT NOON, AND 2 TABLETS AT BEDTIME 150 tablet 1    spironolactone (ALDACTONE) 25 MG tablet Take 1 tablet by mouth 2 (Two) Times a Day.      tiZANidine (ZANAFLEX) 2 MG tablet Take 1 tablet by mouth Every 8 (Eight) Hours.      torsemide (DEMADEX) 20 MG tablet Take 2 tablets by mouth Daily.      traMADol 50 MG tablet 2 tablet, acetaminophen 325 MG tablet 2 tablet Take  by mouth Every 6 (Six) Hours As Needed for Moderate Pain .      amantadine (SYMMETREL) 100 MG capsule Take 1 capsule by mouth 2 (Two) Times a Day. 60 capsule 5     No current facility-administered medications for this visit.     Current outpatient  "and discharge medications have been reconciled for the patient.  Reviewed by: MAURICIO Lanier    Past Medical History:  Past Medical History:   Diagnosis Date    Diabetes mellitus     GERD (gastroesophageal reflux disease)     Hypertension      Past Surgical History:   Procedure Laterality Date    ENDOSCOPY       Family History   Problem Relation Age of Onset    Hypertension Mother     Hypertension Father     Diabetes Father      Social History     Tobacco Use    Smoking status: Never    Smokeless tobacco: Never   Vaping Use    Vaping status: Never Used   Substance Use Topics    Alcohol use: No    Drug use: No     Review of Systems   Constitutional:  Positive for activity change.   Musculoskeletal:  Positive for arthralgias and gait problem.   Neurological:  Positive for tremors and weakness.         Objective   Vital Signs:  Heart Rate:  [62] 62  BP: (122)/(64) 122/64      03/28/24  0951   Weight: 134 kg (296 lb)     157.5 cm (62.01\")  Body mass index is 54.12 kg/m².    Physical Exam  Vitals reviewed.   Constitutional:       Appearance: Normal appearance.   HENT:      Head: Normocephalic.      Mouth/Throat:      Pharynx: Oropharynx is clear.   Eyes:      General: Lids are normal.      Extraocular Movements: Extraocular movements intact.      Pupils: Pupils are equal, round, and reactive to light.   Cardiovascular:      Rate and Rhythm: Normal rate and regular rhythm.      Pulses: Normal pulses.   Pulmonary:      Effort: Pulmonary effort is normal.   Musculoskeletal:         General: Normal range of motion.      Cervical back: Normal range of motion and neck supple.   Skin:     General: Skin is warm and dry.      Capillary Refill: Capillary refill takes less than 2 seconds.   Neurological:      Deep Tendon Reflexes:      Reflex Scores:       Tricep reflexes are 1+ on the right side and 1+ on the left side.       Bicep reflexes are 1+ on the right side and 1+ on the left side.       Brachioradialis reflexes are " 1+ on the right side and 1+ on the left side.       Patellar reflexes are 1+ on the right side and 1+ on the left side.       Achilles reflexes are 1+ on the right side and 1+ on the left side.  Psychiatric:         Mood and Affect: Mood normal.         Speech: Speech normal.       Neurological Exam  Mental Status  Awake, alert and oriented to person, place and time. Recent and remote memory are intact. Speech is normal. Language is fluent with no aphasia. Attention and concentration are normal.    Cranial Nerves  CN II: Visual acuity is normal. Visual fields full to confrontation.  CN III, IV, VI: Extraocular movements intact bilaterally. Normal lids and orbits bilaterally. Pupils equal round and reactive to light bilaterally.  CN V: Facial sensation is normal.  CN VII: Full and symmetric facial movement.  CN IX, X: Palate elevates symmetrically. Normal gag reflex.  CN XI: Shoulder shrug strength is normal.  CN XII: Tongue midline without atrophy or fasciculations.    Motor   Strength is 5/5 in all four extremities except as noted.                                             Right                     Left  Hip flexion                              4                          4  Hip extension                         4                          4  Knee flexion                           3                          3  Knee extension                      3                          3  Plantarflexion                         4                          4  Dorsiflexion                            4                          4    Sensory  Decreased sensation in bilateral lower extremities.    Reflexes                                            Right                      Left  Brachioradialis                    1+                         1+  Biceps                                 1+                         1+  Triceps                                1+                         1+  Patellar                                1+              "            1+  Achilles                                1+                         1+    Coordination    Combination resting and kinetic tremor noted on examination.  Cogwheel rigidity noted.  Bradykinesia noted.    Gait    In wheelchair.  Unable to test secondary to safety.    Results Review:    Lab Results   Component Value Date    GLUCOSE 125 (H) 07/13/2022    BUN 36 (H) 07/13/2022    CREATININE 1.7 (H) 07/13/2022    EGFRIFNONA 30 (A) 07/13/2022    EGFRIFAFRI 36 (L) 07/13/2022    K 4.7 07/13/2022    CO2 23 07/13/2022    CALCIUM 9.0 07/13/2022    ALBUMIN 3.7 07/13/2022    AST 13 07/13/2022    ALT 6 07/13/2022     Lab Results   Component Value Date    WBC 12.7 (H) 06/15/2023    HGB 9.9 (L) 06/15/2023    HCT 33.3 (L) 06/15/2023    MCV 91.0 06/15/2023     06/15/2023     Lab Results   Component Value Date    CHLPL 145 (L) 07/21/2021    TRIG 256 (H) 07/21/2021    HDL 36 (L) 07/21/2021    LDL 58 07/21/2021     Lab Results   Component Value Date    TSH 4.190 12/02/2019     Lab Results   Component Value Date    HGBA1C 7.8 (H) 01/25/2024     No results found for: \"FOLATE\"  No results found for: \"ULETFCIQ18\"    CT Cervical Spine Without Contrast (11/20/2023 14:30)   CT Head Without Contrast (11/20/2023 14:30)     Chart Review:  Office Visit with Dominic Lorenzana PA (10/24/2023)      Plan   Diagnoses and all orders for this visit:    1. Parkinson's disease without dyskinesia, with fluctuating manifestations (Primary)  Assessment & Plan:  She has seen improvement with her symptoms since restarting the Sinemet.  She also does continue to undergo PT.  She notes that she continues to have some difficulties with her ambulation and some of her bradykinesias.  We will initiate Amantadine and continue her Requip and Sinemet. They would like for me to place more orders for Home Health and I will also place these to continue her PT at this time.     Orders:  -     amantadine (SYMMETREL) 100 MG capsule; Take 1 capsule by " mouth 2 (Two) Times a Day.  Dispense: 60 capsule; Refill: 5  -     Ambulatory Referral to Home Health (Outpatient)    Follow-Up:  Return in about 3 months (around 6/28/2024) for Parkinson's Disease.         MAURICIO Lanier  03/28/24  14:33 CDT

## 2024-03-28 NOTE — ASSESSMENT & PLAN NOTE
She has seen improvement with her symptoms since restarting the Sinemet.  She also does continue to undergo PT.  She notes that she continues to have some difficulties with her ambulation and some of her bradykinesias.  We will initiate Amantadine and continue her Requip and Sinemet. They would like for me to place more orders for Home Health and I will also place these to continue her PT at this time.

## 2024-04-04 DIAGNOSIS — R52 PAIN: ICD-10-CM

## 2024-04-04 RX ORDER — TRAMADOL HYDROCHLORIDE 50 MG/1
50 TABLET ORAL 2 TIMES DAILY PRN
Qty: 60 TABLET | Refills: 0 | Status: SHIPPED | OUTPATIENT
Start: 2024-04-04 | End: 2024-05-04

## 2024-04-09 ENCOUNTER — ANCILLARY PROCEDURE (OUTPATIENT)
Dept: PRIMARY CARE CLINIC | Age: 74
End: 2024-04-09
Payer: MEDICARE

## 2024-04-09 ENCOUNTER — OFFICE VISIT (OUTPATIENT)
Dept: PRIMARY CARE CLINIC | Age: 74
End: 2024-04-09
Payer: MEDICARE

## 2024-04-09 VITALS
BODY MASS INDEX: 54.65 KG/M2 | HEART RATE: 63 BPM | DIASTOLIC BLOOD PRESSURE: 60 MMHG | TEMPERATURE: 97.3 F | RESPIRATION RATE: 18 BRPM | HEIGHT: 62 IN | SYSTOLIC BLOOD PRESSURE: 110 MMHG | OXYGEN SATURATION: 88 %

## 2024-04-09 DIAGNOSIS — R09.02 HYPOXIA: Primary | ICD-10-CM

## 2024-04-09 DIAGNOSIS — E11.8 TYPE 2 DIABETES MELLITUS WITH COMPLICATION (HCC): ICD-10-CM

## 2024-04-09 DIAGNOSIS — R35.0 FREQUENT URINATION: ICD-10-CM

## 2024-04-09 DIAGNOSIS — N18.4 STAGE 4 CHRONIC KIDNEY DISEASE (HCC): ICD-10-CM

## 2024-04-09 DIAGNOSIS — J44.9 CHRONIC OBSTRUCTIVE PULMONARY DISEASE, UNSPECIFIED COPD TYPE (HCC): ICD-10-CM

## 2024-04-09 DIAGNOSIS — R06.02 SHORTNESS OF BREATH: ICD-10-CM

## 2024-04-09 LAB
ALBUMIN SERPL-MCNC: 3.7 G/DL (ref 3.5–5.2)
ALP SERPL-CCNC: 88 U/L (ref 35–104)
ALT SERPL-CCNC: <5 U/L (ref 5–33)
ANION GAP SERPL CALCULATED.3IONS-SCNC: 15 MMOL/L (ref 7–19)
AST SERPL-CCNC: 9 U/L (ref 5–32)
B PARAP IS1001 DNA NPH QL NAA+NON-PROBE: NOT DETECTED
B PERT.PT PRMT NPH QL NAA+NON-PROBE: NOT DETECTED
BILIRUB SERPL-MCNC: 0.4 MG/DL (ref 0.2–1.2)
BNP BLD-MCNC: 1065 PG/ML (ref 0–124)
BUN SERPL-MCNC: 35 MG/DL (ref 8–23)
C PNEUM DNA NPH QL NAA+NON-PROBE: NOT DETECTED
CALCIUM SERPL-MCNC: 9.1 MG/DL (ref 8.8–10.2)
CHLORIDE SERPL-SCNC: 100 MMOL/L (ref 98–111)
CO2 SERPL-SCNC: 25 MMOL/L (ref 22–29)
CREAT SERPL-MCNC: 1.7 MG/DL (ref 0.5–0.9)
ERYTHROCYTE [DISTWIDTH] IN BLOOD BY AUTOMATED COUNT: 18.8 % (ref 11.5–14.5)
FLUAV RNA NPH QL NAA+NON-PROBE: NOT DETECTED
FLUBV RNA NPH QL NAA+NON-PROBE: NOT DETECTED
GLUCOSE SERPL-MCNC: 157 MG/DL (ref 74–109)
HADV DNA NPH QL NAA+NON-PROBE: NOT DETECTED
HCOV 229E RNA NPH QL NAA+NON-PROBE: NOT DETECTED
HCOV HKU1 RNA NPH QL NAA+NON-PROBE: NOT DETECTED
HCOV NL63 RNA NPH QL NAA+NON-PROBE: NOT DETECTED
HCOV OC43 RNA NPH QL NAA+NON-PROBE: NOT DETECTED
HCT VFR BLD AUTO: 33.3 % (ref 37–47)
HGB BLD-MCNC: 9.6 G/DL (ref 12–16)
HMPV RNA NPH QL NAA+NON-PROBE: NOT DETECTED
HPIV1 RNA NPH QL NAA+NON-PROBE: NOT DETECTED
HPIV2 RNA NPH QL NAA+NON-PROBE: NOT DETECTED
HPIV3 RNA NPH QL NAA+NON-PROBE: NOT DETECTED
HPIV4 RNA NPH QL NAA+NON-PROBE: NOT DETECTED
M PNEUMO DNA NPH QL NAA+NON-PROBE: NOT DETECTED
MCH RBC QN AUTO: 23.6 PG (ref 27–31)
MCHC RBC AUTO-ENTMCNC: 28.8 G/DL (ref 33–37)
MCV RBC AUTO: 81.8 FL (ref 81–99)
PLATELET # BLD AUTO: 217 K/UL (ref 130–400)
PMV BLD AUTO: 11.3 FL (ref 9.4–12.3)
POTASSIUM SERPL-SCNC: 4.1 MMOL/L (ref 3.5–5)
PROT SERPL-MCNC: 6.8 G/DL (ref 6.6–8.7)
RBC # BLD AUTO: 4.07 M/UL (ref 4.2–5.4)
RSV RNA NPH QL NAA+NON-PROBE: NOT DETECTED
RV+EV RNA NPH QL NAA+NON-PROBE: NOT DETECTED
SARS-COV-2 RNA NPH QL NAA+NON-PROBE: DETECTED
SODIUM SERPL-SCNC: 140 MMOL/L (ref 136–145)
WBC # BLD AUTO: 11.7 K/UL (ref 4.8–10.8)

## 2024-04-09 PROCEDURE — 1124F ACP DISCUSS-NO DSCNMKR DOCD: CPT | Performed by: NURSE PRACTITIONER

## 2024-04-09 PROCEDURE — G8399 PT W/DXA RESULTS DOCUMENT: HCPCS | Performed by: NURSE PRACTITIONER

## 2024-04-09 PROCEDURE — 1090F PRES/ABSN URINE INCON ASSESS: CPT | Performed by: NURSE PRACTITIONER

## 2024-04-09 PROCEDURE — 71045 X-RAY EXAM CHEST 1 VIEW: CPT | Performed by: NURSE PRACTITIONER

## 2024-04-09 PROCEDURE — G8427 DOCREV CUR MEDS BY ELIG CLIN: HCPCS | Performed by: NURSE PRACTITIONER

## 2024-04-09 PROCEDURE — G8417 CALC BMI ABV UP PARAM F/U: HCPCS | Performed by: NURSE PRACTITIONER

## 2024-04-09 PROCEDURE — 1036F TOBACCO NON-USER: CPT | Performed by: NURSE PRACTITIONER

## 2024-04-09 PROCEDURE — 2022F DILAT RTA XM EVC RTNOPTHY: CPT | Performed by: NURSE PRACTITIONER

## 2024-04-09 PROCEDURE — 99215 OFFICE O/P EST HI 40 MIN: CPT | Performed by: NURSE PRACTITIONER

## 2024-04-09 PROCEDURE — 3051F HG A1C>EQUAL 7.0%<8.0%: CPT | Performed by: NURSE PRACTITIONER

## 2024-04-09 PROCEDURE — 3017F COLORECTAL CA SCREEN DOC REV: CPT | Performed by: NURSE PRACTITIONER

## 2024-04-09 PROCEDURE — 3023F SPIROM DOC REV: CPT | Performed by: NURSE PRACTITIONER

## 2024-04-09 RX ORDER — ALPRAZOLAM 0.5 MG/1
TABLET ORAL
COMMUNITY
Start: 2024-02-05 | End: 2024-04-09 | Stop reason: ALTCHOICE

## 2024-04-09 RX ORDER — AMANTADINE HYDROCHLORIDE 100 MG/1
100 CAPSULE, GELATIN COATED ORAL 2 TIMES DAILY
COMMUNITY

## 2024-04-09 RX ORDER — TOBRAMYCIN 3 MG/ML
1 SOLUTION/ DROPS OPHTHALMIC EVERY 4 HOURS
Qty: 1 EACH | Refills: 0 | Status: SHIPPED | OUTPATIENT
Start: 2024-04-09 | End: 2024-04-19

## 2024-04-09 RX ORDER — INSULIN GLARGINE-YFGN 100 [IU]/ML
INJECTION, SOLUTION SUBCUTANEOUS
COMMUNITY
Start: 2024-03-11

## 2024-04-09 RX ORDER — LEVOFLOXACIN 250 MG/1
250 TABLET, FILM COATED ORAL EVERY OTHER DAY
Qty: 5 TABLET | Refills: 0 | Status: SHIPPED | OUTPATIENT
Start: 2024-04-09 | End: 2024-04-18

## 2024-04-09 ASSESSMENT — ENCOUNTER SYMPTOMS
CHEST TIGHTNESS: 0
EYE ITCHING: 1
GASTROINTESTINAL NEGATIVE: 1
EYE REDNESS: 1
COUGH: 1
BACK PAIN: 1
SHORTNESS OF BREATH: 1
RHINORRHEA: 1

## 2024-04-09 NOTE — PROGRESS NOTES
[facility]  [place] [id number] [facility] [place] [address]  Dept: [phone]  Dept Fax: [phone]  Loc: [phone]    Erlinda Chen is a 73 y.o. female who presents today for her medical conditions/complaints as noted below.  Erlinda Chen is c/o of Congestion (Congestion, oxygen is running low, without oxygen this am while sleeping it was 56 but with oxygen its in the low 90's, eyes  matted, she just doesn't felt for about a week) and Sore (Sores on her legs again, she has appt with wound care tomorrow)        HPI:   She has been congested since Friday she states that she has had general malaise and just not feeling well since then.  She denies any fever or chills.  She checked her oxygen this morning and it was low at 56 without her oxygen on she normally only wears oxygen at night.  It is not continuous oxygen and is only assisted and at 2 L nasal cannula.  She has been feeling more short of breath that she started feeling ill.  Has been wearing oxygen all of the time for the last 3 days.  They checked her oxygen this morning with the oxygen on and it was 90%.  She denies any increased leg swelling.  She has been taking her fluid pill regularly.  Has been going to urinate a normal amount for her.  Her daughter states that she has had some instances of her talking out of her head.  She has been nodding off to sleep sitting in her wheelchair but her daughter states that this happens often even when she is not sick.  Her right eye also been bothering her it is red and matted.  She states that it itches.   She does have a wound on her right leg that she is seeing wound care for tomorrow  HPI   Chief Complaint   Patient presents with    Congestion     Congestion, oxygen is running low, without oxygen this am while sleeping it was 56 but with oxygen its in the low 90's, eyes  matted, she just doesn't felt for about a week    Sore     Sores on her legs again, she has appt with

## 2024-04-09 NOTE — PATIENT INSTRUCTIONS
Send respiratory panel to the hospital to check for COVID  Use the tobrex drops in right eye for pink eye  Take 2 antibiotic pills today, skip Wednesday, one on Thursday, skip Friday, one on Saturday, skip Sunday, and one on Monday.

## 2024-04-12 ENCOUNTER — TELEPHONE (OUTPATIENT)
Dept: NEUROLOGY | Facility: CLINIC | Age: 74
End: 2024-04-12
Payer: MEDICARE

## 2024-04-12 NOTE — TELEPHONE ENCOUNTER
Provider: KEEGAN MARSHALL APRN     Caller: JUANY    Relationship to Patient: [facility]     Phone Number: [phone]    Reason for Call: CALLING TO LET PROVIDER KNOW THEY RECEIVED THE REFERRAL FOR PHYSICAL THERAPY.   STATED PATIENT IS ADMITTED TO [facility] RIGHT NOW.   CALLING AS AN FYI.    When was the patient last seen:     DOCUMENTING PER [facility] PROTOCOL

## 2024-04-12 NOTE — TELEPHONE ENCOUNTER
CALLED JUANY BACK TO VERIFY WHAT THE PROTOCOL WOULD BE OR WHAT I WOULD NEED TO DO TO MAKE SURE PATIENT GETS SET UP AFTER DISCHARGE FROM HOSPITAL. SHE STATED THAT THEY HAVE ALREADY CONTACTED [facility] AND THEY ARE IN CONTACT TO BE UPDATED ONCE PATIENT IS DISCHARGED AND THEY WILL GET HER SET UP THEN AND THERE WAS NOTHING FURTHER OUR OFFICE NEEDED TO DO.

## 2024-05-01 ENCOUNTER — OFFICE VISIT (OUTPATIENT)
Dept: PRIMARY CARE CLINIC | Age: 74
End: 2024-05-01
Payer: MEDICARE

## 2024-05-01 VITALS
HEART RATE: 72 BPM | TEMPERATURE: 97.2 F | WEIGHT: 289.8 LBS | RESPIRATION RATE: 16 BRPM | OXYGEN SATURATION: 97 % | BODY MASS INDEX: 53.33 KG/M2 | SYSTOLIC BLOOD PRESSURE: 130 MMHG | DIASTOLIC BLOOD PRESSURE: 76 MMHG | HEIGHT: 62 IN

## 2024-05-01 DIAGNOSIS — E66.01 MORBID OBESITY (HCC): ICD-10-CM

## 2024-05-01 DIAGNOSIS — E11.622 DIABETIC ULCER OF LEFT LOWER LEG (HCC): ICD-10-CM

## 2024-05-01 DIAGNOSIS — G20.A1 PARKINSON DISEASE, SYMPTOMATIC (HCC): ICD-10-CM

## 2024-05-01 DIAGNOSIS — I50.32 CHRONIC DIASTOLIC CONGESTIVE HEART FAILURE (HCC): ICD-10-CM

## 2024-05-01 DIAGNOSIS — L97.929 DIABETIC ULCER OF LEFT LOWER LEG (HCC): ICD-10-CM

## 2024-05-01 DIAGNOSIS — F41.8 DEPRESSION WITH ANXIETY: ICD-10-CM

## 2024-05-01 DIAGNOSIS — G47.34 NOCTURNAL HYPOXEMIA: ICD-10-CM

## 2024-05-01 DIAGNOSIS — N18.4 STAGE 4 CHRONIC KIDNEY DISEASE (HCC): ICD-10-CM

## 2024-05-01 DIAGNOSIS — E11.42 DIABETIC PERIPHERAL NEUROPATHY ASSOCIATED WITH TYPE 2 DIABETES MELLITUS (HCC): Primary | ICD-10-CM

## 2024-05-01 DIAGNOSIS — I87.2 EDEMA OF BOTH LOWER EXTREMITIES DUE TO PERIPHERAL VENOUS INSUFFICIENCY: Chronic | ICD-10-CM

## 2024-05-01 PROCEDURE — 1124F ACP DISCUSS-NO DSCNMKR DOCD: CPT | Performed by: NURSE PRACTITIONER

## 2024-05-01 PROCEDURE — G8417 CALC BMI ABV UP PARAM F/U: HCPCS | Performed by: NURSE PRACTITIONER

## 2024-05-01 PROCEDURE — G8427 DOCREV CUR MEDS BY ELIG CLIN: HCPCS | Performed by: NURSE PRACTITIONER

## 2024-05-01 PROCEDURE — 3051F HG A1C>EQUAL 7.0%<8.0%: CPT | Performed by: NURSE PRACTITIONER

## 2024-05-01 PROCEDURE — 1090F PRES/ABSN URINE INCON ASSESS: CPT | Performed by: NURSE PRACTITIONER

## 2024-05-01 PROCEDURE — 99214 OFFICE O/P EST MOD 30 MIN: CPT | Performed by: NURSE PRACTITIONER

## 2024-05-01 PROCEDURE — 3017F COLORECTAL CA SCREEN DOC REV: CPT | Performed by: NURSE PRACTITIONER

## 2024-05-01 PROCEDURE — 2022F DILAT RTA XM EVC RTNOPTHY: CPT | Performed by: NURSE PRACTITIONER

## 2024-05-01 PROCEDURE — G8399 PT W/DXA RESULTS DOCUMENT: HCPCS | Performed by: NURSE PRACTITIONER

## 2024-05-01 PROCEDURE — 1036F TOBACCO NON-USER: CPT | Performed by: NURSE PRACTITIONER

## 2024-05-01 RX ORDER — CITALOPRAM 20 MG/1
20 TABLET ORAL DAILY
Qty: 90 TABLET | Refills: 2 | Status: SHIPPED | OUTPATIENT
Start: 2024-05-01

## 2024-05-01 ASSESSMENT — ENCOUNTER SYMPTOMS
COUGH: 0
EYES NEGATIVE: 1
SHORTNESS OF BREATH: 1
GASTROINTESTINAL NEGATIVE: 1

## 2024-05-01 NOTE — PROGRESS NOTES
Judgment: Judgment normal.       /76   Pulse 72   Temp 97.2 °F (36.2 °C) (Temporal)   Resp 16   Ht 1.575 m (5' 2\")   Wt 131.5 kg (289 lb 12.8 oz)   SpO2 97%   BMI 53.01 kg/m²     Assessment:   Assessment & Plan    Diagnosis Orders   1. Diabetic peripheral neuropathy associated with type 2 diabetes mellitus (HCC)        2. Edema of both lower extremities due to peripheral venous insufficiency        3. Parkinson disease, symptomatic (HCC)        4. Stage 4 chronic kidney disease (HCC)        5. Chronic diastolic congestive heart failure (HCC)        6. Diabetic ulcer of left lower leg (HCC)        7. Depression with anxiety        8. Nocturnal hypoxemia        9. Morbid obesity (HCC)              Plan:   Total time spent today caring for this patient was 35 minutes  Reviewed records from [facility] her CT of chest showed mild atelectasis but no pneumonia.  Peripheral scans of her legs showed no DVT.  Her BNP was initially elevated at [facility] but came down after diuretics.  She is on Celexa 10 mg for her depression and anxiety.  Today in discussing her health problems she seemed very down with her future prognosis.  She realizes that she and her  are both in serious health conditions that is requiring her children to have to be with them most of the time to provide care for them.  She is mainly in the wheelchair at home but since being back from the hospital they have been getting her in the better in the recliner to elevate her feet more.  She is not really ambulating at all.  She will continue with wound care as this has been a continuous problem we had for quite some time getting her legs to heal and then she will stop going to wound care and they will break down again.  She has known Parkinson's disease and sees a specialist for this and I encouraged him to speak to the specialist about the medicines causing her to have the hallucinations.  She is sleeping better at night and she

## 2024-05-01 NOTE — PATIENT INSTRUCTIONS
Increase celexa to 20mg  Keep oxygen at night  See heart doctor   Continue spirolactone  Continue with wound care.   Continue with kidney doctor.

## 2024-05-06 ENCOUNTER — PATIENT MESSAGE (OUTPATIENT)
Dept: PRIMARY CARE CLINIC | Age: 74
End: 2024-05-06

## 2024-05-06 RX ORDER — FLUCONAZOLE 150 MG/1
150 TABLET ORAL
Qty: 2 TABLET | Refills: 5 | Status: SHIPPED | OUTPATIENT
Start: 2024-05-06

## 2024-05-06 NOTE — TELEPHONE ENCOUNTER
From: Erlinda Chen  To: Marilu King  Sent: 5/6/2024 2:31 PM CDT  Subject: Med refill     Erlinda Chen needs a refill on Diflucan please

## 2024-05-07 DIAGNOSIS — G20.A2 PARKINSON'S DISEASE WITHOUT DYSKINESIA, WITH FLUCTUATING MANIFESTATIONS: Primary | ICD-10-CM

## 2024-05-07 RX ORDER — ROPINIROLE 4 MG/1
TABLET, FILM COATED ORAL
Qty: 150 TABLET | Refills: 5 | Status: SHIPPED | OUTPATIENT
Start: 2024-05-07

## 2024-05-07 RX ORDER — ROPINIROLE 4 MG/1
TABLET, FILM COATED ORAL
Qty: 150 TABLET | Refills: 11 | Status: SHIPPED | OUTPATIENT
Start: 2024-05-07

## 2024-05-07 RX ORDER — ALLOPURINOL 100 MG/1
100 TABLET ORAL DAILY
Qty: 30 TABLET | Refills: 1 | Status: SHIPPED | OUTPATIENT
Start: 2024-05-07

## 2024-05-07 RX ORDER — ISOSORBIDE MONONITRATE 30 MG/1
30 TABLET, EXTENDED RELEASE ORAL DAILY
Qty: 30 TABLET | Refills: 1 | Status: SHIPPED | OUTPATIENT
Start: 2024-05-07

## 2024-05-07 RX ORDER — LINACLOTIDE 290 UG/1
1 CAPSULE, GELATIN COATED ORAL
Qty: 30 CAPSULE | Refills: 5 | Status: SHIPPED | OUTPATIENT
Start: 2024-05-07

## 2024-05-07 NOTE — TELEPHONE ENCOUNTER
Requested Prescriptions     Pending Prescriptions Disp Refills    rOPINIRole (REQUIP) 4 MG tablet 150 tablet 11     Si PO every am, one at noon, and 2 at bedtime       Last Office Visit: 2023  Next Office Visit: Visit date not found  Last Medication Refill: 10/10/22 with11 refills

## 2024-06-19 RX ORDER — ROPINIROLE 4 MG/1
TABLET, FILM COATED ORAL
Qty: 150 TABLET | Refills: 11 | OUTPATIENT
Start: 2024-06-19

## 2024-06-19 RX ORDER — CITALOPRAM 20 MG/1
20 TABLET ORAL DAILY
Qty: 90 TABLET | Refills: 2 | Status: SHIPPED | OUTPATIENT
Start: 2024-06-19

## 2024-06-19 RX ORDER — OMEPRAZOLE 40 MG/1
40 CAPSULE, DELAYED RELEASE ORAL DAILY
Qty: 30 CAPSULE | Refills: 8 | Status: SHIPPED | OUTPATIENT
Start: 2024-06-19

## 2024-06-20 ENCOUNTER — OFFICE VISIT (OUTPATIENT)
Dept: NEUROLOGY | Facility: CLINIC | Age: 74
End: 2024-06-20
Payer: MEDICARE

## 2024-06-20 VITALS
HEART RATE: 64 BPM | BODY MASS INDEX: 53.92 KG/M2 | HEIGHT: 62 IN | DIASTOLIC BLOOD PRESSURE: 76 MMHG | WEIGHT: 293 LBS | SYSTOLIC BLOOD PRESSURE: 128 MMHG | OXYGEN SATURATION: 89 %

## 2024-06-20 DIAGNOSIS — G20.B2 PARKINSON'S DISEASE WITH DYSKINESIA AND FLUCTUATING MANIFESTATIONS: ICD-10-CM

## 2024-06-20 PROBLEM — E11.622 DIABETIC ULCER OF LEFT LOWER LEG ASSOCIATED WITH TYPE 2 DIABETES MELLITUS, WITH FAT LAYER EXPOSED: Status: ACTIVE | Noted: 2020-07-31

## 2024-06-20 PROBLEM — Z00.00 ENCOUNTER FOR HEALTH MAINTENANCE EXAMINATION: Status: ACTIVE | Noted: 2024-06-20

## 2024-06-20 PROBLEM — M79.18 PAIN IN BUTTOCK: Status: ACTIVE | Noted: 2018-09-06

## 2024-06-20 PROBLEM — H66.90 ACUTE OTITIS MEDIA: Status: ACTIVE | Noted: 2024-06-20

## 2024-06-20 PROBLEM — F41.8 DEPRESSION WITH ANXIETY: Status: ACTIVE | Noted: 2024-05-01

## 2024-06-20 PROBLEM — H93.19 SUBJECTIVE TINNITUS: Status: ACTIVE | Noted: 2024-06-20

## 2024-06-20 PROBLEM — I50.32 CHRONIC DIASTOLIC CONGESTIVE HEART FAILURE: Status: ACTIVE | Noted: 2024-01-25

## 2024-06-20 PROBLEM — L97.922 DIABETIC ULCER OF LEFT LOWER LEG ASSOCIATED WITH TYPE 2 DIABETES MELLITUS, WITH FAT LAYER EXPOSED: Status: ACTIVE | Noted: 2020-07-31

## 2024-06-20 PROBLEM — H65.20 CHRONIC SEROUS OTITIS MEDIA: Status: ACTIVE | Noted: 2024-06-20

## 2024-06-20 PROBLEM — R26.2 UNABLE TO AMBULATE: Status: ACTIVE | Noted: 2024-06-20

## 2024-06-20 PROBLEM — H69.90 EUSTACHIAN TUBE DYSFUNCTION: Status: ACTIVE | Noted: 2024-06-20

## 2024-06-20 PROBLEM — Z99.81 ON HOME O2: Status: ACTIVE | Noted: 2024-06-20

## 2024-06-20 PROBLEM — R20.1 HYPOESTHESIA OF SKIN: Status: ACTIVE | Noted: 2017-04-06

## 2024-06-20 PROBLEM — Z78.9 OTHER SPECIFIED HEALTH STATUS: Status: ACTIVE | Noted: 2024-06-20

## 2024-06-20 PROBLEM — H90.8 MIXED CONDUCTIVE AND SENSORINEURAL HEARING LOSS: Status: ACTIVE | Noted: 2024-06-20

## 2024-06-20 PROBLEM — E11.65 HYPERGLYCEMIA DUE TO TYPE 2 DIABETES MELLITUS: Status: ACTIVE | Noted: 2024-06-20

## 2024-06-20 PROBLEM — M25.539 PAIN IN WRIST: Status: ACTIVE | Noted: 2018-06-29

## 2024-06-20 PROBLEM — G62.9 NEUROPATHY: Status: ACTIVE | Noted: 2023-09-15

## 2024-06-20 PROBLEM — Z91.89 AT RISK FOR OBSTRUCTIVE SLEEP APNEA: Status: ACTIVE | Noted: 2021-03-30

## 2024-06-20 PROCEDURE — 3078F DIAST BP <80 MM HG: CPT | Performed by: NURSE PRACTITIONER

## 2024-06-20 PROCEDURE — 1159F MED LIST DOCD IN RCRD: CPT | Performed by: NURSE PRACTITIONER

## 2024-06-20 PROCEDURE — 1160F RVW MEDS BY RX/DR IN RCRD: CPT | Performed by: NURSE PRACTITIONER

## 2024-06-20 PROCEDURE — 3074F SYST BP LT 130 MM HG: CPT | Performed by: NURSE PRACTITIONER

## 2024-06-20 PROCEDURE — 99214 OFFICE O/P EST MOD 30 MIN: CPT | Performed by: NURSE PRACTITIONER

## 2024-06-20 RX ORDER — ROPINIROLE 4 MG/1
TABLET, FILM COATED ORAL
Qty: 150 TABLET | Refills: 5 | Status: SHIPPED | OUTPATIENT
Start: 2024-06-20

## 2024-06-20 RX ORDER — AMANTADINE HYDROCHLORIDE 100 MG/1
100 CAPSULE, GELATIN COATED ORAL NIGHTLY
Qty: 30 CAPSULE | Refills: 3 | Status: SHIPPED | OUTPATIENT
Start: 2024-06-20

## 2024-06-20 NOTE — ASSESSMENT & PLAN NOTE
She continues to remain stable overall.  She continues to describe jerking movements and some mild hallucinations.  Her sleep has improved with the Amantadine.    I did take time to review her previous [facility] notes and there was concern for Hallucinations at that time as well.  There was some consideration of Nuplazid. However I am really unsure as to if the amount of dopaminergic therapy she is on is causing the hallucinations versus a primary Parkinson's induced Psychosis.  Either way, they are not at a point I would recommend addition of Nuplazid for.  Discussed this in detail today.    She continues with the jerking movements that they describe to be similar to Myoclonic Jerks.  I would be suspicious that this is some form of dyskinesia.  I haven't really been able to observe the jerking in the office. I discussed that she is on quite a bit of Requip and Sinemet and that I would recommend we back off of this.  However, this has been attempted in the past with [facility].  They note that every time she has been backed off of medications she has had difficulties with worsening Parkinson's symptoms to the point of not really being functional.  We had a lengthy discussion about the risks/benefits and they would like to continue dosing today. We will continue the Requip, sinemet, and Amantadine as prescribed.

## 2024-06-20 NOTE — PROGRESS NOTES
"    Neurology Progress Note    Cheif Complaint:    Parkinson's  Weakness  Tremor    Subjective   History of Present Illness:  Violeta Romo is a 73 y.o. female who presents today for the above concerns.  She is routinely followed by Anna Rasmussen APRN for primary care. She has previously seen Ramakrishna Lorenzana PA-C.    Parkinson's Disease  She remains doing well overall.  She notes that she continues to have \"glitches\" that they describe as myoclonic jerking movements.  She also notes that she continues to deal with hallucinations that are very mild.  They did seem to worsen during the day when adding Amantadine and as a result they have decreased this to nightly.  They note that with the Amantadine they have noted much improvement in her sleep.  She continues with Sinemet 25-100mg 2 tablets 4 times daily, Requip 8mg in the morning/evening with 4mg midday, and Amantadine nightly.     Allergies:    Codeine, Hydrocodone-acetaminophen, Silver, and Silver sulfadiazine    Medications:  Current Outpatient Medications   Medication Sig Dispense Refill    albuterol (ACCUNEB) 0.63 MG/3ML nebulizer solution Take 3 mL by nebulization Every 6 (Six) Hours As Needed for Wheezing or Shortness of Air. 360 mL 3    albuterol sulfate HFA (VENTOLIN HFA) 108 (90 Base) MCG/ACT inhaler Inhale 2 puffs Every 4 (Four) Hours As Needed for Wheezing or Shortness of Air. 1 inhaler 11    allopurinol (ZYLOPRIM) 100 MG tablet Take 1 tablet by mouth Daily.      amantadine (SYMMETREL) 100 MG capsule Take 1 capsule by mouth Every Night. Only does at night 30 capsule 3    atorvastatin (LIPITOR) 80 MG tablet Take 1 tablet by mouth Daily.      calcitriol (ROCALTROL) 0.25 MCG capsule Take 1 capsule by mouth Daily.      carbidopa-levodopa (SINEMET)  MG per tablet Take 2 tablets by mouth 4 (Four) Times a Day. 240 tablet 3    Cholecalciferol (VITAMIN D3) 5000 units tablet tablet Take 1 tablet by mouth Daily.      citalopram (CeleXA) 10 MG tablet " Take 1 tablet by mouth Daily.      clobetasol (TEMOVATE) 0.05 % ointment Apply external vagina 3 x a day for week one, then decrease to BID on week two, on week 3 once a day, on week four every other day then stop      Eliquis 5 MG tablet tablet Take 1 tablet by mouth 2 (Two) Times a Day.      fluconazole (DIFLUCAN) 150 MG tablet Take one on the 15th of every month      Fluticasone Furoate (ARNUITY ELLIPTA) 100 MCG/ACT aerosol powder  Inhale 1 puff Daily. 30 each 11    gabapentin (NEURONTIN) 600 MG tablet Take 1 tablet by mouth 3 (Three) Times a Day.      insulin aspart (novoLOG FLEXPEN) 100 UNIT/ML solution pen-injector sc pen INJECT 15 UNITS INTO THE SKIN 3 TIMES DAILY (BEFORE MEALS)      insulin detemir (LEVEMIR) 100 UNIT/ML injection Inject  under the skin into the appropriate area as directed As Needed.      isosorbide mononitrate (IMDUR) 30 MG 24 hr tablet Take 1 tablet by mouth Daily.      linaclotide (LINZESS) 145 MCG capsule capsule Take 1 capsule by mouth Every Morning Before Breakfast.      metoprolol tartrate (LOPRESSOR) 25 MG tablet Take 1 tablet by mouth 2 (Two) Times a Day With Meals.      montelukast (SINGULAIR) 10 MG tablet Take 1 tablet by mouth Every Night.      nystatin (MYCOSTATIN) 472082 UNIT/GM ointment Apply topically 2 times daily.for yeast      nystatin (MYCOSTATIN) 714832 UNIT/ML suspension nystatin 100,000 unit/mL oral suspension      O2 (OXYGEN) Inhale 2 L/min Every Night.      omeprazole (priLOSEC) 40 MG capsule Take 1 capsule by mouth Daily.      rOPINIRole (REQUIP) 4 MG tablet Take 2 tablets in the morning, 1 tablet midday, and two tablets in the evening. 150 tablet 5    spironolactone (ALDACTONE) 25 MG tablet Take 1 tablet by mouth 2 (Two) Times a Day.      tiZANidine (ZANAFLEX) 2 MG tablet Take 1 tablet by mouth Every 8 (Eight) Hours.      torsemide (DEMADEX) 20 MG tablet Take 2 tablets by mouth Daily.      traMADol 50 MG tablet 2 tablet, acetaminophen 325 MG tablet 2 tablet Take   "by mouth Every 6 (Six) Hours As Needed for Moderate Pain .       No current facility-administered medications for this visit.     Current outpatient and discharge medications have been reconciled for the patient.  Reviewed by: MAURICIO Lanier    Past Medical History:  Past Medical History:   Diagnosis Date    Diabetes mellitus     GERD (gastroesophageal reflux disease)     Hypertension      Past Surgical History:   Procedure Laterality Date    ENDOSCOPY       Family History   Problem Relation Age of Onset    Hypertension Mother     Hypertension Father     Diabetes Father      Social History     Tobacco Use    Smoking status: Never    Smokeless tobacco: Never   Vaping Use    Vaping status: Never Used   Substance Use Topics    Alcohol use: No    Drug use: No     Review of Systems   Constitutional:  Positive for activity change.   Musculoskeletal:  Positive for arthralgias and gait problem.   Neurological:  Positive for tremors and weakness.         Objective   Vital Signs:  Heart Rate:  [64] 64  BP: (128)/(76) 128/76      06/20/24  0953   Weight: 133 kg (293 lb)     157.5 cm (62.01\")  Body mass index is 53.57 kg/m².    Physical Exam  Vitals reviewed.   Constitutional:       Appearance: Normal appearance.   HENT:      Head: Normocephalic.      Mouth/Throat:      Pharynx: Oropharynx is clear.   Eyes:      General: Lids are normal.      Extraocular Movements: Extraocular movements intact.      Pupils: Pupils are equal, round, and reactive to light.   Cardiovascular:      Rate and Rhythm: Normal rate and regular rhythm.      Pulses: Normal pulses.   Pulmonary:      Effort: Pulmonary effort is normal.   Musculoskeletal:         General: Normal range of motion.      Cervical back: Normal range of motion and neck supple.   Skin:     General: Skin is warm and dry.      Capillary Refill: Capillary refill takes less than 2 seconds.   Neurological:      Deep Tendon Reflexes:      Reflex Scores:       Tricep reflexes are 1+ " on the right side and 1+ on the left side.       Bicep reflexes are 1+ on the right side and 1+ on the left side.       Brachioradialis reflexes are 1+ on the right side and 1+ on the left side.       Patellar reflexes are 1+ on the right side and 1+ on the left side.       Achilles reflexes are 1+ on the right side and 1+ on the left side.  Psychiatric:         Mood and Affect: Mood normal.         Speech: Speech normal.       Neurological Exam  Mental Status  Awake, alert and oriented to person, place and time. Recent and remote memory are intact. Speech is normal. Language is fluent with no aphasia. Attention and concentration are normal.    Cranial Nerves  CN II: Visual acuity is normal. Visual fields full to confrontation.  CN III, IV, VI: Extraocular movements intact bilaterally. Normal lids and orbits bilaterally. Pupils equal round and reactive to light bilaterally.  CN V: Facial sensation is normal.  CN VII: Full and symmetric facial movement.  CN IX, X: Palate elevates symmetrically. Normal gag reflex.  CN XI: Shoulder shrug strength is normal.  CN XII: Tongue midline without atrophy or fasciculations.    Motor   Strength is 5/5 in all four extremities except as noted.                                             Right                     Left  Hip flexion                              4                          4  Hip extension                         4                          4  Knee flexion                           3                          3  Knee extension                      3                          3  Plantarflexion                         4                          4  Dorsiflexion                            4                          4    Sensory  Decreased sensation in bilateral lower extremities.    Reflexes                                            Right                      Left  Brachioradialis                    1+                         1+  Biceps                                 1+      "                    1+  Triceps                                1+                         1+  Patellar                                1+                         1+  Achilles                                1+                         1+    Coordination    Combination resting and kinetic tremor noted on examination.  Cogwheel rigidity noted.  Bradykinesia noted.    Gait    In wheelchair.  Unable to test secondary to safety.    Results Review:    Lab Results   Component Value Date    GLUCOSE 125 (H) 07/13/2022    BUN 36 (H) 07/13/2022    CREATININE 1.7 (H) 07/13/2022    EGFRIFNONA 30 (A) 07/13/2022    EGFRIFAFRI 36 (L) 07/13/2022    K 4.7 07/13/2022    CO2 23 07/13/2022    CALCIUM 9.0 07/13/2022    ALBUMIN 3.7 07/13/2022    AST 13 07/13/2022    ALT 6 07/13/2022     Lab Results   Component Value Date    WBC 11.7 (H) 04/09/2024    HGB 9.6 (L) 04/09/2024    HCT 33.3 (L) 04/09/2024    MCV 81.8 04/09/2024     04/09/2024     Lab Results   Component Value Date    CHLPL 145 (L) 07/21/2021    TRIG 256 (H) 07/21/2021    HDL 36 (L) 07/21/2021    LDL 58 07/21/2021     Lab Results   Component Value Date    TSH 4.190 12/02/2019     Lab Results   Component Value Date    HGBA1C 7.8 (H) 01/25/2024     No results found for: \"FOLATE\"  No results found for: \"QAIOBIHA88\"    CT Cervical Spine Without Contrast (11/20/2023 14:30)   CT Head Without Contrast (11/20/2023 14:30)     Chart Review:  Office Visit with Dominic Lorenzana PA (10/24/2023)      Plan   Diagnoses and all orders for this visit:    1. Parkinson's disease with dyskinesia and fluctuating manifestations  Assessment & Plan:  She continues to remain stable overall.  She continues to describe jerking movements and some mild hallucinations.  Her sleep has improved with the Amantadine.    I did take time to review her previous [facility] Neurology notes and there was concern for Hallucinations at that time as well.  There was some consideration of Nuplazid. However I " am really unsure as to if the amount of dopaminergic therapy she is on is causing the hallucinations versus a primary Parkinson's induced Psychosis.  Either way, they are not at a point I would recommend addition of Nuplazid for.  Discussed this in detail today.    She continues with the jerking movements that they describe to be similar to Myoclonic Jerks.  I would be suspicious that this is some form of dyskinesia.  I haven't really been able to observe the jerking in the office. I discussed that she is on quite a bit of Requip and Sinemet and that I would recommend we back off of this.  However, this has been attempted in the past with [facility].  They note that every time she has been backed off of medications she has had difficulties with worsening Parkinson's symptoms to the point of not really being functional.  We had a lengthy discussion about the risks/benefits and they would like to continue dosing today. We will continue the Requip, sinemet, and Amantadine as prescribed.     Orders:  -     amantadine (SYMMETREL) 100 MG capsule; Take 1 capsule by mouth Every Night. Only does at night  Dispense: 30 capsule; Refill: 3  -     rOPINIRole (REQUIP) 4 MG tablet; Take 2 tablets in the morning, 1 tablet midday, and two tablets in the evening.  Dispense: 150 tablet; Refill: 5  -     carbidopa-levodopa (SINEMET)  MG per tablet; Take 2 tablets by mouth 4 (Four) Times a Day.  Dispense: 240 tablet; Refill: 3    Follow-Up:  Return in about 3 months (around 9/20/2024) for Parkinson's Disease.         MAURICIO Lanier  06/20/24  13:24 CDT

## 2024-06-28 ENCOUNTER — TELEPHONE (OUTPATIENT)
Dept: PRIMARY CARE CLINIC | Age: 74
End: 2024-06-28

## 2024-06-28 DIAGNOSIS — N18.4 STAGE 4 CHRONIC KIDNEY DISEASE (HCC): ICD-10-CM

## 2024-06-28 DIAGNOSIS — E66.01 MORBID OBESITY (HCC): ICD-10-CM

## 2024-06-28 DIAGNOSIS — E11.42 DIABETIC PERIPHERAL NEUROPATHY ASSOCIATED WITH TYPE 2 DIABETES MELLITUS (HCC): ICD-10-CM

## 2024-06-28 DIAGNOSIS — G20.A1 PARKINSON DISEASE, SYMPTOMATIC (HCC): Primary | ICD-10-CM

## 2024-06-28 NOTE — TELEPHONE ENCOUNTER
Needing order for drop arm commode. Rosmery reports patient is not doing well and is having to have 2 people to transfer her every time.

## 2024-07-02 ENCOUNTER — TELEPHONE (OUTPATIENT)
Dept: VASCULAR SURGERY | Age: 74
End: 2024-07-02

## 2024-07-02 ENCOUNTER — OFFICE VISIT (OUTPATIENT)
Dept: VASCULAR SURGERY | Age: 74
End: 2024-07-02
Payer: MEDICARE

## 2024-07-02 VITALS
SYSTOLIC BLOOD PRESSURE: 141 MMHG | TEMPERATURE: 97.9 F | DIASTOLIC BLOOD PRESSURE: 63 MMHG | HEART RATE: 63 BPM | OXYGEN SATURATION: 94 %

## 2024-07-02 DIAGNOSIS — I87.2 VENOUS INSUFFICIENCY: ICD-10-CM

## 2024-07-02 DIAGNOSIS — M79.605 LEG PAIN, LEFT: Primary | ICD-10-CM

## 2024-07-02 DIAGNOSIS — M79.89 LEG SWELLING: ICD-10-CM

## 2024-07-02 DIAGNOSIS — M79.604 LEG PAIN, RIGHT: ICD-10-CM

## 2024-07-02 PROCEDURE — 99203 OFFICE O/P NEW LOW 30 MIN: CPT | Performed by: NURSE PRACTITIONER

## 2024-07-02 PROCEDURE — 1036F TOBACCO NON-USER: CPT | Performed by: NURSE PRACTITIONER

## 2024-07-02 PROCEDURE — 1124F ACP DISCUSS-NO DSCNMKR DOCD: CPT | Performed by: NURSE PRACTITIONER

## 2024-07-02 PROCEDURE — 1090F PRES/ABSN URINE INCON ASSESS: CPT | Performed by: NURSE PRACTITIONER

## 2024-07-02 PROCEDURE — G8417 CALC BMI ABV UP PARAM F/U: HCPCS | Performed by: NURSE PRACTITIONER

## 2024-07-02 PROCEDURE — G8399 PT W/DXA RESULTS DOCUMENT: HCPCS | Performed by: NURSE PRACTITIONER

## 2024-07-02 PROCEDURE — G8427 DOCREV CUR MEDS BY ELIG CLIN: HCPCS | Performed by: NURSE PRACTITIONER

## 2024-07-02 PROCEDURE — 3017F COLORECTAL CA SCREEN DOC REV: CPT | Performed by: NURSE PRACTITIONER

## 2024-07-02 RX ORDER — CEPHALEXIN 500 MG/1
CAPSULE ORAL
COMMUNITY
Start: 2024-06-26

## 2024-07-02 NOTE — TELEPHONE ENCOUNTER
Called and left a message for the patient's daughter to call me back to let me know where the patient had vascular surgery.  I have already sent the request for [facility].

## 2024-07-02 NOTE — PROGRESS NOTES
Erlinda Chen (:  1950) is a 73 y.o. female,New patient, here for evaluation of the following chief complaint(s):  New Patient (ref by [facility] already see kika wants to switch pt has blisters)            SUBJECTIVE/OBJECTIVE:  She has a known history of of chronic venous insufficiency. She reports lower extremity edema on the  Bilateral leg(s), the veins are painful -- severity:  moderate, pain is aggravated by upright posture, and leg ulcer in past. She reports previous treatment includes prescription compression stockings for > 6 months. She does not have a history of spontaneous rupture. Has chf.  Had ablation of left leg by Dr. Bay and it has been much improved    Differential diagnosis for leg pain includes but is not limited to: varicose veins, peripheral vascular disease, arthritic pain, DVT, lumbar disc disease, and neurogenic pain.      I have personally reviewed the following: problem list, current meds, allergies, PMH, PSH, family hx, and social hx  Erlinda Chen is a 73 y.o. female with the following history as recorded in [facility]:  Patient Active Problem List    Diagnosis Date Noted    Depression with anxiety 2024    Chronic diastolic congestive heart failure ([facility]) 2024    Coagulation defect ([facility]) 2021    At risk for obstructive sleep apnea 2021    Nocturnal hypoxemia 2021    Diabetic peripheral neuropathy associated with type 2 diabetes mellitus ([facility]) 2021    Ischemic cardiomyopathy 2021    Edema of both lower extremities due to peripheral venous insufficiency 10/21/2020    Varicose veins of left lower extremity with ulcer of calf (CODE) ([facility]) 2020    Stage 4 chronic kidney disease ([facility]) 2020    Chronic obstructive pulmonary disease ([facility]) 2020    Cellulitis of left lower extremity 2019    Mixed hyperlipidemia 2018    Morbid obesity ([facility]) 10/18/2017    Somnolence, daytime 10/09/2017    Restless legs syndrome

## 2024-07-08 ENCOUNTER — OFFICE VISIT (OUTPATIENT)
Dept: CARDIOLOGY CLINIC | Age: 74
End: 2024-07-08
Payer: MEDICARE

## 2024-07-08 VITALS
BODY MASS INDEX: 53.92 KG/M2 | DIASTOLIC BLOOD PRESSURE: 78 MMHG | SYSTOLIC BLOOD PRESSURE: 128 MMHG | HEART RATE: 67 BPM | WEIGHT: 293 LBS | HEIGHT: 62 IN

## 2024-07-08 DIAGNOSIS — I50.32 CHRONIC DIASTOLIC CONGESTIVE HEART FAILURE (HCC): ICD-10-CM

## 2024-07-08 DIAGNOSIS — E78.2 MIXED HYPERLIPIDEMIA: ICD-10-CM

## 2024-07-08 DIAGNOSIS — I10 ESSENTIAL HYPERTENSION: ICD-10-CM

## 2024-07-08 DIAGNOSIS — I48.0 PAF (PAROXYSMAL ATRIAL FIBRILLATION) (HCC): Primary | ICD-10-CM

## 2024-07-08 DIAGNOSIS — Z79.01 CHRONIC ANTICOAGULATION: ICD-10-CM

## 2024-07-08 PROCEDURE — 1090F PRES/ABSN URINE INCON ASSESS: CPT | Performed by: NURSE PRACTITIONER

## 2024-07-08 PROCEDURE — 3078F DIAST BP <80 MM HG: CPT | Performed by: NURSE PRACTITIONER

## 2024-07-08 PROCEDURE — G8427 DOCREV CUR MEDS BY ELIG CLIN: HCPCS | Performed by: NURSE PRACTITIONER

## 2024-07-08 PROCEDURE — 3074F SYST BP LT 130 MM HG: CPT | Performed by: NURSE PRACTITIONER

## 2024-07-08 PROCEDURE — 1124F ACP DISCUSS-NO DSCNMKR DOCD: CPT | Performed by: NURSE PRACTITIONER

## 2024-07-08 PROCEDURE — 1036F TOBACCO NON-USER: CPT | Performed by: NURSE PRACTITIONER

## 2024-07-08 PROCEDURE — G8417 CALC BMI ABV UP PARAM F/U: HCPCS | Performed by: NURSE PRACTITIONER

## 2024-07-08 PROCEDURE — G8399 PT W/DXA RESULTS DOCUMENT: HCPCS | Performed by: NURSE PRACTITIONER

## 2024-07-08 PROCEDURE — 93000 ELECTROCARDIOGRAM COMPLETE: CPT | Performed by: NURSE PRACTITIONER

## 2024-07-08 PROCEDURE — 3017F COLORECTAL CA SCREEN DOC REV: CPT | Performed by: NURSE PRACTITIONER

## 2024-07-08 PROCEDURE — 99214 OFFICE O/P EST MOD 30 MIN: CPT | Performed by: NURSE PRACTITIONER

## 2024-07-08 RX ORDER — MONTELUKAST SODIUM 10 MG/1
10 TABLET ORAL NIGHTLY
Qty: 30 TABLET | Refills: 8 | Status: SHIPPED | OUTPATIENT
Start: 2024-07-08

## 2024-07-08 RX ORDER — TORSEMIDE 20 MG/1
20 TABLET ORAL DAILY
Qty: 30 TABLET | Refills: 3 | Status: SHIPPED | OUTPATIENT
Start: 2024-07-08

## 2024-07-08 RX ORDER — ALLOPURINOL 100 MG/1
100 TABLET ORAL DAILY
Qty: 30 TABLET | Refills: 1 | Status: SHIPPED | OUTPATIENT
Start: 2024-07-08

## 2024-07-08 RX ORDER — ACETAMINOPHEN 160 MG
1 TABLET,DISINTEGRATING ORAL DAILY
Qty: 30 CAPSULE | Refills: 1 | Status: SHIPPED | OUTPATIENT
Start: 2024-07-08 | End: 2024-07-11 | Stop reason: SDUPTHER

## 2024-07-08 RX ORDER — ISOSORBIDE MONONITRATE 30 MG/1
30 TABLET, EXTENDED RELEASE ORAL DAILY
Qty: 30 TABLET | Refills: 1 | Status: CANCELLED | OUTPATIENT
Start: 2024-07-08

## 2024-07-08 RX ORDER — TIZANIDINE 2 MG/1
2 TABLET ORAL NIGHTLY PRN
Qty: 30 TABLET | Refills: 3 | Status: SHIPPED | OUTPATIENT
Start: 2024-07-08

## 2024-07-08 RX ORDER — ISOSORBIDE MONONITRATE 30 MG/1
30 TABLET, EXTENDED RELEASE ORAL DAILY
Qty: 30 TABLET | Refills: 1 | Status: SHIPPED | OUTPATIENT
Start: 2024-07-08

## 2024-07-08 RX ORDER — SPIRONOLACTONE 25 MG/1
25 TABLET ORAL DAILY
Qty: 30 TABLET | Refills: 0 | Status: SHIPPED | OUTPATIENT
Start: 2024-07-08 | End: 2024-08-07

## 2024-07-08 NOTE — PATIENT INSTRUCTIONS
New instructions for today:  Eliquis can increase your risk of bleeding.  If you notice blood in urine or stool, bleeding gums, excessive bruising or cough productive of bloody sputum, notify the office.  Information on this blood thinner has been included in your after visit summary.    Patient Instructions:  Continue current medications as prescribed.   Always keep a current medication list. Bring your medications to every office visit.   Continue to follow up with primary care provider for non cardiac medical problems.  Call the office with any problems, questions or concerns at [phone].  If you have been asked to keep a blood pressure log, do so for 2 weeks. Call the office to report readings to the triage nurse at [phone].  Follow up with cardiologist as scheduled.  The following educational material has been included in this after visit summary for your review: Life simple 7.  Heart health.     Life simple 7  1) Manage blood pressure - high blood pressure is a major risk factor for heart disease and stroke. Keeping blood pressure in health range reduces strain on your heart, arteries and kidneys.  Blood pressure goal is less than 130/80.   2) Control cholesterol - contributes to plaque, which can clog arteries and lead to heart disease and stroke. When you control your cholesterol you are giving your arteries their best chance to remain clear.  It is recommended that you get cholesterol lab work done once a year.  3) Reduce blood sugar - most of the food we eat is turning into glucose or blood sugar that our body uses for energy.  Over time, high levels of blood sugar can damage your heart, kidneys, eyes and nerves.  4) Get active - living an active life is one of the most rewarding gifts you can give yourself and those you love.  Simply put, daily physical activity increases your length and quality of life. Strive to exercise 15 minutes most days of the week.  5)  Eat better - A healthy diet is one

## 2024-07-08 NOTE — PROGRESS NOTES
ischemic changes or ectopy.    BP Readings from Last 3 Encounters:   07/08/24 128/78   07/02/24 (!) 141/63   05/01/24 130/76    Pulse Readings from Last 3 Encounters:   07/08/24 67   07/02/24 63   05/01/24 72        Wt Readings from Last 3 Encounters:   07/08/24 133.4 kg (294 lb)   05/01/24 131.5 kg (289 lb 12.8 oz)   01/25/24 135.5 kg (298 lb 12.8 oz)     Assessment/Plan:   Diagnosis Orders   1. PAF (paroxysmal atrial fibrillation) (HCC)  EKG 12 lead      2. Chronic diastolic congestive heart failure (HCC)        3. Essential hypertension        4. Chronic anticoagulation      ON Eliquis 5 mg BID      5. Mixed hyperlipidemia            [id number] Score for Atrial Fibrillation Stroke Risk   Risk   Factors  Component Value   C CHF Yes 1   H HTN Yes 1   A2 Age >= 75 No,  (73 y.o.) 0   D DM Yes 1   S2 Prior Stroke/TIA No 0   V Vascular Disease No 0   A Age 65-74 Yes,  (73 y.o.) 1   Sc Sex female 1    [id number]  Score  5   Score last updated 7/8/24 1:10 PM CDT    Click here for a link to the UpToDate guideline \"Atrial Fibrillation: Anticoagulation therapy to prevent embolization  Disclaimer: Risk Score calculation is dependent on accuracy of patient problem list and past encounter diagnosis.    PAF - no symptomology of recurrent AF.  EKG today shows NSR.  The patient continues on Eliquis with no bleeding issues.  Patient reports some falls weak arthritic knees - reporting \"I just kind of slide down.\"    Discussed concern with patient and daughter regarding fall risk and Eliquis.  Will continue to closely monitor risk versus benefit.      Chronic diastolic HF - lower leg edema reported as much better.    The patient is trying to limit sodium intake.  Edema also secondary to venous insufficiency.   The patient is now seeing vascular for this.    Advised leg elevation, compression stockings and low sodium diet.    HTN - normotensive on current regimen.  BP today 128/78.  Continue alexy.    Hyperlipidemia - monitored and

## 2024-07-09 ENCOUNTER — PATIENT MESSAGE (OUTPATIENT)
Dept: PRIMARY CARE CLINIC | Age: 74
End: 2024-07-09

## 2024-07-09 ENCOUNTER — TELEPHONE (OUTPATIENT)
Dept: VASCULAR SURGERY | Age: 74
End: 2024-07-09

## 2024-07-09 RX ORDER — CALCITRIOL 0.25 UG/1
0.25 CAPSULE, LIQUID FILLED ORAL DAILY
Qty: 30 CAPSULE | Refills: 3 | Status: SHIPPED | OUTPATIENT
Start: 2024-07-09

## 2024-07-09 RX ORDER — ATORVASTATIN CALCIUM 80 MG/1
80 TABLET, FILM COATED ORAL DAILY
Qty: 30 TABLET | Refills: 3 | Status: SHIPPED | OUTPATIENT
Start: 2024-07-09

## 2024-07-09 NOTE — TELEPHONE ENCOUNTER
From: Erlinda Chen  To: Marilu King  Sent: 7/9/2024 3:56 PM CDT  Subject: Med refill    Atorvastatin and calcitriol and vitamin d 1000 iv cap

## 2024-07-11 DIAGNOSIS — R52 PAIN: ICD-10-CM

## 2024-07-11 RX ORDER — TRAMADOL HYDROCHLORIDE 50 MG/1
50 TABLET ORAL 2 TIMES DAILY PRN
Qty: 60 TABLET | Refills: 0 | Status: SHIPPED | OUTPATIENT
Start: 2024-07-11 | End: 2024-08-10

## 2024-07-11 RX ORDER — ACETAMINOPHEN 160 MG
1 TABLET,DISINTEGRATING ORAL DAILY
Qty: 30 CAPSULE | Refills: 1 | Status: SHIPPED | OUTPATIENT
Start: 2024-07-11

## 2024-07-11 NOTE — TELEPHONE ENCOUNTER
Provider needs to review PDMP    PDMP Monitoring:    Last PDMP Paul as Reviewed ([place]):  Review User Review Instant Review Result   TAMARA NEW 4/4/2024 12:19 PM Reviewed PDMP [1]     Urine Drug Screenings (1 yr)    No resulted procedures found.       Medication Contract and Consent for Opioid Use Documents Filed       Patient Documents       Type of Document Status Date Received Received By Description    Medication Contract Received 5/24/2019 10:27 AM ROSALBA EASTON neuro    Medication Contract Received 12/17/2019 10:48 AM MEGHAN VALDIVIA Medication Agreement /Neuro 11/25/2019

## 2024-07-15 ENCOUNTER — TELEPHONE (OUTPATIENT)
Dept: VASCULAR SURGERY | Age: 74
End: 2024-07-15

## 2024-07-15 NOTE — TELEPHONE ENCOUNTER
Contacted [facility] to see if they will refax the op note from Dr. Bay from 2022.  She stated she would send it over.         ----- Message from INGRID Burger sent at 7/15/2024  7:48 AM CDT -----  We still need op note from dr. Bay at [facility] 2022     Yes

## 2024-07-17 ENCOUNTER — TELEPHONE (OUTPATIENT)
Dept: VASCULAR SURGERY | Age: 74
End: 2024-07-17

## 2024-07-24 DIAGNOSIS — G25.81 RESTLESS LEGS SYNDROME: ICD-10-CM

## 2024-07-25 RX ORDER — GABAPENTIN 600 MG/1
TABLET ORAL
Qty: 90 TABLET | Refills: 5 | OUTPATIENT
Start: 2024-07-25 | End: 2024-10-29

## 2024-07-25 RX ORDER — GABAPENTIN 600 MG/1
TABLET ORAL
Qty: 90 TABLET | Refills: 1 | Status: SHIPPED | OUTPATIENT
Start: 2024-07-25 | End: 2024-07-26 | Stop reason: SDUPTHER

## 2024-07-25 NOTE — TELEPHONE ENCOUNTER
PDMP Monitoring:    Last PDMP Paul as Reviewed ([place]):  Review User Review Instant Review Result   TAMARA NEW 7/11/2024 11:51 AM Reviewed PDMP [1]     Urine Drug Screenings (1 yr)    No resulted procedures found.       Medication Contract and Consent for Opioid Use Documents Filed       Patient Documents       Type of Document Status Date Received Received By Description    Medication Contract Received 5/24/2019 10:27 AM ROSALBA EASTON neuro    Medication Contract Received 12/17/2019 10:48 AM MEGHAN VALDIVIA Medication Agreement /Neuro 11/25/2019

## 2024-07-26 ENCOUNTER — PATIENT MESSAGE (OUTPATIENT)
Dept: PRIMARY CARE CLINIC | Age: 74
End: 2024-07-26

## 2024-07-26 DIAGNOSIS — G25.81 RESTLESS LEGS SYNDROME: ICD-10-CM

## 2024-07-26 RX ORDER — GABAPENTIN 600 MG/1
TABLET ORAL
Qty: 90 TABLET | Refills: 1 | Status: SHIPPED | OUTPATIENT
Start: 2024-07-26 | End: 2024-08-25

## 2024-07-26 NOTE — TELEPHONE ENCOUNTER
From: Erlinda Scott  To: Marilu King  Sent: 7/26/2024 9:37 AM CDT  Subject: Med refill    Erlinda scott needs a refill on her gabapentin and please

## 2024-07-26 NOTE — TELEPHONE ENCOUNTER
From My Chart:  Erlinda scott needs a refill on her gabapentin and please           PDMP Monitoring:    Last PDMP Paul as Reviewed ([place]):  Review User Review Instant Review Result   TAMARA NEW 7/11/2024 11:51 AM Reviewed PDMP [1]     Urine Drug Screenings (1 yr)    No resulted procedures found.       Medication Contract and Consent for Opioid Use Documents Filed       Patient Documents       Type of Document Status Date Received Received By Description    Medication Contract Received 5/24/2019 10:27 AM ROSALBA EASTON neuro    Medication Contract Received 12/17/2019 10:48 AM MEGHAN VALDIVIA Medication Agreement /Neuro 11/25/2019

## 2024-07-30 ENCOUNTER — HOSPITAL ENCOUNTER (OUTPATIENT)
Dept: VASCULAR LAB | Age: 74
Discharge: HOME | End: 2024-08-01
Payer: MEDICARE

## 2024-07-30 DIAGNOSIS — I70.213 ATHEROSCLER OF NATIVE ARTERY OF BOTH LEGS WITH INTERMIT CLAUDICATION (HCC): ICD-10-CM

## 2024-07-30 PROCEDURE — 93923 UPR/LXTR ART STDY 3+ LVLS: CPT

## 2024-07-30 RX ORDER — ALLOPURINOL 100 MG/1
100 TABLET ORAL DAILY
Qty: 90 TABLET | Refills: 1 | Status: SHIPPED | OUTPATIENT
Start: 2024-07-30

## 2024-07-31 LAB
VAS LEFT ABI: 1.16
VAS LEFT ANKLE BP: 155 MMHG
VAS LEFT ARM BP: 134 MMHG
VAS LEFT CALF PRESSURE: 163 MMHG
VAS LEFT DORSALIS PEDIS BP: 152 MMHG
VAS LEFT HIGH THIGH PRESSURE: 255 MMHG
VAS LEFT LOW THIGH PRESSURE: 255 MMHG
VAS LEFT PTA BP: 155 MMHG
VAS LEFT TBI: 0.48
VAS LEFT TOE PRESSURE: 64 MMHG
VAS RIGHT ABI: 1.37
VAS RIGHT ANKLE BP: 183 MMHG
VAS RIGHT CALF PRESSURE: 220 MMHG
VAS RIGHT DORSALIS PEDIS BP: 152 MMHG
VAS RIGHT HIGH THIGH PRESSURE: 255 MMHG
VAS RIGHT LOW THIGH PRESSURE: 224 MMHG
VAS RIGHT PTA BP: 183 MMHG
VAS RIGHT TBI: 0.96
VAS RIGHT TOE PRESSURE: 128 MMHG

## 2024-07-31 PROCEDURE — 93923 UPR/LXTR ART STDY 3+ LVLS: CPT | Performed by: SURGERY

## 2024-07-31 RX ORDER — TORSEMIDE 20 MG/1
TABLET ORAL
Qty: 135 TABLET | Refills: 1 | Status: SHIPPED | OUTPATIENT
Start: 2024-07-31

## 2024-08-01 ENCOUNTER — OFFICE VISIT (OUTPATIENT)
Dept: PRIMARY CARE CLINIC | Age: 74
End: 2024-08-01
Payer: MEDICARE

## 2024-08-01 VITALS
BODY MASS INDEX: 52.49 KG/M2 | RESPIRATION RATE: 16 BRPM | HEART RATE: 67 BPM | WEIGHT: 287 LBS | DIASTOLIC BLOOD PRESSURE: 64 MMHG | TEMPERATURE: 97.6 F | OXYGEN SATURATION: 93 % | SYSTOLIC BLOOD PRESSURE: 128 MMHG

## 2024-08-01 DIAGNOSIS — Z71.89 ACP (ADVANCE CARE PLANNING): ICD-10-CM

## 2024-08-01 DIAGNOSIS — M54.50 CHRONIC LOW BACK PAIN WITHOUT SCIATICA, UNSPECIFIED BACK PAIN LATERALITY: ICD-10-CM

## 2024-08-01 DIAGNOSIS — R09.02 HYPOXIA: ICD-10-CM

## 2024-08-01 DIAGNOSIS — Z79.4 TYPE 2 DIABETES MELLITUS WITH DIABETIC POLYNEUROPATHY, WITH LONG-TERM CURRENT USE OF INSULIN (HCC): ICD-10-CM

## 2024-08-01 DIAGNOSIS — E11.42 TYPE 2 DIABETES MELLITUS WITH DIABETIC POLYNEUROPATHY, WITH LONG-TERM CURRENT USE OF INSULIN (HCC): ICD-10-CM

## 2024-08-01 DIAGNOSIS — E11.8 TYPE 2 DIABETES MELLITUS WITH COMPLICATION (HCC): Primary | ICD-10-CM

## 2024-08-01 DIAGNOSIS — G89.29 CHRONIC LOW BACK PAIN WITHOUT SCIATICA, UNSPECIFIED BACK PAIN LATERALITY: ICD-10-CM

## 2024-08-01 DIAGNOSIS — Z12.31 SCREENING MAMMOGRAM FOR BREAST CANCER: ICD-10-CM

## 2024-08-01 DIAGNOSIS — R52 PAIN: ICD-10-CM

## 2024-08-01 LAB
ALBUMIN SERPL-MCNC: 3.8 G/DL (ref 3.5–5.2)
ALP SERPL-CCNC: 111 U/L (ref 35–104)
ALT SERPL-CCNC: <5 U/L (ref 5–33)
ANION GAP SERPL CALCULATED.3IONS-SCNC: 13 MMOL/L (ref 7–19)
AST SERPL-CCNC: 8 U/L (ref 5–32)
BILIRUB SERPL-MCNC: 0.2 MG/DL (ref 0.2–1.2)
BUN SERPL-MCNC: 33 MG/DL (ref 8–23)
CALCIUM SERPL-MCNC: 8.8 MG/DL (ref 8.8–10.2)
CHLORIDE SERPL-SCNC: 101 MMOL/L (ref 98–111)
CO2 SERPL-SCNC: 25 MMOL/L (ref 22–29)
CREAT SERPL-MCNC: 1.7 MG/DL (ref 0.5–0.9)
GLUCOSE SERPL-MCNC: 153 MG/DL (ref 74–109)
HBA1C MFR BLD: 7.6 % (ref 4–6)
POTASSIUM SERPL-SCNC: 4.7 MMOL/L (ref 3.5–5)
PROT SERPL-MCNC: 6.9 G/DL (ref 6.6–8.7)
SODIUM SERPL-SCNC: 139 MMOL/L (ref 136–145)

## 2024-08-01 PROCEDURE — 99214 OFFICE O/P EST MOD 30 MIN: CPT | Performed by: NURSE PRACTITIONER

## 2024-08-01 PROCEDURE — G8417 CALC BMI ABV UP PARAM F/U: HCPCS | Performed by: NURSE PRACTITIONER

## 2024-08-01 PROCEDURE — 2022F DILAT RTA XM EVC RTNOPTHY: CPT | Performed by: NURSE PRACTITIONER

## 2024-08-01 PROCEDURE — 1090F PRES/ABSN URINE INCON ASSESS: CPT | Performed by: NURSE PRACTITIONER

## 2024-08-01 PROCEDURE — G8427 DOCREV CUR MEDS BY ELIG CLIN: HCPCS | Performed by: NURSE PRACTITIONER

## 2024-08-01 PROCEDURE — G8399 PT W/DXA RESULTS DOCUMENT: HCPCS | Performed by: NURSE PRACTITIONER

## 2024-08-01 PROCEDURE — 3051F HG A1C>EQUAL 7.0%<8.0%: CPT | Performed by: NURSE PRACTITIONER

## 2024-08-01 PROCEDURE — 1124F ACP DISCUSS-NO DSCNMKR DOCD: CPT | Performed by: NURSE PRACTITIONER

## 2024-08-01 PROCEDURE — 1036F TOBACCO NON-USER: CPT | Performed by: NURSE PRACTITIONER

## 2024-08-01 PROCEDURE — 3017F COLORECTAL CA SCREEN DOC REV: CPT | Performed by: NURSE PRACTITIONER

## 2024-08-01 RX ORDER — ACYCLOVIR 400 MG/1
TABLET ORAL
Qty: 3 EACH | Refills: 5 | Status: SHIPPED | OUTPATIENT
Start: 2024-08-01

## 2024-08-01 RX ORDER — TRAMADOL HYDROCHLORIDE 50 MG/1
50 TABLET ORAL 2 TIMES DAILY PRN
Qty: 60 TABLET | Refills: 0 | Status: SHIPPED | OUTPATIENT
Start: 2024-08-01 | End: 2024-08-31

## 2024-08-01 SDOH — ECONOMIC STABILITY: FOOD INSECURITY: WITHIN THE PAST 12 MONTHS, YOU WORRIED THAT YOUR FOOD WOULD RUN OUT BEFORE YOU GOT MONEY TO BUY MORE.: NEVER TRUE

## 2024-08-01 SDOH — ECONOMIC STABILITY: HOUSING INSECURITY
IN THE LAST 12 MONTHS, WAS THERE A TIME WHEN YOU DID NOT HAVE A STEADY PLACE TO SLEEP OR SLEPT IN A SHELTER (INCLUDING NOW)?: NO

## 2024-08-01 SDOH — ECONOMIC STABILITY: INCOME INSECURITY: HOW HARD IS IT FOR YOU TO PAY FOR THE VERY BASICS LIKE FOOD, HOUSING, MEDICAL CARE, AND HEATING?: NOT VERY HARD

## 2024-08-01 SDOH — ECONOMIC STABILITY: FOOD INSECURITY: WITHIN THE PAST 12 MONTHS, THE FOOD YOU BOUGHT JUST DIDN'T LAST AND YOU DIDN'T HAVE MONEY TO GET MORE.: NEVER TRUE

## 2024-08-01 NOTE — PATIENT INSTRUCTIONS
Continue with cardiology, vascular , and kidney    Advance Care Planning     Advance Care Planning opens a door to talk about and write down your wishes before a sudden accident or illness.  Make your goals, values, and preferences known.     This puts you in the ’s seat and helps others know what matters most to you so they won’t have to guess.      Where can you learn more?    Go to [email or web address]   to learn how to:    Name someone you trust to make healthcare decisions for you, only if you can’t. (Healthcare Power of )    Document your wishes for care if you were seriously ill and not expected to recover or are approaching end of life. (Advance Directive or Living Will)    The same page can be found using the QR code below.           Advance Care Planning     Advance Care Planning opens a door to talk about and write down your wishes before a sudden accident or illness.  Make your goals, values, and preferences known.     This puts you in the ’s seat and helps others know what matters most to you so they won’t have to guess.      Where can you learn more?    Go to [email or web address]   to learn how to:    Name someone you trust to make healthcare decisions for you, only if you can’t. (Healthcare Power of )    Document your wishes for care if you were seriously ill and not expected to recover or are approaching end of life. (Advance Directive or Living Will)    The same page can be found using the QR code below.

## 2024-08-01 NOTE — PROGRESS NOTES
Patient takes some kinfd of arthritis pill but does not know what it is
   Liver Disease Neg Hx     Rectal Cancer Neg Hx     Stomach Cancer Neg Hx        Social History     Tobacco Use    Smoking status: Never    Smokeless tobacco: Never   Substance Use Topics    Alcohol use: No      Current Outpatient Medications on File Prior to Visit   Medication Sig Dispense Refill    torsemide (DEMADEX) 20 MG tablet TAKE 1 TO 2 TABLETS BY MOUTH EVERY MORNING TAKE 2 ON EVEN NUMBERED DAYS AND 1 ON ODD NUMBERED DAYS 135 tablet 1    allopurinol (ZYLOPRIM) 100 MG tablet TAKE 1 TABLET BY MOUTH EVERY DAY 90 tablet 1    gabapentin (NEURONTIN) 600 MG tablet TAKE 1 TABLET BY MOUTH 3 TIMES A DAY MAY CAUSE DROWSINESS! 90 tablet 1    Cholecalciferol (VITAMIN D3) 50 MCG (2000 UT) CAPS Take 1 capsule by mouth daily 30 capsule 1    atorvastatin (LIPITOR) 80 MG tablet Take 1 tablet by mouth daily 30 tablet 3    calcitRIOL (ROCALTROL) 0.25 MCG capsule Take 1 capsule by mouth daily 30 capsule 3    isosorbide mononitrate (IMDUR) 30 MG extended release tablet Take 1 tablet by mouth daily 30 tablet 1    montelukast (SINGULAIR) 10 MG tablet Take 1 tablet by mouth nightly 30 tablet 8    apixaban (ELIQUIS) 5 MG TABS tablet Take 1 tablet by mouth 2 times daily 60 tablet 3    tiZANidine (ZANAFLEX) 2 MG tablet Take 1 tablet by mouth nightly as needed (muscle spasms) 30 tablet 3    Misc. Devices (COMMODE BEDSIDE/BACK) [facility] Bedside commode with a drop arm 1 each 0    omeprazole (PRILOSEC) 40 MG delayed release capsule Take 1 capsule by mouth daily 30 capsule 8    citalopram (CELEXA) 20 MG tablet Take 1 tablet by mouth daily 90 tablet 2    metoprolol tartrate (LOPRESSOR) 25 MG tablet TAKE ONE TABLET BY MOUTH TWICE DAILY WITH FOOD 60 tablet 3    linaclotide (LINZESS) 290 MCG CAPS capsule Take 1 capsule by mouth every morning (before breakfast) 30 capsule 5    rOPINIRole (REQUIP) 4 MG tablet 2 PO every am, one at noon, and 2 at bedtime 150 tablet 11    fluconazole (DIFLUCAN) 150 MG tablet Take 1 tablet by mouth every 14 days 2

## 2024-08-05 RX ORDER — SPIRONOLACTONE 25 MG/1
25 TABLET ORAL DAILY
Qty: 30 TABLET | Refills: 0 | Status: SHIPPED | OUTPATIENT
Start: 2024-08-05 | End: 2024-09-04

## 2024-08-06 ASSESSMENT — ENCOUNTER SYMPTOMS
RESPIRATORY NEGATIVE: 1
BACK PAIN: 1
GASTROINTESTINAL NEGATIVE: 1

## 2024-08-19 RX ORDER — ATORVASTATIN CALCIUM 80 MG/1
80 TABLET, FILM COATED ORAL DAILY
Qty: 90 TABLET | Refills: 1 | Status: SHIPPED | OUTPATIENT
Start: 2024-08-19

## 2024-08-19 RX ORDER — TIZANIDINE 2 MG/1
TABLET ORAL
Qty: 90 TABLET | Refills: 1 | Status: SHIPPED | OUTPATIENT
Start: 2024-08-19

## 2024-08-19 RX ORDER — CALCITRIOL 0.25 UG/1
CAPSULE, LIQUID FILLED ORAL DAILY
Qty: 90 CAPSULE | Refills: 1 | Status: SHIPPED | OUTPATIENT
Start: 2024-08-19

## 2024-08-20 ENCOUNTER — PREP FOR PROCEDURE (OUTPATIENT)
Dept: VASCULAR SURGERY | Age: 74
End: 2024-08-20

## 2024-08-20 ENCOUNTER — TELEPHONE (OUTPATIENT)
Dept: VASCULAR SURGERY | Age: 74
End: 2024-08-20

## 2024-08-20 DIAGNOSIS — I70.213 ATHEROSCLEROSIS OF NATIVE ARTERY OF BOTH LOWER EXTREMITIES WITH INTERMITTENT CLAUDICATION (HCC): Primary | ICD-10-CM

## 2024-08-20 DIAGNOSIS — I87.2 VENOUS INSUFFICIENCY: ICD-10-CM

## 2024-08-20 DIAGNOSIS — Z01.818 PRE-OP TESTING: Primary | ICD-10-CM

## 2024-08-20 DIAGNOSIS — G20.B2 PARKINSON'S DISEASE WITH DYSKINESIA AND FLUCTUATING MANIFESTATIONS: ICD-10-CM

## 2024-08-20 DIAGNOSIS — M79.604 LEG PAIN, RIGHT: ICD-10-CM

## 2024-08-20 DIAGNOSIS — M79.89 LEG SWELLING: ICD-10-CM

## 2024-08-20 DIAGNOSIS — I70.213 ATHEROSCLEROSIS OF NATIVE ARTERY OF BOTH LOWER EXTREMITIES WITH INTERMITTENT CLAUDICATION (HCC): ICD-10-CM

## 2024-08-20 RX ORDER — AMANTADINE HYDROCHLORIDE 100 MG/1
100 CAPSULE, GELATIN COATED ORAL NIGHTLY
Qty: 30 CAPSULE | Refills: 6 | Status: SHIPPED | OUTPATIENT
Start: 2024-08-20

## 2024-08-20 NOTE — TELEPHONE ENCOUNTER
Called and left a message for the patient's daughter to return my phone call to discuss surgery instructions.           Erlinda Chen    Surgery Directions    Report to the outpatient registration at [facility] on Wednesday,        09/04/2024.  The surgery department will contact you after 2:00 PM the day        before to let you know what time to arrive @ the hospital.   Nothing to eat or drink after midnight the night before the procedure.  Please take all medications as normally scheduled to take unless listed below with a sip of water.  Do not take Eliquis the day before and the morning of the surgery.  Only take 1/2 dose of Insulin the night before surgery and none the morning of surgery.  Do not take Aldactone or Demedex the morning of surgery.   If you have sleep apnea and require C-PAP, please bring this with you to the hospital.  Bring a list of all of your allergies and medications with you to the hospital.  Please let our nurse know if you have had an allergy to iodine, shellfish, or x-ray dye.  Let the nurse know if you take any of the following:  Over the counter herbal supplements  Diclofenec, indomethacin, ketoprofen, Caridopa/levadopa, naproxen, sulindac, piroxicam, glucosamine, Chondrotin, cocchine, or methotrexate.  Plan to stay at the hospital for 4 - 6 hours before being released  by the physician. You will need someone to drive you home after the procedure.  Please register @ the outpatient area of the [facility] on 08/26/2024 @ 9:00 AM.  You will not need to be fasting prior to this appointment.  Following this appointment we have you scheduled to come up to see Oksana at 10:00 AM for a chart review.    11. Other Directions: For any questions or concerns please contact our office @        [phone] and ask to speak to Yumiko.   12.  You will need a  to take you home.  13.  Follow up appt: 09/5/2024 @ [facility], [address] 11:00 AM for vascular

## 2024-08-20 NOTE — TELEPHONE ENCOUNTER
Erlinda's daughter Shea called to  change   an appointment for Office Visit to vv. Would like to do ov and pre admission over the phone. [facility] was unable to accommodate. Please be advised that the best time to call Anytime. Please call ellis at [phone].     Thank you.

## 2024-08-22 ENCOUNTER — TELEPHONE (OUTPATIENT)
Dept: CARDIOLOGY CLINIC | Age: 74
End: 2024-08-22

## 2024-08-22 NOTE — TELEPHONE ENCOUNTER
Date: TBD    Cardiologist: only NP since [facility]    Procedure: Cysto/Urethral Dilation/VCUG/Vaginoscopy/Fluroscopy    Surgeon: Brad Benitez    Last Office Visit: 7/8/24  Reason for office visit and medical concerns addressed at this office visit: afib, copd, dm, htn, hyperlipidemia, ckd, chf,     Testing Performed and Date of Service:  12/17/19 Echo Normal left ventricular size and function.   Moderate concentric left ventricular hypertrophy.   Left ventricular ejection fraction is estimated at 55-60%.   E/A flow reversal noted. Suggestive of diastolic dysfunction.    RCRI = 6.6  METs 4    Current Medications: insulin, tramadol, torsemide, tizanidine, spironolactone, requip, metoprolol, linzess, imdur, gabapentin, prolia, celexa, sinemet, atorvastatin, eliquis, allopurinol, amantadine,     Is the patient currently taking an anticoagulant? If so, what is the diagnosis the patient has been given to warrant the need for the anticoagulant? none    Additional Notes: requesting cardiac clearance

## 2024-08-23 ENCOUNTER — TELEPHONE (OUTPATIENT)
Dept: PRIMARY CARE CLINIC | Age: 74
End: 2024-08-23

## 2024-08-23 DIAGNOSIS — R30.0 DYSURIA: ICD-10-CM

## 2024-08-23 RX ORDER — CIPROFLOXACIN 250 MG/1
250 TABLET, FILM COATED ORAL 2 TIMES DAILY
Qty: 14 TABLET | Refills: 0 | Status: SHIPPED | OUTPATIENT
Start: 2024-08-23 | End: 2024-08-30

## 2024-08-23 NOTE — TELEPHONE ENCOUNTER
Pts daughter called and stated pt is being delusional due to possible UTI, order sent to [facility] for a UA did show moderate leukocytes and it is being cultured    Requesting for pt to have an antibiotic to be sent to [facility] please.

## 2024-08-26 ENCOUNTER — HOSPITAL ENCOUNTER (OUTPATIENT)
Dept: GENERAL RADIOLOGY | Age: 74
Discharge: HOME | End: 2024-08-26
Payer: MEDICARE

## 2024-08-26 ENCOUNTER — HOSPITAL ENCOUNTER (OUTPATIENT)
Dept: PREADMISSION TESTING | Age: 74
Discharge: HOME | End: 2024-08-30
Payer: MEDICARE

## 2024-08-26 ENCOUNTER — OFFICE VISIT (OUTPATIENT)
Dept: VASCULAR SURGERY | Age: 74
End: 2024-08-26
Payer: MEDICARE

## 2024-08-26 ENCOUNTER — TELEPHONE (OUTPATIENT)
Dept: PRIMARY CARE CLINIC | Age: 74
End: 2024-08-26

## 2024-08-26 VITALS
TEMPERATURE: 97.8 F | SYSTOLIC BLOOD PRESSURE: 128 MMHG | HEART RATE: 63 BPM | OXYGEN SATURATION: 93 % | DIASTOLIC BLOOD PRESSURE: 64 MMHG

## 2024-08-26 VITALS — WEIGHT: 287 LBS | BODY MASS INDEX: 52.81 KG/M2 | HEIGHT: 62 IN

## 2024-08-26 DIAGNOSIS — L97.909 VENOUS ULCER (HCC): ICD-10-CM

## 2024-08-26 DIAGNOSIS — I83.009 VENOUS ULCER (HCC): ICD-10-CM

## 2024-08-26 DIAGNOSIS — M79.89 LEG SWELLING: Primary | ICD-10-CM

## 2024-08-26 DIAGNOSIS — M79.605 LEG PAIN, LEFT: ICD-10-CM

## 2024-08-26 DIAGNOSIS — Z01.818 PRE-OP TESTING: ICD-10-CM

## 2024-08-26 DIAGNOSIS — I87.2 VENOUS INSUFFICIENCY: ICD-10-CM

## 2024-08-26 DIAGNOSIS — N30.01 ACUTE CYSTITIS WITH HEMATURIA: Primary | ICD-10-CM

## 2024-08-26 LAB
ANION GAP SERPL CALCULATED.3IONS-SCNC: 14 MMOL/L (ref 7–19)
APTT PPP: 45.8 SEC (ref 26–36.2)
BUN SERPL-MCNC: 31 MG/DL (ref 8–23)
CALCIUM SERPL-MCNC: 9.2 MG/DL (ref 8.8–10.2)
CHLORIDE SERPL-SCNC: 103 MMOL/L (ref 98–111)
CO2 SERPL-SCNC: 24 MMOL/L (ref 22–29)
CREAT SERPL-MCNC: 1.5 MG/DL (ref 0.5–0.9)
EKG P AXIS: 5 DEGREES
EKG P-R INTERVAL: 216 MS
EKG Q-T INTERVAL: 408 MS
EKG QRS DURATION: 100 MS
EKG QTC CALCULATION (BAZETT): 413 MS
EKG T AXIS: 21 DEGREES
ERYTHROCYTE [DISTWIDTH] IN BLOOD BY AUTOMATED COUNT: 17.3 % (ref 11.5–14.5)
GLUCOSE SERPL-MCNC: 84 MG/DL (ref 70–99)
HCT VFR BLD AUTO: 33.9 % (ref 37–47)
HGB BLD-MCNC: 10 G/DL (ref 12–16)
INR PPP: 1.61 (ref 0.88–1.18)
MCH RBC QN AUTO: 23.9 PG (ref 27–31)
MCHC RBC AUTO-ENTMCNC: 29.5 G/DL (ref 33–37)
MCV RBC AUTO: 81.1 FL (ref 81–99)
MRSA DNA SPEC QL NAA+PROBE: NOT DETECTED
PLATELET # BLD AUTO: 281 K/UL (ref 130–400)
PMV BLD AUTO: 11 FL (ref 9.4–12.3)
POTASSIUM SERPL-SCNC: 4.8 MMOL/L (ref 3.5–5)
PROTHROMBIN TIME: 18.7 SEC (ref 12–14.6)
RBC # BLD AUTO: 4.18 M/UL (ref 4.2–5.4)
SODIUM SERPL-SCNC: 141 MMOL/L (ref 136–145)
WBC # BLD AUTO: 11.9 K/UL (ref 4.8–10.8)

## 2024-08-26 PROCEDURE — 3017F COLORECTAL CA SCREEN DOC REV: CPT | Performed by: NURSE PRACTITIONER

## 2024-08-26 PROCEDURE — 80048 BASIC METABOLIC PNL TOTAL CA: CPT

## 2024-08-26 PROCEDURE — 87641 MR-STAPH DNA AMP PROBE: CPT

## 2024-08-26 PROCEDURE — 99214 OFFICE O/P EST MOD 30 MIN: CPT | Performed by: NURSE PRACTITIONER

## 2024-08-26 PROCEDURE — 1124F ACP DISCUSS-NO DSCNMKR DOCD: CPT | Performed by: NURSE PRACTITIONER

## 2024-08-26 PROCEDURE — G8399 PT W/DXA RESULTS DOCUMENT: HCPCS | Performed by: NURSE PRACTITIONER

## 2024-08-26 PROCEDURE — G8417 CALC BMI ABV UP PARAM F/U: HCPCS | Performed by: NURSE PRACTITIONER

## 2024-08-26 PROCEDURE — G8427 DOCREV CUR MEDS BY ELIG CLIN: HCPCS | Performed by: NURSE PRACTITIONER

## 2024-08-26 PROCEDURE — 1090F PRES/ABSN URINE INCON ASSESS: CPT | Performed by: NURSE PRACTITIONER

## 2024-08-26 PROCEDURE — 1036F TOBACCO NON-USER: CPT | Performed by: NURSE PRACTITIONER

## 2024-08-26 PROCEDURE — 85610 PROTHROMBIN TIME: CPT

## 2024-08-26 PROCEDURE — 93010 ELECTROCARDIOGRAM REPORT: CPT | Performed by: INTERNAL MEDICINE

## 2024-08-26 PROCEDURE — 85730 THROMBOPLASTIN TIME PARTIAL: CPT

## 2024-08-26 PROCEDURE — 93005 ELECTROCARDIOGRAM TRACING: CPT

## 2024-08-26 PROCEDURE — 71046 X-RAY EXAM CHEST 2 VIEWS: CPT

## 2024-08-26 PROCEDURE — 85027 COMPLETE CBC AUTOMATED: CPT

## 2024-08-26 NOTE — PROGRESS NOTES
Note was sent to the office that patient is still talking out of her head despite taking Cipro for UTI.  Review of chart indicated that urinalysis was done at [facility] but there is no culture or sensitivity to indicate the organism or sensitivity for antibiotic therapy.  Orders placed for a new culture with sensitivity to determine if Cipro is the appropriate therapy. [facility] to bring out the sample collection kit and obtain Urine culture but was disconnected when call could not be completed and was unable to leave a message.   Discussed with the patient's daughter that she might need to go to the ER for further evaluation and possible IV antibiotics since she continues to talk out of her head.  I was concerned for sepsis.  Daughter reports her blood pressure and heart rate have been good.  She is not having any fever.  She does report that with previous UTI after 2 days of Cipro the patient turned around and was doing well but has not responded this time in a similar manner.

## 2024-08-26 NOTE — DISCHARGE INSTRUCTIONS
The day before surgery you will receive a phone call from the surgery nurse to let you know what time to arrive on the day of surgery. This call will usually be between 2-4 PM. If you do not receive a phone call by 4 PM the day before your surgery please call [phone] and let them know you have not received an arrival time. If your surgery is on Monday, your call will be on the Friday before your Monday surgery. Please check your voicemail as they may leave a message with that information.    Chlorhexidine Gluconate 4% Solution    Patient should shower with this soap a minimum of 3 consecutive showers (2 nights before surgery, the night before surgery and the morning of surgery) washing from the neck down (avoiding contact with genitalia).      DO NOT WASH YOUR HAIR OR FACE WITH THIS SOAP.  When washing with this soap, apply enough to suds up the body thoroughly, turn the water away from your body and allow the soap suds to remain on the body for 2 full minutes, then rinse body completely.      After using this soap on the body, please do not apply powders or lotions to your body.  After the shower the night before surgery, please dry off with a new towel, sleep in new freshly laundered pj's, and change your bed linen before going to sleep.      IF YOU HAVE A PET IN YOUR HOME, please do not allow your pet to sleep in the bed with you after you have showered with your surgery prep soap.     Please remember that it is not recommended to allow your pet to sleep with you post op, until your incision has healed.  This can increase your risk of post op infection.      MEDICATION INSTRUCTIONS PRIOR TO YOUR SURGERY    Night before surgery:    ________Take half dose of your evening insulin      The morning of surgery:    You can take all your usual prescribed medications with a small sip of water.      DO NOT TAKE ANY DIABETIC MEDICATIONS the morning of your surgery.    DO NOT TAKE ANY SUPPLEMENTS or over the counter  medications the morning of  surgery.    The morning of surgery, you may take all your prescribed medications with a sip of water unless instructed not to take.  Any exceptions to this would be listed below:  Always follow your surgeon or providers instructions on taking blood thinners (Eliquis).    Take your prescribed betablocker  metoprolol       with a sip of water the morning of surgery.      PREOPERATIVE GUIDELINES WHEN RECEIVING ANESTHESIA    Do not eat or drink anything after midnight, the night before your surgery. No gum or candy the morning of surgery.  This is extremely important for your safety.    Take a bath (or shower) the night before your surgery and you may brush your teeth the morning of your surgery.    You will be scheduled to arrive at the hospital 2 hours before your surgery, or follow your surgeon's instructions.    Dress comfortably.  Wear loose clothing that will be easy to remove and comfortable for your trip home.    You may wear eyeglasses but bring your cases with you as they must be remove before your surgery. If you wear contacts they will have to be removed before your surgery.    Hearing aids and dentures will need to be removed before your surgery. If you wear dentures, do not glue them in the morning of surgery.     Do not wear any jewelry, including body jewelry.  All jewelry will need to be removed prior to your surgery. This includes wedding rings. Any metal touching your body can cause a burn or may have to be cut off due to swelling or injury.    Do not wear fingernail polish or make-up.    It is best not to bring any valuables with you.    If you are to stay in the hospital overnight, bring your robe, slippers and personal toiletries that you may need.    POSTOPERATIVE GUIDELINES AFTER RECEIVING ANESTHESIA    If you are to go home after your surgery, you will need a responsible adult to drive you home.     You will not be able to take public transportation after your

## 2024-08-26 NOTE — PROGRESS NOTES
Erlinda Chen (:  1950) is a 73 y.o. female,established patient here for evaluation of the following chief complaint(s):  Follow-up (Follow up chart review prior to surgery. )            SUBJECTIVE/OBJECTIVE:  She has a known history of of chronic venous insufficiency. She reports lower extremity edema on the  Bilateral leg(s), the veins are painful -- severity:  moderate, pain is aggravated by upright posture, and leg ulcer in past. She reports previous treatment includes prescription compression stockings for > 6 months. She does not have a history of spontaneous rupture. Has chf.  Had ablation of left leg by Dr. Bay and it has been much improved.  She continues to weep and has wounds of the right leg.  She [place] [facility]      Differential diagnosis for leg pain includes but is not limited to: varicose veins, peripheral vascular disease, arthritic pain, DVT, lumbar disc disease, and neurogenic pain.      I have personally reviewed the following: problem list, current meds, allergies, PMH, PSH, family hx, and social hx  Erlinda Chen is a 73 y.o. female with the following history as recorded in [facility]:  Patient Active Problem List    Diagnosis Date Noted    Atherosclerosis of native artery of both lower extremities with intermittent claudication ([facility]) 2024    Depression with anxiety 2024    Chronic diastolic congestive heart failure ([facility]) 2024    Coagulation defect ([facility]) 2021    At risk for obstructive sleep apnea 2021    Nocturnal hypoxemia 2021    Diabetic peripheral neuropathy associated with type 2 diabetes mellitus ([facility]) 2021    Ischemic cardiomyopathy 2021    Edema of both lower extremities due to peripheral venous insufficiency 10/21/2020    Varicose veins of left lower extremity with ulcer of calf (CODE) ([facility]) 2020    Stage 4 chronic kidney disease ([facility]) 2020    Chronic obstructive pulmonary disease ([facility])

## 2024-08-27 DIAGNOSIS — N30.01 ACUTE CYSTITIS WITH HEMATURIA: ICD-10-CM

## 2024-08-28 RX ORDER — ISOSORBIDE MONONITRATE 30 MG/1
30 TABLET, EXTENDED RELEASE ORAL DAILY
Qty: 90 TABLET | Refills: 1 | Status: SHIPPED | OUTPATIENT
Start: 2024-08-28

## 2024-08-28 RX ORDER — ACETAMINOPHEN 160 MG
TABLET,DISINTEGRATING ORAL DAILY
Qty: 90 CAPSULE | Refills: 1 | Status: SHIPPED | OUTPATIENT
Start: 2024-08-28

## 2024-08-29 ENCOUNTER — TELEPHONE (OUTPATIENT)
Dept: VASCULAR SURGERY | Age: 74
End: 2024-08-29

## 2024-08-29 NOTE — TELEPHONE ENCOUNTER
I called and spoke with the patient's daughter this morning in regards to the results below. The patient's daughter asked if the patient could have her lab work done by [facility]. I routed this information to Oksana. Oksana stated that this would be okay but we would need the results sent to us due to the patient being scheduled for surgery next week. The patient's daughter verbalized understanding. I called and spoke with Nora at [facility] and gave her the order for the patient's lab. She stated she would get this to the nurse.     ----- Message from INGRID Burger sent at 8/29/2024  6:26 AM CDT -----  Yes.  Ua was ordered on 8/23 and abx started that day.  This would be 1 week post  ----- Message -----  From: Violetta Lamas MA  Sent: 8/27/2024   3:59 PM CDT  To: INGRID Burger ordered one today, 08/27/2024. Do you need an additional one?  ----- Message -----  From: Oksana Longoria APRN  Sent: 8/27/2024   6:10 AM CDT  To: [facility] Staff    Please arrange for her to have repeat UA with C and S on Friday.  She is on the schedule for surgery next week.  This has to be done to proceed.  It can be done with home health or she can go to the hospital

## 2024-08-30 LAB
BACTERIA URNS QL MICRO: NEGATIVE /HPF
BILIRUB UR QL STRIP: NEGATIVE
CLARITY UR: CLEAR
COLOR UR: YELLOW
CRYSTALS URNS MICRO: NORMAL /HPF
EPI CELLS #/AREA URNS AUTO: 0 /HPF (ref 0–5)
GLUCOSE UR STRIP.AUTO-MCNC: NEGATIVE MG/DL
HGB UR STRIP.AUTO-MCNC: NEGATIVE MG/L
HYALINE CASTS #/AREA URNS AUTO: 3 /HPF (ref 0–8)
KETONES UR STRIP.AUTO-MCNC: NEGATIVE MG/DL
LEUKOCYTE ESTERASE UR QL STRIP.AUTO: ABNORMAL
NITRITE UR QL STRIP.AUTO: NEGATIVE
PH UR STRIP.AUTO: 5 [PH] (ref 5–8)
PROT UR STRIP.AUTO-MCNC: NEGATIVE MG/DL
RBC #/AREA URNS AUTO: 0 /HPF (ref 0–4)
SP GR UR STRIP.AUTO: 1.01 (ref 1–1.03)
UROBILINOGEN UR STRIP.AUTO-MCNC: 0.2 E.U./DL
WBC #/AREA URNS AUTO: 1 /HPF (ref 0–5)

## 2024-09-01 LAB — BACTERIA UR CULT: NORMAL

## 2024-09-03 ENCOUNTER — TELEPHONE (OUTPATIENT)
Dept: VASCULAR SURGERY | Age: 74
End: 2024-09-03

## 2024-09-03 RX ORDER — SODIUM CHLORIDE 0.9 % (FLUSH) 0.9 %
5-40 SYRINGE (ML) INJECTION EVERY 12 HOURS SCHEDULED
Status: CANCELLED | OUTPATIENT
Start: 2024-09-03

## 2024-09-03 RX ORDER — SODIUM CHLORIDE 0.9 % (FLUSH) 0.9 %
5-40 SYRINGE (ML) INJECTION PRN
Status: CANCELLED | OUTPATIENT
Start: 2024-09-03

## 2024-09-03 RX ORDER — SODIUM CHLORIDE 9 MG/ML
INJECTION, SOLUTION INTRAVENOUS PRN
Status: CANCELLED | OUTPATIENT
Start: 2024-09-03

## 2024-09-03 NOTE — TELEPHONE ENCOUNTER
Returned the patients daughter's phone call to let her know as long as she doesn't take it tonight she will be okay to operate.  We went over the medications together.  She voiced understanding.

## 2024-09-03 NOTE — H&P
Erlinda Chen (:  1950) is a 73 y.o. female,established patient here for evaluation of the following chief complaint(s):  Follow-up (Follow up chart review prior to surgery. )            SUBJECTIVE/OBJECTIVE:  She has a known history of of chronic venous insufficiency. She reports lower extremity edema on the  Bilateral leg(s), the veins are painful -- severity:  moderate, pain is aggravated by upright posture, and leg ulcer in past. She reports previous treatment includes prescription compression stockings for > 6 months. She does not have a history of spontaneous rupture. Has chf.  Had ablation of left leg by Dr. Bay and it has been much improved.  She continues to weep and has wounds of the right leg.  [place] [facility] [place]      Differential diagnosis for leg pain includes but is not limited to: varicose veins, peripheral vascular disease, arthritic pain, DVT, lumbar disc disease, and neurogenic pain.      I have personally reviewed the following: problem list, current meds, allergies, PMH, PSH, family hx, and social hx  Erlinda Chen is a 73 y.o. female with the following history as recorded in [facility]:  Patient Active Problem List    Diagnosis Date Noted    Atherosclerosis of native artery of both lower extremities with intermittent claudication ([facility]) 2024    Depression with anxiety 2024    Chronic diastolic congestive heart failure ([facility]) 2024    Coagulation defect ([facility]) 2021    At risk for obstructive sleep apnea 2021    Nocturnal hypoxemia 2021    Diabetic peripheral neuropathy associated with type 2 diabetes mellitus ([facility]) 2021    Ischemic cardiomyopathy 2021    Edema of both lower extremities due to peripheral venous insufficiency 10/21/2020    Varicose veins of left lower extremity with ulcer of calf (CODE) ([facility]) 2020    Stage 4 chronic kidney disease ([facility]) 2020    Chronic obstructive pulmonary disease ([facility])

## 2024-09-04 ENCOUNTER — ANESTHESIA (OUTPATIENT)
Dept: OPERATING ROOM | Age: 74
End: 2024-09-04
Payer: MEDICARE

## 2024-09-04 ENCOUNTER — ANESTHESIA EVENT (OUTPATIENT)
Dept: OPERATING ROOM | Age: 74
End: 2024-09-04
Payer: MEDICARE

## 2024-09-04 ENCOUNTER — HOSPITAL ENCOUNTER (OUTPATIENT)
Age: 74
Setting detail: OUTPATIENT SURGERY
Discharge: HOME | End: 2024-09-04
Attending: SURGERY | Admitting: SURGERY
Payer: MEDICARE

## 2024-09-04 VITALS
RESPIRATION RATE: 20 BRPM | BODY MASS INDEX: 52.81 KG/M2 | TEMPERATURE: 98 F | OXYGEN SATURATION: 94 % | DIASTOLIC BLOOD PRESSURE: 57 MMHG | WEIGHT: 287 LBS | HEART RATE: 69 BPM | SYSTOLIC BLOOD PRESSURE: 162 MMHG | HEIGHT: 62 IN

## 2024-09-04 LAB
APTT PPP: 34.3 SEC (ref 26–36.2)
GLUCOSE BLD-MCNC: 106 MG/DL (ref 70–99)
GLUCOSE BLD-MCNC: 115 MG/DL (ref 70–99)
INR PPP: 1.22 (ref 0.88–1.18)
PERFORMED ON: ABNORMAL
PERFORMED ON: ABNORMAL
PROTHROMBIN TIME: 15.1 SEC (ref 12–14.6)

## 2024-09-04 PROCEDURE — 2500000003 HC RX 250 WO HCPCS: Performed by: SURGERY

## 2024-09-04 PROCEDURE — 3700000001 HC ADD 15 MINUTES (ANESTHESIA): Performed by: SURGERY

## 2024-09-04 PROCEDURE — 2580000003 HC RX 258: Performed by: ANESTHESIOLOGY

## 2024-09-04 PROCEDURE — 2500000003 HC RX 250 WO HCPCS: Performed by: NURSE ANESTHETIST, CERTIFIED REGISTERED

## 2024-09-04 PROCEDURE — 36415 COLL VENOUS BLD VENIPUNCTURE: CPT

## 2024-09-04 PROCEDURE — 3700000000 HC ANESTHESIA ATTENDED CARE: Performed by: SURGERY

## 2024-09-04 PROCEDURE — 2580000003 HC RX 258: Performed by: SURGERY

## 2024-09-04 PROCEDURE — 7100000010 HC PHASE II RECOVERY - FIRST 15 MIN: Performed by: SURGERY

## 2024-09-04 PROCEDURE — 36475 ENDOVENOUS RF 1ST VEIN: CPT | Performed by: SURGERY

## 2024-09-04 PROCEDURE — 2709999900 HC NON-CHARGEABLE SUPPLY: Performed by: SURGERY

## 2024-09-04 PROCEDURE — 7100000000 HC PACU RECOVERY - FIRST 15 MIN: Performed by: SURGERY

## 2024-09-04 PROCEDURE — 6360000002 HC RX W HCPCS: Performed by: NURSE ANESTHETIST, CERTIFIED REGISTERED

## 2024-09-04 PROCEDURE — 82962 GLUCOSE BLOOD TEST: CPT

## 2024-09-04 PROCEDURE — 85730 THROMBOPLASTIN TIME PARTIAL: CPT

## 2024-09-04 PROCEDURE — 85610 PROTHROMBIN TIME: CPT

## 2024-09-04 PROCEDURE — 2580000003 HC RX 258: Performed by: NURSE ANESTHETIST, CERTIFIED REGISTERED

## 2024-09-04 PROCEDURE — C1769 GUIDE WIRE: HCPCS | Performed by: SURGERY

## 2024-09-04 PROCEDURE — C1894 INTRO/SHEATH, NON-LASER: HCPCS | Performed by: SURGERY

## 2024-09-04 PROCEDURE — 3600000017 HC SURGERY HYBRID ADDL 15MIN: Performed by: SURGERY

## 2024-09-04 PROCEDURE — 7100000001 HC PACU RECOVERY - ADDTL 15 MIN: Performed by: SURGERY

## 2024-09-04 PROCEDURE — C1888 ENDOVAS NON-CARDIAC ABL CATH: HCPCS | Performed by: SURGERY

## 2024-09-04 PROCEDURE — 6360000002 HC RX W HCPCS: Performed by: SURGERY

## 2024-09-04 PROCEDURE — 3600000007 HC SURGERY HYBRID BASE: Performed by: SURGERY

## 2024-09-04 PROCEDURE — 7100000011 HC PHASE II RECOVERY - ADDTL 15 MIN: Performed by: SURGERY

## 2024-09-04 PROCEDURE — 2500000003 HC RX 250 WO HCPCS: Performed by: ANESTHESIOLOGY

## 2024-09-04 RX ORDER — SODIUM CHLORIDE 0.9 % (FLUSH) 0.9 %
5-40 SYRINGE (ML) INJECTION EVERY 12 HOURS SCHEDULED
Status: DISCONTINUED | OUTPATIENT
Start: 2024-09-04 | End: 2024-09-04 | Stop reason: HOSPADM

## 2024-09-04 RX ORDER — LIDOCAINE HYDROCHLORIDE 10 MG/ML
INJECTION, SOLUTION INFILTRATION; PERINEURAL PRN
Status: DISCONTINUED | OUTPATIENT
Start: 2024-09-04 | End: 2024-09-04 | Stop reason: SDUPTHER

## 2024-09-04 RX ORDER — SODIUM CHLORIDE 9 MG/ML
INJECTION, SOLUTION INTRAVENOUS PRN
Status: DISCONTINUED | OUTPATIENT
Start: 2024-09-04 | End: 2024-09-04 | Stop reason: HOSPADM

## 2024-09-04 RX ORDER — CEFAZOLIN SODIUM 1 G/3ML
INJECTION, POWDER, FOR SOLUTION INTRAMUSCULAR; INTRAVENOUS PRN
Status: DISCONTINUED | OUTPATIENT
Start: 2024-09-04 | End: 2024-09-04 | Stop reason: SDUPTHER

## 2024-09-04 RX ORDER — HYDROMORPHONE HYDROCHLORIDE 1 MG/ML
0.5 INJECTION, SOLUTION INTRAMUSCULAR; INTRAVENOUS; SUBCUTANEOUS EVERY 5 MIN PRN
Status: DISCONTINUED | OUTPATIENT
Start: 2024-09-04 | End: 2024-09-04 | Stop reason: HOSPADM

## 2024-09-04 RX ORDER — NALOXONE HYDROCHLORIDE 0.4 MG/ML
INJECTION, SOLUTION INTRAMUSCULAR; INTRAVENOUS; SUBCUTANEOUS PRN
Status: DISCONTINUED | OUTPATIENT
Start: 2024-09-04 | End: 2024-09-04 | Stop reason: HOSPADM

## 2024-09-04 RX ORDER — SODIUM CHLORIDE 0.9 % (FLUSH) 0.9 %
5-40 SYRINGE (ML) INJECTION PRN
Status: DISCONTINUED | OUTPATIENT
Start: 2024-09-04 | End: 2024-09-04 | Stop reason: HOSPADM

## 2024-09-04 RX ORDER — HYDROMORPHONE HYDROCHLORIDE 1 MG/ML
0.25 INJECTION, SOLUTION INTRAMUSCULAR; INTRAVENOUS; SUBCUTANEOUS EVERY 5 MIN PRN
Status: DISCONTINUED | OUTPATIENT
Start: 2024-09-04 | End: 2024-09-04 | Stop reason: HOSPADM

## 2024-09-04 RX ORDER — SODIUM CHLORIDE, SODIUM LACTATE, POTASSIUM CHLORIDE, CALCIUM CHLORIDE 600; 310; 30; 20 MG/100ML; MG/100ML; MG/100ML; MG/100ML
INJECTION, SOLUTION INTRAVENOUS CONTINUOUS
Status: DISCONTINUED | OUTPATIENT
Start: 2024-09-04 | End: 2024-09-04 | Stop reason: HOSPADM

## 2024-09-04 RX ORDER — SODIUM CHLORIDE, SODIUM LACTATE, POTASSIUM CHLORIDE, CALCIUM CHLORIDE 600; 310; 30; 20 MG/100ML; MG/100ML; MG/100ML; MG/100ML
INJECTION, SOLUTION INTRAVENOUS CONTINUOUS PRN
Status: DISCONTINUED | OUTPATIENT
Start: 2024-09-04 | End: 2024-09-04 | Stop reason: SDUPTHER

## 2024-09-04 RX ORDER — DEXMEDETOMIDINE HYDROCHLORIDE 100 UG/ML
INJECTION, SOLUTION INTRAVENOUS PRN
Status: DISCONTINUED | OUTPATIENT
Start: 2024-09-04 | End: 2024-09-04 | Stop reason: SDUPTHER

## 2024-09-04 RX ORDER — FENTANYL CITRATE 50 UG/ML
INJECTION, SOLUTION INTRAMUSCULAR; INTRAVENOUS PRN
Status: DISCONTINUED | OUTPATIENT
Start: 2024-09-04 | End: 2024-09-04 | Stop reason: SDUPTHER

## 2024-09-04 RX ORDER — ONDANSETRON 2 MG/ML
4 INJECTION INTRAMUSCULAR; INTRAVENOUS
Status: DISCONTINUED | OUTPATIENT
Start: 2024-09-04 | End: 2024-09-04 | Stop reason: HOSPADM

## 2024-09-04 RX ORDER — PROPOFOL 10 MG/ML
INJECTION, EMULSION INTRAVENOUS PRN
Status: DISCONTINUED | OUTPATIENT
Start: 2024-09-04 | End: 2024-09-04 | Stop reason: SDUPTHER

## 2024-09-04 RX ADMIN — DEXMEDETOMIDINE HYDROCHLORIDE 4 MCG: 100 INJECTION, SOLUTION, CONCENTRATE INTRAVENOUS at 14:00

## 2024-09-04 RX ADMIN — SODIUM CHLORIDE, SODIUM LACTATE, POTASSIUM CHLORIDE, AND CALCIUM CHLORIDE: 600; 310; 30; 20 INJECTION, SOLUTION INTRAVENOUS at 10:52

## 2024-09-04 RX ADMIN — FENTANYL CITRATE 25 MCG: 0.05 INJECTION, SOLUTION INTRAMUSCULAR; INTRAVENOUS at 14:25

## 2024-09-04 RX ADMIN — SODIUM CHLORIDE, SODIUM LACTATE, POTASSIUM CHLORIDE, AND CALCIUM CHLORIDE: 600; 310; 30; 20 INJECTION, SOLUTION INTRAVENOUS at 13:38

## 2024-09-04 RX ADMIN — FAMOTIDINE 20 MG: 10 INJECTION, SOLUTION INTRAVENOUS at 11:27

## 2024-09-04 RX ADMIN — CEFAZOLIN 3 G: 1 INJECTION, POWDER, FOR SOLUTION INTRAMUSCULAR; INTRAVENOUS at 14:09

## 2024-09-04 RX ADMIN — DEXMEDETOMIDINE HYDROCHLORIDE 4 MCG: 100 INJECTION, SOLUTION, CONCENTRATE INTRAVENOUS at 14:25

## 2024-09-04 RX ADMIN — DEXMEDETOMIDINE HYDROCHLORIDE 4 MCG: 100 INJECTION, SOLUTION, CONCENTRATE INTRAVENOUS at 13:52

## 2024-09-04 RX ADMIN — DEXMEDETOMIDINE HYDROCHLORIDE 4 MCG: 100 INJECTION, SOLUTION, CONCENTRATE INTRAVENOUS at 14:39

## 2024-09-04 RX ADMIN — LIDOCAINE HYDROCHLORIDE 5 ML: 10 INJECTION, SOLUTION INFILTRATION; PERINEURAL at 13:52

## 2024-09-04 RX ADMIN — FENTANYL CITRATE 25 MCG: 0.05 INJECTION, SOLUTION INTRAMUSCULAR; INTRAVENOUS at 13:54

## 2024-09-04 RX ADMIN — PROPOFOL 50 MG: 10 INJECTION, EMULSION INTRAVENOUS at 14:03

## 2024-09-04 RX ADMIN — DEXMEDETOMIDINE HYDROCHLORIDE 4 MCG: 100 INJECTION, SOLUTION, CONCENTRATE INTRAVENOUS at 13:54

## 2024-09-04 RX ADMIN — PROPOFOL 30 MG: 10 INJECTION, EMULSION INTRAVENOUS at 14:20

## 2024-09-04 RX ADMIN — PROPOFOL 50 MCG/KG/MIN: 10 INJECTION, EMULSION INTRAVENOUS at 13:56

## 2024-09-04 RX ADMIN — PROPOFOL 50 MG: 10 INJECTION, EMULSION INTRAVENOUS at 13:55

## 2024-09-04 ASSESSMENT — PAIN - FUNCTIONAL ASSESSMENT
PAIN_FUNCTIONAL_ASSESSMENT: NONE - DENIES PAIN
PAIN_FUNCTIONAL_ASSESSMENT: NONE - DENIES PAIN
PAIN_FUNCTIONAL_ASSESSMENT: 0-10

## 2024-09-04 ASSESSMENT — LIFESTYLE VARIABLES: SMOKING_STATUS: 0

## 2024-09-04 ASSESSMENT — PAIN DESCRIPTION - DESCRIPTORS: DESCRIPTORS: ACHING

## 2024-09-04 NOTE — DISCHARGE INSTRUCTIONS
VARICOSE VEIN ABLATION POST OPERATIVE INSTRUCTIONS       YOU CAN REMOVE THE ACE BANDAGE/WRAP 48 HOURS AFTER SURGERY.  THEN WEAR COMPRESSION STOCKINGS  EVERY DAY  IT IS OKAY TO REMOVE THE AT BEDTIME.    2.  BRUISING AND DISCOLORATION OF THE AREA AROUND YOUR INCISIONS IS NORMAL AND WILL DISAPPEAR WITH TIME.  YOU MAY ALSO DEVELOP SOME HARD SPOTS UNDER THE INCISIONS.  THIS IS A NORMAL PART OF THE HEALING PROCESS.  IT IS ALSO  NORMAL FOR THE BRUISING TO TRAVEL DOWN THE LEG WITH GRAVITY.  THIS TOO WILL DISAPPEAR WITH TIME.    3.  YOU MAY SHOWER 48 HOURS AFTER THE SURGERY.  NO TUB BATH NOR SWIMMING FOR ONE WEEK..    4.  THERE ARE NO SPECIAL WOUND CARE INSTRUCTIONS.  YOU DON'T HAVE TO PUT NEOSPORIN OVER THE WOUNDS.  YOU HAVE STERI-STRIPS OVER THE SMALL INCISIONS.  THE STERI STRIPS WILL PEEL OFF AFTER A WEEK OR TWO.    5.  YOU MAY EXPECT SOME MILD SWELLING IN YOUR LEG AFTER SURGERY, ESPECIALLY WHEN YOU GO HOME.  THE SWELLING CAN BE RELIEVED BY ELEVATING YOUR LEGS SLIGHTLY.  IF YOU HAVE SEVERE SWELLING, CALL YOUR DOCTOR.    6.  YOU SHOULD NOT DRIVE FOR 2 DAYS AFTER SURGERY TO MAKE SURE THE ANESTHESIA IS OUT OF YOUR SYSTEM.    7.  REGULAR ACTIVITY IS OK, BUT NO STRENUOUS EXERCISE NOR LIFTING MORE THAN 10-20 POUNDS FOR ONE WEEK.    8.  IF YOU SMOKE, STOP.  IF YOU DO NOT SMOKE, DON'T START.  SMOKING INCREASES THE CHANCE OF A WOUND INFECTION AND BLOOD CLOT.    9.  YOUR PRESCRIPTIONS WILL INCLUDE A PAIN PILL FOR PAIN RELIEF.  YOU CAN TAKE OVER THE COUNTER PAIN MEDICATION (EXTRA STRENGTH TYLENOL OR EVEN MOTRIN), IF YOU DON'T NEED THE PRESCRIPTION PAIN MEDICATION.    10.  YOU WILL HAVE AN APPOINTMENT WITH OUR OFFICE IN ABOUT 2 WEEKS TO MAKE SURE YOU ARE HEALING AND TO ANSWER QUESTIONS YOU MAY HAVE.    11.  IF YOU DEVELOP ANY OF THE PROBLEMS BELOW, OR HAVE ANY QUESTIONS, PLEASE DO NOT HESITATE TO CALL OUR OFFICE -[phone]      A.  NEW OR UNUSUAL DRAINAGE FROM THE PUNCTURE    B.  FEVER/CHILLS    C.  SEVERE SWELLING AT THE  PUNCTURE SITE    D.  SEVERE PAIN IN THE LEG    E.  SUDDEN SWELLING IN THE LEG

## 2024-09-04 NOTE — PROGRESS NOTES
Patient transferred to w/c per lift with assist x3. She tolerated well no bleeding noted from surgical site. Dressing clean, dry and intact.

## 2024-09-04 NOTE — INTERVAL H&P NOTE
Update History & Physical    The patient's History and Physical of September 3, 2024 was reviewed with the patient and I examined the patient. There was no change. The surgical site was confirmed by the patient and me.     Plan: The risks, benefits, expected outcome, and alternative to the recommended procedure have been discussed with the patient. Patient understands and wants to proceed with the procedure.     Electronically signed by Arti Leon DO on 9/4/2024 at 1:31 PM

## 2024-09-04 NOTE — PROGRESS NOTES
1600 Family assisted patient to wheelchair x 2 assist. Pt is non weight bearing. Patients family called nurses to the room stating patient was bleeding. There was a moderate amount of blood noted to the ace wrap on the right lower leg. There was also a moderate pool of blood on the floor. Rebecca Guerrero RN immediately started to hold pressure at site. Site appears to be close to the knee. It was noted there was julisa red blood coming from the site. I left the room to call Dr. Leon.    1603 Called Dr. Leon and orders were given.    1615 Continued to hold pressure.     1630 Pt transferred from  to bed via lift with assist x 3. Leg was elevated. Continued to hold pressure.     1645 Dressing was removed and replaced per order.     1656 Dressing remains dry. Vitals signs stable.

## 2024-09-04 NOTE — OP NOTE
Operative Note      Patient: Erlinda Chen  YOB: 1950  MRN: [id number]      RADIOFREQUENCY VEIN ABLATION    DATE OF PROCEDURE: 9/4/2024    PRE-PROCEDURE DIAGNOSIS:    1.  Reflux and insufficiency of the right grater saphenous vein  2. Symptomatic pain, burning, edema, dermatitis, and wound       POST PROCEDURE DIAGNOSIS:  Same    PROCEDURE:  1.  Ultrasound guided cannulation of right greater saphenous vein  2.  Radiofrequency ablation of right greater saphenous vein     ANESTHETIC:     MAC   1% lidocaine without epinephrine for placing sheath    Tumescent anesthetic solution along the saphenous vein from sheath insertion site to the origin of saphenous vein radiofrequency catheter and saphenofemoral  junction    SURGEON:  Arti Leon, DO    PROCEDURE IN DETAIL:      After the risks, benefits, and alternatives of the procedure were explained to the patient, including how the procedure would be performed, the patient signed an informed consent.    The patient's leg was prepped and draped in the standard surgical fashion.    Approximately 3-4 mL of 1% lidocaine was injected into the skin and subcutaneous tissues over the saphenous vein insertion site.  The right Greater saphenous vein was then cannulated utilizing ultrasound guidance with a micropuncture needle and .018\" wire was placed through the needle.  The needle was removed and replaced with a 7F sheath.    The radiofrequency catheter was placed through the 7f sheath and under ultrasound guidance, the tip was directed to 2 cm from the saphenofemoral  junction.      Next, under ultrasound guidance, tumescent anesthetic solution was injected around the saphenous vein to create a halo of anesthesia along the entire vein from the sheath insertion site to the saphenofemoral  junction.  The tip of the catheter was again visualized to ensure position remains 2 cm from the saphenofemoral  junction.    The radiofrequency ablation then commensed while

## 2024-09-04 NOTE — PROGRESS NOTES
All discharge instructions discussed with patient and family per Angelina CHAVES RN. All state understanding. VSS.

## 2024-09-04 NOTE — ANESTHESIA PRE PROCEDURE
Normal cardiac stress test 09    no ischemia at attained level of excercise   • Palliative care patient 2020   • Parkinson disease (HCC)    • Paroxysmal atrial fibrillation (HCC)     sees dr. alford   • Post-menopausal    • Prolonged emergence from general anesthesia    • Restrictive airway disease    • Stage 3 chronic kidney disease (HCC) 10/18/2017       Past Surgical History:        Procedure Laterality Date   • APPENDECTOMY     • CATARACT REMOVAL WITH IMPLANT     •  SECTION      x3   • COLONOSCOPY      Dr Godwin-Benign polyp-5 yr recall   • COLONOSCOPY N/A 2019    Dr Sara Gibbons-Grade 2 internal hemorrhoids without stigmata, diverticulosis, suboptimal prep--5 yr recall   • EYE SURGERY     • GALLBLADDER SURGERY  2014    dr Ramirez   • JOINT REPLACEMENT Left     knee replacement   • KNEE ARTHROSCOPY     • MA NEUROPLASTY &/TRANSPOS MEDIAN NRV CARPAL TUNNE Right 2018    OPEN CARPAL TUNNEL RELEASE performed by Imtiaz Farah MD at [facility] OR   • TOTAL KNEE ARTHROPLASTY Left    • TYMPANOSTOMY TUBE PLACEMENT      dr Morales in [place]   • UPPER GASTROINTESTINAL ENDOSCOPY         Social History:    Social History     Tobacco Use   • Smoking status: Never   • Smokeless tobacco: Never   Substance Use Topics   • Alcohol use: No                                Counseling given: Not Answered      Vital Signs (Current): There were no vitals filed for this visit.                                           BP Readings from Last 3 Encounters:   24 128/64   24 128/64   24 128/78       NPO Status:                                                                                 BMI:   Wt Readings from Last 3 Encounters:   24 130.2 kg (287 lb)   24 130.2 kg (287 lb)   24 133.4 kg (294 lb)     There is no height or weight on file to calculate BMI.    CBC:   Lab Results   Component Value Date/Time    WBC 11.9 2024 11:13 AM    RBC 4.18 2024

## 2024-09-04 NOTE — ANESTHESIA POSTPROCEDURE EVALUATION
Ambulatory Care Coordination Note  8/23/2018  CM Risk Score: 3  Lottie Mortality Risk Score: 12.17    ACC: Shantell Clark RN    Summary Note: I called to check in on Uriel Michaels and to discuss open heart status along with COPD and diabetes. He says that he is doing well and he is not having any further issues with his incision. He does tell me that the incision is still a little sore and he has some bumps in the incision which I explained may be related to the healing process. I explained that if he has any issues with redness or drainage that he needs to be seen by the doctor and he does understand. He says that he has started cardiac rehab as of this week. He tells me that he has been told that he needs to slow down. I explained that his heart continues to heal and he needs to strengthen his heart slowly. He tells me that his blood pressure is running between 110-120/60-80 mmHg. He tells me that he is sleeping well. He also says that he is trying to maintain a healthy diet. The highest blood sugar he has had is 130 mg/dl. He also says that he has been out on his \"big\" tractor mowing his lawn which made him feel that he is getting back to normal.      Care Coordination Interventions    Program Enrollment:  Rising Risk  Referral from Primary Care Provider:  No  Suggested Interventions and Community Resources         Goals Addressed             Most Recent     Self Monitoring   On track (8/23/2018)             Self-Monitored Blood Glucose - I will check my blood sugar Fasting blood sugar  I will notify my provider of any trends of increasing or decreasing blood sugars over a 1 month period. Blood Pressure - I will take my blood pressure as directed - Daily  I will notify my provider of any trends of increasing or decreasing blood pressures over a month period of time. Patient Reported Blood Pressure No flowsheet data found.     Barriers: none  Plan for overcoming my barriers: N/A  [place]: Department of Anesthesiology  Postprocedure Note    Patient: Erlinda Chen  MRN: [id number]  YOB: 1950  Date of evaluation: 9/4/2024    Procedure Summary       Date: 09/04/24 Room / Location: [address]    Anesthesia Start: 1338 Anesthesia Stop: 1450    Procedure: RIGHT LEG GREATER SAPHENOUS VEIN ABLATION (Right: Leg Upper) Diagnosis:       Atherosclerosis of native artery of both lower extremities with intermittent claudication (HCC)      (Atherosclerosis of native artery of both lower extremities with intermittent claudication (HCC) [I70.213])    Surgeons: Arti Leon DO Responsible Provider: Alecia Ellis APRN - CRNA    Anesthesia Type: general ASA Status: 4            Anesthesia Type: No value filed.    Yojana Phase I: Yojana Score: 7    Yojana Phase II:      Anesthesia Post Evaluation    Patient location during evaluation: PACU  Patient participation: waiting for patient participation  Level of consciousness: obtunded/minimal responses  Pain score: 0  Airway patency: patent  Nausea & Vomiting: no nausea and no vomiting  Cardiovascular status: hemodynamically stable  Respiratory status: acceptable and oral airway  Hydration status: stable  Pain management: adequate    No notable events documented.   Edvin Painter MD   testosterone cypionate (DEPOTESTOTERONE CYPIONATE) 200 MG/ML injection 1 ML every 2 weeks 10/3/17   Racquel Freitas MD   albuterol sulfate HFA (PROAIR HFA) 108 (90 Base) MCG/ACT inhaler Inhale 2 puffs into the lungs every 6 hours as needed for Wheezing 9/11/17   Edvin Painter MD   UNABLE TO FIND SUPER BETA PROSTATE OTC 250MG BETA-SITOSTEROL AND 400IU VIT D    Historical Provider, MD   tobramycin-dexamethasone (TOBRADEX) 0.3-0.1 % ophthalmic suspension  6/13/17   Historical Provider, MD   ascorbic acid (VITAMIN C) 500 MG tablet Take 1,000 mg by mouth daily    Historical Provider, MD   loratadine (CLARITIN) 10 MG tablet Take 1 tablet by mouth daily 11/22/16   Edvin Painter MD   vitamin D (CHOLECALCIFEROL) 1000 UNITS TABS tablet Take 1,000 Units by mouth    Historical Provider, MD   acetaminophen (TYLENOL EX ST ARTHRITIS PAIN) 500 MG tablet Take 1 tablet by mouth every 6 hours as needed for Pain 8/24/15   Jessica Walker MD   CINNAMON PO Take 500 mg by mouth 2 times daily. Historical Provider, MD   aspirin 81 MG EC tablet Take 81 mg by mouth daily.       Historical Provider, MD       Future Appointments  Date Time Provider Kay Bartlett   8/28/2018 10:00 AM Morales Nickerson RN [address] [place] [address]   8/30/2018 10:00 AM GERBER Hancock PCP TESTOSTERONE [facility]   8/30/2018 10:00 AM Morales Nickerson RN [address] [place] [address]   9/4/2018 10:00 AM Morales Nickerson RN [address] [place] [address]   9/10/2018 10:00 AM Morales Nickerson RN [address] [place] [address]   9/13/2018 7:30 AM Morales Nickerson RN [address] [place] [address]   9/13/2018 10:00 AM GERBER WOODY PCP TESTOSTERONE Yvette Marchi [facility]   9/17/2018 7:30 AM Morales Nickerson  [facility]   9/20/2018 7:30 AM Morales Nickerson RN [address] [place] [address]   9/20/2018 9:20 AM MD Roddy Arthur [facility]   9/24/2018 7:30 AM Morales Nickerson RN

## 2024-09-05 ENCOUNTER — HOSPITAL ENCOUNTER (OUTPATIENT)
Dept: VASCULAR LAB | Age: 74
Discharge: HOME | End: 2024-09-05
Payer: MEDICARE

## 2024-09-05 ENCOUNTER — TELEPHONE (OUTPATIENT)
Dept: NEUROLOGY | Facility: CLINIC | Age: 74
End: 2024-09-05
Payer: MEDICARE

## 2024-09-05 ENCOUNTER — OFFICE VISIT (OUTPATIENT)
Dept: VASCULAR SURGERY | Age: 74
End: 2024-09-05
Payer: MEDICARE

## 2024-09-05 VITALS
SYSTOLIC BLOOD PRESSURE: 132 MMHG | HEART RATE: 61 BPM | TEMPERATURE: 97.1 F | DIASTOLIC BLOOD PRESSURE: 60 MMHG | OXYGEN SATURATION: 97 %

## 2024-09-05 DIAGNOSIS — M79.89 LEG SWELLING: ICD-10-CM

## 2024-09-05 DIAGNOSIS — I87.2 VENOUS INSUFFICIENCY: Primary | ICD-10-CM

## 2024-09-05 DIAGNOSIS — M79.604 LEG PAIN, RIGHT: ICD-10-CM

## 2024-09-05 PROCEDURE — 1090F PRES/ABSN URINE INCON ASSESS: CPT | Performed by: NURSE PRACTITIONER

## 2024-09-05 PROCEDURE — G8399 PT W/DXA RESULTS DOCUMENT: HCPCS | Performed by: NURSE PRACTITIONER

## 2024-09-05 PROCEDURE — 3017F COLORECTAL CA SCREEN DOC REV: CPT | Performed by: NURSE PRACTITIONER

## 2024-09-05 PROCEDURE — 93971 EXTREMITY STUDY: CPT | Performed by: SURGERY

## 2024-09-05 PROCEDURE — 93971 EXTREMITY STUDY: CPT

## 2024-09-05 PROCEDURE — G8427 DOCREV CUR MEDS BY ELIG CLIN: HCPCS | Performed by: NURSE PRACTITIONER

## 2024-09-05 PROCEDURE — 1036F TOBACCO NON-USER: CPT | Performed by: NURSE PRACTITIONER

## 2024-09-05 PROCEDURE — 99213 OFFICE O/P EST LOW 20 MIN: CPT | Performed by: NURSE PRACTITIONER

## 2024-09-05 PROCEDURE — G8417 CALC BMI ABV UP PARAM F/U: HCPCS | Performed by: NURSE PRACTITIONER

## 2024-09-05 PROCEDURE — 1124F ACP DISCUSS-NO DSCNMKR DOCD: CPT | Performed by: NURSE PRACTITIONER

## 2024-09-05 NOTE — TELEPHONE ENCOUNTER
CALLED PATIENT TO LET THEM KNOW THAT WE HAVE CANCELED THEIR APPOINTMENT. ANNA LAST OFFICIAL DAY IS TODAY AND HE WILL NO LONGER BE SEEING ANY PATIENTS IN NEUROLOGY GOING FORWARD. I LET THEM KNOW AT THIS TIME WE DO NOT HAVE A NEW PROVIDER COMING IN TO TAKE OVER HIS PATIENTS BUT WE ARE LOOKING FOR SOMEONE, EVERYONE IS ON A WAIT LIST AND ONCE THEY HAVE SOMEONE ABLE TO COME IN WE WILL GIVE ALL OF ANNA PATIENTS A CALL AND GET THEM SCHEDUELD. I LET THEM ALSO KNOW THAT WE ARE STILL ABLE TO FILL ANY MEDS NEEDED THAT THEY HAVE ALREADY BEEN ON BUT WE WILL NOT BE ABLE TO MAKE ANY CHANGES AND IF THEY NEED CHANGES OR ARE HAVING NEW PROBLEMS OR CONCERNS THEY WILL NEED TO DIRECT THOSE TO THEIR PRIMARY CARE PROVIDER AND THEY WILL NEED TO HANDLE THOSE CONCERNS OR REFER THEM TO NEW NEUROLOGIST.     SPOKE WITH DAUGHTER AND SHE VOICED UNDERSTANDING.

## 2024-09-14 LAB
BACTERIA URNS QL MICRO: ABNORMAL /HPF
BILIRUB UR QL STRIP: NEGATIVE
CLARITY UR: ABNORMAL
COLOR UR: YELLOW
CRYSTALS URNS MICRO: ABNORMAL /HPF
EPI CELLS #/AREA URNS AUTO: 2 /HPF (ref 0–5)
GLUCOSE UR STRIP.AUTO-MCNC: NEGATIVE MG/DL
HGB UR STRIP.AUTO-MCNC: NEGATIVE MG/L
HYALINE CASTS #/AREA URNS AUTO: 4 /HPF (ref 0–8)
KETONES UR STRIP.AUTO-MCNC: NEGATIVE MG/DL
LEUKOCYTE ESTERASE UR QL STRIP.AUTO: ABNORMAL
NITRITE UR QL STRIP.AUTO: NEGATIVE
PH UR STRIP.AUTO: 5.5 [PH] (ref 5–8)
PROT UR STRIP.AUTO-MCNC: NEGATIVE MG/DL
RBC #/AREA URNS AUTO: 2 /HPF (ref 0–4)
SP GR UR STRIP.AUTO: 1.01 (ref 1–1.03)
UROBILINOGEN UR STRIP.AUTO-MCNC: 0.2 E.U./DL
WBC #/AREA URNS AUTO: 64 /HPF (ref 0–5)

## 2024-09-15 LAB
BACTERIA UR CULT: ABNORMAL
ORGANISM: ABNORMAL
ORGANISM: ABNORMAL

## 2024-09-16 LAB
BACTERIA UR CULT: ABNORMAL
ORGANISM: ABNORMAL
ORGANISM: ABNORMAL

## 2024-09-16 RX ORDER — CIPROFLOXACIN 500 MG/1
500 TABLET, FILM COATED ORAL 2 TIMES DAILY
Qty: 20 TABLET | Refills: 0 | Status: SHIPPED | OUTPATIENT
Start: 2024-09-16 | End: 2024-09-26

## 2024-09-20 RX ORDER — TRAMADOL HYDROCHLORIDE 50 MG/1
50 TABLET ORAL 2 TIMES DAILY PRN
Qty: 60 TABLET | Refills: 0 | OUTPATIENT
Start: 2024-09-20 | End: 2024-10-20

## 2024-09-20 RX ORDER — TRAMADOL HYDROCHLORIDE 50 MG/1
50 TABLET ORAL 2 TIMES DAILY PRN
COMMUNITY

## 2024-09-21 DIAGNOSIS — G25.81 RESTLESS LEGS SYNDROME: ICD-10-CM

## 2024-09-23 DIAGNOSIS — M54.50 CHRONIC LOW BACK PAIN WITHOUT SCIATICA, UNSPECIFIED BACK PAIN LATERALITY: ICD-10-CM

## 2024-09-23 DIAGNOSIS — R52 PAIN: ICD-10-CM

## 2024-09-23 DIAGNOSIS — G25.81 RESTLESS LEGS SYNDROME: ICD-10-CM

## 2024-09-23 DIAGNOSIS — G89.29 CHRONIC LOW BACK PAIN WITHOUT SCIATICA, UNSPECIFIED BACK PAIN LATERALITY: ICD-10-CM

## 2024-09-23 RX ORDER — GABAPENTIN 600 MG/1
600 TABLET ORAL 3 TIMES DAILY
Qty: 90 TABLET | Refills: 0 | OUTPATIENT
Start: 2024-09-23 | End: 2024-10-23

## 2024-09-23 RX ORDER — GABAPENTIN 600 MG/1
600 TABLET ORAL 3 TIMES DAILY
Qty: 90 TABLET | Refills: 0 | Status: SHIPPED | OUTPATIENT
Start: 2024-09-23 | End: 2024-10-23

## 2024-09-23 RX ORDER — TRAMADOL HYDROCHLORIDE 50 MG/1
50 TABLET ORAL 2 TIMES DAILY PRN
Qty: 60 TABLET | Refills: 0 | Status: SHIPPED | OUTPATIENT
Start: 2024-09-23 | End: 2024-10-23

## 2024-09-23 NOTE — TELEPHONE ENCOUNTER
PDMP Monitoring:    Last PDMP Paul as Reviewed ([place]):  Review User Review Instant Review Result   TAMARA NEW 7/11/2024 11:51 AM Reviewed PDMP [1]     Urine Drug Screenings (1 yr)    No resulted procedures found.       Medication Contract and Consent for Opioid Use Documents Filed       Patient Documents       Type of Document Status Date Received Received By Description    Medication Contract Received 5/24/2019 10:27 AM ROSALBA EASTON neuro    Medication Contract Received 12/17/2019 10:48 AM MEGAHN VALDIVIA Medication Agreement /Neuro 11/25/2019

## 2024-09-25 ENCOUNTER — APPOINTMENT (OUTPATIENT)
Dept: CT IMAGING | Facility: HOSPITAL | Age: 74
End: 2024-09-25
Payer: MEDICARE

## 2024-09-25 ENCOUNTER — HOSPITAL ENCOUNTER (EMERGENCY)
Facility: HOSPITAL | Age: 74
Discharge: HOME | End: 2024-09-25
Attending: EMERGENCY MEDICINE | Admitting: EMERGENCY MEDICINE
Payer: MEDICARE

## 2024-09-25 ENCOUNTER — APPOINTMENT (OUTPATIENT)
Dept: GENERAL RADIOLOGY | Facility: HOSPITAL | Age: 74
End: 2024-09-25
Payer: MEDICARE

## 2024-09-25 ENCOUNTER — OFFICE VISIT (OUTPATIENT)
Dept: PRIMARY CARE CLINIC | Age: 74
End: 2024-09-25
Payer: MEDICARE

## 2024-09-25 VITALS
RESPIRATION RATE: 20 BRPM | HEIGHT: 62 IN | SYSTOLIC BLOOD PRESSURE: 108 MMHG | WEIGHT: 287 LBS | OXYGEN SATURATION: 96 % | HEART RATE: 58 BPM | BODY MASS INDEX: 52.81 KG/M2 | TEMPERATURE: 98 F | DIASTOLIC BLOOD PRESSURE: 61 MMHG

## 2024-09-25 VITALS
OXYGEN SATURATION: 96 % | TEMPERATURE: 98 F | HEART RATE: 62 BPM | SYSTOLIC BLOOD PRESSURE: 130 MMHG | DIASTOLIC BLOOD PRESSURE: 82 MMHG

## 2024-09-25 DIAGNOSIS — Z71.89 ACP (ADVANCE CARE PLANNING): ICD-10-CM

## 2024-09-25 DIAGNOSIS — R40.4 TRANSIENT ALTERATION OF AWARENESS: Primary | ICD-10-CM

## 2024-09-25 DIAGNOSIS — R41.0 CONFUSION: ICD-10-CM

## 2024-09-25 DIAGNOSIS — I87.2 EDEMA OF BOTH LOWER EXTREMITIES DUE TO PERIPHERAL VENOUS INSUFFICIENCY: ICD-10-CM

## 2024-09-25 DIAGNOSIS — R30.0 DYSURIA: ICD-10-CM

## 2024-09-25 DIAGNOSIS — R44.3 HALLUCINATIONS: Primary | ICD-10-CM

## 2024-09-25 DIAGNOSIS — R09.02 HYPOXIA: ICD-10-CM

## 2024-09-25 DIAGNOSIS — N18.4 STAGE 4 CHRONIC KIDNEY DISEASE (HCC): ICD-10-CM

## 2024-09-25 DIAGNOSIS — I25.5 ISCHEMIC CARDIOMYOPATHY: ICD-10-CM

## 2024-09-25 DIAGNOSIS — E11.8 TYPE 2 DIABETES MELLITUS WITH COMPLICATION (HCC): ICD-10-CM

## 2024-09-25 LAB
ALBUMIN SERPL-MCNC: 4 G/DL (ref 3.5–5.2)
ALBUMIN/GLOB SERPL: 1.4 G/DL
ALP SERPL-CCNC: 100 U/L (ref 39–117)
ALT SERPL W P-5'-P-CCNC: <5 U/L (ref 1–33)
AMMONIA BLD-SCNC: 17 UMOL/L (ref 11–51)
ANION GAP SERPL CALCULATED.3IONS-SCNC: 10 MMOL/L (ref 5–15)
APPEARANCE FLUID: ABNORMAL
AST SERPL-CCNC: 9 U/L (ref 1–32)
B PARAPERT DNA SPEC QL NAA+PROBE: NOT DETECTED
B PERT DNA SPEC QL NAA+PROBE: NOT DETECTED
BACTERIA UR QL AUTO: ABNORMAL /HPF
BASOPHILS # BLD AUTO: 0.06 10*3/MM3 (ref 0–0.2)
BASOPHILS NFR BLD AUTO: 0.5 % (ref 0–1.5)
BILIRUB SERPL-MCNC: 0.3 MG/DL (ref 0–1.2)
BILIRUB UR QL STRIP: NEGATIVE
BILIRUBIN, POC: ABNORMAL
BLOOD URINE, POC: ABNORMAL
BUN SERPL-MCNC: 29 MG/DL (ref 8–23)
BUN/CREAT SERPL: 15.8 (ref 7–25)
C PNEUM DNA NPH QL NAA+NON-PROBE: NOT DETECTED
CALCIUM SPEC-SCNC: 9.7 MG/DL (ref 8.6–10.5)
CHLORIDE SERPL-SCNC: 100 MMOL/L (ref 98–107)
CLARITY UR: ABNORMAL
CLARITY, POC: ABNORMAL
CO2 SERPL-SCNC: 28 MMOL/L (ref 22–29)
COLOR UR: YELLOW
COLOR, POC: YELLOW
CREAT SERPL-MCNC: 1.84 MG/DL (ref 0.57–1)
D-LACTATE SERPL-SCNC: 1.2 MMOL/L (ref 0.5–2)
DEPRECATED RDW RBC AUTO: 50.8 FL (ref 37–54)
EGFRCR SERPLBLD CKD-EPI 2021: 28.7 ML/MIN/1.73
EOSINOPHIL # BLD AUTO: 0.29 10*3/MM3 (ref 0–0.4)
EOSINOPHIL NFR BLD AUTO: 2.5 % (ref 0.3–6.2)
ERYTHROCYTE [DISTWIDTH] IN BLOOD BY AUTOMATED COUNT: 18 % (ref 12.3–15.4)
FLUAV SUBTYP SPEC NAA+PROBE: NOT DETECTED
FLUBV RNA ISLT QL NAA+PROBE: NOT DETECTED
GLOBULIN UR ELPH-MCNC: 2.9 GM/DL
GLUCOSE SERPL-MCNC: 150 MG/DL (ref 65–99)
GLUCOSE UR STRIP-MCNC: NEGATIVE MG/DL
GLUCOSE URINE, POC: ABNORMAL MG/DL
HADV DNA SPEC NAA+PROBE: NOT DETECTED
HCOV 229E RNA SPEC QL NAA+PROBE: NOT DETECTED
HCOV HKU1 RNA SPEC QL NAA+PROBE: NOT DETECTED
HCOV NL63 RNA SPEC QL NAA+PROBE: NOT DETECTED
HCOV OC43 RNA SPEC QL NAA+PROBE: NOT DETECTED
HCT VFR BLD AUTO: 34.5 % (ref 34–46.6)
HGB BLD-MCNC: 10.2 G/DL (ref 12–15.9)
HGB UR QL STRIP.AUTO: NEGATIVE
HMPV RNA NPH QL NAA+NON-PROBE: NOT DETECTED
HPIV1 RNA ISLT QL NAA+PROBE: NOT DETECTED
HPIV2 RNA SPEC QL NAA+PROBE: NOT DETECTED
HPIV3 RNA NPH QL NAA+PROBE: NOT DETECTED
HPIV4 P GENE NPH QL NAA+PROBE: NOT DETECTED
HYALINE CASTS UR QL AUTO: ABNORMAL /LPF
IMM GRANULOCYTES # BLD AUTO: 0.08 10*3/MM3 (ref 0–0.05)
IMM GRANULOCYTES NFR BLD AUTO: 0.7 % (ref 0–0.5)
KETONES UR QL STRIP: NEGATIVE
KETONES, POC: ABNORMAL MG/DL
LEUKOCYTE EST, POC: ABNORMAL
LEUKOCYTE ESTERASE UR QL STRIP.AUTO: ABNORMAL
LYMPHOCYTES # BLD AUTO: 1.85 10*3/MM3 (ref 0.7–3.1)
LYMPHOCYTES NFR BLD AUTO: 16.1 % (ref 19.6–45.3)
M PNEUMO IGG SER IA-ACNC: NOT DETECTED
MAGNESIUM SERPL-MCNC: 1.8 MG/DL (ref 1.6–2.4)
MCH RBC QN AUTO: 23.2 PG (ref 26.6–33)
MCHC RBC AUTO-ENTMCNC: 29.6 G/DL (ref 31.5–35.7)
MCV RBC AUTO: 78.6 FL (ref 79–97)
MONOCYTES # BLD AUTO: 0.72 10*3/MM3 (ref 0.1–0.9)
MONOCYTES NFR BLD AUTO: 6.3 % (ref 5–12)
NEUTROPHILS NFR BLD AUTO: 73.9 % (ref 42.7–76)
NEUTROPHILS NFR BLD AUTO: 8.52 10*3/MM3 (ref 1.7–7)
NITRITE UR QL STRIP: NEGATIVE
NITRITE, POC: ABNORMAL
NRBC BLD AUTO-RTO: 0 /100 WBC (ref 0–0.2)
PH UR STRIP.AUTO: <=5 [PH] (ref 5–8)
PH, POC: 5.5
PLATELET # BLD AUTO: 263 10*3/MM3 (ref 140–450)
PMV BLD AUTO: 11.6 FL (ref 6–12)
POTASSIUM SERPL-SCNC: 4.4 MMOL/L (ref 3.5–5.2)
PROT SERPL-MCNC: 6.9 G/DL (ref 6–8.5)
PROT UR QL STRIP: NEGATIVE
PROTEIN, POC: ABNORMAL MG/DL
RBC # BLD AUTO: 4.39 10*6/MM3 (ref 3.77–5.28)
RBC # UR STRIP: ABNORMAL /HPF
REF LAB TEST METHOD: ABNORMAL
RHINOVIRUS RNA SPEC NAA+PROBE: NOT DETECTED
RSV RNA NPH QL NAA+NON-PROBE: NOT DETECTED
SARS-COV-2 RNA NPH QL NAA+NON-PROBE: NOT DETECTED
SODIUM SERPL-SCNC: 138 MMOL/L (ref 136–145)
SP GR UR STRIP: 1.01 (ref 1–1.03)
SPECIFIC GRAVITY, POC: 1.02
SQUAMOUS #/AREA URNS HPF: ABNORMAL /HPF
UROBILINOGEN UR QL STRIP: ABNORMAL
UROBILINOGEN, POC: 0.2 MG/DL
WBC # UR STRIP: ABNORMAL /HPF
WBC NRBC COR # BLD AUTO: 11.52 10*3/MM3 (ref 3.4–10.8)

## 2024-09-25 PROCEDURE — 3017F COLORECTAL CA SCREEN DOC REV: CPT | Performed by: NURSE PRACTITIONER

## 2024-09-25 PROCEDURE — 81001 URINALYSIS AUTO W/SCOPE: CPT | Performed by: EMERGENCY MEDICINE

## 2024-09-25 PROCEDURE — 87040 BLOOD CULTURE FOR BACTERIA: CPT | Performed by: EMERGENCY MEDICINE

## 2024-09-25 PROCEDURE — 36415 COLL VENOUS BLD VENIPUNCTURE: CPT

## 2024-09-25 PROCEDURE — 1090F PRES/ABSN URINE INCON ASSESS: CPT | Performed by: NURSE PRACTITIONER

## 2024-09-25 PROCEDURE — 1124F ACP DISCUSS-NO DSCNMKR DOCD: CPT | Performed by: NURSE PRACTITIONER

## 2024-09-25 PROCEDURE — 99284 EMERGENCY DEPT VISIT MOD MDM: CPT

## 2024-09-25 PROCEDURE — 1036F TOBACCO NON-USER: CPT | Performed by: NURSE PRACTITIONER

## 2024-09-25 PROCEDURE — 0202U NFCT DS 22 TRGT SARS-COV-2: CPT | Performed by: EMERGENCY MEDICINE

## 2024-09-25 PROCEDURE — 82140 ASSAY OF AMMONIA: CPT | Performed by: EMERGENCY MEDICINE

## 2024-09-25 PROCEDURE — 71045 X-RAY EXAM CHEST 1 VIEW: CPT

## 2024-09-25 PROCEDURE — 80053 COMPREHEN METABOLIC PANEL: CPT | Performed by: EMERGENCY MEDICINE

## 2024-09-25 PROCEDURE — 3051F HG A1C>EQUAL 7.0%<8.0%: CPT | Performed by: NURSE PRACTITIONER

## 2024-09-25 PROCEDURE — 99215 OFFICE O/P EST HI 40 MIN: CPT | Performed by: NURSE PRACTITIONER

## 2024-09-25 PROCEDURE — G8417 CALC BMI ABV UP PARAM F/U: HCPCS | Performed by: NURSE PRACTITIONER

## 2024-09-25 PROCEDURE — G8399 PT W/DXA RESULTS DOCUMENT: HCPCS | Performed by: NURSE PRACTITIONER

## 2024-09-25 PROCEDURE — 2022F DILAT RTA XM EVC RTNOPTHY: CPT | Performed by: NURSE PRACTITIONER

## 2024-09-25 PROCEDURE — 83735 ASSAY OF MAGNESIUM: CPT | Performed by: EMERGENCY MEDICINE

## 2024-09-25 PROCEDURE — 85025 COMPLETE CBC W/AUTO DIFF WBC: CPT | Performed by: EMERGENCY MEDICINE

## 2024-09-25 PROCEDURE — 70450 CT HEAD/BRAIN W/O DYE: CPT

## 2024-09-25 PROCEDURE — 81002 URINALYSIS NONAUTO W/O SCOPE: CPT | Performed by: NURSE PRACTITIONER

## 2024-09-25 PROCEDURE — G8427 DOCREV CUR MEDS BY ELIG CLIN: HCPCS | Performed by: NURSE PRACTITIONER

## 2024-09-25 PROCEDURE — 83605 ASSAY OF LACTIC ACID: CPT | Performed by: EMERGENCY MEDICINE

## 2024-09-25 RX ORDER — OXYBUTYNIN CHLORIDE 15 MG/1
15 TABLET, EXTENDED RELEASE ORAL DAILY
COMMUNITY
Start: 2024-08-20

## 2024-09-25 RX ORDER — SODIUM CHLORIDE 0.9 % (FLUSH) 0.9 %
10 SYRINGE (ML) INJECTION AS NEEDED
Status: DISCONTINUED | OUTPATIENT
Start: 2024-09-25 | End: 2024-09-25 | Stop reason: HOSPADM

## 2024-09-25 ASSESSMENT — ENCOUNTER SYMPTOMS
RESPIRATORY NEGATIVE: 1
BACK PAIN: 1
GASTROINTESTINAL NEGATIVE: 1

## 2024-09-25 NOTE — ED PROVIDER NOTES
Subjective   History of Present Illness  Patient is brought to emergency room by her daughters with a complaint of altered mental status.  They state for the past week or so the patient is confused and seeing things that are not there.  She apparently recognizes family and can generally answers questions correctly as to date and time but at other times with the children in the room or not there and at night patient will asked to go home when she is already at home.  They says she was treated for recent UTI just finished her antibiotics today.  This kind of behavior happens often with urinary tract infection for which she has a lot of but normally clears after couple days in the antibiotics and has not cleared.  They do not have any fever or chills.  She has had a slight cough but no vomiting or diarrhea.  Patient's daughters also state that her doctor requested a CT head and an ammonia level but do not know of any known liver disease.  They said they chose [facility] because she sees neurology here for Parkinson's disease.  She does use oxygen at night for her asthma but does not only use it during the day.    History provided by:  Patient and relative  History limited by:  Mental status change   used: No    Altered Mental Status  Presenting symptoms: confusion and disorientation    Severity:  Moderate  Most recent episode:  More than 2 days ago  Episode history:  Single  Duration:  7 days  Timing:  Constant  Progression:  Unchanged  Chronicity:  New  Context: recent illness and recent infection    Context: not alcohol use, not dementia, not drug use, not head injury, not homeless, taking medications as prescribed, not nursing home resident and not recent change in medication    Associated symptoms: hallucinations    Associated symptoms: no abdominal pain, normal movement, no agitation, no bladder incontinence, no decreased appetite, no depression, no difficulty breathing, no eye deviation, no  fever, no headaches, no light-headedness, no nausea, no palpitations, no rash, no seizures, no slurred speech, no suicidal behavior, no visual change, no vomiting and no weakness        Review of Systems   Constitutional: Negative.  Negative for decreased appetite and fever.   HENT: Negative.     Respiratory:  Positive for cough.    Cardiovascular: Negative.  Negative for palpitations.   Gastrointestinal: Negative.  Negative for abdominal pain, nausea and vomiting.   Genitourinary: Negative.  Negative for bladder incontinence.   Musculoskeletal: Negative.    Skin: Negative.  Negative for rash.   Neurological: Negative.  Negative for seizures, weakness, light-headedness and headaches.   Psychiatric/Behavioral:  Positive for confusion and hallucinations. Negative for agitation.    All other systems reviewed and are negative.      Past Medical History:   Diagnosis Date    Diabetes mellitus     GERD (gastroesophageal reflux disease)     Hypertension        Allergies   Allergen Reactions    Codeine Other (See Comments) and Nausea Only     unknown  itching      Hydrocodone-Acetaminophen Nausea Only    Silver Other (See Comments) and Rash     Burning and stinging    Burning and stinging   Burning and stinging    Silver Sulfadiazine Other (See Comments)     Stinging and burning    Stinging and burning   Stinging and burning       Past Surgical History:   Procedure Laterality Date    ENDOSCOPY         Family History   Problem Relation Age of Onset    Hypertension Mother     Hypertension Father     Diabetes Father        Social History     Socioeconomic History    Marital status:    Tobacco Use    Smoking status: Never    Smokeless tobacco: Never   Vaping Use    Vaping status: Never Used   Substance and Sexual Activity    Alcohol use: No    Drug use: No    Sexual activity: Defer       Prior to Admission medications    Medication Sig Start Date End Date Taking? Authorizing Provider   albuterol (ACCUNEB) 0.63 MG/3ML  nebulizer solution Take 3 mL by nebulization Every 6 (Six) Hours As Needed for Wheezing or Shortness of Air. 2/21/20   Marlo Howard APRN   albuterol sulfate HFA (VENTOLIN HFA) 108 (90 Base) MCG/ACT inhaler Inhale 2 puffs Every 4 (Four) Hours As Needed for Wheezing or Shortness of Air. 2/21/20   Marlo Howard APRN   allopurinol (ZYLOPRIM) 100 MG tablet Take 1 tablet by mouth Daily.    Vandana Topete MD   amantadine (SYMMETREL) 100 MG capsule Take 1 capsule by mouth Every Night. Only does at night 8/20/24   Valerio Dempsey APRN   atorvastatin (LIPITOR) 80 MG tablet Take 1 tablet by mouth Daily.    Vandana Topete MD   calcitriol (ROCALTROL) 0.25 MCG capsule Take 1 capsule by mouth Daily.    Vandana Topete MD   carbidopa-levodopa (SINEMET)  MG per tablet Take 2 tablets by mouth 4 (Four) Times a Day. 8/20/24   Valerio Dempsey APRN   Cholecalciferol (VITAMIN D3) 5000 units tablet tablet Take 1 tablet by mouth Daily.    Vandana Topete MD   citalopram (CeleXA) 10 MG tablet Take 1 tablet by mouth Daily. 10/10/23   Vandana Topete MD   clobetasol (TEMOVATE) 0.05 % ointment Apply external vagina 3 x a day for week one, then decrease to BID on week two, on week 3 once a day, on week four every other day then stop 3/2/18   Vandana Topeet MD   Eliquis 5 MG tablet tablet Take 1 tablet by mouth 2 (Two) Times a Day. 10/2/23   Vandana Topete MD   fluconazole (DIFLUCAN) 150 MG tablet Take one on the 15th of every month 6/14/18   Vandana Topete MD   Fluticasone Furoate (ARNUITY ELLIPTA) 100 MCG/ACT aerosol powder  Inhale 1 puff Daily. 11/6/19   Marlo Howard APRN   gabapentin (NEURONTIN) 600 MG tablet Take 1 tablet by mouth 3 (Three) Times a Day.    Vandana Topete MD   insulin aspart (novoLOG FLEXPEN) 100 UNIT/ML solution pen-injector sc pen INJECT 15 UNITS INTO THE SKIN 3 TIMES DAILY (BEFORE MEALS) 6/14/18   Provider,  MD Vandana   insulin detemir (LEVEMIR) 100 UNIT/ML injection Inject  under the skin into the appropriate area as directed As Needed. 10/26/18   Vandana Topete MD   isosorbide mononitrate (IMDUR) 30 MG 24 hr tablet Take 1 tablet by mouth Daily. 10/2/23   Vandana Topete MD   linaclotide (LINZESS) 145 MCG capsule capsule Take 1 capsule by mouth Every Morning Before Breakfast.    Vandana Topete MD   metoprolol tartrate (LOPRESSOR) 25 MG tablet Take 1 tablet by mouth 2 (Two) Times a Day With Meals. 7/18/23   Vandana Topete MD   montelukast (SINGULAIR) 10 MG tablet Take 1 tablet by mouth Every Night.    Vandana Topete MD   nystatin (MYCOSTATIN) 453434 UNIT/GM ointment Apply topically 2 times daily.for yeast 6/14/18   Vandana Topete MD   nystatin (MYCOSTATIN) 246336 UNIT/ML suspension nystatin 100,000 unit/mL oral suspension    Vandana Topete MD   O2 (OXYGEN) Inhale 2 L/min Every Night.    Vandana Topete MD   omeprazole (priLOSEC) 40 MG capsule Take 1 capsule by mouth Daily.    Vandana Topete MD   rOPINIRole (REQUIP) 4 MG tablet Take 2 tablets in the morning, 1 tablet midday, and two tablets in the evening. 6/20/24   Valerio Dempsey APRN   spironolactone (ALDACTONE) 25 MG tablet Take 1 tablet by mouth 2 (Two) Times a Day.    Vandana Topete MD   tiZANidine (ZANAFLEX) 2 MG tablet Take 1 tablet by mouth Every 8 (Eight) Hours.    Vandana Topete MD   torsemide (DEMADEX) 20 MG tablet Take 2 tablets by mouth Daily.    Vandana Topete MD   traMADol 50 MG tablet 2 tablet, acetaminophen 325 MG tablet 2 tablet Take  by mouth Every 6 (Six) Hours As Needed for Moderate Pain .    Vandana Topete MD       Medications   sodium chloride 0.9 % flush 10 mL (has no administration in time range)       Vitals:    09/25/24 1240   BP: 145/60   Pulse: 58   Resp: 18   Temp: 98 °F (36.7 °C)   SpO2: 96%         Objective   Physical Exam  Vitals and nursing  note reviewed.   Constitutional:       Appearance: She is well-developed.   HENT:      Head: Normocephalic and atraumatic.   Eyes:      Extraocular Movements: Extraocular movements intact.      Pupils: Pupils are equal, round, and reactive to light.   Cardiovascular:      Rate and Rhythm: Normal rate and regular rhythm.   Pulmonary:      Effort: Pulmonary effort is normal.      Breath sounds: Normal breath sounds.   Abdominal:      General: Bowel sounds are normal.      Palpations: Abdomen is soft.   Musculoskeletal:         General: Normal range of motion.      Cervical back: Normal range of motion and neck supple.   Skin:     General: Skin is warm and dry.      Coloration: Skin is not cyanotic.   Neurological:      Mental Status: She is alert and oriented to person, place, and time. She is lethargic.      Comments: Patient seems to answer basic questions okay right now but she does seem to be a little lethargic and slow in her answers.   Psychiatric:         Mood and Affect: Mood normal.         Behavior: Behavior normal.         Procedures         Lab Results (last 24 hours)       Procedure Component Value Units Date/Time    CBC & Differential [[id number]]  (Abnormal) Collected: 09/25/24 1509    Specimen: Blood Updated: 09/25/24 1520    Narrative:      The following orders were created for panel order CBC & Differential.  Procedure                               Abnormality         Status                     ---------                               -----------         ------                     CBC Auto Differential[[id number]]        Abnormal            Final result                 Please view results for these tests on the individual orders.    Comprehensive Metabolic Panel [[id number]]  (Abnormal) Collected: 09/25/24 1509    Specimen: Blood Updated: 09/25/24 1537     Glucose 150 mg/dL      BUN 29 mg/dL      Creatinine 1.84 mg/dL      Sodium 138 mmol/L      Potassium 4.4 mmol/L      Comment: Slight hemolysis  detected by analyzer. Result may be falsely elevated.        Chloride 100 mmol/L      CO2 28.0 mmol/L      Calcium 9.7 mg/dL      Total Protein 6.9 g/dL      Albumin 4.0 g/dL      ALT (SGPT) <5 U/L      AST (SGOT) 9 U/L      Alkaline Phosphatase 100 U/L      Total Bilirubin 0.3 mg/dL      Globulin 2.9 gm/dL      A/G Ratio 1.4 g/dL      BUN/Creatinine Ratio 15.8     Anion Gap 10.0 mmol/L      eGFR 28.7 mL/min/1.73     Narrative:      GFR Normal >60  Chronic Kidney Disease <60  Kidney Failure <15    The GFR formula is only valid for adults with stable renal function between ages 18 and 70.    Ammonia [[id number]]  (Normal) Collected: 09/25/24 1509    Specimen: Blood Updated: 09/25/24 1538     Ammonia 17 umol/L     Magnesium [[id number]]  (Normal) Collected: 09/25/24 1509    Specimen: Blood Updated: 09/25/24 1537     Magnesium 1.8 mg/dL     Lactic Acid, Plasma [[id number]]  (Normal) Collected: 09/25/24 1509    Specimen: Blood Updated: 09/25/24 1535     Lactate 1.2 mmol/L     CBC Auto Differential [[id number]]  (Abnormal) Collected: 09/25/24 1509    Specimen: Blood Updated: 09/25/24 1520     WBC 11.52 10*3/mm3      RBC 4.39 10*6/mm3      Hemoglobin 10.2 g/dL      Hematocrit 34.5 %      MCV 78.6 fL      MCH 23.2 pg      MCHC 29.6 g/dL      RDW 18.0 %      RDW-SD 50.8 fl      MPV 11.6 fL      Platelets 263 10*3/mm3      Neutrophil % 73.9 %      Lymphocyte % 16.1 %      Monocyte % 6.3 %      Eosinophil % 2.5 %      Basophil % 0.5 %      Immature Grans % 0.7 %      Neutrophils, Absolute 8.52 10*3/mm3      Lymphocytes, Absolute 1.85 10*3/mm3      Monocytes, Absolute 0.72 10*3/mm3      Eosinophils, Absolute 0.29 10*3/mm3      Basophils, Absolute 0.06 10*3/mm3      Immature Grans, Absolute 0.08 10*3/mm3      nRBC 0.0 /100 WBC     Blood Culture - Blood, Arm, Right [[id number]] Collected: 09/25/24 1511    Specimen: Blood from Arm, Right Updated: 09/25/24 1519    Blood Culture - Blood, Arm, Left [[id number]] Collected: 09/25/24 1511     Specimen: Blood from Arm, Left Updated: 09/25/24 1519    Respiratory Panel PCR w/COVID-19(SARS-CoV-2) HAZEL/TYLER/FAWAD/PAD/COR/ANNETTE In-House, NP Swab in UTM/VTM, 2 HR TAT - Swab, Nasopharynx [[id number]]  (Normal) Collected: 09/25/24 1524    Specimen: Swab from Nasopharynx Updated: 09/25/24 1632     ADENOVIRUS, PCR Not Detected     Coronavirus 229E Not Detected     Coronavirus HKU1 Not Detected     Coronavirus NL63 Not Detected     Coronavirus OC43 Not Detected     COVID19 Not Detected     Human Metapneumovirus Not Detected     Human Rhinovirus/Enterovirus Not Detected     Influenza A PCR Not Detected     Influenza B PCR Not Detected     Parainfluenza Virus 1 Not Detected     Parainfluenza Virus 2 Not Detected     Parainfluenza Virus 3 Not Detected     Parainfluenza Virus 4 Not Detected     RSV, PCR Not Detected     Bordetella pertussis pcr Not Detected     Bordetella parapertussis PCR Not Detected     Chlamydophila pneumoniae PCR Not Detected     Mycoplasma pneumo by PCR Not Detected    Narrative:      In the setting of a positive respiratory panel with a viral infection PLUS a negative procalcitonin without other underlying concern for bacterial infection, consider observing off antibiotics or discontinuation of antibiotics and continue supportive care. If the respiratory panel is positive for atypical bacterial infection (Bordetella pertussis, Chlamydophila pneumoniae, or Mycoplasma pneumoniae), consider antibiotic de-escalation to target atypical bacterial infection.    Urinalysis With Culture If Indicated - Urine, Catheter [[id number]]  (Abnormal) Collected: 09/25/24 1524    Specimen: Urine, Catheter Updated: 09/25/24 1603     Color, UA Yellow     Appearance, UA Cloudy     pH, UA <=5.0     Specific Gravity, UA 1.009     Glucose, UA Negative     Ketones, UA Negative     Bilirubin, UA Negative     Blood, UA Negative     Protein, UA Negative     Leuk Esterase, UA Large (3+)     Nitrite, UA Negative     Urobilinogen, UA  0.2 E.U./dL    Narrative:      In absence of clinical symptoms, the presence of pyuria, bacteria, and/or nitrites on the urinalysis result does not correlate with infection.    Urinalysis, Microscopic Only - Urine, Catheter [[id number]]  (Abnormal) Collected: 09/25/24 1524    Specimen: Urine, Catheter Updated: 09/25/24 1603     RBC, UA None Seen /HPF      WBC, UA 3-5 /HPF      Comment: Urine culture not indicated.        Bacteria, UA None Seen /HPF      Squamous Epithelial Cells, UA 7-12 /HPF      Hyaline Casts, UA None Seen /LPF      Methodology Manual Light Microscopy            CT Head Without Contrast   Final Result   Impression:         No acute intracranial abnormality.       This report was signed and finalized on 9/25/2024 4:51 PM by Harley Thapa.          XR Chest 1 View   Final Result       1. Probable atelectasis on the left.       This report was signed and finalized on 9/25/2024 2:29 PM by Efra Wilde.              ED Course          MDM  Number of Diagnoses or Management Options  Transient alteration of awareness: new and requires workup  Diagnosis management comments: I told the patient and her daughters at length.  Her testing here is all essentially benign or baseline for her.  She does have some chronic kidney disease but not really significantly different than before.  Her chest x-ray does not reveal pneumonia.  Her CT scan of her head is negative for stroke.  Her UA does not show any signs of infection at the present time.  I talked with him at length and I told him that I do not know the patient's entire history but it seems unlikely to me that she actually has that frequent full-blown infections in her urine that cause confusion.  I can tell that she has had some infections in her urine and the patient's daughters tell that she has had cultures in the last 1 or 2 different bacteria but she was on antibiotics that should have killed them both.  I think some of this may be some dementia  setting and because of her parkinsonism.  She is wheelchair-bound because of that and unable to ambulate otherwise.  She will be prone to urinary tract infections but at least 1 something did not seem bad enough to cause significant symptoms of altered mental status.  At any rate I do not see anything that would be accomplished to be in the hospital right now.  I did offer to go ahead and get her admitted to the hospitalist services if they so desired to get further checkup just in case but I think this is can be a chronic problem they actually get worse in the hospital.  They have decided they would rather take her home and follow-up with their family doctor and/or neurologist as an outpatient and I think it is actually the best idea.  She is discharged in stable condition.       Amount and/or Complexity of Data Reviewed  Clinical lab tests: ordered and reviewed  Tests in the radiology section of CPT®: reviewed and ordered  Tests in the medicine section of CPT®: ordered and reviewed  Decide to obtain previous medical records or to obtain history from someone other than the patient: yes    Risk of Complications, Morbidity, and/or Mortality  Presenting problems: moderate  Diagnostic procedures: moderate  Management options: moderate    Patient Progress  Patient progress: stable        Final diagnoses:   Transient alteration of awareness          Marin Bernal Jr., MD  09/25/24 [id number]

## 2024-09-30 LAB
BACTERIA SPEC AEROBE CULT: NORMAL
BACTERIA SPEC AEROBE CULT: NORMAL

## 2024-10-01 ENCOUNTER — TELEPHONE (OUTPATIENT)
Dept: PRIMARY CARE CLINIC | Age: 74
End: 2024-10-01

## 2024-10-01 RX ORDER — QUETIAPINE FUMARATE 25 MG/1
TABLET, FILM COATED ORAL
Qty: 60 TABLET | Refills: 0 | Status: SHIPPED | OUTPATIENT
Start: 2024-10-01

## 2024-10-01 NOTE — TELEPHONE ENCOUNTER
Daughter states Pt was in last week and you sent to [facility] ER. All results in Chart. ER doc States all labs and CT Scan were alright but the Doctor states she definitely has some Dementia. Daughter states confusion is worse. She is seeing fear in Pt. Pt states her bedroom is not hers. Daughter asking about meds?

## 2024-10-01 NOTE — TELEPHONE ENCOUNTER
I have sent her Seroquel and and I want them to give her half twice a day until they can get her in to see neurology.  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  I think is important that she get to see neurology so they can help us figure this out

## 2024-10-02 ENCOUNTER — TELEPHONE (OUTPATIENT)
Dept: CARDIOLOGY | Facility: CLINIC | Age: 74
End: 2024-10-02

## 2024-10-02 NOTE — TELEPHONE ENCOUNTER
PATIENT DAUGHTER CALLING TO FIND OUT WHO PT WILL NOW SEE AND SCHEDULE APPOINTMENT    PLEASE ADVISE ALEK

## 2024-10-15 ENCOUNTER — TELEPHONE (OUTPATIENT)
Dept: PRIMARY CARE CLINIC | Age: 74
End: 2024-10-15

## 2024-10-15 RX ORDER — QUETIAPINE FUMARATE 25 MG/1
TABLET, FILM COATED ORAL
Qty: 60 TABLET | Refills: 0 | Status: SHIPPED | OUTPATIENT
Start: 2024-10-15

## 2024-10-15 NOTE — TELEPHONE ENCOUNTER
Can you call and see if they are doing Seroquel half a pill twice a day if so were going to increase it to a whole pill twice a day.  We need to get her into see the neurologist this could be Parkinson's psychosis, do they have a neurologist she sees for Parkinson's

## 2024-10-15 NOTE — TELEPHONE ENCOUNTER
Pt Daughter states pt is getting worse with the Delusions.accusing her  of having an affair with different people. Some men, some women, pt does not think she is home. Always stating she wants to go home. Very anxious, not eating. Daughter asking if med can be increased? She states by splitting pill she isn't always getting the dose she should. Pt does have HH and [facility] is suppose to start but not until January.

## 2024-10-16 ENCOUNTER — TELEPHONE (OUTPATIENT)
Dept: NEUROLOGY | Facility: CLINIC | Age: 74
End: 2024-10-16
Payer: MEDICARE

## 2024-10-16 NOTE — TELEPHONE ENCOUNTER
----- Message from Buffy ROGERS sent at 10/15/2024 11:05 AM CDT -----  Anna Mata, PCP, is concerned patient is in PD psychosis. hilary Sen/ PCP office, called to ask if patient's December appt. Could be moved up. I told her I would let you know about the concerns.    Francy richard/ Anna Mata   [phone]

## 2024-10-26 DIAGNOSIS — G25.81 RESTLESS LEGS SYNDROME: ICD-10-CM

## 2024-10-28 NOTE — TELEPHONE ENCOUNTER
Provider needs to review PDMP    PDMP Monitoring:    Last PDMP Paul as Reviewed ([place]):  Review User Review Instant Review Result   TAMARA NEW 7/11/2024 11:51 AM Reviewed PDMP [1]     Urine Drug Screenings (1 yr)    No resulted procedures found.       Medication Contract and Consent for Opioid Use Documents Filed       Patient Documents       Type of Document Status Date Received Received By Description    Medication Contract Received 5/24/2019 10:27 AM ROSALBA EASTON neuro    Medication Contract Received 12/17/2019 10:48 AM MEGHAN VALDIVIA Medication Agreement /Neuro 11/25/2019

## 2024-10-29 DIAGNOSIS — G25.81 RESTLESS LEGS SYNDROME: ICD-10-CM

## 2024-10-29 RX ORDER — GABAPENTIN 600 MG/1
600 TABLET ORAL 3 TIMES DAILY
Qty: 90 TABLET | Refills: 0 | OUTPATIENT
Start: 2024-10-29 | End: 2024-11-28

## 2024-10-29 RX ORDER — GABAPENTIN 600 MG/1
600 TABLET ORAL 3 TIMES DAILY
Qty: 90 TABLET | Refills: 0 | Status: SHIPPED | OUTPATIENT
Start: 2024-10-29 | End: 2024-11-28

## 2024-10-30 RX ORDER — GABAPENTIN 600 MG/1
600 TABLET ORAL 3 TIMES DAILY
Qty: 90 TABLET | Refills: 0 | OUTPATIENT
Start: 2024-10-30 | End: 2024-11-29

## 2024-11-06 RX ORDER — QUETIAPINE FUMARATE 25 MG/1
TABLET, FILM COATED ORAL
Qty: 60 TABLET | Refills: 0 | Status: SHIPPED | OUTPATIENT
Start: 2024-11-06

## 2024-11-12 ENCOUNTER — OFFICE VISIT (OUTPATIENT)
Dept: PRIMARY CARE CLINIC | Age: 74
End: 2024-11-12
Payer: MEDICARE

## 2024-11-12 VITALS
HEIGHT: 62 IN | WEIGHT: 267 LBS | DIASTOLIC BLOOD PRESSURE: 70 MMHG | HEART RATE: 63 BPM | RESPIRATION RATE: 16 BRPM | OXYGEN SATURATION: 98 % | SYSTOLIC BLOOD PRESSURE: 124 MMHG | TEMPERATURE: 96 F | BODY MASS INDEX: 49.13 KG/M2

## 2024-11-12 DIAGNOSIS — G20.A1 MILD DEMENTIA DUE TO PARKINSON'S DISEASE, WITH PSYCHOTIC DISTURBANCE (HCC): ICD-10-CM

## 2024-11-12 DIAGNOSIS — G20.A1 PARKINSON DISEASE, SYMPTOMATIC (HCC): ICD-10-CM

## 2024-11-12 DIAGNOSIS — R41.0 CONFUSION: ICD-10-CM

## 2024-11-12 DIAGNOSIS — Z71.89 ACP (ADVANCE CARE PLANNING): ICD-10-CM

## 2024-11-12 DIAGNOSIS — F02.A2 MILD DEMENTIA DUE TO PARKINSON'S DISEASE, WITH PSYCHOTIC DISTURBANCE (HCC): ICD-10-CM

## 2024-11-12 DIAGNOSIS — E11.8 TYPE 2 DIABETES MELLITUS WITH COMPLICATION (HCC): Primary | ICD-10-CM

## 2024-11-12 DIAGNOSIS — N18.4 STAGE 4 CHRONIC KIDNEY DISEASE (HCC): ICD-10-CM

## 2024-11-12 LAB
ALBUMIN SERPL-MCNC: 3.8 G/DL (ref 3.5–5.2)
ALP SERPL-CCNC: 114 U/L (ref 35–104)
ALT SERPL-CCNC: 8 U/L (ref 5–33)
ANION GAP SERPL CALCULATED.3IONS-SCNC: 13 MMOL/L (ref 7–19)
AST SERPL-CCNC: 17 U/L (ref 5–32)
BASOPHILS # BLD: 0.1 K/UL (ref 0–0.2)
BASOPHILS NFR BLD: 0.6 % (ref 0–1)
BILIRUB SERPL-MCNC: 0.4 MG/DL (ref 0.2–1.2)
BUN SERPL-MCNC: 36 MG/DL (ref 8–23)
CALCIUM SERPL-MCNC: 10.4 MG/DL (ref 8.8–10.2)
CHLORIDE SERPL-SCNC: 94 MMOL/L (ref 98–111)
CO2 SERPL-SCNC: 28 MMOL/L (ref 22–29)
CREAT SERPL-MCNC: 1.7 MG/DL (ref 0.5–0.9)
EOSINOPHIL # BLD: 0.4 K/UL (ref 0–0.6)
EOSINOPHIL NFR BLD: 2.5 % (ref 0–5)
ERYTHROCYTE [DISTWIDTH] IN BLOOD BY AUTOMATED COUNT: 20.1 % (ref 11.5–14.5)
GLUCOSE SERPL-MCNC: 209 MG/DL (ref 70–99)
HCT VFR BLD AUTO: 40 % (ref 37–47)
HGB BLD-MCNC: 11.6 G/DL (ref 12–16)
IMM GRANULOCYTES # BLD: 0.1 K/UL
LYMPHOCYTES # BLD: 1.8 K/UL (ref 1.1–4.5)
LYMPHOCYTES NFR BLD: 12.7 % (ref 20–40)
MCH RBC QN AUTO: 23.8 PG (ref 27–31)
MCHC RBC AUTO-ENTMCNC: 29 G/DL (ref 33–37)
MCV RBC AUTO: 82 FL (ref 81–99)
MONOCYTES # BLD: 0.7 K/UL (ref 0–0.9)
MONOCYTES NFR BLD: 4.9 % (ref 0–10)
NEUTROPHILS # BLD: 11.3 K/UL (ref 1.5–7.5)
NEUTS SEG NFR BLD: 78.5 % (ref 50–65)
PLATELET # BLD AUTO: 215 K/UL (ref 130–400)
PMV BLD AUTO: 12.3 FL (ref 9.4–12.3)
POTASSIUM SERPL-SCNC: 4.5 MMOL/L (ref 3.5–5)
PROT SERPL-MCNC: 7.2 G/DL (ref 6.4–8.3)
RBC # BLD AUTO: 4.88 M/UL (ref 4.2–5.4)
SODIUM SERPL-SCNC: 135 MMOL/L (ref 136–145)
WBC # BLD AUTO: 14.4 K/UL (ref 4.8–10.8)

## 2024-11-12 PROCEDURE — G8427 DOCREV CUR MEDS BY ELIG CLIN: HCPCS | Performed by: NURSE PRACTITIONER

## 2024-11-12 PROCEDURE — 1036F TOBACCO NON-USER: CPT | Performed by: NURSE PRACTITIONER

## 2024-11-12 PROCEDURE — 3051F HG A1C>EQUAL 7.0%<8.0%: CPT | Performed by: NURSE PRACTITIONER

## 2024-11-12 PROCEDURE — 2022F DILAT RTA XM EVC RTNOPTHY: CPT | Performed by: NURSE PRACTITIONER

## 2024-11-12 PROCEDURE — G8399 PT W/DXA RESULTS DOCUMENT: HCPCS | Performed by: NURSE PRACTITIONER

## 2024-11-12 PROCEDURE — 99214 OFFICE O/P EST MOD 30 MIN: CPT | Performed by: NURSE PRACTITIONER

## 2024-11-12 PROCEDURE — G8417 CALC BMI ABV UP PARAM F/U: HCPCS | Performed by: NURSE PRACTITIONER

## 2024-11-12 PROCEDURE — 1159F MED LIST DOCD IN RCRD: CPT | Performed by: NURSE PRACTITIONER

## 2024-11-12 PROCEDURE — 1124F ACP DISCUSS-NO DSCNMKR DOCD: CPT | Performed by: NURSE PRACTITIONER

## 2024-11-12 PROCEDURE — 1090F PRES/ABSN URINE INCON ASSESS: CPT | Performed by: NURSE PRACTITIONER

## 2024-11-12 PROCEDURE — 3017F COLORECTAL CA SCREEN DOC REV: CPT | Performed by: NURSE PRACTITIONER

## 2024-11-12 PROCEDURE — G8484 FLU IMMUNIZE NO ADMIN: HCPCS | Performed by: NURSE PRACTITIONER

## 2024-11-12 NOTE — PROGRESS NOTES
[facility]  [id number] [facility]  [place] [address]  Dept: [phone]  Dept Fax: [phone]  Loc: [phone]    Erlinda Chen is a 73 y.o. female who presents today for her medical conditions/complaints as noted below.  Erlinda Chen is c/o of Follow-Up from Hospital ([facility], 10/31-11/5, for pneumonia)        HPI:     HPI   Chief Complaint   Patient presents with    Follow-Up from Hospital     [facility], 10/31-11/5, for pneumonia   She was at [facility] so I cannot see those records but they did send a chest x-ray over also her CT of her brain and her ultrasound of her carotids.  Her daughters are with her today says she is unable to get up and move around anymore they are having to lift her.  The patient says she feels like she is going to fall and that her feet are going to be under her so she has not been standing and walking and all she does pivot. She is still getting confused at times and talking about things that are not there. She often accuses her  of doing things that are untrue. The daughters are very involved in her care and take care of meds. Her legs are good right now and dont have any open sores.   Past Medical History:   Diagnosis Date    Asthma 04/21/2015    Bilateral carpal tunnel syndrome 04/09/2018    sees dr. ronald alford.    CHF (congestive heart failure) (HCC)     Colon polyp     COPD (chronic obstructive pulmonary disease) (HCC)     Diabetes mellitus (HCC)     GERD (gastroesophageal reflux disease)     HTN (hypertension)     Hyperlipidemia     Mild mitral regurgitation by prior echocardiogram 02/11/2016    Morbid obesity 10/18/2017    Normal cardiac stress test 04/02/2009    no ischemia at attained level of excercise    Palliative care patient 08/05/2020    Parkinson disease (HCC)     Paroxysmal atrial fibrillation (HCC)     sees dr. alford    Post-menopausal     Prolonged emergence from general

## 2024-11-12 NOTE — PATIENT INSTRUCTIONS
Refer neurology  Try to increase fluids  Discuss with neurology if we should get therapy involved again.   Labs today    Advance Care Planning     Advance Care Planning opens a door to talk about and write down your wishes before a sudden accident or illness.  Make your goals, values, and preferences known.     This puts you in the ’s seat and helps others know what matters most to you so they won’t have to guess.      Where can you learn more?    Go to [email or web address]   to learn how to:    Name someone you trust to make healthcare decisions for you, only if you can’t. (Healthcare Power of )    Document your wishes for care if you were seriously ill and not expected to recover or are approaching end of life. (Advance Directive or Living Will)    The same page can be found using the QR code below.

## 2024-11-18 DIAGNOSIS — I70.213 ATHEROSCLER OF NATIVE ARTERY OF BOTH LEGS WITH INTERMIT CLAUDICATION (HCC): Primary | ICD-10-CM

## 2024-12-02 ENCOUNTER — TELEPHONE (OUTPATIENT)
Dept: PRIMARY CARE CLINIC | Age: 74
End: 2024-12-02

## 2024-12-02 RX ORDER — CIPROFLOXACIN 250 MG/1
250 TABLET, FILM COATED ORAL 2 TIMES DAILY
Qty: 10 TABLET | Refills: 0 | Status: SHIPPED | OUTPATIENT
Start: 2024-12-02 | End: 2024-12-07

## 2024-12-03 NOTE — TELEPHONE ENCOUNTER
I sent an antibiotic for her uti. She also has decreasing kidney function. Ask the daughter if she is seeing a nephrologist (kidney doctor). She used to but not sure she is.

## 2025-01-23 ENCOUNTER — TELEPHONE (OUTPATIENT)
Age: 75
End: 2025-01-23

## 2025-01-23 NOTE — TELEPHONE ENCOUNTER
Spoke with patients daughterBrijesh Coffey is now in a nursing home and they are discontinuing the Prolia at this time.

## 2025-01-31 NOTE — TELEPHONE ENCOUNTER
Pt saw Dr. Latasha Bueno in [address] [place] [address] yesterday. Pt has Gout. Labs were done Uric Acid 8.7. They are faxing labs. He did not know what to prescribe her due to her Kidney function. Dr. Latasha Bueno wants you to send med. Pt has severe foot pain.
No

## 2025-02-03 ENCOUNTER — TELEPHONE (OUTPATIENT)
Dept: CARDIOLOGY CLINIC | Age: 75
End: 2025-02-03

## (undated) DEVICE — PACK,UNIVERSAL,NO GOWNS: Brand: MEDLINE

## (undated) DEVICE — MINOR CDS: Brand: MEDLINE INDUSTRIES, INC.

## (undated) DEVICE — AMBU AURA-I U SIZE 3, DISPOSABLE LARYNGEAL MASK: Brand: AURA-I

## (undated) DEVICE — BANDAGE COMPR W6INXL15FT BGE E SGL LAYERED CLP CLSR

## (undated) DEVICE — STERILE POLYISOPRENE POWDER-FREE SURGICAL GLOVES: Brand: PROTEXIS

## (undated) DEVICE — TOWEL,OR,DSP,ST,BLUE,DLX,4/PK,20PK/CS: Brand: MEDLINE

## (undated) DEVICE — GLOVE SURG SZ 65 L12IN FNGR THK79MIL GRN LTX FREE

## (undated) DEVICE — DRAPE SHEET: Brand: UNBRANDED

## (undated) DEVICE — INTRODUCER SHTH 7FR L11CM CLSR FAST SET

## (undated) DEVICE — BANDAGE COMPR W4INXL15FT BGE E SGL LAYERED CLP CLSR

## (undated) DEVICE — GLOVE SURG SZ 85 L12IN FNGR THK13MIL BRN LTX SYN POLYMER W

## (undated) DEVICE — CATHETER ABLAT 7FR L100CM 0.025IN ENDOVENOUS RF CLOSUREFAST

## (undated) DEVICE — BANDAGE,GAUZE,4.5"X4.1YD,STERILE,LF: Brand: MEDLINE

## (undated) DEVICE — SOLUTION IV 500ML 0.9% SOD CHL PH 5 INJ USP VIAFLX PLAS

## (undated) DEVICE — U-DRAPE: Brand: CONVERTORS

## (undated) DEVICE — 3M™ STERI-STRIP™ REINFORCED ADHESIVE SKIN CLOSURES, R1546, 1/4 IN X 4 IN (6 MM X 100 MM), 10 STRIPS/ENVELOPE: Brand: 3M™ STERI-STRIP™

## (undated) DEVICE — BNDG,ELSTC,MATRIX,STRL,6"X5YD,LF,HOOK&LP: Brand: MEDLINE

## (undated) DEVICE — LIQUIBAND RAPID ADHESIVE 36/CS 0.8ML: Brand: MEDLINE

## (undated) DEVICE — DECANTER FLD 9IN ST BG FOR ASEP TRNSF OF FLD

## (undated) DEVICE — MASTISOL ADHESIVE LIQ 2/3ML

## (undated) DEVICE — PROVE COVER: Brand: UNBRANDED

## (undated) DEVICE — VAGINAL PREP TRAY: Brand: MEDLINE INDUSTRIES, INC.

## (undated) DEVICE — SHEET,DRAPE,53X77,STERILE: Brand: MEDLINE

## (undated) DEVICE — GAUZE,SPONGE,FLUFF,6"X6.75",STRL,10/TRAY: Brand: MEDLINE

## (undated) DEVICE — MARKER,SKIN,WI/RULER AND LABELS: Brand: MEDLINE

## (undated) DEVICE — BANDAGE COMPR W6INXL12FT SMOOTH FOR LIMB EXSANG ESMARCH

## (undated) DEVICE — SOLUTION IV IRRIG POUR BRL 0.9% SODIUM CHL 2F7124

## (undated) DEVICE — GLOVE SURG SZ 75 L12IN FNGR THK87MIL DK GRN LTX FREE ISOLEX

## (undated) DEVICE — CHLORAPREP 26ML ORANGE

## (undated) DEVICE — THREE QUARTER SHEET: Brand: CONVERTORS

## (undated) DEVICE — DRAPE,EXTREMITY,89X128,STERILE: Brand: MEDLINE

## (undated) DEVICE — GLOVE SURG SZ 7 CRM LTX FREE POLYISOPRENE POLYMER BEAD ANTI

## (undated) DEVICE — SUTURE ETHLN SZ 4-0 L18IN NONABSORBABLE BLK L19MM PS-2 3/8 1667H

## (undated) DEVICE — ENDO KIT,LOURDES HOSPITAL: Brand: MEDLINE INDUSTRIES, INC.

## (undated) DEVICE — Z DISCONTINUED PER MEDLINE USE 2718072 DRESSING FOAM W5XL12.5CM SIL ADH THN BORDED CNFRM LO PROF

## (undated) DEVICE — SUTURE VCRL SZ 3-0 L27IN ABSRB UD L26MM SH 1/2 CIR J416H

## (undated) DEVICE — PENCIL BTTN S S CAUT TIP W HOLSTER 25 50

## (undated) DEVICE — ANGIOGRAPHY PK

## (undated) DEVICE — PAD,NON-ADHERENT,3X8,STERILE,LF,1/PK: Brand: MEDLINE

## (undated) DEVICE — GUIDEWIRE WITH ICE™ HYDROPHILIC COATING: Brand: V-18™ CONTROL WIRE™

## (undated) DEVICE — ROYAL SILK SURGICAL GOWN, XXL: Brand: CONVERTORS

## (undated) DEVICE — ZIMMER® STERILE DISPOSABLE TOURNIQUET CUFF WITH PLC, DUAL PORT, SINGLE BLADDER, 18 IN. (46 CM)

## (undated) DEVICE — CATHETER KIT 5 FR 21 GAX7 CM MICROINTRODUCER GUIDEWIRE STIFF